# Patient Record
Sex: MALE | Race: WHITE | Employment: OTHER
[De-identification: names, ages, dates, MRNs, and addresses within clinical notes are randomized per-mention and may not be internally consistent; named-entity substitution may affect disease eponyms.]

---

## 2017-01-19 ENCOUNTER — TELEPHONE (OUTPATIENT)
Facility: CLINIC | Age: 82
End: 2017-01-19

## 2017-01-19 RX ORDER — PREDNISONE 20 MG/1
TABLET ORAL
Qty: 17 TABLET | Refills: 0 | Status: SHIPPED | OUTPATIENT
Start: 2017-01-19 | End: 2017-01-29

## 2017-02-13 ENCOUNTER — PROCEDURE VISIT (OUTPATIENT)
Dept: PODIATRY | Facility: CLINIC | Age: 82
End: 2017-02-13

## 2017-02-13 VITALS
HEIGHT: 64 IN | DIASTOLIC BLOOD PRESSURE: 76 MMHG | WEIGHT: 164 LBS | BODY MASS INDEX: 28 KG/M2 | HEART RATE: 72 BPM | SYSTOLIC BLOOD PRESSURE: 127 MMHG

## 2017-02-13 DIAGNOSIS — M79.674 PAIN IN TOES OF BOTH FEET: ICD-10-CM

## 2017-02-13 DIAGNOSIS — B35.1 ONYCHOMYCOSIS: Primary | ICD-10-CM

## 2017-02-13 DIAGNOSIS — M79.675 PAIN IN TOES OF BOTH FEET: ICD-10-CM

## 2017-02-13 PROCEDURE — 1036F TOBACCO NON-USER: CPT | Performed by: PODIATRIST

## 2017-02-13 PROCEDURE — 11721 DEBRIDE NAIL 6 OR MORE: CPT | Performed by: PODIATRIST

## 2017-02-13 ASSESSMENT — ENCOUNTER SYMPTOMS
CONSTIPATION: 0
DIARRHEA: 0
VOMITING: 0
COLOR CHANGE: 0
NAUSEA: 0

## 2017-02-17 ENCOUNTER — TELEPHONE (OUTPATIENT)
Facility: CLINIC | Age: 82
End: 2017-02-17

## 2017-02-17 RX ORDER — PREDNISONE 20 MG/1
TABLET ORAL
Qty: 17 TABLET | Refills: 0 | Status: SHIPPED | OUTPATIENT
Start: 2017-02-17 | End: 2017-02-27

## 2017-02-17 RX ORDER — BENZONATATE 100 MG/1
100 CAPSULE ORAL 3 TIMES DAILY PRN
Qty: 40 CAPSULE | Refills: 0 | Status: SHIPPED | OUTPATIENT
Start: 2017-02-17 | End: 2017-02-24

## 2017-02-28 ENCOUNTER — OFFICE VISIT (OUTPATIENT)
Facility: CLINIC | Age: 82
End: 2017-02-28

## 2017-02-28 VITALS
RESPIRATION RATE: 16 BRPM | OXYGEN SATURATION: 98 % | DIASTOLIC BLOOD PRESSURE: 70 MMHG | TEMPERATURE: 97.4 F | HEART RATE: 72 BPM | SYSTOLIC BLOOD PRESSURE: 108 MMHG

## 2017-02-28 DIAGNOSIS — J40 BRONCHITIS: Primary | ICD-10-CM

## 2017-02-28 DIAGNOSIS — I48.0 PAROXYSMAL ATRIAL FIBRILLATION (HCC): ICD-10-CM

## 2017-02-28 PROCEDURE — 4040F PNEUMOC VAC/ADMIN/RCVD: CPT | Performed by: NURSE PRACTITIONER

## 2017-02-28 PROCEDURE — G8420 CALC BMI NORM PARAMETERS: HCPCS | Performed by: NURSE PRACTITIONER

## 2017-02-28 PROCEDURE — 1036F TOBACCO NON-USER: CPT | Performed by: NURSE PRACTITIONER

## 2017-02-28 PROCEDURE — G8484 FLU IMMUNIZE NO ADMIN: HCPCS | Performed by: NURSE PRACTITIONER

## 2017-02-28 PROCEDURE — 1123F ACP DISCUSS/DSCN MKR DOCD: CPT | Performed by: NURSE PRACTITIONER

## 2017-02-28 PROCEDURE — 99213 OFFICE O/P EST LOW 20 MIN: CPT | Performed by: NURSE PRACTITIONER

## 2017-02-28 PROCEDURE — G8427 DOCREV CUR MEDS BY ELIG CLIN: HCPCS | Performed by: NURSE PRACTITIONER

## 2017-03-01 LAB
ABSOLUTE BASO #: 0 K/UL (ref 0–0.1)
ABSOLUTE EOS #: 0 K/UL (ref 0.1–0.4)
ABSOLUTE LYMPH #: 0.7 K/UL (ref 0.8–5.2)
ABSOLUTE MONO #: 0.4 K/UL (ref 0.1–0.9)
ABSOLUTE NEUT #: 8.9 K/UL (ref 1.3–9.1)
ANION GAP SERPL CALCULATED.3IONS-SCNC: 14 MMOL/L
BASOPHILS RELATIVE PERCENT: 0.1 % (ref 0–1)
BUN BLDV-MCNC: 23 MG/DL (ref 10–25)
CALCIUM SERPL-MCNC: 9.9 MG/DL (ref 8.7–10.8)
CHLORIDE BLD-SCNC: 100 MMOL/L (ref 95–111)
CO2: 26 MMOL/L (ref 21–32)
CREAT SERPL-MCNC: 1.1 MG/DL (ref 0.7–1.5)
EGFR AFRICAN AMERICAN: 77
EGFR IF NONAFRICAN AMERICAN: 64
EOSINOPHILS RELATIVE PERCENT: 0.1 % (ref 1–4)
GLUCOSE: 154 MG/DL (ref 70–100)
HCT VFR BLD CALC: 46.6 % (ref 41.4–51)
HEMOGLOBIN: 15.3 G/DL (ref 13.8–17)
LYMPHOCYTE %: 6.7 % (ref 16–48)
MCH RBC QN AUTO: 29.3 PG (ref 27–34)
MCHC RBC AUTO-ENTMCNC: 32.8 G/DL (ref 31–36)
MCV RBC AUTO: 89.3 FL (ref 80–100)
MONOCYTES # BLD: 4.3 % (ref 1–8)
NEUTROPHILS RELATIVE PERCENT: 87.9 % (ref 45–75)
PDW BLD-RTO: 13.8 % (ref 10.8–14.8)
PLATELETS: 265 K/UL (ref 150–450)
POTASSIUM SERPL-SCNC: 4.4 MMOL/L (ref 3.5–5.4)
RBC: 5.22 M/UL (ref 4–5.5)
SODIUM BLD-SCNC: 136 MMOL/L (ref 134–147)
WBC: 10.1 K/UL (ref 3.7–10.8)

## 2017-03-08 ENCOUNTER — HOSPITAL ENCOUNTER (OUTPATIENT)
Dept: GENERAL RADIOLOGY | Facility: CLINIC | Age: 82
Discharge: HOME | End: 2017-03-08
Payer: MEDICARE

## 2017-03-08 ENCOUNTER — HOSPITAL ENCOUNTER (OUTPATIENT)
Facility: CLINIC | Age: 82
Discharge: HOME | End: 2017-03-08
Payer: MEDICARE

## 2017-03-08 ENCOUNTER — OFFICE VISIT (OUTPATIENT)
Facility: CLINIC | Age: 82
End: 2017-03-08

## 2017-03-08 VITALS
BODY MASS INDEX: 29.01 KG/M2 | OXYGEN SATURATION: 97 % | RESPIRATION RATE: 16 BRPM | WEIGHT: 169 LBS | TEMPERATURE: 98.6 F | DIASTOLIC BLOOD PRESSURE: 76 MMHG | SYSTOLIC BLOOD PRESSURE: 124 MMHG | HEART RATE: 64 BPM

## 2017-03-08 DIAGNOSIS — I51.89 DIASTOLIC DYSFUNCTION: ICD-10-CM

## 2017-03-08 DIAGNOSIS — D50.9 IRON DEFICIENCY ANEMIA, UNSPECIFIED IRON DEFICIENCY ANEMIA TYPE: ICD-10-CM

## 2017-03-08 DIAGNOSIS — N40.1 BENIGN PROSTATIC HYPERPLASIA WITH LOWER URINARY TRACT SYMPTOMS, UNSPECIFIED MORPHOLOGY: ICD-10-CM

## 2017-03-08 DIAGNOSIS — K21.9 GASTROESOPHAGEAL REFLUX DISEASE WITHOUT ESOPHAGITIS: ICD-10-CM

## 2017-03-08 DIAGNOSIS — R61 EXCESSIVE SWEATING: ICD-10-CM

## 2017-03-08 DIAGNOSIS — G47.00 INSOMNIA, UNSPECIFIED TYPE: ICD-10-CM

## 2017-03-08 DIAGNOSIS — R61 NIGHT SWEATS: ICD-10-CM

## 2017-03-08 DIAGNOSIS — E78.00 PURE HYPERCHOLESTEROLEMIA: ICD-10-CM

## 2017-03-08 DIAGNOSIS — M19.90 OSTEOARTHRITIS, UNSPECIFIED OSTEOARTHRITIS TYPE, UNSPECIFIED SITE: ICD-10-CM

## 2017-03-08 DIAGNOSIS — E53.8 B12 DEFICIENCY: ICD-10-CM

## 2017-03-08 DIAGNOSIS — I10 ESSENTIAL HYPERTENSION: Primary | ICD-10-CM

## 2017-03-08 DIAGNOSIS — R74.8 ELEVATED LIVER ENZYMES: ICD-10-CM

## 2017-03-08 DIAGNOSIS — I48.0 PAROXYSMAL ATRIAL FIBRILLATION (HCC): ICD-10-CM

## 2017-03-08 DIAGNOSIS — K58.9 IRRITABLE BOWEL SYNDROME WITHOUT DIARRHEA: ICD-10-CM

## 2017-03-08 DIAGNOSIS — E87.1 HYPONATREMIA: ICD-10-CM

## 2017-03-08 DIAGNOSIS — K57.30 DIVERTICULOSIS OF LARGE INTESTINE WITHOUT HEMORRHAGE: ICD-10-CM

## 2017-03-08 LAB
ABSOLUTE EOS #: 0.2 K/UL (ref 0–0.4)
ABSOLUTE LYMPH #: 0.9 K/UL (ref 1–4.8)
ABSOLUTE MONO #: 1.4 K/UL (ref 0.1–1.2)
ABSOLUTE RETIC #: 0.07 M/UL (ref 0.02–0.1)
BASOPHILS # BLD: 0 % (ref 0–2)
BASOPHILS ABSOLUTE: 0 K/UL (ref 0–0.2)
DIFFERENTIAL TYPE: ABNORMAL
EOSINOPHILS RELATIVE PERCENT: 3 % (ref 1–4)
HCT VFR BLD CALC: 43.5 % (ref 41–53)
HEMOGLOBIN: 14.5 G/DL (ref 13.5–17.5)
LYMPHOCYTES # BLD: 12 % (ref 24–44)
MCH RBC QN AUTO: 30.2 PG (ref 26–34)
MCHC RBC AUTO-ENTMCNC: 33.4 G/DL (ref 31–37)
MCV RBC AUTO: 90.4 FL (ref 80–100)
MONOCYTES # BLD: 20 % (ref 2–11)
PDW BLD-RTO: 16 % (ref 12.5–15.4)
PLATELET # BLD: 181 K/UL (ref 140–450)
PLATELET ESTIMATE: ABNORMAL
PMV BLD AUTO: 9.5 FL (ref 6–12)
RBC # BLD: 4.81 M/UL (ref 4.5–5.9)
RBC # BLD: ABNORMAL 10*6/UL
RETIC %: 1.6 % (ref 0.5–2)
SEG NEUTROPHILS: 65 % (ref 36–66)
SEGMENTED NEUTROPHILS ABSOLUTE COUNT: 4.7 K/UL (ref 1.8–7.7)
WBC # BLD: 7.3 K/UL (ref 3.5–11)
WBC # BLD: ABNORMAL 10*3/UL

## 2017-03-08 PROCEDURE — G8420 CALC BMI NORM PARAMETERS: HCPCS | Performed by: FAMILY MEDICINE

## 2017-03-08 PROCEDURE — 84439 ASSAY OF FREE THYROXINE: CPT

## 2017-03-08 PROCEDURE — 1036F TOBACCO NON-USER: CPT | Performed by: FAMILY MEDICINE

## 2017-03-08 PROCEDURE — 85045 AUTOMATED RETICULOCYTE COUNT: CPT

## 2017-03-08 PROCEDURE — 99213 OFFICE O/P EST LOW 20 MIN: CPT | Performed by: FAMILY MEDICINE

## 2017-03-08 PROCEDURE — 4040F PNEUMOC VAC/ADMIN/RCVD: CPT | Performed by: FAMILY MEDICINE

## 2017-03-08 PROCEDURE — 85025 COMPLETE CBC W/AUTO DIFF WBC: CPT

## 2017-03-08 PROCEDURE — 84443 ASSAY THYROID STIM HORMONE: CPT

## 2017-03-08 PROCEDURE — 71020 XR CHEST STANDARD TWO VW: CPT

## 2017-03-08 PROCEDURE — G8427 DOCREV CUR MEDS BY ELIG CLIN: HCPCS | Performed by: FAMILY MEDICINE

## 2017-03-08 PROCEDURE — 1123F ACP DISCUSS/DSCN MKR DOCD: CPT | Performed by: FAMILY MEDICINE

## 2017-03-08 PROCEDURE — 36415 COLL VENOUS BLD VENIPUNCTURE: CPT

## 2017-03-08 PROCEDURE — G8484 FLU IMMUNIZE NO ADMIN: HCPCS | Performed by: FAMILY MEDICINE

## 2017-03-08 ASSESSMENT — ENCOUNTER SYMPTOMS
ABDOMINAL DISTENTION: 0
VOMITING: 0
NAUSEA: 0
CHEST TIGHTNESS: 0
ABDOMINAL PAIN: 0
BACK PAIN: 0
RHINORRHEA: 0
COUGH: 1
SHORTNESS OF BREATH: 0
CONSTIPATION: 0
SORE THROAT: 0
DIARRHEA: 0

## 2017-03-09 LAB
PATHOLOGIST REVIEW: NORMAL
SURGICAL PATHOLOGY REPORT: NORMAL
THYROXINE, FREE: 1.25 NG/DL (ref 0.93–1.7)
TSH SERPL DL<=0.05 MIU/L-ACNC: 2.66 MIU/L (ref 0.3–5)

## 2017-05-24 ENCOUNTER — PROCEDURE VISIT (OUTPATIENT)
Dept: PODIATRY | Age: 82
End: 2017-05-24
Payer: MEDICARE

## 2017-05-24 VITALS — HEART RATE: 70 BPM | SYSTOLIC BLOOD PRESSURE: 142 MMHG | DIASTOLIC BLOOD PRESSURE: 77 MMHG

## 2017-05-24 DIAGNOSIS — B35.1 ONYCHOMYCOSIS: Primary | ICD-10-CM

## 2017-05-24 DIAGNOSIS — M79.675 PAIN IN TOES OF BOTH FEET: ICD-10-CM

## 2017-05-24 DIAGNOSIS — M79.674 PAIN IN TOES OF BOTH FEET: ICD-10-CM

## 2017-05-24 LAB
ABSOLUTE BASO #: 0 K/UL (ref 0–0.1)
ABSOLUTE EOS #: 0.1 K/UL (ref 0.1–0.4)
ABSOLUTE LYMPH #: 0.9 K/UL (ref 0.8–5.2)
ABSOLUTE MONO #: 0.9 K/UL (ref 0.1–0.9)
ABSOLUTE NEUT #: 4.3 K/UL (ref 1.3–9.1)
ALBUMIN SERPL-MCNC: 3.8 G/DL (ref 3.2–5.3)
ALK PHOSPHATASE: 64 IU/L (ref 35–121)
ALT SERPL-CCNC: 15 IU/L (ref 5–59)
ANION GAP SERPL CALCULATED.3IONS-SCNC: 12 MMOL/L
AST SERPL-CCNC: 18 IU/L (ref 10–42)
BASOPHILS RELATIVE PERCENT: 0.3 %
BILIRUB SERPL-MCNC: 1.1 MG/DL (ref 0.2–1.3)
BUN BLDV-MCNC: 13 MG/DL (ref 10–25)
CALCIUM SERPL-MCNC: 9.5 MG/DL (ref 8.7–10.8)
CHLORIDE BLD-SCNC: 101 MMOL/L (ref 95–111)
CHOLESTEROL/HDL RATIO: 2.3
CHOLESTEROL: 115 MG/DL
CO2: 30 MMOL/L (ref 21–32)
CREAT SERPL-MCNC: 1 MG/DL (ref 0.7–1.5)
EGFR AFRICAN AMERICAN: 86
EGFR IF NONAFRICAN AMERICAN: 71
EOSINOPHILS RELATIVE PERCENT: 1.3 %
GLUCOSE: 89 MG/DL (ref 70–100)
HCT VFR BLD CALC: 44.2 % (ref 41.4–51)
HDLC SERPL-MCNC: 50 MG/DL (ref 40–60)
HEMOGLOBIN: 14.6 G/DL (ref 13.8–17)
IRON: 67 MCG/DL (ref 50–160)
LDL CHOLESTEROL CALCULATED: 53 MG/DL
LDL/HDL RATIO: 1.1
LYMPHOCYTE %: 15 %
MCH RBC QN AUTO: 30 PG (ref 27–34)
MCHC RBC AUTO-ENTMCNC: 33 G/DL (ref 31–36)
MCV RBC AUTO: 90.9 FL (ref 80–100)
MONOCYTES # BLD: 13.8 %
NEUTROPHILS RELATIVE PERCENT: 69.3 %
PDW BLD-RTO: 13.2 % (ref 10.8–14.8)
PLATELETS: 200 K/UL (ref 150–450)
POTASSIUM SERPL-SCNC: 4.6 MMOL/L (ref 3.5–5.4)
RBC: 4.86 M/UL (ref 4–5.5)
SODIUM BLD-SCNC: 138 MMOL/L (ref 134–147)
TOTAL PROTEIN: 5.9 G/DL (ref 5.8–8)
TRIGL SERPL-MCNC: 61 MG/DL
VITAMIN B-12: 448 PG/ML (ref 211–911)
VLDLC SERPL CALC-MCNC: 12 MG/DL
WBC: 6.1 K/UL (ref 3.7–10.8)

## 2017-05-24 PROCEDURE — 1036F TOBACCO NON-USER: CPT | Performed by: PODIATRIST

## 2017-05-24 PROCEDURE — 11721 DEBRIDE NAIL 6 OR MORE: CPT | Performed by: PODIATRIST

## 2017-05-24 ASSESSMENT — ENCOUNTER SYMPTOMS
DIARRHEA: 0
COLOR CHANGE: 0
CONSTIPATION: 0
NAUSEA: 0
VOMITING: 0

## 2017-06-08 PROBLEM — R00.1 BRADYCARDIA: Status: ACTIVE | Noted: 2017-06-08

## 2017-06-08 PROBLEM — I45.5 SINUS PAUSE: Status: ACTIVE | Noted: 2017-06-08

## 2017-08-08 ENCOUNTER — OFFICE VISIT (OUTPATIENT)
Dept: UROLOGY | Age: 82
End: 2017-08-08
Payer: MEDICARE

## 2017-08-08 VITALS
HEART RATE: 89 BPM | SYSTOLIC BLOOD PRESSURE: 127 MMHG | WEIGHT: 164.9 LBS | RESPIRATION RATE: 16 BRPM | HEIGHT: 61 IN | DIASTOLIC BLOOD PRESSURE: 90 MMHG | BODY MASS INDEX: 31.13 KG/M2

## 2017-08-08 DIAGNOSIS — N40.1 BENIGN PROSTATIC HYPERPLASIA WITH LOWER URINARY TRACT SYMPTOMS, UNSPECIFIED MORPHOLOGY: ICD-10-CM

## 2017-08-08 DIAGNOSIS — R35.1 NOCTURIA: Primary | ICD-10-CM

## 2017-08-08 PROCEDURE — 99213 OFFICE O/P EST LOW 20 MIN: CPT | Performed by: NURSE PRACTITIONER

## 2017-08-08 PROCEDURE — 1123F ACP DISCUSS/DSCN MKR DOCD: CPT | Performed by: NURSE PRACTITIONER

## 2017-08-08 PROCEDURE — 4040F PNEUMOC VAC/ADMIN/RCVD: CPT | Performed by: NURSE PRACTITIONER

## 2017-08-08 PROCEDURE — G8427 DOCREV CUR MEDS BY ELIG CLIN: HCPCS | Performed by: NURSE PRACTITIONER

## 2017-08-08 PROCEDURE — 1036F TOBACCO NON-USER: CPT | Performed by: NURSE PRACTITIONER

## 2017-08-08 PROCEDURE — G8417 CALC BMI ABV UP PARAM F/U: HCPCS | Performed by: NURSE PRACTITIONER

## 2017-08-08 RX ORDER — WARFARIN SODIUM 2 MG/1
2 TABLET ORAL
COMMUNITY
End: 2018-05-22

## 2017-08-08 RX ORDER — TAMSULOSIN HYDROCHLORIDE 0.4 MG/1
0.4 CAPSULE ORAL 2 TIMES DAILY
Qty: 180 CAPSULE | Refills: 3 | Status: SHIPPED | OUTPATIENT
Start: 2017-08-08 | End: 2018-08-01 | Stop reason: SDUPTHER

## 2017-08-08 ASSESSMENT — ENCOUNTER SYMPTOMS
COUGH: 0
COLOR CHANGE: 0
ABDOMINAL PAIN: 0
NAUSEA: 0
VOMITING: 0
WHEEZING: 0
SHORTNESS OF BREATH: 0
BACK PAIN: 0
EYE PAIN: 0
EYE REDNESS: 0

## 2017-08-16 ENCOUNTER — PROCEDURE VISIT (OUTPATIENT)
Dept: PODIATRY | Age: 82
End: 2017-08-16
Payer: MEDICARE

## 2017-08-16 VITALS
HEIGHT: 62 IN | DIASTOLIC BLOOD PRESSURE: 69 MMHG | SYSTOLIC BLOOD PRESSURE: 121 MMHG | BODY MASS INDEX: 29.81 KG/M2 | WEIGHT: 162 LBS | HEART RATE: 63 BPM

## 2017-08-16 DIAGNOSIS — M79.675 PAIN IN TOES OF BOTH FEET: ICD-10-CM

## 2017-08-16 DIAGNOSIS — M79.674 PAIN IN TOES OF BOTH FEET: ICD-10-CM

## 2017-08-16 DIAGNOSIS — B35.1 ONYCHOMYCOSIS: Primary | ICD-10-CM

## 2017-08-16 PROCEDURE — 11721 DEBRIDE NAIL 6 OR MORE: CPT | Performed by: PODIATRIST

## 2017-08-16 PROCEDURE — 1036F TOBACCO NON-USER: CPT | Performed by: PODIATRIST

## 2017-08-16 ASSESSMENT — ENCOUNTER SYMPTOMS
CONSTIPATION: 0
VOMITING: 0
NAUSEA: 0
DIARRHEA: 0
COLOR CHANGE: 0

## 2017-08-21 ENCOUNTER — HOSPITAL ENCOUNTER (OUTPATIENT)
Dept: GENERAL RADIOLOGY | Facility: CLINIC | Age: 82
Discharge: HOME | End: 2017-08-21
Payer: MEDICARE

## 2017-08-21 ENCOUNTER — HOSPITAL ENCOUNTER (OUTPATIENT)
Facility: CLINIC | Age: 82
Discharge: HOME | End: 2017-08-21
Payer: MEDICARE

## 2017-08-21 DIAGNOSIS — J20.9 ACUTE BRONCHITIS, UNSPECIFIED ORGANISM: ICD-10-CM

## 2017-08-21 DIAGNOSIS — R05.9 COUGH: ICD-10-CM

## 2017-08-21 PROCEDURE — 71020 XR CHEST STANDARD TWO VW: CPT

## 2017-11-01 ENCOUNTER — PROCEDURE VISIT (OUTPATIENT)
Dept: PODIATRY | Age: 82
End: 2017-11-01
Payer: MEDICARE

## 2017-11-01 VITALS
HEART RATE: 70 BPM | DIASTOLIC BLOOD PRESSURE: 95 MMHG | BODY MASS INDEX: 28.17 KG/M2 | WEIGHT: 165 LBS | SYSTOLIC BLOOD PRESSURE: 155 MMHG | HEIGHT: 64 IN

## 2017-11-01 DIAGNOSIS — M79.675 PAIN IN TOES OF BOTH FEET: ICD-10-CM

## 2017-11-01 DIAGNOSIS — M79.674 PAIN IN TOES OF BOTH FEET: ICD-10-CM

## 2017-11-01 DIAGNOSIS — B35.1 ONYCHOMYCOSIS: Primary | ICD-10-CM

## 2017-11-01 PROCEDURE — 11721 DEBRIDE NAIL 6 OR MORE: CPT | Performed by: PODIATRIST

## 2017-11-01 ASSESSMENT — ENCOUNTER SYMPTOMS
COLOR CHANGE: 0
NAUSEA: 0
CONSTIPATION: 0
VOMITING: 0
DIARRHEA: 0

## 2017-11-01 NOTE — PROGRESS NOTES
[x] [x] [x] [x] [x] [x] [x] [x] [x] [x]  5 4 3 2 1 1 2 3 4 5                          Right                                        Left    Incurvation/Ingrowin                                                                   [] [] [] [] [] [] [] [] [] []  5 4 3 2 1 1 2 3 4 5                         Right                                        Left    Inflammation/Pain                                                                   [x] [x] [x] [x] [x] [x] [x] [x] [x] [x]  5 4 3 2 1 1 2 3 4 5                         Right                                        Left    Vascular:  DP/PT pulses palpable 2/4, Bilateral.    CFT <3 seconds to digits 1-5, Bilateral .   Hair growth present to level of digits, Bilateral.    Neurological:  Sensation present to light touch to level of digits, Bilateral.    Assessment:  80 y.o. male with:   1. Onychomycosis    2. Pain in toes of both feet       Orders Placed This Encounter   Procedures    NM DEBRIDEMENT OF NAILS, 6 OR MORE         Plan:   Pt was evaluated and examined. Patient was given personalized discharge instructions. Nails 1-10 were debrided in length and thickness sharply with a nail nipper and  without incident, slant back performed. Pt will follow up in 9-12 weeks or sooner if any problems arise. Diagnosis was discussed with the pt and all of their questions were answered in detail. Proper foot hygiene and care was discussed with the pt. Patient to check feet daily and contact the office with any questions/problems/concerns. Other comorbidity noted and will be managed by PCP. Pain waiver discussed with patient and confirmed.          11/1/2017    Electronically signed by Deya Camargo DPM on 11/1/2017 at 4:56 PM

## 2017-11-30 ENCOUNTER — HOSPITAL ENCOUNTER (OUTPATIENT)
Age: 82
Setting detail: SPECIMEN
Discharge: HOME | End: 2017-11-30
Payer: MEDICARE

## 2017-11-30 DIAGNOSIS — I51.89 DIASTOLIC DYSFUNCTION: ICD-10-CM

## 2017-11-30 DIAGNOSIS — D50.9 IRON DEFICIENCY ANEMIA, UNSPECIFIED IRON DEFICIENCY ANEMIA TYPE: ICD-10-CM

## 2017-11-30 DIAGNOSIS — E53.8 B12 DEFICIENCY: ICD-10-CM

## 2017-11-30 DIAGNOSIS — I10 ESSENTIAL HYPERTENSION: ICD-10-CM

## 2017-11-30 DIAGNOSIS — E87.1 HYPONATREMIA: ICD-10-CM

## 2017-11-30 DIAGNOSIS — R74.8 ELEVATED LIVER ENZYMES: ICD-10-CM

## 2017-11-30 DIAGNOSIS — E78.00 PURE HYPERCHOLESTEROLEMIA: ICD-10-CM

## 2017-11-30 LAB
ALBUMIN SERPL-MCNC: 4 G/DL (ref 3.5–5.2)
ALBUMIN/GLOBULIN RATIO: 1.6 (ref 1–2.5)
ALP BLD-CCNC: 72 U/L (ref 40–129)
ALT SERPL-CCNC: 20 U/L (ref 5–41)
ANION GAP SERPL CALCULATED.3IONS-SCNC: 12 MMOL/L (ref 9–17)
AST SERPL-CCNC: 21 U/L
BILIRUB SERPL-MCNC: 1.29 MG/DL (ref 0.3–1.2)
BUN BLDV-MCNC: 18 MG/DL (ref 8–23)
BUN/CREAT BLD: ABNORMAL (ref 9–20)
CALCIUM SERPL-MCNC: 9.1 MG/DL (ref 8.6–10.4)
CHLORIDE BLD-SCNC: 100 MMOL/L (ref 98–107)
CHOLESTEROL/HDL RATIO: 1.9
CHOLESTEROL: 125 MG/DL
CO2: 27 MMOL/L (ref 20–31)
CREAT SERPL-MCNC: 0.94 MG/DL (ref 0.7–1.2)
GFR AFRICAN AMERICAN: >60 ML/MIN
GFR NON-AFRICAN AMERICAN: >60 ML/MIN
GFR SERPL CREATININE-BSD FRML MDRD: ABNORMAL ML/MIN/{1.73_M2}
GFR SERPL CREATININE-BSD FRML MDRD: ABNORMAL ML/MIN/{1.73_M2}
GLUCOSE BLD-MCNC: 87 MG/DL (ref 70–99)
HCT VFR BLD CALC: 47.5 % (ref 40.7–50.3)
HDLC SERPL-MCNC: 65 MG/DL
HEMOGLOBIN: 14.7 G/DL (ref 13–17)
IRON: 77 UG/DL (ref 59–158)
LDL CHOLESTEROL: 46 MG/DL (ref 0–130)
MCH RBC QN AUTO: 29.5 PG (ref 25.2–33.5)
MCHC RBC AUTO-ENTMCNC: 30.9 G/DL (ref 28.4–34.8)
MCV RBC AUTO: 95.2 FL (ref 82.6–102.9)
PDW BLD-RTO: 14.8 % (ref 11.8–14.4)
PLATELET # BLD: 214 K/UL (ref 138–453)
PMV BLD AUTO: 11.1 FL (ref 8.1–13.5)
POTASSIUM SERPL-SCNC: 4.4 MMOL/L (ref 3.7–5.3)
RBC # BLD: 4.99 M/UL (ref 4.21–5.77)
SODIUM BLD-SCNC: 139 MMOL/L (ref 135–144)
TOTAL PROTEIN: 6.5 G/DL (ref 6.4–8.3)
TRIGL SERPL-MCNC: 70 MG/DL
VITAMIN B-12: 694 PG/ML (ref 211–946)
VLDLC SERPL CALC-MCNC: NORMAL MG/DL (ref 1–30)
WBC # BLD: 6.7 K/UL (ref 3.5–11.3)

## 2018-01-17 ENCOUNTER — PROCEDURE VISIT (OUTPATIENT)
Dept: PODIATRY | Age: 83
End: 2018-01-17
Payer: MEDICARE

## 2018-01-17 VITALS
HEIGHT: 64 IN | BODY MASS INDEX: 28.17 KG/M2 | SYSTOLIC BLOOD PRESSURE: 152 MMHG | WEIGHT: 165 LBS | DIASTOLIC BLOOD PRESSURE: 77 MMHG | HEART RATE: 74 BPM

## 2018-01-17 DIAGNOSIS — M79.674 PAIN IN TOES OF BOTH FEET: ICD-10-CM

## 2018-01-17 DIAGNOSIS — M79.675 PAIN IN TOES OF BOTH FEET: ICD-10-CM

## 2018-01-17 DIAGNOSIS — B35.1 ONYCHOMYCOSIS: Primary | ICD-10-CM

## 2018-01-17 PROCEDURE — 11721 DEBRIDE NAIL 6 OR MORE: CPT | Performed by: PODIATRIST

## 2018-01-17 RX ORDER — WARFARIN SODIUM 5 MG/1
5 TABLET ORAL DAILY
COMMUNITY
Start: 2018-01-11 | End: 2019-08-01 | Stop reason: DRUGHIGH

## 2018-01-17 ASSESSMENT — ENCOUNTER SYMPTOMS
DIARRHEA: 0
CONSTIPATION: 0
COLOR CHANGE: 0
VOMITING: 0
NAUSEA: 0

## 2018-01-17 NOTE — PROGRESS NOTES
times daily 60 tablet 11    metoprolol tartrate (LOPRESSOR) 25 MG tablet Take 25 mg by mouth 2 times daily      Ferrous Gluconate (IRON 27 PO) Take 1 tablet by mouth daily      aspirin 81 MG tablet Take 81 mg by mouth daily      vitamin B-12 (CYANOCOBALAMIN) 100 MCG tablet Take 50 mcg by mouth daily      vitamin D-3 (CHOLECALCIFEROL) 5000 UNITS TABS Take 5,000 Units by mouth daily      Multiple Vitamins-Minerals (THERAPEUTIC MULTIVITAMIN-MINERALS) tablet Take 1 tablet by mouth daily.  Docusate Calcium (STOOL SOFTENER PO) Take  by mouth.  nitroGLYCERIN (NITROSTAT) 0.4 MG SL tablet Place 0.4 mg under the tongue every 5 minutes as needed.  Melatonin 3 MG TABS Take 3 mg by mouth nightly as needed. No current facility-administered medications on file prior to visit. Review of Systems   Constitutional: Negative for chills, diaphoresis, fatigue, fever and unexpected weight change. Cardiovascular: Negative for leg swelling. Gastrointestinal: Negative for constipation, diarrhea, nausea and vomiting. Musculoskeletal: Positive for gait problem. Negative for arthralgias and joint swelling. Skin: Negative for color change, pallor, rash and wound. Neurological: Negative for weakness and numbness. Objective:  General: AAO x 3 in NAD. Derm  Left hallux nail incurvated medial border with slight redness and pain to palpation.     Sites of Onychomycosis Involvement (Check affected area)  [x] [x] [x] [x] [x] [x] [x] [x] [x] [x]  5 4 3 2 1 1 2 3 4 5                          Right                                        Left    Thickness  [x] [x] [x] [x] [x] [x] [x] [x] [x] [x]  5 4 3 2 1 1 2 3 4 5                         Right                                        Left    Dystrophic Changes                                                                 [x] [x] [x] [x] [x] [x] [x] [x] [x] [x]  5 4 3 2 1 1 2 3 4 5                         Right Left    Color                                                                  [x] [x] [x] [x] [x] [x] [x] [x] [x] [x]  5 4 3 2 1 1 2 3 4 5                          Right                                        Left    Incurvation/Ingrowin                                                                   [] [] [] [] [] [] [] [] [] []  5 4 3 2 1 1 2 3 4 5                         Right                                        Left    Inflammation/Pain                                                                   [x] [x] [x] [x] [x] [x] [x] [x] [x] [x]  5 4 3 2 1 1 2 3 4 5                         Right                                        Left    Vascular:  DP/PT pulses palpable 2/4, Bilateral.    CFT <3 seconds to digits 1-5, Bilateral .   Hair growth present to level of digits, Bilateral.    Neurological:  Sensation present to light touch to level of digits, Bilateral.    Assessment:  80 y.o. male with:   1. Onychomycosis    2. Pain in toes of both feet       Orders Placed This Encounter   Procedures    NE DEBRIDEMENT OF NAILS, 6 OR MORE         Plan:   Pt was evaluated and examined. Patient was given personalized discharge instructions. Nails 1-10 were debrided in length and thickness sharply with a nail nipper and  without incident, slant back performed. Pt will follow up in 9-12 weeks or sooner if any problems arise. Diagnosis was discussed with the pt and all of their questions were answered in detail. Proper foot hygiene and care was discussed with the pt. Patient to check feet daily and contact the office with any questions/problems/concerns. Other comorbidity noted and will be managed by PCP. Pain waiver discussed with patient and confirmed.          1/17/2018    Electronically signed by Gregory Silva DPM on 1/17/2018 at 4:28 PM

## 2018-04-17 ENCOUNTER — OFFICE VISIT (OUTPATIENT)
Dept: PODIATRY | Age: 83
End: 2018-04-17
Payer: MEDICARE

## 2018-04-17 VITALS
DIASTOLIC BLOOD PRESSURE: 70 MMHG | SYSTOLIC BLOOD PRESSURE: 106 MMHG | HEART RATE: 68 BPM | WEIGHT: 160 LBS | HEIGHT: 64 IN | BODY MASS INDEX: 27.31 KG/M2

## 2018-04-17 DIAGNOSIS — B35.1 ONYCHOMYCOSIS: Primary | ICD-10-CM

## 2018-04-17 DIAGNOSIS — M79.674 PAIN IN TOES OF BOTH FEET: ICD-10-CM

## 2018-04-17 DIAGNOSIS — M79.675 PAIN IN TOES OF BOTH FEET: ICD-10-CM

## 2018-04-17 PROCEDURE — 11721 DEBRIDE NAIL 6 OR MORE: CPT | Performed by: PODIATRIST

## 2018-04-17 ASSESSMENT — ENCOUNTER SYMPTOMS
CONSTIPATION: 0
NAUSEA: 0
DIARRHEA: 0
VOMITING: 0
COLOR CHANGE: 0

## 2018-05-22 ENCOUNTER — OFFICE VISIT (OUTPATIENT)
Dept: GASTROENTEROLOGY | Age: 83
End: 2018-05-22
Payer: MEDICARE

## 2018-05-22 VITALS
HEART RATE: 80 BPM | BODY MASS INDEX: 28.6 KG/M2 | DIASTOLIC BLOOD PRESSURE: 70 MMHG | SYSTOLIC BLOOD PRESSURE: 111 MMHG | WEIGHT: 166.6 LBS

## 2018-05-22 DIAGNOSIS — K58.9 IRRITABLE BOWEL SYNDROME, UNSPECIFIED TYPE: ICD-10-CM

## 2018-05-22 DIAGNOSIS — K21.9 GASTROESOPHAGEAL REFLUX DISEASE WITHOUT ESOPHAGITIS: ICD-10-CM

## 2018-05-22 DIAGNOSIS — K58.9 IRRITABLE BOWEL SYNDROME WITHOUT DIARRHEA: Primary | ICD-10-CM

## 2018-05-22 PROCEDURE — 99215 OFFICE O/P EST HI 40 MIN: CPT | Performed by: INTERNAL MEDICINE

## 2018-05-22 PROCEDURE — G8417 CALC BMI ABV UP PARAM F/U: HCPCS | Performed by: INTERNAL MEDICINE

## 2018-05-22 PROCEDURE — G8427 DOCREV CUR MEDS BY ELIG CLIN: HCPCS | Performed by: INTERNAL MEDICINE

## 2018-05-22 ASSESSMENT — ENCOUNTER SYMPTOMS
GASTROINTESTINAL NEGATIVE: 1
CONSTIPATION: 0
ABDOMINAL DISTENTION: 0
BLOOD IN STOOL: 0
NAUSEA: 0
ABDOMINAL PAIN: 0
TROUBLE SWALLOWING: 0
VOMITING: 0
RECTAL PAIN: 0
ANAL BLEEDING: 0
ALLERGIC/IMMUNOLOGIC NEGATIVE: 1
DIARRHEA: 0
RESPIRATORY NEGATIVE: 1

## 2018-05-31 ENCOUNTER — HOSPITAL ENCOUNTER (OUTPATIENT)
Age: 83
Setting detail: SPECIMEN
Discharge: HOME | End: 2018-05-31
Payer: MEDICARE

## 2018-05-31 DIAGNOSIS — I10 ESSENTIAL HYPERTENSION: ICD-10-CM

## 2018-05-31 DIAGNOSIS — I51.89 DIASTOLIC DYSFUNCTION: ICD-10-CM

## 2018-05-31 DIAGNOSIS — R74.8 ELEVATED LIVER ENZYMES: ICD-10-CM

## 2018-05-31 DIAGNOSIS — E53.8 B12 DEFICIENCY: ICD-10-CM

## 2018-05-31 DIAGNOSIS — E78.00 PURE HYPERCHOLESTEROLEMIA: ICD-10-CM

## 2018-05-31 DIAGNOSIS — E87.1 HYPONATREMIA: ICD-10-CM

## 2018-05-31 DIAGNOSIS — D50.9 IRON DEFICIENCY ANEMIA, UNSPECIFIED IRON DEFICIENCY ANEMIA TYPE: ICD-10-CM

## 2018-05-31 LAB
ALBUMIN SERPL-MCNC: 3.7 G/DL (ref 3.5–5.2)
ALBUMIN/GLOBULIN RATIO: 1.4 (ref 1–2.5)
ALP BLD-CCNC: 83 U/L (ref 40–129)
ALT SERPL-CCNC: 17 U/L (ref 5–41)
ANION GAP SERPL CALCULATED.3IONS-SCNC: 11 MMOL/L (ref 9–17)
AST SERPL-CCNC: 18 U/L
BILIRUB SERPL-MCNC: 1.13 MG/DL (ref 0.3–1.2)
BUN BLDV-MCNC: 9 MG/DL (ref 8–23)
BUN/CREAT BLD: ABNORMAL (ref 9–20)
CALCIUM SERPL-MCNC: 9.1 MG/DL (ref 8.6–10.4)
CHLORIDE BLD-SCNC: 98 MMOL/L (ref 98–107)
CHOLESTEROL/HDL RATIO: 2.2
CHOLESTEROL: 114 MG/DL
CO2: 26 MMOL/L (ref 20–31)
CREAT SERPL-MCNC: 0.85 MG/DL (ref 0.7–1.2)
GFR AFRICAN AMERICAN: >60 ML/MIN
GFR NON-AFRICAN AMERICAN: >60 ML/MIN
GFR SERPL CREATININE-BSD FRML MDRD: ABNORMAL ML/MIN/{1.73_M2}
GFR SERPL CREATININE-BSD FRML MDRD: ABNORMAL ML/MIN/{1.73_M2}
GLUCOSE BLD-MCNC: 84 MG/DL (ref 70–99)
HCT VFR BLD CALC: 43.1 % (ref 40.7–50.3)
HDLC SERPL-MCNC: 52 MG/DL
HEMOGLOBIN: 13.8 G/DL (ref 13–17)
IRON: 59 UG/DL (ref 59–158)
LDL CHOLESTEROL: 45 MG/DL (ref 0–130)
MCH RBC QN AUTO: 30.2 PG (ref 25.2–33.5)
MCHC RBC AUTO-ENTMCNC: 32 G/DL (ref 28.4–34.8)
MCV RBC AUTO: 94.3 FL (ref 82.6–102.9)
NRBC AUTOMATED: 0 PER 100 WBC
PDW BLD-RTO: 14.5 % (ref 11.8–14.4)
PLATELET # BLD: 203 K/UL (ref 138–453)
PMV BLD AUTO: 11.1 FL (ref 8.1–13.5)
POTASSIUM SERPL-SCNC: 4.5 MMOL/L (ref 3.7–5.3)
RBC # BLD: 4.57 M/UL (ref 4.21–5.77)
SODIUM BLD-SCNC: 135 MMOL/L (ref 135–144)
TOTAL PROTEIN: 6.3 G/DL (ref 6.4–8.3)
TRIGL SERPL-MCNC: 84 MG/DL
VITAMIN B-12: 839 PG/ML (ref 232–1245)
VLDLC SERPL CALC-MCNC: NORMAL MG/DL (ref 1–30)
WBC # BLD: 5.7 K/UL (ref 3.5–11.3)

## 2018-06-14 PROBLEM — I45.5 SINUS PAUSE: Status: RESOLVED | Noted: 2017-06-08 | Resolved: 2018-06-14

## 2018-06-22 ENCOUNTER — TELEPHONE (OUTPATIENT)
Dept: FAMILY MEDICINE CLINIC | Age: 83
End: 2018-06-22

## 2018-07-03 RX ORDER — HYDRALAZINE HYDROCHLORIDE 50 MG/1
50 TABLET, FILM COATED ORAL 2 TIMES DAILY
Qty: 60 TABLET | Refills: 11 | Status: SHIPPED | OUTPATIENT
Start: 2018-07-03 | End: 2019-06-25 | Stop reason: SDUPTHER

## 2018-07-17 ENCOUNTER — OFFICE VISIT (OUTPATIENT)
Dept: PODIATRY | Age: 83
End: 2018-07-17
Payer: MEDICARE

## 2018-07-17 VITALS
HEART RATE: 68 BPM | SYSTOLIC BLOOD PRESSURE: 128 MMHG | BODY MASS INDEX: 27.31 KG/M2 | HEIGHT: 64 IN | DIASTOLIC BLOOD PRESSURE: 76 MMHG | WEIGHT: 160 LBS

## 2018-07-17 DIAGNOSIS — M79.674 PAIN IN TOES OF BOTH FEET: ICD-10-CM

## 2018-07-17 DIAGNOSIS — M79.675 PAIN IN TOES OF BOTH FEET: ICD-10-CM

## 2018-07-17 DIAGNOSIS — B35.1 ONYCHOMYCOSIS: Primary | ICD-10-CM

## 2018-07-17 PROCEDURE — 99999 PR OFFICE/OUTPT VISIT,PROCEDURE ONLY: CPT | Performed by: PODIATRIST

## 2018-07-17 PROCEDURE — 11721 DEBRIDE NAIL 6 OR MORE: CPT | Performed by: PODIATRIST

## 2018-07-17 ASSESSMENT — ENCOUNTER SYMPTOMS
DIARRHEA: 0
NAUSEA: 0
VOMITING: 0
COLOR CHANGE: 0
CONSTIPATION: 0

## 2018-07-17 NOTE — PROGRESS NOTES
daily 30 tablet 11    tamsulosin (FLOMAX) 0.4 MG capsule Take 1 capsule by mouth 2 times daily 180 capsule 3    metoprolol tartrate (LOPRESSOR) 25 MG tablet Take 25 mg by mouth 2 times daily      Ferrous Gluconate (IRON 27 PO) Take 1 tablet by mouth daily      vitamin B-12 (CYANOCOBALAMIN) 100 MCG tablet Take 50 mcg by mouth daily      vitamin D-3 (CHOLECALCIFEROL) 5000 UNITS TABS Take 5,000 Units by mouth daily      Multiple Vitamins-Minerals (THERAPEUTIC MULTIVITAMIN-MINERALS) tablet Take 1 tablet by mouth daily.  Docusate Calcium (STOOL SOFTENER PO) Take  by mouth. No current facility-administered medications on file prior to visit. Review of Systems   Constitutional: Negative for chills, diaphoresis, fatigue, fever and unexpected weight change. Cardiovascular: Negative for leg swelling. Gastrointestinal: Negative for constipation, diarrhea, nausea and vomiting. Musculoskeletal: Positive for gait problem. Negative for arthralgias and joint swelling. Skin: Negative for color change, pallor, rash and wound. Neurological: Negative for weakness and numbness. Objective:  General: AAO x 3 in NAD. Derm  Left hallux nail incurvated medial border with slight redness and pain to palpation.     Sites of Onychomycosis Involvement (Check affected area)  [x] [x] [x] [x] [x] [x] [x] [x] [x] [x]  5 4 3 2 1 1 2 3 4 5                          Right                                        Left    Thickness  [x] [x] [x] [x] [x] [x] [x] [x] [x] [x]  5 4 3 2 1 1 2 3 4 5                         Right                                        Left    Dystrophic Changes                                                                 [x] [x] [x] [x] [x] [x] [x] [x] [x] [x]  5 4 3 2 1 1 2 3 4 5                         Right                                        Left    Color                                                                  [x] [x] [x] [x] [x] [x] [x] [x] [x] [x]  5 4 3 2 1 1 2 3 4 5

## 2018-07-25 RX ORDER — AMLODIPINE BESYLATE 5 MG/1
5 TABLET ORAL DAILY
Qty: 30 TABLET | Refills: 11 | Status: SHIPPED | OUTPATIENT
Start: 2018-07-25 | End: 2019-06-25 | Stop reason: SDUPTHER

## 2018-08-01 RX ORDER — TAMSULOSIN HYDROCHLORIDE 0.4 MG/1
0.4 CAPSULE ORAL 2 TIMES DAILY
Qty: 60 CAPSULE | Refills: 0 | Status: SHIPPED | OUTPATIENT
Start: 2018-08-01 | End: 2018-08-14 | Stop reason: SDUPTHER

## 2018-08-01 NOTE — TELEPHONE ENCOUNTER
Appointment on 8/14/18, will run out of medication on 8/5/18, would like a short supply to be sent to local pharmacy.

## 2018-08-14 ENCOUNTER — OFFICE VISIT (OUTPATIENT)
Dept: UROLOGY | Age: 83
End: 2018-08-14
Payer: MEDICARE

## 2018-08-14 VITALS
HEART RATE: 74 BPM | BODY MASS INDEX: 28.68 KG/M2 | TEMPERATURE: 98.8 F | DIASTOLIC BLOOD PRESSURE: 80 MMHG | HEIGHT: 64 IN | SYSTOLIC BLOOD PRESSURE: 128 MMHG | WEIGHT: 168 LBS

## 2018-08-14 DIAGNOSIS — R35.1 NOCTURIA: ICD-10-CM

## 2018-08-14 DIAGNOSIS — R35.1 BENIGN PROSTATIC HYPERPLASIA WITH NOCTURIA: Primary | ICD-10-CM

## 2018-08-14 DIAGNOSIS — N40.1 BENIGN PROSTATIC HYPERPLASIA WITH NOCTURIA: Primary | ICD-10-CM

## 2018-08-14 PROCEDURE — G8417 CALC BMI ABV UP PARAM F/U: HCPCS | Performed by: NURSE PRACTITIONER

## 2018-08-14 PROCEDURE — 1101F PT FALLS ASSESS-DOCD LE1/YR: CPT | Performed by: NURSE PRACTITIONER

## 2018-08-14 PROCEDURE — G8427 DOCREV CUR MEDS BY ELIG CLIN: HCPCS | Performed by: NURSE PRACTITIONER

## 2018-08-14 PROCEDURE — 1123F ACP DISCUSS/DSCN MKR DOCD: CPT | Performed by: NURSE PRACTITIONER

## 2018-08-14 PROCEDURE — 99213 OFFICE O/P EST LOW 20 MIN: CPT | Performed by: NURSE PRACTITIONER

## 2018-08-14 PROCEDURE — 4040F PNEUMOC VAC/ADMIN/RCVD: CPT | Performed by: NURSE PRACTITIONER

## 2018-08-14 PROCEDURE — 1036F TOBACCO NON-USER: CPT | Performed by: NURSE PRACTITIONER

## 2018-08-14 RX ORDER — TAMSULOSIN HYDROCHLORIDE 0.4 MG/1
0.4 CAPSULE ORAL 2 TIMES DAILY
Qty: 180 CAPSULE | Refills: 3 | Status: SHIPPED | OUTPATIENT
Start: 2018-08-14 | End: 2019-09-03 | Stop reason: SDUPTHER

## 2018-08-14 ASSESSMENT — ENCOUNTER SYMPTOMS
WHEEZING: 0
COLOR CHANGE: 0
BACK PAIN: 1
COUGH: 0
EYE PAIN: 0
VOMITING: 0
NAUSEA: 0
SHORTNESS OF BREATH: 0
ABDOMINAL PAIN: 0
EYE REDNESS: 0

## 2018-08-14 NOTE — PROGRESS NOTES
[facility]  [facility] [place]  [address]  [address]  [address] [facility] [address]  Dept: [id number] [facility] Office Note - Established    Patient:  Se Aviles  YOB: 1931  Date: 8/14/2018    The patient is a 80 y.o. male who presents today for evaluation of the following problems:   Chief Complaint   Patient presents with    Benign Prostatic Hypertrophy     yearly check       HPI  Here for follow up on BPH and continues Flomax BID. He has nocturia 2x per night which is not worsening or bothersome. He lost his wife in November and is struggling with this. He is trying to get more active. Summary of old records: N/A    Additional History: N/A    Procedures Today: N/A    Urinalysis today:  No results found for this visit on 08/14/18. Last several PSA's:  No results found for: PSA  Last total testosterone:  Lab Results   Component Value Date    TESTOSTERONE 141.0 (L) 02/22/2016       AUA Symptom Score (8/14/2018):   INCOMPLETE EMPTYING: How often have you had the sensation of not emptying your bladder?: Less than 1 to 5 times  FREQUENCY: How often do you have to urinate less than every two hours?: Less than 1 to 5 times  INTERMITTENCY: How often have you found you stopped and started again several times when you urinated?: Less than 1 to 5 times  URGENCY: How often have you found it difficult to postpone urination?: Less than 1 to 5 times  WEAK STREAM: How often have you had a weak urinary stream?: Not at all  STRAINING: How often have you had to strain to start  urination?: Not at all  NOCTURIA: How many times did you typically get up at night to uriniate?: 2 Times  TOTAL I-PSS SCORE[de-identified] 6  How would you feel if you were to spend the rest of your life with your urinary condition?: Mostly Satisfied    Last BUN and creatinine:  Lab Results   Component Value Date    BUN 9 05/31/2018     Lab Results   Component Value Date    CREATININE 0.85 05/31/2018 Additional Lab/Culture results:     Imaging Reviewed during this Office Visit:   (results were independently reviewed by physician and radiology report verified)    PAST MEDICAL, FAMILY AND SOCIAL HISTORY UPDATE:  Past Medical History:   Diagnosis Date    Acute MI ([address]     Allergic rhinitis 9/2/2011    Anemia     Basal cell cancer 03/2013    left side of head, face chin    Basal cell cancer 03/10/14    left cheek    Cervical radiculopathy     Diverticulitis 02/17/13    Epigastric pain/GERD. 9/2/2011    GERD (gastroesophageal reflux disease)     Glaucoma     Hyperlipidemia     Hypertension     Hyponatremia 9/2/2011    IBS (irritable bowel syndrome) 9/2/2011    Insomnia 9/2/2011    Osteoarthritis     Osteoarthritis/neck pain. 9/2/2011    Renal calculi     Shingles     history of shingles     Past Surgical History:   Procedure Laterality Date    CHOLECYSTECTOMY  9-9-15    laparoscopic with cholangiogram    COLONOSCOPY  2002    COLONOSCOPY  08/2013    HERNIA REPAIR      LITHOTRIPSY      MOHS SURGERY Left 03/10/14    cheek    MOHS SURGERY  12/30/14    post scalp    MOHS SURGERY Left 08/2017    X2 left cheek    OTHER SURGICAL HISTORY  9/6/15    attempted ERCP    SKIN BIOPSY  03/11/13    3 areas    SKIN BIOPSY Left 02/11/14    cheek /basal cell carinoma    SKIN CANCER EXCISION      left face and top of head    SKIN CANCER EXCISION Left 03/2013    forehead, cheek, chin, basal cell.     SKIN CANCER EXCISION Right 08/12/2016    with skin graft    SPINE SURGERY  04/2003     Family History   Problem Relation Age of Onset    Heart Disease Mother     Lung Cancer Father     Cancer Father         lung    Heart Disease Brother      Outpatient Prescriptions Marked as Taking for the 8/14/18 encounter (Office Visit) with NASRA Rutledge CNP   Medication Sig Dispense Refill    tamsulosin (FLOMAX) 0.4 MG capsule Take 1 capsule by mouth 2 times daily 180 capsule 3    amLODIPine (NORVASC) 5 MG tablet Take 1 tablet by mouth daily 30 tablet 11    hydrALAZINE (APRESOLINE) 50 MG tablet Take 1 tablet by mouth 2 times daily 60 tablet 11    Biotin 1 MG CAPS Take 1 capsule by mouth daily      Melatonin 5 MG CAPS Take 1 capsule by mouth nightly      warfarin (COUMADIN) 5 MG tablet Take (1) to (1 & 1/2) tablets by mouth once daily or as directed by [facility]      pantoprazole (PROTONIX) 40 MG tablet TAKE ONE TABLET BY MOUTH DAILY 30 tablet 11    lisinopril (PRINIVIL;ZESTRIL) 40 MG tablet TAKE ONE TABLET BY MOUTH DAILY 90 tablet 3    atorvastatin (LIPITOR) 20 MG tablet Take 1 tablet by mouth daily 30 tablet 11    metoprolol tartrate (LOPRESSOR) 25 MG tablet Take 25 mg by mouth 2 times daily      Ferrous Gluconate (IRON 27 PO) Take 1 tablet by mouth daily      vitamin B-12 (CYANOCOBALAMIN) 100 MCG tablet Take 50 mcg by mouth daily      vitamin D-3 (CHOLECALCIFEROL) 5000 UNITS TABS Take 5,000 Units by mouth daily      Multiple Vitamins-Minerals (THERAPEUTIC MULTIVITAMIN-MINERALS) tablet Take 1 tablet by mouth daily.  Docusate Calcium (STOOL SOFTENER PO) Take  by mouth. Celebrex [celecoxib] and Mobic  History   Smoking Status    Former Smoker    Packs/day: 1.00    Years: 10.00    Quit date: 1/1/1961   Smokeless Tobacco    Never Used     Comment: quit 1961     (If patient a smoker, smoking cessation counseling offered)    History   Alcohol Use    0.0 oz/week     Comment: 2x/week       REVIEW OF SYSTEMS:  Review of Systems    Physical Exam:      Vitals:    08/14/18 1329   BP: 128/80   Pulse: 74   Temp: 98.8 °F (37.1 °C)     Body mass index is 28.84 kg/m². Patient is a 80 y.o. male in no acute distress and alert and oriented to person, place and time. Physical Exam  Constitutional: Patient in no acute distress. Neuro: Alert and oriented to person, place and time.   Psych: Mood normal, affect normal  Skin: warm, dry,  No rash noted  HEENT: Head: Normocephalic and atraumatic  Conjunctivae and EOM are normal. Pupils are equal, round  Nose: Normal  Right External Ear: Normal; Left External Ear: Normal  Mouth: Mucosa Moist  Neck: Supple  Lungs: Respiratory effort is normal  Cardiovascular: strong and regular. No edema. Abdomen: Soft, non-tender, non-distended. Bladder non-tender and not distended. Musculoskeletal: Normal gait and station      Assessment and Plan      1. Benign prostatic hyperplasia with nocturia    2. Nocturia           Plan:    continue Flomax    Call with worsening symptoms- otherwise f/u in 1 yr. Decrease fluids 2 hours before bed. No Follow-up on file. Prescriptions Ordered:  Orders Placed This Encounter   Medications    tamsulosin (FLOMAX) 0.4 MG capsule     Sig: Take 1 capsule by mouth 2 times daily     Dispense:  180 capsule     Refill:  3      Orders Placed:  No orders of the defined types were placed in this encounter.          Tony Perla, NASRA - CNP

## 2018-08-14 NOTE — PATIENT INSTRUCTIONS
continue Flomax     Call with worsening symptoms- otherwise f/u in 1 yr.      Decrease fluids 2 hours before bed.

## 2018-08-16 ENCOUNTER — TELEPHONE (OUTPATIENT)
Dept: FAMILY MEDICINE CLINIC | Age: 83
End: 2018-08-16

## 2018-08-16 RX ORDER — TIZANIDINE 4 MG/1
4 TABLET ORAL 3 TIMES DAILY
Qty: 40 TABLET | Refills: 0 | Status: SHIPPED | OUTPATIENT
Start: 2018-08-16 | End: 2018-09-18 | Stop reason: ALTCHOICE

## 2018-08-16 NOTE — TELEPHONE ENCOUNTER
patient was carrying mulch and now is having neck and muscle pain in shoulder. Patient to know if could prescribe something.

## 2018-08-20 ENCOUNTER — TELEPHONE (OUTPATIENT)
Dept: FAMILY MEDICINE CLINIC | Age: 83
End: 2018-08-20

## 2018-08-20 ENCOUNTER — HOSPITAL ENCOUNTER (OUTPATIENT)
Dept: PHYSICAL THERAPY | Age: 83
Setting detail: THERAPIES SERIES
Discharge: HOME | End: 2018-08-20
Payer: MEDICARE

## 2018-08-20 DIAGNOSIS — M54.2 NECK PAIN: Primary | ICD-10-CM

## 2018-08-20 PROCEDURE — 97161 PT EVAL LOW COMPLEX 20 MIN: CPT

## 2018-08-20 PROCEDURE — G8990 OTHER PT/OT CURRENT STATUS: HCPCS

## 2018-08-20 PROCEDURE — G0283 ELEC STIM OTHER THAN WOUND: HCPCS

## 2018-08-20 PROCEDURE — G8991 OTHER PT/OT GOAL STATUS: HCPCS

## 2018-08-20 PROCEDURE — 97124 MASSAGE THERAPY: CPT

## 2018-08-20 RX ORDER — TRAMADOL HYDROCHLORIDE 50 MG/1
TABLET ORAL
Qty: 60 TABLET | Refills: 0 | Status: SHIPPED | OUTPATIENT
Start: 2018-08-20 | End: 2018-08-30

## 2018-08-20 ASSESSMENT — PAIN SCALES - GENERAL
PAINLEVEL_OUTOF10: 8
PAINLEVEL_OUTOF10: 8

## 2018-08-20 ASSESSMENT — PAIN DESCRIPTION - DESCRIPTORS
DESCRIPTORS: SHARP;CONSTANT
DESCRIPTORS: SHARP;CONSTANT

## 2018-08-20 ASSESSMENT — PAIN DESCRIPTION - FREQUENCY
FREQUENCY: CONTINUOUS
FREQUENCY: CONTINUOUS

## 2018-08-20 ASSESSMENT — PAIN DESCRIPTION - ORIENTATION
ORIENTATION: LEFT;POSTERIOR
ORIENTATION: LEFT;POSTERIOR

## 2018-08-20 ASSESSMENT — PAIN DESCRIPTION - LOCATION
LOCATION: NECK
LOCATION: NECK

## 2018-08-20 NOTE — TELEPHONE ENCOUNTER
Cont the Zanaflex and will call in Tramadol 1 pill every 6 hours prn  Call later this week with an update  What about starting PT?

## 2018-08-20 NOTE — PROGRESS NOTES
Physical Therapy  Initial Assessment  Date: 2018  Patient Name: Aimee Worthy  MRN: [id number]  : 1931     Treatment Diagnosis: muscle spasm    Subjective   General  Chart Reviewed: Yes  Patient assessed for rehabilitation services?: Yes  Additional Pertinent Hx: neck pain started 18 after lifting 2 days ago  Family / Caregiver Present: No  Referring Practitioner: Dr Marina Mcdonnell  Referral Date : 18  Diagnosis: neck pain M54.2  Follows Commands: Within Functional Limits  PT Visit Information  Onset Date: 18  PT Insurance Information: medicare/[facility]  Total # of Visits Approved: 12  Total # of Visits to Date: 1  Subjective  Subjective: L sided neck pain  Pain Screening  Patient Currently in Pain: Yes  Pain Assessment  Pain Assessment: 0-10  Pain Level: 8  Pain Location: Neck  Pain Orientation: Left;Posterior  Pain Descriptors: Mojgan Spine; Constant  Pain Frequency: Continuous  Vital Signs  Patient Currently in Pain: Yes    Vision/Hearing  Vision  Vision: Impaired (wear glasses)  Hearing  Hearing: Exceptions to [facility]  Hearing Exceptions: Bilateral hearing aid    Orientation  Orientation  Overall Orientation Status: Within Normal Limits    Social/Functional History  Social/Functional History  Lives With: Spouse  Type of Home: House  Home Layout: One level  Home Access: Stairs to enter with rails  Entrance Stairs - Number of Steps: 2  Entrance Stairs - Rails: Left  ADL Assistance: Independent  Homemaking Assistance: Independent  Ambulation Assistance: Independent  Transfer Assistance: Independent  Active : Yes  Mode of Transportation: Car  Occupation: Retired  Objective  Observation/Palpation  Posture: Poor  Palpation: tender L post cervical and upper trap  Observation: forward head and tilted to L  AROM RUE (degrees)  RUE AROM : WNL  AROM LUE (degrees)  LUE AROM : WNL  Spine  Cervical: AROM CERVICAL FLEX 20 EXT 30 SBB20 ROTL 20 R 60  Strength RUE  Strength RUE: WNL  Strength

## 2018-08-20 NOTE — PROGRESS NOTES
Jonathan Fall Risk Assessment    Risk Factor Scale  Score   History of Falls [] Yes  [x] No 25  0 0   Secondary Diagnosis [] Yes  [x] No 15  0 0   Ambulatory Aid [] Furniture  [] Crutches/cane/walker  [x] None/bedrest/wheelchair/nurse 30  15  0 0   IV/Heparin Lock [] Yes  [x] No 20  0 0   Gait/Transferring [] Impaired  [] Weak  [x] Normal/bedrest/immobile 20  10  0 0   Mental Status [] Forgets limitations  [x] Oriented to own ability 15  0 0      Total:0     Based on the Assessment score: check the appropriate box.     [x]  No intervention needed   Low =   Score of 0-24    []  Use standard prevention interventions Moderate =  Score of 24-44   [] Give patient handout and discuss fall prevention strategies   [] Establish goal of education for patient/family RE: fall prevention strategies    []  Use high risk prevention interventions High = Score of 45 and higher   [] Give patient handout and discuss fall prevention strategies   [] Establish goal of education for patient/family Re: fall prevention strategies   [] Discuss lifeline / other resources    Electronically signed by:   Denys James PT  Date: 8/20/2018

## 2018-08-22 ENCOUNTER — HOSPITAL ENCOUNTER (OUTPATIENT)
Dept: PHYSICAL THERAPY | Age: 83
Setting detail: THERAPIES SERIES
Discharge: HOME | End: 2018-08-22
Payer: MEDICARE

## 2018-08-22 PROCEDURE — G0283 ELEC STIM OTHER THAN WOUND: HCPCS

## 2018-08-22 ASSESSMENT — PAIN DESCRIPTION - ORIENTATION: ORIENTATION: LEFT

## 2018-08-22 ASSESSMENT — PAIN DESCRIPTION - DESCRIPTORS: DESCRIPTORS: DULL;ACHING

## 2018-08-22 ASSESSMENT — PAIN DESCRIPTION - FREQUENCY: FREQUENCY: INTERMITTENT

## 2018-08-22 ASSESSMENT — PAIN DESCRIPTION - LOCATION: LOCATION: NECK

## 2018-08-22 ASSESSMENT — PAIN SCALES - GENERAL: PAINLEVEL_OUTOF10: 4

## 2018-08-22 NOTE — PROGRESS NOTES
Physical Therapy  Daily Treatment Note  Date: 2018  Patient Name: Hollis Giron  MRN: [id number]     :   1931    Subjective:      PT Visit Information  Onset Date: 18  PT Insurance Information: medicare/[facility]  Total # of Visits Approved: 12  Total # of Visits to Date: 2  Subjective  Subjective: less neck pain today  Pain Screening  Patient Currently in Pain: Yes  Pain Assessment  Pain Assessment: 0-10  Pain Level: 4  Pain Location: Neck  Pain Orientation: Left  Pain Descriptors: Dull;Aching  Pain Frequency: Intermittent  Vital Signs  Patient Currently in Pain: Yes       Treatment Activities:   Modalities  Moist heat: cervical 20 min sitting  E-stim (parameters): IFC cervical/upper trap 20 min sitting     Assessment:   Conditions Requiring Skilled Therapeutic Intervention  REQUIRES PT FOLLOW UP: Yes     Plan:    Plan  Times per week: 2-3x/week  Current Treatment Recommendations: ROM, Modalities, Home Exercise Program  Plan Comment: to continue PT per POC       Treatment Charges: Minutes Units   []  Ultrasound     [x]  Electrical-Stim 20 1   []  Iontophoresis     []  Traction     []  Massage       []  Eval     []  Gait     []  Ther Exercise       []  Manual Therapy       []  Ther Activities       []  Aquatics     []  Vasopneumatic Device     []  Neuro Re-Ed       []  Other       Total Treatment Time: 20 1        Therapy Time   Individual Concurrent Group Co-treatment   Time In 1445         Time Out 1505         Minutes 20              Electronically signed by: Roberto Jones, PT

## 2018-08-27 ENCOUNTER — APPOINTMENT (OUTPATIENT)
Dept: PHYSICAL THERAPY | Age: 83
End: 2018-08-27
Payer: MEDICARE

## 2018-08-29 ENCOUNTER — APPOINTMENT (OUTPATIENT)
Dept: PHYSICAL THERAPY | Age: 83
End: 2018-08-29
Payer: MEDICARE

## 2018-08-29 NOTE — PROGRESS NOTES
[] [address] @ [facility]  [address] [facility] [address]  [place], [address] [address] [place] [facility]  Phone [phone]  Fax [phone]    Physical Therapy Discharge Note    Date: 2018      Patient: Antonio Gardner  : 1931  MRN: [id number]    Physician: Dr oLrie Mccullough     Diagnosis: neck pain Onset Date: 18    Rehab Diagnosis:muscle spasm  ICD-10 Codes: Total visits attended:  Cancels/No shows:  Date of initial visit: 18                   [] Patient recovered from conditions. Treatment goals were met. [] Patient received maximum benefit. No further therapy indicated at this time. [x] Patient demonstrated improvement from condition with  0 Of  3 Short term goals met. [x]Patient demonstrated improvement from condition with 0   Of 1  Long term goals met. [] Patient to continue exercise/home instructions independently. [] Therapy interrupted due to:    [] Patient has 2 or more no shows/cancels, is discontinued per our policy. [] Patient has completed prescribed number of treatment sessions. [] Other:      Pain level at evaluation was     8  /10 and at discharge was    4   /10    It Is My Understanding That The:  [] Patient returned to work. [] Patient demonstrated improved level of function. [] Patient returned to previous functional level.   [] Patient's current functional status is unknown due to no-shows  [x] Other:patient requested to be discontinued from PT since he is doing better     Recommendations/Comments: Discontinue PT                  Treatment Included:  [x] Therapeutic Exercise    [] Modalities:  [] Therapeutic Activity    [] Ultrasound  [x] Electrical Stimulation  [] Gait Training     [x] Massage       [] Lumbar/Cervical Traction  [] Neuromuscular Re-education [x] Cold/hotpack [] Iontophoresis: 4 mg/mL  [x] Instruction in Home Exercise Program                     Dexamethasone Sodium  [] Manual Therapy             Phosphate 40-80 mAmin  []

## 2018-08-30 ENCOUNTER — HOSPITAL ENCOUNTER (OUTPATIENT)
Dept: PHYSICAL THERAPY | Age: 83
Setting detail: THERAPIES SERIES
Discharge: HOME | End: 2018-08-30
Payer: MEDICARE

## 2018-09-18 ENCOUNTER — OFFICE VISIT (OUTPATIENT)
Dept: FAMILY MEDICINE CLINIC | Age: 83
End: 2018-09-18
Payer: MEDICARE

## 2018-09-18 VITALS
BODY MASS INDEX: 28.67 KG/M2 | HEART RATE: 70 BPM | TEMPERATURE: 98 F | DIASTOLIC BLOOD PRESSURE: 70 MMHG | RESPIRATION RATE: 16 BRPM | SYSTOLIC BLOOD PRESSURE: 110 MMHG | WEIGHT: 167 LBS | OXYGEN SATURATION: 98 %

## 2018-09-18 DIAGNOSIS — F43.21 GRIEF REACTION: ICD-10-CM

## 2018-09-18 DIAGNOSIS — N40.1 BENIGN PROSTATIC HYPERPLASIA WITH LOWER URINARY TRACT SYMPTOMS, SYMPTOM DETAILS UNSPECIFIED: ICD-10-CM

## 2018-09-18 DIAGNOSIS — M54.50 LOW BACK PAIN WITHOUT SCIATICA, UNSPECIFIED BACK PAIN LATERALITY, UNSPECIFIED CHRONICITY: ICD-10-CM

## 2018-09-18 DIAGNOSIS — R74.8 ELEVATED LIVER ENZYMES: ICD-10-CM

## 2018-09-18 DIAGNOSIS — I51.89 DIASTOLIC DYSFUNCTION: ICD-10-CM

## 2018-09-18 DIAGNOSIS — K58.9 IRRITABLE BOWEL SYNDROME WITHOUT DIARRHEA: ICD-10-CM

## 2018-09-18 DIAGNOSIS — I48.0 PAROXYSMAL ATRIAL FIBRILLATION (HCC): ICD-10-CM

## 2018-09-18 DIAGNOSIS — M19.90 OSTEOARTHRITIS, UNSPECIFIED OSTEOARTHRITIS TYPE, UNSPECIFIED SITE: ICD-10-CM

## 2018-09-18 DIAGNOSIS — G47.00 INSOMNIA, UNSPECIFIED TYPE: ICD-10-CM

## 2018-09-18 DIAGNOSIS — E53.8 B12 DEFICIENCY: ICD-10-CM

## 2018-09-18 DIAGNOSIS — D50.9 IRON DEFICIENCY ANEMIA, UNSPECIFIED IRON DEFICIENCY ANEMIA TYPE: ICD-10-CM

## 2018-09-18 DIAGNOSIS — K21.9 GASTROESOPHAGEAL REFLUX DISEASE WITHOUT ESOPHAGITIS: ICD-10-CM

## 2018-09-18 DIAGNOSIS — I10 ESSENTIAL HYPERTENSION: Primary | ICD-10-CM

## 2018-09-18 DIAGNOSIS — E78.00 PURE HYPERCHOLESTEROLEMIA: ICD-10-CM

## 2018-09-18 PROCEDURE — G8417 CALC BMI ABV UP PARAM F/U: HCPCS | Performed by: FAMILY MEDICINE

## 2018-09-18 PROCEDURE — G8427 DOCREV CUR MEDS BY ELIG CLIN: HCPCS | Performed by: FAMILY MEDICINE

## 2018-09-18 PROCEDURE — 1123F ACP DISCUSS/DSCN MKR DOCD: CPT | Performed by: FAMILY MEDICINE

## 2018-09-18 PROCEDURE — 4040F PNEUMOC VAC/ADMIN/RCVD: CPT | Performed by: FAMILY MEDICINE

## 2018-09-18 PROCEDURE — 99214 OFFICE O/P EST MOD 30 MIN: CPT | Performed by: FAMILY MEDICINE

## 2018-09-18 PROCEDURE — 1101F PT FALLS ASSESS-DOCD LE1/YR: CPT | Performed by: FAMILY MEDICINE

## 2018-09-18 PROCEDURE — 1036F TOBACCO NON-USER: CPT | Performed by: FAMILY MEDICINE

## 2018-09-18 ASSESSMENT — ENCOUNTER SYMPTOMS
BACK PAIN: 1
VOMITING: 0
CHEST TIGHTNESS: 0
NAUSEA: 0
CONSTIPATION: 0
ABDOMINAL PAIN: 0
RHINORRHEA: 0
COUGH: 0
SORE THROAT: 0
DIARRHEA: 0
SHORTNESS OF BREATH: 0
ABDOMINAL DISTENTION: 0

## 2018-09-18 NOTE — PROGRESS NOTES
Subjective:      Patient ID: Edith Logan is a 80 y.o. male. HPI  Pt here today for f/u on chronic medical problems:  HTN, HLD, Atrial Fibrillation, Anemia, OA, B12 Def, GERD, IBS, Insomnia,go over labs and/or diagnostic studies, and medication refills. Pt today denies any HA, chest pain, or worsening SOB. Pt denies any N/V/D/C or abdominal pain. Pt denies any fever or chills. Pt with arthralgias on and off, but stable on meds. Pt with occasional heartburn, but stable on meds. Pt did call the office a few weeks ago with low and upper back pain after cutting some trees. We did refill the Zanaflex and he went to PT for 2 sessions and he is doing much better. He tried the Tramdol, but did cause constipation. Pt states he does have good and bad days with the loss of his wife about a year ago, but is trying to stay active. He has good support with his [employer] and  and did join the [employer] in [place]. Otherwise pt doing well on current tx and no other concerns today. The patient's past medical, surgical, social, and family history as well as his current medications and allergies were reviewed as documented in today's encounter.       Current Outpatient Prescriptions   Medication Sig Dispense Refill    tamsulosin (FLOMAX) 0.4 MG capsule Take 1 capsule by mouth 2 times daily 180 capsule 3    amLODIPine (NORVASC) 5 MG tablet Take 1 tablet by mouth daily 30 tablet 11    hydrALAZINE (APRESOLINE) 50 MG tablet Take 1 tablet by mouth 2 times daily 60 tablet 11    Biotin 1 MG CAPS Take 1 capsule by mouth daily      Melatonin 5 MG CAPS Take 1 capsule by mouth nightly      warfarin (COUMADIN) 5 MG tablet Take (1) to (1 & 1/2) tablets by mouth once daily or as directed by [facility]      pantoprazole (PROTONIX) 40 MG tablet TAKE ONE TABLET BY MOUTH DAILY 30 tablet 11    lisinopril (PRINIVIL;ZESTRIL) 40 MG tablet TAKE ONE TABLET BY MOUTH DAILY 90 tablet 3    atorvastatin (LIPITOR) 20 Fasting?/# of Hours     Answer:   8-10 Hours    Vitamin B12     Standing Status:   Future     Standing Expiration Date:   9/18/2019        Will cont with current treatment as pt is currently stable on current treatment    Will cont with low fat/chol diet and Lipitor as ordered    Cont with increased fiber in his diet    Will cont with Coumadin as prescribed and INR checks as ordered per [facility]    Will cont to follow with Dr. Alexander Cheney as instructed    Will cont with the back exercises and Zanaflex prn    I did talk to pt again about staying active and involved with things and to let me know if he has more bad days than good days, but he seems to be doing better but does miss his wife on a daily basis    Rest of systems unchanged, continue current treatments. Medications, labs, diagnostic studies, consultations and follow-up as documented in this encounter.  Rest of systems unchanged, continue current treatments        Carlos Lee MD

## 2018-09-19 ENCOUNTER — OFFICE VISIT (OUTPATIENT)
Dept: PODIATRY | Age: 83
End: 2018-09-19
Payer: MEDICARE

## 2018-09-19 VITALS
DIASTOLIC BLOOD PRESSURE: 63 MMHG | SYSTOLIC BLOOD PRESSURE: 117 MMHG | BODY MASS INDEX: 27.31 KG/M2 | HEIGHT: 64 IN | HEART RATE: 66 BPM | WEIGHT: 160 LBS

## 2018-09-19 DIAGNOSIS — M79.674 PAIN IN TOES OF BOTH FEET: ICD-10-CM

## 2018-09-19 DIAGNOSIS — M79.675 PAIN IN TOES OF BOTH FEET: ICD-10-CM

## 2018-09-19 DIAGNOSIS — B35.1 ONYCHOMYCOSIS: Primary | ICD-10-CM

## 2018-09-19 PROCEDURE — 99999 PR OFFICE/OUTPT VISIT,PROCEDURE ONLY: CPT | Performed by: PODIATRIST

## 2018-09-19 PROCEDURE — 11721 DEBRIDE NAIL 6 OR MORE: CPT | Performed by: PODIATRIST

## 2018-09-19 ASSESSMENT — ENCOUNTER SYMPTOMS
COLOR CHANGE: 0
CONSTIPATION: 0
VOMITING: 0
DIARRHEA: 0
NAUSEA: 0

## 2018-09-19 NOTE — PROGRESS NOTES
tablet by mouth daily 30 tablet 11    metoprolol tartrate (LOPRESSOR) 25 MG tablet Take 25 mg by mouth 2 times daily      vitamin B-12 (CYANOCOBALAMIN) 100 MCG tablet Take 50 mcg by mouth daily      vitamin D-3 (CHOLECALCIFEROL) 5000 UNITS TABS Take 5,000 Units by mouth daily      Multiple Vitamins-Minerals (THERAPEUTIC MULTIVITAMIN-MINERALS) tablet Take 1 tablet by mouth daily.  Docusate Calcium (STOOL SOFTENER PO) Take  by mouth. No current facility-administered medications on file prior to visit. Review of Systems   Constitutional: Negative for chills, diaphoresis, fatigue, fever and unexpected weight change. Cardiovascular: Negative for leg swelling. Gastrointestinal: Negative for constipation, diarrhea, nausea and vomiting. Musculoskeletal: Positive for gait problem. Negative for arthralgias and joint swelling. Skin: Negative for color change, pallor, rash and wound. Neurological: Negative for weakness and numbness. Objective:  General: AAO x 3 in NAD. Derm  Left hallux nail incurvated medial border with slight redness and pain to palpation.     Sites of Onychomycosis Involvement (Check affected area)  [x] [x] [x] [x] [x] [x] [x] [x] [x] [x]  5 4 3 2 1 1 2 3 4 5                          Right                                        Left    Thickness  [x] [x] [x] [x] [x] [x] [x] [x] [x] [x]  5 4 3 2 1 1 2 3 4 5                         Right                                        Left    Dystrophic Changes                                                                 [x] [x] [x] [x] [x] [x] [x] [x] [x] [x]  5 4 3 2 1 1 2 3 4 5                         Right                                        Left    Color                                                                  [x] [x] [x] [x] [x] [x] [x] [x] [x] [x]  5 4 3 2 1 1 2 3 4 5                          Right                                        Left    Incurvation/Ingrowin [] [] [] [] [] [] [] [] [] []  5 4 3 2 1 1 2 3 4 5                         Right                                        Left    Inflammation/Pain                                                                   [x] [x] [x] [x] [x] [x] [x] [x] [x] [x]  5 4 3 2 1 1 2 3 4 5                         Right                                        Left    Vascular:  DP/PT pulses palpable 2/4, Bilateral.    CFT <3 seconds to digits 1-5, Bilateral .   Hair growth present to level of digits, Bilateral.    Neurological:  Sensation present to light touch to level of digits, Bilateral.    Assessment:  80 y.o. male with:   1. Onychomycosis    2. Pain in toes of both feet       Orders Placed This Encounter   Procedures    NM DEBRIDEMENT OF NAILS, 6 OR MORE         Plan:   Pt was evaluated and examined. Patient was given personalized discharge instructions. Nails 1-10 were debrided in length and thickness sharply with a nail nipper and  without incident, slant back performed. Pt will follow up in 9-12 weeks or sooner if any problems arise. Diagnosis was discussed with the pt and all of their questions were answered in detail. Proper foot hygiene and care was discussed with the pt. Patient to check feet daily and contact the office with any questions/problems/concerns. Other comorbidity noted and will be managed by PCP. Pain waiver discussed with patient and confirmed.          9/19/2018    Electronically signed by Jocy Hamilton DPM on 9/19/2018 at 11:49 AM

## 2018-10-05 RX ORDER — ATORVASTATIN CALCIUM 20 MG/1
20 TABLET, FILM COATED ORAL DAILY
Qty: 90 TABLET | Refills: 3 | Status: SHIPPED | OUTPATIENT
Start: 2018-10-05 | End: 2019-06-25 | Stop reason: SDUPTHER

## 2018-11-06 ENCOUNTER — TELEPHONE (OUTPATIENT)
Dept: FAMILY MEDICINE CLINIC | Age: 83
End: 2018-11-06

## 2018-11-06 RX ORDER — TIZANIDINE 4 MG/1
4 TABLET ORAL 3 TIMES DAILY
Qty: 40 TABLET | Refills: 0 | Status: SHIPPED | OUTPATIENT
Start: 2018-11-06 | End: 2018-12-20 | Stop reason: SDUPTHER

## 2018-11-06 RX ORDER — PREDNISONE 20 MG/1
TABLET ORAL
Qty: 17 TABLET | Refills: 0 | Status: SHIPPED | OUTPATIENT
Start: 2018-11-06 | End: 2018-11-16

## 2018-11-13 ENCOUNTER — TELEPHONE (OUTPATIENT)
Dept: FAMILY MEDICINE CLINIC | Age: 83
End: 2018-11-13

## 2018-11-21 ENCOUNTER — OFFICE VISIT (OUTPATIENT)
Dept: PODIATRY | Age: 83
End: 2018-11-21
Payer: MEDICARE

## 2018-11-21 VITALS — BODY MASS INDEX: 27.31 KG/M2 | HEIGHT: 64 IN | WEIGHT: 160 LBS

## 2018-11-21 DIAGNOSIS — M79.675 PAIN IN TOES OF BOTH FEET: ICD-10-CM

## 2018-11-21 DIAGNOSIS — M79.674 PAIN IN TOES OF BOTH FEET: ICD-10-CM

## 2018-11-21 DIAGNOSIS — B35.1 ONYCHOMYCOSIS: Primary | ICD-10-CM

## 2018-11-21 PROCEDURE — 99999 PR OFFICE/OUTPT VISIT,PROCEDURE ONLY: CPT | Performed by: PODIATRIST

## 2018-11-21 PROCEDURE — 11721 DEBRIDE NAIL 6 OR MORE: CPT | Performed by: PODIATRIST

## 2018-11-21 ASSESSMENT — ENCOUNTER SYMPTOMS
CONSTIPATION: 0
DIARRHEA: 0
NAUSEA: 0
COLOR CHANGE: 0
VOMITING: 0

## 2018-11-21 NOTE — PROGRESS NOTES
[facility]  Return Patient    Chief Complaint   Patient presents with    Nail Problem     nail trim/last saw Anisha Joaquin 9/18/18       Subjective: This Nanci Armor comes to clinic for foot and nail care. He would like all of his nails trimmed. He cannot take care of them himself. They are painful when long and he tries to wear shoes. Pt's primary care physician is Laci Christianson MD.     Past Medical History:   Diagnosis Date    Acute MI ([address]     Allergic rhinitis 9/2/2011    Anemia     Basal cell cancer 03/2013    left side of head, face chin    Basal cell cancer 03/10/14    left cheek    Cervical radiculopathy     Diverticulitis 02/17/13    Epigastric pain/GERD. 9/2/2011    GERD (gastroesophageal reflux disease)     Glaucoma     Hyperlipidemia     Hypertension     Hyponatremia 9/2/2011    IBS (irritable bowel syndrome) 9/2/2011    Insomnia 9/2/2011    Osteoarthritis     Osteoarthritis/neck pain.  9/2/2011    Renal calculi     Shingles     history of shingles       Allergies   Allergen Reactions    Celebrex [Celecoxib] Nausea Only    Mobic Nausea Only     Current Outpatient Prescriptions on File Prior to Visit   Medication Sig Dispense Refill    tiZANidine (ZANAFLEX) 4 MG tablet Take 1 tablet by mouth 3 times daily 40 tablet 0    lisinopril (PRINIVIL;ZESTRIL) 40 MG tablet TAKE ONE TABLET BY MOUTH DAILY 90 tablet 3    atorvastatin (LIPITOR) 20 MG tablet Take 1 tablet by mouth daily 90 tablet 3    tamsulosin (FLOMAX) 0.4 MG capsule Take 1 capsule by mouth 2 times daily 180 capsule 3    amLODIPine (NORVASC) 5 MG tablet Take 1 tablet by mouth daily 30 tablet 11    hydrALAZINE (APRESOLINE) 50 MG tablet Take 1 tablet by mouth 2 times daily 60 tablet 11    Biotin 1 MG CAPS Take 1 capsule by mouth daily      Melatonin 5 MG CAPS Take 1 capsule by mouth nightly      warfarin (COUMADIN) 5 MG tablet Take (1) to (1 & 1/2) tablets by mouth once daily or as directed by Left    Incurvation/Ingrowin                                                                   [] [] [] [] [] [] [] [] [] []  5 4 3 2 1 1 2 3 4 5                         Right                                        Left    Inflammation/Pain                                                                   [x] [x] [x] [x] [x] [x] [x] [x] [x] [x]  5 4 3 2 1 1 2 3 4 5                         Right                                        Left    Vascular:  DP/PT pulses palpable 2/4, Bilateral.    CFT <3 seconds to digits 1-5, Bilateral .   Hair growth present to level of digits, Bilateral.    Neurological:  Sensation present to light touch to level of digits, Bilateral.    Assessment:  80 y.o. male with:   1. Onychomycosis    2. Pain in toes of both feet       Orders Placed This Encounter   Procedures    [place] DEBRIDEMENT OF NAILS, 6 OR MORE         Plan:   Pt was evaluated and examined. Patient was given personalized discharge instructions. Nails 1-10 were debrided in length and thickness sharply with a nail nipper and  without incident, slant back performed. Pt will follow up in 9-12 weeks or sooner if any problems arise. Diagnosis was discussed with the pt and all of their questions were answered in detail. Proper foot hygiene and care was discussed with the pt. Patient to check feet daily and contact the office with any questions/problems/concerns. Other comorbidity noted and will be managed by PCP. Pain waiver discussed with patient and confirmed.          11/21/2018    Electronically signed by Eduar García DPM on 11/21/2018 at 11:59 AM

## 2018-11-30 ENCOUNTER — TELEPHONE (OUTPATIENT)
Dept: FAMILY MEDICINE CLINIC | Age: 83
End: 2018-11-30

## 2018-11-30 DIAGNOSIS — R53.83 FATIGUE, UNSPECIFIED TYPE: Primary | ICD-10-CM

## 2018-11-30 NOTE — TELEPHONE ENCOUNTER
Pt calls states Dr Eddie Melchor is suggesting having pt thyroid checked due to fatigue. Pt states he has an upcoming appointment and would like to have the lab order sent to his home so he can complete prior to his appointment. Please advise.  Thank you    Health Maintenance   Topic Date Due    Flu vaccine (1) 09/01/2018    Shingles Vaccine (1 of 2 - 2 Dose Series) 03/13/2019 (Originally 9/15/2012)    Potassium monitoring  05/31/2019    Creatinine monitoring  05/31/2019    DTaP/Tdap/Td vaccine (2 - Td) 08/23/2020    Pneumococcal low/med risk  Completed             (applicable per patient's age: Cancer Screenings, Depression Screening, Fall Risk Screening, Immunizations)    LDL Cholesterol (mg/dL)   Date Value   05/31/2018 45     LDL Calculated (mg/dL)   Date Value   05/23/2017 53     AST (U/L)   Date Value   05/31/2018 18     ALT (U/L)   Date Value   05/31/2018 17     BUN (mg/dL)   Date Value   05/31/2018 9      (goal A1C is < 7)   (goal LDL is <100) need 30-50% reduction from baseline     BP Readings from Last 3 Encounters:   09/19/18 117/63   09/18/18 110/70   08/14/18 128/80    (goal /80)      All Future Testing planned in [facility]:  Lab Frequency Next Occurrence   CBC Once 12/20/2018   Comprehensive Metabolic Panel Once 43/35/8891   Lipid Panel Once 12/20/2018   Vitamin B12 Once 12/20/2018       Next Visit Date:  Future Appointments  Date Time Provider Chanelle Phillips   12/20/2018 1:00 PM MD Chao Mcdonald   1/23/2019 11:15 AM Cherelle Harrison DPM [address]            Patient Active Problem List:     Insomnia     Allergic rhinitis     Malignant basal cell neoplasm of skin     Benign prostatic hyperplasia with lower urinary tract symptoms     Branch retinal vein occlusion, left eye     Cervical radiculopathy     Essential hypertension     Gastroesophageal reflux disease without esophagitis     Iron deficiency anemia     Arthritis, degenerative     Diverticulosis of large intestine without hemorrhage     Elevated PSA     B12 deficiency     Diastolic dysfunction     Elevated liver enzymes     Pure hypercholesterolemia     Irritable bowel syndrome without diarrhea     Paroxysmal atrial fibrillation (HCC)     Low testosterone     Bradycardia

## 2018-12-03 ENCOUNTER — TELEPHONE (OUTPATIENT)
Dept: FAMILY MEDICINE CLINIC | Age: 83
End: 2018-12-03

## 2018-12-04 ENCOUNTER — TELEPHONE (OUTPATIENT)
Dept: FAMILY MEDICINE CLINIC | Age: 83
End: 2018-12-04

## 2018-12-06 ENCOUNTER — TELEPHONE (OUTPATIENT)
Dept: FAMILY MEDICINE CLINIC | Age: 83
End: 2018-12-06

## 2018-12-11 ENCOUNTER — HOSPITAL ENCOUNTER (OUTPATIENT)
Age: 83
Setting detail: SPECIMEN
Discharge: HOME | End: 2018-12-11
Payer: MEDICARE

## 2018-12-11 DIAGNOSIS — R53.83 FATIGUE, UNSPECIFIED TYPE: ICD-10-CM

## 2018-12-11 DIAGNOSIS — E78.00 PURE HYPERCHOLESTEROLEMIA: ICD-10-CM

## 2018-12-11 DIAGNOSIS — R74.8 ELEVATED LIVER ENZYMES: ICD-10-CM

## 2018-12-11 DIAGNOSIS — D50.9 IRON DEFICIENCY ANEMIA, UNSPECIFIED IRON DEFICIENCY ANEMIA TYPE: ICD-10-CM

## 2018-12-11 DIAGNOSIS — I51.89 DIASTOLIC DYSFUNCTION: ICD-10-CM

## 2018-12-11 DIAGNOSIS — E53.8 B12 DEFICIENCY: ICD-10-CM

## 2018-12-11 DIAGNOSIS — I10 ESSENTIAL HYPERTENSION: ICD-10-CM

## 2018-12-11 LAB
ALBUMIN SERPL-MCNC: 3.7 G/DL (ref 3.5–5.2)
ALBUMIN/GLOBULIN RATIO: 1.4 (ref 1–2.5)
ALP BLD-CCNC: 64 U/L (ref 40–129)
ALT SERPL-CCNC: 16 U/L (ref 5–41)
ANION GAP SERPL CALCULATED.3IONS-SCNC: 10 MMOL/L (ref 9–17)
AST SERPL-CCNC: 20 U/L
BILIRUB SERPL-MCNC: 0.98 MG/DL (ref 0.3–1.2)
BUN BLDV-MCNC: 17 MG/DL (ref 8–23)
BUN/CREAT BLD: ABNORMAL (ref 9–20)
CALCIUM SERPL-MCNC: 9.2 MG/DL (ref 8.6–10.4)
CHLORIDE BLD-SCNC: 105 MMOL/L (ref 98–107)
CHOLESTEROL/HDL RATIO: 2
CHOLESTEROL: 114 MG/DL
CO2: 27 MMOL/L (ref 20–31)
CREAT SERPL-MCNC: 0.81 MG/DL (ref 0.7–1.2)
GFR AFRICAN AMERICAN: >60 ML/MIN
GFR NON-AFRICAN AMERICAN: >60 ML/MIN
GFR SERPL CREATININE-BSD FRML MDRD: ABNORMAL ML/MIN/{1.73_M2}
GFR SERPL CREATININE-BSD FRML MDRD: ABNORMAL ML/MIN/{1.73_M2}
GLUCOSE BLD-MCNC: 90 MG/DL (ref 70–99)
HCT VFR BLD CALC: 46.8 % (ref 40.7–50.3)
HDLC SERPL-MCNC: 57 MG/DL
HEMOGLOBIN: 14.3 G/DL (ref 13–17)
LDL CHOLESTEROL: 44 MG/DL (ref 0–130)
MCH RBC QN AUTO: 29.5 PG (ref 25.2–33.5)
MCHC RBC AUTO-ENTMCNC: 30.6 G/DL (ref 28.4–34.8)
MCV RBC AUTO: 96.5 FL (ref 82.6–102.9)
NRBC AUTOMATED: 0 PER 100 WBC
PDW BLD-RTO: 15.6 % (ref 11.8–14.4)
PLATELET # BLD: 201 K/UL (ref 138–453)
PMV BLD AUTO: 11.3 FL (ref 8.1–13.5)
POTASSIUM SERPL-SCNC: 5 MMOL/L (ref 3.7–5.3)
RBC # BLD: 4.85 M/UL (ref 4.21–5.77)
SODIUM BLD-SCNC: 142 MMOL/L (ref 135–144)
THYROXINE, FREE: 1.32 NG/DL (ref 0.93–1.7)
TOTAL PROTEIN: 6.3 G/DL (ref 6.4–8.3)
TRIGL SERPL-MCNC: 65 MG/DL
TSH SERPL DL<=0.05 MIU/L-ACNC: 2.43 MIU/L (ref 0.3–5)
VITAMIN B-12: 461 PG/ML (ref 232–1245)
VLDLC SERPL CALC-MCNC: NORMAL MG/DL (ref 1–30)
WBC # BLD: 5.6 K/UL (ref 3.5–11.3)

## 2018-12-20 ENCOUNTER — OFFICE VISIT (OUTPATIENT)
Dept: FAMILY MEDICINE CLINIC | Age: 83
End: 2018-12-20
Payer: MEDICARE

## 2018-12-20 VITALS
DIASTOLIC BLOOD PRESSURE: 74 MMHG | BODY MASS INDEX: 29.35 KG/M2 | WEIGHT: 171 LBS | TEMPERATURE: 98 F | OXYGEN SATURATION: 97 % | HEART RATE: 70 BPM | RESPIRATION RATE: 16 BRPM | SYSTOLIC BLOOD PRESSURE: 126 MMHG

## 2018-12-20 DIAGNOSIS — M19.90 OSTEOARTHRITIS, UNSPECIFIED OSTEOARTHRITIS TYPE, UNSPECIFIED SITE: ICD-10-CM

## 2018-12-20 DIAGNOSIS — D50.9 IRON DEFICIENCY ANEMIA, UNSPECIFIED IRON DEFICIENCY ANEMIA TYPE: ICD-10-CM

## 2018-12-20 DIAGNOSIS — G89.29 CHRONIC BILATERAL LOW BACK PAIN WITHOUT SCIATICA: ICD-10-CM

## 2018-12-20 DIAGNOSIS — N40.1 BENIGN PROSTATIC HYPERPLASIA WITH LOWER URINARY TRACT SYMPTOMS, SYMPTOM DETAILS UNSPECIFIED: ICD-10-CM

## 2018-12-20 DIAGNOSIS — K58.9 IRRITABLE BOWEL SYNDROME WITHOUT DIARRHEA: ICD-10-CM

## 2018-12-20 DIAGNOSIS — R74.8 ELEVATED LIVER ENZYMES: ICD-10-CM

## 2018-12-20 DIAGNOSIS — I48.0 PAROXYSMAL ATRIAL FIBRILLATION (HCC): ICD-10-CM

## 2018-12-20 DIAGNOSIS — G47.00 INSOMNIA, UNSPECIFIED TYPE: ICD-10-CM

## 2018-12-20 DIAGNOSIS — E53.8 B12 DEFICIENCY: ICD-10-CM

## 2018-12-20 DIAGNOSIS — I10 ESSENTIAL HYPERTENSION: Primary | ICD-10-CM

## 2018-12-20 DIAGNOSIS — M54.50 CHRONIC BILATERAL LOW BACK PAIN WITHOUT SCIATICA: ICD-10-CM

## 2018-12-20 DIAGNOSIS — E78.00 PURE HYPERCHOLESTEROLEMIA: ICD-10-CM

## 2018-12-20 DIAGNOSIS — K21.9 GASTROESOPHAGEAL REFLUX DISEASE WITHOUT ESOPHAGITIS: ICD-10-CM

## 2018-12-20 PROCEDURE — G8427 DOCREV CUR MEDS BY ELIG CLIN: HCPCS | Performed by: FAMILY MEDICINE

## 2018-12-20 PROCEDURE — G8482 FLU IMMUNIZE ORDER/ADMIN: HCPCS | Performed by: FAMILY MEDICINE

## 2018-12-20 PROCEDURE — 1101F PT FALLS ASSESS-DOCD LE1/YR: CPT | Performed by: FAMILY MEDICINE

## 2018-12-20 PROCEDURE — G8417 CALC BMI ABV UP PARAM F/U: HCPCS | Performed by: FAMILY MEDICINE

## 2018-12-20 PROCEDURE — 4040F PNEUMOC VAC/ADMIN/RCVD: CPT | Performed by: FAMILY MEDICINE

## 2018-12-20 PROCEDURE — 1036F TOBACCO NON-USER: CPT | Performed by: FAMILY MEDICINE

## 2018-12-20 PROCEDURE — 1123F ACP DISCUSS/DSCN MKR DOCD: CPT | Performed by: FAMILY MEDICINE

## 2018-12-20 PROCEDURE — 99214 OFFICE O/P EST MOD 30 MIN: CPT | Performed by: FAMILY MEDICINE

## 2018-12-20 RX ORDER — TIZANIDINE 2 MG/1
2 TABLET ORAL 3 TIMES DAILY
Qty: 60 TABLET | Refills: 0 | Status: SHIPPED | OUTPATIENT
Start: 2018-12-20 | End: 2019-05-09 | Stop reason: SINTOL

## 2018-12-20 ASSESSMENT — ENCOUNTER SYMPTOMS
COUGH: 0
CHEST TIGHTNESS: 0
NAUSEA: 0
CONSTIPATION: 0
ABDOMINAL PAIN: 0
BACK PAIN: 1
ABDOMINAL DISTENTION: 0
DIARRHEA: 0
SORE THROAT: 0
RHINORRHEA: 0
SHORTNESS OF BREATH: 0
VOMITING: 0

## 2018-12-20 NOTE — PROGRESS NOTES
HYPERTENSION visit     BP Readings from Last 3 Encounters:   09/19/18 117/63   09/18/18 110/70   08/14/18 128/80       LDL Calculated (mg/dL)   Date Value   05/23/2017 53     LDL Cholesterol (mg/dL)   Date Value   12/11/2018 44     HDL (mg/dL)   Date Value   12/11/2018 57     BUN (mg/dL)   Date Value   12/11/2018 17     CREATININE (mg/dL)   Date Value   12/11/2018 0.81     Glucose   Date Value   12/11/2018 90 mg/dL   05/23/2017 89 mg/dl              Have you changed or started any medications since your last visit including any over-the-counter medicines, vitamins, or herbal medicines? yes - see med list   Have you stopped taking any of your medications? Is so, why? -  no  Are you having any side effects from any of your medications? - no  How often do you miss doses of your medication? no      Have you seen any other physician or provider since your last visit? yes - Dr. Nunu Munson   Have you had any other diagnostic tests since your last visit? yes - labs   Have you been seen in the emergency room and/or had an admission in a hospital since we last saw you?  no   Have you had your routine dental cleaning in the past 6 months?  yes - 07/2018     Do you have an active MyChart account? If no, what is the barrier?   Yes    Patient Care Team:  Aziza Salamanca MD as PCP - General (Family Medicine)  Aziza Salamanca MD as PCP - S Attributed Provider  Sabina Villarreal MD as Consulting Physician (Dermatology)  Juliette Cogan, MD as Consulting Physician (Cardiology)  Fide Saunders MD as Consulting Physician (Gastroenterology)  Janette Adam MD as Surgeon (Ophthalmology)  Cyrus Shane MD as Surgeon (Ophthalmology)  Luna Jackson MD as Consulting Physician (Urology)  Demetrius Orellana DO as Consulting Physician (General Surgery)  Breonna Al DPM as Consulting Physician (Podiatry)  Pablo Wheatley MD as Consulting Physician (Internal Medicine)    Medical History Review  Past Medical, Family, and

## 2018-12-20 NOTE — PROGRESS NOTES
Subjective:      Patient ID: Abbie Epperson is a 80 y.o. male. HPI  Pt here today for f/u on chronic medical problems:  HTN, HLD, GERD, Anemia, OA, B12 Def, Elevated Liver Enzymes, Atrial Fibrillation, IBS, BPH,go over labs and/or diagnostic studies, and medication refills. Pt today denies any HA, chest pain, or SOB. Pt denies any N/V/D/C or abdominal pain. Pt denies any fever or chills. Pt does c/o low back pain on and off and he states is worse first thing in the morning and then once he gets moving he feels better. He states he did try going to the [employer], but didn't really work for him. He states he does he out a lot and difficult to cook for himself. He still misses his wife who passed away over a year ago, but does try to stat active. Pt with occasional heartburn, but stable on meds. Pt with arthralgias on and off, but stable on meds. Otherwise pt doing well on current tx and no other concerns today. The patient's past medical, surgical, social, and family history as well as his current medications and allergies were reviewed as documented in today's encounter.       Current Outpatient Prescriptions   Medication Sig Dispense Refill    tiZANidine (ZANAFLEX) 2 MG tablet Take 1 tablet by mouth 3 times daily 60 tablet 0    lisinopril (PRINIVIL;ZESTRIL) 40 MG tablet TAKE ONE TABLET BY MOUTH DAILY 90 tablet 3    atorvastatin (LIPITOR) 20 MG tablet Take 1 tablet by mouth daily 90 tablet 3    tamsulosin (FLOMAX) 0.4 MG capsule Take 1 capsule by mouth 2 times daily 180 capsule 3    amLODIPine (NORVASC) 5 MG tablet Take 1 tablet by mouth daily 30 tablet 11    hydrALAZINE (APRESOLINE) 50 MG tablet Take 1 tablet by mouth 2 times daily 60 tablet 11    Biotin 1 MG CAPS Take 1 capsule by mouth daily      Melatonin 5 MG CAPS Take 1 capsule by mouth nightly      warfarin (COUMADIN) 5 MG tablet Take (1) to (1 & 1/2) tablets by mouth once daily or as directed by [facility]tico[facility]      heard.  Pulmonary/Chest: Effort normal and breath sounds normal. No respiratory distress. He has no wheezes. He exhibits no tenderness. Abdominal: Soft. Bowel sounds are normal. He exhibits no distension and no mass. There is no tenderness. Musculoskeletal: Normal range of motion. He exhibits no edema or tenderness. Lymphadenopathy:     He has no cervical adenopathy. Neurological: He is alert and oriented to person, place, and time. He has normal reflexes. Skin: No rash noted. Psychiatric: He has a normal mood and affect. His behavior is normal.   Vitals reviewed. Labs reviewed with pt today. Assessment:       Diagnosis Orders   1. Essential hypertension     2. Pure hypercholesterolemia     3. Gastroesophageal reflux disease without esophagitis     4. Iron deficiency anemia, unspecified iron deficiency anemia type     5. Osteoarthritis, unspecified osteoarthritis type, unspecified site     6. B12 deficiency     7. Elevated liver enzymes     8. Paroxysmal atrial fibrillation (HCC)     9. Insomnia, unspecified type     10. Irritable bowel syndrome without diarrhea     11. Benign prostatic hyperplasia with lower urinary tract symptoms, symptom details unspecified     12. Chronic bilateral low back pain without sciatica           Plan:         Orders Placed This Encounter   Medications    tiZANidine (ZANAFLEX) 2 MG tablet     Sig: Take 1 tablet by mouth 3 times daily     Dispense:  60 tablet     Refill:  0     Will cont with current treatment as pt is currently stable on current treatment    Will cont with low fat/chol diet and Lipitor as ordered    Will refill the Zanaflex to take prn for his back pain    Will cont with Coumadin as prescribed and INR [facility] as [facility]    Will cont to follow with Dr. Gary Sidhu as instructed    I did state to stay active and to stay busy with friends and family    Rest of systems unchanged, continue current treatments.     Medications, labs, diagnostic studies, consultations and follow-up as documented in this encounter.  Rest of systems unchanged, continue current treatments        Jennie Borja MD

## 2019-01-03 ENCOUNTER — TELEPHONE (OUTPATIENT)
Dept: FAMILY MEDICINE CLINIC | Age: 84
End: 2019-01-03

## 2019-01-03 ENCOUNTER — OFFICE VISIT (OUTPATIENT)
Dept: FAMILY MEDICINE CLINIC | Age: 84
End: 2019-01-03
Payer: MEDICARE

## 2019-01-03 VITALS
BODY MASS INDEX: 28.99 KG/M2 | OXYGEN SATURATION: 98 % | HEART RATE: 72 BPM | HEIGHT: 64 IN | DIASTOLIC BLOOD PRESSURE: 70 MMHG | WEIGHT: 169.8 LBS | SYSTOLIC BLOOD PRESSURE: 124 MMHG | TEMPERATURE: 98.2 F

## 2019-01-03 DIAGNOSIS — R05.9 COUGH: Primary | ICD-10-CM

## 2019-01-03 DIAGNOSIS — J20.9 ACUTE BRONCHITIS, UNSPECIFIED ORGANISM: Primary | ICD-10-CM

## 2019-01-03 PROCEDURE — G8417 CALC BMI ABV UP PARAM F/U: HCPCS | Performed by: NURSE PRACTITIONER

## 2019-01-03 PROCEDURE — 1036F TOBACCO NON-USER: CPT | Performed by: NURSE PRACTITIONER

## 2019-01-03 PROCEDURE — 4040F PNEUMOC VAC/ADMIN/RCVD: CPT | Performed by: NURSE PRACTITIONER

## 2019-01-03 PROCEDURE — 1101F PT FALLS ASSESS-DOCD LE1/YR: CPT | Performed by: NURSE PRACTITIONER

## 2019-01-03 PROCEDURE — 1123F ACP DISCUSS/DSCN MKR DOCD: CPT | Performed by: NURSE PRACTITIONER

## 2019-01-03 PROCEDURE — G8482 FLU IMMUNIZE ORDER/ADMIN: HCPCS | Performed by: NURSE PRACTITIONER

## 2019-01-03 PROCEDURE — G8427 DOCREV CUR MEDS BY ELIG CLIN: HCPCS | Performed by: NURSE PRACTITIONER

## 2019-01-03 PROCEDURE — 99214 OFFICE O/P EST MOD 30 MIN: CPT | Performed by: NURSE PRACTITIONER

## 2019-01-03 RX ORDER — CEFUROXIME AXETIL 250 MG/1
250 TABLET ORAL 2 TIMES DAILY
Qty: 20 TABLET | Refills: 0 | Status: SHIPPED | OUTPATIENT
Start: 2019-01-03 | End: 2019-01-10 | Stop reason: SDUPTHER

## 2019-01-03 RX ORDER — BENZONATATE 100 MG/1
100 CAPSULE ORAL 3 TIMES DAILY PRN
Qty: 30 CAPSULE | Refills: 0 | Status: SHIPPED | OUTPATIENT
Start: 2019-01-03 | End: 2019-01-13

## 2019-01-03 ASSESSMENT — ENCOUNTER SYMPTOMS
SINUS PRESSURE: 1
SHORTNESS OF BREATH: 1
SORE THROAT: 1
SINUS PAIN: 1
RHINORRHEA: 1
WHEEZING: 0
COUGH: 1

## 2019-01-04 ENCOUNTER — HOSPITAL ENCOUNTER (OUTPATIENT)
Dept: GENERAL RADIOLOGY | Facility: CLINIC | Age: 84
Discharge: HOME | End: 2019-01-06
Payer: MEDICARE

## 2019-01-04 ENCOUNTER — HOSPITAL ENCOUNTER (OUTPATIENT)
Facility: CLINIC | Age: 84
Discharge: HOME | End: 2019-01-06
Payer: MEDICARE

## 2019-01-04 DIAGNOSIS — R05.9 COUGH: ICD-10-CM

## 2019-01-04 PROCEDURE — 71046 X-RAY EXAM CHEST 2 VIEWS: CPT

## 2019-01-08 ENCOUNTER — OFFICE VISIT (OUTPATIENT)
Dept: FAMILY MEDICINE CLINIC | Age: 84
End: 2019-01-08
Payer: MEDICARE

## 2019-01-08 VITALS
SYSTOLIC BLOOD PRESSURE: 124 MMHG | HEART RATE: 76 BPM | TEMPERATURE: 98 F | DIASTOLIC BLOOD PRESSURE: 72 MMHG | OXYGEN SATURATION: 98 % | WEIGHT: 169.75 LBS | HEIGHT: 64 IN | BODY MASS INDEX: 28.98 KG/M2

## 2019-01-08 DIAGNOSIS — J20.9 ACUTE BRONCHITIS, UNSPECIFIED ORGANISM: Primary | ICD-10-CM

## 2019-01-08 PROCEDURE — G8482 FLU IMMUNIZE ORDER/ADMIN: HCPCS | Performed by: FAMILY MEDICINE

## 2019-01-08 PROCEDURE — 4040F PNEUMOC VAC/ADMIN/RCVD: CPT | Performed by: FAMILY MEDICINE

## 2019-01-08 PROCEDURE — 1101F PT FALLS ASSESS-DOCD LE1/YR: CPT | Performed by: FAMILY MEDICINE

## 2019-01-08 PROCEDURE — 99213 OFFICE O/P EST LOW 20 MIN: CPT | Performed by: FAMILY MEDICINE

## 2019-01-08 PROCEDURE — 1123F ACP DISCUSS/DSCN MKR DOCD: CPT | Performed by: FAMILY MEDICINE

## 2019-01-08 PROCEDURE — 1036F TOBACCO NON-USER: CPT | Performed by: FAMILY MEDICINE

## 2019-01-08 PROCEDURE — G8417 CALC BMI ABV UP PARAM F/U: HCPCS | Performed by: FAMILY MEDICINE

## 2019-01-08 PROCEDURE — G8427 DOCREV CUR MEDS BY ELIG CLIN: HCPCS | Performed by: FAMILY MEDICINE

## 2019-01-10 ENCOUNTER — TELEPHONE (OUTPATIENT)
Dept: FAMILY MEDICINE CLINIC | Age: 84
End: 2019-01-10

## 2019-01-10 DIAGNOSIS — J20.9 ACUTE BRONCHITIS, UNSPECIFIED ORGANISM: ICD-10-CM

## 2019-01-10 RX ORDER — CEFUROXIME AXETIL 250 MG/1
250 TABLET ORAL 2 TIMES DAILY
Qty: 20 TABLET | Refills: 0 | Status: SHIPPED | OUTPATIENT
Start: 2019-01-10 | End: 2019-01-20

## 2019-01-22 ENCOUNTER — TELEPHONE (OUTPATIENT)
Dept: FAMILY MEDICINE CLINIC | Age: 84
End: 2019-01-22

## 2019-01-22 DIAGNOSIS — J32.9 CHRONIC SINUSITIS, UNSPECIFIED LOCATION: Primary | ICD-10-CM

## 2019-01-23 ENCOUNTER — HOSPITAL ENCOUNTER (OUTPATIENT)
Facility: CLINIC | Age: 84
Discharge: HOME | End: 2019-01-25
Payer: MEDICARE

## 2019-01-23 ENCOUNTER — HOSPITAL ENCOUNTER (OUTPATIENT)
Dept: GENERAL RADIOLOGY | Facility: CLINIC | Age: 84
Discharge: HOME | End: 2019-01-25
Payer: MEDICARE

## 2019-01-23 ENCOUNTER — OFFICE VISIT (OUTPATIENT)
Dept: PODIATRY | Age: 84
End: 2019-01-23
Payer: MEDICARE

## 2019-01-23 VITALS — HEIGHT: 64 IN | WEIGHT: 160 LBS | BODY MASS INDEX: 27.31 KG/M2

## 2019-01-23 DIAGNOSIS — M79.674 PAIN IN TOES OF BOTH FEET: ICD-10-CM

## 2019-01-23 DIAGNOSIS — M79.675 PAIN IN TOES OF BOTH FEET: ICD-10-CM

## 2019-01-23 DIAGNOSIS — B35.1 ONYCHOMYCOSIS: Primary | ICD-10-CM

## 2019-01-23 DIAGNOSIS — J32.9 CHRONIC SINUSITIS, UNSPECIFIED LOCATION: ICD-10-CM

## 2019-01-23 PROCEDURE — 99999 PR OFFICE/OUTPT VISIT,PROCEDURE ONLY: CPT | Performed by: PODIATRIST

## 2019-01-23 PROCEDURE — 11721 DEBRIDE NAIL 6 OR MORE: CPT | Performed by: PODIATRIST

## 2019-01-23 PROCEDURE — 70220 X-RAY EXAM OF SINUSES: CPT

## 2019-01-23 ASSESSMENT — ENCOUNTER SYMPTOMS
CONSTIPATION: 0
VOMITING: 0
NAUSEA: 0
DIARRHEA: 0
COLOR CHANGE: 0

## 2019-03-14 ENCOUNTER — OFFICE VISIT (OUTPATIENT)
Dept: PODIATRY | Age: 84
End: 2019-03-14
Payer: MEDICARE

## 2019-03-14 VITALS — WEIGHT: 160 LBS | BODY MASS INDEX: 27.31 KG/M2 | HEIGHT: 64 IN

## 2019-03-14 DIAGNOSIS — M79.674 PAIN IN TOES OF BOTH FEET: ICD-10-CM

## 2019-03-14 DIAGNOSIS — M79.675 PAIN IN TOES OF BOTH FEET: ICD-10-CM

## 2019-03-14 DIAGNOSIS — B35.1 ONYCHOMYCOSIS: Primary | ICD-10-CM

## 2019-03-14 PROCEDURE — 99213 OFFICE O/P EST LOW 20 MIN: CPT | Performed by: PODIATRIST

## 2019-03-14 PROCEDURE — 1101F PT FALLS ASSESS-DOCD LE1/YR: CPT | Performed by: PODIATRIST

## 2019-03-14 PROCEDURE — G8482 FLU IMMUNIZE ORDER/ADMIN: HCPCS | Performed by: PODIATRIST

## 2019-03-14 PROCEDURE — 1123F ACP DISCUSS/DSCN MKR DOCD: CPT | Performed by: PODIATRIST

## 2019-03-14 PROCEDURE — G8417 CALC BMI ABV UP PARAM F/U: HCPCS | Performed by: PODIATRIST

## 2019-03-14 PROCEDURE — G8427 DOCREV CUR MEDS BY ELIG CLIN: HCPCS | Performed by: PODIATRIST

## 2019-03-14 PROCEDURE — 1036F TOBACCO NON-USER: CPT | Performed by: PODIATRIST

## 2019-03-14 PROCEDURE — 4040F PNEUMOC VAC/ADMIN/RCVD: CPT | Performed by: PODIATRIST

## 2019-03-14 RX ORDER — PANTOPRAZOLE SODIUM 40 MG/1
40 TABLET, DELAYED RELEASE ORAL DAILY
COMMUNITY
End: 2019-06-25 | Stop reason: ALTCHOICE

## 2019-03-14 ASSESSMENT — ENCOUNTER SYMPTOMS
CONSTIPATION: 0
DIARRHEA: 0
NAUSEA: 0
COLOR CHANGE: 0
VOMITING: 0

## 2019-03-22 ENCOUNTER — OFFICE VISIT (OUTPATIENT)
Dept: FAMILY MEDICINE CLINIC | Age: 84
End: 2019-03-22
Payer: MEDICARE

## 2019-03-22 VITALS
RESPIRATION RATE: 16 BRPM | DIASTOLIC BLOOD PRESSURE: 68 MMHG | TEMPERATURE: 97.4 F | OXYGEN SATURATION: 96 % | WEIGHT: 168 LBS | SYSTOLIC BLOOD PRESSURE: 124 MMHG | BODY MASS INDEX: 28.84 KG/M2 | HEART RATE: 88 BPM

## 2019-03-22 DIAGNOSIS — D50.9 IRON DEFICIENCY ANEMIA, UNSPECIFIED IRON DEFICIENCY ANEMIA TYPE: ICD-10-CM

## 2019-03-22 DIAGNOSIS — M19.90 OSTEOARTHRITIS, UNSPECIFIED OSTEOARTHRITIS TYPE, UNSPECIFIED SITE: ICD-10-CM

## 2019-03-22 DIAGNOSIS — E78.00 PURE HYPERCHOLESTEROLEMIA: ICD-10-CM

## 2019-03-22 DIAGNOSIS — R74.8 ELEVATED LIVER ENZYMES: ICD-10-CM

## 2019-03-22 DIAGNOSIS — M54.50 CHRONIC BILATERAL LOW BACK PAIN WITHOUT SCIATICA: ICD-10-CM

## 2019-03-22 DIAGNOSIS — E53.8 B12 DEFICIENCY: ICD-10-CM

## 2019-03-22 DIAGNOSIS — I10 ESSENTIAL HYPERTENSION: Primary | ICD-10-CM

## 2019-03-22 DIAGNOSIS — G47.00 INSOMNIA, UNSPECIFIED TYPE: ICD-10-CM

## 2019-03-22 DIAGNOSIS — G89.29 CHRONIC BILATERAL LOW BACK PAIN WITHOUT SCIATICA: ICD-10-CM

## 2019-03-22 DIAGNOSIS — I48.0 PAROXYSMAL ATRIAL FIBRILLATION (HCC): ICD-10-CM

## 2019-03-22 DIAGNOSIS — K21.9 GASTROESOPHAGEAL REFLUX DISEASE WITHOUT ESOPHAGITIS: ICD-10-CM

## 2019-03-22 DIAGNOSIS — N40.1 BENIGN PROSTATIC HYPERPLASIA WITH LOWER URINARY TRACT SYMPTOMS, SYMPTOM DETAILS UNSPECIFIED: ICD-10-CM

## 2019-03-22 PROCEDURE — 4040F PNEUMOC VAC/ADMIN/RCVD: CPT | Performed by: FAMILY MEDICINE

## 2019-03-22 PROCEDURE — 1101F PT FALLS ASSESS-DOCD LE1/YR: CPT | Performed by: FAMILY MEDICINE

## 2019-03-22 PROCEDURE — 1123F ACP DISCUSS/DSCN MKR DOCD: CPT | Performed by: FAMILY MEDICINE

## 2019-03-22 PROCEDURE — 1036F TOBACCO NON-USER: CPT | Performed by: FAMILY MEDICINE

## 2019-03-22 PROCEDURE — G8417 CALC BMI ABV UP PARAM F/U: HCPCS | Performed by: FAMILY MEDICINE

## 2019-03-22 PROCEDURE — 99214 OFFICE O/P EST MOD 30 MIN: CPT | Performed by: FAMILY MEDICINE

## 2019-03-22 PROCEDURE — G8427 DOCREV CUR MEDS BY ELIG CLIN: HCPCS | Performed by: FAMILY MEDICINE

## 2019-03-22 PROCEDURE — G8482 FLU IMMUNIZE ORDER/ADMIN: HCPCS | Performed by: FAMILY MEDICINE

## 2019-03-22 ASSESSMENT — ENCOUNTER SYMPTOMS
SHORTNESS OF BREATH: 0
DIARRHEA: 0
RHINORRHEA: 0
ABDOMINAL PAIN: 0
CHEST TIGHTNESS: 0
COUGH: 0
ABDOMINAL DISTENTION: 0
VOMITING: 0
BACK PAIN: 1
SORE THROAT: 0
NAUSEA: 0
CONSTIPATION: 0

## 2019-03-22 ASSESSMENT — PATIENT HEALTH QUESTIONNAIRE - PHQ9: DEPRESSION UNABLE TO ASSESS: PT REFUSES

## 2019-04-10 ENCOUNTER — TELEPHONE (OUTPATIENT)
Dept: FAMILY MEDICINE CLINIC | Age: 84
End: 2019-04-10

## 2019-04-10 NOTE — LETTER
[facility]  [address], [place] [address]  Phone: [phone]  Fax: [phone]    Haylee Hickey MD        April 10, 2019     Patient: Flores Grissom   YOB: 1931   Date of Visit: 4/10/2019       To Whom It May Concern: It is my medical opinion that Cecily Capps requires a disability parking placard for the following reasons:  He cannot walk 200 feet without stopping to rest.  Duration of need: 5 years    DX: OA M19.90    If you have any questions or concerns, please don't hesitate to call.     Sincerely,        Haylee Hickey MD

## 2019-05-09 ENCOUNTER — OFFICE VISIT (OUTPATIENT)
Dept: PODIATRY | Age: 84
End: 2019-05-09
Payer: MEDICARE

## 2019-05-09 VITALS — HEIGHT: 64 IN | WEIGHT: 160 LBS | BODY MASS INDEX: 27.31 KG/M2

## 2019-05-09 DIAGNOSIS — M79.674 PAIN IN TOES OF BOTH FEET: ICD-10-CM

## 2019-05-09 DIAGNOSIS — B35.1 ONYCHOMYCOSIS: Primary | ICD-10-CM

## 2019-05-09 DIAGNOSIS — M79.675 PAIN IN TOES OF BOTH FEET: ICD-10-CM

## 2019-05-09 PROCEDURE — 11721 DEBRIDE NAIL 6 OR MORE: CPT | Performed by: PODIATRIST

## 2019-05-09 PROCEDURE — 99999 PR OFFICE/OUTPT VISIT,PROCEDURE ONLY: CPT | Performed by: PODIATRIST

## 2019-05-09 ASSESSMENT — ENCOUNTER SYMPTOMS
CONSTIPATION: 0
COLOR CHANGE: 0
VOMITING: 0
DIARRHEA: 0
NAUSEA: 0

## 2019-05-09 NOTE — PROGRESS NOTES
[facility]  Return Patient    Chief Complaint   Patient presents with    Nail Problem     B/L NAIL TRIM    Other     PCP: LAST VISIT 03/22/2019 DR Lianet Phillips       Subjective: This Vince Valiente comes to clinic for foot and nail care. He would like all of his nails trimmed. He cannot take care of them himself. They are painful when long and he tries to wear shoes. Pt's primary care physician is Randall Hollingsworth MD.     Past Medical History:   Diagnosis Date    Acute MI ([address]     Allergic rhinitis 9/2/2011    Anemia     Basal cell cancer 03/2013    left side of head, face chin    Basal cell cancer 03/10/14    left cheek    Cervical radiculopathy     Diverticulitis 02/17/13    Epigastric pain/GERD. 9/2/2011    GERD (gastroesophageal reflux disease)     Glaucoma     Hyperlipidemia     Hypertension     Hyponatremia 9/2/2011    IBS (irritable bowel syndrome) 9/2/2011    Insomnia 9/2/2011    Osteoarthritis     Osteoarthritis/neck pain.  9/2/2011    Renal calculi     Shingles     history of shingles       Allergies   Allergen Reactions    Celebrex [Celecoxib] Nausea Only    Mobic Nausea Only     Current Outpatient Medications on File Prior to Visit   Medication Sig Dispense Refill    pantoprazole (PROTONIX) 40 MG tablet Take 40 mg by mouth daily      fluticasone (FLONASE) 50 MCG/ACT nasal spray 2 sprays by Each Nare route daily 1 Bottle 0    tiZANidine (ZANAFLEX) 2 MG tablet Take 1 tablet by mouth 3 times daily 60 tablet 0    lisinopril (PRINIVIL;ZESTRIL) 40 MG tablet TAKE ONE TABLET BY MOUTH DAILY 90 tablet 3    atorvastatin (LIPITOR) 20 MG tablet Take 1 tablet by mouth daily 90 tablet 3    tamsulosin (FLOMAX) 0.4 MG capsule Take 1 capsule by mouth 2 times daily 180 capsule 3    amLODIPine (NORVASC) 5 MG tablet Take 1 tablet by mouth daily 30 tablet 11    hydrALAZINE (APRESOLINE) 50 MG tablet Take 1 tablet by mouth 2 times daily 60 tablet 11    Melatonin 5 MG CAPS Take 1 capsule by mouth nightly      warfarin (COUMADIN) 5 MG tablet Take (1) to (1 & 1/2) tablets by mouth once daily or as directed by [facility]ticoagulation [facility]      metoprolol tartrate (LOPRESSOR) 25 MG tablet Take 25 mg by mouth 2 times daily       No current facility-administered medications on file prior to visit. Review of Systems   Constitutional: Negative for chills, diaphoresis, fatigue, fever and unexpected weight change. Cardiovascular: Negative for leg swelling. Gastrointestinal: Negative for constipation, diarrhea, nausea and vomiting. Musculoskeletal: Positive for gait problem. Negative for arthralgias and joint swelling. Skin: Negative for color change, pallor, rash and wound. Neurological: Negative for weakness and numbness. Objective:  General: AAO x 3 in NAD. Derm  Left hallux nail incurvated medial border with slight redness and pain to palpation.     Sites of Onychomycosis Involvement (Check affected area)  [x] [x] [x] [x] [x] [x] [x] [x] [x] [x]  5 4 3 2 1 1 2 3 4 5                          Right                                        Left    Thickness  [x] [x] [x] [x] [x] [x] [x] [x] [x] [x]  5 4 3 2 1 1 2 3 4 5                         Right                                        Left    Dystrophic Changes                                                                 [x] [x] [x] [x] [x] [x] [x] [x] [x] [x]  5 4 3 2 1 1 2 3 4 5                         Right                                        Left    Color                                                                  [x] [x] [x] [x] [x] [x] [x] [x] [x] [x]  5 4 3 2 1 1 2 3 4 5                          Right                                        Left    Incurvation/Ingrowin                                                                   [] [] [] [] [] [] [] [] [] []  5 4 3 2 1 1 2 3 4 5                         Right                                        Left    Inflammation/Pain [x] [x] [x] [x] [x] [x] [x] [x] [x] [x]  5 4 3 2 1 1 2 3 4 5                         Right                                        Left    Vascular:  DP/PT pulses palpable 2/4, Bilateral.    CFT <3 seconds to digits 1-5, Bilateral .   Hair growth present to level of digits, Bilateral.    Neurological:  Sensation present to light touch to level of digits, Bilateral.    Assessment:  80 y.o. male with:   1. Onychomycosis    2. Pain in toes of both feet       Orders Placed This Encounter   Procedures    [place] DEBRIDEMENT OF NAILS, 6 OR MORE         Plan:   Pt was evaluated and examined. Patient was given personalized discharge instructions. Nails 1-10 were debrided in length and thickness sharply with a nail nipper and  without incident, slant back performed. Pt will follow up in 9-12 weeks or sooner if any problems arise. Diagnosis was discussed with the pt and all of their questions were answered in detail. Proper foot hygiene and care was discussed with the pt. Patient to check feet daily and contact the office with any questions/problems/concerns. Other comorbidity noted and will be managed by PCP. Pain waiver discussed with patient and confirmed.          5/9/2019    Electronically signed by Marycarmen Rutledge DPM on 5/9/2019 at 12:05 PM

## 2019-05-23 ENCOUNTER — TELEPHONE (OUTPATIENT)
Dept: FAMILY MEDICINE CLINIC | Age: 84
End: 2019-05-23

## 2019-05-23 RX ORDER — TIZANIDINE 4 MG/1
4 TABLET ORAL 3 TIMES DAILY
Qty: 40 TABLET | Refills: 0 | Status: SHIPPED | OUTPATIENT
Start: 2019-05-23 | End: 2019-06-25

## 2019-05-23 NOTE — TELEPHONE ENCOUNTER
Patient states his 2 \"neck cords\" and the very top of his shoulders are very painful, pain is radiating down to his rotator cuff. He would like to know if there is a muscle relaxer that could be prescribed, or if you have any other suggestions for him. Any medications should be sent to the [facility]. Please advise.

## 2019-05-23 NOTE — TELEPHONE ENCOUNTER
All the muscle relaxers cause fatigue. Have him try 1/2 of the Zanaflex 4mg. I think he really needs to just get a massage a couple of visits.

## 2019-05-23 NOTE — TELEPHONE ENCOUNTER
Patient notified. Verbalized understanding.    TOLD PATIENT TO TAKE MEDICATION BEFORE GOING TO BED SO IT DOESN'T AFFECT HIS DAY

## 2019-05-23 NOTE — TELEPHONE ENCOUNTER
Patient states he was given this muscle relaxer back at [facility] last year. He states this muscle relaxer makes him very drowsy and he falls asleep within 45 minutes of taking it. He would like to know if there is something else he can take instead of that.

## 2019-06-12 ENCOUNTER — HOSPITAL ENCOUNTER (OUTPATIENT)
Age: 84
Setting detail: SPECIMEN
Discharge: HOME | End: 2019-06-12
Payer: MEDICARE

## 2019-06-12 DIAGNOSIS — D50.9 IRON DEFICIENCY ANEMIA, UNSPECIFIED IRON DEFICIENCY ANEMIA TYPE: ICD-10-CM

## 2019-06-12 DIAGNOSIS — I48.0 PAROXYSMAL ATRIAL FIBRILLATION (HCC): ICD-10-CM

## 2019-06-12 DIAGNOSIS — E53.8 B12 DEFICIENCY: ICD-10-CM

## 2019-06-12 DIAGNOSIS — I10 ESSENTIAL HYPERTENSION: ICD-10-CM

## 2019-06-12 DIAGNOSIS — R74.8 ELEVATED LIVER ENZYMES: ICD-10-CM

## 2019-06-12 DIAGNOSIS — E78.00 PURE HYPERCHOLESTEROLEMIA: ICD-10-CM

## 2019-06-12 LAB
ALBUMIN SERPL-MCNC: 4 G/DL (ref 3.5–5.2)
ALBUMIN/GLOBULIN RATIO: 1.5 (ref 1–2.5)
ALP BLD-CCNC: 69 U/L (ref 40–129)
ALT SERPL-CCNC: 15 U/L (ref 5–41)
ANION GAP SERPL CALCULATED.3IONS-SCNC: 10 MMOL/L (ref 9–17)
AST SERPL-CCNC: 19 U/L
BILIRUB SERPL-MCNC: 1.49 MG/DL (ref 0.3–1.2)
BUN BLDV-MCNC: 10 MG/DL (ref 8–23)
BUN/CREAT BLD: ABNORMAL (ref 9–20)
CALCIUM SERPL-MCNC: 9.6 MG/DL (ref 8.6–10.4)
CHLORIDE BLD-SCNC: 99 MMOL/L (ref 98–107)
CHOLESTEROL/HDL RATIO: 2.3
CHOLESTEROL: 114 MG/DL
CO2: 28 MMOL/L (ref 20–31)
CREAT SERPL-MCNC: 0.83 MG/DL (ref 0.7–1.2)
GFR AFRICAN AMERICAN: >60 ML/MIN
GFR NON-AFRICAN AMERICAN: >60 ML/MIN
GFR SERPL CREATININE-BSD FRML MDRD: ABNORMAL ML/MIN/{1.73_M2}
GFR SERPL CREATININE-BSD FRML MDRD: ABNORMAL ML/MIN/{1.73_M2}
GLUCOSE BLD-MCNC: 85 MG/DL (ref 70–99)
HCT VFR BLD CALC: 47 % (ref 40.7–50.3)
HDLC SERPL-MCNC: 49 MG/DL
HEMOGLOBIN: 14.5 G/DL (ref 13–17)
LDL CHOLESTEROL: 49 MG/DL (ref 0–130)
MCH RBC QN AUTO: 29.2 PG (ref 25.2–33.5)
MCHC RBC AUTO-ENTMCNC: 30.9 G/DL (ref 28.4–34.8)
MCV RBC AUTO: 94.8 FL (ref 82.6–102.9)
NRBC AUTOMATED: 0 PER 100 WBC
PDW BLD-RTO: 14.8 % (ref 11.8–14.4)
PLATELET # BLD: 195 K/UL (ref 138–453)
PMV BLD AUTO: 11.3 FL (ref 8.1–13.5)
POTASSIUM SERPL-SCNC: 4.6 MMOL/L (ref 3.7–5.3)
RBC # BLD: 4.96 M/UL (ref 4.21–5.77)
SODIUM BLD-SCNC: 137 MMOL/L (ref 135–144)
TOTAL PROTEIN: 6.6 G/DL (ref 6.4–8.3)
TRIGL SERPL-MCNC: 81 MG/DL
VITAMIN B-12: 296 PG/ML (ref 232–1245)
VLDLC SERPL CALC-MCNC: NORMAL MG/DL (ref 1–30)
WBC # BLD: 5.7 K/UL (ref 3.5–11.3)

## 2019-06-25 ENCOUNTER — OFFICE VISIT (OUTPATIENT)
Dept: FAMILY MEDICINE CLINIC | Age: 84
End: 2019-06-25
Payer: MEDICARE

## 2019-06-25 VITALS
BODY MASS INDEX: 28.67 KG/M2 | HEART RATE: 68 BPM | RESPIRATION RATE: 16 BRPM | TEMPERATURE: 97.8 F | OXYGEN SATURATION: 96 % | WEIGHT: 167 LBS | DIASTOLIC BLOOD PRESSURE: 82 MMHG | SYSTOLIC BLOOD PRESSURE: 128 MMHG

## 2019-06-25 DIAGNOSIS — M54.2 NECK PAIN: ICD-10-CM

## 2019-06-25 DIAGNOSIS — K58.9 IRRITABLE BOWEL SYNDROME WITHOUT DIARRHEA: ICD-10-CM

## 2019-06-25 DIAGNOSIS — I10 ESSENTIAL HYPERTENSION: Primary | ICD-10-CM

## 2019-06-25 DIAGNOSIS — M75.101 RIGHT ROTATOR CUFF TEAR ARTHROPATHY: ICD-10-CM

## 2019-06-25 DIAGNOSIS — M12.811 RIGHT ROTATOR CUFF TEAR ARTHROPATHY: ICD-10-CM

## 2019-06-25 DIAGNOSIS — D50.9 IRON DEFICIENCY ANEMIA, UNSPECIFIED IRON DEFICIENCY ANEMIA TYPE: ICD-10-CM

## 2019-06-25 DIAGNOSIS — G47.00 INSOMNIA, UNSPECIFIED TYPE: ICD-10-CM

## 2019-06-25 DIAGNOSIS — K21.9 GASTROESOPHAGEAL REFLUX DISEASE WITHOUT ESOPHAGITIS: ICD-10-CM

## 2019-06-25 DIAGNOSIS — R74.8 ELEVATED LIVER ENZYMES: ICD-10-CM

## 2019-06-25 DIAGNOSIS — E78.00 PURE HYPERCHOLESTEROLEMIA: ICD-10-CM

## 2019-06-25 DIAGNOSIS — I48.0 PAROXYSMAL ATRIAL FIBRILLATION (HCC): ICD-10-CM

## 2019-06-25 DIAGNOSIS — M19.90 OSTEOARTHRITIS, UNSPECIFIED OSTEOARTHRITIS TYPE, UNSPECIFIED SITE: ICD-10-CM

## 2019-06-25 DIAGNOSIS — E53.8 B12 DEFICIENCY: ICD-10-CM

## 2019-06-25 DIAGNOSIS — N40.1 BENIGN PROSTATIC HYPERPLASIA WITH LOWER URINARY TRACT SYMPTOMS, SYMPTOM DETAILS UNSPECIFIED: ICD-10-CM

## 2019-06-25 DIAGNOSIS — M54.50 CHRONIC BILATERAL LOW BACK PAIN WITHOUT SCIATICA: ICD-10-CM

## 2019-06-25 DIAGNOSIS — G89.29 CHRONIC BILATERAL LOW BACK PAIN WITHOUT SCIATICA: ICD-10-CM

## 2019-06-25 PROCEDURE — 96372 THER/PROPH/DIAG INJ SC/IM: CPT | Performed by: FAMILY MEDICINE

## 2019-06-25 PROCEDURE — G8417 CALC BMI ABV UP PARAM F/U: HCPCS | Performed by: FAMILY MEDICINE

## 2019-06-25 PROCEDURE — 4040F PNEUMOC VAC/ADMIN/RCVD: CPT | Performed by: FAMILY MEDICINE

## 2019-06-25 PROCEDURE — G8427 DOCREV CUR MEDS BY ELIG CLIN: HCPCS | Performed by: FAMILY MEDICINE

## 2019-06-25 PROCEDURE — 1036F TOBACCO NON-USER: CPT | Performed by: FAMILY MEDICINE

## 2019-06-25 PROCEDURE — 99214 OFFICE O/P EST MOD 30 MIN: CPT | Performed by: FAMILY MEDICINE

## 2019-06-25 PROCEDURE — 1123F ACP DISCUSS/DSCN MKR DOCD: CPT | Performed by: FAMILY MEDICINE

## 2019-06-25 RX ORDER — ATORVASTATIN CALCIUM 20 MG/1
20 TABLET, FILM COATED ORAL DAILY
Qty: 90 TABLET | Refills: 3 | Status: SHIPPED
Start: 2019-06-25 | End: 2020-07-14 | Stop reason: SINTOL

## 2019-06-25 RX ORDER — DOXEPIN HYDROCHLORIDE 50 MG/1
50 CAPSULE ORAL NIGHTLY
Qty: 30 CAPSULE | Refills: 3 | Status: SHIPPED | OUTPATIENT
Start: 2019-06-25 | End: 2019-07-02

## 2019-06-25 RX ORDER — AMLODIPINE BESYLATE 5 MG/1
5 TABLET ORAL DAILY
Qty: 30 TABLET | Refills: 11 | Status: SHIPPED | OUTPATIENT
Start: 2019-06-25 | End: 2019-10-17 | Stop reason: SINTOL

## 2019-06-25 RX ORDER — CYANOCOBALAMIN 1000 UG/ML
1000 INJECTION INTRAMUSCULAR; SUBCUTANEOUS ONCE
Status: COMPLETED | OUTPATIENT
Start: 2019-06-25 | End: 2019-06-25

## 2019-06-25 RX ORDER — HYDRALAZINE HYDROCHLORIDE 50 MG/1
50 TABLET, FILM COATED ORAL 2 TIMES DAILY
Qty: 60 TABLET | Refills: 11 | Status: SHIPPED | OUTPATIENT
Start: 2019-06-25 | End: 2019-07-25 | Stop reason: SDUPTHER

## 2019-06-25 RX ADMIN — CYANOCOBALAMIN 1000 MCG: 1000 INJECTION INTRAMUSCULAR; SUBCUTANEOUS at 14:25

## 2019-06-25 ASSESSMENT — ENCOUNTER SYMPTOMS
SHORTNESS OF BREATH: 0
ABDOMINAL DISTENTION: 0
BACK PAIN: 1
CHEST TIGHTNESS: 0
DIARRHEA: 0
ABDOMINAL PAIN: 0
RHINORRHEA: 0
COUGH: 0
NAUSEA: 0
VOMITING: 0
SORE THROAT: 0
CONSTIPATION: 0

## 2019-06-25 ASSESSMENT — PATIENT HEALTH QUESTIONNAIRE - PHQ9
2. FEELING DOWN, DEPRESSED OR HOPELESS: 1
SUM OF ALL RESPONSES TO PHQ QUESTIONS 1-9: 2
SUM OF ALL RESPONSES TO PHQ9 QUESTIONS 1 & 2: 2
SUM OF ALL RESPONSES TO PHQ QUESTIONS 1-9: 2
1. LITTLE INTEREST OR PLEASURE IN DOING THINGS: 1

## 2019-06-25 NOTE — PROGRESS NOTES
Subjective:      Patient ID: Rose Schreiber is a 80 y.o. male. HPI  Pt here today for f/u on chronic medical problems:  HTN, HLD, Atrial Fibrillation, Anemia, OA, GERD, B12 Def, IBS, BPH,go over labs and/or diagnostic studies, and medication refills. Pt today denies any HA, chest pain, or SOB. Pt denies any N/V/D/C or abdominal pain. Pt denies any fever or chills. Pt with arthralgias/back pain on and off, but stable on meds. Pt with occasional heartburn, but stable on meds. Pt is staying busy and active, but still really misses his wife. He is c/o right shoulder pain and weakness. He states it has improved, but still gets a burning sensation and has weakness where he cannot even raise his coffee cup. He is having difficulty staying asleep. He can fall asleep, but awakens and cannot fall back asleep. He states his mind is racing. Otherwise pt doing well on current tx and no other concerns today. The patient's past medical, surgical, social, and family history as well as his current medications and allergies were reviewed as documented in today's encounter.       Current Outpatient Medications   Medication Sig Dispense Refill    bimatoprost (LUMIGAN) 0.01 % SOLN ophthalmic drops Place 1 drop into the left eye nightly      atorvastatin (LIPITOR) 20 MG tablet Take 1 tablet by mouth daily 90 tablet 3    hydrALAZINE (APRESOLINE) 50 MG tablet Take 1 tablet by mouth 2 times daily 60 tablet 11    amLODIPine (NORVASC) 5 MG tablet Take 1 tablet by mouth daily 30 tablet 11    doxepin (SINEQUAN) 50 MG capsule Take 1 capsule by mouth nightly 30 capsule 3    fluticasone (FLONASE) 50 MCG/ACT nasal spray 2 sprays by Each Nare route daily (Patient taking differently: 2 sprays by Each Nostril route daily as needed ) 1 Bottle 0    lisinopril (PRINIVIL;ZESTRIL) 40 MG tablet TAKE ONE TABLET BY MOUTH DAILY 90 tablet 3    tamsulosin (FLOMAX) 0.4 MG capsule Take 1 capsule by mouth 2 times daily 180 capsule 3    Melatonin 5 MG CAPS Take 2 capsules by mouth nightly       warfarin (COUMADIN) 5 MG tablet Take (1) to (1 & 1/2) tablets by mouth once daily or as [facility]      metoprolol tartrate (LOPRESSOR) 25 MG tablet Take 25 mg by mouth 2 times daily       Current Facility-Administered Medications   Medication Dose Route Frequency Provider Last Rate Last Dose    cyanocobalamin injection 1,000 mcg  1,000 mcg Intramuscular Once Alex Quick MD         Review of Systems   Constitutional: Negative for appetite change, fatigue and fever. HENT: Negative for congestion, ear pain, rhinorrhea and sore throat. Respiratory: Negative for cough, chest tightness and shortness of breath. Cardiovascular: Negative for chest pain and palpitations. Gastrointestinal: Negative for abdominal distention, abdominal pain, constipation, diarrhea, nausea and vomiting. Occ hertburn   Genitourinary: Negative for difficulty urinating and dysuria. Musculoskeletal: Positive for arthralgias, back pain and neck pain. Negative for myalgias. Right shoulder pain   Skin: Negative for rash. Neurological: Positive for weakness (RUE). Negative for dizziness, light-headedness and headaches. Hematological: Negative for adenopathy. Psychiatric/Behavioral: Positive for sleep disturbance. Negative for behavioral problems. The patient is not nervous/anxious. Objective:   Physical Exam   Constitutional: He is oriented to person, place, and time. He appears well-developed. No distress. HENT:   Head: Normocephalic and atraumatic. Right Ear: External ear normal.   Left Ear: External ear normal.   Nose: Nose normal.   Mouth/Throat: Oropharynx is clear and moist. No oropharyngeal exudate. Eyes: Pupils are equal, round, and reactive to light. Conjunctivae and EOM are normal.   Neck: Normal range of motion. Cardiovascular: Normal rate, normal heart sounds and intact distal pulses.  An irregularly irregular rhythm present. No murmur heard. Pulmonary/Chest: Effort normal and breath sounds normal. No respiratory distress. He has no wheezes. He exhibits no tenderness. Abdominal: Soft. Bowel sounds are normal. He exhibits no distension and no mass. There is no tenderness. Musculoskeletal: Normal range of motion. He exhibits no edema or tenderness. Lymphadenopathy:     He has no cervical adenopathy. Neurological: He is alert and oriented to person, place, and time. He has normal reflexes. Skin: No rash noted. Psychiatric: He has a normal mood and affect. His behavior is normal.   Vitals reviewed. Labs reviewed with pt today. Assessment:       Diagnosis Orders   1. Essential hypertension     2. Pure hypercholesterolemia     3. Paroxysmal atrial fibrillation (HCC)     4. Iron deficiency anemia, unspecified iron deficiency anemia type     5. Osteoarthritis, unspecified osteoarthritis type, unspecified site     6. Gastroesophageal reflux disease without esophagitis     7. B12 deficiency  cyanocobalamin injection 1,000 mcg   8. Irritable bowel syndrome without diarrhea     9. Chronic bilateral low back pain without sciatica     10. Elevated liver enzymes      improved   11. Insomnia, unspecified type  doxepin (SINEQUAN) 50 MG capsule   12. Benign prostatic hyperplasia with lower urinary tract symptoms, symptom details unspecified     13. Right rotator cuff tear arthropathy  [facility]   14.  Neck pain  [facility]         Plan:      Orders Placed This Encounter   Procedures    [facility]     Referral Priority:   Routine     Referral Type:   Eval and Treat     Referral Reason:   Specialty Services Required     Requested Specialty:   Physical Therapy     Number of Visits Requested:   1      Orders Placed This Encounter   Medications    cyanocobalamin injection 1,000 mcg    atorvastatin (LIPITOR) 20 MG tablet     Sig: Take 1 tablet by mouth daily     Dispense:  90 tablet     Refill:  3    hydrALAZINE (APRESOLINE) 50 MG tablet     Sig: Take 1 tablet by mouth 2 times daily     Dispense:  60 tablet     Refill:  11    amLODIPine (NORVASC) 5 MG tablet     Sig: Take 1 tablet by mouth daily     Dispense:  30 tablet     Refill:  11    doxepin (SINEQUAN) 50 MG capsule     Sig: Take 1 capsule by mouth nightly     Dispense:  30 capsule     Refill:  3     Will cont with current treatment as pt is currently stable on current treatment    Will cont with low fat/chol diet and Lipitor as ordered    Will give him a B12 injection here in the office today and start OTC B12 1000mcg daily    Will try the Doxepin 50mg daily for his sleep    Will start PT for his right arm pain and weakness, but I did state it can also be coming form his neck    Will cont to follow with Dr. Ale Ornelas as instructed    Will follow up with Urology as instructed for elevated PSA level    Will have him back in a month to see how he did with PT and the Doxepin    Rest of systems unchanged, continue current treatments. Medications, labs, diagnostic studies, consultations and follow-up as documented in this encounter.  Rest of systems unchanged, continue current treatments        Brandon Laguna MD

## 2019-06-25 NOTE — PROGRESS NOTES
HYPERTENSION visit     BP Readings from Last 3 Encounters:   06/25/19 128/82   03/22/19 124/68   01/08/19 124/72       LDL Calculated (mg/dL)   Date Value   05/23/2017 53     LDL Cholesterol (mg/dL)   Date Value   06/12/2019 49     HDL (mg/dL)   Date Value   06/12/2019 49     BUN (mg/dL)   Date Value   06/12/2019 10     CREATININE (mg/dL)   Date Value   06/12/2019 0.83     Glucose   Date Value   06/12/2019 85 mg/dL   05/23/2017 89 mg/dl              Have you changed or started any medications since your last visit including any over-the-counter medicines, vitamins, or herbal medicines? no   Have you stopped taking any of your medications? Is so, why? -  no  Are you having any side effects from any of your medications? - no  How often do you miss doses of your medication? rare      Have you seen any other physician or provider since your last visit?  no   Have you had any other diagnostic tests since your last visit? yes - labs   Have you been seen in the emergency room and/or had an admission in a hospital since we last saw you?  no   Have you had your routine dental cleaning in the past 6 months?  yes - 02/2019     Do you have an active MyChart account? If no, what is the barrier?   Yes    Patient Care Team:  Martha Jain MD as PCP - General (Family Medicine)  Martha Jain MD as PCP - [facility]  Yolanda Ny MD as Consulting Physician (Dermatology)  Fredrick Stewart MD as Consulting Physician (Cardiology)  Nicole Erickson MD as Consulting Physician (Gastroenterology)  Wilfrido Montero MD as Surgeon (Ophthalmology)  Antoinette Saldana MD as Surgeon (Ophthalmology)  Andrea Astorga MD as Consulting Physician (Urology)  Phoenix Mcdonald DO as Consulting Physician (General Surgery)  Bryant Lucero DPM as Consulting Physician (Podiatry)  Argenis Dawn MD as Consulting Physician (Internal Medicine)    Medical History Review  Past Medical, Family, and Social History reviewed and does contribute to the patient presenting condition    Health Maintenance   Topic Date Due    Annual Wellness Visit (AWV)  12/02/1994    Shingles Vaccine (2 of 3) 06/25/2020 (Originally 9/9/2012)    Potassium monitoring  06/12/2020    Creatinine monitoring  06/12/2020    DTaP/Tdap/Td vaccine (2 - Td) 08/23/2020    Flu vaccine  Completed    Pneumococcal 65+ years Vaccine  Completed     Patient/Caregiver verbalize understanding of medications.   Yes

## 2019-07-02 ENCOUNTER — HOSPITAL ENCOUNTER (OUTPATIENT)
Dept: PHYSICAL THERAPY | Age: 84
Setting detail: THERAPIES SERIES
Discharge: HOME | End: 2019-07-02
Payer: MEDICARE

## 2019-07-02 ENCOUNTER — OFFICE VISIT (OUTPATIENT)
Dept: PODIATRY | Age: 84
End: 2019-07-02
Payer: MEDICARE

## 2019-07-02 VITALS — BODY MASS INDEX: 27.66 KG/M2 | WEIGHT: 162 LBS | HEIGHT: 64 IN

## 2019-07-02 DIAGNOSIS — M79.674 PAIN IN TOES OF BOTH FEET: ICD-10-CM

## 2019-07-02 DIAGNOSIS — B35.1 ONYCHOMYCOSIS: Primary | ICD-10-CM

## 2019-07-02 DIAGNOSIS — M79.675 PAIN IN TOES OF BOTH FEET: ICD-10-CM

## 2019-07-02 PROCEDURE — 97161 PT EVAL LOW COMPLEX 20 MIN: CPT

## 2019-07-02 PROCEDURE — G8417 CALC BMI ABV UP PARAM F/U: HCPCS | Performed by: PODIATRIST

## 2019-07-02 PROCEDURE — 1036F TOBACCO NON-USER: CPT | Performed by: PODIATRIST

## 2019-07-02 PROCEDURE — G8427 DOCREV CUR MEDS BY ELIG CLIN: HCPCS | Performed by: PODIATRIST

## 2019-07-02 PROCEDURE — 1123F ACP DISCUSS/DSCN MKR DOCD: CPT | Performed by: PODIATRIST

## 2019-07-02 PROCEDURE — 97110 THERAPEUTIC EXERCISES: CPT

## 2019-07-02 PROCEDURE — 4040F PNEUMOC VAC/ADMIN/RCVD: CPT | Performed by: PODIATRIST

## 2019-07-02 PROCEDURE — 99213 OFFICE O/P EST LOW 20 MIN: CPT | Performed by: PODIATRIST

## 2019-07-02 PROCEDURE — G0283 ELEC STIM OTHER THAN WOUND: HCPCS

## 2019-07-02 ASSESSMENT — ENCOUNTER SYMPTOMS
CONSTIPATION: 0
COLOR CHANGE: 0
DIARRHEA: 0
VOMITING: 0
NAUSEA: 0

## 2019-07-02 ASSESSMENT — PAIN DESCRIPTION - FREQUENCY: FREQUENCY: CONTINUOUS

## 2019-07-02 ASSESSMENT — PAIN DESCRIPTION - DESCRIPTORS: DESCRIPTORS: CONSTANT;ACHING

## 2019-07-02 ASSESSMENT — PAIN SCALES - GENERAL: PAINLEVEL_OUTOF10: 9

## 2019-07-02 ASSESSMENT — PAIN DESCRIPTION - ORIENTATION: ORIENTATION: RIGHT

## 2019-07-02 ASSESSMENT — PAIN DESCRIPTION - LOCATION: LOCATION: SHOULDER

## 2019-07-02 NOTE — PROGRESS NOTES
tablet Take 25 mg by mouth 2 times daily       No current facility-administered medications on file prior to visit. Review of Systems   Constitutional: Negative for chills, diaphoresis, fatigue, fever and unexpected weight change. Cardiovascular: Negative for leg swelling. Gastrointestinal: Negative for constipation, diarrhea, nausea and vomiting. Musculoskeletal: Positive for gait problem. Negative for arthralgias and joint swelling. Skin: Negative for color change, pallor, rash and wound. Neurological: Negative for weakness and numbness. Objective:  General: AAO x 3 in NAD. Derm  Left hallux nail incurvated medial border with slight redness and pain to palpation.     Sites of Onychomycosis Involvement (Check affected area)  [x] [x] [x] [x] [x] [x] [x] [x] [x] [x]  5 4 3 2 1 1 2 3 4 5                          Right                                        Left    Thickness  [x] [x] [x] [x] [x] [x] [x] [x] [x] [x]  5 4 3 2 1 1 2 3 4 5                         Right                                        Left    Dystrophic Changes                                                                 [x] [x] [x] [x] [x] [x] [x] [x] [x] [x]  5 4 3 2 1 1 2 3 4 5                         Right                                        Left    Color                                                                  [x] [x] [x] [x] [x] [x] [x] [x] [x] [x]  5 4 3 2 1 1 2 3 4 5                          Right                                        Left    Incurvation/Ingrowin                                                                   [] [] [] [] [] [] [] [] [] []  5 4 3 2 1 1 2 3 4 5                         Right                                        Left    Inflammation/Pain                                                                   [x] [x] [x] [x] [x] [x] [x] [x] [x] [x]  5 4 3 2 1 1 2 3 4 5                         Right                                        Left    Vascular:  DP/PT pulses palpable 2/4, Bilateral.    CFT <3 seconds to digits 1-5, Bilateral .   Hair growth present to level of digits, Bilateral.    Neurological:  Sensation present to light touch to level of digits, Bilateral.    Assessment:  80 y.o. male with:   1. Onychomycosis    2. Pain in toes of both feet       No orders of the defined types were placed in this encounter. Plan:   Pt was evaluated and examined. Patient was given personalized discharge instructions. Nails 1-10 were debrided in length and thickness sharply with a nail nipper and  without incident, slant back performed. Pt will follow up in 9-12 weeks or sooner if any problems arise. Diagnosis was discussed with the pt and all of their questions were answered in detail. Proper foot hygiene and care was discussed with the pt. Patient to check feet daily and contact the office with any questions/problems/concerns. Other comorbidity noted and will be managed by PCP. Pain waiver discussed with patient and confirmed.          7/2/2019    Electronically signed by Brandi Torres DPM on 7/2/2019 at 4:49 PM

## 2019-07-09 ENCOUNTER — HOSPITAL ENCOUNTER (OUTPATIENT)
Dept: PHYSICAL THERAPY | Age: 84
Setting detail: THERAPIES SERIES
Discharge: HOME | End: 2019-07-09
Payer: MEDICARE

## 2019-07-09 PROCEDURE — 97110 THERAPEUTIC EXERCISES: CPT

## 2019-07-09 PROCEDURE — G0283 ELEC STIM OTHER THAN WOUND: HCPCS

## 2019-07-09 ASSESSMENT — PAIN DESCRIPTION - FREQUENCY: FREQUENCY: INTERMITTENT

## 2019-07-09 ASSESSMENT — PAIN DESCRIPTION - LOCATION: LOCATION: SHOULDER

## 2019-07-09 ASSESSMENT — PAIN DESCRIPTION - ORIENTATION: ORIENTATION: RIGHT

## 2019-07-09 ASSESSMENT — PAIN SCALES - GENERAL: PAINLEVEL_OUTOF10: 8

## 2019-07-11 ENCOUNTER — HOSPITAL ENCOUNTER (OUTPATIENT)
Dept: PHYSICAL THERAPY | Age: 84
Setting detail: THERAPIES SERIES
Discharge: HOME | End: 2019-07-11
Payer: MEDICARE

## 2019-07-11 PROCEDURE — G0283 ELEC STIM OTHER THAN WOUND: HCPCS

## 2019-07-11 PROCEDURE — 97110 THERAPEUTIC EXERCISES: CPT

## 2019-07-11 ASSESSMENT — PAIN DESCRIPTION - DESCRIPTORS: DESCRIPTORS: ACHING

## 2019-07-11 ASSESSMENT — PAIN DESCRIPTION - ORIENTATION: ORIENTATION: RIGHT

## 2019-07-11 ASSESSMENT — PAIN DESCRIPTION - LOCATION: LOCATION: SHOULDER

## 2019-07-11 ASSESSMENT — PAIN SCALES - GENERAL: PAINLEVEL_OUTOF10: 6

## 2019-07-11 ASSESSMENT — PAIN DESCRIPTION - FREQUENCY: FREQUENCY: INTERMITTENT

## 2019-07-15 ENCOUNTER — HOSPITAL ENCOUNTER (OUTPATIENT)
Dept: PHYSICAL THERAPY | Age: 84
Setting detail: THERAPIES SERIES
Discharge: HOME | End: 2019-07-15
Payer: MEDICARE

## 2019-07-15 PROCEDURE — G0283 ELEC STIM OTHER THAN WOUND: HCPCS

## 2019-07-15 PROCEDURE — 97110 THERAPEUTIC EXERCISES: CPT

## 2019-07-16 ENCOUNTER — HOSPITAL ENCOUNTER (OUTPATIENT)
Dept: PHYSICAL THERAPY | Age: 84
Setting detail: THERAPIES SERIES
Discharge: HOME | End: 2019-07-16
Payer: MEDICARE

## 2019-07-16 PROCEDURE — 97110 THERAPEUTIC EXERCISES: CPT

## 2019-07-16 PROCEDURE — G0283 ELEC STIM OTHER THAN WOUND: HCPCS

## 2019-07-16 ASSESSMENT — PAIN SCALES - GENERAL: PAINLEVEL_OUTOF10: 6

## 2019-07-16 ASSESSMENT — PAIN DESCRIPTION - FREQUENCY: FREQUENCY: CONTINUOUS

## 2019-07-16 ASSESSMENT — PAIN DESCRIPTION - ORIENTATION: ORIENTATION: RIGHT

## 2019-07-16 ASSESSMENT — PAIN DESCRIPTION - DESCRIPTORS: DESCRIPTORS: CONSTANT

## 2019-07-16 ASSESSMENT — PAIN DESCRIPTION - LOCATION: LOCATION: SHOULDER

## 2019-07-16 NOTE — PROGRESS NOTES
Physical Therapy  Daily Treatment Note  Date: 2019  Patient Name: Antony Douglas  MRN: [id number]     :   [id number]    Subjective:      PT Visit Information  Onset Date: 19  PT Insurance Information: medicare/[facility]  Total # of Visits Approved: 12  Total # of Visits to Date: 5  Subjective  Subjective: complains of increased pain on R denis today,slept on R denis last night  Pain Screening  Patient Currently in Pain: Yes  Pain Assessment  Pain Assessment: 0-10  Pain Level: 6  Pain Location: Shoulder  Pain Orientation: Right  Pain Descriptors: Constant  Pain Frequency: Continuous  Vital Signs  Patient Currently in Pain: Yes       Treatment Activities:   Exercises  Exercise 1: 1# cane ex-flex/Abd/ext/ER 10x  Exercise 2: R denis flex/ext/scaption 1# 10x  Exercise 3: R elbow curls 1# 10x  Exercise 4: R denis IR towel stretch 3x30\"  Exercise 5: hot pack and EStim R denis/neck sitting 20 min  Exercise 6: Green Tband rows/pull down 10x  Exercise 7: 1 strand Lime Tband 6 way R denis 10x     Assessment:   Conditions Requiring Skilled Therapeutic Intervention  REQUIRES PT FOLLOW UP: Yes  Discharge Recommendations: Home independently     Plan:    Plan  Current Treatment Recommendations: Strengthening, ROM, Modalities, Home Exercise Program  Plan Comment: to continue PT per POC  Timed Code Treatment Minutes: 15 Minutes   Treatment Charges: Minutes Units   []  Ultrasound     [x]  Electrical-Stim 20 1   []  Iontophoresis     []  Traction     []  Massage       []  Eval     []  Gait     [x]  Ther Exercise 15  1    []  Manual Therapy       []  Ther Activities       []  Aquatics     []  Vasopneumatic Device     []  Neuro Re-Ed       []  Other       Total Treatment Time: 35 2        Therapy Time   Individual Concurrent Group Co-treatment   Time In 1100         Time Out 1135         Minutes 35         Timed Code Treatment Minutes: 15 Minutes     Electronically signed by: Susana Regalado, PT

## 2019-07-17 ENCOUNTER — OFFICE VISIT (OUTPATIENT)
Dept: FAMILY MEDICINE CLINIC | Age: 84
End: 2019-07-17
Payer: MEDICARE

## 2019-07-17 VITALS
SYSTOLIC BLOOD PRESSURE: 112 MMHG | TEMPERATURE: 98.4 F | DIASTOLIC BLOOD PRESSURE: 68 MMHG | WEIGHT: 170 LBS | RESPIRATION RATE: 16 BRPM | OXYGEN SATURATION: 98 % | BODY MASS INDEX: 29.18 KG/M2 | HEART RATE: 98 BPM

## 2019-07-17 DIAGNOSIS — Z00.00 ROUTINE GENERAL MEDICAL EXAMINATION AT A HEALTH CARE FACILITY: Primary | ICD-10-CM

## 2019-07-17 PROCEDURE — 1123F ACP DISCUSS/DSCN MKR DOCD: CPT | Performed by: FAMILY MEDICINE

## 2019-07-17 PROCEDURE — 4040F PNEUMOC VAC/ADMIN/RCVD: CPT | Performed by: FAMILY MEDICINE

## 2019-07-17 PROCEDURE — G0438 PPPS, INITIAL VISIT: HCPCS | Performed by: FAMILY MEDICINE

## 2019-07-17 RX ORDER — SERTRALINE HYDROCHLORIDE 25 MG/1
25 TABLET, FILM COATED ORAL DAILY
Qty: 30 TABLET | Refills: 3 | Status: SHIPPED | OUTPATIENT
Start: 2019-07-17 | End: 2019-07-23 | Stop reason: SINTOL

## 2019-07-17 ASSESSMENT — LIFESTYLE VARIABLES
HOW MANY STANDARD DRINKS CONTAINING ALCOHOL DO YOU HAVE ON A TYPICAL DAY: 0
HOW OFTEN DO YOU HAVE SIX OR MORE DRINKS ON ONE OCCASION: 0
AUDIT-C TOTAL SCORE: 3
HOW OFTEN DO YOU HAVE A DRINK CONTAINING ALCOHOL: 3

## 2019-07-17 ASSESSMENT — PATIENT HEALTH QUESTIONNAIRE - PHQ9
SUM OF ALL RESPONSES TO PHQ QUESTIONS 1-9: 2
SUM OF ALL RESPONSES TO PHQ QUESTIONS 1-9: 2

## 2019-07-17 NOTE — PATIENT INSTRUCTIONS
heart-healthy diet is one that limits sodium , certain types of fat , and cholesterol . This type of diet is recommended for:   People with any form of cardiovascular disease (eg, coronary heart disease , peripheral vascular disease , previous heart attack , previous stroke )   People with risk factors for cardiovascular disease, such as high blood pressure , high cholesterol , or diabetes   Anyone who wants to lower their risk of developing cardiovascular disease   Sodium    Sodium is a mineral found in many foods. In general, most people consume much more sodium than they need. Diets high in sodium can increase blood pressure and lead to edema (water retention). On a heart-healthy diet, you should consume no more than 2,300 mg (milligrams) of sodium per dayabout the amount in one teaspoon of table salt. The foods highest in sodium include table salt (about 50% sodium), processed foods, convenience foods, and preserved foods. Cholesterol    Cholesterol is a fat-like, waxy substance in your blood. Our bodies make some cholesterol. It is also found in animal products, with the highest amounts in fatty meat, egg yolks, whole milk, cheese, shellfish, and organ meats. On a heart-healthy diet, you should limit your cholesterol intake to less than 200 mg per day. It is normal and important to have some cholesterol in your bloodstream. But too much cholesterol can cause plaque to build up within your arteries, which can eventually lead to a heart attack or stroke. The two types of cholesterol that are most commonly referred to are:   Low-density lipoprotein (LDL) cholesterol  Also known as bad cholesterol, this is the cholesterol that tends to build up along your arteries. Bad cholesterol levels are increased by eating fats that are saturated or hydrogenated. Optimal level of this cholesterol is less than 100. Over 130 starts to get risky for heart disease.    High-density lipoprotein (HDL) cholesterol  Also known as witnesses or a notary present when you sign this form. Consider a durable power of  for health care. This allows you to name a person to make decisions about your care if you are not able to. Most people ask a close friend or family member. Talk to this person about the kinds of treatments you want and those that you do not want. Make sure this person understands your wishes. These legal papers are simple to change. Tell your doctor what you want to change, and ask him or her to make a note in your medical file. Give your family updated copies of the papers. Where can you learn more? Go to [email or web address] and sign in to your [employer] account. Enter P184 in the [employer] box to learn more about \"Advance Care Planning: Care Instructions. \"     If you do not have an account, please click on the \"Sign Up Now\" link. Current as of: April 18, 2018  Content Version: 12.0  © 1740-5875 Healthwise, Incorporated. Care instructions adapted under license by [facility]). If you have questions about a medical condition or this instruction, always ask your healthcare professional. [place] [address] any warranty or liability for your use of this information.     ·

## 2019-07-23 ENCOUNTER — APPOINTMENT (OUTPATIENT)
Dept: PHYSICAL THERAPY | Age: 84
End: 2019-07-23
Payer: MEDICARE

## 2019-07-23 ENCOUNTER — OFFICE VISIT (OUTPATIENT)
Dept: FAMILY MEDICINE CLINIC | Age: 84
End: 2019-07-23
Payer: MEDICARE

## 2019-07-23 VITALS
TEMPERATURE: 96.9 F | OXYGEN SATURATION: 97 % | DIASTOLIC BLOOD PRESSURE: 70 MMHG | RESPIRATION RATE: 16 BRPM | WEIGHT: 167 LBS | BODY MASS INDEX: 28.67 KG/M2 | HEART RATE: 109 BPM | SYSTOLIC BLOOD PRESSURE: 114 MMHG

## 2019-07-23 DIAGNOSIS — R63.0 DECREASED APPETITE: ICD-10-CM

## 2019-07-23 DIAGNOSIS — G25.2 COARSE TREMOR: ICD-10-CM

## 2019-07-23 DIAGNOSIS — T50.905A ADVERSE EFFECT OF DRUG, INITIAL ENCOUNTER: Primary | ICD-10-CM

## 2019-07-23 DIAGNOSIS — F41.9 ANXIETY: ICD-10-CM

## 2019-07-23 PROCEDURE — 1036F TOBACCO NON-USER: CPT | Performed by: FAMILY MEDICINE

## 2019-07-23 PROCEDURE — 99214 OFFICE O/P EST MOD 30 MIN: CPT | Performed by: FAMILY MEDICINE

## 2019-07-23 PROCEDURE — G8417 CALC BMI ABV UP PARAM F/U: HCPCS | Performed by: FAMILY MEDICINE

## 2019-07-23 PROCEDURE — G8427 DOCREV CUR MEDS BY ELIG CLIN: HCPCS | Performed by: FAMILY MEDICINE

## 2019-07-23 PROCEDURE — 1123F ACP DISCUSS/DSCN MKR DOCD: CPT | Performed by: FAMILY MEDICINE

## 2019-07-23 PROCEDURE — 4040F PNEUMOC VAC/ADMIN/RCVD: CPT | Performed by: FAMILY MEDICINE

## 2019-07-23 RX ORDER — BUSPIRONE HYDROCHLORIDE 5 MG/1
TABLET ORAL
Qty: 60 TABLET | Refills: 1 | Status: SHIPPED | OUTPATIENT
Start: 2019-07-23 | End: 2019-08-01 | Stop reason: DRUGHIGH

## 2019-07-23 ASSESSMENT — ENCOUNTER SYMPTOMS
VOMITING: 0
SHORTNESS OF BREATH: 0
SORE THROAT: 0
DIARRHEA: 0
NAUSEA: 0
CONSTIPATION: 0
COUGH: 0
ABDOMINAL PAIN: 0
ABDOMINAL DISTENTION: 0
BACK PAIN: 0
RHINORRHEA: 0
CHEST TIGHTNESS: 0

## 2019-07-23 NOTE — PROGRESS NOTES
Subjective:      Patient ID: Markel Brizuela is a 80 y.o. male. HPI  Pt here today for an acute visit secondary to feeling very anxious and nervous and panicky. He is also c/o decreased appetite and a tremor of both of his hands. . He states the symptoms started since we started him on Zoloft a week ago. Pt today denies any HA, chest pain, or SOB. Pt denies any N/V/D/C or abdominal pain. Pt denies any fever or chills. Otherwise pt doing well on current tx and no other concerns today. The patient's past medical, surgical, social, and family history as well as his current medications and allergies were reviewed as documented in today's encounter. Current Outpatient Medications   Medication Sig Dispense Refill    busPIRone (BUSPAR) 5 MG tablet Take 1 pill daily x 1 week, the 1 pill twice a day 60 tablet 1    bimatoprost (LUMIGAN) 0.01 % SOLN ophthalmic drops Place 1 drop into the left eye nightly      atorvastatin (LIPITOR) 20 MG tablet Take 1 tablet by mouth daily 90 tablet 3    hydrALAZINE (APRESOLINE) 50 MG tablet Take 1 tablet by mouth 2 times daily 60 tablet 11    amLODIPine (NORVASC) 5 MG tablet Take 1 tablet by mouth daily 30 tablet 11    lisinopril (PRINIVIL;ZESTRIL) 40 MG tablet TAKE ONE TABLET BY MOUTH DAILY 90 tablet 3    tamsulosin (FLOMAX) 0.4 MG capsule Take 1 capsule by mouth 2 times daily 180 capsule 3    Melatonin 5 MG CAPS Take 2 capsules by mouth nightly       warfarin (COUMADIN) 5 MG tablet Take (1) to (1 & 1/2) tablets by mouth once daily or as [facility]      metoprolol tartrate (LOPRESSOR) 25 MG tablet Take 25 mg by mouth 2 times daily       No current facility-administered medications for this visit. Review of Systems   Constitutional: Positive for appetite change (decreased). Negative for fatigue and fever. HENT: Negative for congestion, ear pain, rhinorrhea and sore throat.     Respiratory: Negative for cough, chest tightness and Anxiety     3. Coarse tremor     4. Decreased appetite           Plan:         Orders Placed This Encounter   Medications    busPIRone (BUSPAR) 5 MG tablet     Sig: Take 1 pill daily x 1 week, the 1 pill twice a day     Dispense:  60 tablet     Refill:  1     I did d/w pt that I think he is having a reaction/side effects to the Zoloft sicne his symptoms started since starting the Zoloft a week ago    We are going to stop the Zoloft and I am going to start him on Buspar 5mg to take daily x 1 week and then increase to BID if he tolerates. I want to start him on something as he is very anxious and I do not want to use a benzodiazepam because of their side effects    Will have him call on Thursday with an update    Will keep his regular appt in 3 weeks to see how he is doing and if no improvement with his tremor, will refer him to Neurology as he is very concerned about Parkinson's. I did state it is a clinical diagnosis, but I really do not think he has Parkinson's    Rest of systems unchanged, continue current treatments. Medications, labs, diagnostic studies, consultations and follow-up as documented in this encounter.  Rest of systems unchanged, continue current treatments        Megan Giraldo MD

## 2019-07-24 ENCOUNTER — HOSPITAL ENCOUNTER (OUTPATIENT)
Dept: PHYSICAL THERAPY | Age: 84
Setting detail: THERAPIES SERIES
Discharge: HOME | End: 2019-07-24
Payer: MEDICARE

## 2019-07-24 PROCEDURE — 97110 THERAPEUTIC EXERCISES: CPT

## 2019-07-25 ENCOUNTER — TELEPHONE (OUTPATIENT)
Dept: FAMILY MEDICINE CLINIC | Age: 84
End: 2019-07-25

## 2019-07-25 RX ORDER — HYDRALAZINE HYDROCHLORIDE 50 MG/1
TABLET, FILM COATED ORAL
Qty: 60 TABLET | Refills: 11 | Status: SHIPPED | OUTPATIENT
Start: 2019-07-25 | End: 2020-07-13

## 2019-07-29 ENCOUNTER — OFFICE VISIT (OUTPATIENT)
Dept: PODIATRY | Age: 84
End: 2019-07-29
Payer: MEDICARE

## 2019-07-29 VITALS — BODY MASS INDEX: 27.66 KG/M2 | WEIGHT: 162 LBS | HEIGHT: 64 IN

## 2019-07-29 DIAGNOSIS — M79.671 PAIN ASSOCIATED WITH ACCESSORY NAVICULAR BONE OF FOOT, RIGHT: ICD-10-CM

## 2019-07-29 DIAGNOSIS — Q74.2 PAIN ASSOCIATED WITH ACCESSORY NAVICULAR BONE OF FOOT, RIGHT: ICD-10-CM

## 2019-07-29 DIAGNOSIS — M76.821 POSTERIOR TIBIAL TENDINITIS OF RIGHT LOWER EXTREMITY: ICD-10-CM

## 2019-07-29 DIAGNOSIS — M79.671 FOOT PAIN, RIGHT: Primary | ICD-10-CM

## 2019-07-29 PROCEDURE — 99213 OFFICE O/P EST LOW 20 MIN: CPT | Performed by: PODIATRIST

## 2019-07-29 PROCEDURE — 4040F PNEUMOC VAC/ADMIN/RCVD: CPT | Performed by: PODIATRIST

## 2019-07-29 PROCEDURE — G8427 DOCREV CUR MEDS BY ELIG CLIN: HCPCS | Performed by: PODIATRIST

## 2019-07-29 PROCEDURE — 1123F ACP DISCUSS/DSCN MKR DOCD: CPT | Performed by: PODIATRIST

## 2019-07-29 PROCEDURE — 1036F TOBACCO NON-USER: CPT | Performed by: PODIATRIST

## 2019-07-29 PROCEDURE — G8417 CALC BMI ABV UP PARAM F/U: HCPCS | Performed by: PODIATRIST

## 2019-07-29 PROCEDURE — 29540 STRAPPING ANKLE &/FOOT: CPT | Performed by: PODIATRIST

## 2019-07-29 ASSESSMENT — ENCOUNTER SYMPTOMS
NAUSEA: 0
VOMITING: 0
CONSTIPATION: 0
DIARRHEA: 0
COLOR CHANGE: 0

## 2019-07-30 NOTE — PROGRESS NOTES
Plan: Patient examined and evaluated. Current condition and treatment options discussed in detail. Advised pt to rest, ice. Verbal and written instructions given to patient. Contact office with any questions/problems/concerns. Rest, ice  New powersteps  Arch pad, short unna boot, ankle strapping  Albert for 5 days.      Orders Placed This Encounter   Procedures    XR FOOT RIGHT (MIN 3 VIEWS)    TX STRAPPING; ANKLE &/OR FOOT     Orders Placed This Encounter   Medications    predniSONE (ALBERT) 5 MG TBEC     Sig: Take 1 tablet by mouth 2 times daily     Dispense:  10 tablet     Refill:  0        RTC in 1week(s).    7/29/2019

## 2019-07-31 ENCOUNTER — TELEPHONE (OUTPATIENT)
Dept: FAMILY MEDICINE CLINIC | Age: 84
End: 2019-07-31

## 2019-07-31 ENCOUNTER — TELEPHONE (OUTPATIENT)
Dept: PODIATRY | Age: 84
End: 2019-07-31

## 2019-07-31 DIAGNOSIS — M79.671 FOOT PAIN, RIGHT: Primary | ICD-10-CM

## 2019-07-31 DIAGNOSIS — M79.671 PAIN ASSOCIATED WITH ACCESSORY NAVICULAR BONE OF FOOT, RIGHT: ICD-10-CM

## 2019-07-31 DIAGNOSIS — M76.821 POSTERIOR TIBIAL TENDINITIS OF RIGHT LOWER EXTREMITY: ICD-10-CM

## 2019-07-31 DIAGNOSIS — Q74.2 PAIN ASSOCIATED WITH ACCESSORY NAVICULAR BONE OF FOOT, RIGHT: ICD-10-CM

## 2019-07-31 RX ORDER — TRAMADOL HYDROCHLORIDE 50 MG/1
50 TABLET ORAL EVERY 4 HOURS PRN
Qty: 18 TABLET | Refills: 0 | Status: ON HOLD | OUTPATIENT
Start: 2019-07-31 | End: 2019-08-05 | Stop reason: HOSPADM

## 2019-08-01 ENCOUNTER — HOSPITAL ENCOUNTER (INPATIENT)
Age: 84
LOS: 4 days | Discharge: NURSING, REHAB OR LONG-TERM CARE | DRG: 554 | End: 2019-08-05
Attending: EMERGENCY MEDICINE | Admitting: PODIATRIST
Payer: MEDICARE

## 2019-08-01 ENCOUNTER — APPOINTMENT (OUTPATIENT)
Dept: GENERAL RADIOLOGY | Age: 84
DRG: 554 | End: 2019-08-01
Payer: MEDICARE

## 2019-08-01 ENCOUNTER — TELEPHONE (OUTPATIENT)
Dept: FAMILY MEDICINE CLINIC | Age: 84
End: 2019-08-01

## 2019-08-01 ENCOUNTER — TELEPHONE (OUTPATIENT)
Dept: PODIATRY | Age: 84
End: 2019-08-01

## 2019-08-01 DIAGNOSIS — R52 INTRACTABLE PAIN: ICD-10-CM

## 2019-08-01 DIAGNOSIS — L03.115 CELLULITIS OF RIGHT FOOT: Primary | ICD-10-CM

## 2019-08-01 PROBLEM — L03.90 CELLULITIS: Status: ACTIVE | Noted: 2019-08-01

## 2019-08-01 LAB
ABSOLUTE EOS #: 0 K/UL (ref 0–0.4)
ABSOLUTE IMMATURE GRANULOCYTE: ABNORMAL K/UL (ref 0–0.3)
ABSOLUTE LYMPH #: 1.18 K/UL (ref 1–4.8)
ABSOLUTE MONO #: 2.25 K/UL (ref 0.1–1.3)
ANION GAP SERPL CALCULATED.3IONS-SCNC: 11 MMOL/L (ref 9–17)
BASOPHILS # BLD: 0 % (ref 0–2)
BASOPHILS ABSOLUTE: 0 K/UL (ref 0–0.2)
BUN BLDV-MCNC: 16 MG/DL (ref 8–23)
BUN/CREAT BLD: ABNORMAL (ref 9–20)
C-REACTIVE PROTEIN: 170.4 MG/L (ref 0–5)
CALCIUM SERPL-MCNC: 9.2 MG/DL (ref 8.6–10.4)
CHLORIDE BLD-SCNC: 96 MMOL/L (ref 98–107)
CO2: 24 MMOL/L (ref 20–31)
CREAT SERPL-MCNC: 0.86 MG/DL (ref 0.7–1.2)
DIFFERENTIAL TYPE: ABNORMAL
EOSINOPHILS RELATIVE PERCENT: 0 % (ref 0–4)
GFR AFRICAN AMERICAN: >60 ML/MIN
GFR NON-AFRICAN AMERICAN: >60 ML/MIN
GFR SERPL CREATININE-BSD FRML MDRD: ABNORMAL ML/MIN/{1.73_M2}
GFR SERPL CREATININE-BSD FRML MDRD: ABNORMAL ML/MIN/{1.73_M2}
GLUCOSE BLD-MCNC: 92 MG/DL (ref 70–99)
HCT VFR BLD CALC: 44.6 % (ref 41–53)
HEMOGLOBIN: 15 G/DL (ref 13.5–17.5)
IMMATURE GRANULOCYTES: ABNORMAL %
INR BLD: 3.2
LYMPHOCYTES # BLD: 11 % (ref 24–44)
MCH RBC QN AUTO: 30.3 PG (ref 26–34)
MCHC RBC AUTO-ENTMCNC: 33.7 G/DL (ref 31–37)
MCV RBC AUTO: 90 FL (ref 80–100)
MONOCYTES # BLD: 21 % (ref 1–7)
MORPHOLOGY: ABNORMAL
NRBC AUTOMATED: ABNORMAL PER 100 WBC
PDW BLD-RTO: 16.2 % (ref 11.5–14.9)
PLATELET # BLD: 182 K/UL (ref 150–450)
PLATELET ESTIMATE: ABNORMAL
PMV BLD AUTO: 8.8 FL (ref 6–12)
POTASSIUM SERPL-SCNC: 4.3 MMOL/L (ref 3.7–5.3)
PROTHROMBIN TIME: 32.7 SEC (ref 11.8–14.6)
RBC # BLD: 4.95 M/UL (ref 4.5–5.9)
RBC # BLD: ABNORMAL 10*6/UL
SEDIMENTATION RATE, ERYTHROCYTE: 36 MM (ref 0–15)
SEG NEUTROPHILS: 68 % (ref 36–66)
SEGMENTED NEUTROPHILS ABSOLUTE COUNT: 7.27 K/UL (ref 1.3–9.1)
SODIUM BLD-SCNC: 131 MMOL/L (ref 135–144)
URIC ACID: 3.9 MG/DL (ref 3.4–7)
WBC # BLD: 10.7 K/UL (ref 3.5–11)
WBC # BLD: ABNORMAL 10*3/UL

## 2019-08-01 PROCEDURE — 85651 RBC SED RATE NONAUTOMATED: CPT

## 2019-08-01 PROCEDURE — 6360000002 HC RX W HCPCS: Performed by: STUDENT IN AN ORGANIZED HEALTH CARE EDUCATION/TRAINING PROGRAM

## 2019-08-01 PROCEDURE — G0378 HOSPITAL OBSERVATION PER HR: HCPCS

## 2019-08-01 PROCEDURE — 80048 BASIC METABOLIC PNL TOTAL CA: CPT

## 2019-08-01 PROCEDURE — 73610 X-RAY EXAM OF ANKLE: CPT

## 2019-08-01 PROCEDURE — 73630 X-RAY EXAM OF FOOT: CPT

## 2019-08-01 PROCEDURE — 85025 COMPLETE CBC W/AUTO DIFF WBC: CPT

## 2019-08-01 PROCEDURE — 93970 EXTREMITY STUDY: CPT

## 2019-08-01 PROCEDURE — 99221 1ST HOSP IP/OBS SF/LOW 40: CPT | Performed by: PODIATRIST

## 2019-08-01 PROCEDURE — 2580000003 HC RX 258: Performed by: STUDENT IN AN ORGANIZED HEALTH CARE EDUCATION/TRAINING PROGRAM

## 2019-08-01 PROCEDURE — 36415 COLL VENOUS BLD VENIPUNCTURE: CPT

## 2019-08-01 PROCEDURE — 86140 C-REACTIVE PROTEIN: CPT

## 2019-08-01 PROCEDURE — 99285 EMERGENCY DEPT VISIT HI MDM: CPT

## 2019-08-01 PROCEDURE — 1200000000 HC SEMI PRIVATE

## 2019-08-01 PROCEDURE — 85610 PROTHROMBIN TIME: CPT

## 2019-08-01 PROCEDURE — 84550 ASSAY OF BLOOD/URIC ACID: CPT

## 2019-08-01 RX ORDER — WARFARIN SODIUM 5 MG/1
5 TABLET ORAL DAILY
Status: DISCONTINUED | OUTPATIENT
Start: 2019-08-01 | End: 2019-08-01 | Stop reason: DRUGHIGH

## 2019-08-01 RX ORDER — ACETAMINOPHEN 325 MG/1
650 TABLET ORAL EVERY 4 HOURS PRN
Status: DISCONTINUED | OUTPATIENT
Start: 2019-08-01 | End: 2019-08-05 | Stop reason: HOSPADM

## 2019-08-01 RX ORDER — LANOLIN ALCOHOL/MO/W.PET/CERES
1000 CREAM (GRAM) TOPICAL DAILY
COMMUNITY
End: 2020-07-14 | Stop reason: ALTCHOICE

## 2019-08-01 RX ORDER — LISINOPRIL 20 MG/1
40 TABLET ORAL DAILY
Status: DISCONTINUED | OUTPATIENT
Start: 2019-08-01 | End: 2019-08-01

## 2019-08-01 RX ORDER — LISINOPRIL 20 MG/1
40 TABLET ORAL DAILY
Status: DISCONTINUED | OUTPATIENT
Start: 2019-08-02 | End: 2019-08-05 | Stop reason: HOSPADM

## 2019-08-01 RX ORDER — BUSPIRONE HYDROCHLORIDE 5 MG/1
5 TABLET ORAL 2 TIMES DAILY
COMMUNITY
End: 2020-04-16 | Stop reason: SDUPTHER

## 2019-08-01 RX ORDER — AMLODIPINE BESYLATE 5 MG/1
5 TABLET ORAL DAILY
Status: DISCONTINUED | OUTPATIENT
Start: 2019-08-02 | End: 2019-08-03

## 2019-08-01 RX ORDER — SODIUM CHLORIDE 0.9 % (FLUSH) 0.9 %
10 SYRINGE (ML) INJECTION EVERY 12 HOURS SCHEDULED
Status: DISCONTINUED | OUTPATIENT
Start: 2019-08-01 | End: 2019-08-05 | Stop reason: HOSPADM

## 2019-08-01 RX ORDER — TAMSULOSIN HYDROCHLORIDE 0.4 MG/1
0.4 CAPSULE ORAL 2 TIMES DAILY
Status: DISCONTINUED | OUTPATIENT
Start: 2019-08-01 | End: 2019-08-02 | Stop reason: SDUPTHER

## 2019-08-01 RX ORDER — SODIUM CHLORIDE 0.9 % (FLUSH) 0.9 %
10 SYRINGE (ML) INJECTION PRN
Status: DISCONTINUED | OUTPATIENT
Start: 2019-08-01 | End: 2019-08-05 | Stop reason: HOSPADM

## 2019-08-01 RX ORDER — WARFARIN SODIUM 5 MG/1
2.5 TABLET ORAL WEEKLY
Status: ON HOLD | COMMUNITY
End: 2021-08-21 | Stop reason: SDUPTHER

## 2019-08-01 RX ORDER — ATORVASTATIN CALCIUM 20 MG/1
20 TABLET, FILM COATED ORAL DAILY
Status: DISCONTINUED | OUTPATIENT
Start: 2019-08-02 | End: 2019-08-05 | Stop reason: HOSPADM

## 2019-08-01 RX ORDER — LISINOPRIL 20 MG/1
40 TABLET ORAL DAILY
Status: DISCONTINUED | OUTPATIENT
Start: 2019-08-02 | End: 2019-08-01 | Stop reason: SDUPTHER

## 2019-08-01 RX ORDER — HYDRALAZINE HYDROCHLORIDE 50 MG/1
50 TABLET, FILM COATED ORAL 2 TIMES DAILY
Status: DISCONTINUED | OUTPATIENT
Start: 2019-08-01 | End: 2019-08-05 | Stop reason: HOSPADM

## 2019-08-01 RX ORDER — ONDANSETRON 2 MG/ML
4 INJECTION INTRAMUSCULAR; INTRAVENOUS EVERY 6 HOURS PRN
Status: DISCONTINUED | OUTPATIENT
Start: 2019-08-01 | End: 2019-08-05 | Stop reason: HOSPADM

## 2019-08-01 RX ORDER — WARFARIN SODIUM 5 MG/1
5 TABLET ORAL
COMMUNITY
End: 2020-05-04 | Stop reason: SDUPTHER

## 2019-08-01 RX ADMIN — TAMSULOSIN HYDROCHLORIDE 0.4 MG: 0.4 CAPSULE ORAL at 22:28

## 2019-08-01 RX ADMIN — CEFAZOLIN 1 G: 1 INJECTION, POWDER, FOR SOLUTION INTRAMUSCULAR; INTRAVENOUS at 22:33

## 2019-08-01 RX ADMIN — METOPROLOL TARTRATE 25 MG: 25 TABLET ORAL at 22:28

## 2019-08-01 RX ADMIN — HYDRALAZINE HYDROCHLORIDE 50 MG: 50 TABLET, FILM COATED ORAL at 22:28

## 2019-08-01 ASSESSMENT — PAIN DESCRIPTION - LOCATION
LOCATION: LEG;FOOT
LOCATION: FOOT

## 2019-08-01 ASSESSMENT — ENCOUNTER SYMPTOMS
SHORTNESS OF BREATH: 0
ABDOMINAL DISTENTION: 0
SORE THROAT: 0
COLOR CHANGE: 1
CHEST TIGHTNESS: 0
DIARRHEA: 0
VOMITING: 0
ABDOMINAL PAIN: 0
NAUSEA: 0

## 2019-08-01 ASSESSMENT — PAIN DESCRIPTION - PROGRESSION: CLINICAL_PROGRESSION: GRADUALLY WORSENING

## 2019-08-01 ASSESSMENT — PAIN SCALES - GENERAL
PAINLEVEL_OUTOF10: 10
PAINLEVEL_OUTOF10: 9

## 2019-08-01 ASSESSMENT — PAIN DESCRIPTION - PAIN TYPE
TYPE: CHRONIC PAIN
TYPE: ACUTE PAIN

## 2019-08-01 ASSESSMENT — PAIN DESCRIPTION - ORIENTATION
ORIENTATION: RIGHT;LEFT
ORIENTATION: RIGHT;LEFT

## 2019-08-01 ASSESSMENT — PAIN DESCRIPTION - DESCRIPTORS: DESCRIPTORS: ACHING

## 2019-08-01 ASSESSMENT — PAIN DESCRIPTION - ONSET: ONSET: ON-GOING

## 2019-08-01 NOTE — H&P
4.3   CL 96*   CO2 24   BUN 16   CREATININE 0.86   GLUCOSE 92   CALCIUM 9.2        Coags:  Recent Labs     08/01/19  1010   INR 3.2       No results found for: LABA1C  Lab Results   Component Value Date    SEDRATE 36 (H) 08/01/2019      Lab Results   Component Value Date    .4 (H) 08/01/2019        Physical Exam:    General: A&Ox3, NAD  Heart: Regular rate and irregular rhythm. Lungs: Equal air entry. No increased effort. Abdomen: Soft, non-tender to palpation. Not distended. Lower Extremity Physical Exam:  Vascular: DP and PT pulses are palpable. CFT <3 seconds to all digits. Hair growth is absent to the level of the digits. Mild nonpitting edema bilateral lower extremity. Neuro: Saph/sural/SP/DP/plantar sensation intact to light touch. Musculoskeletal: Muscle strength is deferred due to pain to all lower extremity muscle groups. Gross deformity is absent. Dermatologic:   Mild erythema to the medial right foot and ankle near the insertion of the posterior tibial tendon and to the left foot over the dorsal aspect of the first MPJ. Increase in warmth was noted. Pertinent negatives include no open wound, no purulent drainage, no fluctuance, no crepitus, no induration. Imaging:   XR ANKLE LEFT (MIN 3 VIEWS)   Final Result   Left ankle: Diffuse mild soft tissue swelling and degenerative changes are   noted without acute fracture or dislocation. Small plantar calcaneal spur. Osteopenia. Left foot: Osteopenia. Arthritic changes most significant at the tarsal   metatarsal junction. Soft tissue swelling over the forefoot. Plantar   calcaneal spur. No acute fracture. XR FOOT LEFT (MIN 3 VIEWS)   Final Result   Left ankle: Diffuse mild soft tissue swelling and degenerative changes are   noted without acute fracture or dislocation. Small plantar calcaneal spur. Osteopenia. Left foot: Osteopenia. Arthritic changes most significant at the tarsal   metatarsal junction. this time there is no clear source for elevated inflammatory markers. CRP was 170.4. · Follow-up MRI right foot - rule out deep abscess vs septic joint  · Will discuss with Dr. Reyna Hamilton    DVT ppx: Coumadin  Diet: Regular  Activity: As tolerated to bilateral lower extremity  Pain control: Preethi Al DPM   Podiatric Medicine & Surgery   8/1/2019 at 3:17 PM    I performed a history and physical examination of the patient and discussed management with the resident. I reviewed the residents note and agree with the documented findings and plan of care. Any areas of disagreement are noted on the chart. I was personally present for the key portions of any procedures. I have documented in the chart those procedures where I was not present during the key portions. I have reviewed the Podiatry Resident progress note. I agree with the chief complaint, past medical history, past surgical history, allergies, medications, social and family history as documented unless otherwise noted below. Documentation of the HPI, Physical Exam and Medical Decision Making performed by medical students or scribes is based on my personal performance of the HPI, PE and MDM. I have personally evaluated this patient and have completed at least one if not all key elements of the E/M (history, physical exam, and MDM). Additional findings are as noted.      Edward Kaye DPM on 8/1/2019 at 6:96 PM  Board Certified, American Board of Podiatric Surgery  Fellow, [employer] and [employer]

## 2019-08-01 NOTE — ED PROVIDER NOTES
swelling of the right ankle and   right foot. 2. No acute fracture or gross dislocation. 3. Mild plantar calcaneal spur. Degenerative changes as above. VL Lower Extremity Bilateral Venous Duplex    (Results Pending)       Xr Foot Right (min 3 Views)    Result Date: 7/29/2019  Radiology exam is complete. No Radiologist dictation. Please follow up with ordering provider. LABS:  Labs Reviewed   CBC WITH AUTO DIFFERENTIAL - Abnormal; Notable for the following components:       Result Value    RDW 16.2 (*)     Seg Neutrophils 68 (*)     Lymphocytes 11 (*)     Monocytes 21 (*)     Absolute Mono # 2.25 (*)     All other components within normal limits   BASIC METABOLIC PANEL - Abnormal; Notable for the following components:    Sodium 131 (*)     Chloride 96 (*)     All other components within normal limits   PROTIME-INR - Abnormal; Notable for the following components:    Protime 32.7 (*)     All other components within normal limits   SEDIMENTATION RATE - Abnormal; Notable for the following components:    Sed Rate 36 (*)     All other components within normal limits   C-REACTIVE PROTEIN - Abnormal; Notable for the following components:    .4 (*)     All other components within normal limits   URIC ACID       All other labs were within normal range or not returned as of this dictation. EMERGENCY DEPARTMENT COURSE and DIFFERENTIAL DIAGNOSIS/MDM:   Vitals:    Vitals:    08/01/19 0931 08/01/19 0953 08/01/19 1029   BP: 96/68  106/66   Pulse: 75 72 70   Resp: 16  16   Temp: 98.2 °F (36.8 °C)     TempSrc: Oral     SpO2: 98%  98%   Weight: 163 lb (73.9 kg)     Height: 5' 4\" (1.626 m)           Patient instructed to return to the emergency room if symptoms worsen, return, or any other concern right away which is agreed by the patient    ED MEDS:  No orders of the defined types were placed in this encounter.         CONSULTS:  IP CONSULT TO PODIATRY  IP CONSULT TO INTERNAL

## 2019-08-01 NOTE — PROGRESS NOTES
Medication History completed:    New medications: vitamin B12    Medications discontinued: none    Changes to dosing:   Buspirone changed to 5 mg twice daily  Melatonin changed to 5 mg nightly  Warfarin changed to 2.5 mg on Wednesday and 5 mg all other days    Stated allergies: As listed    Other pertinent information: Medications confirmed with [address] [place] [address] Anticoagulation Service notes. Prednisone was started on 7/29/19 for a 5 day course.      Thank you,  Xander Toribio, PharmD, [facility]  [phone]

## 2019-08-02 ENCOUNTER — APPOINTMENT (OUTPATIENT)
Dept: MRI IMAGING | Age: 84
DRG: 554 | End: 2019-08-02
Payer: MEDICARE

## 2019-08-02 LAB
ABSOLUTE EOS #: 0 K/UL (ref 0–0.4)
ABSOLUTE IMMATURE GRANULOCYTE: ABNORMAL K/UL (ref 0–0.3)
ABSOLUTE LYMPH #: 0.93 K/UL (ref 1–4.8)
ABSOLUTE MONO #: 2.55 K/UL (ref 0.1–1.3)
ANION GAP SERPL CALCULATED.3IONS-SCNC: 13 MMOL/L (ref 9–17)
BASOPHILS # BLD: 0 % (ref 0–2)
BASOPHILS ABSOLUTE: 0 K/UL (ref 0–0.2)
BUN BLDV-MCNC: 13 MG/DL (ref 8–23)
BUN/CREAT BLD: ABNORMAL (ref 9–20)
C-REACTIVE PROTEIN: 229.3 MG/L (ref 0–5)
CALCIUM SERPL-MCNC: 8.5 MG/DL (ref 8.6–10.4)
CHLORIDE BLD-SCNC: 97 MMOL/L (ref 98–107)
CO2: 21 MMOL/L (ref 20–31)
CREAT SERPL-MCNC: 0.68 MG/DL (ref 0.7–1.2)
DIFFERENTIAL TYPE: ABNORMAL
EOSINOPHILS RELATIVE PERCENT: 0 % (ref 0–4)
GFR AFRICAN AMERICAN: >60 ML/MIN
GFR NON-AFRICAN AMERICAN: >60 ML/MIN
GFR SERPL CREATININE-BSD FRML MDRD: ABNORMAL ML/MIN/{1.73_M2}
GFR SERPL CREATININE-BSD FRML MDRD: ABNORMAL ML/MIN/{1.73_M2}
GLUCOSE BLD-MCNC: 86 MG/DL (ref 70–99)
HCT VFR BLD CALC: 40.4 % (ref 41–53)
HEMOGLOBIN: 13.3 G/DL (ref 13.5–17.5)
IMMATURE GRANULOCYTES: ABNORMAL %
INR BLD: 4.1
LYMPHOCYTES # BLD: 8 % (ref 24–44)
MCH RBC QN AUTO: 30 PG (ref 26–34)
MCHC RBC AUTO-ENTMCNC: 33 G/DL (ref 31–37)
MCV RBC AUTO: 91 FL (ref 80–100)
MONOCYTES # BLD: 22 % (ref 1–7)
MORPHOLOGY: ABNORMAL
NRBC AUTOMATED: ABNORMAL PER 100 WBC
PATHOLOGIST REVIEW: NORMAL
PDW BLD-RTO: 16 % (ref 11.5–14.9)
PLATELET # BLD: 169 K/UL (ref 150–450)
PLATELET ESTIMATE: ABNORMAL
PMV BLD AUTO: 8.6 FL (ref 6–12)
POTASSIUM SERPL-SCNC: 4.3 MMOL/L (ref 3.7–5.3)
PROTHROMBIN TIME: 39.9 SEC (ref 11.8–14.6)
RBC # BLD: 4.44 M/UL (ref 4.5–5.9)
RBC # BLD: ABNORMAL 10*6/UL
SEG NEUTROPHILS: 70 % (ref 36–66)
SEGMENTED NEUTROPHILS ABSOLUTE COUNT: 8.12 K/UL (ref 1.3–9.1)
SODIUM BLD-SCNC: 131 MMOL/L (ref 135–144)
URIC ACID, UR: 79.2 MG/DL
WBC # BLD: 11.6 K/UL (ref 3.5–11)
WBC # BLD: ABNORMAL 10*3/UL

## 2019-08-02 PROCEDURE — 6360000002 HC RX W HCPCS: Performed by: STUDENT IN AN ORGANIZED HEALTH CARE EDUCATION/TRAINING PROGRAM

## 2019-08-02 PROCEDURE — 6360000004 HC RX CONTRAST MEDICATION: Performed by: PODIATRIST

## 2019-08-02 PROCEDURE — 97166 OT EVAL MOD COMPLEX 45 MIN: CPT

## 2019-08-02 PROCEDURE — 80048 BASIC METABOLIC PNL TOTAL CA: CPT

## 2019-08-02 PROCEDURE — 6370000000 HC RX 637 (ALT 250 FOR IP): Performed by: INTERNAL MEDICINE

## 2019-08-02 PROCEDURE — 2580000003 HC RX 258: Performed by: INTERNAL MEDICINE

## 2019-08-02 PROCEDURE — 97530 THERAPEUTIC ACTIVITIES: CPT

## 2019-08-02 PROCEDURE — 2580000003 HC RX 258: Performed by: PODIATRIST

## 2019-08-02 PROCEDURE — 84560 ASSAY OF URINE/URIC ACID: CPT

## 2019-08-02 PROCEDURE — 73720 MRI LWR EXTREMITY W/O&W/DYE: CPT

## 2019-08-02 PROCEDURE — 36415 COLL VENOUS BLD VENIPUNCTURE: CPT

## 2019-08-02 PROCEDURE — 86140 C-REACTIVE PROTEIN: CPT

## 2019-08-02 PROCEDURE — 73723 MRI JOINT LWR EXTR W/O&W/DYE: CPT

## 2019-08-02 PROCEDURE — 85025 COMPLETE CBC W/AUTO DIFF WBC: CPT

## 2019-08-02 PROCEDURE — 99223 1ST HOSP IP/OBS HIGH 75: CPT | Performed by: INTERNAL MEDICINE

## 2019-08-02 PROCEDURE — 1200000000 HC SEMI PRIVATE

## 2019-08-02 PROCEDURE — 2580000003 HC RX 258: Performed by: STUDENT IN AN ORGANIZED HEALTH CARE EDUCATION/TRAINING PROGRAM

## 2019-08-02 PROCEDURE — 85610 PROTHROMBIN TIME: CPT

## 2019-08-02 PROCEDURE — 99233 SBSQ HOSP IP/OBS HIGH 50: CPT | Performed by: PODIATRIST

## 2019-08-02 PROCEDURE — 97162 PT EVAL MOD COMPLEX 30 MIN: CPT

## 2019-08-02 PROCEDURE — A9579 GAD-BASE MR CONTRAST NOS,1ML: HCPCS | Performed by: PODIATRIST

## 2019-08-02 PROCEDURE — 6370000000 HC RX 637 (ALT 250 FOR IP): Performed by: STUDENT IN AN ORGANIZED HEALTH CARE EDUCATION/TRAINING PROGRAM

## 2019-08-02 RX ORDER — TAMSULOSIN HYDROCHLORIDE 0.4 MG/1
0.4 CAPSULE ORAL 2 TIMES DAILY
Status: DISCONTINUED | OUTPATIENT
Start: 2019-08-02 | End: 2019-08-05 | Stop reason: HOSPADM

## 2019-08-02 RX ORDER — SODIUM CHLORIDE 0.9 % (FLUSH) 0.9 %
10 SYRINGE (ML) INJECTION PRN
Status: DISCONTINUED | OUTPATIENT
Start: 2019-08-02 | End: 2019-08-05 | Stop reason: HOSPADM

## 2019-08-02 RX ORDER — BUSPIRONE HYDROCHLORIDE 5 MG/1
5 TABLET ORAL 2 TIMES DAILY
Status: DISCONTINUED | OUTPATIENT
Start: 2019-08-02 | End: 2019-08-05 | Stop reason: HOSPADM

## 2019-08-02 RX ORDER — SODIUM CHLORIDE 9 MG/ML
INJECTION, SOLUTION INTRAVENOUS CONTINUOUS
Status: DISCONTINUED | OUTPATIENT
Start: 2019-08-02 | End: 2019-08-05 | Stop reason: HOSPADM

## 2019-08-02 RX ORDER — COLCHICINE 0.6 MG/1
0.6 TABLET ORAL 2 TIMES DAILY
Status: DISCONTINUED | OUTPATIENT
Start: 2019-08-02 | End: 2019-08-05 | Stop reason: HOSPADM

## 2019-08-02 RX ADMIN — GADOTERIDOL 15 ML: 279.3 INJECTION, SOLUTION INTRAVENOUS at 23:28

## 2019-08-02 RX ADMIN — CEFAZOLIN 1 G: 1 INJECTION, POWDER, FOR SOLUTION INTRAMUSCULAR; INTRAVENOUS at 23:18

## 2019-08-02 RX ADMIN — CEFAZOLIN 1 G: 1 INJECTION, POWDER, FOR SOLUTION INTRAMUSCULAR; INTRAVENOUS at 14:44

## 2019-08-02 RX ADMIN — METOPROLOL TARTRATE 25 MG: 25 TABLET ORAL at 23:17

## 2019-08-02 RX ADMIN — SODIUM CHLORIDE: 9 INJECTION, SOLUTION INTRAVENOUS at 14:41

## 2019-08-02 RX ADMIN — METOPROLOL TARTRATE 25 MG: 25 TABLET ORAL at 09:33

## 2019-08-02 RX ADMIN — CEFAZOLIN 1 G: 1 INJECTION, POWDER, FOR SOLUTION INTRAMUSCULAR; INTRAVENOUS at 05:17

## 2019-08-02 RX ADMIN — Medication 10 ML: at 09:35

## 2019-08-02 RX ADMIN — TAMSULOSIN HYDROCHLORIDE 0.4 MG: 0.4 CAPSULE ORAL at 09:31

## 2019-08-02 RX ADMIN — HYDRALAZINE HYDROCHLORIDE 50 MG: 50 TABLET, FILM COATED ORAL at 09:33

## 2019-08-02 RX ADMIN — ACETAMINOPHEN 650 MG: 325 TABLET, FILM COATED ORAL at 14:41

## 2019-08-02 RX ADMIN — AMLODIPINE BESYLATE 5 MG: 5 TABLET ORAL at 09:32

## 2019-08-02 RX ADMIN — Medication 10 ML: at 23:28

## 2019-08-02 RX ADMIN — COLCHICINE 0.6 MG: 0.6 TABLET, FILM COATED ORAL at 23:16

## 2019-08-02 RX ADMIN — HYDRALAZINE HYDROCHLORIDE 50 MG: 50 TABLET, FILM COATED ORAL at 23:17

## 2019-08-02 RX ADMIN — TAMSULOSIN HYDROCHLORIDE 0.4 MG: 0.4 CAPSULE ORAL at 23:18

## 2019-08-02 RX ADMIN — BUSPIRONE HYDROCHLORIDE 5 MG: 5 TABLET ORAL at 23:17

## 2019-08-02 RX ADMIN — ATORVASTATIN CALCIUM 20 MG: 20 TABLET, FILM COATED ORAL at 09:34

## 2019-08-02 RX ADMIN — COLCHICINE 0.6 MG: 0.6 TABLET, FILM COATED ORAL at 14:41

## 2019-08-02 RX ADMIN — LISINOPRIL 40 MG: 20 TABLET ORAL at 09:34

## 2019-08-02 ASSESSMENT — PAIN DESCRIPTION - PROGRESSION
CLINICAL_PROGRESSION: GRADUALLY WORSENING
CLINICAL_PROGRESSION: GRADUALLY WORSENING

## 2019-08-02 ASSESSMENT — PAIN DESCRIPTION - DESCRIPTORS
DESCRIPTORS: SHARP
DESCRIPTORS: SHARP

## 2019-08-02 ASSESSMENT — PAIN DESCRIPTION - LOCATION
LOCATION: FOOT

## 2019-08-02 ASSESSMENT — PAIN DESCRIPTION - ORIENTATION
ORIENTATION: RIGHT;LEFT
ORIENTATION: RIGHT;LEFT
ORIENTATION: LEFT;RIGHT
ORIENTATION: RIGHT;LEFT

## 2019-08-02 ASSESSMENT — PAIN DESCRIPTION - ONSET
ONSET: ON-GOING
ONSET: ON-GOING

## 2019-08-02 ASSESSMENT — PAIN DESCRIPTION - FREQUENCY
FREQUENCY: INTERMITTENT
FREQUENCY: INTERMITTENT

## 2019-08-02 ASSESSMENT — PAIN SCALES - GENERAL
PAINLEVEL_OUTOF10: 8
PAINLEVEL_OUTOF10: 8
PAINLEVEL_OUTOF10: 6
PAINLEVEL_OUTOF10: 8
PAINLEVEL_OUTOF10: 8

## 2019-08-02 ASSESSMENT — PAIN DESCRIPTION - PAIN TYPE
TYPE: ACUTE PAIN

## 2019-08-02 NOTE — PROGRESS NOTES
below. Documentation of the HPI, Physical Exam and Medical Decision Making performed by medical students or scribes is based on my personal performance of the HPI, PE and MDM. I have personally evaluated this patient and have completed at least one if not all key elements of the E/M (history, physical exam, and MDM). Additional findings are as noted.      Aleida Guevara DPM on 8/2/2019 at 46:97 AM  Board Certified, American Board of Podiatric Surgery  Fellow, [employer] [facility] and [employer] [facility]

## 2019-08-02 NOTE — PLAN OF CARE
Problem: Falls - Risk of:  Goal: Will remain free from falls  Description  Will remain free from falls  Outcome: Ongoing  Note:   Patient remained free from falls this shift. Call light and bed side table are within reach, bed is in lowest position, and side rails are up x2. Will continue to monitor. Goal: Absence of physical injury  Description  Absence of physical injury  Outcome: Ongoing     Problem: Risk for Impaired Skin Integrity  Goal: Tissue integrity - skin and mucous membranes  Description  Structural intactness and normal physiological function of skin and  mucous membranes.   Outcome: Ongoing     Problem: Pain:  Goal: Pain level will decrease  Description  Pain level will decrease  Outcome: Ongoing  Goal: Control of acute pain  Description  Control of acute pain  Outcome: Ongoing  Goal: Control of chronic pain  Description  Control of chronic pain  Outcome: Ongoing

## 2019-08-02 NOTE — PROGRESS NOTES
Activity; Distraction;Repositioned  Response to Pain Intervention: None  Vital Signs  Patient Currently in Pain: Yes  Oxygen Therapy  O2 Device: None (Room air)       Orientation  Orientation  Overall Orientation Status: Within Normal Limits  Social/Functional History  Social/Functional History  Lives With: Alone  Type of Home: House  Home Layout: One level  Home Access: Stairs to enter with rails  Entrance Stairs - Number of Steps: 2  Entrance Stairs - Rails: Both(can reach both sides)  Bathroom Shower/Tub: Tub/Shower unit, Doors  Bathroom Toilet: Standard  Bathroom Equipment: Grab bars in shower  Bathroom Accessibility: Accessible  Home Equipment: Quad cane, Cane, Rolling walker, Reacher(transport chair - did not use)  ADL Assistance: Independent  Homemaking Assistance: Independent  Homemaking Responsibilities: Yes  Ambulation Assistance: Independent  Transfer Assistance: Independent  Active : Yes  Mode of Transportation: SUV  Occupation: Retired  Type of occupation: [employer]  Additional Comments: Son is [employer] [facility], daughter does not work and lives nearby if needed. Cognition        Objective          AROM RLE (degrees)  RLE AROM: WFL  RLE General AROM: hip/knee WFL, ankle minimal movement PROM/AROM due to pain ~5 degrees  AROM LLE (degrees)  LLE AROM : WFL  LLE General AROM: hip/knee WFL, ankle minimal movement PROM/AROM due to pain ~5 degrees  AROM RUE (degrees)  RUE General AROM: See OT  AROM LUE (degrees)  LUE General AROM: See OT  Strength RLE  Strength RLE: WFL  Comment: hip/knee 4-/5, ankle 2/5  Strength LLE  Strength LLE: WFL  Comment: hip/knee 4-/5, ankle 2/5  Strength RUE  Comment: See OT  Strength LUE  Comment: See OT     Sensation  Overall Sensation Status: WFL(Pt denies)  Bed mobility  Rolling to Left: Minimal assistance  Supine to Sit: Minimal assistance  Sit to Supine:  Moderate assistance;2 Person assistance  Scooting: Stand by assistance  Comment: HOB slightly elevated, requiring

## 2019-08-02 NOTE — CONSULTS
a past surgical history that includes Spine surgery (04/2003); Lithotripsy; hernia repair; Skin cancer excision; skin biopsy (03/11/13); Skin cancer excision (Left, 03/2013); Colonoscopy (2002); Colonoscopy (08/2013); skin biopsy (Left, 02/11/14); Mohs surgery (Left, 03/10/14); Mohs surgery (12/30/14); other surgical history (9/6/15); Cholecystectomy (9-9-15); Skin cancer excision (Right, 08/12/2016); and Mohs surgery (Left, 08/2017). Home Medications:    Prior to Admission medications    Medication Sig Start Date End Date Taking? Authorizing Provider   warfarin (COUMADIN) 5 MG tablet Take 2.5 mg by mouth once a week Wednesday   Yes Historical Provider, MD   warfarin (COUMADIN) 5 MG tablet Take 5 mg by mouth Six times weekly   Yes Historical Provider, MD   vitamin B-12 (CYANOCOBALAMIN) 1000 MCG tablet Take 1,000 mcg by mouth daily   Yes Historical Provider, MD   busPIRone (BUSPAR) 5 MG tablet Take 5 mg by mouth 2 times daily   Yes Historical Provider, MD   traMADol (ULTRAM) 50 MG tablet Take 1 tablet by mouth every 4 hours as needed for Pain for up to 3 days. Intended supply: 3 days.  Take lowest dose possible to manage pain 7/31/19 8/3/19 Yes Bianka Corona, REECE   predniSONE (ALBERT) 5 MG TBEC Take 1 tablet by mouth 2 times daily 7/29/19  Yes Darren Moffett DPM   hydrALAZINE (APRESOLINE) 50 MG tablet TAKE ONE TABLET BY MOUTH TWICE A DAY 7/25/19  Yes Luan Benton MD   bimatoprost (LUMIGAN) 0.01 % SOLN ophthalmic drops Place 1 drop into the left eye nightly   Yes Historical Provider, MD   atorvastatin (LIPITOR) 20 MG tablet Take 1 tablet by mouth daily 6/25/19  Yes Luan Benton MD   amLODIPine (NORVASC) 5 MG tablet Take 1 tablet by mouth daily 6/25/19  Yes Luan Benton MD   lisinopril (PRINIVIL;ZESTRIL) 40 MG tablet TAKE ONE TABLET BY MOUTH DAILY 10/18/18  Yes Luan Benton MD   tamsulosin [facility]) 0.4 MG capsule Take 1 capsule by mouth 2 times daily 8/14/18  Yes Valdo Fuentes, APRN - CNP noted.  A small plantar calcaneal spur is noted. The LisFranc relationship is preserved. Left ankle: Diffuse mild soft tissue swelling and degenerative changes are noted without acute fracture or dislocation. Small plantar calcaneal spur. Osteopenia. Left foot: Osteopenia. Arthritic changes most significant at the tarsal metatarsal junction. Soft tissue swelling over the forefoot. Plantar calcaneal spur. No acute fracture. Xr Foot Right (min 3 Views)    Result Date: 8/1/2019  EXAMINATION: THREE XRAY VIEWS OF THE RIGHT FOOT; THREE XRAY VIEWS OF THE RIGHT ANKLE 8/1/2019 9:50 am COMPARISON: None. HISTORY: ORDERING SYSTEM PROVIDED HISTORY: pain over medial aspect. hx of inflammed tendon TECHNOLOGIST PROVIDED HISTORY: pain over medial aspect. hx of inflammed tendon Reason for Exam: Pain Acuity: Unknown Type of Exam: Unknown 59-year-old male with pain over the medial aspect of the right ankle and foot FINDINGS: Right ankle: Mild to moderate edema and soft tissue swelling of the right leg and right ankle. Ankle mortise appears intact. Osseous alignment is normal.  No acute fracture or gross dislocation. Mild degenerative changes of the midfoot and tibiotalar joint. Mild plantar calcaneal spur. Boehler's angle is maintained. Atherosclerotic calcification of the vasculature. Right foot: Mild degenerative change at the midfoot, 1st MTP/MTS joints. Type 2 accessory navicular. No marginal erosions. No acute fracture or gross dislocation. Mild to moderate edema, soft tissue swelling of the right ankle and right foot. Boehler's angle is maintained. Mild plantar calcaneal spur. Well corticated ossific density dorsal to the 1st metatarsal base likely representing sequela of remote trauma. Right ankle: 1. Mild to moderate edema and soft tissue swelling of the right leg and right ankle. 2. Degenerative changes as above. Mild plantar calcaneal spur. 3. No acute fracture or gross dislocation.  Right foot:

## 2019-08-03 LAB
-: NORMAL
ABSOLUTE BANDS #: 0.18 K/UL (ref 0–1)
ABSOLUTE EOS #: 0 K/UL (ref 0–0.4)
ABSOLUTE IMMATURE GRANULOCYTE: ABNORMAL K/UL (ref 0–0.3)
ABSOLUTE LYMPH #: 0.79 K/UL (ref 1–4.8)
ABSOLUTE MONO #: 1.41 K/UL (ref 0.1–1.3)
ANION GAP SERPL CALCULATED.3IONS-SCNC: 11 MMOL/L (ref 9–17)
BANDS: 2 % (ref 0–10)
BASOPHILS # BLD: 0 % (ref 0–2)
BASOPHILS ABSOLUTE: 0 K/UL (ref 0–0.2)
BUN BLDV-MCNC: 11 MG/DL (ref 8–23)
BUN/CREAT BLD: ABNORMAL (ref 9–20)
C-REACTIVE PROTEIN: 236.6 MG/L (ref 0–5)
CALCIUM SERPL-MCNC: 8.4 MG/DL (ref 8.6–10.4)
CHLORIDE BLD-SCNC: 100 MMOL/L (ref 98–107)
CO2: 22 MMOL/L (ref 20–31)
CREAT SERPL-MCNC: 0.61 MG/DL (ref 0.7–1.2)
CULTURE: NORMAL
DIFFERENTIAL TYPE: ABNORMAL
DIRECT EXAM: NORMAL
EOSINOPHILS RELATIVE PERCENT: 0 % (ref 0–4)
GFR AFRICAN AMERICAN: >60 ML/MIN
GFR NON-AFRICAN AMERICAN: >60 ML/MIN
GFR SERPL CREATININE-BSD FRML MDRD: ABNORMAL ML/MIN/{1.73_M2}
GFR SERPL CREATININE-BSD FRML MDRD: ABNORMAL ML/MIN/{1.73_M2}
GLUCOSE BLD-MCNC: 105 MG/DL (ref 70–99)
HCT VFR BLD CALC: 39.9 % (ref 41–53)
HEMOGLOBIN: 13.3 G/DL (ref 13.5–17.5)
IMMATURE GRANULOCYTES: ABNORMAL %
INR BLD: 3.8
LYMPHOCYTES # BLD: 9 % (ref 24–44)
Lab: NORMAL
MCH RBC QN AUTO: 29.9 PG (ref 26–34)
MCHC RBC AUTO-ENTMCNC: 33.2 G/DL (ref 31–37)
MCV RBC AUTO: 89.9 FL (ref 80–100)
MONOCYTES # BLD: 16 % (ref 1–7)
MORPHOLOGY: ABNORMAL
NRBC AUTOMATED: ABNORMAL PER 100 WBC
PDW BLD-RTO: 15.7 % (ref 11.5–14.9)
PLATELET # BLD: 179 K/UL (ref 150–450)
PLATELET ESTIMATE: ABNORMAL
PMV BLD AUTO: 8.7 FL (ref 6–12)
POTASSIUM SERPL-SCNC: 4.1 MMOL/L (ref 3.7–5.3)
PROTHROMBIN TIME: 38 SEC (ref 11.8–14.6)
RBC # BLD: 4.44 M/UL (ref 4.5–5.9)
RBC # BLD: ABNORMAL 10*6/UL
REASON FOR REJECTION: NORMAL
SEG NEUTROPHILS: 73 % (ref 36–66)
SEGMENTED NEUTROPHILS ABSOLUTE COUNT: 6.42 K/UL (ref 1.3–9.1)
SODIUM BLD-SCNC: 133 MMOL/L (ref 135–144)
SPECIMEN DESCRIPTION: NORMAL
WBC # BLD: 8.8 K/UL (ref 3.5–11)
WBC # BLD: ABNORMAL 10*3/UL
ZZ NTE CLEAN UP: ORDERED TEST: NORMAL
ZZ NTE WITH NAME CLEAN UP: SPECIMEN SOURCE: NORMAL

## 2019-08-03 PROCEDURE — 89060 EXAM SYNOVIAL FLUID CRYSTALS: CPT

## 2019-08-03 PROCEDURE — 85610 PROTHROMBIN TIME: CPT

## 2019-08-03 PROCEDURE — 0S9G3ZZ DRAINAGE OF LEFT ANKLE JOINT, PERCUTANEOUS APPROACH: ICD-10-PCS | Performed by: PODIATRIST

## 2019-08-03 PROCEDURE — 1200000000 HC SEMI PRIVATE

## 2019-08-03 PROCEDURE — 87070 CULTURE OTHR SPECIMN AEROBIC: CPT

## 2019-08-03 PROCEDURE — 6370000000 HC RX 637 (ALT 250 FOR IP): Performed by: INTERNAL MEDICINE

## 2019-08-03 PROCEDURE — 2500000003 HC RX 250 WO HCPCS: Performed by: STUDENT IN AN ORGANIZED HEALTH CARE EDUCATION/TRAINING PROGRAM

## 2019-08-03 PROCEDURE — 87075 CULTR BACTERIA EXCEPT BLOOD: CPT

## 2019-08-03 PROCEDURE — 85025 COMPLETE CBC W/AUTO DIFF WBC: CPT

## 2019-08-03 PROCEDURE — 36415 COLL VENOUS BLD VENIPUNCTURE: CPT

## 2019-08-03 PROCEDURE — 99232 SBSQ HOSP IP/OBS MODERATE 35: CPT | Performed by: INTERNAL MEDICINE

## 2019-08-03 PROCEDURE — 87206 SMEAR FLUORESCENT/ACID STAI: CPT

## 2019-08-03 PROCEDURE — 2580000003 HC RX 258: Performed by: STUDENT IN AN ORGANIZED HEALTH CARE EDUCATION/TRAINING PROGRAM

## 2019-08-03 PROCEDURE — 6360000002 HC RX W HCPCS: Performed by: STUDENT IN AN ORGANIZED HEALTH CARE EDUCATION/TRAINING PROGRAM

## 2019-08-03 PROCEDURE — 87205 SMEAR GRAM STAIN: CPT

## 2019-08-03 PROCEDURE — 86140 C-REACTIVE PROTEIN: CPT

## 2019-08-03 PROCEDURE — 80048 BASIC METABOLIC PNL TOTAL CA: CPT

## 2019-08-03 RX ORDER — LIDOCAINE HYDROCHLORIDE 10 MG/ML
20 INJECTION, SOLUTION INFILTRATION; PERINEURAL ONCE
Status: COMPLETED | OUTPATIENT
Start: 2019-08-03 | End: 2019-08-03

## 2019-08-03 RX ADMIN — LISINOPRIL 40 MG: 20 TABLET ORAL at 13:39

## 2019-08-03 RX ADMIN — TAMSULOSIN HYDROCHLORIDE 0.4 MG: 0.4 CAPSULE ORAL at 21:47

## 2019-08-03 RX ADMIN — METOPROLOL TARTRATE 25 MG: 25 TABLET ORAL at 21:46

## 2019-08-03 RX ADMIN — AMLODIPINE BESYLATE 5 MG: 5 TABLET ORAL at 08:37

## 2019-08-03 RX ADMIN — COLCHICINE 0.6 MG: 0.6 TABLET, FILM COATED ORAL at 08:40

## 2019-08-03 RX ADMIN — ATORVASTATIN CALCIUM 20 MG: 20 TABLET, FILM COATED ORAL at 08:37

## 2019-08-03 RX ADMIN — HYDRALAZINE HYDROCHLORIDE 50 MG: 50 TABLET, FILM COATED ORAL at 13:40

## 2019-08-03 RX ADMIN — HYDRALAZINE HYDROCHLORIDE 50 MG: 50 TABLET, FILM COATED ORAL at 21:46

## 2019-08-03 RX ADMIN — COLCHICINE 0.6 MG: 0.6 TABLET, FILM COATED ORAL at 21:46

## 2019-08-03 RX ADMIN — BUSPIRONE HYDROCHLORIDE 5 MG: 5 TABLET ORAL at 21:51

## 2019-08-03 RX ADMIN — BUSPIRONE HYDROCHLORIDE 5 MG: 5 TABLET ORAL at 08:37

## 2019-08-03 RX ADMIN — TAMSULOSIN HYDROCHLORIDE 0.4 MG: 0.4 CAPSULE ORAL at 08:37

## 2019-08-03 RX ADMIN — CEFAZOLIN 1 G: 1 INJECTION, POWDER, FOR SOLUTION INTRAMUSCULAR; INTRAVENOUS at 08:37

## 2019-08-03 RX ADMIN — METOPROLOL TARTRATE 25 MG: 25 TABLET ORAL at 08:37

## 2019-08-03 RX ADMIN — LIDOCAINE HYDROCHLORIDE 20 ML: 10 INJECTION, SOLUTION INFILTRATION; PERINEURAL at 11:48

## 2019-08-03 ASSESSMENT — ENCOUNTER SYMPTOMS
CHEST TIGHTNESS: 0
DIARRHEA: 0
COLOR CHANGE: 1
SHORTNESS OF BREATH: 0
ABDOMINAL PAIN: 0
NAUSEA: 0
ABDOMINAL DISTENTION: 0
VOMITING: 0
SORE THROAT: 0

## 2019-08-03 ASSESSMENT — PAIN DESCRIPTION - FREQUENCY: FREQUENCY: INTERMITTENT

## 2019-08-03 ASSESSMENT — PAIN DESCRIPTION - ORIENTATION: ORIENTATION: RIGHT;LEFT

## 2019-08-03 ASSESSMENT — PAIN DESCRIPTION - PROGRESSION: CLINICAL_PROGRESSION: GRADUALLY IMPROVING

## 2019-08-03 ASSESSMENT — PAIN DESCRIPTION - ONSET: ONSET: ON-GOING

## 2019-08-03 ASSESSMENT — PAIN - FUNCTIONAL ASSESSMENT: PAIN_FUNCTIONAL_ASSESSMENT: PREVENTS OR INTERFERES SOME ACTIVE ACTIVITIES AND ADLS

## 2019-08-03 ASSESSMENT — PAIN SCALES - GENERAL
PAINLEVEL_OUTOF10: 8
PAINLEVEL_OUTOF10: 7
PAINLEVEL_OUTOF10: 7

## 2019-08-03 ASSESSMENT — PAIN DESCRIPTION - DESCRIPTORS: DESCRIPTORS: ACHING

## 2019-08-03 ASSESSMENT — PAIN DESCRIPTION - LOCATION: LOCATION: FOOT

## 2019-08-03 NOTE — CONSULTS
of clubs or organizations: Not on file     Relationship status: Not on file    Intimate partner violence:     Fear of current or ex partner: Not on file     Emotionally abused: Not on file     Physically abused: Not on file     Forced sexual activity: Not on file   Other Topics Concern    Not on file   Social History Narrative    Not on file       Family History:     Family History   Problem Relation Age of Onset    Heart Disease Mother     Lung Cancer Father     Cancer Father         lung    Heart Disease Brother         Allergies:   Celebrex [celecoxib] and Mobic     Review of Systems:   Constitutional: No fevers or chills. No systemic complaints  Head: No headaches  Eyes: No double vision or blurry vision. No conjunctival inflammation. ENT: No sore throat or runny nose. . No hearing loss, tinnitus or vertigo. Cardiovascular: No chest pain or palpitations. No shortness of breath. No LOPEZ  Lung: No shortness of breath or cough. No sputum production  Abdomen: No nausea, vomiting, diarrhea, or abdominal pain. Honora Ely No cramps. Genitourinary: No increased urinary frequency, or dysuria. No hematuria. No suprapubic or CVA pain  Musculoskeletal: Bilateral ankle and foot pain  Hematologic: No bleeding or bruising. Neurologic: No headache, weakness, numbness, or tingling. Integument: No rash, no ulcers. Psychiatric: No depression. Endocrine: No polyuria, no polydipsia, no polyphagia. Physical Examination :     Patient Vitals for the past 8 hrs:   BP Temp Pulse Resp SpO2   08/03/19 0645 (!) 109/56 98.2 °F (36.8 °C) 106 18 96 %     General Appearance: Awake, alert, and in no apparent distress  Head:  Normocephalic, no trauma  Eyes: Pupils equal, round, reactive to light and accommodation; extraocular movements intact; sclera anicteric; conjunctivae pink. No embolic phenomena. ENT: Oropharynx clear, without erythema, exudate, or thrush. No tenderness of sinuses. Mouth/throat: mucosa pink and moist. No lesions.

## 2019-08-03 NOTE — CONSULTS
CALCIUM 9.2 8.5* 8.4*     No results for input(s): PROT, LABALBU, EAG, T2HCLSV, U2KDPCD, FT4, TSH, CORTISOL, PROLACTIN, AST, ALT, LDH, GGT, ALKPHOS, LABGGT, BILITOT, BILIDIR, AMMONIA, AMYLASE, LIPASE, LACTATE, CHOL, HDL, LDLCHOLESTEROL, CHOLHDLRATIO, TRIG, VLDL, PHENYTOIN, PHENYF, VALPROATE in the last 72 hours. Glucose:No results for input(s): LABA1C, POCGLU, LABINSU in the last 72 hours. Lab Results   Component Value Date/Time    SPECIAL NOT REPORTED 09/04/2015 12:06 AM     Lab Results   Component Value Date/Time    CULTURE NO GROWTH 09/04/2015 12:06 AM    CULTURE  09/04/2015 12:06 AM     Performed at [address] [place] [address], [address] [place] [address] [phone]       No results found for: POCPH, PHART, PH, POCPCO2, DAV0VTH, PCO2, POCPO2, PO2ART, PO2, POCHCO3, TKQ1KXW, HCO3, NBEA, PBEA, BEART, BE, THGBART, THB, EFX7KNY, CMPS0HTU, I3YQPJGA, O2SAT, FIO2    Radiology:        Physical Examination:        General appearance:  alert, cooperative and no distress  Mental Status:  oriented to person, place and time and normal affect  Lungs:  clear to auscultation bilaterally, normal effort  Heart:  regular rate and rhythm, no murmur  Abdomen:  soft, nontender, nondistended, normal bowel sounds, no masses, hepatomegaly, splenomegaly  Extremities: Bilateral dorsum of the feet pain some edema 1+  Skin:  no gross lesions, rashes, induration    Assessment:        Primary Problem  <principal problem not specified>    Active Hospital Problems    Diagnosis Date Noted    Cellulitis of right foot [L03.115]     Intractable pain [R52]     Acute idiopathic gout of right foot [M10.071]     Cellulitis [L03.90] 08/01/2019       Plan:        1. akash feet pain  2. Mri done resutl pending  3. Clinically not septic likely inflamatory agree with colcrys   4. Sed rate 36  5. htncontrolled  6. meds rev  7.  Stop norvasc likely causing edema     Massiel Gonzalez MD  8/3/2019  9:13 AM

## 2019-08-03 NOTE — PROGRESS NOTES
DATE: 8/3/2019    NAME: Bhavana Meza  MRN: [id number]   : 1931    Patient not seen this date for Physical Therapy due to:  [] Blood transfusion in progress  [] Hemodialysis  [x]  Patient Declined-still with too mihir pain on both feet  [] Spine Precautions   [] Strict Bedrest  [] Surgery/ Procedure  [] Testing      [] Other        [] PT being discontinued at this time. Patient independent. No further needs. [] PT being discontinued at this time as the patient has been transferred to palliative care. No further needs.     Brice Wayne, PT

## 2019-08-03 NOTE — CARE COORDINATION
ONGOING DISCHARGE PLAN:    Spoke with patient regarding discharge plan and patient states that he thinks he will need to go to SNF. He would like [facility] as that is close to his home. We discussed VNS after SNF. He is agreeable. Freedom of choice and list provided, agency selected [address]. Notified Chrystal Tamia, that pt would like to go to [facility]. PT/OT rec SNF. MRI bilat feet done yesterday- awaiting results. Active order for IV Ancef. Will continue to follow for additional discharge needs.     Electronically signed by Demond Ibrahim RN on 8/3/2019 at 8:56 AM

## 2019-08-03 NOTE — FLOWSHEET NOTE
Patient feels like he's made some progress and doctors beginning to develop a plan of care. He updated writer on his condition and is receptive to the idea of going to an ECF for rehab. While his daughter lives close by, he feels a rehab unit is the best option for full recovery. 08/03/19 1041   Encounter Summary   Services provided to: Patient   Referral/Consult From: [place]   Continue Visiting   (8-3-19)   Complexity of Encounter Moderate   Length of Encounter 15 minutes   Routine   Type Follow up   Spiritual/Taoist   Type Spiritual support   Grief and Life Adjustment   Type New Diagnosis   Assessment Approachable; Anxious   Intervention Active listening;Explored feelings, thoughts, concerns;Explored coping resources;Nurtured hope;Sustaining presence/ Ministry of presence; Discussed illness/injury and it's impact   Outcome Expressed gratitude;Engaged in conversation;Expressed feelings/needs/concerns; Hopeful

## 2019-08-03 NOTE — PROGRESS NOTES
Progress note  Podiatric Medicine and Surgery     Subjective     Chief Complaint: Cellulitis, bilateral feet    Patient seen and evaluated at bedside. No acute events overnight. Afebrile, VSS. Pain about the same as yesterday to the L ankle. HPI:  Yolanda Christina is a 80 y.o. male seen at [facility] for progressive severe pain with associated cellulitis bilateral feet. Patient states he has been dealing with posterior tibial tendinitis in his right foot and has been following Dr. Ana Burger for treatment. Last Friday (7/26/19) patient related increasing pain to his medial right foot and ankle. Patient states that over the weekend the pain became progressively worse in his left foot also became affected. Per his daughter the patient was in so much pain over the weekend that he was unable to ambulate or even get out of his chair. He called Dr. Ana Burger schedule an appointment and was seen in his office on Monday. He was placed in a posterior splint and given prednisone. However pain continued to worsen to the point of being untolerable and the patient noted an increase in redness to the painful areas. So the patient came to the ED. He denies any trauma or falls. Patient denies nausea, vomiting, fever, chills, and shortness of breath. PCP is Eddie Falk MD    ROS:   Review of Systems   Constitutional: Negative for chills and fever. HENT: Negative for congestion and sore throat. Respiratory: Negative for chest tightness and shortness of breath. Cardiovascular: Positive for leg swelling. Negative for chest pain and palpitations. Gastrointestinal: Negative for abdominal distention, abdominal pain, diarrhea, nausea and vomiting. Musculoskeletal: Positive for arthralgias, gait problem and joint swelling. Skin: Positive for color change. Neurological: Negative for dizziness, light-headedness and numbness.    Psychiatric/Behavioral: Negative for agitation, behavioral problems and confusion. Past Medical History   has a past medical history of Acute MI ([facility] [address] Allergic rhinitis, Anemia, Basal cell cancer, Basal cell cancer, Cervical radiculopathy, Diverticulitis, Epigastric pain/GERD., GERD (gastroesophageal reflux disease), Glaucoma, Hyperlipidemia, Hypertension, Hyponatremia, IBS (irritable bowel syndrome), Insomnia, Osteoarthritis, Osteoarthritis/neck pain. , Renal calculi, and Shingles. Past Surgical History   has a past surgical history that includes Spine surgery (04/2003); Lithotripsy; hernia repair; Skin cancer excision; skin biopsy (03/11/13); Skin cancer excision (Left, 03/2013); Colonoscopy (2002); Colonoscopy (08/2013); skin biopsy (Left, 02/11/14); Mohs surgery (Left, 03/10/14); Mohs surgery (12/30/14); other surgical history (9/6/15); Cholecystectomy (9-9-15); Skin cancer excision (Right, 08/12/2016); and Mohs surgery (Left, 08/2017). Medications  Prior to Admission medications    Medication Sig Start Date End Date Taking? Authorizing Provider   warfarin (COUMADIN) 5 MG tablet Take 2.5 mg by mouth once a week Wednesday   Yes Historical Provider, MD   warfarin (COUMADIN) 5 MG tablet Take 5 mg by mouth Six times weekly   Yes Historical Provider, MD   vitamin B-12 (CYANOCOBALAMIN) 1000 MCG tablet Take 1,000 mcg by mouth daily   Yes Historical Provider, MD   busPIRone (BUSPAR) 5 MG tablet Take 5 mg by mouth 2 times daily   Yes Historical Provider, MD   traMADol (ULTRAM) 50 MG tablet Take 1 tablet by mouth every 4 hours as needed for Pain for up to 3 days. Intended supply: 3 days.  Take lowest dose possible to manage pain 7/31/19 8/3/19 Yes Cele Ziegler DPM   predniSONE (ALBERT) 5 MG TBEC Take 1 tablet by mouth 2 times daily 7/29/19  Yes Darren Gonzalez DPM   hydrALAZINE (APRESOLINE) 50 MG tablet TAKE ONE TABLET BY MOUTH TWICE A DAY 7/25/19  Yes Mali Ordaz MD   bimatoprost (LUMIGAN) 0.01 % SOLN ophthalmic drops Place 1 drop into the left eye nightly   Yes Historical Provider, MD   atorvastatin (LIPITOR) 20 MG tablet Take 1 tablet by mouth daily 19  Yes Wicho Sam MD   amLODIPine (NORVASC) 5 MG tablet Take 1 tablet by mouth daily 19  Yes Wicho Sam MD   lisinopril (PRINIVIL;ZESTRIL) 40 MG tablet TAKE ONE TABLET BY MOUTH DAILY 10/18/18  Yes Wicho Sam MD   tamsulosin (FLOMAX) 0.4 MG capsule Take 1 capsule by mouth 2 times daily 18  Yes NASRA Roy CNP   melatonin 5 MG TABS tablet Take 1 tablet by mouth nightly    Yes Historical Provider, MD   metoprolol tartrate (LOPRESSOR) 25 MG tablet Take 25 mg by mouth 2 times daily   Yes Historical Provider, MD    Scheduled Meds:  Continuous Infusions:  PRN Meds:. Allergies  is allergic to celebrex [celecoxib] and mobic. Family History  family history includes Cancer in his father; Heart Disease in his brother and mother; Pegserafin Dietzbe in his father. Social History   reports that he quit smoking about 58 years ago. He has a 10.00 pack-year smoking history. He has never used smokeless tobacco.   reports that he drinks alcohol. reports that he does not use drugs.       Objective     Vitals:  Patient Vitals for the past 8 hrs:   BP Temp Pulse Resp SpO2   19 0645 (!) 109/56 98.2 °F (36.8 °C) 106 18 96 %   19 2316 130/68 -- -- -- --     Average, Min, and Max for last 24 hours Vitals:  TEMPERATURE:  Temp  Av °F (37.2 °C)  Min: 98.2 °F (36.8 °C)  Max: 99.9 °F (37.7 °C)    RESPIRATIONS RANGE: Resp  Av.7  Min: 16  Max: 18    PULSE RANGE: Pulse  Av.7  Min: 79  Max: 108    BLOOD PRESSURE RANGE:  Systolic (20LUE), ZYK:303 , Min:84 , CFK:184   ; Diastolic (70GEL), ALL:15, Min:40, Max:68      PULSE OXIMETRY RANGE: SpO2  Av %  Min: 96 %  Max: 98 %  I&O:  I/O last 3 completed shifts:  In: -   Out: 650 [Urine:650]    CBC:  Recent Labs     19  1010 19  0654 19  0614   WBC 10.7 11.6* 8.8   HGB 15.0 13.3* 13.3*   HCT 44.6 40.4* 39.9*    169 179   .4* 229.3* 236.6*        BMP:  Recent Labs     08/01/19  1010 08/02/19  0654 08/03/19  0614   * 131* 133*   K 4.3 4.3 4.1   CL 96* 97* 100   CO2 24 21 22   BUN 16 13 11   CREATININE 0.86 0.68* 0.61*   GLUCOSE 92 86 105*   CALCIUM 9.2 8.5* 8.4*        Coags:  Recent Labs     08/01/19  1010 08/02/19  0751   INR 3.2 4.1       No results found for: LABA1C  Lab Results   Component Value Date    SEDRATE 36 (H) 08/01/2019      Lab Results   Component Value Date    .6 (H) 08/03/2019        Physical Exam:    General: A&Ox3, NAD  Heart: Regular rate and irregular rhythm. Lungs: Equal air entry. No increased effort. Abdomen: Soft, non-tender to palpation. Not distended. Lower Extremity Physical Exam:  Vascular: DP and PT pulses are palpable, bilaterally. CFT <3 seconds to all digits. Hair growth is absent to the level of the digits. Mild nonpitting edema bilateral lower extremity. Neuro: Saph/sural/SP/DP/plantar sensation intact to light touch. Musculoskeletal: Muscle strength is deferred due to pain to all lower extremity muscle groups. Gross deformity is absent. Dermatologic:   Mild erythema to the medial right foot and ankle near the insertion of the posterior tibial tendon and to the left foot over the dorsal aspect of the first MPJ is improved. No in warmth was noted. Pertinent negatives include no open wound, no purulent drainage, no fluctuance, no crepitus, no induration. Imaging:   VASCULAR REPORT   Final Result      VL Lower Extremity Bilateral Venous Duplex   Final Result      XR ANKLE LEFT (MIN 3 VIEWS)   Final Result   Left ankle: Diffuse mild soft tissue swelling and degenerative changes are   noted without acute fracture or dislocation. Small plantar calcaneal spur. Osteopenia. Left foot: Osteopenia. Arthritic changes most significant at the tarsal   metatarsal junction. Soft tissue swelling over the forefoot. Plantar   calcaneal spur.   No acute have discussed the case, including pertinent history and exam findings with the resident. I agree with the assessment, plan and orders as documented by the intern. Any changes were made in the note above. Electronically signed by Jose Stanley DPM on 8/3/2019 at 12:33 PM    I performed a history and physical examination of the patient and discussed management with the resident. I reviewed the residents note and agree with the documented findings and plan of care. Any areas of disagreement are noted on the chart. I was personally present for the key portions of any procedures. I have documented in the chart those procedures where I was not present during the key portions. I have reviewed the Podiatry Resident progress note. I agree with the chief complaint, past medical history, past surgical history, allergies, medications, social and family history as documented unless otherwise noted below. Documentation of the HPI, Physical Exam and Medical Decision Making performed by medical students or scribes is based on my personal performance of the HPI, PE and MDM. I have personally evaluated this patient and have completed at least one if not all key elements of the E/M (history, physical exam, and MDM). Additional findings are as noted.      Herminia Bowers DPM on 8/4/2019 at 6:86 PM  Board Certified, American Board of Podiatric Surgery  Fellow, [employer] [facility] [employer] and [facility]

## 2019-08-03 NOTE — PROGRESS NOTES
Writer received phone call from lab. They said that the specimen from the left ankle clotted, and they were unable to run the body fluid count, and the tech said that she saw some crystals in the sample, but she will be unable to place the report in the chart until a pathologist looks at it, and that will not be till Monday. Podiatry sent [employer] message with update.

## 2019-08-04 LAB
ABSOLUTE EOS #: 0.07 K/UL (ref 0–0.4)
ABSOLUTE IMMATURE GRANULOCYTE: ABNORMAL K/UL (ref 0–0.3)
ABSOLUTE LYMPH #: 0.52 K/UL (ref 1–4.8)
ABSOLUTE MONO #: 1.56 K/UL (ref 0.1–1.3)
ANION GAP SERPL CALCULATED.3IONS-SCNC: 9 MMOL/L (ref 9–17)
BASOPHILS # BLD: 0 % (ref 0–2)
BASOPHILS ABSOLUTE: 0 K/UL (ref 0–0.2)
BUN BLDV-MCNC: 11 MG/DL (ref 8–23)
BUN/CREAT BLD: ABNORMAL (ref 9–20)
C-REACTIVE PROTEIN: 151.2 MG/L (ref 0–5)
CALCIUM SERPL-MCNC: 8.6 MG/DL (ref 8.6–10.4)
CHLORIDE BLD-SCNC: 100 MMOL/L (ref 98–107)
CO2: 24 MMOL/L (ref 20–31)
CREAT SERPL-MCNC: 0.57 MG/DL (ref 0.7–1.2)
DIFFERENTIAL TYPE: ABNORMAL
DIRECT EXAM: NORMAL
EOSINOPHILS RELATIVE PERCENT: 1 % (ref 0–4)
GFR AFRICAN AMERICAN: >60 ML/MIN
GFR NON-AFRICAN AMERICAN: >60 ML/MIN
GFR SERPL CREATININE-BSD FRML MDRD: ABNORMAL ML/MIN/{1.73_M2}
GFR SERPL CREATININE-BSD FRML MDRD: ABNORMAL ML/MIN/{1.73_M2}
GLUCOSE BLD-MCNC: 94 MG/DL (ref 70–99)
HCT VFR BLD CALC: 39.2 % (ref 41–53)
HEMOGLOBIN: 13.2 G/DL (ref 13.5–17.5)
IMMATURE GRANULOCYTES: ABNORMAL %
INR BLD: 2.8
LYMPHOCYTES # BLD: 8 % (ref 24–44)
Lab: NORMAL
MCH RBC QN AUTO: 30.1 PG (ref 26–34)
MCHC RBC AUTO-ENTMCNC: 33.6 G/DL (ref 31–37)
MCV RBC AUTO: 89.7 FL (ref 80–100)
MONOCYTES # BLD: 24 % (ref 1–7)
MORPHOLOGY: ABNORMAL
NRBC AUTOMATED: ABNORMAL PER 100 WBC
PDW BLD-RTO: 15.9 % (ref 11.5–14.9)
PLATELET # BLD: 195 K/UL (ref 150–450)
PLATELET ESTIMATE: ABNORMAL
PMV BLD AUTO: 8.7 FL (ref 6–12)
POTASSIUM SERPL-SCNC: 4.4 MMOL/L (ref 3.7–5.3)
PROTHROMBIN TIME: 29.8 SEC (ref 11.8–14.6)
RBC # BLD: 4.37 M/UL (ref 4.5–5.9)
RBC # BLD: ABNORMAL 10*6/UL
SEG NEUTROPHILS: 67 % (ref 36–66)
SEGMENTED NEUTROPHILS ABSOLUTE COUNT: 4.35 K/UL (ref 1.3–9.1)
SODIUM BLD-SCNC: 133 MMOL/L (ref 135–144)
SPECIMEN DESCRIPTION: NORMAL
WBC # BLD: 6.5 K/UL (ref 3.5–11)
WBC # BLD: ABNORMAL 10*3/UL

## 2019-08-04 PROCEDURE — 6370000000 HC RX 637 (ALT 250 FOR IP): Performed by: INTERNAL MEDICINE

## 2019-08-04 PROCEDURE — 80048 BASIC METABOLIC PNL TOTAL CA: CPT

## 2019-08-04 PROCEDURE — 36415 COLL VENOUS BLD VENIPUNCTURE: CPT

## 2019-08-04 PROCEDURE — 86140 C-REACTIVE PROTEIN: CPT

## 2019-08-04 PROCEDURE — 1200000000 HC SEMI PRIVATE

## 2019-08-04 PROCEDURE — 85025 COMPLETE CBC W/AUTO DIFF WBC: CPT

## 2019-08-04 PROCEDURE — 99232 SBSQ HOSP IP/OBS MODERATE 35: CPT | Performed by: INTERNAL MEDICINE

## 2019-08-04 PROCEDURE — 97116 GAIT TRAINING THERAPY: CPT

## 2019-08-04 PROCEDURE — 6370000000 HC RX 637 (ALT 250 FOR IP): Performed by: FAMILY MEDICINE

## 2019-08-04 PROCEDURE — 85610 PROTHROMBIN TIME: CPT

## 2019-08-04 PROCEDURE — 99231 SBSQ HOSP IP/OBS SF/LOW 25: CPT | Performed by: NURSE PRACTITIONER

## 2019-08-04 PROCEDURE — 2580000003 HC RX 258: Performed by: INTERNAL MEDICINE

## 2019-08-04 RX ORDER — WARFARIN SODIUM 2.5 MG/1
2.5 TABLET ORAL
Status: COMPLETED | OUTPATIENT
Start: 2019-08-04 | End: 2019-08-04

## 2019-08-04 RX ADMIN — TAMSULOSIN HYDROCHLORIDE 0.4 MG: 0.4 CAPSULE ORAL at 20:19

## 2019-08-04 RX ADMIN — METOPROLOL TARTRATE 25 MG: 25 TABLET ORAL at 20:19

## 2019-08-04 RX ADMIN — ATORVASTATIN CALCIUM 20 MG: 20 TABLET, FILM COATED ORAL at 09:11

## 2019-08-04 RX ADMIN — METOPROLOL TARTRATE 25 MG: 25 TABLET ORAL at 09:11

## 2019-08-04 RX ADMIN — COLCHICINE 0.6 MG: 0.6 TABLET, FILM COATED ORAL at 20:18

## 2019-08-04 RX ADMIN — HYDRALAZINE HYDROCHLORIDE 50 MG: 50 TABLET, FILM COATED ORAL at 09:11

## 2019-08-04 RX ADMIN — TAMSULOSIN HYDROCHLORIDE 0.4 MG: 0.4 CAPSULE ORAL at 09:11

## 2019-08-04 RX ADMIN — WARFARIN SODIUM 2.5 MG: 2.5 TABLET ORAL at 17:11

## 2019-08-04 RX ADMIN — SODIUM CHLORIDE: 9 INJECTION, SOLUTION INTRAVENOUS at 09:28

## 2019-08-04 RX ADMIN — LISINOPRIL 40 MG: 20 TABLET ORAL at 09:11

## 2019-08-04 RX ADMIN — BUSPIRONE HYDROCHLORIDE 5 MG: 5 TABLET ORAL at 20:19

## 2019-08-04 RX ADMIN — COLCHICINE 0.6 MG: 0.6 TABLET, FILM COATED ORAL at 09:10

## 2019-08-04 RX ADMIN — BUSPIRONE HYDROCHLORIDE 5 MG: 5 TABLET ORAL at 09:11

## 2019-08-04 RX ADMIN — HYDRALAZINE HYDROCHLORIDE 50 MG: 50 TABLET, FILM COATED ORAL at 20:19

## 2019-08-04 ASSESSMENT — PAIN DESCRIPTION - ORIENTATION
ORIENTATION: RIGHT;LEFT
ORIENTATION: RIGHT

## 2019-08-04 ASSESSMENT — ENCOUNTER SYMPTOMS
SORE THROAT: 0
SHORTNESS OF BREATH: 0
DIARRHEA: 0
COLOR CHANGE: 1
ABDOMINAL PAIN: 0
VOMITING: 0
CHEST TIGHTNESS: 0
NAUSEA: 0
ABDOMINAL DISTENTION: 0

## 2019-08-04 ASSESSMENT — PAIN DESCRIPTION - LOCATION
LOCATION: FOOT
LOCATION: FOOT

## 2019-08-04 ASSESSMENT — PAIN SCALES - GENERAL
PAINLEVEL_OUTOF10: 7
PAINLEVEL_OUTOF10: 6
PAINLEVEL_OUTOF10: 5
PAINLEVEL_OUTOF10: 7

## 2019-08-04 ASSESSMENT — PAIN DESCRIPTION - FREQUENCY
FREQUENCY: INTERMITTENT
FREQUENCY: INTERMITTENT

## 2019-08-04 ASSESSMENT — PAIN DESCRIPTION - ONSET: ONSET: ON-GOING

## 2019-08-04 ASSESSMENT — PAIN DESCRIPTION - PROGRESSION: CLINICAL_PROGRESSION: GRADUALLY IMPROVING

## 2019-08-04 ASSESSMENT — PAIN - FUNCTIONAL ASSESSMENT: PAIN_FUNCTIONAL_ASSESSMENT: PREVENTS OR INTERFERES SOME ACTIVE ACTIVITIES AND ADLS

## 2019-08-04 ASSESSMENT — PAIN DESCRIPTION - DESCRIPTORS: DESCRIPTORS: ACHING

## 2019-08-04 NOTE — CONSULTS
B1HXMOZ, FT4, TSH, CORTISOL, PROLACTIN, AST, ALT, LDH, GGT, ALKPHOS, LABGGT, BILITOT, BILIDIR, AMMONIA, AMYLASE, LIPASE, LACTATE, CHOL, HDL, LDLCHOLESTEROL, CHOLHDLRATIO, TRIG, VLDL, PHENYTOIN, PHENYF, VALPROATE in the last 72 hours. Glucose:No results for input(s): LABA1C, POCGLU, LABINSU in the last 72 hours. Lab Results   Component Value Date/Time    SPECIAL NOT REPORTED 08/03/2019 12:29 PM     Lab Results   Component Value Date/Time    CULTURE PLEASE REFER TO AEROBIC ANAEROBIC CULTURE 08/03/2019 12:24 PM       No results found for: POCPH, PHART, PH, POCPCO2, GGN6IYF, PCO2, POCPO2, PO2ART, PO2, POCHCO3, LRC6YQO, HCO3, NBEA, PBEA, BEART, BE, THGBART, THB, DOB1EIW, SRST6UDN, F6LNGKJN, O2SAT, FIO2    Radiology:        Physical Examination:        General appearance:  alert, cooperative and no distress  Mental Status:  oriented to person, place and time and normal affect  Lungs:  clear to auscultation bilaterally, normal effort  Heart:  regular rate and rhythm, no murmur  Abdomen:  soft, nontender, nondistended, normal bowel sounds, no masses, hepatomegaly, splenomegaly  Extremities: Bilateral dorsum of the feet pain some edema 1+  Skin:  no gross lesions, rashes, induration    Assessment:        Primary Problem  <principal problem not specified>    Active Hospital Problems    Diagnosis Date Noted    Cellulitis of right foot [L03.115]     Intractable pain [R52]     Acute idiopathic gout of right foot [M10.071]     Cellulitis [L03.90] 08/01/2019       Plan:        1. akash feet pain  2. Mri done resutl pending  3. Clinically not septic likely inflamatory agree with colcrys   4. Sed rate 36  5. htncontrolled  6. meds rev  7. Stop norvasc likely causing edema   8. Aug 4  9. No fever  10. Mri noted no bacteria  11. infmlamtory condition  12. Labs rev  13. meds rev  14. Agree with plan  15.      Crow Pradhan MD  8/4/2019  11:48 AM

## 2019-08-04 NOTE — PROGRESS NOTES
Progress note  Podiatric Medicine and Surgery     Subjective     Chief Complaint: Cellulitis, bilateral feet    Patient seen and evaluated at bedside. No acute events overnight. Afebrile, VSS. Erythema continues to improve since starting colchicine   Pain is improving patient able to bear weight for the 1st time today. HPI:  Gurpreet Yung is a 80 y.o. male seen at [facility] for progressive severe pain with associated cellulitis bilateral feet. Patient states he has been dealing with posterior tibial tendinitis in his right foot and has been following Dr. Huma Griggs for treatment. Last Friday (7/26/19) patient related increasing pain to his medial right foot and ankle. Patient states that over the weekend the pain became progressively worse in his left foot also became affected. Per his daughter the patient was in so much pain over the weekend that he was unable to ambulate or even get out of his chair. He called Dr. Huma Griggs schedule an appointment and was seen in his office on Monday. He was placed in a posterior splint and given prednisone. However pain continued to worsen to the point of being untolerable and the patient noted an increase in redness to the painful areas. So the patient came to the ED. He denies any trauma or falls. Patient denies nausea, vomiting, fever, chills, and shortness of breath. PCP is Marko Zuniga MD    ROS:   Review of Systems   Constitutional: Negative for chills and fever. HENT: Negative for congestion and sore throat. Respiratory: Negative for chest tightness and shortness of breath. Cardiovascular: Positive for leg swelling. Negative for chest pain and palpitations. Gastrointestinal: Negative for abdominal distention, abdominal pain, diarrhea, nausea and vomiting. Musculoskeletal: Positive for arthralgias, gait problem and joint swelling. Skin: Positive for color change.    Neurological: Negative for dizziness, light-headedness and numbness. Psychiatric/Behavioral: Negative for agitation, behavioral problems and confusion. Past Medical History   has a past medical history of Acute MI ([facility] [address] Allergic rhinitis, Anemia, Basal cell cancer, Basal cell cancer, Cervical radiculopathy, Diverticulitis, Epigastric pain/GERD., GERD (gastroesophageal reflux disease), Glaucoma, Hyperlipidemia, Hypertension, Hyponatremia, IBS (irritable bowel syndrome), Insomnia, Osteoarthritis, Osteoarthritis/neck pain. , Renal calculi, and Shingles. Past Surgical History   has a past surgical history that includes Spine surgery (04/2003); Lithotripsy; hernia repair; Skin cancer excision; skin biopsy (03/11/13); Skin cancer excision (Left, 03/2013); Colonoscopy (2002); Colonoscopy (08/2013); skin biopsy (Left, 02/11/14); Mohs surgery (Left, 03/10/14); Mohs surgery (12/30/14); other surgical history (9/6/15); Cholecystectomy (9-9-15); Skin cancer excision (Right, 08/12/2016); and Mohs surgery (Left, 08/2017). Medications  Prior to Admission medications    Medication Sig Start Date End Date Taking?  Authorizing Provider   warfarin (COUMADIN) 5 MG tablet Take 2.5 mg by mouth once a week Wednesday   Yes Historical Provider, MD   warfarin (COUMADIN) 5 MG tablet Take 5 mg by mouth Six times weekly   Yes Historical Provider, MD   vitamin B-12 (CYANOCOBALAMIN) 1000 MCG tablet Take 1,000 mcg by mouth daily   Yes Historical Provider, MD   busPIRone (BUSPAR) 5 MG tablet Take 5 mg by mouth 2 times daily   Yes Historical Provider, MD   predniSONE (ALBERT) 5 MG TBEC Take 1 tablet by mouth 2 times daily 7/29/19  Yes Darren Mondragon DPM   hydrALAZINE (APRESOLINE) 50 MG tablet TAKE ONE TABLET BY MOUTH TWICE A DAY 7/25/19  Yes Larisa Chen MD   bimatoprost (LUMIGAN) 0.01 % SOLN ophthalmic drops Place 1 drop into the left eye nightly   Yes Historical Provider, MD   atorvastatin (LIPITOR) 20 MG tablet Take 1 tablet by mouth daily 6/25/19  Yes Larisa Chen MD   amLODIPine 100   CO2 21 22 24   BUN 13 11 11   CREATININE 0.68* 0.61* 0.57*   GLUCOSE 86 105* 94   CALCIUM 8.5* 8.4* 8.6        Coags:  Recent Labs     08/02/19  0751 08/03/19  0614 08/04/19  0717   INR 4.1 3.8 2.8       No results found for: LABA1C  Lab Results   Component Value Date    SEDRATE 36 (H) 08/01/2019      Lab Results   Component Value Date    .2 (H) 08/04/2019        Physical Exam:    General: A&Ox3, NAD  Heart: Regular rate and irregular rhythm. Lungs: Equal air entry. No increased effort. Abdomen: Soft, non-tender to palpation. Not distended. Lower Extremity Physical Exam:  Vascular: DP and PT pulses are palpable, bilaterally. CFT <3 seconds to all digits. Hair growth is absent to the level of the digits. Mild nonpitting edema bilateral lower extremity. Neuro: Saph/sural/SP/DP/plantar sensation intact to light touch. Musculoskeletal: Muscle strength is deferred due to pain to all lower extremity muscle groups. Gross deformity is absent. ROM is increasing. Less pain to palpation of lateral ankle gutter. Still painful with forced dorsiflexion of ankle. No crepitus noted to ankle joint during ROM. Inversion and eversion ROM non-painful. Dermatologic:   Mild erythema to the medial right foot and ankle near the insertion of the posterior tibial tendon and to the left foot over the dorsal aspect of the first MPJ is nearly resolved. No in warmth was noted. Pertinent negatives include no open wound, no purulent drainage, no fluctuance, no crepitus, no induration. Imaging:   MRI ANKLE LEFT W WO CONTRAST   Final Result   Diffuse soft tissue swelling and subcutaneous edema. No focal drainable   fluid collection. Small nonspecific ankle joint effusion. No evidence of osteomyelitis. MRI FOOT RIGHT W WO CONTRAST   Final Result   Diffuse soft tissue swelling and subcutaneous edema. No focal drainable   fluid collection. Small nonspecific ankle joint effusion.       No

## 2019-08-04 NOTE — PROGRESS NOTES
Assessment: 0-10  Pain Level: 7  Pain Location: Foot  Pain Orientation: Right  Pain Frequency: Intermittent  Vital Signs  Patient Currently in Pain: Yes       Orientation    WNL  Cognition    WNL  Objective   Transfers  Sit to Stand: Stand by assistance  Stand to sit: Stand by assistance  Ambulation  Ambulation?: Yes  Ambulation 1  Surface: level tile  Device: Rolling Walker  Assistance: Contact guard assistance  Gait Deviations: Slow Maribel  Distance: 20ft  Stairs/Curb  Stairs?: No     Exercises  Hip Flexion: BLE sitting 10x  Knee Long Arc Quad: BLE 10x  Ankle Pumps: BLE sitting 10x     Goals  Short term goals  Time Frame for Short term goals: 5-7 visits - progress goals as able  Short term goal 1: Pt to demonstrate IND bed mobility. Short term goal 2: Pt to perform sit to stand/stand to sit Mod A x1 to RW. Short term goal 3: Pt improve B LE strength by 1/2 MMT grade. Short term goal 4: Pt to improve sitting balance to Fair with upright midline posture at EOB. Patient Goals   Patient goals :  To be able to walk again    Plan    Plan  Times per week: 5-7x/week  Specific instructions for Next Treatment: sitting balance, bed mobility, ROM/strength  Current Treatment Recommendations: ROM, Strengthening, Balance Training, Functional Mobility Training, Safety Education & Training, Patient/Caregiver Education & Training, Equipment Evaluation, Education, & procurement, Positioning, Endurance Training  Plan Comment: to continue PT per POC  Safety Devices  Type of devices: Call light within reach, Left in chair  Restraints  Initially in place: No     Therapy Time   Individual Concurrent Group Co-treatment   Time In 1050         Time Out 1110         Minutes 20         Timed Code Treatment Minutes: 20 Minutes     Electronically signed by: Marina Sheldon, PT

## 2019-08-04 NOTE — PROGRESS NOTES
left face and top of head    SKIN CANCER EXCISION Left 2013    forehead, cheek, chin, basal cell.  SKIN CANCER EXCISION Right 2016    with skin graft    SPINE SURGERY  2003       Medications:      warfarin  2.5 mg Oral Once    colchicine  0.6 mg Oral BID    tamsulosin  0.4 mg Oral BID    busPIRone  5 mg Oral BID    atorvastatin  20 mg Oral Daily    hydrALAZINE  50 mg Oral BID    metoprolol tartrate  25 mg Oral BID    sodium chloride flush  10 mL Intravenous 2 times per day    warfarin (COUMADIN) daily dosing (placeholder)   Other RX Placeholder    lisinopril  40 mg Oral Daily       Social History:     Social History     Socioeconomic History    Marital status:      Spouse name: Not on file    Number of children: Not on file    Years of education: Not on file    Highest education level: Not on file   Occupational History    Not on file   Social Needs    Financial resource strain: Not on file    Food insecurity:     Worry: Not on file     Inability: Not on file    Transportation needs:     Medical: Not on file     Non-medical: Not on file   Tobacco Use    Smoking status: Former Smoker     Packs/day: 1.00     Years: 10.00     Pack years: 10.00     Last attempt to quit: 1961     Years since quittin.6    Smokeless tobacco: Never Used    Tobacco comment: quit    Substance and Sexual Activity    Alcohol use:  Yes     Alcohol/week: 0.0 standard drinks     Comment: 2x/week    Drug use: No    Sexual activity: Not on file   Lifestyle    Physical activity:     Days per week: Not on file     Minutes per session: Not on file    Stress: Not on file   Relationships    Social connections:     Talks on phone: Not on file     Gets together: Not on file     Attends [employer] service: Not on file     Active member of club or organization: Not on file     Attends meetings of clubs or organizations: Not on file     Relationship status: Not on file    Intimate partner contrast.; Multiplanar multisequence   MRI of the left ankle was performed without and with the administration of   intravenous contrast.       COMPARISON:   None       HISTORY:   ORDERING SYSTEM PROVIDED HISTORY: Progressive severe pain with cellulitis   TECHNOLOGIST PROVIDED HISTORY:   Reason for Exam: Progressive severe pain with cellulitis Patient c/o   Bi-lateral feet pain. Acuity: Acute   Type of Exam: Initial   Additional signs and symptoms: Hx - gout of right foot. Relevant Medical/Surgical History: No relevant surgical hx to area of   interest. ; ORDERING SYSTEM PROVIDED HISTORY: ankle pain, rule out septic   joint   TECHNOLOGIST PROVIDED HISTORY:   Reason for Exam: ankle pain, rule out septic joint   Acuity: Acute   Type of Exam: Initial   Relevant Medical/Surgical History: No relevant surgical hx to area of   interest.       FINDINGS:   BONE MARROW: No acute fracture.  No suspicious bone marrow replacing lesion. Small nonspecific ankle joint effusion.  Os navicularis.       SOFT TISSUES: Diffuse subcutaneous edema and soft tissue swelling.  No focal   drainable fluid collection.  Post-contrast imaging demonstrates mild reactive   type enhancement.  No peripherally enhancing fluid collection.           Impression   Diffuse soft tissue swelling and subcutaneous edema.  No focal drainable   fluid collection.       Small nonspecific ankle joint effusion.       No evidence of osteomyelitis. Medical Decision [id number]:       Medical Decision Making-Other:     Note:  · Labs, medications, radiologic studies were reviewed with personal review of films  · Moderate Large amounts of data were reviewed  · Discussed with nursing Staff, Discharge planner  · Infection Control and Prevention measures reviewed  · All prior entries were reviewed  · Administer medications as ordered  · Prognosis: Good  · Discharge planning reviewed  · Follow up as outpatient.     Thank you for allowing us to

## 2019-08-05 VITALS
BODY MASS INDEX: 27.83 KG/M2 | DIASTOLIC BLOOD PRESSURE: 66 MMHG | RESPIRATION RATE: 16 BRPM | HEIGHT: 64 IN | HEART RATE: 75 BPM | TEMPERATURE: 98.8 F | SYSTOLIC BLOOD PRESSURE: 117 MMHG | OXYGEN SATURATION: 96 % | WEIGHT: 163 LBS

## 2019-08-05 LAB
ABSOLUTE EOS #: 0 K/UL (ref 0–0.4)
ABSOLUTE IMMATURE GRANULOCYTE: ABNORMAL K/UL (ref 0–0.3)
ABSOLUTE LYMPH #: 0.39 K/UL (ref 1–4.8)
ABSOLUTE MONO #: 0.69 K/UL (ref 0.1–1.3)
ANION GAP SERPL CALCULATED.3IONS-SCNC: 11 MMOL/L (ref 9–17)
ANION GAP SERPL CALCULATED.3IONS-SCNC: 11 MMOL/L (ref 9–17)
BASOPHILS # BLD: 0 % (ref 0–2)
BASOPHILS ABSOLUTE: 0 K/UL (ref 0–0.2)
BNP INTERPRETATION: ABNORMAL
BUN BLDV-MCNC: 13 MG/DL (ref 8–23)
BUN BLDV-MCNC: 13 MG/DL (ref 8–23)
BUN/CREAT BLD: ABNORMAL (ref 9–20)
BUN/CREAT BLD: ABNORMAL (ref 9–20)
C-REACTIVE PROTEIN: 80.3 MG/L (ref 0–5)
CALCIUM SERPL-MCNC: 8.5 MG/DL (ref 8.6–10.4)
CALCIUM SERPL-MCNC: 8.6 MG/DL (ref 8.6–10.4)
CHLORIDE BLD-SCNC: 104 MMOL/L (ref 98–107)
CHLORIDE BLD-SCNC: 105 MMOL/L (ref 98–107)
CO2: 21 MMOL/L (ref 20–31)
CO2: 22 MMOL/L (ref 20–31)
CREAT SERPL-MCNC: 0.6 MG/DL (ref 0.7–1.2)
CREAT SERPL-MCNC: 0.68 MG/DL (ref 0.7–1.2)
CRYSTALS, FLUID: NORMAL
DIFFERENTIAL TYPE: ABNORMAL
EOSINOPHILS RELATIVE PERCENT: 0 % (ref 0–4)
GFR AFRICAN AMERICAN: >60 ML/MIN
GFR AFRICAN AMERICAN: >60 ML/MIN
GFR NON-AFRICAN AMERICAN: >60 ML/MIN
GFR NON-AFRICAN AMERICAN: >60 ML/MIN
GFR SERPL CREATININE-BSD FRML MDRD: ABNORMAL ML/MIN/{1.73_M2}
GLUCOSE BLD-MCNC: 131 MG/DL (ref 70–99)
GLUCOSE BLD-MCNC: 93 MG/DL (ref 70–99)
HCT VFR BLD CALC: 38.4 % (ref 41–53)
HEMOGLOBIN: 12.8 G/DL (ref 13.5–17.5)
IMMATURE GRANULOCYTES: ABNORMAL %
INR BLD: 2.7
LV EF: 33 %
LVEF MODALITY: NORMAL
LYMPHOCYTES # BLD: 8 % (ref 24–44)
MCH RBC QN AUTO: 29.9 PG (ref 26–34)
MCHC RBC AUTO-ENTMCNC: 33.4 G/DL (ref 31–37)
MCV RBC AUTO: 89.6 FL (ref 80–100)
MONOCYTES # BLD: 14 % (ref 1–7)
MORPHOLOGY: ABNORMAL
NRBC AUTOMATED: ABNORMAL PER 100 WBC
PDW BLD-RTO: 16 % (ref 11.5–14.9)
PLATELET # BLD: 193 K/UL (ref 150–450)
PLATELET ESTIMATE: ABNORMAL
PMV BLD AUTO: 8.2 FL (ref 6–12)
POTASSIUM SERPL-SCNC: 3.6 MMOL/L (ref 3.7–5.3)
POTASSIUM SERPL-SCNC: 3.7 MMOL/L (ref 3.7–5.3)
PRO-BNP: 3776 PG/ML
PROTHROMBIN TIME: 29.1 SEC (ref 11.8–14.6)
RBC # BLD: 4.29 M/UL (ref 4.5–5.9)
RBC # BLD: ABNORMAL 10*6/UL
SEG NEUTROPHILS: 78 % (ref 36–66)
SEGMENTED NEUTROPHILS ABSOLUTE COUNT: 3.82 K/UL (ref 1.3–9.1)
SODIUM BLD-SCNC: 136 MMOL/L (ref 135–144)
SODIUM BLD-SCNC: 138 MMOL/L (ref 135–144)
SPECIMEN TYPE: NORMAL
WBC # BLD: 4.9 K/UL (ref 3.5–11)
WBC # BLD: ABNORMAL 10*3/UL

## 2019-08-05 PROCEDURE — 6370000000 HC RX 637 (ALT 250 FOR IP): Performed by: INTERNAL MEDICINE

## 2019-08-05 PROCEDURE — 97110 THERAPEUTIC EXERCISES: CPT

## 2019-08-05 PROCEDURE — 99232 SBSQ HOSP IP/OBS MODERATE 35: CPT | Performed by: INTERNAL MEDICINE

## 2019-08-05 PROCEDURE — 83880 ASSAY OF NATRIURETIC PEPTIDE: CPT

## 2019-08-05 PROCEDURE — 6370000000 HC RX 637 (ALT 250 FOR IP): Performed by: STUDENT IN AN ORGANIZED HEALTH CARE EDUCATION/TRAINING PROGRAM

## 2019-08-05 PROCEDURE — 85610 PROTHROMBIN TIME: CPT

## 2019-08-05 PROCEDURE — 93970 EXTREMITY STUDY: CPT

## 2019-08-05 PROCEDURE — 80048 BASIC METABOLIC PNL TOTAL CA: CPT

## 2019-08-05 PROCEDURE — 86140 C-REACTIVE PROTEIN: CPT

## 2019-08-05 PROCEDURE — 93306 TTE W/DOPPLER COMPLETE: CPT

## 2019-08-05 PROCEDURE — 36415 COLL VENOUS BLD VENIPUNCTURE: CPT

## 2019-08-05 PROCEDURE — 85025 COMPLETE CBC W/AUTO DIFF WBC: CPT

## 2019-08-05 RX ORDER — WARFARIN SODIUM 2.5 MG/1
2.5 TABLET ORAL
Status: COMPLETED | OUTPATIENT
Start: 2019-08-05 | End: 2019-08-05

## 2019-08-05 RX ORDER — FUROSEMIDE 20 MG/1
20 TABLET ORAL ONCE
Status: COMPLETED | OUTPATIENT
Start: 2019-08-05 | End: 2019-08-05

## 2019-08-05 RX ORDER — COLCHICINE 0.6 MG/1
0.6 TABLET ORAL 2 TIMES DAILY
Qty: 30 TABLET | Refills: 3
Start: 2019-08-05 | End: 2019-08-06 | Stop reason: SDUPTHER

## 2019-08-05 RX ORDER — POTASSIUM CHLORIDE 20 MEQ/1
20 TABLET, EXTENDED RELEASE ORAL 2 TIMES DAILY
Status: DISCONTINUED | OUTPATIENT
Start: 2019-08-05 | End: 2019-08-05 | Stop reason: HOSPADM

## 2019-08-05 RX ADMIN — METOPROLOL TARTRATE 25 MG: 25 TABLET ORAL at 11:48

## 2019-08-05 RX ADMIN — TAMSULOSIN HYDROCHLORIDE 0.4 MG: 0.4 CAPSULE ORAL at 11:48

## 2019-08-05 RX ADMIN — FUROSEMIDE 20 MG: 20 TABLET ORAL at 18:39

## 2019-08-05 RX ADMIN — POTASSIUM CHLORIDE 20 MEQ: 20 TABLET, EXTENDED RELEASE ORAL at 09:07

## 2019-08-05 RX ADMIN — WARFARIN SODIUM 2.5 MG: 2.5 TABLET ORAL at 18:40

## 2019-08-05 RX ADMIN — ATORVASTATIN CALCIUM 20 MG: 20 TABLET, FILM COATED ORAL at 11:48

## 2019-08-05 RX ADMIN — BUSPIRONE HYDROCHLORIDE 5 MG: 5 TABLET ORAL at 09:03

## 2019-08-05 RX ADMIN — COLCHICINE 0.6 MG: 0.6 TABLET, FILM COATED ORAL at 09:03

## 2019-08-05 RX ADMIN — HYDRALAZINE HYDROCHLORIDE 50 MG: 50 TABLET, FILM COATED ORAL at 11:48

## 2019-08-05 RX ADMIN — LISINOPRIL 40 MG: 20 TABLET ORAL at 11:49

## 2019-08-05 ASSESSMENT — PAIN DESCRIPTION - LOCATION
LOCATION: ANKLE
LOCATION: FOOT

## 2019-08-05 ASSESSMENT — PAIN SCALES - GENERAL
PAINLEVEL_OUTOF10: 5
PAINLEVEL_OUTOF10: 0
PAINLEVEL_OUTOF10: 5

## 2019-08-05 ASSESSMENT — PAIN DESCRIPTION - PROGRESSION
CLINICAL_PROGRESSION: GRADUALLY IMPROVING
CLINICAL_PROGRESSION: GRADUALLY IMPROVING

## 2019-08-05 ASSESSMENT — ENCOUNTER SYMPTOMS
NAUSEA: 0
SORE THROAT: 0
ABDOMINAL DISTENTION: 0
SHORTNESS OF BREATH: 0
ABDOMINAL PAIN: 0
VOMITING: 0
DIARRHEA: 0
CHEST TIGHTNESS: 0
COLOR CHANGE: 1

## 2019-08-05 ASSESSMENT — PAIN DESCRIPTION - DESCRIPTORS: DESCRIPTORS: ACHING

## 2019-08-05 ASSESSMENT — PAIN DESCRIPTION - PAIN TYPE: TYPE: ACUTE PAIN

## 2019-08-05 ASSESSMENT — PAIN DESCRIPTION - ORIENTATION
ORIENTATION: RIGHT;LEFT
ORIENTATION: RIGHT;LEFT

## 2019-08-05 NOTE — CARE COORDINATION
DISCHARGE PLANNING NOTE:    LSW continues to follow for SNF. Ritchie Langley, spoke with [facility] this morning, and they do not have any beds available. LSW spoke with patient and he would like [facility]. PT/OT continue to rec SNF. Left ankle joint aspiration performed at bedside on 8/3. GLC to follow for d/c from SNF. Will continue to follow for additional d/c needs.     Electronically signed by Sohan Ryan RN on 8/5/2019 at 9:06 AM

## 2019-08-05 NOTE — DISCHARGE SUMMARY
so much pain over the weekend that he was unable to ambulate or even get out of his chair. He called Dr. Jennifer Quintana schedule an appointment and was seen in his office on Monday. He was placed in a posterior splint and given prednisone. However pain continued to worsen to the point of being untolerable and the patient noted an increase in redness to the painful areas. So the patient came to the ED. He denies any trauma or falls. Patient denies nausea, vomiting, fever, chills, and shortness of breath. During the patient's inpatient stay his L ankle had worsening pain and erythema. After joint aspiration was performed it was found that the patient had CPPD of the L ankle. The patient's symptoms improved after receiving colchicine. The patient was later found to have increasing pitting edema to the bilateral lower extremities, BNP was elevated and 2D ECHO was done. Patient will be transferred to SNF and will follow up his long term management of CPPD and CHF with his PCP and cardiologist, respectively. The patient will follow up with Dr. Jennifer Quintana within 1 week.     Consults: ID and Internal medicine    Patient Instructions     Medications:      Medication List      START taking these medications    colchicine 0.6 MG tablet  Commonly known as:  COLCRYS  Take 1 tablet by mouth 2 times daily        CONTINUE taking these medications    amLODIPine 5 MG tablet  Commonly known as:  NORVASC  Take 1 tablet by mouth daily     atorvastatin 20 MG tablet  Commonly known as:  LIPITOR  Take 1 tablet by mouth daily     bimatoprost 0.01 % Soln ophthalmic drops  Commonly known as:  LUMIGAN     busPIRone 5 MG tablet  Commonly known as:  BUSPAR     hydrALAZINE 50 MG tablet  Commonly known as:  APRESOLINE  TAKE ONE TABLET BY MOUTH TWICE A DAY     lisinopril 40 MG tablet  Commonly known as:  PRINIVIL;ZESTRIL  TAKE ONE TABLET BY MOUTH DAILY     melatonin 5 MG Tabs tablet     metoprolol tartrate 25 MG tablet  Commonly known as:

## 2019-08-05 NOTE — PROGRESS NOTES
bed)    EXERCISES    Other exercises?: Yes  Other exercises 1: (B) LE ex sup at pts amanda 5-10reps(pt limited by pain)           Activity Tolerance: Patient limited by pain  PT Equipment Recommendations  Equipment Needed: No(reports having RW and s.c at home)  Other Comments  Comments: therapy session cut short due to pt getting US of (B) LE's checking for DVT's. Current Treatment Recommendations: ROM, Strengthening, Balance Training, Functional Mobility Training, Safety Education & Training, Patient/Caregiver Education & Training, Equipment Evaluation, Education, & procurement, Positioning, Endurance Training    Conditions Requiring Skilled Therapeutic Intervention  Body structures, Functions, Activity limitations: Decreased functional mobility ; Decreased strength;Decreased ROM; Decreased endurance; Increased Pain;Decreased balance  Assessment: gradually improving with mobility  REQUIRES PT FOLLOW UP: Yes  Discharge Recommendations: Subacute/Skilled Nursing Facility;Continue to assess pending progress    Goals  Short term goals  Time Frame for Short term goals: 5-7 visits - progress goals as able  Short term goal 1: Pt to demonstrate IND bed mobility. Short term goal 2: Pt to perform sit to stand/stand to sit Mod A x1 to RW. Short term goal 3: Pt improve B LE strength by 1/2 MMT grade. Short term goal 4: Pt to improve sitting balance to Fair with upright midline posture at EOB.        08/05/19 0842   PT Individual Minutes   Time In [address] [place] [address] [address]   Time Out 0842   Minutes 13       Electronically signed by ARCHANA Brunson on 8/5/19 at 8:46 AM

## 2019-08-05 NOTE — PROGRESS NOTES
Progress note  Podiatric Medicine and Surgery     Subjective     Chief Complaint: Cellulitis, bilateral feet    Patient seen and evaluated at bedside. Patient reports acute LE pitting edema over night  Afebrile, slightly tachy overnight  Erythema continues to improve as well  Pain of the L ankle continues to improve however patient reports increased pain and swelling to his RLE which started yesterday evening    HPI:  Bard Irene is a 80 y.o. male seen at [facility] for progressive severe pain with associated cellulitis bilateral feet. Patient states he has been dealing with posterior tibial tendinitis in his right foot and has been following Dr. Ailin Chong for treatment. Last Friday (7/26/19) patient related increasing pain to his medial right foot and ankle. Patient states that over the weekend the pain became progressively worse in his left foot also became affected. Per his daughter the patient was in so much pain over the weekend that he was unable to ambulate or even get out of his chair. He called Dr. Ailin Chong schedule an appointment and was seen in his office on Monday. He was placed in a posterior splint and given prednisone. However pain continued to worsen to the point of being untolerable and the patient noted an increase in redness to the painful areas. So the patient came to the ED. He denies any trauma or falls. Patient denies nausea, vomiting, fever, chills, and shortness of breath. PCP is Jamshid Hadley MD    ROS:   Review of Systems   Constitutional: Negative for chills and fever. HENT: Negative for congestion and sore throat. Respiratory: Negative for chest tightness and shortness of breath. Cardiovascular: Positive for leg swelling. Negative for chest pain and palpitations. Gastrointestinal: Negative for abdominal distention, abdominal pain, diarrhea, nausea and vomiting. Musculoskeletal: Positive for arthralgias, gait problem and joint swelling. Skin: Positive for color change. Neurological: Negative for dizziness, light-headedness and numbness. Psychiatric/Behavioral: Negative for agitation, behavioral problems and confusion. Past Medical History   has a past medical history of Acute MI ([address] Allergic rhinitis, Anemia, Basal cell cancer, Basal cell cancer, Cervical radiculopathy, Diverticulitis, Epigastric pain/GERD., GERD (gastroesophageal reflux disease), Glaucoma, Hyperlipidemia, Hypertension, Hyponatremia, IBS (irritable bowel syndrome), Insomnia, Osteoarthritis, Osteoarthritis/neck pain. , Renal calculi, and Shingles. Past Surgical History   has a past surgical history that includes Spine surgery (04/2003); Lithotripsy; hernia repair; Skin cancer excision; skin biopsy (03/11/13); Skin cancer excision (Left, 03/2013); Colonoscopy (2002); Colonoscopy (08/2013); skin biopsy (Left, 02/11/14); Mohs surgery (Left, 03/10/14); Mohs surgery (12/30/14); other surgical history (9/6/15); Cholecystectomy (9-9-15); Skin cancer excision (Right, 08/12/2016); and Mohs surgery (Left, 08/2017). Medications  Prior to Admission medications    Medication Sig Start Date End Date Taking?  Authorizing Provider   warfarin (COUMADIN) 5 MG tablet Take 2.5 mg by mouth once a week Wednesday   Yes Historical Provider, MD   warfarin (COUMADIN) 5 MG tablet Take 5 mg by mouth Six times weekly   Yes Historical Provider, MD   vitamin B-12 (CYANOCOBALAMIN) 1000 MCG tablet Take 1,000 mcg by mouth daily   Yes Historical Provider, MD   busPIRone (BUSPAR) 5 MG tablet Take 5 mg by mouth 2 times daily   Yes Historical Provider, MD   predniSONE (ALBERT) 5 MG TBEC Take 1 tablet by mouth 2 times daily 7/29/19  Yes Darren Gardner DPM   hydrALAZINE (APRESOLINE) 50 MG tablet TAKE ONE TABLET BY MOUTH TWICE A DAY 7/25/19  Yes Venecia Smiley MD   bimatoprost (LUMIGAN) 0.01 % SOLN ophthalmic drops Place 1 drop into the left eye nightly   Yes Historical Provider, MD   atorvastatin 08/03/19  0614 08/04/19  0717 08/05/19  0624   * 133* 138   K 4.1 4.4 3.6*    100 105   CO2 22 24 22   BUN 11 11 13   CREATININE 0.61* 0.57* 0.60*   GLUCOSE 105* 94 93   CALCIUM 8.4* 8.6 8.5*        Coags:  Recent Labs     08/03/19  0614 08/04/19  0717 08/05/19  0624   INR 3.8 2.8 2.7       No results found for: LABA1C  Lab Results   Component Value Date    SEDRATE 36 (H) 08/01/2019      Lab Results   Component Value Date    CRP 80.3 (H) 08/05/2019        Physical Exam:    General: A&Ox3, NAD  Heart: Regular rate and irregular rhythm. Lungs: Equal air entry. No increased effort. Abdomen: Soft, non-tender to palpation. Not distended. Lower Extremity Physical Exam:  Vascular: DP and PT pulses are palpable, bilaterally. CFT <3 seconds to all digits. Hair growth is absent to the level of the digits. Pitting edema bilateral lower extremity R>L. Neuro: Saph/sural/SP/DP/plantar sensation intact to light touch. Musculoskeletal: Muscle strength decreased with decreased ROM 2/2 edema and pain, ROM is improved since admission. Gross deformity is absent. Less pain to palpation of lateral ankle gutter. Still painful with forced dorsiflexion of ankle. No crepitus noted to ankle joint during ROM. Inversion and eversion ROM non-painful. No tenderness with compression of calf bilaterally. Dermatologic:  Erythema to the medial right foot and ankle near the insertion of the posterior tibial tendon and to the left foot over the dorsal aspect of the first MPJ is resolved. No in warmth was noted. Pertinent negatives include no open wound, no purulent drainage, no fluctuance, no crepitus, no induration. Imaging:   MRI ANKLE LEFT W WO CONTRAST   Final Result   Diffuse soft tissue swelling and subcutaneous edema. No focal drainable   fluid collection. Small nonspecific ankle joint effusion. No evidence of osteomyelitis.          MRI FOOT RIGHT W WO CONTRAST   Final Result   Diffuse soft tissue

## 2019-08-06 ENCOUNTER — TELEPHONE (OUTPATIENT)
Dept: PODIATRY | Age: 84
End: 2019-08-06

## 2019-08-06 DIAGNOSIS — I10 ESSENTIAL HYPERTENSION: Primary | ICD-10-CM

## 2019-08-06 RX ORDER — FUROSEMIDE 20 MG/1
20 TABLET ORAL DAILY
Qty: 30 TABLET | Refills: 0 | Status: SHIPPED | OUTPATIENT
Start: 2019-08-06 | End: 2019-10-17

## 2019-08-06 RX ORDER — COLCHICINE 0.6 MG/1
0.6 TABLET ORAL 2 TIMES DAILY
Qty: 30 TABLET | Refills: 3 | Status: SHIPPED | OUTPATIENT
Start: 2019-08-06 | End: 2019-09-03 | Stop reason: SDUPTHER

## 2019-08-07 ENCOUNTER — TELEPHONE (OUTPATIENT)
Dept: FAMILY MEDICINE CLINIC | Age: 84
End: 2019-08-07

## 2019-08-07 DIAGNOSIS — R53.1 GENERALIZED WEAKNESS: Primary | ICD-10-CM

## 2019-08-07 NOTE — TELEPHONE ENCOUNTER
Spoke with paxton who needs an order faxed that says ok to admit patient to [facility] for skilled nursing and therapy. I did print the order and will have the doctor sign in the morning and then fax back to them.

## 2019-08-08 ENCOUNTER — TELEPHONE (OUTPATIENT)
Dept: FAMILY MEDICINE CLINIC | Age: 84
End: 2019-08-08

## 2019-08-08 LAB
CULTURE: ABNORMAL
DIRECT EXAM: ABNORMAL
DIRECT EXAM: ABNORMAL
Lab: ABNORMAL
SPECIMEN DESCRIPTION: ABNORMAL

## 2019-08-19 ENCOUNTER — TELEPHONE (OUTPATIENT)
Dept: FAMILY MEDICINE CLINIC | Age: 84
End: 2019-08-19

## 2019-08-26 ENCOUNTER — HOSPITAL ENCOUNTER (OUTPATIENT)
Age: 84
Setting detail: SPECIMEN
Discharge: HOME | End: 2019-08-26
Payer: MEDICARE

## 2019-08-26 DIAGNOSIS — I10 ESSENTIAL HYPERTENSION: ICD-10-CM

## 2019-08-26 LAB
ANION GAP SERPL CALCULATED.3IONS-SCNC: 13 MMOL/L (ref 9–17)
BUN BLDV-MCNC: 12 MG/DL (ref 8–23)
BUN/CREAT BLD: ABNORMAL (ref 9–20)
CALCIUM SERPL-MCNC: 9.2 MG/DL (ref 8.6–10.4)
CHLORIDE BLD-SCNC: 105 MMOL/L (ref 98–107)
CO2: 26 MMOL/L (ref 20–31)
CREAT SERPL-MCNC: 0.76 MG/DL (ref 0.7–1.2)
GFR AFRICAN AMERICAN: >60 ML/MIN
GFR NON-AFRICAN AMERICAN: >60 ML/MIN
GFR SERPL CREATININE-BSD FRML MDRD: ABNORMAL ML/MIN/{1.73_M2}
GFR SERPL CREATININE-BSD FRML MDRD: ABNORMAL ML/MIN/{1.73_M2}
GLUCOSE BLD-MCNC: 102 MG/DL (ref 70–99)
HCT VFR BLD CALC: 45.6 % (ref 40.7–50.3)
HEMOGLOBIN: 13.9 G/DL (ref 13–17)
MCH RBC QN AUTO: 29.2 PG (ref 25.2–33.5)
MCHC RBC AUTO-ENTMCNC: 30.5 G/DL (ref 28.4–34.8)
MCV RBC AUTO: 95.8 FL (ref 82.6–102.9)
NRBC AUTOMATED: 0 PER 100 WBC
PDW BLD-RTO: 15.3 % (ref 11.8–14.4)
PLATELET # BLD: 157 K/UL (ref 138–453)
PMV BLD AUTO: 12.4 FL (ref 8.1–13.5)
POTASSIUM SERPL-SCNC: 4.3 MMOL/L (ref 3.7–5.3)
RBC # BLD: 4.76 M/UL (ref 4.21–5.77)
SODIUM BLD-SCNC: 144 MMOL/L (ref 135–144)
WBC # BLD: 5.6 K/UL (ref 3.5–11.3)

## 2019-08-27 ENCOUNTER — OFFICE VISIT (OUTPATIENT)
Dept: FAMILY MEDICINE CLINIC | Age: 84
End: 2019-08-27
Payer: MEDICARE

## 2019-08-27 VITALS
HEART RATE: 58 BPM | OXYGEN SATURATION: 98 % | WEIGHT: 161 LBS | RESPIRATION RATE: 16 BRPM | TEMPERATURE: 98.4 F | DIASTOLIC BLOOD PRESSURE: 66 MMHG | SYSTOLIC BLOOD PRESSURE: 112 MMHG | BODY MASS INDEX: 27.64 KG/M2

## 2019-08-27 DIAGNOSIS — F41.9 ANXIETY: ICD-10-CM

## 2019-08-27 DIAGNOSIS — G47.00 INSOMNIA, UNSPECIFIED TYPE: ICD-10-CM

## 2019-08-27 DIAGNOSIS — R79.89 ELEVATED BRAIN NATRIURETIC PEPTIDE (BNP) LEVEL: ICD-10-CM

## 2019-08-27 DIAGNOSIS — M10.071 ACUTE IDIOPATHIC GOUT OF RIGHT FOOT: ICD-10-CM

## 2019-08-27 DIAGNOSIS — I50.43 ACUTE ON CHRONIC COMBINED SYSTOLIC AND DIASTOLIC CONGESTIVE HEART FAILURE (HCC): ICD-10-CM

## 2019-08-27 DIAGNOSIS — I48.0 PAROXYSMAL ATRIAL FIBRILLATION (HCC): ICD-10-CM

## 2019-08-27 DIAGNOSIS — Z23 NEED FOR IMMUNIZATION AGAINST INFLUENZA: ICD-10-CM

## 2019-08-27 DIAGNOSIS — L03.115 CELLULITIS OF RIGHT FOOT: Primary | ICD-10-CM

## 2019-08-27 DIAGNOSIS — R19.7 DIARRHEA, UNSPECIFIED TYPE: ICD-10-CM

## 2019-08-27 DIAGNOSIS — M79.89 SWELLING OF BOTH LOWER EXTREMITIES: ICD-10-CM

## 2019-08-27 PROCEDURE — G0008 ADMIN INFLUENZA VIRUS VAC: HCPCS | Performed by: FAMILY MEDICINE

## 2019-08-27 PROCEDURE — G8417 CALC BMI ABV UP PARAM F/U: HCPCS | Performed by: FAMILY MEDICINE

## 2019-08-27 PROCEDURE — 1123F ACP DISCUSS/DSCN MKR DOCD: CPT | Performed by: FAMILY MEDICINE

## 2019-08-27 PROCEDURE — 1111F DSCHRG MED/CURRENT MED MERGE: CPT | Performed by: FAMILY MEDICINE

## 2019-08-27 PROCEDURE — 4040F PNEUMOC VAC/ADMIN/RCVD: CPT | Performed by: FAMILY MEDICINE

## 2019-08-27 PROCEDURE — 1036F TOBACCO NON-USER: CPT | Performed by: FAMILY MEDICINE

## 2019-08-27 PROCEDURE — G8427 DOCREV CUR MEDS BY ELIG CLIN: HCPCS | Performed by: FAMILY MEDICINE

## 2019-08-27 PROCEDURE — 90653 IIV ADJUVANT VACCINE IM: CPT | Performed by: FAMILY MEDICINE

## 2019-08-27 PROCEDURE — 99214 OFFICE O/P EST MOD 30 MIN: CPT | Performed by: FAMILY MEDICINE

## 2019-08-27 ASSESSMENT — ENCOUNTER SYMPTOMS
SHORTNESS OF BREATH: 0
COUGH: 0
ABDOMINAL PAIN: 0
CONSTIPATION: 0
DIARRHEA: 1
SORE THROAT: 0
RHINORRHEA: 0
VOMITING: 0
NAUSEA: 0
CHEST TIGHTNESS: 0
ABDOMINAL DISTENTION: 0
BACK PAIN: 0

## 2019-08-27 NOTE — PROGRESS NOTES
Subjective:      Patient ID: Terence Toro is a 80 y.o. male. HPI  Pt here today for a hospital follow up secondary to acute gout in his right ankle. He was admitted for acute pain in his right ankle and LE swelling. He was first treated with ATB's as a cellulitis, but they aspirated the ankle and crystals were positive. He was started on Colchicine. The did an BNP for his LE swelling and ECHO and his BNP was elevated and the ECHO did show EF 30-35% which is decreased for him. He today is feeling good. The LE swelling has gone down and no CP or SOB, he is c/o diarrhea since he was discharged from the hospital. He did go to rehab after his hospital stay. Pt denies any fever or chills. Pt denies any N/V or abdominal pain. He is feeling much better after starting the Buspar. Otherwise pt doing well on current tx and no other concerns today. The patient's past medical, surgical, social, and family history as well as his current medications and allergies were reviewed as documented in today's encounter.       Current Outpatient Medications   Medication Sig Dispense Refill    colchicine (COLCRYS) 0.6 MG tablet Take 1 tablet by mouth 2 times daily (Patient taking differently: Take 0.6 mg by mouth daily ) 30 tablet 3    warfarin (COUMADIN) 5 MG tablet Take 2.5 mg by mouth once a week Wednesday      warfarin (COUMADIN) 5 MG tablet Take 5 mg by mouth Six times weekly      vitamin B-12 (CYANOCOBALAMIN) 1000 MCG tablet Take 1,000 mcg by mouth daily      busPIRone (BUSPAR) 5 MG tablet Take 5 mg by mouth 2 times daily      hydrALAZINE (APRESOLINE) 50 MG tablet TAKE ONE TABLET BY MOUTH TWICE A DAY 60 tablet 11    bimatoprost (LUMIGAN) 0.01 % SOLN ophthalmic drops Place 1 drop into the left eye nightly      atorvastatin (LIPITOR) 20 MG tablet Take 1 tablet by mouth daily 90 tablet 3    amLODIPine (NORVASC) 5 MG tablet Take 1 tablet by mouth daily 30 tablet 11    lisinopril (PRINIVIL;ZESTRIL) 40 MG tablet TAKE OLDER, IM, PREFILL SYR, 0.5ML (FLUAD TRIV)    CBC     Standing Status:   Future     Standing Expiration Date:   8/26/2020    Basic Metabolic Panel     Standing Status:   Future     Standing Expiration Date:   8/26/2020    Uric Acid     Standing Status:   Future     Standing Expiration Date:   8/26/2020    Brain Natriuretic Peptide     Standing Status:   Future     Standing Expiration Date:   8/27/2020    Occult Blood Screen      Will keep him on the Colchicine daily as he is doing better and he is afraid to come off - his uric acid level in the hospital was normal    Will follow up with Dr. Lori Moffett later this week    Will check stool studies and for C.diff with his diarrhea and being on ATB's and in the hospital and nursing home    Will have him get compression stockings    Will keep him off the Lasix as he is clinically doing better and no SOB. Will repeat the BNP level and will keep his appt with Dr. Delmer Dawson in October to follow up on the ECHO that did show a decreased EF - 30-35%    Will cont with the Buspar as he is doing better    Rest of systems unchanged, continue current treatments. Medications, labs, diagnostic studies, consultations and follow-up as documented in this encounter.  Rest of systems unchanged, continue current treatments        Luan Benton MD

## 2019-08-28 ENCOUNTER — TELEPHONE (OUTPATIENT)
Dept: FAMILY MEDICINE CLINIC | Age: 84
End: 2019-08-28

## 2019-08-28 ENCOUNTER — HOSPITAL ENCOUNTER (OUTPATIENT)
Age: 84
Setting detail: SPECIMEN
Discharge: HOME | End: 2019-08-28
Payer: MEDICARE

## 2019-08-28 DIAGNOSIS — R19.7 DIARRHEA, UNSPECIFIED TYPE: ICD-10-CM

## 2019-08-29 ENCOUNTER — OFFICE VISIT (OUTPATIENT)
Dept: PODIATRY | Age: 84
End: 2019-08-29
Payer: MEDICARE

## 2019-08-29 VITALS — WEIGHT: 162 LBS | BODY MASS INDEX: 27.66 KG/M2 | HEIGHT: 64 IN

## 2019-08-29 DIAGNOSIS — M79.675 PAIN IN TOES OF BOTH FEET: ICD-10-CM

## 2019-08-29 DIAGNOSIS — M79.674 PAIN IN TOES OF BOTH FEET: ICD-10-CM

## 2019-08-29 DIAGNOSIS — B35.1 ONYCHOMYCOSIS: Primary | ICD-10-CM

## 2019-08-29 LAB
C DIFF AG + TOXIN: NEGATIVE
SPECIMEN DESCRIPTION: NORMAL

## 2019-08-29 PROCEDURE — 11721 DEBRIDE NAIL 6 OR MORE: CPT | Performed by: PODIATRIST

## 2019-08-29 PROCEDURE — 99999 PR OFFICE/OUTPT VISIT,PROCEDURE ONLY: CPT | Performed by: PODIATRIST

## 2019-08-30 ASSESSMENT — ENCOUNTER SYMPTOMS
COLOR CHANGE: 0
CONSTIPATION: 0
DIARRHEA: 0
NAUSEA: 0
VOMITING: 0

## 2019-09-03 ENCOUNTER — OFFICE VISIT (OUTPATIENT)
Dept: UROLOGY | Age: 84
End: 2019-09-03
Payer: MEDICARE

## 2019-09-03 VITALS
HEIGHT: 64 IN | HEART RATE: 54 BPM | DIASTOLIC BLOOD PRESSURE: 83 MMHG | BODY MASS INDEX: 27.31 KG/M2 | SYSTOLIC BLOOD PRESSURE: 134 MMHG | WEIGHT: 160 LBS

## 2019-09-03 DIAGNOSIS — R35.1 BENIGN PROSTATIC HYPERPLASIA WITH NOCTURIA: Primary | ICD-10-CM

## 2019-09-03 DIAGNOSIS — R35.1 NOCTURIA: ICD-10-CM

## 2019-09-03 DIAGNOSIS — N40.1 BENIGN PROSTATIC HYPERPLASIA WITH NOCTURIA: Primary | ICD-10-CM

## 2019-09-03 PROCEDURE — 1123F ACP DISCUSS/DSCN MKR DOCD: CPT | Performed by: NURSE PRACTITIONER

## 2019-09-03 PROCEDURE — 1036F TOBACCO NON-USER: CPT | Performed by: NURSE PRACTITIONER

## 2019-09-03 PROCEDURE — G8427 DOCREV CUR MEDS BY ELIG CLIN: HCPCS | Performed by: NURSE PRACTITIONER

## 2019-09-03 PROCEDURE — 4040F PNEUMOC VAC/ADMIN/RCVD: CPT | Performed by: NURSE PRACTITIONER

## 2019-09-03 PROCEDURE — G8417 CALC BMI ABV UP PARAM F/U: HCPCS | Performed by: NURSE PRACTITIONER

## 2019-09-03 PROCEDURE — 99213 OFFICE O/P EST LOW 20 MIN: CPT | Performed by: NURSE PRACTITIONER

## 2019-09-03 PROCEDURE — 1111F DSCHRG MED/CURRENT MED MERGE: CPT | Performed by: NURSE PRACTITIONER

## 2019-09-03 RX ORDER — COLCHICINE 0.6 MG/1
0.6 TABLET ORAL 2 TIMES DAILY
Qty: 60 TABLET | Refills: 11 | Status: SHIPPED | OUTPATIENT
Start: 2019-09-03 | End: 2019-11-27 | Stop reason: DRUGHIGH

## 2019-09-03 RX ORDER — TAMSULOSIN HYDROCHLORIDE 0.4 MG/1
0.4 CAPSULE ORAL 2 TIMES DAILY
Qty: 180 CAPSULE | Refills: 3 | Status: SHIPPED | OUTPATIENT
Start: 2019-09-03 | End: 2020-09-08 | Stop reason: SDUPTHER

## 2019-09-03 RX ORDER — ATORVASTATIN CALCIUM 20 MG/1
TABLET, FILM COATED ORAL
Qty: 90 TABLET | Refills: 3 | Status: SHIPPED | OUTPATIENT
Start: 2019-09-03 | End: 2019-09-03 | Stop reason: ALTCHOICE

## 2019-09-03 ASSESSMENT — ENCOUNTER SYMPTOMS
NAUSEA: 0
ABDOMINAL PAIN: 0
COUGH: 0
SHORTNESS OF BREATH: 0
WHEEZING: 0
DIARRHEA: 0
EYE REDNESS: 0
CONSTIPATION: 0
EYE PAIN: 0
VOMITING: 0
BACK PAIN: 0

## 2019-09-03 NOTE — PATIENT INSTRUCTIONS
Flomax 2x per day    Continue to stay well hydrated- decrease fluids 2 ours before bed    Continue to exercise.

## 2019-09-03 NOTE — PROGRESS NOTES
SKILLED NURSING AND THERAPY 1 each 0    furosemide (LASIX) 20 MG tablet Take 1 tablet by mouth daily 30 tablet 0    warfarin (COUMADIN) 5 MG tablet Take 2.5 mg by mouth once a week Wednesday      warfarin (COUMADIN) 5 MG tablet Take 5 mg by mouth Six times weekly      vitamin B-12 (CYANOCOBALAMIN) 1000 MCG tablet Take 1,000 mcg by mouth daily      busPIRone (BUSPAR) 5 MG tablet Take 5 mg by mouth 2 times daily      hydrALAZINE (APRESOLINE) 50 MG tablet TAKE ONE TABLET BY MOUTH TWICE A DAY 60 tablet 11    bimatoprost (LUMIGAN) 0.01 % SOLN ophthalmic drops Place 1 drop into the left eye nightly      atorvastatin (LIPITOR) 20 MG tablet Take 1 tablet by mouth daily 90 tablet 3    amLODIPine (NORVASC) 5 MG tablet Take 1 tablet by mouth daily 30 tablet 11    lisinopril (PRINIVIL;ZESTRIL) 40 MG tablet TAKE ONE TABLET BY MOUTH DAILY 90 tablet 3    melatonin 5 MG TABS tablet Take 1 tablet by mouth nightly       metoprolol tartrate (LOPRESSOR) 25 MG tablet Take 25 mg by mouth 2 times daily         Celebrex [celecoxib] and Mobic  Social History     Tobacco Use   Smoking Status Former Smoker    Packs/day: 1.00    Years: 10.00    Pack years: 10.00    Last attempt to quit: 1961    Years since quittin.7   Smokeless Tobacco Never Used   Tobacco Comment    quit      (Ifpatient a smoker, smoking cessation counseling offered)    Social History     Substance and Sexual Activity   Alcohol Use Yes    Alcohol/week: 0.0 standard drinks    Comment: 2x/week       REVIEW OF SYSTEMS:  Review of Systems    Physical Exam:      Vitals:    19 1430   BP: 134/83   Pulse: 54     Body mass index is 27.45 kg/m². Patient is a 80 y.o. male in no acute distress and alert and oriented to person, place and time. Physical Exam  Constitutional: Patient in no acute distress. Neuro: Alert and oriented to person, place and time.   Psych: Mood normal, affect normal  Skin: warm, pink, No rash noted  HEENT: Head: Normocephalic

## 2019-09-26 RX ORDER — BUSPIRONE HYDROCHLORIDE 5 MG/1
5 TABLET ORAL 2 TIMES DAILY
Qty: 60 TABLET | Refills: 11 | Status: SHIPPED | OUTPATIENT
Start: 2019-09-26 | End: 2019-10-17 | Stop reason: SDUPTHER

## 2019-10-02 ENCOUNTER — HOSPITAL ENCOUNTER (OUTPATIENT)
Age: 84
Setting detail: SPECIMEN
Discharge: HOME | End: 2019-10-02
Payer: MEDICARE

## 2019-10-02 DIAGNOSIS — I50.43 ACUTE ON CHRONIC COMBINED SYSTOLIC AND DIASTOLIC CONGESTIVE HEART FAILURE (HCC): ICD-10-CM

## 2019-10-02 DIAGNOSIS — I48.0 PAROXYSMAL ATRIAL FIBRILLATION (HCC): ICD-10-CM

## 2019-10-02 DIAGNOSIS — L03.115 CELLULITIS OF RIGHT FOOT: ICD-10-CM

## 2019-10-02 DIAGNOSIS — M10.071 ACUTE IDIOPATHIC GOUT OF RIGHT FOOT: ICD-10-CM

## 2019-10-02 DIAGNOSIS — R79.89 ELEVATED BRAIN NATRIURETIC PEPTIDE (BNP) LEVEL: ICD-10-CM

## 2019-10-02 DIAGNOSIS — M79.89 SWELLING OF BOTH LOWER EXTREMITIES: ICD-10-CM

## 2019-10-02 LAB
ANION GAP SERPL CALCULATED.3IONS-SCNC: 9 MMOL/L (ref 9–17)
BNP INTERPRETATION: ABNORMAL
BUN BLDV-MCNC: 12 MG/DL (ref 8–23)
BUN/CREAT BLD: ABNORMAL (ref 9–20)
CALCIUM SERPL-MCNC: 9 MG/DL (ref 8.6–10.4)
CHLORIDE BLD-SCNC: 101 MMOL/L (ref 98–107)
CO2: 26 MMOL/L (ref 20–31)
CREAT SERPL-MCNC: 0.82 MG/DL (ref 0.7–1.2)
GFR AFRICAN AMERICAN: >60 ML/MIN
GFR NON-AFRICAN AMERICAN: >60 ML/MIN
GFR SERPL CREATININE-BSD FRML MDRD: ABNORMAL ML/MIN/{1.73_M2}
GFR SERPL CREATININE-BSD FRML MDRD: ABNORMAL ML/MIN/{1.73_M2}
GLUCOSE BLD-MCNC: 122 MG/DL (ref 70–99)
HCT VFR BLD CALC: 43.6 % (ref 40.7–50.3)
HEMOGLOBIN: 13.5 G/DL (ref 13–17)
MCH RBC QN AUTO: 29.7 PG (ref 25.2–33.5)
MCHC RBC AUTO-ENTMCNC: 31 G/DL (ref 28.4–34.8)
MCV RBC AUTO: 96 FL (ref 82.6–102.9)
NRBC AUTOMATED: 0 PER 100 WBC
PDW BLD-RTO: 16 % (ref 11.8–14.4)
PLATELET # BLD: 165 K/UL (ref 138–453)
PMV BLD AUTO: 12.7 FL (ref 8.1–13.5)
POTASSIUM SERPL-SCNC: 4.1 MMOL/L (ref 3.7–5.3)
PRO-BNP: 2208 PG/ML
RBC # BLD: 4.54 M/UL (ref 4.21–5.77)
SODIUM BLD-SCNC: 136 MMOL/L (ref 135–144)
URIC ACID: 4.7 MG/DL (ref 3.4–7)
WBC # BLD: 5.5 K/UL (ref 3.5–11.3)

## 2019-10-17 ENCOUNTER — OFFICE VISIT (OUTPATIENT)
Dept: FAMILY MEDICINE CLINIC | Age: 84
End: 2019-10-17
Payer: MEDICARE

## 2019-10-17 VITALS
HEART RATE: 50 BPM | DIASTOLIC BLOOD PRESSURE: 72 MMHG | OXYGEN SATURATION: 98 % | WEIGHT: 161 LBS | SYSTOLIC BLOOD PRESSURE: 106 MMHG | TEMPERATURE: 97.9 F | BODY MASS INDEX: 27.62 KG/M2

## 2019-10-17 DIAGNOSIS — F41.9 ANXIETY: ICD-10-CM

## 2019-10-17 DIAGNOSIS — D50.9 IRON DEFICIENCY ANEMIA, UNSPECIFIED IRON DEFICIENCY ANEMIA TYPE: ICD-10-CM

## 2019-10-17 DIAGNOSIS — I10 ESSENTIAL HYPERTENSION: Primary | ICD-10-CM

## 2019-10-17 DIAGNOSIS — M19.90 OSTEOARTHRITIS, UNSPECIFIED OSTEOARTHRITIS TYPE, UNSPECIFIED SITE: ICD-10-CM

## 2019-10-17 DIAGNOSIS — E53.8 B12 DEFICIENCY: ICD-10-CM

## 2019-10-17 DIAGNOSIS — I48.0 PAROXYSMAL ATRIAL FIBRILLATION (HCC): ICD-10-CM

## 2019-10-17 DIAGNOSIS — I50.22 CHRONIC SYSTOLIC CONGESTIVE HEART FAILURE (HCC): ICD-10-CM

## 2019-10-17 DIAGNOSIS — M10.9 GOUT, UNSPECIFIED CAUSE, UNSPECIFIED CHRONICITY, UNSPECIFIED SITE: ICD-10-CM

## 2019-10-17 DIAGNOSIS — E78.00 PURE HYPERCHOLESTEROLEMIA: ICD-10-CM

## 2019-10-17 PROBLEM — L03.90 CELLULITIS: Status: RESOLVED | Noted: 2019-08-01 | Resolved: 2019-10-17

## 2019-10-17 PROCEDURE — G8482 FLU IMMUNIZE ORDER/ADMIN: HCPCS | Performed by: FAMILY MEDICINE

## 2019-10-17 PROCEDURE — G8427 DOCREV CUR MEDS BY ELIG CLIN: HCPCS | Performed by: FAMILY MEDICINE

## 2019-10-17 PROCEDURE — 1036F TOBACCO NON-USER: CPT | Performed by: FAMILY MEDICINE

## 2019-10-17 PROCEDURE — 99214 OFFICE O/P EST MOD 30 MIN: CPT | Performed by: FAMILY MEDICINE

## 2019-10-17 PROCEDURE — G8417 CALC BMI ABV UP PARAM F/U: HCPCS | Performed by: FAMILY MEDICINE

## 2019-10-17 PROCEDURE — 1123F ACP DISCUSS/DSCN MKR DOCD: CPT | Performed by: FAMILY MEDICINE

## 2019-10-17 PROCEDURE — 4040F PNEUMOC VAC/ADMIN/RCVD: CPT | Performed by: FAMILY MEDICINE

## 2019-10-17 ASSESSMENT — ENCOUNTER SYMPTOMS
SHORTNESS OF BREATH: 0
ABDOMINAL DISTENTION: 0
CONSTIPATION: 0
SORE THROAT: 0
NAUSEA: 0
COUGH: 0
RHINORRHEA: 0
DIARRHEA: 0
CHEST TIGHTNESS: 0
BACK PAIN: 0
ABDOMINAL PAIN: 0
VOMITING: 0

## 2019-10-22 ENCOUNTER — OFFICE VISIT (OUTPATIENT)
Dept: PODIATRY | Age: 84
End: 2019-10-22
Payer: MEDICARE

## 2019-10-22 VITALS — HEIGHT: 62 IN | WEIGHT: 168 LBS | BODY MASS INDEX: 30.91 KG/M2

## 2019-10-22 DIAGNOSIS — B35.1 ONYCHOMYCOSIS: Primary | ICD-10-CM

## 2019-10-22 DIAGNOSIS — M79.675 PAIN IN TOES OF BOTH FEET: ICD-10-CM

## 2019-10-22 DIAGNOSIS — M79.674 PAIN IN TOES OF BOTH FEET: ICD-10-CM

## 2019-10-22 PROCEDURE — 99213 OFFICE O/P EST LOW 20 MIN: CPT | Performed by: PODIATRIST

## 2019-10-22 PROCEDURE — 4040F PNEUMOC VAC/ADMIN/RCVD: CPT | Performed by: PODIATRIST

## 2019-10-22 PROCEDURE — G8427 DOCREV CUR MEDS BY ELIG CLIN: HCPCS | Performed by: PODIATRIST

## 2019-10-22 PROCEDURE — G8482 FLU IMMUNIZE ORDER/ADMIN: HCPCS | Performed by: PODIATRIST

## 2019-10-22 PROCEDURE — 1123F ACP DISCUSS/DSCN MKR DOCD: CPT | Performed by: PODIATRIST

## 2019-10-22 PROCEDURE — G8417 CALC BMI ABV UP PARAM F/U: HCPCS | Performed by: PODIATRIST

## 2019-10-22 PROCEDURE — 1036F TOBACCO NON-USER: CPT | Performed by: PODIATRIST

## 2019-10-22 ASSESSMENT — ENCOUNTER SYMPTOMS
DIARRHEA: 0
VOMITING: 0
CONSTIPATION: 0
NAUSEA: 0
COLOR CHANGE: 0

## 2019-11-07 RX ORDER — LISINOPRIL 40 MG/1
TABLET ORAL
Qty: 90 TABLET | Refills: 3 | Status: SHIPPED | OUTPATIENT
Start: 2019-11-07 | End: 2020-10-23

## 2019-11-13 ENCOUNTER — TELEPHONE (OUTPATIENT)
Dept: FAMILY MEDICINE CLINIC | Age: 84
End: 2019-11-13

## 2019-11-27 ENCOUNTER — OFFICE VISIT (OUTPATIENT)
Dept: FAMILY MEDICINE CLINIC | Age: 84
End: 2019-11-27
Payer: MEDICARE

## 2019-11-27 VITALS
HEART RATE: 66 BPM | TEMPERATURE: 97.8 F | WEIGHT: 159 LBS | BODY MASS INDEX: 29.08 KG/M2 | RESPIRATION RATE: 16 BRPM | DIASTOLIC BLOOD PRESSURE: 66 MMHG | OXYGEN SATURATION: 97 % | SYSTOLIC BLOOD PRESSURE: 114 MMHG

## 2019-11-27 DIAGNOSIS — E53.8 B12 DEFICIENCY: ICD-10-CM

## 2019-11-27 DIAGNOSIS — I48.0 PAROXYSMAL ATRIAL FIBRILLATION (HCC): ICD-10-CM

## 2019-11-27 DIAGNOSIS — I10 ESSENTIAL HYPERTENSION: ICD-10-CM

## 2019-11-27 DIAGNOSIS — I50.22 CHRONIC SYSTOLIC CONGESTIVE HEART FAILURE (HCC): ICD-10-CM

## 2019-11-27 DIAGNOSIS — D50.9 IRON DEFICIENCY ANEMIA, UNSPECIFIED IRON DEFICIENCY ANEMIA TYPE: ICD-10-CM

## 2019-11-27 DIAGNOSIS — R63.4 WEIGHT LOSS: ICD-10-CM

## 2019-11-27 DIAGNOSIS — F41.9 ANXIETY: ICD-10-CM

## 2019-11-27 DIAGNOSIS — E78.00 PURE HYPERCHOLESTEROLEMIA: ICD-10-CM

## 2019-11-27 DIAGNOSIS — M10.9 GOUT, UNSPECIFIED CAUSE, UNSPECIFIED CHRONICITY, UNSPECIFIED SITE: ICD-10-CM

## 2019-11-27 DIAGNOSIS — R19.7 DIARRHEA, UNSPECIFIED TYPE: ICD-10-CM

## 2019-11-27 DIAGNOSIS — I95.2 HYPOTENSION DUE TO DRUGS: Primary | ICD-10-CM

## 2019-11-27 PROCEDURE — G8417 CALC BMI ABV UP PARAM F/U: HCPCS | Performed by: FAMILY MEDICINE

## 2019-11-27 PROCEDURE — 1036F TOBACCO NON-USER: CPT | Performed by: FAMILY MEDICINE

## 2019-11-27 PROCEDURE — G8482 FLU IMMUNIZE ORDER/ADMIN: HCPCS | Performed by: FAMILY MEDICINE

## 2019-11-27 PROCEDURE — 4040F PNEUMOC VAC/ADMIN/RCVD: CPT | Performed by: FAMILY MEDICINE

## 2019-11-27 PROCEDURE — 99214 OFFICE O/P EST MOD 30 MIN: CPT | Performed by: FAMILY MEDICINE

## 2019-11-27 PROCEDURE — 1123F ACP DISCUSS/DSCN MKR DOCD: CPT | Performed by: FAMILY MEDICINE

## 2019-11-27 PROCEDURE — G8427 DOCREV CUR MEDS BY ELIG CLIN: HCPCS | Performed by: FAMILY MEDICINE

## 2019-11-27 RX ORDER — COLCHICINE 0.6 MG/1
0.6 TABLET ORAL 2 TIMES DAILY
Qty: 60 TABLET | Refills: 11 | Status: SHIPPED
Start: 2019-11-27 | End: 2020-01-13

## 2019-11-27 RX ORDER — ALLOPURINOL 100 MG/1
100 TABLET ORAL DAILY
Qty: 90 TABLET | Refills: 1 | Status: SHIPPED | OUTPATIENT
Start: 2019-11-27 | End: 2020-05-11 | Stop reason: SDUPTHER

## 2019-11-27 ASSESSMENT — ENCOUNTER SYMPTOMS
CHEST TIGHTNESS: 0
SORE THROAT: 0
CONSTIPATION: 0
DIARRHEA: 1
VOMITING: 0
COUGH: 0
SHORTNESS OF BREATH: 0
RHINORRHEA: 0
NAUSEA: 0
BACK PAIN: 0
ABDOMINAL PAIN: 0
ABDOMINAL DISTENTION: 0

## 2019-12-13 ENCOUNTER — TELEPHONE (OUTPATIENT)
Dept: FAMILY MEDICINE CLINIC | Age: 84
End: 2019-12-13

## 2019-12-16 ENCOUNTER — TELEPHONE (OUTPATIENT)
Dept: FAMILY MEDICINE CLINIC | Age: 84
End: 2019-12-16

## 2019-12-17 ENCOUNTER — OFFICE VISIT (OUTPATIENT)
Dept: FAMILY MEDICINE CLINIC | Age: 84
End: 2019-12-17
Payer: MEDICARE

## 2019-12-17 ENCOUNTER — HOSPITAL ENCOUNTER (OUTPATIENT)
Dept: GENERAL RADIOLOGY | Facility: CLINIC | Age: 84
Discharge: HOME | End: 2019-12-19
Payer: MEDICARE

## 2019-12-17 ENCOUNTER — TELEPHONE (OUTPATIENT)
Dept: FAMILY MEDICINE CLINIC | Age: 84
End: 2019-12-17

## 2019-12-17 ENCOUNTER — HOSPITAL ENCOUNTER (OUTPATIENT)
Facility: CLINIC | Age: 84
Discharge: HOME | End: 2019-12-19
Payer: MEDICARE

## 2019-12-17 VITALS
OXYGEN SATURATION: 98 % | SYSTOLIC BLOOD PRESSURE: 130 MMHG | HEART RATE: 86 BPM | DIASTOLIC BLOOD PRESSURE: 74 MMHG | RESPIRATION RATE: 16 BRPM | WEIGHT: 159 LBS | BODY MASS INDEX: 29.08 KG/M2 | TEMPERATURE: 98.7 F

## 2019-12-17 DIAGNOSIS — M53.3 DISORDER OF SACROILIAC JOINT: ICD-10-CM

## 2019-12-17 DIAGNOSIS — M54.16 LUMBAR RADICULOPATHY: Primary | ICD-10-CM

## 2019-12-17 DIAGNOSIS — M54.16 LUMBAR RADICULOPATHY: ICD-10-CM

## 2019-12-17 PROCEDURE — G8482 FLU IMMUNIZE ORDER/ADMIN: HCPCS | Performed by: FAMILY MEDICINE

## 2019-12-17 PROCEDURE — 1123F ACP DISCUSS/DSCN MKR DOCD: CPT | Performed by: FAMILY MEDICINE

## 2019-12-17 PROCEDURE — G8427 DOCREV CUR MEDS BY ELIG CLIN: HCPCS | Performed by: FAMILY MEDICINE

## 2019-12-17 PROCEDURE — G8417 CALC BMI ABV UP PARAM F/U: HCPCS | Performed by: FAMILY MEDICINE

## 2019-12-17 PROCEDURE — 72100 X-RAY EXAM L-S SPINE 2/3 VWS: CPT

## 2019-12-17 PROCEDURE — 99213 OFFICE O/P EST LOW 20 MIN: CPT | Performed by: FAMILY MEDICINE

## 2019-12-17 PROCEDURE — 4040F PNEUMOC VAC/ADMIN/RCVD: CPT | Performed by: FAMILY MEDICINE

## 2019-12-17 PROCEDURE — 1036F TOBACCO NON-USER: CPT | Performed by: FAMILY MEDICINE

## 2019-12-17 RX ORDER — PREDNISONE 20 MG/1
TABLET ORAL
Qty: 17 TABLET | Refills: 0 | Status: SHIPPED | OUTPATIENT
Start: 2019-12-17 | End: 2019-12-27

## 2019-12-17 RX ORDER — GABAPENTIN 100 MG/1
CAPSULE ORAL
Qty: 30 CAPSULE | Refills: 1 | Status: SHIPPED | OUTPATIENT
Start: 2019-12-17 | End: 2020-05-04 | Stop reason: ALTCHOICE

## 2019-12-17 ASSESSMENT — ENCOUNTER SYMPTOMS
BACK PAIN: 1
ABDOMINAL DISTENTION: 0
NAUSEA: 0
SHORTNESS OF BREATH: 0
COUGH: 0
CHEST TIGHTNESS: 0
CONSTIPATION: 0
ABDOMINAL PAIN: 0
VOMITING: 0
DIARRHEA: 0
RHINORRHEA: 0
SORE THROAT: 0

## 2019-12-19 ENCOUNTER — OFFICE VISIT (OUTPATIENT)
Dept: PODIATRY | Age: 84
End: 2019-12-19
Payer: MEDICARE

## 2019-12-19 VITALS — WEIGHT: 159 LBS | BODY MASS INDEX: 29.08 KG/M2

## 2019-12-19 DIAGNOSIS — B35.1 ONYCHOMYCOSIS: Primary | ICD-10-CM

## 2019-12-19 DIAGNOSIS — M79.674 PAIN IN TOES OF BOTH FEET: ICD-10-CM

## 2019-12-19 DIAGNOSIS — M79.675 PAIN IN TOES OF BOTH FEET: ICD-10-CM

## 2019-12-19 PROCEDURE — 99999 PR OFFICE/OUTPT VISIT,PROCEDURE ONLY: CPT | Performed by: PODIATRIST

## 2019-12-19 PROCEDURE — 11721 DEBRIDE NAIL 6 OR MORE: CPT | Performed by: PODIATRIST

## 2019-12-20 ENCOUNTER — TELEPHONE (OUTPATIENT)
Dept: FAMILY MEDICINE CLINIC | Age: 84
End: 2019-12-20

## 2019-12-20 ASSESSMENT — ENCOUNTER SYMPTOMS
DIARRHEA: 0
COLOR CHANGE: 0
NAUSEA: 0
VOMITING: 0
CONSTIPATION: 0

## 2020-01-02 ENCOUNTER — HOSPITAL ENCOUNTER (OUTPATIENT)
Age: 85
Setting detail: SPECIMEN
Discharge: HOME | End: 2020-01-02
Payer: MEDICARE

## 2020-01-02 LAB
ALBUMIN SERPL-MCNC: 3.7 G/DL (ref 3.5–5.2)
ALBUMIN/GLOBULIN RATIO: 1.4 (ref 1–2.5)
ALP BLD-CCNC: 72 U/L (ref 40–129)
ALT SERPL-CCNC: 23 U/L (ref 5–41)
ANION GAP SERPL CALCULATED.3IONS-SCNC: 9 MMOL/L (ref 9–17)
AST SERPL-CCNC: 19 U/L
BILIRUB SERPL-MCNC: 1.69 MG/DL (ref 0.3–1.2)
BUN BLDV-MCNC: 20 MG/DL (ref 8–23)
BUN/CREAT BLD: ABNORMAL (ref 9–20)
CALCIUM SERPL-MCNC: 9.2 MG/DL (ref 8.6–10.4)
CHLORIDE BLD-SCNC: 101 MMOL/L (ref 98–107)
CHOLESTEROL/HDL RATIO: 1.7
CHOLESTEROL: 132 MG/DL
CO2: 29 MMOL/L (ref 20–31)
CREAT SERPL-MCNC: 0.85 MG/DL (ref 0.7–1.2)
GFR AFRICAN AMERICAN: >60 ML/MIN
GFR NON-AFRICAN AMERICAN: >60 ML/MIN
GFR SERPL CREATININE-BSD FRML MDRD: ABNORMAL ML/MIN/{1.73_M2}
GFR SERPL CREATININE-BSD FRML MDRD: ABNORMAL ML/MIN/{1.73_M2}
GLUCOSE BLD-MCNC: 89 MG/DL (ref 70–99)
HCT VFR BLD CALC: 47.5 % (ref 40.7–50.3)
HDLC SERPL-MCNC: 79 MG/DL
HEMOGLOBIN: 14.6 G/DL (ref 13–17)
LDL CHOLESTEROL: 41 MG/DL (ref 0–130)
MCH RBC QN AUTO: 29.6 PG (ref 25.2–33.5)
MCHC RBC AUTO-ENTMCNC: 30.7 G/DL (ref 28.4–34.8)
MCV RBC AUTO: 96.3 FL (ref 82.6–102.9)
NRBC AUTOMATED: 0 PER 100 WBC
PDW BLD-RTO: 15.9 % (ref 11.8–14.4)
PLATELET # BLD: 184 K/UL (ref 138–453)
PMV BLD AUTO: 11.5 FL (ref 8.1–13.5)
POTASSIUM SERPL-SCNC: 4.6 MMOL/L (ref 3.7–5.3)
RBC # BLD: 4.93 M/UL (ref 4.21–5.77)
SODIUM BLD-SCNC: 139 MMOL/L (ref 135–144)
TOTAL PROTEIN: 6.4 G/DL (ref 6.4–8.3)
TRIGL SERPL-MCNC: 61 MG/DL
URIC ACID: 4.2 MG/DL (ref 3.4–7)
VITAMIN B-12: 366 PG/ML (ref 232–1245)
VLDLC SERPL CALC-MCNC: NORMAL MG/DL (ref 1–30)
WBC # BLD: 8.8 K/UL (ref 3.5–11.3)

## 2020-01-06 ENCOUNTER — HOSPITAL ENCOUNTER (OUTPATIENT)
Dept: PHYSICAL THERAPY | Age: 85
Setting detail: THERAPIES SERIES
Discharge: HOME | End: 2020-01-06
Payer: MEDICARE

## 2020-01-06 PROCEDURE — 97161 PT EVAL LOW COMPLEX 20 MIN: CPT

## 2020-01-06 PROCEDURE — 97110 THERAPEUTIC EXERCISES: CPT

## 2020-01-06 PROCEDURE — G0283 ELEC STIM OTHER THAN WOUND: HCPCS

## 2020-01-06 ASSESSMENT — PAIN DESCRIPTION - ORIENTATION: ORIENTATION: LOWER

## 2020-01-06 ASSESSMENT — PAIN SCALES - GENERAL: PAINLEVEL_OUTOF10: 6

## 2020-01-06 ASSESSMENT — PAIN DESCRIPTION - FREQUENCY: FREQUENCY: INTERMITTENT

## 2020-01-06 ASSESSMENT — PAIN DESCRIPTION - LOCATION: LOCATION: BACK

## 2020-01-06 NOTE — PROGRESS NOTES
WNL  Strength LLE  Strength LLE: WNL  Bed mobility  Rolling to Left: Independent  Rolling to Right: Independent  Supine to Sit: Independent  Sit to Supine: Independent  Transfers  Sit to Stand: Independent  Stand to sit: Independent  Bed to Chair: Independent  Stand Pivot Transfers: Independent  Ambulation  Ambulation?: Yes  Ambulation 1  Surface: level tile  Device: No Device  Assistance: Independent  Gait Deviations: None  Stairs/Curb  Stairs?: No     Exercises  Exercise 1: post pelvic tilt 10x10\"supine  Exercise 2: bridging 10x10\"  Exercise 3: LTR B 10x10\"  Exercise 4: B hamsting stretch 3x30\"  Exercise 5: hot pack and EStim to lumbar area 20 min sitting    Assessment   Conditions Requiring Skilled Therapeutic Intervention  Body structures, Functions, Activity limitations: Decreased functional mobility ; Decreased ADL status; Decreased ROM; Increased pain  Assessment: lumbar pain limiting function  Treatment Diagnosis: difficulty walking R26.2 NEC  Prognosis: Good  Decision Making: Low Complexity  History: low back pain started 11/15/19,no injury  Exam: limited trunk ROM  Clinical Presentation: oswestry score 22  Barriers to Learning: none  REQUIRES PT FOLLOW UP: Yes  Treatment Initiated : therapeutic ex hot pack and EStim to lumbar area  Discharge Recommendations: Home independently  Activity Tolerance  Activity Tolerance: Patient Tolerated treatment well     Plan   Plan  Times per week: 2x/week  Plan weeks: 6 weeks  Specific instructions for Next Treatment: progress with ex as tolerated  Current Treatment Recommendations: Strengthening, ROM, Home Exercise Program, Modalities  Plan Comment: given written HEP    OutComes Score  Oswestry CMS Modifier: Alexsandra Hernandez (01/06/20 1050)  Oswestry Disability Scores %: 22.22 (01/06/20 1050)    Goals  Short term goals  Time Frame for Short term goals: 6 visits  Short term goal 1: decrease low back pain by 50% so patient can tolerate walking better  Short term goal 2: increase AROM trunk flex and SB to full  Short term goal 3: indep with HEP  Long term goals  Time Frame for Long term goals : 12 visits  Long term goal 1: improve oswewstry score from 22 to 12 or better  Patient Goals   Patient goals : strengthen back     Treatment Charges: Minutes Units   []  Ultrasound     [x]  Electrical-Stim 20 1   []  Iontophoresis     []  Traction     []  Massage       [x]  Eval 20 1   []  Gait     [x]  Ther Exercise 10  1    []  Manual Therapy       []  Ther Activities       []  Aquatics     []  Vasopneumatic Device     []  Neuro Re-Ed       []  Other       Total Treatment Time: 50 3        Therapy Time   Individual Concurrent Group Co-treatment   Time In 1050         Time Out 1140         Minutes 50         Timed Code Treatment Minutes: 10 Minutes    Patient Goals:strengthen back    Comments/Assessment:    Rehab Potential:  [x] Good  [] Fair  [] Poor   Suggested Professional Referral:  [x] No  [] Yes:  Barriers to Goal Achievement:  [x] No  [] Yes:  Domestic Concerns:  [x] No  [] Yes:    Treatment Plan:  [x] Therapeutic Exercise    [] Modalities:  [] Therapeutic Activity    [] Ultrasound  [x] Electrical Stimulation  [] Gait Training     [] Massage       [] Lumbar/Cervical Traction  [] Neuromuscular Re-education [x] Cold/hot pack [] Other:  [x] Instruction in HEP              [] Work Conditioning                                  [] Manual Therapy                     [] Aquatic Therapy     [] Vasocompression  [] Iontophoresis: 4 mg/mL                      Dexamethasone Sodium      Phosphate 40-80mAmin (Allergies Reviewed)     Frequency:         2  X/wk x        6  wk's      [x] Plans/Goals, Risk/Benefits discussed with pt/family  Comprehension of Education [x] yes  [] Needs Review  Pt/Family Education: [x] Verbal  [x] Demo  [x] Written    More objective information is available upon request.  Thank you for this referral.        Medicare/Regulatory Requirements:  I have reviewed this plan of care and certify a

## 2020-01-06 NOTE — PROGRESS NOTES
Jonathan Fall Risk Assessment    Risk Factor Scale  Score   History of Falls [] Yes  [x] No 25  0 0   Secondary Diagnosis [] Yes  [x] No 15  0 0   Ambulatory Aid [] Furniture  [] Crutches/cane/walker  [x] None/bedrest/wheelchair/nurse 30  15  0 0   IV/Heparin Lock [] Yes  [x] No 20  0 0   Gait/Transferring [] Impaired  [] Weak  [x] Normal/bedrest/immobile 20  10  0 0   Mental Status [] Forgets limitations  [x] Oriented to own ability 15  0 0      Total:0     Based on the Assessment score: check the appropriate box.     [x]  No intervention needed   Low =   Score of 0-24    []  Use standard prevention interventions Moderate =  Score of 24-44   [] Give patient handout and discuss fall prevention strategies   [] Establish goal of education for patient/family RE: fall prevention strategies    []  Use high risk prevention interventions High = Score of 45 and higher   [] Give patient handout and discuss fall prevention strategies   [] Establish goal of education for patient/family Re: fall prevention strategies   [] Discuss lifeline / other resources    Electronically signed by:   Larry Duran, PT  Date: 1/6/2020

## 2020-01-10 ENCOUNTER — HOSPITAL ENCOUNTER (OUTPATIENT)
Dept: PHYSICAL THERAPY | Age: 85
Setting detail: THERAPIES SERIES
Discharge: HOME | End: 2020-01-10
Payer: MEDICARE

## 2020-01-10 PROCEDURE — G0283 ELEC STIM OTHER THAN WOUND: HCPCS

## 2020-01-10 NOTE — PROGRESS NOTES
Physical Therapy Discharge Note    Date: 1/10/2020  Patient Name: Frances Baumann  MRN: [id number]     :   [id number]    Subjective:   General  Referring Practitioner: Miguelina Boudreaux  PT Visit Information  Onset Date: 11/15/19  PT Insurance Information: medicare/[place] roxy Jewell  Total # of Visits Approved: 12  Total # of Visits to Date: 2  Subjective  Subjective: no low back pain but complains of stiffness today  General Comment  Comments: patient would like today to be his last visit and will continue HEP          Treatment Activities:   Exercises  Exercise 5: hot pack and EStim to lumbar area 20 min sitting    Assessment:   Conditions Requiring Skilled Therapeutic Intervention  Assessment: SHORT TERM GOALS MET 2/3 LONG TERM GOAL MET 0/1  Treatment Diagnosis: difficulty walking R26.2 NEC  REQUIRES PT FOLLOW UP: No  Discharge Recommendations: Home independently     Goals:  Short term goals  Short term goal 1: decrease low back pain by 50% so patient can tolerate walking better(met)  Short term goal 2: increase AROM trunk flex and SB to full(not met)  Short term goal 3: indep with HEP(met)  Long term goals  Long term goal 1: improve oswewstry score from 22 to 12 or better(not met)    Plan:    Plan  Plan Comment: TO DISCONTINUE PT PER PATIENT REQUEST AND PATIENT TO CONTINUE HEP      Treatment Charges: Minutes Units   []  Ultrasound     [x]  Electrical-Stim 20 1   []  Iontophoresis     []  Traction     []  Massage       []  Eval     []  Gait     []  Ther Exercise       []  Manual Therapy       []  Ther Activities       []  Aquatics     []  Vasopneumatic Device     []  Neuro Re-Ed       []  Other       Total Treatment Time: 20 1        Therapy Time   Individual Concurrent Group Co-treatment   Time In 1026         Time Out 1046         Minutes 20               Electronically signed by: Natalie Carter PT

## 2020-01-13 ENCOUNTER — OFFICE VISIT (OUTPATIENT)
Dept: FAMILY MEDICINE CLINIC | Age: 85
End: 2020-01-13
Payer: MEDICARE

## 2020-01-13 VITALS
WEIGHT: 168 LBS | SYSTOLIC BLOOD PRESSURE: 128 MMHG | OXYGEN SATURATION: 99 % | BODY MASS INDEX: 30.73 KG/M2 | HEART RATE: 79 BPM | TEMPERATURE: 98.7 F | DIASTOLIC BLOOD PRESSURE: 78 MMHG | RESPIRATION RATE: 16 BRPM

## 2020-01-13 PROCEDURE — 4040F PNEUMOC VAC/ADMIN/RCVD: CPT | Performed by: FAMILY MEDICINE

## 2020-01-13 PROCEDURE — G8427 DOCREV CUR MEDS BY ELIG CLIN: HCPCS | Performed by: FAMILY MEDICINE

## 2020-01-13 PROCEDURE — G8417 CALC BMI ABV UP PARAM F/U: HCPCS | Performed by: FAMILY MEDICINE

## 2020-01-13 PROCEDURE — 99214 OFFICE O/P EST MOD 30 MIN: CPT | Performed by: FAMILY MEDICINE

## 2020-01-13 PROCEDURE — 1123F ACP DISCUSS/DSCN MKR DOCD: CPT | Performed by: FAMILY MEDICINE

## 2020-01-13 PROCEDURE — 20610 DRAIN/INJ JOINT/BURSA W/O US: CPT | Performed by: FAMILY MEDICINE

## 2020-01-13 PROCEDURE — G8482 FLU IMMUNIZE ORDER/ADMIN: HCPCS | Performed by: FAMILY MEDICINE

## 2020-01-13 PROCEDURE — 1036F TOBACCO NON-USER: CPT | Performed by: FAMILY MEDICINE

## 2020-01-13 RX ORDER — BUPIVACAINE HYDROCHLORIDE 2.5 MG/ML
5 INJECTION, SOLUTION INFILTRATION; PERINEURAL ONCE
Status: COMPLETED | OUTPATIENT
Start: 2020-01-13 | End: 2020-01-13

## 2020-01-13 RX ORDER — METHYLPREDNISOLONE ACETATE 40 MG/ML
40 INJECTION, SUSPENSION INTRA-ARTICULAR; INTRALESIONAL; INTRAMUSCULAR; SOFT TISSUE ONCE
Status: COMPLETED | OUTPATIENT
Start: 2020-01-13 | End: 2020-01-13

## 2020-01-13 RX ADMIN — BUPIVACAINE HYDROCHLORIDE 12.5 MG: 2.5 INJECTION, SOLUTION INFILTRATION; PERINEURAL at 11:46

## 2020-01-13 RX ADMIN — METHYLPREDNISOLONE ACETATE 40 MG: 40 INJECTION, SUSPENSION INTRA-ARTICULAR; INTRALESIONAL; INTRAMUSCULAR; SOFT TISSUE at 11:48

## 2020-01-13 ASSESSMENT — ENCOUNTER SYMPTOMS
NAUSEA: 0
ABDOMINAL DISTENTION: 0
RHINORRHEA: 1
SORE THROAT: 0
DIARRHEA: 0
ABDOMINAL PAIN: 0
VOMITING: 0
BACK PAIN: 1
CHEST TIGHTNESS: 0
COUGH: 0
CONSTIPATION: 0
SHORTNESS OF BREATH: 0

## 2020-01-13 ASSESSMENT — PATIENT HEALTH QUESTIONNAIRE - PHQ9
SUM OF ALL RESPONSES TO PHQ QUESTIONS 1-9: 1
1. LITTLE INTEREST OR PLEASURE IN DOING THINGS: 0
SUM OF ALL RESPONSES TO PHQ QUESTIONS 1-9: 1
2. FEELING DOWN, DEPRESSED OR HOPELESS: 1
SUM OF ALL RESPONSES TO PHQ9 QUESTIONS 1 & 2: 1

## 2020-01-13 NOTE — PROGRESS NOTES
Visit Information    Have you changed or started any medications since your last visit including any over-the-counter medicines, vitamins, or herbal medicines? no   Have you stopped taking any of your medications? Is so, why? -  no  Are you having any side effects from any of your medications? - no    Have you seen any other physician or provider since your last visit?  no   Have you had any other diagnostic tests since your last visit?  no   Have you been seen in the emergency room and/or had an admission in a hospital since we last saw you?  no   Have you had your routine dental cleaning in the past 6 months?  no     Do you have an active MyChart account? If no, what is the barrier?   Yes    Patient Care Team:  Rene Noble MD as PCP - General (Family Medicine)  Rene Noble MD as PCP - [facility]  Pete Monreal MD as Consulting Physician (Dermatology)  Salvador Casey MD as Consulting Physician (Cardiology)  Iraj Albert MD as Consulting Physician (Gastroenterology)  Oscar Kirk MD as Surgeon (Ophthalmology)  Lucia Shah MD as Surgeon (Ophthalmology)  Codi Small MD as Consulting Physician (Urology)  Carolyn Smiley DO as Consulting Physician (General Surgery)  Mayte Conley DPM as Consulting Physician (Podiatry)  Petar Duvall MD as Consulting Physician (Internal Medicine)    Medical History Review  Past Medical, Family, and Social History reviewed and does contribute to the patient presenting condition    Health Maintenance   Topic Date Due    Shingles Vaccine (2 of 3) 06/25/2020 (Originally 9/9/2012)    Annual Wellness Visit (AWV)  07/16/2020    DTaP/Tdap/Td vaccine (2 - Td) 08/23/2020    Lipid screen  01/02/2021    Potassium monitoring  01/02/2021    Creatinine monitoring  01/02/2021    Flu vaccine  Completed    Pneumococcal 65+ years Vaccine  Completed

## 2020-01-13 NOTE — PROGRESS NOTES
Jacqueline Leiva MD    [address]  [address] [facility] [address]  Dept: [phone]  Dept Fax: [phone]     Patient ID: Jd Luke is a 80 y.o. male. HPI    Pt here today for f/u on chronic medical problems HTN, HLD, Atrial Fibrillation, Anxiety, OA, Anemia, GERD, B12 Def,go over labs and/or diagnostic studies, and medication refills. Pt denies any fever or chills. Pt today denies any HA, chest pain, or SOB. Pt denies any N/V/D/C or abdominal pain. Pt states his low back pain has improved and he did stop PT and doing the home exercises. He is c/o right shoulder pain with a burning sensation down his arm. Pt with occasional heartburn, but stable on meds. Pt states mood is stable on meds. Otherwise pt doing well on current tx and no other concerns today. The patient's past medical, surgical, social, and family history as well as his current medications and allergies were reviewed as documented in today's encounter.       Current Outpatient Medications on File Prior to Visit   Medication Sig Dispense Refill    gabapentin (NEURONTIN) 100 MG capsule Take one pill at night 30 capsule 1    allopurinol (ZYLOPRIM) 100 MG tablet Take 1 tablet by mouth daily 90 tablet 1    lisinopril (PRINIVIL;ZESTRIL) 40 MG tablet TAKE ONE TABLET BY MOUTH DAILY 90 tablet 3    tamsulosin (FLOMAX) 0.4 MG capsule Take 1 capsule by mouth 2 times daily 180 capsule 3    UNABLE TO FIND OK TO ADMIT TO [place] FOR INPATIENT SKILLED NURSING AND THERAPY 1 each 0    warfarin (COUMADIN) 5 MG tablet Take 2.5 mg by mouth once a week Wednesday      warfarin (COUMADIN) 5 MG tablet Take 5 mg by mouth Six times weekly      vitamin B-12 (CYANOCOBALAMIN) 1000 MCG tablet Take 1,000 mcg by mouth daily      busPIRone (BUSPAR) 5 MG tablet Take 5 mg by mouth 2 times daily      hydrALAZINE (APRESOLINE) 50 MG tablet TAKE ONE TABLET BY MOUTH TWICE A DAY 60 Medications    methylPREDNISolone acetate (DEPO-MEDROL) injection 40 mg    bupivacaine (MARCAINE) 0.25 % injection 12.5 mg     Will cont with current treatment as pt is currently stable on current treatment    Will cont with low fat/chol diet and Lipitor as ordered    Will cont with the back exercises    Will see how pt does with right shoulder injection. Avoid vigorous activity to right shoulder and apply ice 2-3 times per day. I did state she may have increased pain the next day while the steroid takes effect and to call if any increased pain, swelling or redness    Will cont to follow with Dr. Pedro Roth as instructed    Rest of systems unchanged, continue current treatments. Medications, labs, diagnostic studies, consultations and follow-up as documented in this encounter. Rest of systems unchanged, continue current treatments    Jacqueline Lake MD

## 2020-01-14 ENCOUNTER — APPOINTMENT (OUTPATIENT)
Dept: PHYSICAL THERAPY | Age: 85
End: 2020-01-14
Payer: MEDICARE

## 2020-01-16 ENCOUNTER — APPOINTMENT (OUTPATIENT)
Dept: PHYSICAL THERAPY | Age: 85
End: 2020-01-16
Payer: MEDICARE

## 2020-01-27 ENCOUNTER — HOSPITAL ENCOUNTER (OUTPATIENT)
Facility: CLINIC | Age: 85
Discharge: HOME | End: 2020-01-29
Payer: MEDICARE

## 2020-01-27 ENCOUNTER — HOSPITAL ENCOUNTER (OUTPATIENT)
Dept: GENERAL RADIOLOGY | Facility: CLINIC | Age: 85
Discharge: HOME | End: 2020-01-29
Payer: MEDICARE

## 2020-01-27 ENCOUNTER — OFFICE VISIT (OUTPATIENT)
Dept: FAMILY MEDICINE CLINIC | Age: 85
End: 2020-01-27
Payer: MEDICARE

## 2020-01-27 ENCOUNTER — TELEPHONE (OUTPATIENT)
Dept: FAMILY MEDICINE CLINIC | Age: 85
End: 2020-01-27

## 2020-01-27 VITALS
WEIGHT: 168 LBS | SYSTOLIC BLOOD PRESSURE: 138 MMHG | BODY MASS INDEX: 30.73 KG/M2 | DIASTOLIC BLOOD PRESSURE: 76 MMHG | HEART RATE: 80 BPM | OXYGEN SATURATION: 98 % | TEMPERATURE: 98 F

## 2020-01-27 PROCEDURE — 72040 X-RAY EXAM NECK SPINE 2-3 VW: CPT

## 2020-01-27 PROCEDURE — 99213 OFFICE O/P EST LOW 20 MIN: CPT | Performed by: NURSE PRACTITIONER

## 2020-01-27 PROCEDURE — 73030 X-RAY EXAM OF SHOULDER: CPT

## 2020-01-27 RX ORDER — TRAMADOL HYDROCHLORIDE 50 MG/1
50 TABLET ORAL EVERY 8 HOURS PRN
Qty: 21 TABLET | Refills: 0 | Status: SHIPPED | OUTPATIENT
Start: 2020-01-27 | End: 2020-02-03

## 2020-01-27 ASSESSMENT — ENCOUNTER SYMPTOMS
ABDOMINAL DISTENTION: 0
SORE THROAT: 0
EYE PAIN: 0
CONSTIPATION: 0
SHORTNESS OF BREATH: 0
RHINORRHEA: 0
CHEST TIGHTNESS: 0
EYE DISCHARGE: 0
BACK PAIN: 0
DIARRHEA: 0
COUGH: 0
EYE ITCHING: 0
NAUSEA: 0
ABDOMINAL PAIN: 0
VOMITING: 0

## 2020-01-27 NOTE — TELEPHONE ENCOUNTER
Patient called stating there pain is at # 8  the top of right shoulder neck cord. Patient states they havent' had any xray's of shoulder or Mri.  Please advise

## 2020-01-27 NOTE — PROGRESS NOTES
Visit Information    Have you changed or started any medications since your last visit including any over-the-counter medicines, vitamins, or herbal medicines? no   Are you having any side effects from any of your medications? -  no  Have you stopped taking any of your medications? Is so, why? -  no    Have you seen any other physician or provider since your last visit? No  Have you had any other diagnostic tests since your last visit? No  Have you been seen in the emergency room and/or had an admission to a hospital since we last saw you? Yes - Records Requested- [facility] for ear flush  Have you had your routine dental cleaning in the past 6 months? no    Have you activated your [employer] account? If not, what are your barriers?  Yes     Patient Care Team:  Dale Shell MD as PCP - General (Family Medicine)  Dale Shell MD as PCP [facility]  Gustavo Damon MD as Consulting Physician (Dermatology)  Jony Caicedo MD as Consulting Physician (Cardiology)  Mable Stewart MD as Consulting Physician (Gastroenterology)  Jeovanny Mckeon MD as Surgeon (Ophthalmology)  Jesus Tenorio MD as Surgeon (Ophthalmology)  Radha Patten MD as Consulting Physician (Urology)  Deyvi Martin DO as Consulting Physician (General Surgery)  Marcus Dahl DPM as Consulting Physician (Podiatry)  Ricki Wheeler MD as Consulting Physician (Internal Medicine)    Medical History Review  Past Medical, Family, and Social History reviewed and does not contribute to the patient presenting condition    Health Maintenance   Topic Date Due    Shingles Vaccine (2 of 3) 06/25/2020 (Originally 9/9/2012)    Annual Wellness Visit (AWV)  07/16/2020    DTaP/Tdap/Td vaccine (2 - Td) 08/23/2020    Lipid screen  01/02/2021    Potassium monitoring  01/02/2021    Creatinine monitoring  01/02/2021    Flu vaccine  Completed    Pneumococcal 65+ years Vaccine  Completed

## 2020-01-27 NOTE — PROGRESS NOTES
Caitlin Tom, APRN-[facility]  [address]  [address] [facility] [address]  Dept: [phone]  Dept Fax: [phone]     Patient ID: Mildred Hummel is a 80 y.o. male. HPI    Pt here today for an acute visit secondary to right, posterior shoulder pain and neck pain. He relates he was using a broom, sweeping back and forth and he has had pain for the last month. He has been using Tylenol to control the pain and isn't getting much relief. He does try to use the SalonPas patches, but given where his pain is, is hard for him to apply. Pt denies any fever or chills. Pt today denies any HA, chest pain, or SOB. Pt denies any N/V/D/C or abdominal pain. Pt states mood is stable on meds. Otherwise pt doing well on current tx and no other concerns today. The patient's past medical, surgical, social, and family history as well as his current medications and allergies were reviewed as documented in today's encounter.       Current Outpatient Medications on File Prior to Visit   Medication Sig Dispense Refill    gabapentin (NEURONTIN) 100 MG capsule Take one pill at night 30 capsule 1    allopurinol (ZYLOPRIM) 100 MG tablet Take 1 tablet by mouth daily 90 tablet 1    lisinopril (PRINIVIL;ZESTRIL) 40 MG tablet TAKE ONE TABLET BY MOUTH DAILY 90 tablet 3    tamsulosin (FLOMAX) 0.4 MG capsule Take 1 capsule by mouth 2 times daily 180 capsule 3    UNABLE TO FIND OK TO ADMIT TO [place] FOR INPATIENT SKILLED NURSING AND THERAPY 1 each 0    warfarin (COUMADIN) 5 MG tablet Take 2.5 mg by mouth once a week Wednesday      warfarin (COUMADIN) 5 MG tablet Take 5 mg by mouth Six times weekly      vitamin B-12 (CYANOCOBALAMIN) 1000 MCG tablet Take 1,000 mcg by mouth daily      busPIRone (BUSPAR) 5 MG tablet Take 5 mg by mouth 2 times daily      hydrALAZINE (APRESOLINE) 50 MG tablet TAKE ONE TABLET BY MOUTH TWICE A DAY 60 tablet 11    Conjunctivae normal.      Pupils: Pupils are equal, round, and reactive to light. Neck:      Musculoskeletal: Normal range of motion. Pain with movement and muscular tenderness present. Cardiovascular:      Rate and Rhythm: Normal rate and regular rhythm. Pulses: Normal pulses. Heart sounds: No murmur. Pulmonary:      Effort: Pulmonary effort is normal. No respiratory distress. Breath sounds: Normal breath sounds. No wheezing, rhonchi or rales. Abdominal:      General: Bowel sounds are normal. There is no distension. Palpations: Abdomen is soft. Musculoskeletal: Normal range of motion. General: Tenderness (right shoulder) present. Right shoulder: He exhibits tenderness and pain. Comments: Point tenderness noted to posterior shoulder. Has slightly diminished ROM   Skin:     General: Skin is warm and dry. Findings: No rash. Neurological:      General: No focal deficit present. Mental Status: He is alert and oriented to person, place, and time. Deep Tendon Reflexes: Reflexes normal.   Psychiatric:         Behavior: Behavior normal.         Assessment:      Diagnosis Orders   1. Acute pain of right shoulder  XR SHOULDER RIGHT (MIN 2 VIEWS)   2. Acute neck pain  XR CERVICAL SPINE (2-3 VIEWS)       Plan:     1. Acute pain of right shoulder/Acute neck pain    - XR SHOULDER RIGHT (MIN 2 VIEWS); Future    - XR CERVICAL SPINE (2-3 VIEWS); Future    - will call with results     - possible PT    - traMADol (ULTRAM) 50 MG tablet; Take 1 tablet by mouth every 8 hours as needed for Pain for up to 7 days. Intended supply: 7 days. Take lowest dose possible to manage pain  Dispense: 21 tablet; Refill: 0    - Tylenol 1-2 tabs po q4h prn    - Rest of systems unchanged, continue current treatments. - Medications, labs, diagnostic studies, consultations and follow-up as documented in this encounter.  Rest of systems unchanged, continue current treatments    Frederick Safer Cindy Goodwin, APRN-CNP

## 2020-01-28 NOTE — PROGRESS NOTES
Notes recorded by Earnie Barthel, APRN - NP on 1/27/2020 at 5:03 PM EST  Both shoulder x-ray and neck x-ray show degenerative changes (arthritis).  Will order PT.  If shoulder symptoms continue, he can schedule a shoulder injection with Dr. Dorian Diaz    Referral for [facility] done.

## 2020-02-03 ENCOUNTER — TELEPHONE (OUTPATIENT)
Dept: FAMILY MEDICINE CLINIC | Age: 85
End: 2020-02-03

## 2020-02-03 RX ORDER — IPRATROPIUM BROMIDE 21 UG/1
2 SPRAY, METERED NASAL 3 TIMES DAILY
Qty: 1 BOTTLE | Refills: 3 | Status: ON HOLD | OUTPATIENT
Start: 2020-02-03 | End: 2021-02-17

## 2020-02-06 ENCOUNTER — TELEPHONE (OUTPATIENT)
Dept: FAMILY MEDICINE CLINIC | Age: 85
End: 2020-02-06

## 2020-02-06 RX ORDER — TIZANIDINE 2 MG/1
2 TABLET ORAL 3 TIMES DAILY
Qty: 40 TABLET | Refills: 0 | Status: SHIPPED | OUTPATIENT
Start: 2020-02-06 | End: 2020-04-12 | Stop reason: SDUPTHER

## 2020-02-06 NOTE — TELEPHONE ENCOUNTER
[place] Pt Mr Jeanie Poole is calling, to see if his Dr can please, send in a script for him, Pt states he is having bad back spasms, can not get out of bed, and will like to have a muscle relaxer, can reach Pt at [phone] thanks writer.

## 2020-02-07 RX ORDER — PREDNISONE 20 MG/1
TABLET ORAL
Qty: 17 TABLET | Refills: 0 | Status: SHIPPED | OUTPATIENT
Start: 2020-02-07 | End: 2020-03-23 | Stop reason: SDUPTHER

## 2020-02-07 NOTE — TELEPHONE ENCOUNTER
Cristina Rao the pt daughter called and states that the pt started the muscle relaxer but it does not seem to help. Pt is not screaming in regards to the pain but he is still not able to sit up or get out of bed. Pt daughter thinks the 2mg is not enough. Pt daughter thinks he needs a pain pill with the muscle relaxer. Cristina Rao is requesting a call back on her cellphone.    Cristina Rao: [phone]

## 2020-02-07 NOTE — TELEPHONE ENCOUNTER
He can take 2 of the Tizanidine 2mg pills three times a day as needed. He did have tramadol sent in on 1/27 and that is a pain pill. I will call in oral steroids which will help with the pain as well. Call MOnday with an update.

## 2020-02-19 ENCOUNTER — OFFICE VISIT (OUTPATIENT)
Dept: PODIATRY | Age: 85
End: 2020-02-19
Payer: MEDICARE

## 2020-02-19 VITALS — HEIGHT: 64 IN | WEIGHT: 160 LBS | BODY MASS INDEX: 27.31 KG/M2

## 2020-02-19 PROCEDURE — G8482 FLU IMMUNIZE ORDER/ADMIN: HCPCS | Performed by: PODIATRIST

## 2020-02-19 PROCEDURE — G8427 DOCREV CUR MEDS BY ELIG CLIN: HCPCS | Performed by: PODIATRIST

## 2020-02-19 PROCEDURE — 1036F TOBACCO NON-USER: CPT | Performed by: PODIATRIST

## 2020-02-19 PROCEDURE — 1123F ACP DISCUSS/DSCN MKR DOCD: CPT | Performed by: PODIATRIST

## 2020-02-19 PROCEDURE — 99213 OFFICE O/P EST LOW 20 MIN: CPT | Performed by: PODIATRIST

## 2020-02-19 PROCEDURE — G8417 CALC BMI ABV UP PARAM F/U: HCPCS | Performed by: PODIATRIST

## 2020-02-19 PROCEDURE — 4040F PNEUMOC VAC/ADMIN/RCVD: CPT | Performed by: PODIATRIST

## 2020-02-19 ASSESSMENT — ENCOUNTER SYMPTOMS
DIARRHEA: 0
VOMITING: 0
COLOR CHANGE: 0
CONSTIPATION: 0
NAUSEA: 0

## 2020-02-19 NOTE — PROGRESS NOTES
[facility]  Return Patient    Chief Complaint   Patient presents with    Nail Problem     nail trim/last saw Kris Anchors 1/13/2020       Subjective: This Citlalli Degree comes to clinic for foot and nail care. He would like all of his nails trimmed. He cannot take care of them himself. They are painful when long and he tries to wear shoes. Pt's primary care physician is Nils Camara MD. His right hallux nail is pain free. Past Medical History:   Diagnosis Date    Acute MI ([address]     Allergic rhinitis 9/2/2011    Anemia     Basal cell cancer 03/2013    left side of head, face chin    Basal cell cancer 03/10/14    left cheek    Cervical radiculopathy     Diverticulitis 02/17/13    Epigastric pain/GERD. 9/2/2011    GERD (gastroesophageal reflux disease)     Glaucoma     Hyperlipidemia     Hypertension     Hyponatremia 9/2/2011    IBS (irritable bowel syndrome) 9/2/2011    Insomnia 9/2/2011    Osteoarthritis     Osteoarthritis/neck pain.  9/2/2011    Renal calculi     Shingles     history of shingles       Allergies   Allergen Reactions    Celebrex [Celecoxib] Nausea Only    Mobic Nausea Only     Current Outpatient Medications on File Prior to Visit   Medication Sig Dispense Refill    tiZANidine (ZANAFLEX) 2 MG tablet Take 1 tablet by mouth 3 times daily 40 tablet 0    ipratropium (ATROVENT) 0.03 % nasal spray 2 sprays by Nasal route 3 times daily 1 Bottle 3    allopurinol (ZYLOPRIM) 100 MG tablet Take 1 tablet by mouth daily 90 tablet 1    lisinopril (PRINIVIL;ZESTRIL) 40 MG tablet TAKE ONE TABLET BY MOUTH DAILY 90 tablet 3    tamsulosin (FLOMAX) 0.4 MG capsule Take 1 capsule by mouth 2 times daily 180 capsule 3    UNABLE TO FIND OK TO ADMIT TO [place] FOR INPATIENT SKILLED NURSING AND THERAPY 1 each 0    warfarin (COUMADIN) 5 MG tablet Take 2.5 mg by mouth once a week Wednesday      warfarin (COUMADIN) 5 MG tablet Take 5 mg by mouth Six times weekly      vitamin B-12 (CYANOCOBALAMIN) 1000 MCG tablet Take 1,000 mcg by mouth daily      busPIRone (BUSPAR) 5 MG tablet Take 5 mg by mouth 2 times daily      hydrALAZINE (APRESOLINE) 50 MG tablet TAKE ONE TABLET BY MOUTH TWICE A DAY 60 tablet 11    bimatoprost (LUMIGAN) 0.01 % SOLN ophthalmic drops Place 1 drop into the left eye nightly      atorvastatin (LIPITOR) 20 MG tablet Take 1 tablet by mouth daily 90 tablet 3    melatonin 5 MG TABS tablet Take 1 tablet by mouth nightly       metoprolol tartrate (LOPRESSOR) 25 MG tablet Take 25 mg by mouth 2 times daily      gabapentin (NEURONTIN) 100 MG capsule Take one pill at night 30 capsule 1     No current facility-administered medications on file prior to visit. Review of Systems   Constitutional: Negative for chills, diaphoresis, fatigue, fever and unexpected weight change. Cardiovascular: Negative for leg swelling. Gastrointestinal: Negative for constipation, diarrhea, nausea and vomiting. Musculoskeletal: Positive for gait problem. Negative for arthralgias and joint swelling. Skin: Negative for color change, pallor, rash and wound. Neurological: Negative for weakness and numbness. Objective:  General: AAO x 3 in NAD. Derm  Left hallux nail incurvated medial border with slight redness and pain to palpation.     Sites of Onychomycosis Involvement (Check affected area)  [x] [x] [x] [x] [x] [x] [x] [x] [x] [x]  5 4 3 2 1 1 2 3 4 5                          Right                                        Left    Thickness  [x] [x] [x] [x] [x] [x] [x] [x] [x] [x]  5 4 3 2 1 1 2 3 4 5                         Right                                        Left    Dystrophic Changes                                                                 [x] [x] [x] [x] [x] [x] [x] [x] [x] [x]  5 4 3 2 1 1 2 3 4 5                         Right                                        Left    Color [x] [x] [x] [x] [x] [x] [x] [x] [x] [x]  5 4 3 2 1 1 2 3 4 5                          Right                                        Left    Incurvation/Ingrowin                                                                   [] [] [] [] [] [] [] [] [] []  5 4 3 2 1 1 2 3 4 5                         Right                                        Left    Inflammation/Pain                                                                   [x] [x] [x] [x] [x] [x] [x] [x] [x] [x]  5 4 3 2 1 1 2 3 4 5                         Right                                        Left    Vascular:  DP/PT pulses palpable 2/4, Bilateral.    CFT <3 seconds to digits 1-5, Bilateral .   Hair growth present to level of digits, Bilateral.    Neurological:  Sensation present to light touch to level of digits, Bilateral.    Assessment:  80 y.o. male with:   1. Onychomycosis    2. Pain in toes of both feet       No orders of the defined types were placed in this encounter. Plan:   Pt was evaluated and examined. Patient was given personalized discharge instructions. Nails 1-10 were debrided in length and thickness sharply with a nail nipper and  without incident, slant back performed. Pt will follow up in 9-12 weeks or sooner if any problems arise. Diagnosis was discussed with the pt and all of their questions were answered in detail. Proper foot hygiene and care was discussed with the pt. Patient to check feet daily and contact the office with any questions/problems/concerns. Other comorbidity noted and will be managed by PCP. Pain waiver discussed with patient and confirmed.          2/19/2020    Electronically signed by Kristan Santacruz DPM on 2/19/2020 at 10:47 AM

## 2020-03-02 ENCOUNTER — TELEPHONE (OUTPATIENT)
Dept: FAMILY MEDICINE CLINIC | Age: 85
End: 2020-03-02

## 2020-03-05 ENCOUNTER — TELEPHONE (OUTPATIENT)
Dept: FAMILY MEDICINE CLINIC | Age: 85
End: 2020-03-05

## 2020-03-05 NOTE — TELEPHONE ENCOUNTER
Patient called and said he spoke with you earlier. He did not give me details. I offered to try and help him, but he wants to speak with you directly. Please call patient.

## 2020-03-11 ENCOUNTER — HOSPITAL ENCOUNTER (OUTPATIENT)
Dept: PHYSICAL THERAPY | Age: 85
Setting detail: THERAPIES SERIES
Discharge: HOME | End: 2020-03-11
Payer: MEDICARE

## 2020-03-11 PROCEDURE — G0283 ELEC STIM OTHER THAN WOUND: HCPCS

## 2020-03-11 PROCEDURE — 97161 PT EVAL LOW COMPLEX 20 MIN: CPT

## 2020-03-11 ASSESSMENT — PAIN DESCRIPTION - DESCRIPTORS: DESCRIPTORS: CONSTANT

## 2020-03-11 ASSESSMENT — PAIN SCALES - GENERAL: PAINLEVEL_OUTOF10: 7

## 2020-03-11 ASSESSMENT — PAIN DESCRIPTION - LOCATION: LOCATION: BACK

## 2020-03-11 ASSESSMENT — PAIN DESCRIPTION - ORIENTATION: ORIENTATION: LOWER

## 2020-03-11 ASSESSMENT — PAIN DESCRIPTION - PAIN TYPE: TYPE: CHRONIC PAIN

## 2020-03-11 ASSESSMENT — PAIN DESCRIPTION - FREQUENCY: FREQUENCY: CONTINUOUS

## 2020-03-11 NOTE — PROGRESS NOTES
Jonathan Fall Risk Assessment    Risk Factor Scale  Score   History of Falls [] Yes  [x] No 25  0 0   Secondary Diagnosis [] Yes  [x] No 15  0 0   Ambulatory Aid [] Furniture  [] Crutches/cane/walker  [x] None/bedrest/wheelchair/nurse 30  15  0 0   IV/Heparin Lock [] Yes  [x] No 20  0 0   Gait/Transferring [] Impaired  [] Weak  [x] Normal/bedrest/immobile 20  10  0 0   Mental Status [] Forgets limitations  [x] Oriented to own ability 15  0 0      Total:0     Based on the Assessment score: check the appropriate box.     [x]  No intervention needed   Low =   Score of 0-24    []  Use standard prevention interventions Moderate =  Score of 24-44   [] Give patient handout and discuss fall prevention strategies   [] Establish goal of education for patient/family RE: fall prevention strategies    []  Use high risk prevention interventions High = Score of 45 and higher   [] Give patient handout and discuss fall prevention strategies   [] Establish goal of education for patient/family Re: fall prevention strategies   [] Discuss lifeline / other resources    Electronically signed by:   Kateryna Flaherty, PT  Date: 3/11/2020
improve oswestry score from 53 to 43 or better  Patient Goals   Patient goals : relieve pain     Treatment Charges: Minutes Units   []  Ultrasound     [x]  Electrical-Stim 20 1   []  Iontophoresis     []  Traction     []  Massage       [x]  Eval 20 1   []  Gait     []  Ther Exercise       []  Manual Therapy       []  Ther Activities       []  Aquatics     []  Vasopneumatic Device     []  Neuro Re-Ed       []  Other       Total Treatment Time: 40 2        Therapy Time   Individual Concurrent Group Co-treatment   Time In 1000         Time Out 4458         Minutes 40               Patient Goals:relieve pain  Comments/Assessment:    Rehab Potential:  [x] Good  [] Fair  [] Poor   Suggested Professional Referral:  [x] No  [] Yes:  Barriers to Goal Achievement:  [] No  [x] Yes:chronic  Domestic Concerns:  [x] No  [] Yes:    Treatment Plan:  [x] Therapeutic Exercise   16623  [] Iontophoresis: 4 mg/mL Dexamethasone Sodium Phosphate  mAmin  61180   [] Therapeutic Activity  75184 [] Vasopneumatic cold with compression  87373    [] Gait Training   45262 [] Ultrasound   [id number]   [] Neuromuscular Re-education  88011 [x] Electrical Stimulation Unattended  51433   [] Manual Therapy  12678 [] Electrical Stimulation Attended  94858   [x] Instruction in HEP  [] Lumbar/Cervical Traction  [id number]   [] Aquatic Therapy   62244 [] Cold/hotpack    [] Massage   71735      [] Dry Needling, 1 or 2 muscles  49569   [] Biofeedback, first 15 minutes   37080  [] Biofeedback, additional 15 minutes   91692 [] Dry Needling, 3 or more muscles  82448      Frequency:     2-3      X/wk x    4-6      wk's      [x] Plans/Goals, Risk/Benefits discussed with pt/family  Comprehension of Education [x] yes  [] Needs Review  Pt/Family Education: [x] Verbal  [x] Demo  [] Written    More objective information is available upon request.  Thank you for this referral.        Medicare/Regulatory Requirements:  I have reviewed this plan of care and certify a need

## 2020-03-12 ENCOUNTER — HOSPITAL ENCOUNTER (OUTPATIENT)
Dept: PHYSICAL THERAPY | Age: 85
Setting detail: THERAPIES SERIES
Discharge: HOME | End: 2020-03-12
Payer: MEDICARE

## 2020-03-12 PROCEDURE — G0283 ELEC STIM OTHER THAN WOUND: HCPCS

## 2020-03-12 ASSESSMENT — PAIN SCALES - GENERAL: PAINLEVEL_OUTOF10: 7

## 2020-03-12 ASSESSMENT — PAIN DESCRIPTION - DESCRIPTORS: DESCRIPTORS: CONSTANT

## 2020-03-12 ASSESSMENT — PAIN DESCRIPTION - LOCATION: LOCATION: BACK;SHOULDER

## 2020-03-12 ASSESSMENT — PAIN DESCRIPTION - ORIENTATION: ORIENTATION: LOWER;RIGHT

## 2020-03-12 ASSESSMENT — PAIN DESCRIPTION - FREQUENCY: FREQUENCY: CONTINUOUS

## 2020-03-12 NOTE — PROGRESS NOTES
Physical Therapy  Daily Treatment Note  Date: 3/12/2020  Patient Name: Jackson Ely  MRN: [id number]     :   1931    Subjective:      PT Visit Information  Onset Date: 20  PT Insurance Information: medicare/[facility]  Total # of Visits Approved: 12  Total # of Visits to Date: 2  Subjective  Subjective: complains of pain on lower back and R denis today  Pain Screening  Patient Currently in Pain: Yes  Pain Assessment  Pain Assessment: 0-10  Pain Level: 7  Pain Location: Back; Shoulder  Pain Orientation: Lower;Right  Pain Descriptors: Constant  Pain Frequency: Continuous  Vital Signs  Patient Currently in Pain: Yes       Treatment Activities:   Modalities  Moist heat: hot pack and EStim to lower back and R denis 20 min sitting     Assessment:   Conditions Requiring Skilled Therapeutic Intervention  Assessment: will do ex at home  REQUIRES PT FOLLOW UP: Yes  Discharge Recommendations: Home independently     Plan:    Plan  Times per week: 2-3x/week  Current Treatment Recommendations: Modalities  Plan Comment: to continue PT per POC      Treatment Charges: Minutes Units   []  Ultrasound     [x]  Electrical-Stim 20 1   []  Iontophoresis     []  Traction     []  Massage       []  Eval     []  Gait     []  Ther Exercise       []  Manual Therapy       []  Ther Activities       []  Aquatics     []  Vasopneumatic Device     []  Neuro Re-Ed       []  Other       Total Treatment Time: 20 1        Therapy Time   Individual Concurrent Group Co-treatment   Time In 1345         Time Out 1405         Minutes 20              Electronically signed by: Cj Flowers, PT

## 2020-03-13 ENCOUNTER — HOSPITAL ENCOUNTER (OUTPATIENT)
Dept: PHYSICAL THERAPY | Age: 85
Setting detail: THERAPIES SERIES
Discharge: HOME | End: 2020-03-13
Payer: MEDICARE

## 2020-03-13 PROCEDURE — G0283 ELEC STIM OTHER THAN WOUND: HCPCS

## 2020-03-13 ASSESSMENT — PAIN DESCRIPTION - ORIENTATION
ORIENTATION_2: RIGHT
ORIENTATION: LOWER

## 2020-03-13 ASSESSMENT — PAIN SCALES - GENERAL: PAINLEVEL_OUTOF10: 5

## 2020-03-13 ASSESSMENT — PAIN DESCRIPTION - INTENSITY: RATING_2: 8

## 2020-03-13 ASSESSMENT — PAIN DESCRIPTION - FREQUENCY: FREQUENCY: INTERMITTENT

## 2020-03-13 ASSESSMENT — PAIN DESCRIPTION - LOCATION
LOCATION: BACK
LOCATION_2: SHOULDER

## 2020-03-13 ASSESSMENT — PAIN DESCRIPTION - DURATION: DURATION_2: CONTINUOUS

## 2020-03-13 ASSESSMENT — PAIN DESCRIPTION - DESCRIPTORS: DESCRIPTORS_2: CONSTANT

## 2020-03-16 ENCOUNTER — HOSPITAL ENCOUNTER (OUTPATIENT)
Dept: PHYSICAL THERAPY | Age: 85
Setting detail: THERAPIES SERIES
Discharge: HOME | End: 2020-03-16
Payer: MEDICARE

## 2020-03-16 PROCEDURE — G0283 ELEC STIM OTHER THAN WOUND: HCPCS

## 2020-03-16 ASSESSMENT — PAIN DESCRIPTION - DURATION: DURATION_2: INTERMITTENT

## 2020-03-16 ASSESSMENT — PAIN DESCRIPTION - LOCATION
LOCATION: BACK
LOCATION_2: SHOULDER

## 2020-03-16 ASSESSMENT — PAIN DESCRIPTION - ORIENTATION
ORIENTATION: LOWER
ORIENTATION_2: RIGHT

## 2020-03-16 ASSESSMENT — PAIN SCALES - GENERAL: PAINLEVEL_OUTOF10: 3

## 2020-03-16 ASSESSMENT — PAIN DESCRIPTION - INTENSITY: RATING_2: 8

## 2020-03-16 ASSESSMENT — PAIN DESCRIPTION - FREQUENCY: FREQUENCY: INTERMITTENT

## 2020-03-16 NOTE — PROGRESS NOTES
Physical Therapy  Daily Treatment Note  Date: 3/16/2020  Patient Name: Baljinder Cleveland  MRN: [id number]     :   1931    Subjective:      PT Visit Information  Onset Date: 20  PT Insurance Information: medicare/[facility]  Total # of Visits Approved: 12  Total # of Visits to Date: 4  Subjective  Subjective: less lower back pain,R denis pain still persist  Pain Screening  Patient Currently in Pain: Yes  Pain Assessment  Pain Assessment: 0-10  Pain Level: 3  Pain Location: Back  Pain Orientation: Lower  Pain Frequency: Intermittent  Pain 2  Pain Rating 2: 8  Pain Location 2: Shoulder  Pain Orientation 2: Right  Pain Duration 2: Intermittent  Vital Signs  Patient Currently in Pain: Yes       Treatment Activities:   Modalities  Moist heat: hot pack and EStim to lower back and R denis 20 min sitting     Assessment:   Conditions Requiring Skilled Therapeutic Intervention  REQUIRES PT FOLLOW UP: Yes  Discharge Recommendations: Home independently      Plan:    Plan  Current Treatment Recommendations: Modalities  Plan Comment: to hold off PT due to COVD-19 concern but will call when he is ready to come again      Treatment Charges: Minutes Units   []  Ultrasound     [x]  Electrical-Stim 20 1   []  Iontophoresis     []  Traction     []  Massage       []  Eval     []  Gait     []  Ther Exercise       []  Manual Therapy       []  Ther Activities       []  Aquatics     []  Vasopneumatic Device     []  Neuro Re-Ed       []  Other       Total Treatment Time: 20 1        Therapy Time   Individual Concurrent Group Co-treatment   Time In 1050         Time Out 1110         Minutes 20               Electronically signed by: Sonny Perez, PT

## 2020-03-17 ENCOUNTER — APPOINTMENT (OUTPATIENT)
Dept: PHYSICAL THERAPY | Age: 85
End: 2020-03-17
Payer: MEDICARE

## 2020-03-18 ENCOUNTER — APPOINTMENT (OUTPATIENT)
Dept: PHYSICAL THERAPY | Age: 85
End: 2020-03-18
Payer: MEDICARE

## 2020-03-23 ENCOUNTER — TELEPHONE (OUTPATIENT)
Dept: INFECTIOUS DISEASES | Age: 85
End: 2020-03-23

## 2020-03-23 ENCOUNTER — TELEPHONE (OUTPATIENT)
Dept: FAMILY MEDICINE CLINIC | Age: 85
End: 2020-03-23

## 2020-03-23 RX ORDER — BACLOFEN 10 MG/1
10 TABLET ORAL 3 TIMES DAILY
Qty: 60 TABLET | Refills: 0 | Status: SHIPPED | OUTPATIENT
Start: 2020-03-23 | End: 2020-04-16

## 2020-03-23 RX ORDER — PREDNISONE 20 MG/1
TABLET ORAL
Qty: 17 TABLET | Refills: 0 | Status: SHIPPED | OUTPATIENT
Start: 2020-03-23 | End: 2020-04-12 | Stop reason: SDUPTHER

## 2020-03-23 NOTE — TELEPHONE ENCOUNTER
Patient's daughter called stating that her father is having severe muscle spasms in his lower back since 3/20. It has gotten so bad that she has to stay with him to help him. It is too painful for him to lay down, so he has been sleeping upright on the couch. She states this has happened before and the only thing that gave him relief was a muscle relaxer and steroid.

## 2020-03-23 NOTE — TELEPHONE ENCOUNTER
I will call in another course of steroids and I am going to try a different muscle relaxer because I think he said the Zanaflex didn't work for him. Keep me posted on how he is doing and stay safe during all this.

## 2020-04-12 ENCOUNTER — VIRTUAL VISIT (OUTPATIENT)
Dept: FAMILY MEDICINE CLINIC | Age: 85
End: 2020-04-12
Payer: MEDICARE

## 2020-04-12 PROCEDURE — 99441 PR PHYS/QHP TELEPHONE EVALUATION 5-10 MIN: CPT | Performed by: FAMILY MEDICINE

## 2020-04-12 RX ORDER — PREDNISONE 20 MG/1
TABLET ORAL
Qty: 17 TABLET | Refills: 0 | Status: SHIPPED | OUTPATIENT
Start: 2020-04-12 | End: 2020-04-22

## 2020-04-12 RX ORDER — TIZANIDINE 2 MG/1
2 TABLET ORAL 3 TIMES DAILY
Qty: 40 TABLET | Refills: 0 | Status: SHIPPED | OUTPATIENT
Start: 2020-04-12 | End: 2020-09-04 | Stop reason: SDUPTHER

## 2020-04-16 ENCOUNTER — VIRTUAL VISIT (OUTPATIENT)
Dept: FAMILY MEDICINE CLINIC | Age: 85
End: 2020-04-16
Payer: MEDICARE

## 2020-04-16 PROBLEM — M51.36 DDD (DEGENERATIVE DISC DISEASE), LUMBAR: Status: ACTIVE | Noted: 2020-04-16

## 2020-04-16 PROBLEM — M50.30 DDD (DEGENERATIVE DISC DISEASE), CERVICAL: Status: ACTIVE | Noted: 2020-04-16

## 2020-04-16 PROCEDURE — 99443 PR PHYS/QHP TELEPHONE EVALUATION 21-30 MIN: CPT | Performed by: FAMILY MEDICINE

## 2020-04-16 RX ORDER — BUSPIRONE HYDROCHLORIDE 10 MG/1
5 TABLET ORAL 3 TIMES DAILY
Qty: 90 TABLET | Refills: 3 | Status: SHIPPED
Start: 2020-04-16 | End: 2020-05-21 | Stop reason: DRUGHIGH

## 2020-04-16 ASSESSMENT — ENCOUNTER SYMPTOMS
SORE THROAT: 0
DIARRHEA: 0
VOMITING: 0
RHINORRHEA: 0
COUGH: 0
CHEST TIGHTNESS: 0
ABDOMINAL DISTENTION: 0
CONSTIPATION: 0
NAUSEA: 0
SHORTNESS OF BREATH: 0
ABDOMINAL PAIN: 0
BACK PAIN: 1

## 2020-04-16 NOTE — PROGRESS NOTES
Jacqueline Carlisle MD  [address] [place] [address]  [address] [address], [address]  [place] [address]  Dept: [phone]  Dept Fax: [phone]     Patient ID: Tricia Jackson is a 80 y.o. male. HPI    Pt here today for an acute visit secondary to low back pain and weakness in his legs from the pain. He does get relief when he is on the Prednisone and muscle relaxers. He states he has no radiation of the pain in his legs and no numbness or tingling. No fever or chills and no B/B incontinence. He states the pain sometimes doubles him over. He states worse in the morning, but when he gets moving he does feel better. Pt denies any fever or chills. Pt today denies any HA, chest pain, or SOB. Pt denies any N/V/D/C or abdominal pain. Pt also states he is feeling more anxious with everything going on and he is taking the Buspar as prescribed. Otherwise pt doing well on current tx and no other concerns today. The patient's past medical, surgical, social, and family history as well as his current medications and allergies were reviewed as documented in today's encounter.       Current Outpatient Medications on File Prior to Visit   Medication Sig Dispense Refill    tiZANidine (ZANAFLEX) 2 MG tablet Take 1 tablet by mouth 3 times daily 40 tablet 0    predniSONE (DELTASONE) 20 MG tablet Take 2 tabs daily x 4 days, then 1 tab daily x 6 days, then 1/2 tab daily x 6 days 17 tablet 0    ipratropium (ATROVENT) 0.03 % nasal spray 2 sprays by Nasal route 3 times daily 1 Bottle 3    gabapentin (NEURONTIN) 100 MG capsule Take one pill at night 30 capsule 1    allopurinol (ZYLOPRIM) 100 MG tablet Take 1 tablet by mouth daily 90 tablet 1    lisinopril (PRINIVIL;ZESTRIL) 40 MG tablet TAKE ONE TABLET BY MOUTH DAILY 90 tablet 3    tamsulosin (FLOMAX) 0.4 MG capsule Take 1 capsule by mouth 2 times daily 180 capsule 3    UNABLE TO FIND OK TO ADMIT TO [facility] FOR INPATIENT SKILLED NURSING AND THERAPY 1 each 0    warfarin (COUMADIN) 5 MG tablet Take 2.5 mg by mouth once a week Wednesday      warfarin (COUMADIN) 5 MG tablet Take 5 mg by mouth Six times weekly      vitamin B-12 (CYANOCOBALAMIN) 1000 MCG tablet Take 1,000 mcg by mouth daily      hydrALAZINE (APRESOLINE) 50 MG tablet TAKE ONE TABLET BY MOUTH TWICE A DAY 60 tablet 11    bimatoprost (LUMIGAN) 0.01 % SOLN ophthalmic drops Place 1 drop into the left eye nightly      atorvastatin (LIPITOR) 20 MG tablet Take 1 tablet by mouth daily 90 tablet 3    melatonin 5 MG TABS tablet Take 1 tablet by mouth nightly       metoprolol tartrate (LOPRESSOR) 25 MG tablet Take 25 mg by mouth 2 times daily       No current facility-administered medications on file prior to visit. Subjective:     Review of Systems   Constitutional: Negative for appetite change, fatigue and fever. HENT: Negative for congestion, ear pain, rhinorrhea and sore throat. Respiratory: Negative for cough, chest tightness and shortness of breath. Cardiovascular: Negative for chest pain and palpitations. Gastrointestinal: Negative for abdominal distention, abdominal pain, constipation, diarrhea, nausea and vomiting. Genitourinary: Negative for difficulty urinating and dysuria. Musculoskeletal: Positive for arthralgias and back pain. Negative for myalgias. Skin: Negative for rash. Neurological: Negative for dizziness, weakness, light-headedness and headaches. Hematological: Negative for adenopathy. Psychiatric/Behavioral: Negative for behavioral problems and sleep disturbance. The patient is nervous/anxious. Objective:     Physical Exam  Pulmonary:      Effort: Pulmonary effort is normal. No respiratory distress. Neurological:      Mental Status: He is alert and oriented to person, place, and time. Psychiatric:         Mood and Affect: Mood normal.     Lumbar X-ray reviewed with pt today    Assessment:      Diagnosis Orders   1.  DDD

## 2020-04-17 ENCOUNTER — TELEPHONE (OUTPATIENT)
Dept: FAMILY MEDICINE CLINIC | Age: 85
End: 2020-04-17

## 2020-04-17 NOTE — TELEPHONE ENCOUNTER
He just needs to let Dr. Elizabeth Alexander know he has been on steroids for his back and if we can, I would like to keep him on a daily low dose Prednisone, but only if his pressures in his eyes are OK. Thanks.

## 2020-04-17 NOTE — TELEPHONE ENCOUNTER
Pt called and is having a little confusion on what Dr. Taylor Somers wanted the pt to discuss with Dr. Tomeka Mendoza. He believes he is suppose to ask if Dr. Tomeka Mendoza will check his glaucoma more frequently if you prescribe him the low dose of prednisone daily. I told him I would verify and have someone call him back.

## 2020-04-27 ENCOUNTER — TELEPHONE (OUTPATIENT)
Dept: FAMILY MEDICINE CLINIC | Age: 85
End: 2020-04-27

## 2020-04-27 RX ORDER — FUROSEMIDE 20 MG/1
20 TABLET ORAL DAILY PRN
Qty: 30 TABLET | Refills: 0 | Status: SHIPPED | OUTPATIENT
Start: 2020-04-27 | End: 2020-07-14 | Stop reason: ALTCHOICE

## 2020-04-30 ENCOUNTER — TELEPHONE (OUTPATIENT)
Dept: FAMILY MEDICINE CLINIC | Age: 85
End: 2020-04-30

## 2020-05-04 ENCOUNTER — VIRTUAL VISIT (OUTPATIENT)
Dept: FAMILY MEDICINE CLINIC | Age: 85
End: 2020-05-04
Payer: MEDICARE

## 2020-05-04 ENCOUNTER — HOSPITAL ENCOUNTER (OUTPATIENT)
Age: 85
Setting detail: SPECIMEN
Discharge: HOME | End: 2020-05-04
Payer: MEDICARE

## 2020-05-04 ENCOUNTER — TELEPHONE (OUTPATIENT)
Dept: FAMILY MEDICINE CLINIC | Age: 85
End: 2020-05-04

## 2020-05-04 LAB
ANION GAP SERPL CALCULATED.3IONS-SCNC: 11 MMOL/L (ref 9–17)
BUN BLDV-MCNC: 17 MG/DL (ref 8–23)
BUN/CREAT BLD: ABNORMAL (ref 9–20)
CALCIUM SERPL-MCNC: 8.8 MG/DL (ref 8.6–10.4)
CHLORIDE BLD-SCNC: 97 MMOL/L (ref 98–107)
CO2: 28 MMOL/L (ref 20–31)
CREAT SERPL-MCNC: 1.01 MG/DL (ref 0.7–1.2)
GFR AFRICAN AMERICAN: >60 ML/MIN
GFR NON-AFRICAN AMERICAN: >60 ML/MIN
GFR SERPL CREATININE-BSD FRML MDRD: ABNORMAL ML/MIN/{1.73_M2}
GFR SERPL CREATININE-BSD FRML MDRD: ABNORMAL ML/MIN/{1.73_M2}
GLUCOSE BLD-MCNC: 121 MG/DL (ref 70–99)
POTASSIUM SERPL-SCNC: 4.3 MMOL/L (ref 3.7–5.3)
SODIUM BLD-SCNC: 136 MMOL/L (ref 135–144)

## 2020-05-04 PROCEDURE — 99443 PR PHYS/QHP TELEPHONE EVALUATION 21-30 MIN: CPT | Performed by: FAMILY MEDICINE

## 2020-05-04 RX ORDER — PREDNISONE 1 MG/1
5 TABLET ORAL DAILY
Qty: 30 TABLET | Refills: 6 | Status: SHIPPED | OUTPATIENT
Start: 2020-05-04 | End: 2020-06-02 | Stop reason: SDUPTHER

## 2020-05-04 ASSESSMENT — ENCOUNTER SYMPTOMS
ABDOMINAL DISTENTION: 0
DIARRHEA: 0
NAUSEA: 0
BACK PAIN: 1
CHEST TIGHTNESS: 0
SHORTNESS OF BREATH: 0
CONSTIPATION: 0
RHINORRHEA: 0
ABDOMINAL PAIN: 0
SORE THROAT: 0
COUGH: 0
VOMITING: 0

## 2020-05-04 NOTE — PROGRESS NOTES
Jacqueline Estrada MD  [address] [place] [address] FAMILY [facility]  [address] [address], [address]  [address] [address]  Dept: [phone]  Dept Fax: [phone]     Patient ID: Leonel Ruiz is a 80 y.o. male. HPI    Pt for a phone follow up on his low back pain, anxiety, and lower extremity swelling. He states today he is feeling great. He did take the Lasix for the past 5 days and has been wearing compression stockings and the swelling in his legs has gone down. He states the increased Buspar has really helped with his anxiety and is not feeling as anxious. He also states his back pain has improved. He did see Dr. Nunu Barnett and he did tell him he could be on long term steroids if it would benefit from his back pain. Pt denies any fever or chills. Pt today denies any HA, chest pain, or SOB. Pt denies any N/V/D/C or abdominal pain. Otherwise pt doing well on current tx and no other concerns today. The patient's past medical, surgical, social, and family history as well as his current medications and allergies were reviewed as documented in today's encounter.       Current Outpatient Medications on File Prior to Visit   Medication Sig Dispense Refill    furosemide (LASIX) 20 MG tablet Take 1 tablet by mouth daily as needed (lower extremity swelling) 30 tablet 0    busPIRone (BUSPAR) 10 MG tablet Take 0.5 tablets by mouth 3 times daily (Patient taking differently: Take 10 mg by mouth 2 times daily ) 90 tablet 3    tiZANidine (ZANAFLEX) 2 MG tablet Take 1 tablet by mouth 3 times daily 40 tablet 0    ipratropium (ATROVENT) 0.03 % nasal spray 2 sprays by Nasal route 3 times daily 1 Bottle 3    allopurinol (ZYLOPRIM) 100 MG tablet Take 1 tablet by mouth daily 90 tablet 1    lisinopril (PRINIVIL;ZESTRIL) 40 MG tablet TAKE ONE TABLET BY MOUTH DAILY 90 tablet 3    tamsulosin (FLOMAX) 0.4 MG capsule Take 1 capsule by mouth 2 times daily 180 capsule 3    UNABLE TO FIND OK TO ADMIT TO pending    Assessment:      Diagnosis Orders   1. DDD (degenerative disc disease), lumbar     2. Chronic bilateral low back pain without sciatica     3. Anxiety     4. Swelling of both lower extremities         Plan:        Orders Placed This Encounter   Medications    predniSONE (DELTASONE) 5 MG tablet     Sig: Take 1 tablet by mouth daily for 10 days     Dispense:  30 tablet     Refill:  6     Will cont with the Buspar 10mg TID as he is doing much better    Will cont with the Zanaflex or Baclofen for his low back pain and I did have a long d/w pt about being on a chronic low dose prednisone for his back pain. I did d/w pt about side effects, but I do feel the risk outweighs the benefit. I am going to also have him get on OTC Omeprazole 20mg daily for GI prophylaxis with being on the long term steroid. Will cont wearing the compression stockings and will use the Lasix prn. Will call him with the results of his BMP he had drawn today    Rest of systems unchanged, continue current treatments. Medications, labs, diagnostic studies, consultations and follow-up as documented in this encounter. Rest of systems unchanged, continue current treatments    Heather Mcneill is a 80 y.o. male being evaluated by a Virtual Visit (phone visit) encounter to address concerns as mentioned above. A caregiver was present when appropriate. Due to this being a TeleHealth encounter (During [facility] public health emergency), evaluation of the following organ systems was limited: Vitals/Constitutional/EENT/Resp/CV/GI//MS/Neuro/Skin/Heme-Lymph-Imm. Pursuant to the emergency declaration under the [address] [place] [address], [address] [place] [address] authority and the [employer] and [employer] Act, this Virtual Visit was conducted with patient's (and/or legal guardian's) consent, to reduce the patient's risk of exposure to COVID-19 and provide necessary medical care.   The patient (and/or legal guardian) has also been advised to contact this office for worsening conditions or problems, and seek emergency medical treatment and/or call 911 if deemed necessary. Patient identification was verified at the start of the visit: Yes    Total time spent for this encounter: 24  minutes    Services were provided through a phine synchronous discussion virtually to substitute for in-person clinic visit. Patient and provider were located at their individual homes. --Tee Suarez MD on 5/4/2020 at 11:08 AM    An electronic signature was used to authenticate this note. Jacqueline Montesinos MD

## 2020-05-04 NOTE — TELEPHONE ENCOUNTER
Discussed results and need for the omeprazole because of his chronic steroid therapy. He stated his understanding.

## 2020-05-04 NOTE — PROGRESS NOTES
reviewed and does contribute to the patient presenting condition    Health Maintenance   Topic Date Due    Shingles Vaccine (2 of 3) 06/25/2020 (Originally 9/9/2012)    Annual Wellness Visit (AWV)  07/17/2020    DTaP/Tdap/Td vaccine (2 - Td) 08/23/2020    Lipid screen  01/02/2021    Potassium monitoring  01/02/2021    Creatinine monitoring  01/02/2021    Flu vaccine  Completed    Pneumococcal 65+ years Vaccine  Completed    Hepatitis A vaccine  Aged Out    Hepatitis B vaccine  Aged Out    Hib vaccine  Aged Out    Meningococcal (ACWY) vaccine  Aged Out   Patient/Caregiver verbalize understanding of medications.   Yes

## 2020-05-05 ENCOUNTER — TELEPHONE (OUTPATIENT)
Dept: FAMILY MEDICINE CLINIC | Age: 85
End: 2020-05-05

## 2020-05-11 ENCOUNTER — TELEPHONE (OUTPATIENT)
Dept: FAMILY MEDICINE CLINIC | Age: 85
End: 2020-05-11

## 2020-05-11 RX ORDER — ALLOPURINOL 100 MG/1
100 TABLET ORAL DAILY
Qty: 90 TABLET | Refills: 3 | Status: SHIPPED | OUTPATIENT
Start: 2020-05-11 | End: 2021-04-09

## 2020-05-11 NOTE — TELEPHONE ENCOUNTER
Pt called in to let Dr. Lin Mcclain know that he is doing better since taking the prednisone. Also, pt would like a refill of allopurinol  [facility] to [facility] [address].

## 2020-05-13 ENCOUNTER — OFFICE VISIT (OUTPATIENT)
Dept: PODIATRY | Age: 85
End: 2020-05-13
Payer: MEDICARE

## 2020-05-13 VITALS — HEIGHT: 64 IN | WEIGHT: 160 LBS | BODY MASS INDEX: 27.31 KG/M2

## 2020-05-13 PROCEDURE — 99999 PR OFFICE/OUTPT VISIT,PROCEDURE ONLY: CPT | Performed by: PODIATRIST

## 2020-05-13 PROCEDURE — 11721 DEBRIDE NAIL 6 OR MORE: CPT | Performed by: PODIATRIST

## 2020-05-13 RX ORDER — GABAPENTIN 100 MG/1
CAPSULE ORAL
COMMUNITY
Start: 2019-12-17 | End: 2020-07-14 | Stop reason: ALTCHOICE

## 2020-05-13 ASSESSMENT — ENCOUNTER SYMPTOMS
VOMITING: 0
COLOR CHANGE: 0
DIARRHEA: 0
NAUSEA: 0
CONSTIPATION: 0

## 2020-05-13 NOTE — PROGRESS NOTES
times daily ) 90 tablet 3    tiZANidine (ZANAFLEX) 2 MG tablet Take 1 tablet by mouth 3 times daily 40 tablet 0    ipratropium (ATROVENT) 0.03 % nasal spray 2 sprays by Nasal route 3 times daily 1 Bottle 3    lisinopril (PRINIVIL;ZESTRIL) 40 MG tablet TAKE ONE TABLET BY MOUTH DAILY 90 tablet 3    tamsulosin (FLOMAX) 0.4 MG capsule Take 1 capsule by mouth 2 times daily 180 capsule 3    UNABLE TO FIND OK TO ADMIT TO [facility] FOR INPATIENT SKILLED NURSING AND THERAPY 1 each 0    warfarin (COUMADIN) 5 MG tablet Take 2.5 mg by mouth once a week Wednesday      vitamin B-12 (CYANOCOBALAMIN) 1000 MCG tablet Take 1,000 mcg by mouth daily      hydrALAZINE (APRESOLINE) 50 MG tablet TAKE ONE TABLET BY MOUTH TWICE A DAY 60 tablet 11    bimatoprost (LUMIGAN) 0.01 % SOLN ophthalmic drops Place 1 drop into both eyes nightly       atorvastatin (LIPITOR) 20 MG tablet Take 1 tablet by mouth daily 90 tablet 3    melatonin 5 MG TABS tablet Take 1 tablet by mouth nightly       metoprolol tartrate (LOPRESSOR) 25 MG tablet Take 25 mg by mouth 2 times daily       No current facility-administered medications on file prior to visit. Review of Systems   Constitutional: Negative for chills, diaphoresis, fatigue, fever and unexpected weight change. Cardiovascular: Negative for leg swelling. Gastrointestinal: Negative for constipation, diarrhea, nausea and vomiting. Musculoskeletal: Positive for gait problem. Negative for arthralgias and joint swelling. Skin: Negative for color change, pallor, rash and wound. Neurological: Negative for weakness and numbness. Objective:  General: AAO x 3 in NAD. Derm  Left hallux nail incurvated medial border with slight redness and pain to palpation.     Sites of Onychomycosis Involvement (Check affected area)  [x] [x] [x] [x] [x] [x] [x] [x] [x] [x]  5 4 3 2 1 1 2 3 4 5                          Right Left    Thickness  [x] [x] [x] [x] [x] [x] [x] [x] [x] [x]  5 4 3 2 1 1 2 3 4 5                         Right                                        Left    Dystrophic Changes                                                                 [x] [x] [x] [x] [x] [x] [x] [x] [x] [x]  5 4 3 2 1 1 2 3 4 5                         Right                                        Left    Color                                                                  [x] [x] [x] [x] [x] [x] [x] [x] [x] [x]  5 4 3 2 1 1 2 3 4 5                          Right                                        Left    Incurvation/Ingrowin                                                                   [] [] [] [] [] [] [] [] [] []  5 4 3 2 1 1 2 3 4 5                         Right                                        Left    Inflammation/Pain                                                                   [x] [x] [x] [x] [x] [x] [x] [x] [x] [x]  5 4 3 2 1 1 2 3 4 5                         Right                                        Left    Vascular:  DP/PT pulses palpable 2/4, Bilateral.    CFT <3 seconds to digits 1-5, Bilateral .   Hair growth present to level of digits, Bilateral.    Neurological:  Sensation present to light touch to level of digits, Bilateral.    Assessment:  80 y.o. male with:   1. Onychomycosis    2. Pain in toes of both feet       Orders Placed This Encounter   Procedures    NC DEBRIDEMENT OF NAILS, 6 OR MORE         Plan:   Pt was evaluated and examined. Patient was given personalized discharge instructions. Nails 1-10 were debrided in length and thickness sharply with a nail nipper and  without incident, slant back performed. Pt will follow up in 9-12 weeks or sooner if any problems arise. Diagnosis was discussed with the pt and all of their questions were answered in detail. Proper foot hygiene and care was discussed with the pt.  Patient to check feet daily and contact the office with any

## 2020-05-21 RX ORDER — BUSPIRONE HYDROCHLORIDE 10 MG/1
10 TABLET ORAL 2 TIMES DAILY
Qty: 60 TABLET | Refills: 11 | Status: SHIPPED | OUTPATIENT
Start: 2020-05-21 | End: 2021-01-13 | Stop reason: SDUPTHER

## 2020-05-21 RX ORDER — BUSPIRONE HYDROCHLORIDE 10 MG/1
10 TABLET ORAL 2 TIMES DAILY
COMMUNITY
End: 2020-05-21 | Stop reason: SDUPTHER

## 2020-06-02 ENCOUNTER — VIRTUAL VISIT (OUTPATIENT)
Dept: FAMILY MEDICINE CLINIC | Age: 85
End: 2020-06-02
Payer: MEDICARE

## 2020-06-02 PROCEDURE — 99443 PR PHYS/QHP TELEPHONE EVALUATION 21-30 MIN: CPT | Performed by: FAMILY MEDICINE

## 2020-06-02 RX ORDER — PREDNISONE 1 MG/1
5 TABLET ORAL DAILY
Qty: 30 TABLET | Refills: 6
Start: 2020-06-02 | End: 2020-07-02

## 2020-06-02 ASSESSMENT — ENCOUNTER SYMPTOMS
ABDOMINAL PAIN: 0
COUGH: 0
RHINORRHEA: 0
NAUSEA: 0
DIARRHEA: 0
ABDOMINAL DISTENTION: 0
CONSTIPATION: 0
SORE THROAT: 0
CHEST TIGHTNESS: 0
VOMITING: 0
SHORTNESS OF BREATH: 0
BACK PAIN: 1

## 2020-06-02 NOTE — PROGRESS NOTES
Visit Information    Have you changed or started any medications since your last visit including any over-the-counter medicines, vitamins, or herbal medicines? no   Are you having any side effects from any of your medications? -  no  Have you stopped taking any of your medications? Is so, why? -  no    Have you seen any other physician or provider since your last visit? No  Have you had any other diagnostic tests since your last visit? No  Have you been seen in the emergency room and/or had an admission to a hospital since we last saw you? No  Have you had your routine dental cleaning in the past 6 months? no    Have you activated your [employer] account? If not, what are your barriers?  Yes     Patient Care Team:  Emma Salazar MD as PCP - General (Family Medicine)  Emma Salazar MD as PCP - [facility]  Henrique Demarco MD as Consulting Physician (Dermatology)  Grisel Ross MD as Consulting Physician (Cardiology)  Tg Cottrell MD as Consulting Physician (Gastroenterology)  Yolanda Mann MD as Surgeon (Ophthalmology)  Alida Schafer MD as Surgeon (Ophthalmology)  Alexander Boo MD as Consulting Physician (Urology)  Isabel Bowers DO as Consulting Physician (General Surgery)  Randi Soulier, DPM as Consulting Physician (Podiatry)  Brett Eric MD as Consulting Physician (Internal Medicine)    Medical History Review  Past Medical, Family, and Social History reviewed and does not contribute to the patient presenting condition    Health Maintenance   Topic Date Due    Shingles Vaccine (2 of 3) 06/25/2020 (Originally 9/9/2012)    Annual Wellness Visit (AWV)  07/17/2020    DTaP/Tdap/Td vaccine (2 - Td) 08/23/2020    Lipid screen  01/02/2021    Potassium monitoring  05/04/2021    Creatinine monitoring  05/04/2021    Flu vaccine  Completed    Pneumococcal 65+ years Vaccine  Completed    Hepatitis A vaccine  Aged Out    Hepatitis B vaccine  Aged Out    Hib vaccine  Aged

## 2020-07-06 ENCOUNTER — HOSPITAL ENCOUNTER (OUTPATIENT)
Age: 85
Setting detail: SPECIMEN
Discharge: HOME | End: 2020-07-06
Payer: MEDICARE

## 2020-07-06 LAB
ALBUMIN SERPL-MCNC: 3.8 G/DL (ref 3.5–5.2)
ALBUMIN/GLOBULIN RATIO: 1.6 (ref 1–2.5)
ALP BLD-CCNC: 69 U/L (ref 40–129)
ALT SERPL-CCNC: 15 U/L (ref 5–41)
ANION GAP SERPL CALCULATED.3IONS-SCNC: 13 MMOL/L (ref 9–17)
AST SERPL-CCNC: 18 U/L
BILIRUB SERPL-MCNC: 1.2 MG/DL (ref 0.3–1.2)
BUN BLDV-MCNC: 15 MG/DL (ref 8–23)
BUN/CREAT BLD: ABNORMAL (ref 9–20)
CALCIUM SERPL-MCNC: 9.1 MG/DL (ref 8.6–10.4)
CHLORIDE BLD-SCNC: 102 MMOL/L (ref 98–107)
CHOLESTEROL/HDL RATIO: 2.3
CHOLESTEROL: 143 MG/DL
CO2: 27 MMOL/L (ref 20–31)
CREAT SERPL-MCNC: 0.85 MG/DL (ref 0.7–1.2)
GFR AFRICAN AMERICAN: >60 ML/MIN
GFR NON-AFRICAN AMERICAN: >60 ML/MIN
GFR SERPL CREATININE-BSD FRML MDRD: ABNORMAL ML/MIN/{1.73_M2}
GFR SERPL CREATININE-BSD FRML MDRD: ABNORMAL ML/MIN/{1.73_M2}
GLUCOSE BLD-MCNC: 83 MG/DL (ref 70–99)
HCT VFR BLD CALC: 45.5 % (ref 40.7–50.3)
HDLC SERPL-MCNC: 63 MG/DL
HEMOGLOBIN: 14.4 G/DL (ref 13–17)
LDL CHOLESTEROL: 65 MG/DL (ref 0–130)
MCH RBC QN AUTO: 30.1 PG (ref 25.2–33.5)
MCHC RBC AUTO-ENTMCNC: 31.6 G/DL (ref 28.4–34.8)
MCV RBC AUTO: 95 FL (ref 82.6–102.9)
NRBC AUTOMATED: 0 PER 100 WBC
PDW BLD-RTO: 14.9 % (ref 11.8–14.4)
PLATELET # BLD: 196 K/UL (ref 138–453)
PMV BLD AUTO: 11 FL (ref 8.1–13.5)
POTASSIUM SERPL-SCNC: 4.5 MMOL/L (ref 3.7–5.3)
RBC # BLD: 4.79 M/UL (ref 4.21–5.77)
SODIUM BLD-SCNC: 142 MMOL/L (ref 135–144)
TOTAL PROTEIN: 6.2 G/DL (ref 6.4–8.3)
TRIGL SERPL-MCNC: 73 MG/DL
URIC ACID: 4 MG/DL (ref 3.4–7)
VITAMIN B-12: 276 PG/ML (ref 232–1245)
VLDLC SERPL CALC-MCNC: NORMAL MG/DL (ref 1–30)
WBC # BLD: 6.9 K/UL (ref 3.5–11.3)

## 2020-07-13 RX ORDER — HYDRALAZINE HYDROCHLORIDE 50 MG/1
TABLET, FILM COATED ORAL
Qty: 60 TABLET | Refills: 11 | Status: SHIPPED | OUTPATIENT
Start: 2020-07-13 | End: 2021-03-05

## 2020-07-14 ENCOUNTER — VIRTUAL VISIT (OUTPATIENT)
Dept: FAMILY MEDICINE CLINIC | Age: 85
End: 2020-07-14
Payer: MEDICARE

## 2020-07-14 PROBLEM — I50.43 ACUTE ON CHRONIC COMBINED SYSTOLIC AND DIASTOLIC CONGESTIVE HEART FAILURE (HCC): Status: RESOLVED | Noted: 2019-08-27 | Resolved: 2020-07-14

## 2020-07-14 PROCEDURE — 4040F PNEUMOC VAC/ADMIN/RCVD: CPT | Performed by: FAMILY MEDICINE

## 2020-07-14 PROCEDURE — G8427 DOCREV CUR MEDS BY ELIG CLIN: HCPCS | Performed by: FAMILY MEDICINE

## 2020-07-14 PROCEDURE — 1036F TOBACCO NON-USER: CPT | Performed by: FAMILY MEDICINE

## 2020-07-14 PROCEDURE — G8417 CALC BMI ABV UP PARAM F/U: HCPCS | Performed by: FAMILY MEDICINE

## 2020-07-14 PROCEDURE — 1123F ACP DISCUSS/DSCN MKR DOCD: CPT | Performed by: FAMILY MEDICINE

## 2020-07-14 PROCEDURE — 99214 OFFICE O/P EST MOD 30 MIN: CPT | Performed by: FAMILY MEDICINE

## 2020-07-14 RX ORDER — PREDNISONE 1 MG/1
5 TABLET ORAL DAILY
COMMUNITY
End: 2020-11-09

## 2020-07-14 RX ORDER — NITROGLYCERIN 0.4 MG/1
0.4 TABLET SUBLINGUAL EVERY 5 MIN PRN
COMMUNITY
End: 2021-05-18 | Stop reason: ALTCHOICE

## 2020-07-14 ASSESSMENT — ENCOUNTER SYMPTOMS
VOMITING: 0
CONSTIPATION: 0
CHEST TIGHTNESS: 0
DIARRHEA: 0
ABDOMINAL DISTENTION: 0
SHORTNESS OF BREATH: 0
ABDOMINAL PAIN: 0
BACK PAIN: 1
SORE THROAT: 0
COUGH: 0
RHINORRHEA: 0
NAUSEA: 0

## 2020-07-14 NOTE — PROGRESS NOTES
(PRINIVIL;ZESTRIL) 40 MG tablet TAKE ONE TABLET BY MOUTH DAILY 90 tablet 3    tamsulosin (FLOMAX) 0.4 MG capsule Take 1 capsule by mouth 2 times daily 180 capsule 3    warfarin (COUMADIN) 5 MG tablet Take 2.5 mg by mouth once a week Wednesday      bimatoprost (LUMIGAN) 0.01 % SOLN ophthalmic drops Place 1 drop into both eyes nightly       melatonin 5 MG TABS tablet Take 1 tablet by mouth nightly       metoprolol tartrate (LOPRESSOR) 25 MG tablet Take 25 mg by mouth 2 times daily       No current facility-administered medications on file prior to visit. Subjective:     Review of Systems   Constitutional: Negative for appetite change, fatigue and fever. HENT: Negative for congestion, ear pain, rhinorrhea and sore throat. Respiratory: Negative for cough, chest tightness and shortness of breath. Cardiovascular: Negative for chest pain and palpitations. Gastrointestinal: Negative for abdominal distention, abdominal pain, constipation, diarrhea, nausea and vomiting. Occ heartburn   Genitourinary: Negative for difficulty urinating and dysuria. Musculoskeletal: Positive for arthralgias, back pain and neck pain. Negative for myalgias. Skin: Negative for rash. Neurological: Negative for dizziness, weakness, light-headedness and headaches. Hematological: Negative for adenopathy. Psychiatric/Behavioral: Positive for sleep disturbance (stable). Negative for behavioral problems. The patient is nervous/anxious (stable). Objective:     Physical Exam  Vitals reviewed: Unable to do vital signs, as this is a virtual visit. Constitutional:       General: He is not in acute distress. Appearance: He is well-developed. Pulmonary:      Effort: Pulmonary effort is normal. No tachypnea, accessory muscle usage or respiratory distress.       Comments: Patient able to speak in full sentences without difficulty  Neurological:      Mental Status: He is alert and oriented to person, place, and Virtual Visit (video visit) encounter to address concerns as mentioned above. A caregiver was present when appropriate. Due to this being a TeleHealth encounter (During [facility] public health emergency), evaluation of the following organ systems was limited: Vitals/Constitutional/EENT/Resp/CV/GI//MS/Neuro/Skin/Heme-Lymph-Imm. Pursuant to the emergency declaration under the [address] [place] [address] and the [employer] and [employer] Act, this Virtual Visit was conducted with patient's (and/or legal guardian's) consent, to reduce the patient's risk of exposure to COVID-19 and provide necessary medical care. The patient (and/or legal guardian) has also been advised to contact this office for worsening conditions or problems, and seek emergency medical treatment and/or call 911 if deemed necessary. Patient identification was verified at the start of the visit: Yes    Total time spent for this encounter: Not billed by time    Services were provided through a video synchronous discussion virtually to substitute for in-person clinic visit. Patient and provider were located at their individual homes. --Charlie Daniels MD on 7/14/2020 at 3:52 PM    An electronic signature was used to authenticate this note. Jacqueline Yung MD

## 2020-07-14 NOTE — PROGRESS NOTES
HYPERTENSION visit     BP Readings from Last 3 Encounters:   01/27/20 138/76   01/13/20 128/78   12/17/19 130/74       LDL Calculated (mg/dL)   Date Value   05/23/2017 53     LDL Cholesterol (mg/dL)   Date Value   07/06/2020 65     HDL (mg/dL)   Date Value   07/06/2020 63     BUN (mg/dL)   Date Value   07/06/2020 15     CREATININE (mg/dL)   Date Value   07/06/2020 0.85     Glucose   Date Value   07/06/2020 83 mg/dL   05/23/2017 89 mg/dl              Have you changed or started any medications since your last visit including any over-the-counter medicines, vitamins, or herbal medicines? no   Have you stopped taking any of your medications? Is so, why? -  no  Are you having any side effects from any of your medications? - no  How often do you miss doses of your medication? rare      Have you seen any other physician or provider since your last visit? yes - Dr. Poly Garzon   Have you had any other diagnostic tests since your last visit? yes - labs   Have you been seen in the emergency room and/or had an admission in a hospital since we last saw you?  no   Have you had your routine dental cleaning in the past 6 months?  yes -      Do you have an active MyChart account? If no, what is the barrier?   Yes    Patient Care Team:  Brian Cruz MD as PCP - General (Family Medicine)  Brian Cruz MD as PCP - [facility]  Sherin Turk MD as Consulting Physician (Dermatology)  Abdulaziz Huitron MD as Consulting Physician (Cardiology)  Sherman Rider MD as Consulting Physician (Gastroenterology)  Marty Navarro MD as Surgeon (Ophthalmology)  Ruben Bernardo MD as Surgeon (Ophthalmology)  Wilfred Zavala MD as Consulting Physician (Urology)  Flaco Le DO as Consulting Physician (General Surgery)  Luis Miguel Bone DPM as Consulting Physician (Podiatry)  Yvonne Foss MD as Consulting Physician (Internal Medicine)    Medical History Review  Past Medical, Family, and Social History reviewed and does contribute to the patient presenting condition    Health Maintenance   Topic Date Due    Shingles Vaccine (2 of 3) 09/09/2012    Annual Wellness Visit (AWV)  07/17/2020    DTaP/Tdap/Td vaccine (2 - Td) 08/23/2020    Flu vaccine (1) 09/01/2020    Lipid screen  07/06/2021    Potassium monitoring  07/06/2021    Creatinine monitoring  07/06/2021    Pneumococcal 65+ years Vaccine  Completed    Hepatitis A vaccine  Aged Out    Hepatitis B vaccine  Aged Out    Hib vaccine  Aged Out    Meningococcal (ACWY) vaccine  Aged Out     Patient/Caregiver verbalize understanding of medications.   Yes

## 2020-07-15 ENCOUNTER — OFFICE VISIT (OUTPATIENT)
Dept: PODIATRY | Age: 85
End: 2020-07-15
Payer: MEDICARE

## 2020-07-15 VITALS — WEIGHT: 160 LBS | BODY MASS INDEX: 27.31 KG/M2 | HEIGHT: 64 IN

## 2020-07-15 PROCEDURE — 11721 DEBRIDE NAIL 6 OR MORE: CPT | Performed by: PODIATRIST

## 2020-07-15 PROCEDURE — 99999 PR OFFICE/OUTPT VISIT,PROCEDURE ONLY: CPT | Performed by: PODIATRIST

## 2020-07-15 RX ORDER — LATANOPROST 50 UG/ML
1 SOLUTION/ DROPS OPHTHALMIC NIGHTLY
COMMUNITY

## 2020-07-15 ASSESSMENT — ENCOUNTER SYMPTOMS
NAUSEA: 0
DIARRHEA: 0
VOMITING: 0
CONSTIPATION: 0
COLOR CHANGE: 0

## 2020-07-15 NOTE — PROGRESS NOTES
[facility]  Return Patient    Chief Complaint   Patient presents with    Nail Problem     nail trim/last saw Neftali Just 7/15/2020       Subjective: This Jillian Castillo comes to clinic for foot and nail care. He would like all of his nails trimmed. He cannot take care of them himself. They are painful when long and he tries to wear shoes. Pt's primary care physician is Geoff Truong MD. His right hallux nail is pain free. Past Medical History:   Diagnosis Date    Acute MI ([address]     Allergic rhinitis 9/2/2011    Anemia     Basal cell cancer 03/2013    left side of head, face chin    Basal cell cancer 03/10/14    left cheek    Cervical radiculopathy     Diverticulitis 02/17/13    Epigastric pain/GERD. 9/2/2011    GERD (gastroesophageal reflux disease)     Glaucoma     Hyperlipidemia     Hypertension     Hyponatremia 9/2/2011    IBS (irritable bowel syndrome) 9/2/2011    Insomnia 9/2/2011    Osteoarthritis     Osteoarthritis/neck pain.  9/2/2011    Renal calculi     Shingles     history of shingles       Allergies   Allergen Reactions    Celebrex [Celecoxib] Nausea Only    Mobic Nausea Only     Current Outpatient Medications on File Prior to Visit   Medication Sig Dispense Refill    latanoprost (XALATAN) 0.005 % ophthalmic solution 1 drop nightly      predniSONE (DELTASONE) 5 MG tablet Take 5 mg by mouth daily      hydrALAZINE (APRESOLINE) 50 MG tablet TAKE ONE TABLET BY MOUTH TWICE A DAY 60 tablet 11    busPIRone (BUSPAR) 10 MG tablet Take 1 tablet by mouth 2 times daily 60 tablet 11    allopurinol (ZYLOPRIM) 100 MG tablet Take 1 tablet by mouth daily 90 tablet 3    lisinopril (PRINIVIL;ZESTRIL) 40 MG tablet TAKE ONE TABLET BY MOUTH DAILY 90 tablet 3    tamsulosin (FLOMAX) 0.4 MG capsule Take 1 capsule by mouth 2 times daily 180 capsule 3    warfarin (COUMADIN) 5 MG tablet Take 2.5 mg by mouth once a week Wednesday      melatonin 5 MG TABS tablet Take 1 Left    Incurvation/Ingrowin                                                                   [] [] [] [] [] [] [] [] [] []  5 4 3 2 1 1 2 3 4 5                         Right                                        Left    Inflammation/Pain                                                                   [x] [x] [x] [x] [x] [x] [x] [x] [x] [x]  5 4 3 2 1 1 2 3 4 5                         Right                                        Left    Vascular:  DP/PT pulses palpable 2/4, Bilateral.    CFT <3 seconds to digits 1-5, Bilateral .   Hair growth present to level of digits, Bilateral.    Neurological:  Sensation present to light touch to level of digits, Bilateral.    Assessment:  80 y.o. male with:   1. Onychomycosis    2. Pain in toes of both feet       Orders Placed This Encounter   Procedures    [place] DEBRIDEMENT OF NAILS, 6 OR MORE         Plan:   Pt was evaluated and examined. Patient was given personalized discharge instructions. Nails 1-10 were debrided in length and thickness sharply with a nail nipper and  without incident, slant back performed. Pt will follow up in 9-12 weeks or sooner if any problems arise. Diagnosis was discussed with the pt and all of their questions were answered in detail. Proper foot hygiene and care was discussed with the pt. Patient to check feet daily and contact the office with any questions/problems/concerns. Other comorbidity noted and will be managed by PCP. Pain waiver discussed with patient and confirmed.          7/15/2020    Electronically signed by Reji Lott DPM on 7/15/2020 at 11:49 AM

## 2020-08-07 ENCOUNTER — TELEPHONE (OUTPATIENT)
Dept: FAMILY MEDICINE CLINIC | Age: 85
End: 2020-08-07

## 2020-08-07 NOTE — TELEPHONE ENCOUNTER
It can be elevated because he is feeling nervous. He has been on the low side so I do not want to increase his doses of his BP pills. As long as it stays below 150-160/90 we are OK. Have him monitor it over the weekend and next week and call with some BP readings next week.

## 2020-08-07 NOTE — TELEPHONE ENCOUNTER
Patient went to a dermatologist, and they took his blood pressure and it's been elevated. It has been high the past couple days. Patient feels nervous and it wondering at what point he should be concerned. Wanting a call back from clinical regarding this.

## 2020-08-07 NOTE — TELEPHONE ENCOUNTER
Spoke with patient and relayed message.   Patient expressed understanding and said he will call with bp readings

## 2020-08-10 ENCOUNTER — TELEPHONE (OUTPATIENT)
Dept: FAMILY MEDICINE CLINIC | Age: 85
End: 2020-08-10

## 2020-08-10 NOTE — TELEPHONE ENCOUNTER
Please recommend appointment, I can see him tomorrow for an in person visit. For any BP >180/110 is means to be seen emergently. Please ensure he is currently not having any CP, SOB, or severe headache?

## 2020-08-10 NOTE — TELEPHONE ENCOUNTER
Patient was supposed to call in with BP readings. Friday (08/07/2020) 154/82, 161/102. Saturday (08/08/2020) was 150/76. Today (08/10/2020) 161/103. Patient is wondering if he should have a appt or what at this point. Please advise.

## 2020-08-11 ENCOUNTER — OFFICE VISIT (OUTPATIENT)
Dept: FAMILY MEDICINE CLINIC | Age: 85
End: 2020-08-11
Payer: MEDICARE

## 2020-08-11 VITALS
RESPIRATION RATE: 16 BRPM | HEART RATE: 78 BPM | SYSTOLIC BLOOD PRESSURE: 138 MMHG | BODY MASS INDEX: 27.98 KG/M2 | OXYGEN SATURATION: 98 % | DIASTOLIC BLOOD PRESSURE: 80 MMHG | WEIGHT: 163 LBS | TEMPERATURE: 98 F

## 2020-08-11 PROCEDURE — G8417 CALC BMI ABV UP PARAM F/U: HCPCS | Performed by: NURSE PRACTITIONER

## 2020-08-11 PROCEDURE — 99213 OFFICE O/P EST LOW 20 MIN: CPT | Performed by: NURSE PRACTITIONER

## 2020-08-11 PROCEDURE — G8427 DOCREV CUR MEDS BY ELIG CLIN: HCPCS | Performed by: NURSE PRACTITIONER

## 2020-08-11 PROCEDURE — 1123F ACP DISCUSS/DSCN MKR DOCD: CPT | Performed by: NURSE PRACTITIONER

## 2020-08-11 PROCEDURE — 1036F TOBACCO NON-USER: CPT | Performed by: NURSE PRACTITIONER

## 2020-08-11 PROCEDURE — 4040F PNEUMOC VAC/ADMIN/RCVD: CPT | Performed by: NURSE PRACTITIONER

## 2020-08-11 ASSESSMENT — ENCOUNTER SYMPTOMS: SHORTNESS OF BREATH: 0

## 2020-08-11 NOTE — PROGRESS NOTES
2013    HERNIA REPAIR      LITHOTRIPSY      MOHS SURGERY Left 03/10/14    cheek    MOHS SURGERY  14    post scalp    MOHS SURGERY Left 08/2017    X2 left cheek    MOHS SURGERY Left 10/21/2019    temple    OTHER SURGICAL HISTORY  9/6/15    attempted ERCP    SKIN BIOPSY  13    3 areas    SKIN BIOPSY Left 14    cheek /basal cell carinoma    SKIN CANCER EXCISION      left face and top of head    SKIN CANCER EXCISION Left 2013    forehead, cheek, chin, basal cell.  SKIN CANCER EXCISION Right 2016    with skin graft    SPINE SURGERY  2003       Family History   Problem Relation Age of Onset    Heart Disease Mother     Lung Cancer Father     Cancer Father         lung    Heart Disease Brother        Social History     Tobacco Use    Smoking status: Former Smoker     Packs/day: 1.00     Years: 10.00     Pack years: 10.00     Last attempt to quit: 1961     Years since quittin.6    Smokeless tobacco: Never Used    Tobacco comment: quit    Substance Use Topics    Alcohol use: Yes     Alcohol/week: 0.0 standard drinks     Comment: 2x/week      Current Outpatient Medications   Medication Sig Dispense Refill    latanoprost (XALATAN) 0.005 % ophthalmic solution 1 drop nightly      predniSONE (DELTASONE) 5 MG tablet Take 5 mg by mouth daily      nitroGLYCERIN (NITROSTAT) 0.4 MG SL tablet Place 0.4 mg under the tongue every 5 minutes as needed for Chest pain up to max of 3 total doses. If no relief after 1 dose, call 911.       hydrALAZINE (APRESOLINE) 50 MG tablet TAKE ONE TABLET BY MOUTH TWICE A DAY 60 tablet 11    busPIRone (BUSPAR) 10 MG tablet Take 1 tablet by mouth 2 times daily 60 tablet 11    allopurinol (ZYLOPRIM) 100 MG tablet Take 1 tablet by mouth daily 90 tablet 3    tiZANidine (ZANAFLEX) 2 MG tablet Take 1 tablet by mouth 3 times daily 40 tablet 0    lisinopril (PRINIVIL;ZESTRIL) 40 MG tablet TAKE ONE TABLET BY MOUTH DAILY 90 tablet 3    tamsulosin (FLOMAX) 0.4 MG capsule Take 1 capsule by mouth 2 times daily 180 capsule 3    warfarin (COUMADIN) 5 MG tablet Take 2.5 mg by mouth once a week Wednesday      melatonin 5 MG TABS tablet Take 1 tablet by mouth nightly       metoprolol tartrate (LOPRESSOR) 25 MG tablet Take 25 mg by mouth 2 times daily      ipratropium (ATROVENT) 0.03 % nasal spray 2 sprays by Nasal route 3 times daily (Patient not taking: Reported on 7/15/2020) 1 Bottle 3     No current facility-administered medications for this visit. Allergies   Allergen Reactions    Celebrex [Celecoxib] Nausea Only    Mobic Nausea Only       Health Maintenance   Topic Date Due    Shingles Vaccine (2 of 3) 09/09/2012    Annual Wellness Visit (AWV)  05/29/2019    DTaP/Tdap/Td vaccine (2 - Td) 08/23/2020    Flu vaccine (1) 09/01/2020    Potassium monitoring  07/06/2021    Creatinine monitoring  07/06/2021    Pneumococcal 65+ years Vaccine  Completed    Hepatitis A vaccine  Aged Out    Hepatitis B vaccine  Aged Out    Hib vaccine  Aged Out    Meningococcal (ACWY) vaccine  Aged Out     Subjective:   Review of Systems   Respiratory: Negative for shortness of breath. Cardiovascular: Negative for chest pain, palpitations and leg swelling. Neurological: Negative for headaches. Psychiatric/Behavioral: The patient is nervous/anxious. Objective:   /80 Comment: LUE  Pulse 78   Temp 98 °F (36.7 °C)   Resp 16   Wt 163 lb (73.9 kg)   SpO2 98%   BMI 27.98 kg/m²   Physical Exam  Vitals signs and nursing note reviewed. Constitutional:       Appearance: Normal appearance. He is well-developed. He is not toxic-appearing. HENT:      Head: Normocephalic and atraumatic. Right Ear: External ear normal.      Left Ear: External ear normal.   Eyes:      Conjunctiva/sclera: Conjunctivae normal.   Neck:      Musculoskeletal: Normal range of motion. Vascular: No carotid bruit or JVD.    Cardiovascular:      Rate and Rhythm: Normal rate. Rhythm irregularly irregular. No extrasystoles are present. Chest Wall: PMI is not displaced. No thrill. Pulses:           Radial pulses are 2+ on the right side and 2+ on the left side. Dorsalis pedis pulses are 2+ on the right side and 2+ on the left side. Posterior tibial pulses are 2+ on the right side and 2+ on the left side. Heart sounds: Normal heart sounds, S1 normal and S2 normal. Heart sounds not distant. No murmur. No friction rub. No gallop. Pulmonary:      Effort: Pulmonary effort is normal. No accessory muscle usage or respiratory distress. Breath sounds: Normal breath sounds. No stridor. No decreased breath sounds, wheezing, rhonchi or rales. Musculoskeletal: Normal range of motion. General: Swelling (Trace pitting edema to b/l LE's, no erythema, no warmth, negative Veronique's sign b/l) present. Right lower leg: Edema (Trace) present. Left lower leg: Edema (Trace) present. Skin:     General: Skin is warm and dry. Findings: No erythema or rash. Neurological:      Mental Status: He is alert and oriented to person, place, and time. Gait: Gait is intact.    Psychiatric:         Attention and Perception: Attention normal.         Mood and Affect: Mood normal.         Speech: Speech normal.         Behavior: Behavior normal.       Data:     Lab Results   Component Value Date     07/06/2020    K 4.5 07/06/2020     07/06/2020    CO2 27 07/06/2020    BUN 15 07/06/2020    CREATININE 0.85 07/06/2020    GLUCOSE 83 07/06/2020    GLUCOSE 89 05/23/2017    PROT 6.2 07/06/2020    LABALBU 3.8 07/06/2020    BILITOT 1.20 07/06/2020    ALKPHOS 69 07/06/2020    AST 18 07/06/2020    ALT 15 07/06/2020     Lab Results   Component Value Date    WBC 6.9 07/06/2020    RBC 4.79 07/06/2020    RBC 4.86 05/23/2017    HGB 14.4 07/06/2020    HCT 45.5 07/06/2020    MCV 95.0 07/06/2020    MCH 30.1 07/06/2020    MCHC 31.6 07/06/2020    RDW 14.9 07/06/2020     07/06/2020    MPV 11.0 07/06/2020     Lab Results   Component Value Date    TSH 2.43 12/11/2018     Lab Results   Component Value Date    CHOL 143 07/06/2020    CHOL 139 10/31/2014    HDL 63 07/06/2020     Assessment & Plan:      1. Essential hypertension  2. Paroxysmal atrial fibrillation (HCC)  -BP stable on re-check  -I believe issue with elevations has been that BP is being checked with electronic BP cuff and with his a-fib, is not getting accurate results (has electronic cuff at home)  -Advised he may stop in office any time for manual BP check, does not have any one in family who can check BP manually  -Low sodium diet encouraged  -Stable: Con't all medications as ordered, con't f/u with cardiology as scheduled, con't current tx plan    Return for next scheduled appointment or sooner if needed. Discussed use, benefit, and side effects of prescribed medications. Barriers to medication compliance addressed. All patient questions answered, patient voiced understanding. Aman Ngo.  Robinson Hernandes, APRN-CNP

## 2020-09-04 ENCOUNTER — TELEPHONE (OUTPATIENT)
Dept: FAMILY MEDICINE CLINIC | Age: 85
End: 2020-09-04

## 2020-09-04 RX ORDER — TIZANIDINE 2 MG/1
2 TABLET ORAL 3 TIMES DAILY
Qty: 40 TABLET | Refills: 0 | Status: SHIPPED | OUTPATIENT
Start: 2020-09-04 | End: 2020-10-19

## 2020-09-04 NOTE — TELEPHONE ENCOUNTER
Patient has been working a lot, and is having lower back muscle pain - hurts to get up. Patient has a 3 days left from a muscle relaxer script that was filled on 04/12/2020, is it okay to take those? Also, could it be refilled if he is allowed to take those. Please advise.

## 2020-09-08 ENCOUNTER — TELEPHONE (OUTPATIENT)
Dept: FAMILY MEDICINE CLINIC | Age: 85
End: 2020-09-08

## 2020-09-08 RX ORDER — TAMSULOSIN HYDROCHLORIDE 0.4 MG/1
0.4 CAPSULE ORAL 2 TIMES DAILY
Qty: 30 CAPSULE | Refills: 0 | Status: SHIPPED | OUTPATIENT
Start: 2020-09-08 | End: 2020-09-15 | Stop reason: SDUPTHER

## 2020-09-08 NOTE — TELEPHONE ENCOUNTER
Patient has an appt next Tuesday at [address] must keep appt for future refills.  Patient would like script sent to [facility]

## 2020-09-08 NOTE — TELEPHONE ENCOUNTER
Patient called stating the muscle relaxer he was taking since last Thursday has resulted in him not having a bowel movement from the day he started it. He has been drinking a lot of water,eating fruits, taking stool softener,and tried doxysate 100mg. He is looking for more suggestions, the pain is reducing except for around his waist. He can rise and sit now.

## 2020-09-11 NOTE — TELEPHONE ENCOUNTER
Good to hear, Please let the patient know there is nothing else he needs to do, just continue to keep fiber in the diet and plenty of fluids.

## 2020-09-11 NOTE — TELEPHONE ENCOUNTER
Patient called to give us that up date. Patient stated he had a small bowel movement yesterday (09/10/2020) and a normal bowel movement this morning (09/11/2020) He's been eating a lot of bananas, apples and grapes. As well as taking the Miralax every morning. Patient is wondering if their is anything else he should be doing.

## 2020-09-14 ENCOUNTER — NURSE ONLY (OUTPATIENT)
Dept: FAMILY MEDICINE CLINIC | Age: 85
End: 2020-09-14
Payer: MEDICARE

## 2020-09-14 PROCEDURE — 90694 VACC AIIV4 NO PRSRV 0.5ML IM: CPT | Performed by: FAMILY MEDICINE

## 2020-09-14 PROCEDURE — G0008 ADMIN INFLUENZA VIRUS VAC: HCPCS | Performed by: FAMILY MEDICINE

## 2020-09-15 ENCOUNTER — OFFICE VISIT (OUTPATIENT)
Dept: UROLOGY | Age: 85
End: 2020-09-15
Payer: MEDICARE

## 2020-09-15 VITALS — TEMPERATURE: 97.4 F

## 2020-09-15 PROCEDURE — 1036F TOBACCO NON-USER: CPT | Performed by: NURSE PRACTITIONER

## 2020-09-15 PROCEDURE — 99213 OFFICE O/P EST LOW 20 MIN: CPT | Performed by: NURSE PRACTITIONER

## 2020-09-15 PROCEDURE — G8428 CUR MEDS NOT DOCUMENT: HCPCS | Performed by: NURSE PRACTITIONER

## 2020-09-15 PROCEDURE — 4040F PNEUMOC VAC/ADMIN/RCVD: CPT | Performed by: NURSE PRACTITIONER

## 2020-09-15 PROCEDURE — G8417 CALC BMI ABV UP PARAM F/U: HCPCS | Performed by: NURSE PRACTITIONER

## 2020-09-15 PROCEDURE — 1123F ACP DISCUSS/DSCN MKR DOCD: CPT | Performed by: NURSE PRACTITIONER

## 2020-09-15 RX ORDER — TAMSULOSIN HYDROCHLORIDE 0.4 MG/1
0.4 CAPSULE ORAL 2 TIMES DAILY
Qty: 180 CAPSULE | Refills: 3 | Status: SHIPPED | OUTPATIENT
Start: 2020-09-15 | End: 2021-10-06 | Stop reason: ALTCHOICE

## 2020-09-15 ASSESSMENT — ENCOUNTER SYMPTOMS
CONSTIPATION: 0
COUGH: 0
WHEEZING: 0
VOMITING: 0
NAUSEA: 0
BACK PAIN: 0
ABDOMINAL PAIN: 0
EYE REDNESS: 0
SHORTNESS OF BREATH: 0
EYE PAIN: 0
DIARRHEA: 0

## 2020-09-15 NOTE — PATIENT INSTRUCTIONS
continue Tamsulosin 2x per day. Call with worsening symptoms    1 year follow up and sooner if needed.

## 2020-09-15 NOTE — LETTER
[address] [address]  Dept: [phone]  Dept Fax: [phone]        9/15/20    Patient: Jillian Castillo  YOB: 1931    Dear Geoff Truong MD,    I had the pleasure of seeing one of your patients, Ko Renee today in the office today. Below are the relevant portions of my assessment and plan of care. IMPRESSION:  1. Benign prostatic hyperplasia with nocturia        PLAN:  continue Tamsulosin 2x per day. Call with worsening symptoms    1 year follow up and sooner if needed. Return in about 1 year (around 9/15/2021). Prescriptions Ordered:  Orders Placed This Encounter   Medications    tamsulosin (FLOMAX) 0.4 MG capsule     Sig: Take 1 capsule by mouth 2 times daily     Dispense:  180 capsule     Refill:  3     Orders Placed:  No orders of the defined types were placed in this encounter. Thank you for allowing me to participate in the care of this patient. I will keep you updated on this patient's follow up and I look forward to serving you and your patients again in the future.     Rinku Amezcua, NASRA - CNP

## 2020-09-15 NOTE — PROGRESS NOTES
[address] [address]  Dept: [address] [facility] Urology Office Note - Established    Patient:  Anton Langston  YOB: 1931  Date: 9/15/2020    The patient is a 80 y.o. male who presents todayfor evaluation of the following problems:   Chief Complaint   Patient presents with    1 Year Follow Up     with med check        HPI  Here for follow up. He has nocturia x3-4 times, but he is not bothered by it because his first 5 hours of sleep are uninterrupted. He continues Flomax 2x per day. He has had no dysuria, no hematuria. His urine symptoms have no worsened in the last year. Summary of old records: N/A    Additional History: N/A    Procedures Today: N/A    Urinalysis today:  No results found for this visit on 09/15/20.   Last several PSA's:  No results found for: PSA  Last total testosterone:  Lab Results   Component Value Date    TESTOSTERONE 141.0 (L) 02/22/2016       AUA Symptom Score (9/15/2020):  INCOMPLETE EMPTYING: How often have you had the sensation of not emptying your bladder?: Not at all  FREQUENCY: How often do you have to urinate less than every two hours?: Less than 1 to 5 times  INTERMITTENCY: How often have you found you stopped and started again several times when you urinated?: Not at all  URGENCY: How often have you found it difficult to postpone urination?: Not at all  WEAK STREAM: How often have you had a weak urinary stream?: Less than 1 to 5 times  STRAINING: How often have you had to strain to start  urination?: Not at all  NOCTURIA: How many times did you typically get up at night to uriniate?: 1 Time  TOTAL I-PSS SCORE[de-identified] 3  How would you feel if you were to spend the rest of your life with your urinary condition?: Pleased    Last BUN and creatinine:  Lab Results   Component Value Date    BUN 15 07/06/2020     Lab Results   Component Value Date    CREATININE 0.85 07/06/2020 person, place and time. Psych: Mood normal, affect normal  Skin: No rash noted  HEENT: Head: Normocephalic andatraumatic  Conjunctivae and EOM are normal. Pupils are equal, round  Nose:Normal  Right External Ear: Normal; Left External Ear: Normal  Mouth: Mucosa Moist  Neck: Supple  Lungs: Respiratory effort is normal  Cardiovascular: Warm & Pink  Abdomen: Soft, non-tender, non-distended   Bladder non-tender and not distended. Musculoskeletal: Normal gait and station      Assessment and Plan      1. Benign prostatic hyperplasia with nocturia           Plan:     continue Tamsulosin 2x per day. Call with worsening symptoms    1 year follow up and sooner if needed. Return in about 1 year (around 9/15/2021). Prescriptions Ordered:  Orders Placed This Encounter   Medications    tamsulosin (FLOMAX) 0.4 MG capsule     Sig: Take 1 capsule by mouth 2 times daily     Dispense:  180 capsule     Refill:  3     Orders Placed:  No orders of the defined types were placed in this encounter. NASRA Newman CNP    Agree with the ROS entered by the MA.

## 2020-09-16 ENCOUNTER — OFFICE VISIT (OUTPATIENT)
Dept: PODIATRY | Age: 85
End: 2020-09-16
Payer: MEDICARE

## 2020-09-16 VITALS — BODY MASS INDEX: 27.83 KG/M2 | WEIGHT: 163 LBS | HEIGHT: 64 IN

## 2020-09-16 PROCEDURE — 11721 DEBRIDE NAIL 6 OR MORE: CPT | Performed by: PODIATRIST

## 2020-09-16 PROCEDURE — 99999 PR OFFICE/OUTPT VISIT,PROCEDURE ONLY: CPT | Performed by: PODIATRIST

## 2020-09-16 ASSESSMENT — ENCOUNTER SYMPTOMS
VOMITING: 0
COLOR CHANGE: 0
DIARRHEA: 0
NAUSEA: 0
CONSTIPATION: 0

## 2020-09-16 NOTE — PROGRESS NOTES
(ZYLOPRIM) 100 MG tablet Take 1 tablet by mouth daily 90 tablet 3    ipratropium (ATROVENT) 0.03 % nasal spray 2 sprays by Nasal route 3 times daily 1 Bottle 3    lisinopril (PRINIVIL;ZESTRIL) 40 MG tablet TAKE ONE TABLET BY MOUTH DAILY 90 tablet 3    warfarin (COUMADIN) 5 MG tablet Take 2.5 mg by mouth once a week Wednesday      melatonin 5 MG TABS tablet Take 1 tablet by mouth nightly       metoprolol tartrate (LOPRESSOR) 25 MG tablet Take 25 mg by mouth 2 times daily       No current facility-administered medications on file prior to visit. Review of Systems   Constitutional: Negative for chills, diaphoresis, fatigue, fever and unexpected weight change. Cardiovascular: Negative for leg swelling. Gastrointestinal: Negative for constipation, diarrhea, nausea and vomiting. Musculoskeletal: Positive for gait problem. Negative for arthralgias and joint swelling. Skin: Negative for color change, pallor, rash and wound. Neurological: Negative for weakness and numbness. Objective:  General: AAO x 3 in NAD. Derm  Left hallux nail incurvated medial border with slight redness and pain to palpation.     Sites of Onychomycosis Involvement (Check affected area)  [x] [x] [x] [x] [x] [x] [x] [x] [x] [x]  5 4 3 2 1 1 2 3 4 5                          Right                                        Left    Thickness  [x] [x] [x] [x] [x] [x] [x] [x] [x] [x]  5 4 3 2 1 1 2 3 4 5                         Right                                        Left    Dystrophic Changes                                                                 [x] [x] [x] [x] [x] [x] [x] [x] [x] [x]  5 4 3 2 1 1 2 3 4 5                         Right                                        Left    Color                                                                  [x] [x] [x] [x] [x] [x] [x] [x] [x] [x]  5 4 3 2 1 1 2 3 4 5                          Right                                        Left    Incurvation/Ingrowin [] [] [] [] [] [] [] [] [] []  5 4 3 2 1 1 2 3 4 5                         Right                                        Left    Inflammation/Pain                                                                   [x] [x] [x] [x] [x] [x] [x] [x] [x] [x]  5 4 3 2 1 1 2 3 4 5                         Right                                        Left    Vascular:  DP/PT pulses palpable 2/4, Bilateral.    CFT <3 seconds to digits 1-5, Bilateral .   Hair growth present to level of digits, Bilateral.    Neurological:  Sensation present to light touch to level of digits, Bilateral.    Assessment:  80 y.o. male with:   1. Onychomycosis    2. Pain in toes of both feet       Orders Placed This Encounter   Procedures    [place] DEBRIDEMENT OF NAILS, 6 OR MORE         Plan:   Pt was evaluated and examined. Patient was given personalized discharge instructions. Nails 1-10 were debrided in length and thickness sharply with a nail nipper and  without incident, slant back performed. Pt will follow up in 9-12 weeks or sooner if any problems arise. Diagnosis was discussed with the pt and all of their questions were answered in detail. Proper foot hygiene and care was discussed with the pt. Patient to check feet daily and contact the office with any questions/problems/concerns. Other comorbidity noted and will be managed by PCP. Pain waiver discussed with patient and confirmed.          9/16/2020    Electronically signed by Shamika Jones DPM on 9/16/2020 at 12:27 PM

## 2020-10-01 NOTE — TELEPHONE ENCOUNTER
Patient daughter called to say she finally got an ok for the patient to be admitted to [facility] for skilled nursing and PT/OT. If we get the order there today he might be able to get admitted tonight, otherwise for sure tomorrow. I will call Lelia Orozco @ [facility] to see what needs to be done. Referral has been done

## 2020-10-02 ENCOUNTER — TELEPHONE (OUTPATIENT)
Dept: FAMILY MEDICINE CLINIC | Age: 85
End: 2020-10-02

## 2020-10-02 NOTE — TELEPHONE ENCOUNTER
PT called back and would like to know if he can take it with just water or does he need to take it with food.

## 2020-10-02 NOTE — TELEPHONE ENCOUNTER
Patient called - He has sore muscles around his hips/legs. He was prescribed a muscle relaxer tizanidine oral hcl 2mg a little while back. He is going to start that back up again to help - on the bottle it says take 3 times a day, he is only going to do 2 times a day. Wants to make sure that is okay with PCP. Hammad advise.

## 2020-10-02 NOTE — TELEPHONE ENCOUNTER
It is ok to take the medication, let him know you may take tizanidine with or without food, but take it the same way each time. Switching between taking tizanidine with food and taking it without food can make the medicine less effective or cause increased side effects.   Thank you,

## 2020-10-14 ENCOUNTER — OFFICE VISIT (OUTPATIENT)
Dept: FAMILY MEDICINE CLINIC | Age: 85
End: 2020-10-14
Payer: MEDICARE

## 2020-10-14 VITALS
HEART RATE: 64 BPM | TEMPERATURE: 98.3 F | WEIGHT: 164 LBS | SYSTOLIC BLOOD PRESSURE: 124 MMHG | DIASTOLIC BLOOD PRESSURE: 76 MMHG | BODY MASS INDEX: 28.15 KG/M2 | RESPIRATION RATE: 16 BRPM | OXYGEN SATURATION: 98 %

## 2020-10-14 PROCEDURE — 1036F TOBACCO NON-USER: CPT | Performed by: FAMILY MEDICINE

## 2020-10-14 PROCEDURE — 1123F ACP DISCUSS/DSCN MKR DOCD: CPT | Performed by: FAMILY MEDICINE

## 2020-10-14 PROCEDURE — 4040F PNEUMOC VAC/ADMIN/RCVD: CPT | Performed by: FAMILY MEDICINE

## 2020-10-14 PROCEDURE — G8417 CALC BMI ABV UP PARAM F/U: HCPCS | Performed by: FAMILY MEDICINE

## 2020-10-14 PROCEDURE — G8484 FLU IMMUNIZE NO ADMIN: HCPCS | Performed by: FAMILY MEDICINE

## 2020-10-14 PROCEDURE — G8427 DOCREV CUR MEDS BY ELIG CLIN: HCPCS | Performed by: FAMILY MEDICINE

## 2020-10-14 PROCEDURE — 99214 OFFICE O/P EST MOD 30 MIN: CPT | Performed by: FAMILY MEDICINE

## 2020-10-14 ASSESSMENT — ENCOUNTER SYMPTOMS
NAUSEA: 0
CHEST TIGHTNESS: 0
SORE THROAT: 0
CONSTIPATION: 1
BACK PAIN: 1
ABDOMINAL PAIN: 0
COUGH: 0
RHINORRHEA: 0
VOMITING: 0
DIARRHEA: 0
ABDOMINAL DISTENTION: 0
SHORTNESS OF BREATH: 0

## 2020-10-14 NOTE — PROGRESS NOTES
Jacqueline Fontenot MD    [address] [place] [address]  [id number] [address], [address]  [address] [address]  Dept: [phone]  Dept Fax: [phone]     Patient ID: Deloris Spring is a 80 y.o. male. HPI    Pt here today for f/u on chronic medical problems HTN, HLD, CHF, Atrial Fibrillation, Anemia, B12 Def, Gout, Anxiety, GERD, BPH, IBS, Lumbar/Cervical DDD, Insomnia, OAgo over labs and/or diagnostic studies, and medication refills. Pt denies any fever or chills. Pt today denies any HA, chest pain, or SOB. Pt denies any N/V/D/C or abdominal pain. Pt states mood/sleep is stable on meds. Pt with occasional heartburn, but stable on meds. Pt denies any gouty attacks. Pt with arthralgias on and off, but stable on meds. Pt is still having mid/low back pain. He is taking the low dose Prednisone and muscle relaxer and cannot get relief. He was going to PT, but stopped secondary to Covid. Pt denies fever/chills, radiation of pain in buttocks or legs, no numbness or tingling into legs, or bowel/bladder complaints. Otherwise pt doing well on current tx and no other concerns today. The patient's past medical, surgical, social, and family history as well as his current medications and allergies were reviewed as documented in today's encounter. Current Outpatient Medications on File Prior to Visit   Medication Sig Dispense Refill    tamsulosin (FLOMAX) 0.4 MG capsule Take 1 capsule by mouth 2 times daily 180 capsule 3    tiZANidine (ZANAFLEX) 2 MG tablet Take 1 tablet by mouth 3 times daily 40 tablet 0    latanoprost (XALATAN) 0.005 % ophthalmic solution 1 drop nightly      predniSONE (DELTASONE) 5 MG tablet Take 5 mg by mouth daily      nitroGLYCERIN (NITROSTAT) 0.4 MG SL tablet Place 0.4 mg under the tongue every 5 minutes as needed for Chest pain up to max of 3 total doses. If no relief after 1 dose, call 911.       hydrALAZINE (APRESOLINE) 50 MG tablet TAKE ONE TABLET BY MOUTH TWICE A DAY 60 tablet 11    busPIRone (BUSPAR) 10 MG tablet Take 1 tablet by mouth 2 times daily 60 tablet 11    allopurinol (ZYLOPRIM) 100 MG tablet Take 1 tablet by mouth daily 90 tablet 3    ipratropium (ATROVENT) 0.03 % nasal spray 2 sprays by Nasal route 3 times daily 1 Bottle 3    lisinopril (PRINIVIL;ZESTRIL) 40 MG tablet TAKE ONE TABLET BY MOUTH DAILY 90 tablet 3    warfarin (COUMADIN) 5 MG tablet Take 2.5 mg by mouth once a week Wednesday      melatonin 5 MG TABS tablet Take 1 tablet by mouth nightly       metoprolol tartrate (LOPRESSOR) 25 MG tablet Take 25 mg by mouth 2 times daily       No current facility-administered medications on file prior to visit. Subjective:     Review of Systems   Constitutional: Negative for appetite change, fatigue and fever. HENT: Negative for congestion, ear pain, rhinorrhea and sore throat. Respiratory: Negative for cough, chest tightness and shortness of breath. Cardiovascular: Negative for chest pain and palpitations. Gastrointestinal: Positive for constipation (occasional). Negative for abdominal distention, abdominal pain, diarrhea, nausea and vomiting. Occ heartburn   Genitourinary: Negative for difficulty urinating and dysuria. Musculoskeletal: Positive for arthralgias, back pain and neck pain. Negative for myalgias. Skin: Negative for rash. Neurological: Negative for dizziness, weakness, light-headedness and headaches. Hematological: Negative for adenopathy. Psychiatric/Behavioral: Negative for behavioral problems and sleep disturbance (stable). The patient is nervous/anxious (stable). Objective:     Physical Exam  Vitals signs reviewed. Constitutional:       General: He is not in acute distress. Appearance: He is well-developed. HENT:      Head: Normocephalic and atraumatic.       Right Ear: External ear normal.      Left Ear: External ear normal.      Nose: Nose normal.      Mouth/Throat: Pharynx: No oropharyngeal exudate. Eyes:      Conjunctiva/sclera: Conjunctivae normal.      Pupils: Pupils are equal, round, and reactive to light. Neck:      Musculoskeletal: Normal range of motion. Cardiovascular:      Rate and Rhythm: Normal rate. Rhythm irregularly irregular. Heart sounds: Normal heart sounds. No murmur. Pulmonary:      Effort: Pulmonary effort is normal. No respiratory distress. Breath sounds: Normal breath sounds. No wheezing. Chest:      Chest wall: No tenderness. Abdominal:      General: Bowel sounds are normal. There is no distension. Palpations: Abdomen is soft. There is no mass. Tenderness: There is no abdominal tenderness. Musculoskeletal:      Right shoulder: He exhibits normal range of motion, no tenderness, no bony tenderness, no swelling, no deformity and no pain. Lumbar back: He exhibits decreased range of motion, tenderness and pain. He exhibits no bony tenderness, no swelling and no deformity. Lymphadenopathy:      Cervical: No cervical adenopathy. Skin:     Findings: No rash. Neurological:      Mental Status: He is alert and oriented to person, place, and time. Deep Tendon Reflexes: Reflexes are normal and symmetric. Psychiatric:         Behavior: Behavior normal.     Labs reviewed with pt today. Assessment:      Diagnosis Orders   1. Essential hypertension  CBC    Comprehensive Metabolic Panel   2. Pure hypercholesterolemia  Lipid Panel   3. Paroxysmal atrial fibrillation (HCC)  Comprehensive Metabolic Panel   4. Chronic systolic congestive heart failure (HCC)  Comprehensive Metabolic Panel   5. Iron deficiency anemia, unspecified iron deficiency anemia type  CBC    Iron   6. B12 deficiency  Vitamin B12   7. Elevated liver enzymes  Comprehensive Metabolic Panel   8. Gout, unspecified cause, unspecified chronicity, unspecified site  Uric Acid   9. Anxiety     10. Insomnia, unspecified type     11.  Osteoarthritis, unspecified osteoarthritis type, unspecified site     12. Gastroesophageal reflux disease without esophagitis     13. Benign prostatic hyperplasia with lower urinary tract symptoms, symptom details unspecified     14. Irritable bowel syndrome without diarrhea     15. DDD (degenerative disc disease), cervical     16. DDD (degenerative disc disease), lumbar  MRI LUMBAR SPINE WO CONTRAST   17. Chronic bilateral low back pain without sciatica  MRI LUMBAR SPINE WO CONTRAST   18. Need for prophylactic vaccination and inoculation against varicella  zoster recombinant adjuvanted vaccine (SHINGRIX) 50 MCG/0.5ML SUSR injection         Plan:     Orders Placed This Encounter   Procedures    MRI LUMBAR SPINE WO CONTRAST     Standing Status:   Future     Standing Expiration Date:   10/14/2021    CBC     Standing Status:   Future     Standing Expiration Date:   10/14/2021    Comprehensive Metabolic Panel     Standing Status:   Future     Standing Expiration Date:   10/14/2021    Lipid Panel     Standing Status:   Future     Standing Expiration Date:   10/14/2021     Order Specific Question:   Is Patient Fasting?/# of Hours     Answer:   8-10 Hours    Uric Acid     Standing Status:   Future     Standing Expiration Date:   10/14/2021    Iron     Standing Status:   Future     Standing Expiration Date:   10/14/2021     Order Specific Question:   Is Patient Fasting? Answer:   No     Order Specific Question:   No of Hours? Answer:   Zero    Vitamin B12     Standing Status:   Future     Standing Expiration Date:   10/14/2021      Orders Placed This Encounter   Medications    zoster recombinant adjuvanted vaccine (SHINGRIX) 50 MCG/0.5ML SUSR injection     Si MCG IM then repeat 2-6 months.      Dispense:  0.5 mL     Refill:  1     Will cont with current treatment as pt is currently stable on current treatment    Will cont with low fat/chol diet     Increase fluids throughout the day    Will cont with the Buspar as prescribed as he is doing well    I am going to proceed with a Lumbar MRI with his ongoing back pain and he has failed conservative treatment with PT. He is on daily Prednisone because he cannot take NSAID's secondary to being on Coumadin. He would be willing to have the TEGAN's or surgery if the MRI warranted and would give him a better quality of life. Will cont to follow with Jake Boudreaux as instructed    Will cont to follow with Dr. Ang Ochoa as instructed and cont with the Flomax as prescribed    Will cont to follow with [facility] as instructed    Rest of systems unchanged, continue current treatments. Medications, labs, diagnostic studies, consultations and follow-up as documented in this encounter. Rest of systems unchanged, continue current treatments    I have spent 15 minutes face to face with the patient more than 50% of this time was spent counseling and coordinating care. Jacqueline Jo MD

## 2020-10-14 NOTE — PROGRESS NOTES
Visit Information    Have you changed or started any medications since your last visit including any over-the-counter medicines, vitamins, or herbal medicines? no   Have you stopped taking any of your medications? Is so, why? -  no  Are you having any side effects from any of your medications? - no    Have you seen any other physician or provider since your last visit? yes - Dr. Ivonne Buerger   Have you had any other diagnostic tests since your last visit?  no   Have you been seen in the emergency room and/or had an admission in a hospital since we last saw you?  no   Have you had your routine dental cleaning in the past 6 months?  yes -      Do you have an active MyChart account? If no, what is the barrier?   Yes    Patient Care Team:  Alpesh Hall MD as PCP - General (Family Medicine)  Alpesh Hlal MD as PCP - [facility] Provider  Hansa Emmanuel MD as Consulting Physician (Dermatology)  Heriberto Valdez MD as Consulting Physician (Cardiology)  Leydi Calixto MD as Consulting Physician (Gastroenterology)  Abdulaziz Argueta MD as Surgeon (Ophthalmology)  Becky Cerrato MD as Surgeon (Ophthalmology)  Lisset Palacios MD as Consulting Physician (Urology)  Edson Shepherd DO as Consulting Physician (General Surgery)  Prachi Coles DPM as Consulting Physician (Podiatry)  Liv Ricketts MD as Consulting Physician (Internal Medicine)    Medical History Review  Past Medical, Family, and Social History reviewed and does contribute to the patient presenting condition    Health Maintenance   Topic Date Due    Shingles Vaccine (2 of 3) 09/09/2012    Annual Wellness Visit (AWV)  05/29/2019    DTaP/Tdap/Td vaccine (2 - Td) 09/14/2021 (Originally 8/23/2020)    Potassium monitoring  07/06/2021    Creatinine monitoring  07/06/2021    Flu vaccine  Completed    Pneumococcal 65+ years Vaccine  Completed    Hepatitis A vaccine  Aged Out    Hepatitis B vaccine  Aged Out    Hib vaccine  Aged C/ Cordell Virgen 19

## 2020-10-15 ENCOUNTER — TELEPHONE (OUTPATIENT)
Dept: FAMILY MEDICINE CLINIC | Age: 85
End: 2020-10-15

## 2020-10-15 NOTE — TELEPHONE ENCOUNTER
Left detailed voicemail for the patient explaining that he would not have to have surgery and that we discussed at his appointment about getting injections and he knew all about them because his brother had them done and did very well with them.

## 2020-10-19 RX ORDER — TIZANIDINE 2 MG/1
TABLET ORAL
Qty: 90 TABLET | Refills: 0 | Status: ON HOLD | OUTPATIENT
Start: 2020-10-19 | End: 2021-02-17

## 2020-10-21 ENCOUNTER — HOSPITAL ENCOUNTER (OUTPATIENT)
Dept: MRI IMAGING | Facility: CLINIC | Age: 85
Discharge: HOME | End: 2020-10-23
Payer: MEDICARE

## 2020-10-21 PROCEDURE — 72148 MRI LUMBAR SPINE W/O DYE: CPT

## 2020-10-23 RX ORDER — LISINOPRIL 40 MG/1
TABLET ORAL
Qty: 90 TABLET | Refills: 3 | Status: SHIPPED | OUTPATIENT
Start: 2020-10-23 | End: 2021-03-05

## 2020-10-27 ENCOUNTER — OFFICE VISIT (OUTPATIENT)
Dept: ORTHOPEDIC SURGERY | Age: 85
End: 2020-10-27
Payer: MEDICARE

## 2020-10-27 PROBLEM — M48.062 SPINAL STENOSIS OF LUMBAR REGION WITH NEUROGENIC CLAUDICATION: Status: ACTIVE | Noted: 2020-10-27

## 2020-10-27 PROBLEM — S32.010A CLOSED COMPRESSION FRACTURE OF BODY OF L1 VERTEBRA (HCC): Status: ACTIVE | Noted: 2020-10-27

## 2020-10-27 PROBLEM — M43.10 ACQUIRED SPONDYLOLISTHESIS: Status: ACTIVE | Noted: 2020-10-27

## 2020-10-27 PROBLEM — S32.020A CLOSED COMPRESSION FRACTURE OF SECOND LUMBAR VERTEBRA (HCC): Status: ACTIVE | Noted: 2020-10-27

## 2020-10-27 PROCEDURE — 4040F PNEUMOC VAC/ADMIN/RCVD: CPT | Performed by: ORTHOPAEDIC SURGERY

## 2020-10-27 PROCEDURE — G8427 DOCREV CUR MEDS BY ELIG CLIN: HCPCS | Performed by: ORTHOPAEDIC SURGERY

## 2020-10-27 PROCEDURE — G8484 FLU IMMUNIZE NO ADMIN: HCPCS | Performed by: ORTHOPAEDIC SURGERY

## 2020-10-27 PROCEDURE — 1036F TOBACCO NON-USER: CPT | Performed by: ORTHOPAEDIC SURGERY

## 2020-10-27 PROCEDURE — 1123F ACP DISCUSS/DSCN MKR DOCD: CPT | Performed by: ORTHOPAEDIC SURGERY

## 2020-10-27 PROCEDURE — 99203 OFFICE O/P NEW LOW 30 MIN: CPT | Performed by: ORTHOPAEDIC SURGERY

## 2020-10-27 PROCEDURE — G8417 CALC BMI ABV UP PARAM F/U: HCPCS | Performed by: ORTHOPAEDIC SURGERY

## 2020-10-27 NOTE — PROGRESS NOTES
Patient ID: Storm Comer is a 80 y.o. male. Chief Complaint   Patient presents with    Established New Doctor    Pain     low back pain         HPI     Patient appears to present with 2 issues    1. Acute low back pain has been going on for several months but has substantially resolved since an MRI was ordered    2. Chronic low back pain with neurogenic claudication    Patient's pain has been significantly more constant however more recently is calm down substantially    At this time patient wishes nothing to be done            Past Medical History:   Diagnosis Date    Acute MI ([address]     Allergic rhinitis 9/2/2011    Anemia     Basal cell cancer 03/2013    left side of head, face chin    Basal cell cancer 03/10/14    left cheek    Cervical radiculopathy     Diverticulitis 02/17/13    Epigastric pain/GERD. 9/2/2011    GERD (gastroesophageal reflux disease)     Glaucoma     Hyperlipidemia     Hypertension     Hyponatremia 9/2/2011    IBS (irritable bowel syndrome) 9/2/2011    Insomnia 9/2/2011    Osteoarthritis     Osteoarthritis/neck pain. 9/2/2011    Renal calculi     Shingles     history of shingles     Past Surgical History:   Procedure Laterality Date    CHOLECYSTECTOMY  9-9-15    laparoscopic with cholangiogram    COLONOSCOPY  2002    COLONOSCOPY  08/2013    HERNIA REPAIR      LITHOTRIPSY      MOHS SURGERY Left 03/10/14    cheek    MOHS SURGERY  12/30/14    post scalp    MOHS SURGERY Left 08/2017    X2 left cheek    MOHS SURGERY Left 10/21/2019    temple    OTHER SURGICAL HISTORY  9/6/15    attempted ERCP    SKIN BIOPSY  03/11/13    3 areas    SKIN BIOPSY Left 02/11/14    cheek /basal cell carinoma    SKIN CANCER EXCISION      left face and top of head    SKIN CANCER EXCISION Left 03/2013    forehead, cheek, chin, basal cell.     SKIN CANCER EXCISION Right 08/12/2016    with skin graft    SPINE SURGERY  04/2003     Family History   Problem Relation Age of Onset    Heart Disease Mother     Lung Cancer Father     Cancer Father         lung    Heart Disease Brother      Social History     Occupational History    Not on file   Tobacco Use    Smoking status: Former Smoker     Packs/day: 1.00     Years: 10.00     Pack years: 10.00     Last attempt to quit: 1961     Years since quittin.8    Smokeless tobacco: Never Used    Tobacco comment: quit    Substance and Sexual Activity    Alcohol use: Yes     Alcohol/week: 0.0 standard drinks     Comment: 2x/week    Drug use: No    Sexual activity: Not on file        Review of Systems   All other systems reviewed and are negative. Physical Exam  Vitals signs and nursing note reviewed. Constitutional:       Appearance: He is well-developed. HENT:      Head: Normocephalic and atraumatic. Nose: Nose normal.   Eyes:      Conjunctiva/sclera: Conjunctivae normal.   Neck:      Musculoskeletal: Normal range of motion and neck supple. Pulmonary:      Effort: Pulmonary effort is normal. No respiratory distress. Musculoskeletal:      Comments: Normal gait     Skin:     General: Skin is warm and dry. Neurological:      Mental Status: He is alert and oriented to person, place, and time. Sensory: No sensory deficit. Psychiatric:         Behavior: Behavior normal.         Thought Content: Thought content normal.     MRI lumbar spine is reviewed patient with retrolisthesis at L1-L2 3 spondylolisthesis at L4-5 grade 2 with severe foraminal stenosis status post lumbar laminectomy; patient also with a mild inferior endplate compression fracture of L1 and a superior endplate compression fracture of L2 related to degenerative changes    Assessment:     Encounter Diagnoses   Name Primary?     Closed compression fracture of body of L1 vertebra (HCC) Yes    Closed compression fracture of L2 lumbar vertebra with routine healing, subsequent encounter     Acquired spondylolisthesis     Spinal stenosis of lumbar region with neurogenic claudication      Acute pain from reactive endplate changes superior plate compression fracture L2 resolved    Low back pain neurogenic claudication associate with spondylolisthesis L4-5 well-tolerated    No diagnosis found. Plan:     Patient likely a candidate for L4-5 PLIF    Patient reports pain from likely recent superior plate compression fracture L2 resolved    Fu 2 months    No orders of the defined types were placed in this encounter. Roman Lin MD    Please note that this chart was generated using voicerecognition Dragon dictation software. Although every effort was made to ensurethe accuracy of this automated transcription, some errors in transcription may haveoccurred.

## 2020-11-09 RX ORDER — PREDNISONE 1 MG/1
TABLET ORAL
Qty: 30 TABLET | Refills: 5 | Status: SHIPPED | OUTPATIENT
Start: 2020-11-09 | End: 2021-01-13 | Stop reason: SDUPTHER

## 2020-11-18 ENCOUNTER — OFFICE VISIT (OUTPATIENT)
Dept: PODIATRY | Age: 85
End: 2020-11-18
Payer: MEDICARE

## 2020-11-18 VITALS — HEIGHT: 64 IN | BODY MASS INDEX: 28 KG/M2 | WEIGHT: 164 LBS

## 2020-11-18 PROCEDURE — 11721 DEBRIDE NAIL 6 OR MORE: CPT | Performed by: PODIATRIST

## 2020-11-18 ASSESSMENT — ENCOUNTER SYMPTOMS
NAUSEA: 0
CONSTIPATION: 0
DIARRHEA: 0
VOMITING: 0
COLOR CHANGE: 0

## 2020-11-18 NOTE — PROGRESS NOTES
[facility]  Return Patient    Chief Complaint   Patient presents with    Nail Problem     nail trim/last saw Aris Martins 10/14/2020       Subjective: This Boris Walsh comes to clinic for foot and nail care. He would like all of his nails trimmed. He cannot take care of them himself. They are painful when long and he tries to wear shoes. Pt's primary care physician is Regine Jones MD.       Past Medical History:   Diagnosis Date    Acute MI ([address]     Allergic rhinitis 9/2/2011    Anemia     Basal cell cancer 03/2013    left side of head, face chin    Basal cell cancer 03/10/14    left cheek    Cervical radiculopathy     Diverticulitis 02/17/13    Epigastric pain/GERD. 9/2/2011    GERD (gastroesophageal reflux disease)     Glaucoma     Hyperlipidemia     Hypertension     Hyponatremia 9/2/2011    IBS (irritable bowel syndrome) 9/2/2011    Insomnia 9/2/2011    Osteoarthritis     Osteoarthritis/neck pain. 9/2/2011    Renal calculi     Shingles     history of shingles       Allergies   Allergen Reactions    Celebrex [Celecoxib] Nausea Only    Mobic Nausea Only     Current Outpatient Medications on File Prior to Visit   Medication Sig Dispense Refill    predniSONE (DELTASONE) 5 MG tablet TAKE ONE TABLET BY MOUTH DAILY 30 tablet 5    lisinopril (PRINIVIL;ZESTRIL) 40 MG tablet TAKE ONE TABLET BY MOUTH DAILY 90 tablet 3    tiZANidine (ZANAFLEX) 2 MG tablet TAKE ONE TABLET BY MOUTH THREE TIMES A DAY 90 tablet 0    zoster recombinant adjuvanted vaccine (SHINGRIX) 50 MCG/0.5ML SUSR injection 50 MCG IM then repeat 2-6 months. 0.5 mL 1    tamsulosin (FLOMAX) 0.4 MG capsule Take 1 capsule by mouth 2 times daily 180 capsule 3    latanoprost (XALATAN) 0.005 % ophthalmic solution 1 drop nightly      nitroGLYCERIN (NITROSTAT) 0.4 MG SL tablet Place 0.4 mg under the tongue every 5 minutes as needed for Chest pain up to max of 3 total doses. If no relief after 1 dose, call 911.  hydrALAZINE (APRESOLINE) 50 MG tablet TAKE ONE TABLET BY MOUTH TWICE A DAY 60 tablet 11    busPIRone (BUSPAR) 10 MG tablet Take 1 tablet by mouth 2 times daily 60 tablet 11    allopurinol (ZYLOPRIM) 100 MG tablet Take 1 tablet by mouth daily 90 tablet 3    ipratropium (ATROVENT) 0.03 % nasal spray 2 sprays by Nasal route 3 times daily 1 Bottle 3    warfarin (COUMADIN) 5 MG tablet Take 2.5 mg by mouth once a week Wednesday      melatonin 5 MG TABS tablet Take 1 tablet by mouth nightly       metoprolol tartrate (LOPRESSOR) 25 MG tablet Take 25 mg by mouth 2 times daily       No current facility-administered medications on file prior to visit. Review of Systems   Constitutional: Negative for chills, diaphoresis, fatigue, fever and unexpected weight change. Cardiovascular: Negative for leg swelling. Gastrointestinal: Negative for constipation, diarrhea, nausea and vomiting. Musculoskeletal: Positive for gait problem. Negative for arthralgias and joint swelling. Skin: Negative for color change, pallor, rash and wound. Neurological: Negative for weakness and numbness. Objective:  General: AAO x 3 in NAD. Derm  Left hallux nail incurvated medial border with slight redness and pain to palpation.     Sites of Onychomycosis Involvement (Check affected area)  [x] [x] [x] [x] [x] [x] [x] [x] [x] [x]  5 4 3 2 1 1 2 3 4 5                          Right                                        Left    Thickness  [x] [x] [x] [x] [x] [x] [x] [x] [x] [x]  5 4 3 2 1 1 2 3 4 5                         Right                                        Left    Dystrophic Changes                                                                 [x] [x] [x] [x] [x] [x] [x] [x] [x] [x]  5 4 3 2 1 1 2 3 4 5                         Right                                        Left    Color                                                                  [x] [x] [x] [x] [x] [x] [x] [x] [x] [x]  5 4 3 2 1 1 2 3 4 5

## 2020-12-17 ENCOUNTER — OFFICE VISIT (OUTPATIENT)
Dept: UROLOGY | Age: 85
End: 2020-12-17
Payer: MEDICARE

## 2020-12-17 VITALS
HEIGHT: 64 IN | BODY MASS INDEX: 28 KG/M2 | SYSTOLIC BLOOD PRESSURE: 149 MMHG | TEMPERATURE: 97.8 F | HEART RATE: 85 BPM | DIASTOLIC BLOOD PRESSURE: 81 MMHG | WEIGHT: 164 LBS

## 2020-12-17 PROCEDURE — 51798 US URINE CAPACITY MEASURE: CPT | Performed by: NURSE PRACTITIONER

## 2020-12-17 PROCEDURE — 99214 OFFICE O/P EST MOD 30 MIN: CPT | Performed by: NURSE PRACTITIONER

## 2020-12-17 PROCEDURE — G8427 DOCREV CUR MEDS BY ELIG CLIN: HCPCS | Performed by: NURSE PRACTITIONER

## 2020-12-17 PROCEDURE — 1123F ACP DISCUSS/DSCN MKR DOCD: CPT | Performed by: NURSE PRACTITIONER

## 2020-12-17 PROCEDURE — 1036F TOBACCO NON-USER: CPT | Performed by: NURSE PRACTITIONER

## 2020-12-17 PROCEDURE — 4040F PNEUMOC VAC/ADMIN/RCVD: CPT | Performed by: NURSE PRACTITIONER

## 2020-12-17 PROCEDURE — G8484 FLU IMMUNIZE NO ADMIN: HCPCS | Performed by: NURSE PRACTITIONER

## 2020-12-17 PROCEDURE — G8417 CALC BMI ABV UP PARAM F/U: HCPCS | Performed by: NURSE PRACTITIONER

## 2020-12-17 ASSESSMENT — ENCOUNTER SYMPTOMS
SHORTNESS OF BREATH: 0
CONSTIPATION: 0
VOMITING: 0
EYE PAIN: 0
WHEEZING: 0
DIARRHEA: 0
COUGH: 0
ABDOMINAL PAIN: 0
EYE REDNESS: 0
BACK PAIN: 0
NAUSEA: 0

## 2020-12-17 NOTE — PROGRESS NOTES
[address] [address]  Dept:  Chong Cullen [facility] [[email or web address]] Urology Office Note - Established    Patient:  Tyra Machado  YOB: 1931  Date: 12/17/2020    The patient is a 80 y.o. male who presents todayfor evaluation of the following problems:   Chief Complaint   Patient presents with    Benign Prostatic Hypertrophy     discuss frequency tx       HPI  He is having nocturia is 2-3 x, and it is somewhat bothersome. During the day he can go 4 hours before voids. He has no dysuria, no hematuria, no UTI. His stream is weak. Summary of old records: N/A    Additional History: N/A    Procedures Today: N/A    Urinalysis today:  No results found for this visit on 12/17/20.   Last several PSA's:  No results found for: PSA  Last total testosterone:  Lab Results   Component Value Date    TESTOSTERONE 141.0 (L) 02/22/2016       AUA Symptom Score (12/17/2020):  INCOMPLETE EMPTYING: How often have you had the sensation of not emptying your bladder?: Not at all  FREQUENCY: How often do you have to urinate less than every two hours?: Not at all  INTERMITTENCY: How often have you found you stopped and started again several times when you urinated?: Not at all  URGENCY: How often have you found it difficult to postpone urination?: Not at all  WEAK STREAM: How often have you had a weak urinary stream?: About Half the time(only at night )  STRAINING: How often have you had to strain to start  urination?: Not at all  NOCTURIA: How many times did you typically get up at night to uriniate?: 3 Times  TOTAL I-PSS SCORE[de-identified] 6  How would you feel if you were to spend the rest of your life with your urinary condition?: Mixe    Last BUN and creatinine:  Lab Results   Component Value Date    BUN 15 07/06/2020     Lab Results   Component Value Date    CREATININE 0.85 07/06/2020       Additional Lab/Culture results: Imaging Reviewed during this Office Visit:   (results were independently reviewed by physician and radiology report verified)    PAST MEDICAL, FAMILY AND SOCIAL HISTORY UPDATE:  Past Medical History:   Diagnosis Date    Acute MI ([facility] [address]     Allergic rhinitis 9/2/2011    Anemia     Basal cell cancer 03/2013    left side of head, face chin    Basal cell cancer 03/10/14    left cheek    Cervical radiculopathy     Diverticulitis 02/17/13    Epigastric pain/GERD. 9/2/2011    GERD (gastroesophageal reflux disease)     Glaucoma     Hyperlipidemia     Hypertension     Hyponatremia 9/2/2011    IBS (irritable bowel syndrome) 9/2/2011    Insomnia 9/2/2011    Osteoarthritis     Osteoarthritis/neck pain. 9/2/2011    Renal calculi     Shingles     history of shingles     Past Surgical History:   Procedure Laterality Date    CHOLECYSTECTOMY  9-9-15    laparoscopic with cholangiogram    COLONOSCOPY  2002    COLONOSCOPY  08/2013    HERNIA REPAIR      LITHOTRIPSY      MOHS SURGERY Left 03/10/14    cheek    MOHS SURGERY  12/30/14    post scalp    MOHS SURGERY Left 08/2017    X2 left cheek    MOHS SURGERY Left 10/21/2019    temple    OTHER SURGICAL HISTORY  9/6/15    attempted ERCP    SKIN BIOPSY  03/11/13    3 areas    SKIN BIOPSY Left 02/11/14    cheek /basal cell carinoma    SKIN CANCER EXCISION      left face and top of head    SKIN CANCER EXCISION Left 03/2013    forehead, cheek, chin, basal cell.     SKIN CANCER EXCISION Right 08/12/2016    with skin graft    SPINE SURGERY  04/2003     Family History   Problem Relation Age of Onset    Heart Disease Mother     Lung Cancer Father     Cancer Father         lung    Heart Disease Brother      Outpatient Medications Marked as Taking for the 12/17/20 encounter (Office Visit) with NASRA Wells CNP   Medication Sig Dispense Refill    predniSONE (DELTASONE) 5 MG tablet TAKE ONE TABLET BY MOUTH DAILY 30 tablet 5  lisinopril (PRINIVIL;ZESTRIL) 40 MG tablet TAKE ONE TABLET BY MOUTH DAILY 90 tablet 3    tiZANidine (ZANAFLEX) 2 MG tablet TAKE ONE TABLET BY MOUTH THREE TIMES A DAY 90 tablet 0    zoster recombinant adjuvanted vaccine (SHINGRIX) 50 MCG/0.5ML SUSR injection 50 MCG IM then repeat 2-6 months. 0.5 mL 1    tamsulosin (FLOMAX) 0.4 MG capsule Take 1 capsule by mouth 2 times daily 180 capsule 3    latanoprost (XALATAN) 0.005 % ophthalmic solution 1 drop nightly      nitroGLYCERIN (NITROSTAT) 0.4 MG SL tablet Place 0.4 mg under the tongue every 5 minutes as needed for Chest pain up to max of 3 total doses. If no relief after 1 dose, call 911.  hydrALAZINE (APRESOLINE) 50 MG tablet TAKE ONE TABLET BY MOUTH TWICE A DAY 60 tablet 11    busPIRone (BUSPAR) 10 MG tablet Take 1 tablet by mouth 2 times daily 60 tablet 11    allopurinol (ZYLOPRIM) 100 MG tablet Take 1 tablet by mouth daily 90 tablet 3    ipratropium (ATROVENT) 0.03 % nasal spray 2 sprays by Nasal route 3 times daily 1 Bottle 3    warfarin (COUMADIN) 5 MG tablet Take 2.5 mg by mouth once a week Wednesday      melatonin 5 MG TABS tablet Take 1 tablet by mouth nightly       metoprolol tartrate (LOPRESSOR) 25 MG tablet Take 25 mg by mouth 2 times daily         Celebrex [celecoxib] and Mobic  Social History     Tobacco Use   Smoking Status Former Smoker    Packs/day: 1.00    Years: 10.00    Pack years: 10.00    Quit date: 1961    Years since quittin.0   Smokeless Tobacco Never Used   Tobacco Comment    quit      (Ifpatient a smoker, smoking cessation counseling offered)    Social History     Substance and Sexual Activity   Alcohol Use Yes    Alcohol/week: 0.0 standard drinks    Comment: 2x/week       REVIEW OF SYSTEMS:  Review of Systems    Physical Exam:      Vitals:    20 0928   BP: (!) 149/81   Pulse: 85   Temp: 97.8 °F (36.6 °C)     Body mass index is 28.15 kg/m². Patient is a 80 y.o. male in no acute distress and alert and oriented to person, place and time. Physical Exam  Constitutional: Patient in no acute distress. Neuro: Alert and oriented to person, place and time. Psych: Mood normal, affect normal  Skin: No rash noted  HEENT: Head: Normocephalic andatraumatic  Conjunctivae and EOM are normal. Pupils are equal, round  Nose:Normal  Right External Ear: Normal; Left External Ear: Normal  Mouth: Mucosa Moist  Neck: Supple  Lungs: Respiratory effort is normal  Cardiovascular: Warm & Pink  Abdomen: Soft, non-tender, non-distended with no CVA,  No flank tenderness,  Or hepatosplenomegaly   Lymphatics: No palpablelymphadenopathy. Bladder non-tender and not distended. PVR- 32 ml   Musculoskeletal: Normal gait and station      Assessment and Plan      1. Incomplete emptying of bladder    2. Nocturia    3. Weak urinary stream    4. Benign prostatic hyperplasia with nocturia           Plan:     Continue Tamsulosin 2x per day    Post void residual is 32 ml     Elevate leg when sitting    Decrease fluids 2 hours before bed. Timed urination every 2-3 hours during the day. See if these measures help the nighttime urination first. We discussed Finasteride and cysto- will see if lifestyle measures can improve symptoms. No follow-ups on file. Prescriptions Ordered:  No orders of the defined types were placed in this encounter. Orders Placed:  Orders Placed This Encounter   Procedures    [place] MEASUREMENT,POST-VOID RESIDUAL VOLUME BY US,NON-IMAGING           NASRA Chavarria CNP    Agree with the ROS entered by the MA.

## 2020-12-17 NOTE — LETTER
[address] [address]  Dept: [phone]  Dept Fax: [phone]        12/17/20    Patient: Wilfrido Estrada  YOB: 1931    Dear Josie Victoria MD,    I had the pleasure of seeing one of your patients, Oscar Lerma today in the office today. Below are the relevant portions of my assessment and plan of care. IMPRESSION:  1. Incomplete emptying of bladder    2. Nocturia    3. Weak urinary stream    4. Benign prostatic hyperplasia with nocturia        PLAN:  Continue Tamsulosin 2x per day    Post void residual is 32 ml     Elevate leg when sitting    Decrease fluids 2 hours before bed. Timed urination every 2-3 hours during the day. See if these measures help the nighttime urination first. We discussed Finasteride and cysto- will see if lifestyle measures can improve symptoms. No follow-ups on file. Prescriptions Ordered:  No orders of the defined types were placed in this encounter. Orders Placed:  Orders Placed This Encounter   Procedures    [place] MEASUREMENT,POST-VOID RESIDUAL VOLUME BY US,NON-IMAGING        Thank you for allowing me to participate in the care of this patient. I will keep you updated on this patient's follow up and I look forward to serving you and your patients again in the future.     NASRA Rayo - CNP

## 2020-12-28 ENCOUNTER — TELEPHONE (OUTPATIENT)
Dept: FAMILY MEDICINE CLINIC | Age: 85
End: 2020-12-28

## 2021-01-04 ENCOUNTER — TELEPHONE (OUTPATIENT)
Dept: FAMILY MEDICINE CLINIC | Age: 86
End: 2021-01-04

## 2021-01-04 NOTE — TELEPHONE ENCOUNTER
Daughter Shanna Walters called back to explain that the 5 mg was the lower dose. He used to take 20 mg. Daughter said he is doing better . He was not taking the muscle relaxer before and now he is. Everything is working fine.

## 2021-01-04 NOTE — TELEPHONE ENCOUNTER
He has always been on the Prednisone 5mg daily. Will see what Dr. Pavel Lim says at his appt on 1/7 and I see he has an appt with me on 1/13 and will discuss further options at that appt.

## 2021-01-07 ENCOUNTER — HOSPITAL ENCOUNTER (OUTPATIENT)
Age: 86
Setting detail: SPECIMEN
Discharge: HOME | End: 2021-01-07
Payer: MEDICARE

## 2021-01-07 DIAGNOSIS — M10.9 GOUT, UNSPECIFIED CAUSE, UNSPECIFIED CHRONICITY, UNSPECIFIED SITE: ICD-10-CM

## 2021-01-07 DIAGNOSIS — I48.0 PAROXYSMAL ATRIAL FIBRILLATION (HCC): ICD-10-CM

## 2021-01-07 DIAGNOSIS — E53.8 B12 DEFICIENCY: ICD-10-CM

## 2021-01-07 DIAGNOSIS — R74.8 ELEVATED LIVER ENZYMES: ICD-10-CM

## 2021-01-07 DIAGNOSIS — E78.00 PURE HYPERCHOLESTEROLEMIA: ICD-10-CM

## 2021-01-07 DIAGNOSIS — D50.9 IRON DEFICIENCY ANEMIA, UNSPECIFIED IRON DEFICIENCY ANEMIA TYPE: ICD-10-CM

## 2021-01-07 DIAGNOSIS — I50.22 CHRONIC SYSTOLIC CONGESTIVE HEART FAILURE (HCC): ICD-10-CM

## 2021-01-07 DIAGNOSIS — I10 ESSENTIAL HYPERTENSION: ICD-10-CM

## 2021-01-07 LAB
ALBUMIN SERPL-MCNC: 3.8 G/DL (ref 3.5–5.2)
ALBUMIN/GLOBULIN RATIO: 1.5 (ref 1–2.5)
ALP BLD-CCNC: 67 U/L (ref 40–129)
ALT SERPL-CCNC: 15 U/L (ref 5–41)
ANION GAP SERPL CALCULATED.3IONS-SCNC: 14 MMOL/L (ref 9–17)
AST SERPL-CCNC: 18 U/L
BILIRUB SERPL-MCNC: 1.48 MG/DL (ref 0.3–1.2)
BUN BLDV-MCNC: 13 MG/DL (ref 8–23)
BUN/CREAT BLD: ABNORMAL (ref 9–20)
CALCIUM SERPL-MCNC: 9.3 MG/DL (ref 8.6–10.4)
CHLORIDE BLD-SCNC: 99 MMOL/L (ref 98–107)
CHOLESTEROL/HDL RATIO: 2.7
CHOLESTEROL: 171 MG/DL
CO2: 25 MMOL/L (ref 20–31)
CREAT SERPL-MCNC: 0.84 MG/DL (ref 0.7–1.2)
GFR AFRICAN AMERICAN: >60 ML/MIN
GFR NON-AFRICAN AMERICAN: >60 ML/MIN
GFR SERPL CREATININE-BSD FRML MDRD: ABNORMAL ML/MIN/{1.73_M2}
GFR SERPL CREATININE-BSD FRML MDRD: ABNORMAL ML/MIN/{1.73_M2}
GLUCOSE BLD-MCNC: 85 MG/DL (ref 70–99)
HCT VFR BLD CALC: 46.8 % (ref 40.7–50.3)
HDLC SERPL-MCNC: 64 MG/DL
HEMOGLOBIN: 14.4 G/DL (ref 13–17)
IRON: 71 UG/DL (ref 59–158)
LDL CHOLESTEROL: 92 MG/DL (ref 0–130)
MCH RBC QN AUTO: 29.2 PG (ref 25.2–33.5)
MCHC RBC AUTO-ENTMCNC: 30.8 G/DL (ref 28.4–34.8)
MCV RBC AUTO: 94.9 FL (ref 82.6–102.9)
NRBC AUTOMATED: 0 PER 100 WBC
PDW BLD-RTO: 14.2 % (ref 11.8–14.4)
PLATELET # BLD: 201 K/UL (ref 138–453)
PMV BLD AUTO: 11.1 FL (ref 8.1–13.5)
POTASSIUM SERPL-SCNC: 5.1 MMOL/L (ref 3.7–5.3)
RBC # BLD: 4.93 M/UL (ref 4.21–5.77)
SODIUM BLD-SCNC: 138 MMOL/L (ref 135–144)
TOTAL PROTEIN: 6.4 G/DL (ref 6.4–8.3)
TRIGL SERPL-MCNC: 74 MG/DL
URIC ACID: 4 MG/DL (ref 3.4–7)
VITAMIN B-12: 693 PG/ML (ref 232–1245)
VLDLC SERPL CALC-MCNC: NORMAL MG/DL (ref 1–30)
WBC # BLD: 7.6 K/UL (ref 3.5–11.3)

## 2021-01-13 ENCOUNTER — OFFICE VISIT (OUTPATIENT)
Dept: FAMILY MEDICINE CLINIC | Age: 86
End: 2021-01-13
Payer: MEDICARE

## 2021-01-13 VITALS
TEMPERATURE: 97.8 F | WEIGHT: 165 LBS | BODY MASS INDEX: 28.32 KG/M2 | DIASTOLIC BLOOD PRESSURE: 74 MMHG | HEART RATE: 56 BPM | OXYGEN SATURATION: 97 % | SYSTOLIC BLOOD PRESSURE: 132 MMHG | RESPIRATION RATE: 16 BRPM

## 2021-01-13 DIAGNOSIS — M19.90 OSTEOARTHRITIS, UNSPECIFIED OSTEOARTHRITIS TYPE, UNSPECIFIED SITE: ICD-10-CM

## 2021-01-13 DIAGNOSIS — G47.00 INSOMNIA, UNSPECIFIED TYPE: ICD-10-CM

## 2021-01-13 DIAGNOSIS — M50.30 DDD (DEGENERATIVE DISC DISEASE), CERVICAL: ICD-10-CM

## 2021-01-13 DIAGNOSIS — I50.22 CHRONIC SYSTOLIC CONGESTIVE HEART FAILURE (HCC): ICD-10-CM

## 2021-01-13 DIAGNOSIS — R35.1 BENIGN PROSTATIC HYPERPLASIA WITH NOCTURIA: ICD-10-CM

## 2021-01-13 DIAGNOSIS — Z23 NEED FOR PROPHYLACTIC VACCINATION AND INOCULATION AGAINST VARICELLA: ICD-10-CM

## 2021-01-13 DIAGNOSIS — E78.00 PURE HYPERCHOLESTEROLEMIA: ICD-10-CM

## 2021-01-13 DIAGNOSIS — K58.9 IRRITABLE BOWEL SYNDROME WITHOUT DIARRHEA: ICD-10-CM

## 2021-01-13 DIAGNOSIS — I10 ESSENTIAL HYPERTENSION: Primary | ICD-10-CM

## 2021-01-13 DIAGNOSIS — D50.9 IRON DEFICIENCY ANEMIA, UNSPECIFIED IRON DEFICIENCY ANEMIA TYPE: ICD-10-CM

## 2021-01-13 DIAGNOSIS — R74.8 ELEVATED LIVER ENZYMES: ICD-10-CM

## 2021-01-13 DIAGNOSIS — N40.1 BENIGN PROSTATIC HYPERPLASIA WITH NOCTURIA: ICD-10-CM

## 2021-01-13 DIAGNOSIS — F41.9 ANXIETY: ICD-10-CM

## 2021-01-13 DIAGNOSIS — M51.36 DDD (DEGENERATIVE DISC DISEASE), LUMBAR: ICD-10-CM

## 2021-01-13 DIAGNOSIS — K21.9 GASTROESOPHAGEAL REFLUX DISEASE WITHOUT ESOPHAGITIS: ICD-10-CM

## 2021-01-13 DIAGNOSIS — M10.9 GOUT, UNSPECIFIED CAUSE, UNSPECIFIED CHRONICITY, UNSPECIFIED SITE: ICD-10-CM

## 2021-01-13 DIAGNOSIS — I48.0 PAROXYSMAL ATRIAL FIBRILLATION (HCC): ICD-10-CM

## 2021-01-13 DIAGNOSIS — H61.23 BILATERAL IMPACTED CERUMEN: ICD-10-CM

## 2021-01-13 DIAGNOSIS — E53.8 B12 DEFICIENCY: ICD-10-CM

## 2021-01-13 PROCEDURE — G8427 DOCREV CUR MEDS BY ELIG CLIN: HCPCS | Performed by: FAMILY MEDICINE

## 2021-01-13 PROCEDURE — 4040F PNEUMOC VAC/ADMIN/RCVD: CPT | Performed by: FAMILY MEDICINE

## 2021-01-13 PROCEDURE — 99214 OFFICE O/P EST MOD 30 MIN: CPT | Performed by: FAMILY MEDICINE

## 2021-01-13 PROCEDURE — 69210 REMOVE IMPACTED EAR WAX UNI: CPT | Performed by: FAMILY MEDICINE

## 2021-01-13 PROCEDURE — G8417 CALC BMI ABV UP PARAM F/U: HCPCS | Performed by: FAMILY MEDICINE

## 2021-01-13 PROCEDURE — 1036F TOBACCO NON-USER: CPT | Performed by: FAMILY MEDICINE

## 2021-01-13 PROCEDURE — 1123F ACP DISCUSS/DSCN MKR DOCD: CPT | Performed by: FAMILY MEDICINE

## 2021-01-13 PROCEDURE — G8484 FLU IMMUNIZE NO ADMIN: HCPCS | Performed by: FAMILY MEDICINE

## 2021-01-13 RX ORDER — PREDNISONE 1 MG/1
TABLET ORAL
Qty: 45 TABLET | Refills: 6 | Status: ON HOLD
Start: 2021-01-13 | End: 2021-02-19 | Stop reason: HOSPADM

## 2021-01-13 RX ORDER — BUSPIRONE HYDROCHLORIDE 10 MG/1
10 TABLET ORAL 3 TIMES DAILY
Qty: 90 TABLET | Refills: 11
Start: 2021-01-13 | End: 2021-02-05 | Stop reason: SDUPTHER

## 2021-01-13 ASSESSMENT — ENCOUNTER SYMPTOMS
BACK PAIN: 0
CHEST TIGHTNESS: 0
NAUSEA: 0
RHINORRHEA: 0
DIARRHEA: 0
CONSTIPATION: 0
VOMITING: 0
SORE THROAT: 0
ABDOMINAL PAIN: 0
COUGH: 0
ABDOMINAL DISTENTION: 0
SHORTNESS OF BREATH: 0

## 2021-01-13 ASSESSMENT — PATIENT HEALTH QUESTIONNAIRE - PHQ9
SUM OF ALL RESPONSES TO PHQ9 QUESTIONS 1 & 2: 2
2. FEELING DOWN, DEPRESSED OR HOPELESS: 1
SUM OF ALL RESPONSES TO PHQ QUESTIONS 1-9: 2
SUM OF ALL RESPONSES TO PHQ QUESTIONS 1-9: 2

## 2021-01-13 NOTE — PROGRESS NOTES
HYPERTENSION visit     BP Readings from Last 3 Encounters:   12/17/20 (!) 149/81   10/14/20 124/76   08/11/20 138/80       LDL Calculated (mg/dL)   Date Value   05/23/2017 53     LDL Cholesterol (mg/dL)   Date Value   01/07/2021 92     HDL (mg/dL)   Date Value   01/07/2021 64     BUN (mg/dL)   Date Value   01/07/2021 13     CREATININE (mg/dL)   Date Value   01/07/2021 0.84     Glucose   Date Value   01/07/2021 85 mg/dL   05/23/2017 89 mg/dl              Have you changed or started any medications since your last visit including any over-the-counter medicines, vitamins, or herbal medicines? no   Have you stopped taking any of your medications? Is so, why? -  no  Are you having any side effects from any of your medications? - no  How often do you miss doses of your medication? rare      Have you seen any other physician or provider since your last visit? yes - Dr.s Catrachito Treviño   Have you had any other diagnostic tests since your last visit? yes - labs   Have you been seen in the emergency room and/or had an admission in a hospital since we last saw you?  no   Have you had your routine dental cleaning in the past 6 months?  yes -      Do you have an active MyChart account? If no, what is the barrier?   Yes    Patient Care Team:  Lu Belcher MD as PCP - General (Family Medicine)  Lu Belcher MD as PCP - [facility]  Christopher Salvador MD as Consulting Physician (Dermatology)  Valeri Marquez MD as Consulting Physician (Cardiology)  Corry Watkins MD as Consulting Physician (Gastroenterology)  Ruiz Shah MD as Surgeon (Ophthalmology)  Maryjo Solis MD as Surgeon (Ophthalmology)  Verito Mcfarland MD as Consulting Physician (Urology)  Dickson Bowen DO as Consulting Physician (General Surgery)  Sean Doan DPM as Consulting Physician (Podiatry)  Fabienne Bejarano MD as Consulting Physician (Internal Medicine)    Medical History Review Past Medical, Family, and Social History reviewed and does contribute to the patient presenting condition    Health Maintenance   Topic Date Due    Shingles Vaccine (2 of 3) 09/09/2012    Annual Wellness Visit (AWV)  05/29/2019    DTaP/Tdap/Td vaccine (2 - Td) 09/14/2021 (Originally 8/23/2020)    Potassium monitoring  01/07/2022    Creatinine monitoring  01/07/2022    Flu vaccine  Completed    Pneumococcal 65+ years Vaccine  Completed    Hepatitis A vaccine  Aged Out    Hepatitis B vaccine  Aged Out    Hib vaccine  Aged Out    Meningococcal (ACWY) vaccine  Aged Out     Patient/Caregiver verbalize understanding of medications.   Yes

## 2021-01-13 NOTE — PROGRESS NOTES
Jacqueline Nj MD    [address] [facility]  [id number]  [address] [address]  Dept: [phone]  Dept Fax: [phone]     Patient ID: Reginald Henson is a 80 y.o. male. HPI    Pt here today for f/u on chronic medical problems HTN, HLD, CHF, Atrial Fibrillation, Lumbar/Cervical DDD, Gout, Anxiety, B12 Def, OA, Insomnia, IBS, BPH,go over labs and/or diagnostic studies, and medication refills. Pt denies any fever or chills. Pt today denies any HA, chest pain, or SOB. Pt denies any N/V/D/C or abdominal pain. Pt states mood is stable on meds and is felling better on the Buspar, but is wondering if he can take one during the middle of the day. Pt with arthralgias/neck/back pain on and off, but stable on meds and he is taking the daily low dose Prednisone. He is also wondering if he can take more Prednisone on days he is really hurting. Pt denies any gouty attacks. Otherwise pt doing well on current tx and no other concerns today. The patient's past medical, surgical, social, and family history as well as his current medications and allergies were reviewed as documented in today's encounter. Current Outpatient Medications on File Prior to Visit   Medication Sig Dispense Refill    lisinopril (PRINIVIL;ZESTRIL) 40 MG tablet TAKE ONE TABLET BY MOUTH DAILY 90 tablet 3    tiZANidine (ZANAFLEX) 2 MG tablet TAKE ONE TABLET BY MOUTH THREE TIMES A DAY 90 tablet 0    tamsulosin (FLOMAX) 0.4 MG capsule Take 1 capsule by mouth 2 times daily 180 capsule 3    latanoprost (XALATAN) 0.005 % ophthalmic solution 1 drop nightly      nitroGLYCERIN (NITROSTAT) 0.4 MG SL tablet Place 0.4 mg under the tongue every 5 minutes as needed for Chest pain up to max of 3 total doses. If no relief after 1 dose, call 911.       hydrALAZINE (APRESOLINE) 50 MG tablet TAKE ONE TABLET BY MOUTH TWICE A DAY 60 tablet 11  allopurinol (ZYLOPRIM) 100 MG tablet Take 1 tablet by mouth daily 90 tablet 3    ipratropium (ATROVENT) 0.03 % nasal spray 2 sprays by Nasal route 3 times daily 1 Bottle 3    warfarin (COUMADIN) 5 MG tablet Take 2.5 mg by mouth once a week Wednesday      melatonin 5 MG TABS tablet Take 1 tablet by mouth nightly       metoprolol tartrate (LOPRESSOR) 25 MG tablet Take 25 mg by mouth 2 times daily       No current facility-administered medications on file prior to visit. Subjective:     Review of Systems   Constitutional: Negative for appetite change, fatigue and fever. HENT: Positive for hearing loss. Negative for congestion, ear pain, rhinorrhea and sore throat. Ear fullness   Respiratory: Negative for cough, chest tightness and shortness of breath. Cardiovascular: Negative for chest pain and palpitations. Gastrointestinal: Negative for abdominal distention, abdominal pain, constipation, diarrhea, nausea and vomiting. Genitourinary: Negative for difficulty urinating and dysuria. Musculoskeletal: Positive for arthralgias and neck pain. Negative for back pain and myalgias. Skin: Negative for rash. Neurological: Negative for dizziness, weakness, light-headedness and headaches. Hematological: Negative for adenopathy. Psychiatric/Behavioral: Negative for behavioral problems and sleep disturbance (stable). The patient is nervous/anxious (stable). Objective:     Physical Exam  Vitals signs reviewed. Constitutional:       General: He is not in acute distress. Appearance: He is well-developed. HENT:      Head: Normocephalic and atraumatic. Right Ear: External ear normal. There is impacted cerumen. Left Ear: External ear normal. There is impacted cerumen. Nose: Nose normal.      Mouth/Throat:      Pharynx: No oropharyngeal exudate. Eyes:      Conjunctiva/sclera: Conjunctivae normal.      Pupils: Pupils are equal, round, and reactive to light.    Neck: Musculoskeletal: Normal range of motion. Cardiovascular:      Rate and Rhythm: Normal rate and regular rhythm. Heart sounds: Normal heart sounds. No murmur. Pulmonary:      Effort: Pulmonary effort is normal. No respiratory distress. Breath sounds: Normal breath sounds. No wheezing. Chest:      Chest wall: No tenderness. Abdominal:      General: Bowel sounds are normal. There is no distension. Palpations: Abdomen is soft. There is no mass. Tenderness: There is no abdominal tenderness. Musculoskeletal: Normal range of motion. General: No tenderness. Lymphadenopathy:      Cervical: No cervical adenopathy. Skin:     Findings: No rash. Neurological:      Mental Status: He is alert and oriented to person, place, and time. Deep Tendon Reflexes: Reflexes are normal and symmetric. Psychiatric:         Behavior: Behavior normal.     Labs reviewed with pt today. Assessment:      Diagnosis Orders   1. Essential hypertension     2. Pure hypercholesterolemia     3. Chronic systolic congestive heart failure (HCC)     4. Paroxysmal atrial fibrillation ([facility] [address]     5. Iron deficiency anemia, unspecified iron deficiency anemia type     6. B12 deficiency     7. DDD (degenerative disc disease), cervical     8. DDD (degenerative disc disease), lumbar     9. Gout, unspecified cause, unspecified chronicity, unspecified site     10. Anxiety     11. Insomnia, unspecified type     12. Osteoarthritis, unspecified osteoarthritis type, unspecified site     13. Elevated liver enzymes      stable   14. Irritable bowel syndrome without diarrhea     15. Benign prostatic hyperplasia with nocturia     16. Gastroesophageal reflux disease without esophagitis     17. Need for prophylactic vaccination and inoculation against varicella  zoster recombinant adjuvanted vaccine (SHINGRIX) 50 MCG/0.5ML SUSR injection   18.  Bilateral impacted cerumen  [place] REMOVAL IMPACTED CERUMEN INSTRUMENTATION UNILAT Plan:     Orders Placed This Encounter   Procedures    IN REMOVAL IMPACTED CERUMEN INSTRUMENTATION UNILAT      Orders Placed This Encounter   Medications    zoster recombinant adjuvanted vaccine (SHINGRIX) 50 MCG/0.5ML SUSR injection     Si MCG IM then repeat 2-6 months. Dispense:  0.5 mL     Refill:  1    busPIRone (BUSPAR) 10 MG tablet     Sig: Take 1 tablet by mouth 3 times daily     Dispense:  90 tablet     Refill:  11    predniSONE (DELTASONE) 5 MG tablet     Si-2 pills daily prn arthralgias/back     Dispense:  45 tablet     Refill:  6     Will cont with current treatment as pt is currently stable on current treatment    Will cont with the Lisinopril and hydralazine as prescribed for BP control    Will cont with low fat/chol diet     Will cont with the low dose Prednisone 5mg daily and the Zanaflex for his neck and back pain. He aware on the long term side effects with chronic steroids, but the benefit and QOL outweighs the risks for him. I did tell him he can double up on the Prednisone on days his back is really hurting    Will cont with the Buspar as prescribed as he is doing well and I did state he can increase to TID    Will cont with a high fiber diet    Will follow up with Dr. Silvia Crawley next week for his back pain    Will cont to follow with Dr. Taylor Mckenzie as instructed    Cont to follow with Coumadin clinic for Coumadin dosing    Rest of systems unchanged, continue current treatments. Medications, labs, diagnostic studies, consultations and follow-up as documented in this encounter. Rest of systems unchanged, continue current treatments    I have spent 15 minutes face to face with the patient more than 50% of this time was spent counseling and coordinating care. Jacqueline Knott MD

## 2021-01-15 ENCOUNTER — TELEPHONE (OUTPATIENT)
Dept: UROLOGY | Age: 86
End: 2021-01-15

## 2021-01-20 ENCOUNTER — OFFICE VISIT (OUTPATIENT)
Dept: PODIATRY | Age: 86
End: 2021-01-20
Payer: MEDICARE

## 2021-01-20 VITALS — WEIGHT: 163 LBS | HEIGHT: 64 IN | BODY MASS INDEX: 27.83 KG/M2

## 2021-01-20 DIAGNOSIS — B35.1 ONYCHOMYCOSIS: Primary | ICD-10-CM

## 2021-01-20 DIAGNOSIS — M79.675 PAIN IN TOES OF BOTH FEET: ICD-10-CM

## 2021-01-20 DIAGNOSIS — M79.674 PAIN IN TOES OF BOTH FEET: ICD-10-CM

## 2021-01-20 PROCEDURE — 1036F TOBACCO NON-USER: CPT | Performed by: PODIATRIST

## 2021-01-20 PROCEDURE — 1123F ACP DISCUSS/DSCN MKR DOCD: CPT | Performed by: PODIATRIST

## 2021-01-20 PROCEDURE — G8427 DOCREV CUR MEDS BY ELIG CLIN: HCPCS | Performed by: PODIATRIST

## 2021-01-20 PROCEDURE — 4040F PNEUMOC VAC/ADMIN/RCVD: CPT | Performed by: PODIATRIST

## 2021-01-20 PROCEDURE — G8484 FLU IMMUNIZE NO ADMIN: HCPCS | Performed by: PODIATRIST

## 2021-01-20 PROCEDURE — G8417 CALC BMI ABV UP PARAM F/U: HCPCS | Performed by: PODIATRIST

## 2021-01-20 PROCEDURE — 11721 DEBRIDE NAIL 6 OR MORE: CPT | Performed by: PODIATRIST

## 2021-01-20 ASSESSMENT — ENCOUNTER SYMPTOMS
VOMITING: 0
CONSTIPATION: 0
DIARRHEA: 0
COLOR CHANGE: 0
NAUSEA: 0

## 2021-01-20 NOTE — PROGRESS NOTES
[facility]  Return Patient    Chief Complaint   Patient presents with    Nail Problem     b/l nail trim/ last seen Dr. Tamera Aase 1/13/2020       Subjective: This Elizabeth Shoe comes to clinic for foot and nail care. He would like all of his nails trimmed. He cannot take care of them himself. They are painful when long and he tries to wear shoes. Pt's primary care physician is Michael Lemon MD.       Past Medical History:   Diagnosis Date    Acute MI ([address]     Allergic rhinitis 9/2/2011    Anemia     Basal cell cancer 03/2013    left side of head, face chin    Basal cell cancer 03/10/14    left cheek    Cervical radiculopathy     Diverticulitis 02/17/13    Epigastric pain/GERD. 9/2/2011    GERD (gastroesophageal reflux disease)     Glaucoma     Hyperlipidemia     Hypertension     Hyponatremia 9/2/2011    IBS (irritable bowel syndrome) 9/2/2011    Insomnia 9/2/2011    Osteoarthritis     Osteoarthritis/neck pain. 9/2/2011    Renal calculi     Shingles     history of shingles       Allergies   Allergen Reactions    Celebrex [Celecoxib] Nausea Only    Mobic Nausea Only     Current Outpatient Medications on File Prior to Visit   Medication Sig Dispense Refill    zoster recombinant adjuvanted vaccine (SHINGRIX) 50 MCG/0.5ML SUSR injection 50 MCG IM then repeat 2-6 months.  0.5 mL 1    busPIRone (BUSPAR) 10 MG tablet Take 1 tablet by mouth 3 times daily 90 tablet 11    predniSONE (DELTASONE) 5 MG tablet 1-2 pills daily prn arthralgias/back 45 tablet 6    lisinopril (PRINIVIL;ZESTRIL) 40 MG tablet TAKE ONE TABLET BY MOUTH DAILY 90 tablet 3    tiZANidine (ZANAFLEX) 2 MG tablet TAKE ONE TABLET BY MOUTH THREE TIMES A DAY 90 tablet 0    tamsulosin (FLOMAX) 0.4 MG capsule Take 1 capsule by mouth 2 times daily 180 capsule 3    latanoprost (XALATAN) 0.005 % ophthalmic solution 1 drop nightly      nitroGLYCERIN (NITROSTAT) 0.4 MG SL tablet Place 0.4 mg under the tongue every 5 minutes as needed for Chest pain up to max of 3 total doses. If no relief after 1 dose, call 911.  hydrALAZINE (APRESOLINE) 50 MG tablet TAKE ONE TABLET BY MOUTH TWICE A DAY 60 tablet 11    allopurinol (ZYLOPRIM) 100 MG tablet Take 1 tablet by mouth daily 90 tablet 3    ipratropium (ATROVENT) 0.03 % nasal spray 2 sprays by Nasal route 3 times daily 1 Bottle 3    warfarin (COUMADIN) 5 MG tablet Take 2.5 mg by mouth once a week Wednesday      melatonin 5 MG TABS tablet Take 1 tablet by mouth nightly       metoprolol tartrate (LOPRESSOR) 25 MG tablet Take 25 mg by mouth 2 times daily       No current facility-administered medications on file prior to visit. Review of Systems   Constitutional: Negative for chills, diaphoresis, fatigue, fever and unexpected weight change. Cardiovascular: Negative for leg swelling. Gastrointestinal: Negative for constipation, diarrhea, nausea and vomiting. Musculoskeletal: Positive for gait problem. Negative for arthralgias and joint swelling. Skin: Negative for color change, pallor, rash and wound. Neurological: Negative for weakness and numbness. Objective:  General: AAO x 3 in NAD. Derm  Left hallux nail incurvated medial border with slight redness and pain to palpation.     Sites of Onychomycosis Involvement (Check affected area)  [x] [x] [x] [x] [x] [x] [x] [x] [x] [x]  5 4 3 2 1 1 2 3 4 5                          Right                                        Left    Thickness  [x] [x] [x] [x] [x] [x] [x] [x] [x] [x]  5 4 3 2 1 1 2 3 4 5                         Right                                        Left    Dystrophic Changes                                                                 [x] [x] [x] [x] [x] [x] [x] [x] [x] [x]  5 4 3 2 1 1 2 3 4 5                         Right                                        Left    Color [x] [x] [x] [x] [x] [x] [x] [x] [x] [x]  5 4 3 2 1 1 2 3 4 5                          Right                                        Left    Incurvation/Ingrowin                                                                   [] [] [] [] [] [] [] [] [] []  5 4 3 2 1 1 2 3 4 5                         Right                                        Left    Inflammation/Pain                                                                   [x] [x] [x] [x] [x] [x] [x] [x] [x] [x]  5 4 3 2 1 1 2 3 4 5                         Right                                        Left    Vascular:  DP/PT pulses palpable 2/4, Bilateral.    CFT <3 seconds to digits 1-5, Bilateral .   Hair growth present to level of digits, Bilateral.    Neurological:  Sensation present to light touch to level of digits, Bilateral.    Assessment:  80 y.o. male with:   1. Onychomycosis    2. Pain in toes of both feet       Orders Placed This Encounter   Procedures    KS DEBRIDEMENT OF NAILS, 6 OR MORE         Plan:   Pt was evaluated and examined. Patient was given personalized discharge instructions. Nails 1-10 were debrided in length and thickness sharply with a nail nipper and  without incident, slant back performed. Pt will follow up in 9-12 weeks or sooner if any problems arise. Diagnosis was discussed with the pt and all of their questions were answered in detail. Proper foot hygiene and care was discussed with the pt. Patient to check feet daily and contact the office with any questions/problems/concerns. Other comorbidity noted and will be managed by PCP. Pain waiver discussed with patient and confirmed.          1/20/2021    Electronically signed by Raphael Rodriguez DPM on 1/20/2021 at 12:53 PM

## 2021-01-26 ENCOUNTER — IMMUNIZATION (OUTPATIENT)
Dept: PRIMARY CARE CLINIC | Age: 86
End: 2021-01-26
Payer: MEDICARE

## 2021-01-26 PROCEDURE — 0011A COVID-19, MODERNA VACCINE 100MCG/0.5ML DOSE: CPT | Performed by: NURSE PRACTITIONER

## 2021-01-26 PROCEDURE — 91301 COVID-19, MODERNA VACCINE 100MCG/0.5ML DOSE: CPT | Performed by: NURSE PRACTITIONER

## 2021-02-05 RX ORDER — BUSPIRONE HYDROCHLORIDE 10 MG/1
10 TABLET ORAL 3 TIMES DAILY
Qty: 90 TABLET | Refills: 11 | Status: SHIPPED | OUTPATIENT
Start: 2021-02-05 | End: 2021-10-18 | Stop reason: SDUPTHER

## 2021-02-16 ENCOUNTER — NURSE TRIAGE (OUTPATIENT)
Dept: OTHER | Facility: CLINIC | Age: 86
End: 2021-02-16

## 2021-02-16 NOTE — TELEPHONE ENCOUNTER
(8-10): excruciating pain, unable to do any normal activities, unable to walk        9/10    4. WORK OR EXERCISE: \"Has there been any recent work or exercise that involved this part of the body? \"       Denies  Any other symptoms     5. CAUSE: \"What do you think is causing the foot pain? \"        Weak Arch     6. OTHER SYMPTOMS: \"Do you have any other symptoms? \" (e.g., leg pain, rash, fever, numbness)      Denies     7. PREGNANCY: \"Is there any chance you are pregnant? \" \"When was your last menstrual period? \"      N/a    Protocols used: FOOT PAIN-ADULT-[place]

## 2021-02-17 ENCOUNTER — HOSPITAL ENCOUNTER (INPATIENT)
Age: 86
LOS: 2 days | Discharge: HOME | DRG: 554 | End: 2021-02-19
Attending: EMERGENCY MEDICINE | Admitting: INTERNAL MEDICINE
Payer: MEDICARE

## 2021-02-17 ENCOUNTER — APPOINTMENT (OUTPATIENT)
Dept: GENERAL RADIOLOGY | Age: 86
DRG: 554 | End: 2021-02-17
Payer: MEDICARE

## 2021-02-17 DIAGNOSIS — M11.879 CALCIUM PYROPHOSPHATE DEPOSITION DISEASE OF ANKLE: Primary | ICD-10-CM

## 2021-02-17 DIAGNOSIS — M00.9 SEPTIC ARTHRITIS OF LEFT ANKLE, DUE TO UNSPECIFIED ORGANISM (HCC): ICD-10-CM

## 2021-02-17 LAB
ABSOLUTE BANDS #: 0.25 K/UL (ref 0–1)
ABSOLUTE EOS #: 0 K/UL (ref 0–0.4)
ABSOLUTE IMMATURE GRANULOCYTE: ABNORMAL K/UL (ref 0–0.3)
ABSOLUTE LYMPH #: 0.38 K/UL (ref 1–4.8)
ABSOLUTE MONO #: 4.19 K/UL (ref 0.1–1.3)
ANION GAP SERPL CALCULATED.3IONS-SCNC: 6 MMOL/L (ref 9–17)
BANDS: 2 % (ref 0–10)
BASOPHILS # BLD: 0 % (ref 0–2)
BASOPHILS ABSOLUTE: 0 K/UL (ref 0–0.2)
BUN BLDV-MCNC: 13 MG/DL (ref 8–23)
BUN/CREAT BLD: ABNORMAL (ref 9–20)
C-REACTIVE PROTEIN: 34.7 MG/L (ref 0–5)
CALCIUM SERPL-MCNC: 8.9 MG/DL (ref 8.6–10.4)
CHLORIDE BLD-SCNC: 102 MMOL/L (ref 98–107)
CO2: 27 MMOL/L (ref 20–31)
CREAT SERPL-MCNC: 0.7 MG/DL (ref 0.7–1.2)
CRYSTALS, FLUID: NORMAL
CULTURE: NORMAL
DIFFERENTIAL TYPE: ABNORMAL
DIRECT EXAM: NORMAL
EOSINOPHILS RELATIVE PERCENT: 0 % (ref 0–4)
GFR AFRICAN AMERICAN: >60 ML/MIN
GFR NON-AFRICAN AMERICAN: >60 ML/MIN
GFR SERPL CREATININE-BSD FRML MDRD: ABNORMAL ML/MIN/{1.73_M2}
GFR SERPL CREATININE-BSD FRML MDRD: ABNORMAL ML/MIN/{1.73_M2}
GLUCOSE BLD-MCNC: 100 MG/DL (ref 70–99)
HCT VFR BLD CALC: 42.8 % (ref 41–53)
HEMOGLOBIN: 14.2 G/DL (ref 13.5–17.5)
IMMATURE GRANULOCYTES: ABNORMAL %
INR BLD: 2.2
LACTIC ACID, WHOLE BLOOD: NORMAL MMOL/L (ref 0.7–2.1)
LACTIC ACID: 1 MMOL/L (ref 0.5–2.2)
LYMPHOCYTES # BLD: 3 % (ref 24–44)
Lab: NORMAL
MCH RBC QN AUTO: 30 PG (ref 26–34)
MCHC RBC AUTO-ENTMCNC: 33.1 G/DL (ref 31–37)
MCV RBC AUTO: 90.5 FL (ref 80–100)
MONOCYTES # BLD: 33 % (ref 1–7)
MORPHOLOGY: NORMAL
NRBC AUTOMATED: ABNORMAL PER 100 WBC
PARTIAL THROMBOPLASTIN TIME: 38.3 SEC (ref 24–36)
PATHOLOGIST REVIEW: NORMAL
PDW BLD-RTO: 15.3 % (ref 11.5–14.9)
PLATELET # BLD: 175 K/UL (ref 150–450)
PLATELET ESTIMATE: ABNORMAL
PMV BLD AUTO: 9.2 FL (ref 6–12)
POTASSIUM SERPL-SCNC: 4.1 MMOL/L (ref 3.7–5.3)
PROTHROMBIN TIME: 24.6 SEC (ref 11.8–14.6)
RBC # BLD: 4.73 M/UL (ref 4.5–5.9)
RBC # BLD: ABNORMAL 10*6/UL
SEDIMENTATION RATE, ERYTHROCYTE: 18 MM (ref 0–20)
SEG NEUTROPHILS: 62 % (ref 36–66)
SEGMENTED NEUTROPHILS ABSOLUTE COUNT: 7.88 K/UL (ref 1.3–9.1)
SODIUM BLD-SCNC: 135 MMOL/L (ref 135–144)
SPECIMEN DESCRIPTION: NORMAL
SPECIMEN TYPE: NORMAL
URIC ACID: 2.7 MG/DL (ref 3.4–7)
WBC # BLD: 12.7 K/UL (ref 3.5–11)
WBC # BLD: ABNORMAL 10*3/UL

## 2021-02-17 PROCEDURE — 2580000003 HC RX 258: Performed by: EMERGENCY MEDICINE

## 2021-02-17 PROCEDURE — 85610 PROTHROMBIN TIME: CPT

## 2021-02-17 PROCEDURE — 99223 1ST HOSP IP/OBS HIGH 75: CPT | Performed by: PODIATRIST

## 2021-02-17 PROCEDURE — 87070 CULTURE OTHR SPECIMN AEROBIC: CPT

## 2021-02-17 PROCEDURE — 99285 EMERGENCY DEPT VISIT HI MDM: CPT

## 2021-02-17 PROCEDURE — 93971 EXTREMITY STUDY: CPT

## 2021-02-17 PROCEDURE — 86140 C-REACTIVE PROTEIN: CPT

## 2021-02-17 PROCEDURE — 36415 COLL VENOUS BLD VENIPUNCTURE: CPT

## 2021-02-17 PROCEDURE — 89060 EXAM SYNOVIAL FLUID CRYSTALS: CPT

## 2021-02-17 PROCEDURE — 99223 1ST HOSP IP/OBS HIGH 75: CPT | Performed by: INTERNAL MEDICINE

## 2021-02-17 PROCEDURE — 2580000003 HC RX 258: Performed by: STUDENT IN AN ORGANIZED HEALTH CARE EDUCATION/TRAINING PROGRAM

## 2021-02-17 PROCEDURE — 87075 CULTR BACTERIA EXCEPT BLOOD: CPT

## 2021-02-17 PROCEDURE — 85730 THROMBOPLASTIN TIME PARTIAL: CPT

## 2021-02-17 PROCEDURE — 0S9G3ZZ DRAINAGE OF LEFT ANKLE JOINT, PERCUTANEOUS APPROACH: ICD-10-PCS | Performed by: EMERGENCY MEDICINE

## 2021-02-17 PROCEDURE — 83605 ASSAY OF LACTIC ACID: CPT

## 2021-02-17 PROCEDURE — 6370000000 HC RX 637 (ALT 250 FOR IP): Performed by: STUDENT IN AN ORGANIZED HEALTH CARE EDUCATION/TRAINING PROGRAM

## 2021-02-17 PROCEDURE — 73610 X-RAY EXAM OF ANKLE: CPT

## 2021-02-17 PROCEDURE — 85652 RBC SED RATE AUTOMATED: CPT

## 2021-02-17 PROCEDURE — 73630 X-RAY EXAM OF FOOT: CPT

## 2021-02-17 PROCEDURE — 85025 COMPLETE CBC W/AUTO DIFF WBC: CPT

## 2021-02-17 PROCEDURE — 6360000002 HC RX W HCPCS: Performed by: EMERGENCY MEDICINE

## 2021-02-17 PROCEDURE — 20605 DRAIN/INJ JOINT/BURSA W/O US: CPT

## 2021-02-17 PROCEDURE — 84550 ASSAY OF BLOOD/URIC ACID: CPT

## 2021-02-17 PROCEDURE — 1200000000 HC SEMI PRIVATE

## 2021-02-17 PROCEDURE — 87205 SMEAR GRAM STAIN: CPT

## 2021-02-17 PROCEDURE — 80048 BASIC METABOLIC PNL TOTAL CA: CPT

## 2021-02-17 PROCEDURE — 6370000000 HC RX 637 (ALT 250 FOR IP): Performed by: EMERGENCY MEDICINE

## 2021-02-17 PROCEDURE — 2500000003 HC RX 250 WO HCPCS: Performed by: EMERGENCY MEDICINE

## 2021-02-17 RX ORDER — NAPROXEN 500 MG/1
500 TABLET ORAL 2 TIMES DAILY WITH MEALS
Status: DISCONTINUED | OUTPATIENT
Start: 2021-02-17 | End: 2021-02-19 | Stop reason: HOSPADM

## 2021-02-17 RX ORDER — HYDROCODONE BITARTRATE AND ACETAMINOPHEN 5; 325 MG/1; MG/1
2 TABLET ORAL ONCE
Status: COMPLETED | OUTPATIENT
Start: 2021-02-17 | End: 2021-02-17

## 2021-02-17 RX ORDER — TIZANIDINE 2 MG/1
2 TABLET ORAL 3 TIMES DAILY PRN
Status: DISCONTINUED | OUTPATIENT
Start: 2021-02-17 | End: 2021-02-19 | Stop reason: HOSPADM

## 2021-02-17 RX ORDER — SODIUM CHLORIDE 0.9 % (FLUSH) 0.9 %
10 SYRINGE (ML) INJECTION EVERY 12 HOURS SCHEDULED
Status: DISCONTINUED | OUTPATIENT
Start: 2021-02-17 | End: 2021-02-19 | Stop reason: HOSPADM

## 2021-02-17 RX ORDER — WARFARIN SODIUM 2.5 MG/1
2.5 TABLET ORAL
Status: DISCONTINUED | OUTPATIENT
Start: 2021-02-17 | End: 2021-02-17 | Stop reason: SDUPTHER

## 2021-02-17 RX ORDER — SODIUM CHLORIDE 0.9 % (FLUSH) 0.9 %
10 SYRINGE (ML) INJECTION PRN
Status: DISCONTINUED | OUTPATIENT
Start: 2021-02-17 | End: 2021-02-19 | Stop reason: HOSPADM

## 2021-02-17 RX ORDER — ACETAMINOPHEN 650 MG/1
650 SUPPOSITORY RECTAL EVERY 6 HOURS PRN
Status: DISCONTINUED | OUTPATIENT
Start: 2021-02-17 | End: 2021-02-19 | Stop reason: HOSPADM

## 2021-02-17 RX ORDER — 0.9 % SODIUM CHLORIDE 0.9 %
1000 INTRAVENOUS SOLUTION INTRAVENOUS ONCE
Status: COMPLETED | OUTPATIENT
Start: 2021-02-17 | End: 2021-02-17

## 2021-02-17 RX ORDER — TIZANIDINE 2 MG/1
2 TABLET ORAL 3 TIMES DAILY
Status: DISCONTINUED | OUTPATIENT
Start: 2021-02-17 | End: 2021-02-17

## 2021-02-17 RX ORDER — HYDRALAZINE HYDROCHLORIDE 50 MG/1
50 TABLET, FILM COATED ORAL 2 TIMES DAILY
Status: DISCONTINUED | OUTPATIENT
Start: 2021-02-17 | End: 2021-02-19 | Stop reason: HOSPADM

## 2021-02-17 RX ORDER — WARFARIN SODIUM 5 MG/1
5 TABLET ORAL
COMMUNITY
End: 2021-03-05

## 2021-02-17 RX ORDER — LISINOPRIL 20 MG/1
40 TABLET ORAL DAILY
Status: DISCONTINUED | OUTPATIENT
Start: 2021-02-17 | End: 2021-02-19 | Stop reason: HOSPADM

## 2021-02-17 RX ORDER — BUSPIRONE HYDROCHLORIDE 10 MG/1
10 TABLET ORAL 3 TIMES DAILY
Status: DISCONTINUED | OUTPATIENT
Start: 2021-02-17 | End: 2021-02-19 | Stop reason: HOSPADM

## 2021-02-17 RX ORDER — POLYETHYLENE GLYCOL 3350 17 G/17G
17 POWDER, FOR SOLUTION ORAL DAILY PRN
Status: DISCONTINUED | OUTPATIENT
Start: 2021-02-17 | End: 2021-02-19 | Stop reason: HOSPADM

## 2021-02-17 RX ORDER — LANOLIN ALCOHOL/MO/W.PET/CERES
3 CREAM (GRAM) TOPICAL NIGHTLY
Status: DISCONTINUED | OUTPATIENT
Start: 2021-02-17 | End: 2021-02-19 | Stop reason: HOSPADM

## 2021-02-17 RX ORDER — PROMETHAZINE HYDROCHLORIDE 25 MG/1
12.5 TABLET ORAL EVERY 6 HOURS PRN
Status: DISCONTINUED | OUTPATIENT
Start: 2021-02-17 | End: 2021-02-19 | Stop reason: HOSPADM

## 2021-02-17 RX ORDER — ONDANSETRON 2 MG/ML
4 INJECTION INTRAMUSCULAR; INTRAVENOUS EVERY 6 HOURS PRN
Status: DISCONTINUED | OUTPATIENT
Start: 2021-02-17 | End: 2021-02-19 | Stop reason: HOSPADM

## 2021-02-17 RX ORDER — TAMSULOSIN HYDROCHLORIDE 0.4 MG/1
0.4 CAPSULE ORAL 2 TIMES DAILY
Status: DISCONTINUED | OUTPATIENT
Start: 2021-02-17 | End: 2021-02-19 | Stop reason: HOSPADM

## 2021-02-17 RX ORDER — ALLOPURINOL 100 MG/1
100 TABLET ORAL DAILY
Status: DISCONTINUED | OUTPATIENT
Start: 2021-02-17 | End: 2021-02-19 | Stop reason: HOSPADM

## 2021-02-17 RX ORDER — ACETAMINOPHEN 325 MG/1
650 TABLET ORAL EVERY 6 HOURS PRN
Status: DISCONTINUED | OUTPATIENT
Start: 2021-02-17 | End: 2021-02-19 | Stop reason: HOSPADM

## 2021-02-17 RX ORDER — LIDOCAINE HYDROCHLORIDE 10 MG/ML
20 INJECTION, SOLUTION INFILTRATION; PERINEURAL ONCE
Status: COMPLETED | OUTPATIENT
Start: 2021-02-17 | End: 2021-02-17

## 2021-02-17 RX ADMIN — CEFTRIAXONE SODIUM 1000 MG: 1 INJECTION, POWDER, FOR SOLUTION INTRAMUSCULAR; INTRAVENOUS at 13:46

## 2021-02-17 RX ADMIN — SODIUM CHLORIDE, PRESERVATIVE FREE 10 ML: 5 INJECTION INTRAVENOUS at 20:04

## 2021-02-17 RX ADMIN — BUSPIRONE HYDROCHLORIDE 10 MG: 10 TABLET ORAL at 20:04

## 2021-02-17 RX ADMIN — HYDROCODONE BITARTRATE AND ACETAMINOPHEN 2 TABLET: 5; 325 TABLET ORAL at 10:13

## 2021-02-17 RX ADMIN — BUSPIRONE HYDROCHLORIDE 10 MG: 10 TABLET ORAL at 16:05

## 2021-02-17 RX ADMIN — Medication 3 MG: at 20:04

## 2021-02-17 RX ADMIN — METOPROLOL TARTRATE 25 MG: 25 TABLET, FILM COATED ORAL at 20:04

## 2021-02-17 RX ADMIN — TIZANIDINE 2 MG: 2 TABLET ORAL at 16:05

## 2021-02-17 RX ADMIN — HYDRALAZINE HYDROCHLORIDE 50 MG: 50 TABLET, FILM COATED ORAL at 20:04

## 2021-02-17 RX ADMIN — LIDOCAINE HYDROCHLORIDE 20 ML: 10 INJECTION, SOLUTION INFILTRATION; PERINEURAL at 13:41

## 2021-02-17 RX ADMIN — SODIUM CHLORIDE 1000 ML: 9 INJECTION, SOLUTION INTRAVENOUS at 10:13

## 2021-02-17 RX ADMIN — NAPROXEN 500 MG: 500 TABLET ORAL at 20:04

## 2021-02-17 RX ADMIN — TAMSULOSIN HYDROCHLORIDE 0.4 MG: 0.4 CAPSULE ORAL at 20:05

## 2021-02-17 ASSESSMENT — ENCOUNTER SYMPTOMS
BLOOD IN STOOL: 0
COUGH: 0
TROUBLE SWALLOWING: 0
ABDOMINAL PAIN: 0
SORE THROAT: 0
BACK PAIN: 1
SHORTNESS OF BREATH: 0
DIARRHEA: 0
BACK PAIN: 0
COLOR CHANGE: 0
CONSTIPATION: 0
NAUSEA: 0
WHEEZING: 0
VOMITING: 0

## 2021-02-17 ASSESSMENT — PAIN DESCRIPTION - ORIENTATION: ORIENTATION: LEFT

## 2021-02-17 ASSESSMENT — PAIN SCALES - GENERAL: PAINLEVEL_OUTOF10: 5

## 2021-02-17 NOTE — PLAN OF CARE
Problem: Falls - Risk of:  Goal: Will remain free from falls  Description: Will remain free from falls  Outcome: Ongoing   Pt. Remains free of falls, appropriate fall precautions in place. Problem: Pain:  Goal: Control of acute pain  Description: Control of acute pain  Outcome: Ongoing   Pt. Pain is adequately controlled.

## 2021-02-17 NOTE — CONSULTS
Consultation Note  Podiatric Medicine and Surgery     Subjective     Chief Complaint: Left ankle pain    HPI:  Michael Rodriguez is a 80 y.o. male seen at [facility] for evaluation of left ankle pain. The patient states that he has had insidious onset of left ankle pain over the past 3 days. The patient denies any history of acute trauma. The patient states that the pain is sharp and does not radiate. The patient presented to the hospital with a similar complaint in August 2019 and was diagnosed with CPPD of the left ankle. Upon work-up in the ED today arthrocentesis of the left ankle was performed. The patient is not diabetic. Patient denies any nausea, vomiting, fever, chills, shortness of breath. PCP is Brynn Rutledge MD    ROS:   Review of Systems   Constitutional: Negative for chills, fatigue and fever. HENT: Negative for congestion and sore throat. Respiratory: Negative for cough, shortness of breath and wheezing. Cardiovascular: Negative for chest pain and leg swelling. Gastrointestinal: Negative for diarrhea, nausea and vomiting. Endocrine: Negative for cold intolerance and heat intolerance. Genitourinary: Negative for difficulty urinating and dysuria. Musculoskeletal: Positive for arthralgias, back pain, gait problem and myalgias. Negative for neck stiffness. Skin: Negative for rash and wound. Neurological: Negative for dizziness, weakness, numbness and headaches. Psychiatric/Behavioral: Negative for agitation and behavioral problems. Past Medical History   has a past medical history of Acute MI ([facility] [address] Allergic rhinitis, Anemia, Basal cell cancer, Basal cell cancer, Cervical radiculopathy, Diverticulitis, Epigastric pain/GERD., GERD (gastroesophageal reflux disease), Glaucoma, Hyperlipidemia, Hypertension, Hyponatremia, IBS (irritable bowel syndrome), Insomnia, Osteoarthritis, Osteoarthritis/neck pain. , Renal calculi, and Shingles.     Past Surgical History has a past surgical history that includes Spine surgery (04/2003); Lithotripsy; hernia repair; Skin cancer excision; skin biopsy (03/11/13); Skin cancer excision (Left, 03/2013); Colonoscopy (2002); Colonoscopy (08/2013); skin biopsy (Left, 02/11/14); Mohs surgery (Left, 03/10/14); Mohs surgery (12/30/14); other surgical history (9/6/15); Cholecystectomy (9-9-15); Skin cancer excision (Right, 08/12/2016); Mohs surgery (Left, 08/2017); Mohs surgery (Left, 10/21/2019); and Mohs surgery (12/14/2020). Medications  Prior to Admission medications    Medication Sig Start Date End Date Taking? Authorizing Provider   warfarin (COUMADIN) 5 MG tablet Take 5 mg by mouth Six times weekly Indications: All days except Wednesday; INR goal 2-2.5 Managed by [facility]   Yes Historical Provider, MD   busPIRone (BUSPAR) 10 MG tablet Take 1 tablet by mouth 3 times daily 2/5/21  Yes Leonardo Vieyra MD   predniSONE (DELTASONE) 5 MG tablet 1-2 pills daily prn arthralgias/back  Patient taking differently: Take 5 mg by mouth daily 1-2 pills daily prn arthralgias/back 1/13/21  Yes Leonardo Vieyra MD   lisinopril (PRINIVIL;ZESTRIL) 40 MG tablet TAKE ONE TABLET BY MOUTH DAILY 10/23/20  Yes NASRA Kent CNP   tamsulosin (FLOMAX) 0.4 MG capsule Take 1 capsule by mouth 2 times daily 9/15/20  Yes NASRA Gipson CNP   latanoprost (XALATAN) 0.005 % ophthalmic solution 1 drop nightly   Yes Historical Provider, MD   hydrALAZINE (APRESOLINE) 50 MG tablet TAKE ONE TABLET BY MOUTH TWICE A DAY 7/13/20  Yes Leonardo Vieyra MD   allopurinol (ZYLOPRIM) 100 MG tablet Take 1 tablet by mouth daily 5/11/20  Yes Leonardo Vieyra MD   warfarin (COUMADIN) 5 MG tablet Take 2.5 mg by mouth once a week Indications:  Wednesdays; INR goal 2-2.5 Managed by [facility]   Yes Historical Provider, MD   melatonin 5 MG TABS tablet Take 1 tablet by mouth nightly    Yes Historical Provider, MD metoprolol tartrate (LOPRESSOR) 25 MG tablet Take 25 mg by mouth 2 times daily   Yes Historical Provider, MD   zoster recombinant adjuvanted vaccine (SHINGRIX) 50 MCG/0.5ML SUSR injection 50 MCG IM then repeat 2-6 months. 1/13/21 1/13/22  Matilde Lau MD   nitroGLYCERIN (NITROSTAT) 0.4 MG SL tablet Place 0.4 mg under the tongue every 5 minutes as needed for Chest pain up to max of 3 total doses. If no relief after 1 dose, call 911. Historical Provider, MD    Scheduled Meds:   sodium chloride flush  10 mL Intravenous 2 times per day    sodium chloride flush  10 mL Intravenous 2 times per day    allopurinol  100 mg Oral Daily    busPIRone  10 mg Oral TID    hydrALAZINE  1 tablet Oral BID    tiZANidine  2 mg Oral TID    tamsulosin  0.4 mg Oral BID    metoprolol tartrate  25 mg Oral BID    melatonin  1 tablet Oral Nightly    lisinopril  1 tablet Oral Daily    cefTRIAXone (ROCEPHIN) IV  1,000 mg Intravenous Q24H     Continuous Infusions:  PRN Meds:.sodium chloride flush, sodium chloride flush, promethazine **OR** ondansetron, polyethylene glycol, acetaminophen **OR** acetaminophen    Allergies  is allergic to celebrex [celecoxib] and mobic. Family History  family history includes Cancer in his father; Heart Disease in his brother and mother; Evia Bodily in his father. Social History   reports that he quit smoking about 60 years ago. He has a 10.00 pack-year smoking history. He has never used smokeless tobacco.   reports current alcohol use. reports no history of drug use.     Objective     Vitals:  Patient Vitals for the past 8 hrs:   BP Temp Temp src Pulse Resp SpO2 Height Weight   02/17/21 1427 133/85 99 °F (37.2 °C)  100  99 % 5' 4\" (1.626 m) 165 lb 12.6 oz (75.2 kg)   02/17/21 1400 (!) 115/59          02/17/21 1300 131/79          02/17/21 1200 (!) 107/59     95 %     02/17/21 1130 114/73     97 %     02/17/21 1100 118/67     95 %   21 1030 121/74     96 %     21 1000 (!) 105/57     97 %     21 0934 110/73 99.5 °F (37.5 °C) Oral 94 15 97 % 5' 4\" (1.626 m) 163 lb (73.9 kg)     Average, Min, and Max for last 24 hours Vitals:  TEMPERATURE:  Temp  Av.3 °F (37.4 °C)  Min: 99 °F (37.2 °C)  Max: 99.5 °F (37.5 °C)    RESPIRATIONS RANGE: Resp  Avg: 15  Min: 15  Max: 15    PULSE RANGE: Pulse  Av  Min: 94  Max: 100    BLOOD PRESSURE RANGE:  Systolic (32CTU), LEJ:055 , Min:105 , FAT:258   ; Diastolic (51EWJ), KENISHA:47, Min:57, Max:85      PULSE OXIMETRY RANGE: SpO2  Av.6 %  Min: 95 %  Max: 99 %  I&O:  No intake/output data recorded. CBC:  Recent Labs     21  1010   WBC 12.7*   HGB 14.2   HCT 42.8      CRP 34.7*        BMP:  Recent Labs     21  1010      K 4.1      CO2 27   BUN 13   CREATININE 0.70   GLUCOSE 100*   CALCIUM 8.9        Coags:  Recent Labs     21  1010   APTT 38.3*   INR 2.2       No results found for: LABA1C  Lab Results   Component Value Date    SEDRATE 18 2021     Lab Results   Component Value Date    CRP 34.7 (H) 2021       Lower Extremity Physical Exam:  Vascular: DP and PT pulses are palpable. CFT <3 seconds to all digits. Hair growth is absent to the level of the digits. Moderate non-pitting edema to the left ankle. Neuro: Saph/sural/SP/DP/plantar sensation intact to light touch. Musculoskeletal: Muscle strength is 5/5 to all lower extremity muscle groups on the right but deferred to the left. Ankle joint range of motion in plantarflexion and dorsiflexion is limited due to pain. Subtalar joint range of motion is limited due to pain. Gross tenderness to palpation to the medial and lateral ankle gutter is noted. Gross deformity is absent. Dermatologic: No open wounds or lacerations noted. Adhesive bandage over previous arthrocentesis site. No erythema noted.     Clinical:  None    Imaging:   XR ANKLE LEFT (MIN 3 VIEWS) Final Result   Nonspecific ankle soft tissue swelling. XR FOOT LEFT (MIN 3 VIEWS)   Final Result   Nonspecific ankle soft tissue swelling. VL DUP LOWER EXTREMITY VENOUS LEFT    (Results Pending)       Arthrocentesis 2/17/2021:  BIREFRINGENCE CONSISTENT WITH CALCIUM PYROPHOSPHATE CRYSTALS. Assessment     Loly Matta is a 80 y.o. male with   1. CPPD of the left ankle    Active Problems:    Septic arthritis ([address]  Resolved Problems:    * No resolved hospital problems. *        Plan     · Patient examined and evaluated at bedside. · Treatment options discussed in detail with the patient. · Radiographs reviewed and discussed in detail with the patient. · Discussed the etiology and pathophysiology of CPPD with the patient in great detail. · Recommend Naprosyn 500mg BID -- can switch to Colcrys 0.6mg BID if no clinical improvement with Naprosyn. · WBAT to Left lower extremity. · Will follow with you. · Discussed with Dr. Luz Bernstein.       Electronically signed by Aris Perez DPM on 2/17/2021 at 2:38 PM I performed a history and physical examination of the patient and discussed management with the resident. I reviewed the residents note and agree with the documented findings and plan of care. Any areas of disagreement are noted on the chart. I was personally present for the key portions of any procedures. I have documented in the chart those procedures where I was not present during the key portions. I have reviewed the Podiatry Resident progress note. I agree with the chief complaint, past medical history, past surgical history, allergies, medications, social and family history as documented unless otherwise noted below. Documentation of the HPI, Physical Exam and Medical Decision Making performed by medical students or scribes is based on my personal performance of the HPI, PE and MDM. I have personally evaluated this patient and have completed at least one if not all key elements of the E/M (history, physical exam, and MDM). Additional findings are as noted.      Tricia Guevara DPM on 2/18/2021 at 99:98 PM  Board Certified, American Board of Podiatric Surgery  Fellow, [employer] [facility]

## 2021-02-17 NOTE — ED NOTES
Bed: 15  Expected date:   Expected time:   Means of arrival:   Comments:  Olayinka Saul [facility]  02/17/21 1582

## 2021-02-17 NOTE — PROGRESS NOTES
Pharmacy Note  Warfarin Consult    Winsome Melchor is a 80 y.o. male for whom pharmacy has been consulted to manage warfarin therapy. Consulting Physician: Ubaldo Castano  Reason for Admission: Gout, left foot/ankle      Warfarin dose prior to admission: 2.5 mg Wednesday, 5 mg all other days  Warfarin indication: A fib  Target INR range: 2-2.5     Past Medical History:   Diagnosis Date    Acute MI ([address]     Allergic rhinitis 9/2/2011    Anemia     Basal cell cancer 03/2013    left side of head, face chin    Basal cell cancer 03/10/14    left cheek    Cervical radiculopathy     Diverticulitis 02/17/13    Epigastric pain/GERD. 9/2/2011    GERD (gastroesophageal reflux disease)     Glaucoma     Hyperlipidemia     Hypertension     Hyponatremia 9/2/2011    IBS (irritable bowel syndrome) 9/2/2011    Insomnia 9/2/2011    Osteoarthritis     Osteoarthritis/neck pain. 9/2/2011    Renal calculi     Shingles     history of shingles                Recent Labs     02/17/21  1010   INR 2.2     Recent Labs     02/17/21  1010   HGB 14.2   HCT 42.8          Current warfarin drug-drug interactions: allopurinol, acetaminophen,  rocephin      Date             INR        Dose   2/17/2021        2.2    Home dose is 5 mg except today patient took 2.5 mg dose on Wednesday. Daily PT/INR while inpatient. Plan:  continue home dose tomorrow if INR within 2-2.5 range. Thank you for the consult. Will continue to follow. Loretta Liao. Ph.  2/17/2021  3:43 PM

## 2021-02-17 NOTE — ED PROVIDER NOTES
[address]  EMERGENCY DEPARTMENT ENCOUNTER    Pt Name: Isabelle Fernández  MRN: [id number]  YOB: 1931  Date of evaluation:2/17/21  PCP: Gregorio Diaz MD    [address]       Chief Complaint   Patient presents with    Gout     left foot/ ankle       HISTORY OF PRESENT ILLNESS    Isabelle Fernández is a 80 y.o. male who presents with a chief complaint of left foot and ankle pain and swelling. Patient states for the past 3 days he has had pain and swelling in the left foot and ankle. Rates his pain is moderate. Pain is sharp. Nothing makes pain better but ambulating makes his symptoms worse. Pain is sharp and does not radiate. No fevers or chills. He states that several years ago he was here and diagnosed with gout with similar symptoms however had bilateral symptoms at that time. He is taking allopurinol for gout prevention. Symptoms are acute. Symptoms are moderate. Patient has no other complaints at this time. REVIEW OF SYSTEMS       Review of Systems   Constitutional: Negative for chills, fatigue and fever. HENT: Negative for congestion, ear pain, sore throat and trouble swallowing. Eyes: Negative for visual disturbance. Respiratory: Negative for cough and shortness of breath. Cardiovascular: Negative for chest pain, palpitations and leg swelling. Gastrointestinal: Negative for abdominal pain, blood in stool, constipation, diarrhea, nausea and vomiting. Genitourinary: Negative for dysuria and flank pain. Musculoskeletal: Positive for arthralgias, joint swelling and myalgias. Negative for back pain and neck pain. Skin: Negative for color change, rash and wound. Neurological: Negative for dizziness, weakness, light-headedness, numbness and headaches. Psychiatric/Behavioral: Negative for confusion. All other systems reviewed and are negative. Negative in 10 essential Systems except as mentioned above and in the HPI.         PAST MEDICAL HISTORY Past Medical History:   Diagnosis Date    Acute MI ([facility] [address]     Allergic rhinitis 9/2/2011    Anemia     Basal cell cancer 03/2013    left side of head, face chin    Basal cell cancer 03/10/14    left cheek    Cervical radiculopathy     Diverticulitis 02/17/13    Epigastric pain/GERD. 9/2/2011    GERD (gastroesophageal reflux disease)     Glaucoma     Hyperlipidemia     Hypertension     Hyponatremia 9/2/2011    IBS (irritable bowel syndrome) 9/2/2011    Insomnia 9/2/2011    Osteoarthritis     Osteoarthritis/neck pain. 9/2/2011    Renal calculi     Shingles     history of shingles         SURGICAL HISTORY      has a past surgical history that includes Spine surgery (04/2003); Lithotripsy; hernia repair; Skin cancer excision; skin biopsy (03/11/13); Skin cancer excision (Left, 03/2013); Colonoscopy (2002); Colonoscopy (08/2013); skin biopsy (Left, 02/11/14); Mohs surgery (Left, 03/10/14); Mohs surgery (12/30/14); other surgical history (9/6/15); Cholecystectomy (9-9-15); Skin cancer excision (Right, 08/12/2016); Mohs surgery (Left, 08/2017); Mohs surgery (Left, 10/21/2019); and Mohs surgery (12/14/2020).       CURRENT MEDICATIONS       Previous Medications    ALLOPURINOL (ZYLOPRIM) 100 MG TABLET    Take 1 tablet by mouth daily    BUSPIRONE (BUSPAR) 10 MG TABLET    Take 1 tablet by mouth 3 times daily    HYDRALAZINE (APRESOLINE) 50 MG TABLET    TAKE ONE TABLET BY MOUTH TWICE A DAY    IPRATROPIUM (ATROVENT) 0.03 % NASAL SPRAY    2 sprays by Nasal route 3 times daily    LATANOPROST (XALATAN) 0.005 % OPHTHALMIC SOLUTION    1 drop nightly    LISINOPRIL (PRINIVIL;ZESTRIL) 40 MG TABLET    TAKE ONE TABLET BY MOUTH DAILY    MELATONIN 5 MG TABS TABLET    Take 1 tablet by mouth nightly     METOPROLOL TARTRATE (LOPRESSOR) 25 MG TABLET    Take 25 mg by mouth 2 times daily NITROGLYCERIN (NITROSTAT) 0.4 MG SL TABLET    Place 0.4 mg under the tongue every 5 minutes as needed for Chest pain up to max of 3 total doses. If no relief after 1 dose, call 911. PREDNISONE (DELTASONE) 5 MG TABLET    1-2 pills daily prn arthralgias/back    TAMSULOSIN (FLOMAX) 0.4 MG CAPSULE    Take 1 capsule by mouth 2 times daily    TIZANIDINE (ZANAFLEX) 2 MG TABLET    TAKE ONE TABLET BY MOUTH THREE TIMES A DAY    WARFARIN (COUMADIN) 5 MG TABLET    Take 2.5 mg by mouth once a week Indications: ; INR goal 2-2.5 Managed by [facility]    WARFARIN (COUMADIN) 5 MG TABLET    Take 5 mg by mouth Six times weekly Indications: All days except Wednesday; INR goal 2-2.5 Managed by [facility]    ZOSTER RECOMBINANT ADJUVANTED VACCINE (SHINGRIX) 50 MCG/0.5ML SUSR INJECTION    50 MCG IM then repeat 2-6 months. ALLERGIES     is allergic to celebrex [celecoxib] and mobic. FAMILY HISTORY     He indicated that his mother is . He indicated that his father is . He indicated that the status of his brother is unknown.     family history includes Cancer in his father; Heart Disease in his brother and mother; [employer] in his father. SOCIAL HISTORY      reports that he quit smoking about 60 years ago. He has a 10.00 pack-year smoking history. He has never used smokeless tobacco. He reports current alcohol use. He reports that he does not use drugs. PHYSICAL EXAM     INITIAL VITALS:  height is 5' 4\" (1.626 m) and weight is 163 lb (73.9 kg). His oral temperature is 99.5 °F (37.5 °C). His blood pressure is 118/67 and his pulse is 94. His respiration is 15 and oxygen saturation is 95%. Physical Exam  Vitals signs and nursing note reviewed. Constitutional:       General: He is not in acute distress. HENT:      Head: Normocephalic and atraumatic.    Eyes:      Conjunctiva/sclera: Conjunctivae normal. Pupils: Pupils are equal, round, and reactive to light. Neck:      Musculoskeletal: Neck supple. Cardiovascular:      Rate and Rhythm: Normal rate and regular rhythm. Heart sounds: Normal heart sounds. No murmur. Pulmonary:      Effort: Pulmonary effort is normal. No respiratory distress. Breath sounds: Normal breath sounds. Abdominal:      General: Bowel sounds are normal. There is no distension. Palpations: Abdomen is soft. Tenderness: There is no abdominal tenderness. Musculoskeletal:         General: Tenderness present. Left ankle: He exhibits decreased range of motion and swelling. Tenderness. Achilles tendon normal.      Right lower leg: No edema. Left lower leg: Edema present. Lymphadenopathy:      Cervical: No cervical adenopathy. Skin:     General: Skin is warm and dry. Findings: No erythema or rash. Neurological:      Mental Status: He is alert and oriented to person, place, and time. Psychiatric:         Judgment: Judgment normal.           DIFFERENTIAL DIAGNOSIS/MDM:   22-year-old male presents with several days of left ankle and foot pain and swelling. He is afebrile but temperature is mildly elevated at 99.5. He is overall nontoxic in appearance. On exam his left ankle is swollen compared to the right. It is tender to palpation. He has good strong pulses. There is no erythema. Differential diagnosis includes gout, pseudogout, septic arthritis, inflammatory arthritis. DVT less likely. We will get blood work, x-rays, ultrasound of the leg and reassess. DIAGNOSTIC RESULTS     EKG: All EKG's are interpreted by the Emergency Department Physician who either signs or Co-signs this chart in the absence of a cardiologist.        RADIOLOGY:   I directly visualized the following  images and reviewed the radiologist interpretations:  XR ANKLE LEFT (MIN 3 VIEWS)   Final Result   Nonspecific ankle soft tissue swelling. XR FOOT LEFT (MIN 3 VIEWS)   Final Result   Nonspecific ankle soft tissue swelling. VL DUP LOWER EXTREMITY VENOUS LEFT    (Results Pending)           ED BEDSIDE ULTRASOUND:      LABS:  Labs Reviewed   CBC WITH AUTO DIFFERENTIAL - Abnormal; Notable for the following components:       Result Value    WBC 12.7 (*)     RDW 15.3 (*)     Lymphocytes 3 (*)     Monocytes 33 (*)     Absolute Lymph # 0.38 (*)     Absolute Mono # 4.19 (*)     All other components within normal limits   BASIC METABOLIC PANEL - Abnormal; Notable for the following components:    Glucose 100 (*)     Anion Gap 6 (*)     All other components within normal limits   C-REACTIVE PROTEIN - Abnormal; Notable for the following components:    CRP 34.7 (*)     All other components within normal limits   URIC ACID - Abnormal; Notable for the following components:    Uric Acid 2.7 (*)     All other components within normal limits   PROTIME-INR - Abnormal; Notable for the following components:    Protime 24.6 (*)     All other components within normal limits   APTT - Abnormal; Notable for the following components:    PTT 38.3 (*)     All other components within normal limits   CULTURE, BODY FLUID   LACTIC ACID, PLASMA   SEDIMENTATION RATE   PERIPHERAL BLOOD SMEAR, PATH REVIEW   BODY FLUID CRYSTAL         EMERGENCY DEPARTMENT COURSE:   Vitals:    Vitals:    02/17/21 0934 02/17/21 1000 02/17/21 1030 02/17/21 1100   BP: 110/73 (!) 105/57 121/74 118/67   Pulse: 94      Resp: 15      Temp: 99.5 °F (37.5 °C)      TempSrc: Oral      SpO2: 97% 97% 96% 95%   Weight: 163 lb (73.9 kg)      Height: 5' 4\" (1.626 m)        1:45 PM EST  Ultrasound of the left leg is unremarkable. No evidence of DVT. X-ray shows nonspecific tissue swelling. Blood work shows a white count of 13. He does have mildly elevated CRP. Sed rate is normal.  His uric acid level is actually within normal limits. Based on his lab work I cannot attribute his symptoms to gout. I did attempt a arthrocentesis as below. Could only withdraw a small amount of fluid however not for a culture. Patient started on broad-spectrum antibiotics. I spoke with podiatry about the case. They will evaluate the patient. At this time I am going to treat patient as a septic arthritis until proven otherwise. Spoke with Dr. Glenna Goddard who accepted patient for admission. Spoke with residents will place admission orders. CRITICALCARE:      CONSULTS:  IP CONSULT TO INTERNAL MEDICINE  IP CONSULT TO PODIATRY  IP CONSULT TO SOCIAL WORK  PHARMACY TO DOSE WARFARIN      PROCEDURES:  Arthrocentesis    Date/Time: 2/17/2021 1:47 PM  Performed by: Kari Peoples DO  Authorized by: Kari Peoples DO     Consent:     Consent obtained:  Verbal    Consent given by:  Patient    Risks discussed:  Bleeding, infection, incomplete drainage and pain    Alternatives discussed:  No treatment and alternative treatment  Location:     Location:  Ankle    Ankle:  L ankle  Anesthesia (see MAR for exact dosages): Anesthesia method:  Local infiltration    Local anesthetic:  Lidocaine 1% w/o epi  Procedure details:     Preparation: Patient was prepped and draped in usual sterile fashion      Needle gauge:  20 G    Ultrasound guidance: no      Approach: Anterior medial.    Aspirate amount:  3 cc    Aspirate characteristics:  Blood-tinged    Steroid injected: no      Specimen collected: yes    Post-procedure details:     Dressing:  Adhesive bandage    Patient tolerance of procedure: Tolerated well, no immediate complications          FINAL IMPRESSION      1. Septic arthritis of left ankle, due to unspecified organism [facility])            DISPOSITION/PLAN   DISPOSITION Admitted 02/17/2021 01:09:24 PM        PATIENT REFERRED TO:  No follow-up provider specified.     DISCHARGE MEDICATIONS:  New Prescriptions    No medications on file

## 2021-02-17 NOTE — PROGRESS NOTES
Pharmacy Medication History Note      List of current medications patient is taking is complete. Source of information: Patient, Gonzalo Zeng made to medication list:  Medications removed (include reason, ex. therapy complete or physician discontinued, noncompliance): Atrovent Nasal Spray 0.03% - patient states he no longer takes  Tizanidine 0.4 mg - Has not filled since December    Medications added/doses adjusted:  Prednisone 5 mg tablet once daily    Other notes (ex. Recent course of antibiotics, Coumadin dosing):  Patient follows with [address] [place] [address], last visit 2/3. Patients INR was 2. Goal is 2 - 2.5. Dose is Take 2.5 mg Wednesday and 5 mg all other days. Patient states he already took his dose of warfarin today 2/17. Denies use of other OTC or herbal medications. Allergies clarified - no new allergies    Please let me know if you have any questions about this encounter. Thank you!     Electronically signed by Caroline Sinclair, [facility] on 2/17/2021 at 2:05 PM

## 2021-02-17 NOTE — ED NOTES
Admission Dx: septic arthritis left ankle    Pts Chief Complaints on Arrival: left ankle pain and swelling. Hx gout. ADL's - self. Lives alone. Unable to bear weight on foot at this time. Pending Diagnostics:  Blood cx,  Left foot cell count    Residence PTA: alone    Special Considerations/Circumstances:  Pleasant, a/ox4. bandaid left ankle from aspiration.      Vitals: Current vital signs:  BP (!) 115/59   Pulse 94   Temp 99.5 °F (37.5 °C) (Oral)   Resp 15   Ht 5' 4\" (1.626 m)   Wt 163 lb (73.9 kg)   SpO2 95%   BMI 27.98 kg/m²                MEWS Score: 1            Filemon Guthrie RN  02/17/21 9327

## 2021-02-17 NOTE — H&P
[facility]     HISTORY AND PHYSICAL EXAMINATION            Date:   2/17/2021  Patient name:  Terrence Orta  Date of admission:  2/17/2021  9:39 AM  MRN:   [id number]  Account:  [de-identified]  YOB: 1931  PCP:    Yudith Cummings MD  Room:   15/15  Code Status:    Prior    Chief Complaint:     Chief Complaint   Patient presents with    Gout     left foot/ ankle       History Obtained From:     patient    History of Present Illness: The patient is a 80 y.o. Non-/non  male who presents withGout (left foot/ ankle)  and he is admitted to the hospital for the management of  Left ankle swelling     80years old male patient with past history of CAD, Afib on warfarin, HTN, GERD, OA, BCC of the face s/p treatment. Presented with worsening left foot and ankle pain and swelling that was noted 3 days prior to admission. The patient reported waking up at night and going to the bathroom to pass urine 3 days ago and he noticed this pain in his left foot and ankle. No history of trauma. The pain was described as sharp and worsened by ambulation reaching 9/10. Patient has a past medical history of gout with similar symptoms one year ago and he takes allopurinol daily. At the ER, vital signs showed low-grade fever of 37.5 however, no hypotension and no tachycardia. His physical examination was unremarkable except for left ankle swelling, hotness and tenderness, no clear erythema noted. Labs were remarkable for CRP of 34, white blood cell 12.7 and imaging studies with x-ray of the left ankle and foot were negative. Arthrocentesis performed and was bloody. Patient admitted for evaluation and possible case of septic arthritis vs gout for IV antibiotics.      Past Medical History:     Past Medical History:   Diagnosis Date  Acute MI ([facility] [address]     Allergic rhinitis 9/2/2011    Anemia     Basal cell cancer 03/2013    left side of head, face chin    Basal cell cancer 03/10/14    left cheek    Cervical radiculopathy     Diverticulitis 02/17/13    Epigastric pain/GERD. 9/2/2011    GERD (gastroesophageal reflux disease)     Glaucoma     Hyperlipidemia     Hypertension     Hyponatremia 9/2/2011    IBS (irritable bowel syndrome) 9/2/2011    Insomnia 9/2/2011    Osteoarthritis     Osteoarthritis/neck pain. 9/2/2011    Renal calculi     Shingles     history of shingles        Past SurgicalHistory:     Past Surgical History:   Procedure Laterality Date    CHOLECYSTECTOMY  9-9-15    laparoscopic with cholangiogram    COLONOSCOPY  2002    COLONOSCOPY  08/2013    HERNIA REPAIR      LITHOTRIPSY      MOHS SURGERY Left 03/10/14    cheek    MOHS SURGERY  12/30/14    post scalp    MOHS SURGERY Left 08/2017    X2 left cheek    MOHS SURGERY Left 10/21/2019    temple    MOHS SURGERY  12/14/2020    top of head    OTHER SURGICAL HISTORY  9/6/15    attempted ERCP    SKIN BIOPSY  03/11/13    3 areas    SKIN BIOPSY Left 02/11/14    cheek /basal cell carinoma    SKIN CANCER EXCISION      left face and top of head    SKIN CANCER EXCISION Left 03/2013    forehead, cheek, chin, basal cell.  SKIN CANCER EXCISION Right 08/12/2016    with skin graft    SPINE SURGERY  04/2003        Medications Prior to Admission:        Prior to Admission medications    Medication Sig Start Date End Date Taking? Authorizing Provider   warfarin (COUMADIN) 5 MG tablet Take 5 mg by mouth Six times weekly Indications:  All days except Wednesday; INR goal 2-2.5 Managed by [facility]   Yes Historical Provider, MD   busPIRone (BUSPAR) 10 MG tablet Take 1 tablet by mouth 3 times daily 2/5/21  Yes Lanna Aase, MD   predniSONE (DELTASONE) 5 MG tablet 1-2 pills daily prn arthralgias/back 1/13/21  Yes Lanna Aase, MD lisinopril (PRINIVIL;ZESTRIL) 40 MG tablet TAKE ONE TABLET BY MOUTH DAILY 10/23/20  Yes NASRA Fonseca CNP   tiZANidine (ZANAFLEX) 2 MG tablet TAKE ONE TABLET BY MOUTH THREE TIMES A DAY 10/19/20  Yes NASRA Burgos NP   tamsulosin (FLOMAX) 0.4 MG capsule Take 1 capsule by mouth 2 times daily 9/15/20  Yes Ilene Merlin, APRN - CNP   latanoprost (XALATAN) 0.005 % ophthalmic solution 1 drop nightly   Yes Historical Provider, MD   hydrALAZINE (APRESOLINE) 50 MG tablet TAKE ONE TABLET BY MOUTH TWICE A DAY 7/13/20  Yes Raheel Angulo MD   allopurinol (ZYLOPRIM) 100 MG tablet Take 1 tablet by mouth daily 5/11/20  Yes Raheel Angulo MD   warfarin (COUMADIN) 5 MG tablet Take 2.5 mg by mouth once a week Indications: Wednesdays; INR goal 2-2.5 Managed by [facility]   Yes Historical Provider, MD   melatonin 5 MG TABS tablet Take 1 tablet by mouth nightly    Yes Historical Provider, MD   metoprolol tartrate (LOPRESSOR) 25 MG tablet Take 25 mg by mouth 2 times daily   Yes Historical Provider, MD   zoster recombinant adjuvanted vaccine (SHINGRIX) 50 MCG/0.5ML SUSR injection 50 MCG IM then repeat 2-6 months. 1/13/21 1/13/22  Raheel Angulo MD   nitroGLYCERIN (NITROSTAT) 0.4 MG SL tablet Place 0.4 mg under the tongue every 5 minutes as needed for Chest pain up to max of 3 total doses. If no relief after 1 dose, call 911. Historical Provider, MD   ipratropium (ATROVENT) 0.03 % nasal spray 2 sprays by Nasal route 3 times daily 2/3/20 2/2/21  Raheel Angulo MD        Allergies:     Celebrex [celecoxib] and Mobic    Social History:     Tobacco:    reports that he quit smoking about 60 years ago. He has a 10.00 pack-year smoking history. He has never used smokeless tobacco.  Alcohol:      reports current alcohol use. Drug Use:  reports no history of drug use.     Family History:     Family History   Problem Relation Age of Onset    Heart Disease Mother    Kia Carlton Father  Cancer Father         lung    Heart Disease Brother        Review of Systems:     Positive and Negative as described in HPI. CONSTITUTIONAL:  no fevers  Psych: Normal mood and affect, no depression, no suicidal ideation. EYES: negative for blury vision  HEENT: No headaches, no nasal congestion, no difficulty swallowing  RESPIRATORY:negative for dyspnea, no wheezing, no Cough  CARDIOVASCULAR: negative for chest pain, no palpitations  GASTROINTESTINAL: no nausea, no vomiting, no change in bowel habits, no abdominal pain   GENITOURINARY: negative for dysuria, no hematuria   MUSCULOSKELETAL: Left ankle pain swelling unable to ambulate  NEUROLOGICAL: No weakness or numbness    Physical Exam:   /67   Pulse 94   Temp 99.5 °F (37.5 °C) (Oral)   Resp 15   Ht 5' 4\" (1.626 m)   Wt 163 lb (73.9 kg)   SpO2 95%   BMI 27.98 kg/m²   Temp (24hrs), Av.5 °F (37.5 °C), Min:99.5 °F (37.5 °C), Max:99.5 °F (37.5 °C)    No results for input(s): POCGLU in the last 72 hours. No intake or output data in the 24 hours ending 21 1330    PHYSICAL EXAM:  General Appearance  Alert , awake, not in acute distress  HEENT - Head is normocephalic, atraumatic. Psych: Normal mood and affect, normal behavior, not agitated, maintaining good eye contact   Neck: supple, no rigidity, normal ROM, no neck swellings, normal thyroid gland  Lungs - Bilateral equal air entry, no wheezes, rales or rhonchi, aeration good  Cardiovascular - Heart sounds are normal.  Regular rhythm, normal rate without murmur, gallop or rub.   Abdomen - Soft, nontender, nondistended, no masses or organomegaly  Neurologic - There are no new focal motor or sensory deficits  Skin - No bruising or bleeding on exposed skin area  Extremities - left ankle swelling, hotness, tenderness, no overlying erythema    Investigations:     Laboratory Testing:  Recent Results (from the past 24 hour(s))   CBC Auto Differential    Collection Time: 21 10:10 AM Result Value Ref Range    WBC 12.7 (H) 3.5 - 11.0 k/uL    RBC 4.73 4.5 - 5.9 m/uL    Hemoglobin 14.2 13.5 - 17.5 g/dL    Hematocrit 42.8 41 - 53 %    MCV 90.5 80 - 100 fL    MCH 30.0 26 - 34 pg    MCHC 33.1 31 - 37 g/dL    RDW 15.3 (H) 11.5 - 14.9 %    Platelets 337 299 - 047 k/uL    MPV 9.2 6.0 - 12.0 fL    NRBC Automated NOT REPORTED per 100 WBC    Differential Type NOT REPORTED     Immature Granulocytes NOT REPORTED 0 %    Absolute Immature Granulocyte NOT REPORTED 0.00 - 0.30 k/uL    WBC Morphology NOT REPORTED     RBC Morphology NOT REPORTED     Platelet Estimate NOT REPORTED     Seg Neutrophils 62 36 - 66 %    Lymphocytes 3 (L) 24 - 44 %    Monocytes 33 (H) 1 - 7 %    Eosinophils % 0 0 - 4 %    Basophils 0 0 - 2 %    Bands 2 0 - 10 %    Segs Absolute 7.88 1.3 - 9.1 k/uL    Absolute Lymph # 0.38 (L) 1.0 - 4.8 k/uL    Absolute Mono # 4.19 (H) 0.1 - 1.3 k/uL    Absolute Eos # 0.00 0.0 - 0.4 k/uL    Basophils Absolute 0.00 0.0 - 0.2 k/uL    Absolute Bands # 0.25 0.0 - 1.0 k/uL    Morphology Normal    Basic Metabolic Panel    Collection Time: 02/17/21 10:10 AM   Result Value Ref Range    Glucose 100 (H) 70 - 99 mg/dL    BUN 13 8 - 23 mg/dL    CREATININE 0.70 0.70 - 1.20 mg/dL    Bun/Cre Ratio NOT REPORTED 9 - 20    Calcium 8.9 8.6 - 10.4 mg/dL    Sodium 135 135 - 144 mmol/L    Potassium 4.1 3.7 - 5.3 mmol/L    Chloride 102 98 - 107 mmol/L    CO2 27 20 - 31 mmol/L    Anion Gap 6 (L) 9 - 17 mmol/L    GFR Non-African American >60 >60 mL/min    GFR African American >60 >60 mL/min    GFR Comment          GFR Staging NOT REPORTED    Lactic Acid, Plasma    Collection Time: 02/17/21 10:10 AM   Result Value Ref Range    Lactic Acid 1.0 0.5 - 2.2 mmol/L    Lactic Acid, Whole Blood NOT REPORTED 0.7 - 2.1 mmol/L   C-Reactive Protein    Collection Time: 02/17/21 10:10 AM   Result Value Ref Range    CRP 34.7 (H) 0.0 - 5.0 mg/L   Sedimentation Rate    Collection Time: 02/17/21 10:10 AM   Result Value Ref Range Sed Rate 18 0 - 20 mm   Uric Acid    Collection Time: 02/17/21 10:10 AM   Result Value Ref Range    Uric Acid 2.7 (L) 3.4 - 7.0 mg/dL   Protime-INR    Collection Time: 02/17/21 10:10 AM   Result Value Ref Range    Protime 24.6 (H) 11.8 - 14.6 sec    INR 2.2    APTT    Collection Time: 02/17/21 10:10 AM   Result Value Ref Range    PTT 38.3 (H) 24.0 - 36.0 sec   Path Review, Smear    Collection Time: 02/17/21 10:10 AM   Result Value Ref Range    Pathologist Review TO BE REVIEWED BY PATHOLOGIST          Current Facility-Administered Medications   Medication Dose Route Frequency Provider Last Rate Last Admin    lidocaine 1 % injection 20 mL  20 mL Intradermal Once [facility], DO        sodium chloride flush 0.9 % injection 10 mL  10 mL Intravenous 2 times per day [facility], DO        sodium chloride flush 0.9 % injection 10 mL  10 mL Intravenous PRN [facility], DO        cefTRIAXone (ROCEPHIN) 1000 mg IVPB in 50 mL D5W minibag  1,000 mg Intravenous Once [facility], DO         Current Outpatient Medications   Medication Sig Dispense Refill    warfarin (COUMADIN) 5 MG tablet Take 5 mg by mouth Six times weekly Indications:  All days except Wednesday; INR goal 2-2.5 Managed by [facility]      busPIRone (BUSPAR) 10 MG tablet Take 1 tablet by mouth 3 times daily 90 tablet 11    predniSONE (DELTASONE) 5 MG tablet 1-2 pills daily prn arthralgias/back 45 tablet 6    lisinopril (PRINIVIL;ZESTRIL) 40 MG tablet TAKE ONE TABLET BY MOUTH DAILY 90 tablet 3    tiZANidine (ZANAFLEX) 2 MG tablet TAKE ONE TABLET BY MOUTH THREE TIMES A DAY 90 tablet 0    tamsulosin (FLOMAX) 0.4 MG capsule Take 1 capsule by mouth 2 times daily 180 capsule 3    latanoprost (XALATAN) 0.005 % ophthalmic solution 1 drop nightly      hydrALAZINE (APRESOLINE) 50 MG tablet TAKE ONE TABLET BY MOUTH TWICE A DAY 60 tablet 11    allopurinol (ZYLOPRIM) 100 MG tablet Take 1 tablet by mouth daily 90 tablet 3  warfarin (COUMADIN) 5 MG tablet Take 2.5 mg by mouth once a week Indications: Wednesdays; INR goal 2-2.5 Managed by [facility]      melatonin 5 MG TABS tablet Take 1 tablet by mouth nightly       metoprolol tartrate (LOPRESSOR) 25 MG tablet Take 25 mg by mouth 2 times daily      zoster recombinant adjuvanted vaccine (SHINGRIX) 50 MCG/0.5ML SUSR injection 50 MCG IM then repeat 2-6 months. 0.5 mL 1    nitroGLYCERIN (NITROSTAT) 0.4 MG SL tablet Place 0.4 mg under the tongue every 5 minutes as needed for Chest pain up to max of 3 total doses. If no relief after 1 dose, call 911.  ipratropium (ATROVENT) 0.03 % nasal spray 2 sprays by Nasal route 3 times daily 1 Bottle 3       Imaging/Diagnostics:    Xr Ankle Left (min 3 Views)    Result Date: 2/17/2021  EXAMINATION: THREE XRAY VIEWS OF THE LEFT FOOT; THREE XRAY VIEWS OF THE LEFT ANKLE 2/17/2021 10:12 am COMPARISON: None. HISTORY: ORDERING SYSTEM PROVIDED HISTORY: swelling, pain TECHNOLOGIST PROVIDED HISTORY: swelling, pain Reason for Exam: swelling, warmth, pain Acuity: Acute Type of Exam: Initial; ORDERING SYSTEM PROVIDED HISTORY: swelling, warmth TECHNOLOGIST PROVIDED HISTORY: swelling, warmth Reason for Exam: swelling, warmth, pain Acuity: Acute Type of Exam: Initial FINDINGS: Lateral ankle soft tissue swelling. No acute fracture. No widening of the ankle mortise. Mild narrowing of the 1st metatarsophalangeal joint. Nonspecific ankle soft tissue swelling.      Xr Foot Left (min 3 Views)    Result Date: 2/17/2021 EXAMINATION: THREE XRAY VIEWS OF THE LEFT FOOT; THREE XRAY VIEWS OF THE LEFT ANKLE 2/17/2021 10:12 am COMPARISON: None. HISTORY: ORDERING SYSTEM PROVIDED HISTORY: swelling, pain TECHNOLOGIST PROVIDED HISTORY: swelling, pain Reason for Exam: swelling, warmth, pain Acuity: Acute Type of Exam: Initial; ORDERING SYSTEM PROVIDED HISTORY: swelling, warmth TECHNOLOGIST PROVIDED HISTORY: swelling, warmth Reason for Exam: swelling, warmth, pain Acuity: Acute Type of Exam: Initial FINDINGS: Lateral ankle soft tissue swelling. No acute fracture. No widening of the ankle mortise. Mild narrowing of the 1st metatarsophalangeal joint. Nonspecific ankle soft tissue swelling. Assessment :      Primary Problem  <principal problem not specified>    Active Hospital Problems    Diagnosis Date Noted    [facility] [M00.9] 02/17/2021       Plan:     Patient status Admit as inpatient in the  Med/Surge    Consultations:   IP CONSULT TO INTERNAL MEDICINE  IP CONSULT TO PODIATRY  IP CONSULT TO SOCIAL WORK    Left ankle pain and swelling, possible gout versus septic arthritis  -patient presented with low-grade fever 37.5, no hypotension, no tachycardia, mild leukocytosis 12.7, CRP 34, lactic acid 1, ESR 18  -uric acid 2.7  -patient on home allopurinol 100 mg daily  -received Norco for pain in the ER  -Tylenol as needed for pain  -patient has NSAIDs showing on Allergies list with nausea described  -on Rocephin 1 g daily IV  -Arthrocentesis performed: Calcium pyrophosphate crystals  -synovial fluid culture pending. -x-ray of the left ankle and foot negative  -Doppler ultrasound ordered to rule out DVT  -Follow CBC and CMP tomorrow am   -diet General.  -podiatry consulted    Afib  -On warfarin orally 5mg 6 times per week and 2.5 mg once a week.    -Resumed during admission, pharmacy to dose warfarin  -INR 2.2  -CBC tomorrow am    Chronic hypertension  -on hydralazine 50 mg twice daily  -lisinopril 40 mg daily -metoprolol 25 mg twice daily    BPH  -tamsulosin 0.4 mg twice daily    PT/OT/SW  DVT prophylaxis: already anticoagulated with warfarin    Patient is admitted as inpatient status because of co-morbiditieslisted above, severity of signs and symptoms as outlined, requirement for current medical therapies and most importantly because of direct risk to patient if care not provided in a hospital setting. Rito Land MD  2/17/2021  1:30 PM    Copy sent to Dr. Erick Ward MD   Attending Physician Statement  I have discussed the care of Norris Bethea and I have examined the patient myselft and taken ros and hpi , including pertinent history and exam findings,  with the resident. I have reviewed the key elements of all parts of the encounter with the resident. I agree with the assessment, plan and orders as documented by the resident.       Electronically signed by Yan De Jesus MD

## 2021-02-17 NOTE — ED NOTES
Mode of arrival (squad #, walk in, police, etc) : squad from home        Chief complaint(s): foot pain        Arrival Note (brief scenario, treatment PTA, etc). : Pt arrives due to left foot pain x 3 days. Pt hx gout. Pt complaint with allopurinol. Pt states he is unable to bare weight on foot due to pain. Pt states pain radiates up left calf. Pt denies numbness and tingling. Swelling and redness noted to foot. Pt denies further complaints. Pt is A&Ox4, eupneic, PWD. GCS=15. Call light in reach. C= \"Have you ever felt that you should Cut down on your drinking? \"  No  A= \"Have people Annoyed you by criticizing your drinking? \"  No  G= \"Have you ever felt bad or Guilty about your drinking? \"  No  E= \"Have you ever had a drink as an Eye-opener first thing in the morning to steady your nerves or to help a hangover? \"  No      Deferred []      Reason for deferring: N/A    *If yes to two or more: probable alcohol abuse. Gloria Pantoja RN  02/17/21 1030

## 2021-02-18 LAB
ABSOLUTE EOS #: 0 K/UL (ref 0–0.4)
ABSOLUTE IMMATURE GRANULOCYTE: ABNORMAL K/UL (ref 0–0.3)
ABSOLUTE LYMPH #: 0.38 K/UL (ref 1–4.8)
ABSOLUTE MONO #: 2.55 K/UL (ref 0.1–1.3)
ALBUMIN SERPL-MCNC: 3 G/DL (ref 3.5–5.2)
ALBUMIN/GLOBULIN RATIO: ABNORMAL (ref 1–2.5)
ALP BLD-CCNC: 69 U/L (ref 40–129)
ALT SERPL-CCNC: 27 U/L (ref 5–41)
ANION GAP SERPL CALCULATED.3IONS-SCNC: 5 MMOL/L (ref 9–17)
AST SERPL-CCNC: 20 U/L
BASOPHILS # BLD: 0 % (ref 0–2)
BASOPHILS ABSOLUTE: 0 K/UL (ref 0–0.2)
BILIRUB SERPL-MCNC: 1.82 MG/DL (ref 0.3–1.2)
BUN BLDV-MCNC: 15 MG/DL (ref 8–23)
BUN/CREAT BLD: ABNORMAL (ref 9–20)
CALCIUM SERPL-MCNC: 9 MG/DL (ref 8.6–10.4)
CHLORIDE BLD-SCNC: 105 MMOL/L (ref 98–107)
CO2: 26 MMOL/L (ref 20–31)
CREAT SERPL-MCNC: 0.7 MG/DL (ref 0.7–1.2)
DIFFERENTIAL TYPE: ABNORMAL
EOSINOPHILS RELATIVE PERCENT: 0 % (ref 0–4)
GFR AFRICAN AMERICAN: >60 ML/MIN
GFR NON-AFRICAN AMERICAN: >60 ML/MIN
GFR SERPL CREATININE-BSD FRML MDRD: ABNORMAL ML/MIN/{1.73_M2}
GFR SERPL CREATININE-BSD FRML MDRD: ABNORMAL ML/MIN/{1.73_M2}
GLUCOSE BLD-MCNC: 81 MG/DL (ref 70–99)
HCT VFR BLD CALC: 40.7 % (ref 41–53)
HEMOGLOBIN: 13.5 G/DL (ref 13.5–17.5)
IMMATURE GRANULOCYTES: ABNORMAL %
INR BLD: 2.4
LYMPHOCYTES # BLD: 5 % (ref 24–44)
MCH RBC QN AUTO: 30.2 PG (ref 26–34)
MCHC RBC AUTO-ENTMCNC: 33.1 G/DL (ref 31–37)
MCV RBC AUTO: 91.2 FL (ref 80–100)
MONOCYTES # BLD: 34 % (ref 1–7)
MORPHOLOGY: NORMAL
NRBC AUTOMATED: ABNORMAL PER 100 WBC
PDW BLD-RTO: 15.7 % (ref 11.5–14.9)
PLATELET # BLD: 158 K/UL (ref 150–450)
PLATELET ESTIMATE: ABNORMAL
PMV BLD AUTO: 9.3 FL (ref 6–12)
POTASSIUM SERPL-SCNC: 4.7 MMOL/L (ref 3.7–5.3)
PROTHROMBIN TIME: 25.9 SEC (ref 11.8–14.6)
RBC # BLD: 4.46 M/UL (ref 4.5–5.9)
RBC # BLD: ABNORMAL 10*6/UL
SEG NEUTROPHILS: 61 % (ref 36–66)
SEGMENTED NEUTROPHILS ABSOLUTE COUNT: 4.57 K/UL (ref 1.3–9.1)
SODIUM BLD-SCNC: 136 MMOL/L (ref 135–144)
TOTAL PROTEIN: 5.7 G/DL (ref 6.4–8.3)
WBC # BLD: 7.5 K/UL (ref 3.5–11)
WBC # BLD: ABNORMAL 10*3/UL

## 2021-02-18 PROCEDURE — 99232 SBSQ HOSP IP/OBS MODERATE 35: CPT | Performed by: INTERNAL MEDICINE

## 2021-02-18 PROCEDURE — 6370000000 HC RX 637 (ALT 250 FOR IP): Performed by: INTERNAL MEDICINE

## 2021-02-18 PROCEDURE — 2580000003 HC RX 258: Performed by: STUDENT IN AN ORGANIZED HEALTH CARE EDUCATION/TRAINING PROGRAM

## 2021-02-18 PROCEDURE — 2580000003 HC RX 258: Performed by: EMERGENCY MEDICINE

## 2021-02-18 PROCEDURE — 80053 COMPREHEN METABOLIC PANEL: CPT

## 2021-02-18 PROCEDURE — 97530 THERAPEUTIC ACTIVITIES: CPT

## 2021-02-18 PROCEDURE — 85610 PROTHROMBIN TIME: CPT

## 2021-02-18 PROCEDURE — 1200000000 HC SEMI PRIVATE

## 2021-02-18 PROCEDURE — 6370000000 HC RX 637 (ALT 250 FOR IP): Performed by: STUDENT IN AN ORGANIZED HEALTH CARE EDUCATION/TRAINING PROGRAM

## 2021-02-18 PROCEDURE — 6360000002 HC RX W HCPCS: Performed by: INTERNAL MEDICINE

## 2021-02-18 PROCEDURE — 97166 OT EVAL MOD COMPLEX 45 MIN: CPT

## 2021-02-18 PROCEDURE — 85025 COMPLETE CBC W/AUTO DIFF WBC: CPT

## 2021-02-18 PROCEDURE — 97162 PT EVAL MOD COMPLEX 30 MIN: CPT

## 2021-02-18 PROCEDURE — 36415 COLL VENOUS BLD VENIPUNCTURE: CPT

## 2021-02-18 RX ORDER — WARFARIN SODIUM 5 MG/1
5 TABLET ORAL
Status: COMPLETED | OUTPATIENT
Start: 2021-02-18 | End: 2021-02-18

## 2021-02-18 RX ORDER — PANTOPRAZOLE SODIUM 40 MG/1
40 TABLET, DELAYED RELEASE ORAL
Status: DISCONTINUED | OUTPATIENT
Start: 2021-02-18 | End: 2021-02-19 | Stop reason: HOSPADM

## 2021-02-18 RX ORDER — METHYLPREDNISOLONE SODIUM SUCCINATE 125 MG/2ML
60 INJECTION, POWDER, LYOPHILIZED, FOR SOLUTION INTRAMUSCULAR; INTRAVENOUS EVERY 6 HOURS
Status: DISCONTINUED | OUTPATIENT
Start: 2021-02-18 | End: 2021-02-19 | Stop reason: HOSPADM

## 2021-02-18 RX ADMIN — METHYLPREDNISOLONE SODIUM SUCCINATE 60 MG: 125 INJECTION, POWDER, FOR SOLUTION INTRAMUSCULAR; INTRAVENOUS at 11:02

## 2021-02-18 RX ADMIN — LISINOPRIL 40 MG: 20 TABLET ORAL at 08:15

## 2021-02-18 RX ADMIN — HYDRALAZINE HYDROCHLORIDE 50 MG: 50 TABLET, FILM COATED ORAL at 08:16

## 2021-02-18 RX ADMIN — Medication 3 MG: at 22:12

## 2021-02-18 RX ADMIN — METOPROLOL TARTRATE 25 MG: 25 TABLET, FILM COATED ORAL at 19:43

## 2021-02-18 RX ADMIN — METHYLPREDNISOLONE SODIUM SUCCINATE 60 MG: 125 INJECTION, POWDER, FOR SOLUTION INTRAMUSCULAR; INTRAVENOUS at 22:13

## 2021-02-18 RX ADMIN — BUSPIRONE HYDROCHLORIDE 10 MG: 10 TABLET ORAL at 08:16

## 2021-02-18 RX ADMIN — BUSPIRONE HYDROCHLORIDE 10 MG: 10 TABLET ORAL at 13:20

## 2021-02-18 RX ADMIN — METHYLPREDNISOLONE SODIUM SUCCINATE 60 MG: 125 INJECTION, POWDER, FOR SOLUTION INTRAMUSCULAR; INTRAVENOUS at 17:39

## 2021-02-18 RX ADMIN — METOPROLOL TARTRATE 25 MG: 25 TABLET, FILM COATED ORAL at 08:15

## 2021-02-18 RX ADMIN — PANTOPRAZOLE SODIUM 40 MG: 40 TABLET, DELAYED RELEASE ORAL at 11:02

## 2021-02-18 RX ADMIN — SODIUM CHLORIDE, PRESERVATIVE FREE 10 ML: 5 INJECTION INTRAVENOUS at 22:13

## 2021-02-18 RX ADMIN — NAPROXEN 500 MG: 500 TABLET ORAL at 17:39

## 2021-02-18 RX ADMIN — WARFARIN SODIUM 5 MG: 5 TABLET ORAL at 18:36

## 2021-02-18 RX ADMIN — NAPROXEN 500 MG: 500 TABLET ORAL at 08:14

## 2021-02-18 RX ADMIN — SODIUM CHLORIDE, PRESERVATIVE FREE 10 ML: 5 INJECTION INTRAVENOUS at 11:02

## 2021-02-18 RX ADMIN — ALLOPURINOL 100 MG: 100 TABLET ORAL at 08:14

## 2021-02-18 RX ADMIN — TAMSULOSIN HYDROCHLORIDE 0.4 MG: 0.4 CAPSULE ORAL at 19:43

## 2021-02-18 RX ADMIN — SODIUM CHLORIDE, PRESERVATIVE FREE 10 ML: 5 INJECTION INTRAVENOUS at 08:16

## 2021-02-18 RX ADMIN — SODIUM CHLORIDE, PRESERVATIVE FREE 10 ML: 5 INJECTION INTRAVENOUS at 19:43

## 2021-02-18 RX ADMIN — TAMSULOSIN HYDROCHLORIDE 0.4 MG: 0.4 CAPSULE ORAL at 08:16

## 2021-02-18 RX ADMIN — BUSPIRONE HYDROCHLORIDE 10 MG: 10 TABLET ORAL at 19:43

## 2021-02-18 RX ADMIN — HYDRALAZINE HYDROCHLORIDE 50 MG: 50 TABLET, FILM COATED ORAL at 19:43

## 2021-02-18 ASSESSMENT — PAIN SCALES - GENERAL
PAINLEVEL_OUTOF10: 0
PAINLEVEL_OUTOF10: 9
PAINLEVEL_OUTOF10: 9
PAINLEVEL_OUTOF10: 0

## 2021-02-18 ASSESSMENT — PAIN DESCRIPTION - ORIENTATION: ORIENTATION: LEFT

## 2021-02-18 ASSESSMENT — PAIN DESCRIPTION - DESCRIPTORS: DESCRIPTORS: BURNING

## 2021-02-18 ASSESSMENT — PAIN DESCRIPTION - FREQUENCY: FREQUENCY: INTERMITTENT

## 2021-02-18 ASSESSMENT — PAIN DESCRIPTION - ONSET: ONSET: ON-GOING

## 2021-02-18 ASSESSMENT — PAIN DESCRIPTION - LOCATION
LOCATION: ANKLE;FOOT
LOCATION: ANKLE;FOOT

## 2021-02-18 NOTE — PROGRESS NOTES
Physical Therapy    Facility/Department: [facility] MED SURG  Initial Assessment    NAME: Amada Nicholson  : 1931  MRN: [id number]    Date of Service: 2021    Discharge Recommendations:  Patient would benefit from continued therapy after discharge   PT Equipment Recommendations  Equipment Needed: (TBD)    Assessment   Body structures, Functions, Activity limitations: Decreased functional mobility ; Decreased ROM; Decreased balance; Increased pain  Assessment: continue per POC to maxmize potential for safe D/C  Treatment Diagnosis: impaired mobility due to pain left and swelling left ankle and foot  Specific instructions for Next Treatment: advance gait as tolerated left WBAT, instruct in ROM and strengthening exercises and progress to steps when pain is controlled  Prognosis: Excellent  Decision Making: Medium Complexity  History: admitted due to septic arthritis left ankle  Exam: ROM, MMT, balance and mobility assessments  Clinical Presentation: min x 1 for bed mobility, CGA x 1 sit <> stand, mod  x 1 for attempted gait 2 steps using standard walker and min x 1 for pivot to chair left WBAT, FALL RISK  PT Education: Goals; General Safety;Gait Training;PT Role;Plan of Care;Equipment;Home Exercise Program;Weight-bearing Education;Transfer Training  Barriers to Learning: none  REQUIRES PT FOLLOW UP: Yes  Activity Tolerance  Activity Tolerance: Patient limited by pain       Patient Diagnosis(es): The encounter diagnosis was Septic arthritis of left ankle, due to unspecified organism ([facility] [facility] has a past medical history of Acute MI ([facility] Allergic rhinitis, Anemia, Basal cell cancer, Basal cell cancer, Cervical radiculopathy, Diverticulitis, Epigastric pain/GERD., GERD (gastroesophageal reflux disease), Glaucoma, Hyperlipidemia, Hypertension, Hyponatremia, IBS (irritable bowel syndrome), Insomnia, Osteoarthritis, Osteoarthritis/neck pain. , Renal calculi, and Shingles. has a past surgical history that includes Spine surgery (04/2003); Lithotripsy; hernia repair; Skin cancer excision; skin biopsy (03/11/13); Skin cancer excision (Left, 03/2013); Colonoscopy (2002); Colonoscopy (08/2013); skin biopsy (Left, 02/11/14); Mohs surgery (Left, 03/10/14); Mohs surgery (12/30/14); other surgical history (9/6/15); Cholecystectomy (9-9-15); Skin cancer excision (Right, 08/12/2016); Mohs surgery (Left, 08/2017); Mohs surgery (Left, 10/21/2019); and Mohs surgery (12/14/2020). Restrictions  Restrictions/Precautions  Restrictions/Precautions: Fall Risk(up w/ assist, peripheral IV left antecubital)  Required Braces or Orthoses?: No  Position Activity Restriction  Other position/activity restrictions: up w/ assist  Vision/Hearing  Vision: Impaired  Vision Exceptions: Wears glasses at all times  Hearing: Exceptions to [facility]  Hearing Exceptions: Hard of hearing/hearing concerns;Bilateral hearing aid(hearing aides are weak)     Subjective  General  Chart Reviewed: Yes  Patient assessed for rehabilitation services?: Yes  Response To Previous Treatment: Not applicable  Family / Caregiver Present: No  Referring Practitioner: Dr. Melissa Mares  Referral Date : 02/17/21  Diagnosis: septic arthritis left ankle  Follows Commands: Within Functional Limits  Other (Comment): OK per nurse Paul to proceed w/ PT evaluation  General Comment  Comments: pt had aspiration of the left ankle in the ER  Subjective  Subjective: Pt reports that he developed pain and swelling in the left ankle and foot. Pain Screening  Patient Currently in Pain: Yes  Pain Assessment  Pain Assessment: 0-10  Pain Level: 9  Pain Type: Acute pain  Pain Location: Ankle; Foot  Pain Orientation: Left  Pain Descriptors: Burning  Pain Frequency: Intermittent  Pain Onset: On-going  Non-Pharmaceutical Pain Intervention(s): Repositioned  Response to Pain Intervention: Patient Satisfied  Multiple Pain Sites: No  Vital Signs Patient Currently in Pain: Yes       Orientation  Orientation  Overall Orientation Status: Within Functional Limits  Social/Functional History  Social/Functional History  Lives With: Alone  Type of Home: House  Home Layout: One level  Home Access: Stairs to enter with rails  Entrance Stairs - Number of Steps: 2 + 1 more into the house  Entrance Stairs - Rails: Both((can reach both sides))  Bathroom Shower/Tub: Tub/Shower unit, Doors  Bathroom Toilet: Standard  Bathroom Equipment: Grab bars in shower, Hand-held shower  Home Equipment: Cane, Quad cane, Rolling walker, Reacher(transport chair - did not use)  ADL Assistance: Independent  Homemaking Assistance: Independent  Homemaking Responsibilities: Yes  Ambulation Assistance: Independent(no device)  Transfer Assistance: Independent  Active : Yes  Mode of Transportation: SUV  Occupation: Retired  Type of occupation: [employer]  IADL Comments: sleeps in a flat bed (air mattress type)  Additional Comments: Son is [facility], daughter does not work and lives nearby if needed.   Cognition        Objective     Observation/Palpation  Observation: up w/ assist, peripheral IV left antecubital, bruising bilateral  forearms, bandages right forearm and right olecranon area    AROM RLE (degrees)  RLE AROM: WFL  AROM LLE (degrees)  LLE General AROM: limited ankle dorsiflexion due to pain level  AROM RUE (degrees)  RUE General AROM: see OT for UE assessment  AROM LUE (degrees)  LUE General AROM: see OT for UE assessment  Strength RLE  Strength RLE: WFL  Comment: 5/5  Strength LLE  Strength LLE: Exception  Comment: unable to tolerate  strength testing to the left ankle due to pain  L Hip Flexion: 3+/5  L Hip ABduction: 4-/5  L Hip ADduction: 4-/5  L Knee Flexion: 4-/5  L Knee Extension: 4-/5  L Ankle Dorsiflexion: NT  L Ankle Plantar Flexion: NT  Strength RUE  Comment: see OT for UE assessment  Strength LUE  Comment: see OT for UE assessment     Sensation Overall Sensation Status: Impaired(tingling left foot)  Bed mobility  Rolling to Right: Minimal assistance  Supine to Sit: Minimal assistance  Scooting: Stand by assistance  Comment: pt left sitting up in the chair w/ left foot propped up on a footstool  Transfers  Sit to Stand: Contact guard assistance  Stand to sit: Contact guard assistance  Bed to Chair: Minimal assistance(used standard walker)  Ambulation  Ambulation?: Yes  WB Status: WBAT left ankle  Ambulation 1  Surface: level tile  Device: Standard Walker  Assistance:  Moderate assistance  Distance: 2 forward steps- unable to tolerate weight bearing left foot due to pain level  Comments: decided to abort gait and pivot to chair  Stairs/Curb  Stairs?: No     Balance  Sitting - Static: Good  Sitting - Dynamic: Good  Standing - Static: Fair;+(used standard walker)  Standing - Dynamic: Fair(used standard walker)        Plan   Plan  Times per week: daily x 5 days  Times per day: Daily  Specific instructions for Next Treatment: advance gait as tolerated left WBAT, instruct in ROM and strengthening exercises and progress to steps when pain is controlled  Current Treatment Recommendations: Strengthening, Home Exercise Program, Safety Education & Training, ROM, Balance Training, Patient/Caregiver Education & Training, Equipment Evaluation, Education, & procurement, Functional Mobility Training, Transfer Training, Gait Training, Stair training  Safety Devices  Type of devices: Left in chair, Call light within reach, Gait belt, Patient at risk for falls, Nurse notified(nurse Miller)    G-Code       OutComes Score                                                  AM-PAC Score  AM-PAC Inpatient Mobility Raw Score : 15 (02/18/21 0852)  AM-PAC Inpatient T-Scale Score : 39.45 (02/18/21 0852)  Mobility Inpatient CMS 0-100% Score: 57.7 (02/18/21 0852)  Mobility Inpatient CMS G-Code Modifier : CK (02/18/21 8276)          Goals  Short term goals Time Frame for Short term goals: daily x 5 days  Short term goal 1: pt to tolerate 1/2 hour of therapuetic exercise and activity  Short term goal 2: pt to demonstrate good technique for LE ROM and strengthening exercises  Short term goal 3: pt to demonstrate independent bed mobility for position change  Short term goal 4: pt to demonstrate MODIFIED independent transfers using standard walker left WBAT  Short term goal 5: pt to demonstrate MODIFIED independent gait 50-80'  using standard walker left WBAT for household distances  Short term goal 6: pt to demonstrate good balance  using standard walker left WBAT  Short term goal 7: pt to demonstrate ability to ascend/ descend 2 steps w/ bilateral rails w/ min x 1 to enter/ exit his home  Patient Goals   Patient goals : return home after pain is controlled and able to [facility]       Therapy Time   Individual Concurrent Group Co-treatment   Time In 0852         Time Out 0918         Minutes 26         Timed Code Treatment Minutes: [facility], PT

## 2021-02-18 NOTE — CARE COORDINATION
CASE MANAGEMENT NOTE:    Admission Date:  2/17/2021 Tessy Valdez is a 80 y.o.  male    Admitted for : Septic arthritis ([facility] [address] [M00.9]    Met with:  Patient    PCP:  Dr. Alan De La Rosa:  Medicare      Current Residence/ Living Arrangements:  independently at home             Current Services PTA:  No    Is patient agreeable to VNS: Yes    Freedom of choice provided:  Yes    List of [address] provided: Yes    VNS chosen:  No    DME:  straight cane and walker, transport chair and GB    Home Oxygen: No    Nebulizer: No    CPAP/BIPAP: No    Supplier: N/A    Potential Assistance Needed: Yes    SNF needed: No    Freedom of choice and list provided: NA    Pharmacy:  [facility]       Does Patient want to use MEDS to BEDS? No    Is patient currently receiving oral anticoagulation therapy? Yes - Coumadin PTA - [address]    Is the Patient an [facility] with Readmission Risk Score greater than 14%? No  If yes, pt needs a follow up appointment made within 7 days. Family Members/Caregivers that pt would like involved in their care:    Yes    If yes, list name here:  Daughter Ras Ramires and Son Rufino Mariscal    Transportation Provider:  Patient and Family             Is patient in Isolation/One on One/Altered Mental Status? No  If yes, skip next question. If no, would they like an I-Pad to  use? No  If yes, call [phone]. Discharge Plan:  2/18: MEDICARE - Patient is from home alone. DME - cane, walker, transport chair, GB. Current with [address]. May want VNS - List provided. On IV rocephin, podiatry consult.  ORANGE HEADER - 7%. Liv Mojica                 Electronically signed by: Tru Stewart RN on 2/18/2021 at 1:01 PM

## 2021-02-18 NOTE — PROGRESS NOTES
Arthrocentesis performed and was bloody. Patient admitted for evaluation and possible case of septic arthritis vs gout for IV antibiotics. Review of Systems:     CONSTITUTIONAL:  no fevers  Psych: Normal mood and affect, no depression, no suicidal ideation. EYES: negative for blury vision  HEENT: No headaches, no nasal congestion, no difficulty swallowing  RESPIRATORY:negative for dyspnea, no wheezing, no Cough  CARDIOVASCULAR: negative for chest pain, no palpitations  GASTROINTESTINAL: no nausea, no vomiting, no change in bowel habits, no abdominal pain   GENITOURINARY: negative for dysuria, no hematuria   MUSCULOSKELETAL: Left foot pain and ankle swelling  NEUROLOGICAL: No weakness or numbness    Medications: Allergies:     Allergies   Allergen Reactions    Celebrex [Celecoxib] Nausea Only    Mobic Nausea Only       Current Meds:   Scheduled Meds:    sodium chloride flush  10 mL Intravenous 2 times per day    sodium chloride flush  10 mL Intravenous 2 times per day    allopurinol  100 mg Oral Daily    busPIRone  10 mg Oral TID    hydrALAZINE  50 mg Oral BID    tamsulosin  0.4 mg Oral BID    metoprolol tartrate  25 mg Oral BID    melatonin  3 mg Oral Nightly    lisinopril  40 mg Oral Daily    cefTRIAXone (ROCEPHIN) IV  1,000 mg Intravenous Q24H    warfarin (COUMADIN) daily dosing (placeholder)   Other RX Placeholder    naproxen  500 mg Oral BID WC     Continuous Infusions:     PRN Meds: sodium chloride flush, sodium chloride flush, promethazine **OR** ondansetron, polyethylene glycol, acetaminophen **OR** acetaminophen, tiZANidine    Data: Past Medical History:   has a past medical history of Acute MI ([facility] [address] Allergic rhinitis, Anemia, Basal cell cancer, Basal cell cancer, Cervical radiculopathy, Diverticulitis, Epigastric pain/GERD., GERD (gastroesophageal reflux disease), Glaucoma, Hyperlipidemia, Hypertension, Hyponatremia, IBS (irritable bowel syndrome), Insomnia, Osteoarthritis, Osteoarthritis/neck pain. , Renal calculi, and Shingles. Social History:   reports that he quit smoking about 60 years ago. He has a 10.00 pack-year smoking history. He has never used smokeless tobacco. He reports current alcohol use. He reports that he does not use drugs. Family History:   Family History   Problem Relation Age of Onset    Heart Disease Mother     Lung Cancer Father     Cancer Father         lung    Heart Disease Brother        Vitals:  /83   Pulse 103   Temp 98.2 °F (36.8 °C) (Oral)   Resp 16   Ht 5' 4\" (1.626 m)   Wt 165 lb 12.6 oz (75.2 kg)   SpO2 97%   BMI 28.46 kg/m²   Temp (24hrs), Av.8 °F (37.1 °C), Min:98.2 °F (36.8 °C), Max:99.5 °F (37.5 °C)      No results for input(s): POCGLU in the last 72 hours. I/O(24Hr):     Intake/Output Summary (Last 24 hours) at 2021 0831  Last data filed at 2021 0731  Gross per 24 hour   Intake 1050 ml   Output 650 ml   Net 400 ml       Labs:    CBC:   Lab Results   Component Value Date    WBC 7.5 2021    RBC 4.46 2021    RBC 4.86 2017    HGB 13.5 2021    HCT 40.7 2021    MCV 91.2 2021    MCH 30.2 2021    MCHC 33.1 2021    RDW 15.7 2021     2021    MPV 9.3 2021     CBC with Differential:    Lab Results   Component Value Date    WBC 7.5 2021    RBC 4.46 2021    RBC 4.86 2017    HGB 13.5 2021    HCT 40.7 2021     2021    MCV 91.2 2021    MCH 30.2 2021    MCHC 33.1 2021    RDW 15.7 2021    LYMPHOPCT 5 2021    LYMPHOPCT 15.0 2017 MONOPCT 34 02/18/2021    MONOPCT 7.7 10/05/2012    EOSPCT 1.3 05/23/2017    BASOPCT 0 02/18/2021    BASOPCT 0.3 05/23/2017    MONOSABS 2.55 02/18/2021    MONOSABS 0.6 10/05/2012    LYMPHSABS 0.38 02/18/2021    LYMPHSABS 1.1 10/05/2012    EOSABS 0.00 02/18/2021    EOSABS 0.1 10/05/2012    BASOSABS 0.00 02/18/2021    DIFFTYPE NOT REPORTED 02/18/2021     WBC:    Lab Results   Component Value Date    WBC 7.5 02/18/2021     Platelets:    Lab Results   Component Value Date     02/18/2021     Hemoglobin/Hematocrit:    Lab Results   Component Value Date    HGB 13.5 02/18/2021    HCT 40.7 02/18/2021     CMP:    Lab Results   Component Value Date     02/18/2021    K 4.7 02/18/2021     02/18/2021    CO2 26 02/18/2021    BUN 15 02/18/2021    CREATININE 0.70 02/18/2021    GFRAA >60 02/18/2021    LABGLOM >60 02/18/2021    GLUCOSE 81 02/18/2021    GLUCOSE 89 05/23/2017    PROT 5.7 02/18/2021    LABALBU 3.0 02/18/2021    CALCIUM 9.0 02/18/2021    BILITOT 1.82 02/18/2021    ALKPHOS 69 02/18/2021    AST 20 02/18/2021    ALT 27 02/18/2021       Lab Results   Component Value Date/Time    SPECIAL NOT REPORTED 02/17/2021 08:08 PM     Lab Results   Component Value Date/Time    CULTURE PENDING 02/17/2021 08:08 PM         Radiology:    Xr Ankle Left (min 3 Views)    Result Date: 2/17/2021  EXAMINATION: THREE XRAY VIEWS OF THE LEFT FOOT; THREE XRAY VIEWS OF THE LEFT ANKLE 2/17/2021 10:12 am COMPARISON: None. HISTORY: ORDERING SYSTEM PROVIDED HISTORY: swelling, pain TECHNOLOGIST PROVIDED HISTORY: swelling, pain Reason for Exam: swelling, warmth, pain Acuity: Acute Type of Exam: Initial; ORDERING SYSTEM PROVIDED HISTORY: swelling, warmth TECHNOLOGIST PROVIDED HISTORY: swelling, warmth Reason for Exam: swelling, warmth, pain Acuity: Acute Type of Exam: Initial FINDINGS: Lateral ankle soft tissue swelling. No acute fracture. No widening of the ankle mortise. Mild narrowing of the 1st metatarsophalangeal joint. Nonspecific ankle soft tissue swelling. Xr Foot Left (min 3 Views)    Result Date: 2/17/2021  EXAMINATION: THREE XRAY VIEWS OF THE LEFT FOOT; THREE XRAY VIEWS OF THE LEFT ANKLE 2/17/2021 10:12 am COMPARISON: None. HISTORY: ORDERING SYSTEM PROVIDED HISTORY: swelling, pain TECHNOLOGIST PROVIDED HISTORY: swelling, pain Reason for Exam: swelling, warmth, pain Acuity: Acute Type of Exam: Initial; ORDERING SYSTEM PROVIDED HISTORY: swelling, warmth TECHNOLOGIST PROVIDED HISTORY: swelling, warmth Reason for Exam: swelling, warmth, pain Acuity: Acute Type of Exam: Initial FINDINGS: Lateral ankle soft tissue swelling. No acute fracture. No widening of the ankle mortise. Mild narrowing of the 1st metatarsophalangeal joint. Nonspecific ankle soft tissue swelling.      Vl Dup Lower Extremity Venous Left    Result Date: 2/17/2021 [address] [place] [address]  Vascular Lower Extremities DVT Study Procedure   Patient Name   [facility]     Date of Study           02/17/2021                 Evgeny Vines   Date of Birth  12/02/1931   Gender                  Male   Age            80 year(s)   Race                       Room Number    2057   Corporate ID # [id number]   Patient Acct # [de-identified]   MR #           [id number]       Sonographer             Anoop Michaels   Accession #    [id number]   Interpreting Physician  Crow Ruffin   Referring                   Referring Physician     Tam Sanchez DO  Nurse  Practitioner  Procedure Type of Study:   Veins: Lower Extremities DVT Study, Venous Scan Lower Left. Indications for Study:Pain, leg. Patient Status:Out Patient. Technical Quality:Adequate visualization. Conclusions   Summary   No evidence of superficial or deep venous thrombosis in the left lower  extremity. Signature   ----------------------------------------------------------------  Electronically signed by Mani Dixon(Sonographer) on  02/17/2021 10:56 AM  ----------------------------------------------------------------   ----------------------------------------------------------------  Electronically signed by Bebo Corbett(Interpreting physician)  on 02/17/2021 10:40 PM  ----------------------------------------------------------------  Findings:   Right Impression:                Left Impression:  The common femoral vein          The common femoral, femoral, popliteal  demonstrates normal              and tibial veins demonstrate normal  compressibility and              compressibility and augmentation. augmentation. Normal compressibility of the great                                   saphenous vein. Normal compressibility of the small                                   saphenous vein.   Velocities are measured in cm/s ; Diameters are measured in cm Right Lower !Yes       !Yes            ! None      ! +------------------------------------+----------+---------------+----------+ ! PTV                                 ! Yes       ! Yes            ! None      ! +------------------------------------+----------+---------------+----------+ ! Peroneal                            !Yes       ! Yes            ! None      ! +------------------------------------+----------+---------------+----------+ ! Gastroc                             ! Yes       ! Yes            ! None      ! +------------------------------------+----------+---------------+----------+ ! GSV Thigh                           ! Yes       ! Yes            ! None      ! +------------------------------------+----------+---------------+----------+ ! GSV Knee                            ! Yes       ! Yes            ! None      ! +------------------------------------+----------+---------------+----------+ ! GSV Ankle                           ! Yes       ! Yes            ! None      ! +------------------------------------+----------+---------------+----------+ ! SSV                                 ! Yes       ! Yes            ! None      ! +------------------------------------+----------+---------------+----------+ Left Doppler Measurements +---------------------------+------+------+--------------------------------+ ! Location                   ! Signal!Reflux! Reflux (msec)                   ! +---------------------------+------+------+--------------------------------+ ! Common Femoral             !Phasic!      !                                ! +---------------------------+------+------+--------------------------------+ ! Prox Femoral               !Phasic!      !                                ! +---------------------------+------+------+--------------------------------+ ! Popliteal                  !Phasic!      !                                ! +---------------------------+------+------+--------------------------------+ Physical Examination:        PHYSICAL EXAM:  General Appearance  Alert , awake, not in acute distress  Psych: Normal mood and affect, normal behavior, not agitated, maintaining good eye contact   HEENT - Head is normocephalic, atraumatic. Neck: supple, no rigidity, normal ROM, no neck swellings, normal thyroid gland  Lungs - Bilateral equal air entry, no wheezes, rales or rhonchi, aeration good  Cardiovascular - Heart sounds are normal.  Regular rhythm, normal rate without murmur, gallop or rub. Abdomen - Soft, nontender, nondistended, no masses or organomegaly  Neurologic - There are no new focal motor or sensory deficits  Skin - No bruising or bleeding on exposed skin area  Extremities -left ankle swelling, mild tenderness, warm on palpation      Assessment:        Primary Problem  Calcium pyrophosphate deposition disease of ankle    Active Hospital Problems    Diagnosis Date Noted    [facility] [M00.9] 02/17/2021    Calcium pyrophosphate deposition disease of ankle [M11.279] 02/17/2021       Plan:        Left ankle pain and swelling secondary to pseudogout  -Clinically local Left dorsum of ankle swelling and warmth.   -No leukocytosis hemodynamic stable, unlikely septic arthritis  -Arthrocentesis performed in the ED: Calcium pyrophosphate crystals  -X-ray of the left ankle and foot negative  -Doppler negative for DVT  -podiatry consulted  -Continue on home allopurinol 100 mg daily  -Tylenol as needed for pain  -Naprosyn 500 mg twice daily  -slight improvement today, still patient cannot weight-bear  -we will work with PT OT for assessment  -Will discontinue antibiotics, no bacteria seen in synovial fluid.   Culture pending    History of chronic A. fib  -On warfarin pharmacy to dose, no bleeding, CBC stable  -Lopressor 25 mg twice daily    History of chronic hypertension  -Continue hydralazine 50 mg twice daily  -Continue lisinopril 40 mg daily  -Lopressor 25 mg twice daily -Blood pressure readings stable, no significant hypertension, no hypotension    History of BPH  -Continue tamsulosin 0.4 mg twice daily    DVT prophylaxis: Already anticoagulated with warfarin. Dasha Tejeda MD  2/18/2021  8:31 AM     Attending Physician Statement  I have discussed the care of Norris Bethea and I have examined the patient myselft and taken ros and hpi , including pertinent history and exam findings,  with the resident. I have reviewed the key elements of all parts of the encounter with the resident. I agree with the assessment, plan and orders as documented by the resident.   Pseudogout left ankle aspiration noted calcium pyrophosphate crystal no bacteria noted cultures negative so far clinically pseudogout and IV steroids Solu-Medrol 60 every 6 continue Colcrys start PPI patient on Coumadin and NSAIDs and steroid high risk for upper GI bleed  Patient unable to walk unable to bear weight lives alone elderly gentleman with multiple comorbid conditions high risk of falling complications    Electronically signed by Adryan Strange MD

## 2021-02-18 NOTE — PROGRESS NOTES
Subjective: \"If they can get this pain under control, you will see a whole new man\"  Comments: Pt feels that pain is his biggest barrier at this time. Overall Orientation Status: Within Functional Limits  Vision  Vision: Impaired  Vision Exceptions: Wears glasses at all times  Hearing  Hearing: Exceptions to [facility]  Hearing Exceptions: Hard of hearing/hearing concerns, Bilateral hearing aid(hearing aides are weak)  Social/Functional History  Lives With: Alone  Type of Home: House  Home Layout: One level  Home Access: Stairs to enter with rails  Entrance Stairs - Number of Steps: 2 + 1 more into the house  Entrance Stairs - Rails: Both((can reach both sides))  Bathroom Shower/Tub: Tub/Shower unit, Doors  Bathroom Toilet: Standard  Bathroom Equipment: Grab bars in shower, Hand-held shower  Home Equipment: Cane, Quad cane, Rolling walker, Reacher(transport chair - did not use)  ADL Assistance: [address] [place] [address] Avenue: Independent  Homemaking Responsibilities: Yes  Ambulation Assistance: Independent(no device)  Transfer Assistance: Independent  Active : Yes  Mode of Transportation: [employer]  Occupation: Retired  Type of occupation: [employer]  IADL Comments: sleeps in a flat bed (air mattress type)  Additional Comments: Son is [employer], daughter does not work and lives nearby if needed. Pain Assessment  Pain Assessment: 0-10  Pain Level: 9  Pain Type: Acute pain  Pain Location: Ankle, Foot  Pain Orientation: Left  Pain Descriptors: Burning  Pain Frequency: Intermittent  Response to Pain Intervention: Patient Satisfied  Multiple Pain Sites: No    Objective  Vision - Basic Assessment  Patient Visual Report: No visual complaint reported.    Cognition  Overall Cognitive Status: WFL   Perception  Overall Perceptual Status: WFL  Sensation  Overall Sensation Status: Impaired(tingling left foot)   ADL  Feeding: Independent  Grooming: Setup  UE Bathing: Setup LE Bathing: Minimal assistance(A for L foot 2* pain; CGA/Min A for standing balance)  UE Dressing: Setup  LE Dressing: Moderate assistance(A for socks 2* pain per pt report; NT at evaluation)  Toileting: Contact guard assistance(Pt reports CGA from RN student when standing to use urinal)  Additional Comments: Above levels based on pt report, observation, and clinical reasoning unless otherwise noted. Pt limited by L ankle/foot pain and decreased standing balance/tolerance. UE Function  LUE Strength  Gross LUE Strength: WFL  L Hand General: 4+/5     LUE Tone: Normotonic     LUE AROM (degrees)  LUE AROM : WFL     Left Hand AROM (degrees)  Left Hand AROM: WFL     RUE Strength  Gross RUE Strength: WFL  R Hand General: 4+/5      RUE Tone: Normotonic     RUE AROM (degrees)  RUE AROM : WFL     Right Hand AROM (degrees)  Right Hand AROM: WFL    Fine Motor Skills  Coordination  Movements Are Fluid And Coordinated: Yes     Mobility  Supine to Sit: Minimal assistance       Balance  Sitting Balance: Supervision  Standing Balance: Minimal assistance(Varied CGA/Min A depending on task/level of support.)     Functional Mobility  Functional Mobility Comments: Pt completed stand pivot only 2* pain. Bed mobility  Supine to Sit: Minimal assistance  Scooting: Stand by assistance  Comment: Pt left sitting up in bedside chair at end of session. Transfers  Stand Pivot Transfers: Minimal assistance  Sit to stand: Contact guard assistance  Stand to sit: Contact guard assistance  Transfer Comments: w/standard walker; cues for technique to reduce pain  Functional Activity Tolerance  Functional Activity Tolerance:  Tolerates < 10 Min exercise, no significant change in vital signs  Additional Comments: Limited standing tolerance 2* pain with weight-bearing   Assessment  Performance deficits / Impairments: Decreased functional mobility , Decreased ADL status, Decreased balance, Decreased high-level IADLs, Decreased endurance

## 2021-02-18 NOTE — PROGRESS NOTES
Physician Progress Note      Marcos Apodaca  [facility] #:                  [id number]  :                       1931  ADMIT DATE:       2021 9:39 AM  DISCH DATE:  RESPONDING  PROVIDER #:        Namrata Canada MD          QUERY TEXT:    Pt admitted with left ankle pain and swelling--septic arthritis vs gout per H   & P. Noted documentation of CPPD on  in consult note by ordered podiatry   consultant. If possible, please document in progress notes and discharge   summary:    The medical record reflects the following:  Risk Factors: OA, advanced age  Clinical Indicators:  Podiatry consult note:   CPPD of the left ankle;     arthrocentesis:  many neutrophils, no bacteria; birefringence consistent   with calcium pyrophosphate crystals  Treatment: arthrocentesis, podiatry consult, Naprosyn  Options provided:  -- CPPD/pseudogout confirmed present on admission, septic arthritis and gout   ruled out  -- Gout confirmed, septic arthritis and pseudogout ruled out  -- Septic arthritis confirmed, gout and pseudogout ruled out  -- Other - I will add my own diagnosis  -- Disagree - Not applicable / Not valid  -- Disagree - Clinically unable to determine / Unknown  -- Refer to Clinical Documentation Reviewer    PROVIDER RESPONSE TEXT:    The diagnosis of CPPD/pseudogout was confirmed as present on admission and   diagnoses of septic arthritis and gout ruled out.     Query created by: Giles Donnelly on 2021 7:29 AM      Electronically signed by:  Namrata Canada MD 2021 8:33 AM

## 2021-02-18 NOTE — PLAN OF CARE
Problem: Falls - Risk of:  Goal: Will remain free from falls  Description: Will remain free from falls  Outcome: Ongoing, Patient is alert and oriented, able to make needs known. Patient is aware of limitations, call light within reach.       Problem: Pain:  Goal: Pain level will decrease  Description: Pain level will decrease  Outcome: Ongoing, tolerable pain at this time      Problem: Musculor/Skeletal Functional Status  Goal: Highest potential functional level  Outcome: Ongoing, up with assist

## 2021-02-18 NOTE — PROGRESS NOTES
Pharmacy Note  Warfarin Consult follow-up      Recent Labs     02/17/21  1010 02/18/21  0550   INR 2.2 2.4     Recent Labs     02/17/21  1010 02/18/21  0550   HGB 14.2 13.5   HCT 42.8 40.7*    158       Significant Drug-Drug Interactions:  New warfarin drug-drug interactions: none  Discontinued drug-drug interactions: none  Current warfarin drug-drug interactions: none      Date             INR        Dose given previous day  Dose scheduled for today  2/18/2021            2.4       unknown          5 mg        Notes:                   INR - 2.4, will give warfarin 5 mg today. Daily PT/INR while inpatient. Zhang Rivera Pharm. [facility]

## 2021-02-19 VITALS
OXYGEN SATURATION: 95 % | TEMPERATURE: 97.9 F | HEIGHT: 64 IN | SYSTOLIC BLOOD PRESSURE: 140 MMHG | BODY MASS INDEX: 28.3 KG/M2 | DIASTOLIC BLOOD PRESSURE: 81 MMHG | HEART RATE: 96 BPM | WEIGHT: 165.79 LBS | RESPIRATION RATE: 18 BRPM

## 2021-02-19 LAB
INR BLD: 2
PROTHROMBIN TIME: 22.2 SEC (ref 11.8–14.6)

## 2021-02-19 PROCEDURE — 97535 SELF CARE MNGMENT TRAINING: CPT

## 2021-02-19 PROCEDURE — 2500000003 HC RX 250 WO HCPCS: Performed by: STUDENT IN AN ORGANIZED HEALTH CARE EDUCATION/TRAINING PROGRAM

## 2021-02-19 PROCEDURE — 99239 HOSP IP/OBS DSCHRG MGMT >30: CPT | Performed by: INTERNAL MEDICINE

## 2021-02-19 PROCEDURE — 6370000000 HC RX 637 (ALT 250 FOR IP): Performed by: INTERNAL MEDICINE

## 2021-02-19 PROCEDURE — 85610 PROTHROMBIN TIME: CPT

## 2021-02-19 PROCEDURE — 6370000000 HC RX 637 (ALT 250 FOR IP): Performed by: STUDENT IN AN ORGANIZED HEALTH CARE EDUCATION/TRAINING PROGRAM

## 2021-02-19 PROCEDURE — 6360000002 HC RX W HCPCS: Performed by: STUDENT IN AN ORGANIZED HEALTH CARE EDUCATION/TRAINING PROGRAM

## 2021-02-19 PROCEDURE — 97116 GAIT TRAINING THERAPY: CPT

## 2021-02-19 PROCEDURE — 6360000002 HC RX W HCPCS: Performed by: INTERNAL MEDICINE

## 2021-02-19 PROCEDURE — 97530 THERAPEUTIC ACTIVITIES: CPT

## 2021-02-19 PROCEDURE — 36415 COLL VENOUS BLD VENIPUNCTURE: CPT

## 2021-02-19 PROCEDURE — 2580000003 HC RX 258: Performed by: STUDENT IN AN ORGANIZED HEALTH CARE EDUCATION/TRAINING PROGRAM

## 2021-02-19 RX ORDER — PREDNISONE 20 MG/1
20 TABLET ORAL DAILY
Qty: 7 TABLET | Refills: 0 | Status: SHIPPED | OUTPATIENT
Start: 2021-02-19 | End: 2021-02-26

## 2021-02-19 RX ORDER — LIDOCAINE HYDROCHLORIDE 10 MG/ML
5 INJECTION, SOLUTION INFILTRATION; PERINEURAL ONCE
Status: COMPLETED | OUTPATIENT
Start: 2021-02-19 | End: 2021-02-19

## 2021-02-19 RX ORDER — BETAMETHASONE SODIUM PHOSPHATE AND BETAMETHASONE ACETATE 3; 3 MG/ML; MG/ML
6 INJECTION, SUSPENSION INTRA-ARTICULAR; INTRALESIONAL; INTRAMUSCULAR; SOFT TISSUE ONCE
Status: COMPLETED | OUTPATIENT
Start: 2021-02-19 | End: 2021-02-19

## 2021-02-19 RX ORDER — NAPROXEN 500 MG/1
500 TABLET ORAL 2 TIMES DAILY PRN
Qty: 60 TABLET | Refills: 1 | Status: SHIPPED
Start: 2021-02-19 | End: 2021-09-16

## 2021-02-19 RX ORDER — WARFARIN SODIUM 5 MG/1
5 TABLET ORAL
Status: DISCONTINUED | OUTPATIENT
Start: 2021-02-19 | End: 2021-02-19 | Stop reason: HOSPADM

## 2021-02-19 RX ORDER — HYDROCODONE BITARTRATE AND ACETAMINOPHEN 5; 325 MG/1; MG/1
1 TABLET ORAL EVERY 6 HOURS PRN
Qty: 14 TABLET | Refills: 0 | Status: SHIPPED | OUTPATIENT
Start: 2021-02-19 | End: 2021-02-26

## 2021-02-19 RX ORDER — PANTOPRAZOLE SODIUM 40 MG/1
40 TABLET, DELAYED RELEASE ORAL
Qty: 7 TABLET | Refills: 0 | Status: SHIPPED
Start: 2021-02-20 | End: 2021-09-16 | Stop reason: ALTCHOICE

## 2021-02-19 RX ADMIN — METHYLPREDNISOLONE SODIUM SUCCINATE 60 MG: 125 INJECTION, POWDER, FOR SOLUTION INTRAMUSCULAR; INTRAVENOUS at 05:49

## 2021-02-19 RX ADMIN — NAPROXEN 500 MG: 500 TABLET ORAL at 17:09

## 2021-02-19 RX ADMIN — HYDRALAZINE HYDROCHLORIDE 50 MG: 50 TABLET, FILM COATED ORAL at 07:42

## 2021-02-19 RX ADMIN — ALLOPURINOL 100 MG: 100 TABLET ORAL at 07:42

## 2021-02-19 RX ADMIN — METHYLPREDNISOLONE SODIUM SUCCINATE 60 MG: 125 INJECTION, POWDER, FOR SOLUTION INTRAMUSCULAR; INTRAVENOUS at 11:16

## 2021-02-19 RX ADMIN — BETAMETHASONE ACETATE AND BETAMETHASONE SODIUM PHOSPHATE 6 MG: 3; 3 INJECTION, SUSPENSION INTRA-ARTICULAR; INTRALESIONAL; INTRAMUSCULAR; SOFT TISSUE at 11:20

## 2021-02-19 RX ADMIN — METOPROLOL TARTRATE 25 MG: 25 TABLET, FILM COATED ORAL at 07:43

## 2021-02-19 RX ADMIN — SODIUM CHLORIDE, PRESERVATIVE FREE 10 ML: 5 INJECTION INTRAVENOUS at 07:49

## 2021-02-19 RX ADMIN — LIDOCAINE HYDROCHLORIDE 5 ML: 10 INJECTION, SOLUTION INFILTRATION; PERINEURAL at 11:19

## 2021-02-19 RX ADMIN — PANTOPRAZOLE SODIUM 40 MG: 40 TABLET, DELAYED RELEASE ORAL at 05:49

## 2021-02-19 RX ADMIN — NAPROXEN 500 MG: 500 TABLET ORAL at 07:42

## 2021-02-19 RX ADMIN — METHYLPREDNISOLONE SODIUM SUCCINATE 60 MG: 125 INJECTION, POWDER, FOR SOLUTION INTRAMUSCULAR; INTRAVENOUS at 17:06

## 2021-02-19 RX ADMIN — TAMSULOSIN HYDROCHLORIDE 0.4 MG: 0.4 CAPSULE ORAL at 07:43

## 2021-02-19 RX ADMIN — BUSPIRONE HYDROCHLORIDE 10 MG: 10 TABLET ORAL at 07:43

## 2021-02-19 RX ADMIN — BUSPIRONE HYDROCHLORIDE 10 MG: 10 TABLET ORAL at 15:17

## 2021-02-19 RX ADMIN — LISINOPRIL 40 MG: 20 TABLET ORAL at 07:43

## 2021-02-19 ASSESSMENT — PAIN SCALES - GENERAL
PAINLEVEL_OUTOF10: 7
PAINLEVEL_OUTOF10: 6

## 2021-02-19 ASSESSMENT — PAIN DESCRIPTION - DESCRIPTORS: DESCRIPTORS: BURNING

## 2021-02-19 ASSESSMENT — PAIN DESCRIPTION - ORIENTATION: ORIENTATION: LEFT

## 2021-02-19 ASSESSMENT — PAIN DESCRIPTION - PAIN TYPE: TYPE: ACUTE PAIN

## 2021-02-19 ASSESSMENT — PAIN DESCRIPTION - LOCATION: LOCATION: ANKLE;FOOT

## 2021-02-19 NOTE — PROGRESS NOTES
[address] [facility]    Physical Therapy Progress Note    Date: 21  Patient Name: Lizzeth Denton       Room: 2243/5912-71  MRN: [id number]   Account: [de-identified]   : 1931  (80 y.o.)   Gender: male     Discharge Recommendations   Patient would benefit from continued therapy after discharge  Equipment Needed: (Pt reports having Ulices Ren, & handle bars)    Referring Practitioner: Dr. Janelle Marrufo  Diagnosis: Septic arthritis L ankle  Restrictions/Precautions: Fall Risk(up w/ assist, peripheral IV left antecubital)  Other position/activity restrictions: up w/ assist   Past Medical History:  has a past medical history of Acute MI ([facility] [address] Allergic rhinitis, Anemia, Basal cell cancer, Basal cell cancer, Cervical radiculopathy, Diverticulitis, Epigastric pain/GERD., GERD (gastroesophageal reflux disease), Glaucoma, Hyperlipidemia, Hypertension, Hyponatremia, IBS (irritable bowel syndrome), Insomnia, Osteoarthritis, Osteoarthritis/neck pain. , Renal calculi, and Shingles. Past Surgical History:   has a past surgical history that includes Spine surgery (2003); Lithotripsy; hernia repair; Skin cancer excision; skin biopsy (13); Skin cancer excision (Left, 2013); Colonoscopy (); Colonoscopy (2013); skin biopsy (Left, 14); Mohs surgery (Left, 03/10/14); Mohs surgery (14); other surgical history (9/6/15); Cholecystectomy (9-9-15); Skin cancer excision (Right, 2016); Mohs surgery (Left, 2017); Mohs surgery (Left, 10/21/2019); and Mohs surgery (2020).        Overall Orientation Status: Within Functional Limits  Restrictions/Precautions  Restrictions/Precautions: Fall Risk(up w/ assist, peripheral IV left antecubital)  Required Braces or Orthoses?: No  Position Activity Restriction  Other position/activity restrictions: up w/ assist Subjective: Pt states he has been waiting for PT to \"show up\". Pt reports he received an injection in his left ankle this morning; pt states it helped however pt feels very stiff  Comments: Pt pleasant and agreeable to PT. Working with patient when Mookie Dahl was able to joint for high balance activities. Vital Signs  Patient Currently in Pain: Yes  Pain Assessment: 0-10  Pain Level: 6  Pain Type: Acute pain  Pain Location: Ankle; Foot  Pain Orientation: Left  Non-Pharmaceutical Pain Intervention(s): Ambulation/Increased Activity; Distraction;Repositioned  Response to Pain Intervention: Patient Satisfied                Bed Mobility  Rolling: Modified independent  Supine to Sit: Modified independent  Sit to Supine: Modified independent  Scooting: Modified independent  Comment: pt is getting in and out of bed with use of 1 bedrail      Transfers:  Sit to Stand: Supervision  Stand to sit: Supervision  Bed to Chair: Stand by assistance;Contact guard assistance(using no AD)           WB Status: WBAT left ankle  Ambulation 1  Surface: level tile  Device: No Device(pt deferred walker or SBQC)  Assistance: Stand by assistance;Contact guard assistance  Quality of Gait: minimal heel/toe gait, shuffle gait, staggering, reaching for hallrail at times, lateral sway  Gait Deviations: Slow Maribel; Increased LOLLY; Decreased step length;Decreased step height  Distance: 75ft  Comments: extra encouragement to use Walker, pt reports not needing it as \"his house is perfectly set up\"        Stairs/Curb  Stairs?: Yes  Stairs  # Steps : 2  Stairs Height: 8\"  Rails: Left ascending  Curbs: 8\"  Device: No Device(attempted with SBQC, pt started to use & left it behind)  Assistance: Stand by assistance  Comment: no LOB noted, steady                   Posture: Fair  Sitting - Static: Good  Sitting - Dynamic: Good  Standing - Static: Fair;+  Standing - Dynamic: Fair Comments: Pt deferred all use of Assistive Devices. Attempted to have pt use SBQC since pt didn't want to use walker     Other exercises?: Yes  Other exercises 1: Education on Home Safety, Safety on Stairs, Heavily Encouraged to use an Assistive Device, pt not agreeable at this time but reports having all Assistive Devices at home  Other exercises 2: Standing Tolerance >5min. Other exercises 3: Dynamic Activity Reaching High/Low, unsteady at times, pt reaches for objects to steady balance           Activity Tolerance: Patient limited by pain  Activity Tolerance: Pt unsteady and even Heavy LOB forward when transfering into bathroom, SBA/CGA throughout tx  PT Equipment Recommendations  Equipment Needed: (Pt reports having [place], & handle bars)  Other Comments  Comments: Pt strongly encouraged to use Walker for safety    Assessment  Activity Tolerance: Patient limited by pain   Body structures, Functions, Activity limitations: Decreased functional mobility ; Decreased ROM; Decreased balance; Increased pain  Specific instructions for Next Treatment: advance gait as tolerated left WBAT, instruct in ROM and strengthening exercises and progress to steps when pain is controlled  Prognosis: Excellent  Discharge Recommendations: Patient would benefit from continued therapy after discharge     Type of devices: Left in chair;Call light within reach;Gait belt;Patient at risk for falls;Nurse notified(VERONICA Rogers)     Plan  Times per week: daily x 5 days  Times per day: Daily  Current Treatment Recommendations: Strengthening, Home Exercise Program, Safety Education & Training, ROM, Balance Training, Patient/Caregiver Education & Training, Equipment Evaluation, Education, & procurement, Functional Mobility Training, Transfer Training, Gait Training, Stair training    Patient Education  New Education Provided:  Need to Use Walker for Safe Ambulation  Learner:patient  Method: demonstration and explanation Outcome: acknowledged understanding of the Need to Use Walker for Safe Ambulation and needs reinforcement     Goals  Short term goals  Time Frame for Short term goals: daily x 5 days  Short term goal 1: pt to tolerate 1/2 hour of therapuetic exercise and activity  Short term goal 2: pt to demonstrate good technique for LE ROM and strengthening exercises  Short term goal 3: pt to demonstrate independent bed mobility for position change  Short term goal 4: pt to demonstrate MODIFIED independent transfers using standard walker left WBAT  Short term goal 5: pt to demonstrate MODIFIED independent gait 50-80'  using standard walker left WBAT for household distances  Short term goal 6: pt to demonstrate good balance  using standard walker left WBAT  Short term goal 7: pt to demonstrate ability to ascend/ descend 2 steps w/ bilateral rails w/ min x 1 to enter/ exit his home    PT Individual Minutes  Time In: 7869  Time Out: 5513  Minutes: 23    Electronically signed by Isabella Jackson PTA on 2/19/21 at 3:58 PM EST

## 2021-02-19 NOTE — FLOWSHEET NOTE
02/18/21 1903   Encounter Summary   Services provided to: Patient   Referral/Consult From: [address] [facility]   (02/18/2021)   Complexity of Encounter Low   Length of Encounter 15 minutes   Spiritual Assessment Completed Yes   Routine   Type Follow up   Assessment Calm; Approachable;Passive   Intervention Active listening;Prayer;Sustaining presence/ Ministry of presence   Outcome Expressed gratitude;Comfort

## 2021-02-19 NOTE — PROGRESS NOTES
Pharmacy Note  Warfarin Consult follow-up      Recent Labs     02/17/21  1010 02/18/21  0550 02/19/21  0613   INR 2.2 2.4 2.0     Recent Labs     02/17/21  1010 02/18/21  0550   HGB 14.2 13.5   HCT 42.8 40.7*    158       Significant Drug-Drug Interactions:  New warfarin drug-drug interactions: none  Discontinued drug-drug interactions: none  Current warfarin drug-drug interactions: Solumedrol      Date             INR        Dose given previous day  Dose scheduled for today  2/19/2021            2       5 mg           5 mg        Notes:                   INR = 2, will give warfarin 5 mg today. Daily PT/INR while inpatient. Lemuel Fairchild. [facility]

## 2021-02-19 NOTE — PROGRESS NOTES
Progress Note  Podiatric Medicine and Surgery     Subjective     CC: Left ankle pain    Patient seen and examined at bedside. Afebrile, vital signs stable   Pain significantly improved from morning to afternoon  States is still painful to ambulate on left ankle    HPI :  Dom Abdi is a 80 y.o. male seen at [facility] for evaluation of left ankle pain. The patient states that he has had insidious onset of left ankle pain over the past 3 days. The patient denies any history of acute trauma. The patient states that the pain is sharp and does not radiate. The patient presented to the hospital with a similar complaint in August 2019 and was diagnosed with CPPD of the left ankle. Upon work-up in the ED today arthrocentesis of the left ankle was performed. The patient is not diabetic. Patient denies any nausea, vomiting, fever, chills, shortness of breath. ROS: Denies N/V/F/C/SOB/CP. Otherwise negative except at stated in the HPI.      Medications:  Scheduled Meds:   methylPREDNISolone  60 mg Intravenous Q6H    pantoprazole  40 mg Oral QAM AC    sodium chloride flush  10 mL Intravenous 2 times per day    sodium chloride flush  10 mL Intravenous 2 times per day    allopurinol  100 mg Oral Daily    busPIRone  10 mg Oral TID    hydrALAZINE  50 mg Oral BID    tamsulosin  0.4 mg Oral BID    metoprolol tartrate  25 mg Oral BID    melatonin  3 mg Oral Nightly    lisinopril  40 mg Oral Daily    warfarin (COUMADIN) daily dosing (placeholder)   Other RX Placeholder    naproxen  500 mg Oral BID WC       Continuous Infusions:    PRN Meds:sodium chloride flush, sodium chloride flush, promethazine **OR** ondansetron, polyethylene glycol, acetaminophen **OR** acetaminophen, tiZANidine    Objective     Vitals:  Patient Vitals for the past 8 hrs:   BP Temp Temp src Pulse Resp SpO2   02/18/21 1916 137/83 98.5 °F (36.9 °C) Oral 98 18 96 %   02/18/21 1230 106/65 98.6 °F (37 °C) Oral 96 16  Average, Min, and Max for last 24 hours Vitals:  TEMPERATURE:  Temp  Av.5 °F (36.9 °C)  Min: 98.2 °F (36.8 °C)  Max: 98.8 °F (37.1 °C)    RESPIRATIONS RANGE: Resp  Av.4  Min: 16  Max: 18    PULSE RANGE: Pulse  Av.4  Min: 86  Max: 103    BLOOD PRESSURE RANGE:  Systolic (88ULP), RUP:931 , Min:106 , QPD:154   ; Diastolic (01GEU), XVA:18, Min:53, Max:83      PULSE OXIMETRY RANGE: SpO2  Av %  Min: 95 %  Max: 97 %    I/O last 3 completed shifts:  In: -   Out: 650 [Urine:650]    CBC:  Recent Labs     21  1010 21  0550   WBC 12.7* 7.5   HGB 14.2 13.5   HCT 42.8 40.7*    158   CRP 34.7*  --         BMP:  Recent Labs     21  1010 21  0550    136   K 4.1 4.7    105   CO2 27 26   BUN 13 15   CREATININE 0.70 0.70   GLUCOSE 100* 81   CALCIUM 8.9 9.0        Coags:  Recent Labs     21  1010 21  0550   APTT 38.3*  --    PROT  --  5.7*   INR 2.2 2.4       Lab Results   Component Value Date    SEDRATE 2021     Recent Labs     21  1010   CRP 34.7*       Lower Extremity Physical Exam:  Vascular: DP and PT pulses are palpable. CFT <3 seconds to all digits. Hair growth is absent to the level of the digits. Moderate non-pitting edema to the left ankle.       Neuro: Saph/sural/SP/DP/plantar sensation intact to light touch.     Musculoskeletal: Muscle strength is 5/5 to all lower extremity muscle groups on the right but deferred to the left. Ankle joint range of motion in plantarflexion and dorsiflexion is limited due to pain. Pain elicited around the ankle joint and sinus tarsi with deep palpation. No allodynia appreciated. Gross deformity is absent.     Dermatologic: No open wounds or lacerations noted. Adhesive bandage over previous arthrocentesis site. No erythema noted.      Clinical Images:  None    Imaging:   VL DUP LOWER EXTREMITY VENOUS LEFT   Final Result      XR ANKLE LEFT (MIN 3 VIEWS)   Final Result Nonspecific ankle soft tissue swelling. XR FOOT LEFT (MIN 3 VIEWS)   Final Result   Nonspecific ankle soft tissue swelling. Cultures:   Arthrocentesis 2/17/2021:  BIREFRINGENCE CONSISTENT WITH CALCIUM PYROPHOSPHATE CRYSTALS. Assessment   Elissa Bernheim is a 80 y.o. male with   1. CPPD of the left ankle    Principal Problem:    Calcium pyrophosphate deposition disease of ankle  Active Problems:    Septic arthritis ([address]  Resolved Problems:    * No resolved hospital problems. *       Plan     · Patient examined and evaluated at bedside. · Treatment options discussed in detail with the patient. · Radiographs reviewed and discussed in detail with the patient. · Recommend to continue Naprosyn 500mg BID due to significant improvement. If symptoms do not continue to improve or worsen, will consider switching to Colcrys 0.6 mg BID or possible injection. · WBAT to Left lower extremity. · Continue working with PT/OT as tolerated. · Will follow with you. · Discussed with Dr. Nell Dickerson.     Antonella Giraldo DPM   Podiatric Medicine & Surgery   2/18/2021 at 7:30 PM I performed a history and physical examination of the patient and discussed management with the resident. I reviewed the residents note and agree with the documented findings and plan of care. Any areas of disagreement are noted on the chart. I was personally present for the key portions of any procedures. I have documented in the chart those procedures where I was not present during the key portions. I have reviewed the Podiatry Resident progress note. I agree with the chief complaint, past medical history, past surgical history, allergies, medications, social and family history as documented unless otherwise noted below. Documentation of the HPI, Physical Exam and Medical Decision Making performed by medical students or scribes is based on my personal performance of the HPI, PE and MDM. I have personally evaluated this patient and have completed at least one if not all key elements of the E/M (history, physical exam, and MDM). Additional findings are as noted.      Tricia Guevara DPM on 2/22/2021 at 1:26 AM  Board Certified, American Board of Podiatric Surgery  Fellow, [employer] and [employer] [facility]

## 2021-02-19 NOTE — PROGRESS NOTES
Reviewed discharge instructions, follow up appts, and prescriptions with patient. All patient questions answered to satisfaction.

## 2021-02-19 NOTE — PROGRESS NOTES
[id number]    PROGRESS NOTE             2/19/2021    9:46 AM    Name:   Tessy Valdez  MRN:     [id number]     Acct:      [de-identified]   Room:   2057/2057-01  IP Day:  2  Admit Date:  2/17/2021  9:39 AM    PCP:  Rachel Alvarenga MD  Code Status:  Full Code    Subjective:     C/C:   Chief Complaint   Patient presents with    Gout     left foot/ ankle     Interval History Status: improved. Patient seen and examined at the bed side, no new acute events overnight. This morning the patient feels more improvement today. Will continue working with PT/OT. IV steroids started yesterday. Notes from nursing staff and Consults had been reviewed, and the overnight progress had been checked with the nursing staff as well. Brief History:     80years old male patient with past history of CAD, Afib on warfarin, HTN, GERD, OA, BCC of the face s/p treatment. Presented with worsening left foot and ankle pain and swelling that was noted 3 days prior to admission. The patient reported waking up at night and going to the bathroom to pass urine 3 days ago and he noticed this pain in his left foot and ankle. No history of trauma. The pain was described as sharp and worsened by ambulation reaching 9/10. Patient has a past medical history of gout with similar symptoms one year ago and he takes allopurinol daily. At the ER, vital signs showed low-grade fever of 37.5 however, no hypotension and no tachycardia. His physical examination was unremarkable except for left ankle swelling, hotness and tenderness, no clear erythema noted. Labs were remarkable for CRP of 34, white blood cell 12.7 and imaging studies with x-ray of the left ankle and foot were negative. Arthrocentesis performed and was bloody. Patient admitted for evaluation and possible case of septic arthritis vs gout for IV antibiotics.      Review of Systems: CONSTITUTIONAL:  no fevers  Psych: Normal mood and affect, no depression, no suicidal ideation. EYES: negative for blury vision  HEENT: No headaches, no nasal congestion, no difficulty swallowing  RESPIRATORY:negative for dyspnea, no wheezing, no Cough  CARDIOVASCULAR: negative for chest pain, no palpitations  GASTROINTESTINAL: no nausea, no vomiting, no change in bowel habits, no abdominal pain   GENITOURINARY: negative for dysuria, no hematuria   MUSCULOSKELETAL: Left foot pain and ankle swelling improving   NEUROLOGICAL: No weakness or numbness    Medications: Allergies: Allergies   Allergen Reactions    Celebrex [Celecoxib] Nausea Only    Mobic Nausea Only       Current Meds:   Scheduled Meds:    methylPREDNISolone  60 mg Intravenous Q6H    pantoprazole  40 mg Oral QAM AC    sodium chloride flush  10 mL Intravenous 2 times per day    sodium chloride flush  10 mL Intravenous 2 times per day    allopurinol  100 mg Oral Daily    busPIRone  10 mg Oral TID    hydrALAZINE  50 mg Oral BID    tamsulosin  0.4 mg Oral BID    metoprolol tartrate  25 mg Oral BID    melatonin  3 mg Oral Nightly    lisinopril  40 mg Oral Daily    warfarin (COUMADIN) daily dosing (placeholder)   Other RX Placeholder    naproxen  500 mg Oral BID WC     Continuous Infusions:     PRN Meds: sodium chloride flush, sodium chloride flush, promethazine **OR** ondansetron, polyethylene glycol, acetaminophen **OR** acetaminophen, tiZANidine    Data:     Past Medical History:   has a past medical history of Acute MI ([facility] [address] Allergic rhinitis, Anemia, Basal cell cancer, Basal cell cancer, Cervical radiculopathy, Diverticulitis, Epigastric pain/GERD., GERD (gastroesophageal reflux disease), Glaucoma, Hyperlipidemia, Hypertension, Hyponatremia, IBS (irritable bowel syndrome), Insomnia, Osteoarthritis, Osteoarthritis/neck pain. , Renal calculi, and Shingles. WBC 7.5 02/18/2021     Platelets:    Lab Results   Component Value Date     02/18/2021     Hemoglobin/Hematocrit:    Lab Results   Component Value Date    HGB 13.5 02/18/2021    HCT 40.7 02/18/2021     CMP:    Lab Results   Component Value Date     02/18/2021    K 4.7 02/18/2021     02/18/2021    CO2 26 02/18/2021    BUN 15 02/18/2021    CREATININE 0.70 02/18/2021    GFRAA >60 02/18/2021    LABGLOM >60 02/18/2021    GLUCOSE 81 02/18/2021    GLUCOSE 89 05/23/2017    PROT 5.7 02/18/2021    LABALBU 3.0 02/18/2021    CALCIUM 9.0 02/18/2021    BILITOT 1.82 02/18/2021    ALKPHOS 69 02/18/2021    AST 20 02/18/2021    ALT 27 02/18/2021       Lab Results   Component Value Date/Time    SPECIAL NOT REPORTED 02/17/2021 08:08 PM     Lab Results   Component Value Date/Time    CULTURE NO GROWTH 22 HOURS 02/17/2021 08:08 PM         Radiology:    Xr Ankle Left (min 3 Views)    Result Date: 2/17/2021  EXAMINATION: THREE XRAY VIEWS OF THE LEFT FOOT; THREE XRAY VIEWS OF THE LEFT ANKLE 2/17/2021 10:12 am COMPARISON: None. HISTORY: ORDERING SYSTEM PROVIDED HISTORY: swelling, pain TECHNOLOGIST PROVIDED HISTORY: swelling, pain Reason for Exam: swelling, warmth, pain Acuity: Acute Type of Exam: Initial; ORDERING SYSTEM PROVIDED HISTORY: swelling, warmth TECHNOLOGIST PROVIDED HISTORY: swelling, warmth Reason for Exam: swelling, warmth, pain Acuity: Acute Type of Exam: Initial FINDINGS: Lateral ankle soft tissue swelling. No acute fracture. No widening of the ankle mortise. Mild narrowing of the 1st metatarsophalangeal joint. Nonspecific ankle soft tissue swelling.      Xr Foot Left (min 3 Views)    Result Date: 2/17/2021 EXAMINATION: THREE XRAY VIEWS OF THE LEFT FOOT; THREE XRAY VIEWS OF THE LEFT ANKLE 2/17/2021 10:12 am COMPARISON: None. HISTORY: ORDERING SYSTEM PROVIDED HISTORY: swelling, pain TECHNOLOGIST PROVIDED HISTORY: swelling, pain Reason for Exam: swelling, warmth, pain Acuity: Acute Type of Exam: Initial; ORDERING SYSTEM PROVIDED HISTORY: swelling, warmth TECHNOLOGIST PROVIDED HISTORY: swelling, warmth Reason for Exam: swelling, warmth, pain Acuity: Acute Type of Exam: Initial FINDINGS: Lateral ankle soft tissue swelling. No acute fracture. No widening of the ankle mortise. Mild narrowing of the 1st metatarsophalangeal joint. Nonspecific ankle soft tissue swelling.      Vl Dup Lower Extremity Venous Left    Result Date: 2/17/2021 [address] [place] [address]  Vascular Lower Extremities DVT Study Procedure   Patient Name   [facility]     Date of Study           02/17/2021                 Margret Chavira   Date of Birth  12/02/1931   Gender                  Male   Age            80 year(s)   Race                       Room Number    2057   Corporate ID # [id number]   Patient Acct # [de-identified]   MR #           [id number]       Sonographer             Bradley Valdez   Accession #    [id number]   Interpreting Physician  Manuel Rios   Referring                   Referring Physician     Natalya Recinos DO  Nurse  Practitioner  Procedure Type of Study:   Veins: Lower Extremities DVT Study, Venous Scan Lower Left. Indications for Study:Pain, leg. Patient Status:Out Patient. Technical Quality:Adequate visualization. Conclusions   Summary   No evidence of superficial or deep venous thrombosis in the left lower  extremity. Signature   ----------------------------------------------------------------  Electronically signed by Mani Salcedo(Sonographer) on  02/17/2021 10:56 AM  ----------------------------------------------------------------   ----------------------------------------------------------------  Electronically signed by Bebo Corbett(Interpreting physician)  on 02/17/2021 10:40 PM  ----------------------------------------------------------------  Findings:   Right Impression:                Left Impression:  The common femoral vein          The common femoral, femoral, popliteal  demonstrates normal              and tibial veins demonstrate normal  compressibility and              compressibility and augmentation. augmentation. Normal compressibility of the great                                   saphenous vein. Normal compressibility of the small                                   saphenous vein.   Velocities are measured in cm/s ; Diameters are measured in cm Right Lower Extremities DVT Study Measurements Right 2D Measurements +------------------------------------+----------+---------------+----------+ ! Location                            ! Visualized! Compressibility! Thrombosis! +------------------------------------+----------+---------------+----------+ ! Common Femoral                      !Yes       ! Yes            ! None      ! +------------------------------------+----------+---------------+----------+ Right Doppler Measurements +----------------------------+------+------+-------------------------------+ ! Location                    ! Signal!Reflux! Reflux (msec)                  ! +----------------------------+------+------+-------------------------------+ ! Common Femoral              !Phasic!      !                               ! +----------------------------+------+------+-------------------------------+ Left Lower Extremities DVT Study Measurements Left 2D Measurements +------------------------------------+----------+---------------+----------+ ! Location                            ! Visualized! Compressibility! Thrombosis! +------------------------------------+----------+---------------+----------+ ! Common Femoral                      !Yes       ! Yes            ! None      ! +------------------------------------+----------+---------------+----------+ ! Prox Femoral                        !Yes       ! Yes            ! None      ! +------------------------------------+----------+---------------+----------+ ! Mid Femoral                         !Yes       ! Yes            ! None      ! +------------------------------------+----------+---------------+----------+ ! Dist Femoral                        !Yes       ! Yes            ! None      ! +------------------------------------+----------+---------------+----------+ ! Popliteal                           !Yes       ! Yes            ! None      ! +------------------------------------+----------+---------------+----------+ ! Sapheno Femoral Junction !Yes       !Yes            ! None      ! +------------------------------------+----------+---------------+----------+ ! PTV                                 ! Yes       ! Yes            ! None      ! +------------------------------------+----------+---------------+----------+ ! Peroneal                            !Yes       ! Yes            ! None      ! +------------------------------------+----------+---------------+----------+ ! Gastroc                             ! Yes       ! Yes            ! None      ! +------------------------------------+----------+---------------+----------+ ! GSV Thigh                           ! Yes       ! Yes            ! None      ! +------------------------------------+----------+---------------+----------+ ! GSV Knee                            ! Yes       ! Yes            ! None      ! +------------------------------------+----------+---------------+----------+ ! GSV Ankle                           ! Yes       ! Yes            ! None      ! +------------------------------------+----------+---------------+----------+ ! SSV                                 ! Yes       ! Yes            ! None      ! +------------------------------------+----------+---------------+----------+ Left Doppler Measurements +---------------------------+------+------+--------------------------------+ ! Location                   ! Signal!Reflux! Reflux (msec)                   ! +---------------------------+------+------+--------------------------------+ ! Common Femoral             !Phasic!      !                                ! +---------------------------+------+------+--------------------------------+ ! Prox Femoral               !Phasic!      !                                ! +---------------------------+------+------+--------------------------------+ ! Popliteal                  !Phasic!      !                                ! +---------------------------+------+------+--------------------------------+ Physical Examination:        PHYSICAL EXAM:  General Appearance  Alert , awake, not in acute distress  Psych: Normal mood and affect, normal behavior, not agitated, maintaining good eye contact   HEENT - Head is normocephalic, atraumatic. Neck: supple, no rigidity, normal ROM, no neck swellings, normal thyroid gland  Lungs - Bilateral equal air entry, no wheezes, rales or rhonchi, aeration good  Cardiovascular - Heart sounds are normal.  Regular rhythm, normal rate without murmur, gallop or rub. Abdomen - Soft, nontender, nondistended, no masses or organomegaly  Neurologic - There are no new focal motor or sensory deficits  Skin - No bruising or bleeding on exposed skin area  Extremities -left ankle swelling improving, mild tenderness, warm on palpation    Assessment:        Primary Problem  Calcium pyrophosphate deposition disease of ankle    Active Hospital Problems    Diagnosis Date Noted    [facility] [M00.9] 02/17/2021    Calcium pyrophosphate deposition disease of ankle [M11.279] 02/17/2021       Plan:        Left ankle pain and swelling secondary to pseudogout  -Clinically local Left dorsum of ankle swelling and warmth.   -No leukocytosis hemodynamic stable, unlikely septic arthritis  -Arthrocentesis performed in the ED: Calcium pyrophosphate crystals  -X-ray of the left ankle and foot negative  -Doppler negative for DVT  -podiatry consulted  -Continue on home allopurinol 100 mg daily  -Tylenol as needed for pain  -Naprosyn 500 mg twice daily  -more improvement today  -we will work with PT OT for assessment  -Will discontinue antibiotics, no bacteria seen in synovial fluid.   Culture pending    History of chronic A. fib  -On warfarin pharmacy to dose, no bleeding, CBC stable  -Lopressor 25 mg twice daily  -PT 22.2, INR 2     History of chronic hypertension  -Continue hydralazine 50 mg twice daily.  -Continue lisinopril 40 mg daily  -Lopressor 25 mg twice daily -Blood pressure readings stable, no significant hypertension, no hypotension     History of BPH  -Continue tamsulosin 0.4 mg twice daily    DVT prophylaxis: Already anticoagulated with warfarin. Juan C Jacobo MD  2/19/2021  9:46 AM     Attending Physician Statement  I have discussed the care of Norris Bethea and I have examined the patient myselft and taken ros and hpi , including pertinent history and exam findings,  with the resident. I have reviewed the key elements of all parts of the encounter with the resident. I agree with the assessment, plan and orders as documented by the resident.   Possible left ankle joint steroid injection today by podiatry will discharge on prednisone NSAID [facility] outpatient follow-up with PCP within a week    Electronically signed by Jose Baker MD

## 2021-02-19 NOTE — PROGRESS NOTES
[address]    INPATIENT OCCUPATIONAL THERAPY  PROGRESS NOTE  Date: 2021  Patient Name: Bre Clinton      Room: 5305/2710-81  MRN: [id number]    : 1931  (80 y.o.) Gender: male     Discharge Recommendations:  Further Occupational Therapy is recommended upon facility discharge. Equipment Needed: (TBD)    Referring Practitioner: Dr. Jairo Daniel  Diagnosis: Septic arthritis L ankle  General  Chart Reviewed: Yes, Orders, Progress Notes  Patient assessed for rehabilitation services?: Yes  Family / Caregiver Present: No  Referring Practitioner: Dr. Jairo Daniel  Diagnosis: Septic arthritis L ankle    Restrictions  Restrictions/Precautions: Fall Risk(up w/ assist, peripheral IV left antecubital)  Other position/activity restrictions: up w/ assist  Required Braces or Orthoses?: No      Subjective  Subjective: Pt reports he is willing to obtain a shower chair for home bathing. Comments: Pt pleasant. Poor insight to deficits. 2 Major LOB requring assist for recovery. Per case mgt; pt indecisive with initiating home therapies despite deficits. He did receive injection to foot and reports imrpoved comfort but increased stiffness. Patient Currently in Pain: Yes  Pain Level: 6  Pain Location: Ankle; Foot  Pain Orientation: Left  Overall Orientation Status: Within Functional Limits     Pain Assessment  Pain Assessment: 0-10  Pain Level: 6  Pain Type: Acute pain  Pain Location: Ankle, Foot  Pain Orientation: Left  Pain Descriptors: Burning  Response to Pain Intervention: Patient Satisfied    Objective  Cognition  Overall Cognitive Status: WFL  Bed mobility  Scooting: Modified independent  Comment: sitting bedside chair, LLE elevated for pain control  Balance  Sitting Balance: Modified independent   Standing Balance: Contact guard assistance(Varied CGA/SBAdepending on task/level of support.)  Standing Balance  Time: 9.5min, 30sec x2 Activity: static stands, mobility in room, stair training with 2nd SBA, IADLs within room  Comment: ambulating into bathroom pt demo heavy overstep  overthreshold requiring MaxA to recover, 'thought it was a step'. 2nd LOB while ambulating to bedside chair; pt frequently reaching for furniture to stabilize; writer heavily encouraging use of RW for improved stability  with mobility tasks  Functional Mobility  Functional - Mobility Device: No device  Activity: To/from bathroom; Other  Assist Level: Contact guard assistance  Functional Mobility Comments: improved pain tolerance today; moderate unsteadiness with no DME, while using RW demos mild unsteadiness but improved use of reflexive responses   ADL  Feeding: Independent  Grooming: Setup  UE Bathing: Setup  LE Bathing: Contact guard assistance(A for L foot 2* pain; CGA for standing balance)  UE Dressing: Setup  LE Dressing: Contact guard assistance(in standing)  Toileting: Contact guard assistance(Pt reports CGA from RN student when standing to use urinal)  Additional Comments: Above levels based on pt report, observation, and clinical reasoning unless otherwise noted. Pt limited by L ankle/foot pain and decreased standing balance/tolerance. Handwashes sink level and manange door and cupboards with SBA/CGA. Writer encouraging use of RW at home as well as use of side steps; pt states he folds his RW legs in to enter/exit bathroom.      Transfers  Sit to stand: Contact guard assistance  Stand to sit: Contact guard assistance  Transfer Comments: w/standard walker; cues for technique to reduce pain  Tub Transfers  Tub Transfers Comments: encouraged use of shower chair for home bathing; reviewed how to obtain/back support vs no support; pt stating wants a chair with a back; is familiar with use of grab bars Additional Activities: ambulated to bathroom after treatment with MI; writer encouraging CGA<>Supervision due to instability; poor insight not appropriately using a call light      Vision  Patient Visual Report: No visual complaint reported. Assessment  Performance deficits / Impairments: Decreased functional mobility ; Decreased ADL status; Decreased balance;Decreased high-level IADLs;Decreased endurance  Assessment: home today with safety concerns; poor insight  Prognosis: Good  Activity Tolerance: Patient limited by pain  Safety Devices in place: Yes  Type of devices: Patient at risk for falls;Gait belt;Left in chair;Call light within reach  Equipment Recommendations  Equipment Needed: (TBD)          Patient Education:   Pacing, Safety with mobility  Learner:patient  Method: demonstration and explanation       Outcome: needs reinforcement     Plan  Safety Devices  Safety Devices in place: Yes  Type of devices: Patient at risk for falls, Gait belt, Left in chair, Call light within reach  Plan  Times per week: 5-7  Times per day: Daily  Current Treatment Recommendations: Balance Training, Functional Mobility Training, Endurance Training, Pain Management, Safety Education & Training, Patient/Caregiver Education & Training, Equipment Evaluation, Education, & procurement, Self-Care / ADL, Home Management Training      Goals  Short term goals  Time Frame for Short term goals: By Discharge  Short term goal 1: Pt will complete BADL's with set-up/Mod I and Good safety using AE if needed. Short term goal 2: Pt will complete toilet transfer and toileting with Mod I and Good safety using least restrictive device. Short term goal 3: Pt will tolerate standing for 5-10 minutes with 1-2 UE support and no LOB while completing a functional task. Short term goal 4: Pt will actively participate in 15-20 minutes of therapeutic exercise/activity to promote increased independence and safety with self-care and mobility. OT Individual Minutes  Time In: 1400  Time Out: 9305  Minutes: 27      Electronically signed by EVENS Moreira on 2/19/21 at 4:03 PM EST

## 2021-02-19 NOTE — PROGRESS NOTES
Progress Note  Podiatric Medicine and Surgery     Subjective     CC: Left ankle pain    Patient seen and examined at bedside. Afebrile, vital signs stable   Patient states pain slightly improved, but persists making it difficult to walk  Denies nausea, vomiting, fever, or chills    HPI :  Jessica George is a 80 y.o. male seen at [address] [place] [address] for evaluation of left ankle pain. The patient states that he has had insidious onset of left ankle pain over the past 3 days. The patient denies any history of acute trauma. The patient states that the pain is sharp and does not radiate. The patient presented to the hospital with a similar complaint in August 2019 and was diagnosed with CPPD of the left ankle. Upon work-up in the ED today arthrocentesis of the left ankle was performed. The patient is not diabetic. Patient denies any nausea, vomiting, fever, chills, shortness of breath. ROS: Denies N/V/F/C/SOB/CP. Otherwise negative except at stated in the HPI.      Medications:  Scheduled Meds:   warfarin  5 mg Oral Once    methylPREDNISolone  60 mg Intravenous Q6H    pantoprazole  40 mg Oral QAM AC    sodium chloride flush  10 mL Intravenous 2 times per day    sodium chloride flush  10 mL Intravenous 2 times per day    allopurinol  100 mg Oral Daily    busPIRone  10 mg Oral TID    hydrALAZINE  50 mg Oral BID    tamsulosin  0.4 mg Oral BID    metoprolol tartrate  25 mg Oral BID    melatonin  3 mg Oral Nightly    lisinopril  40 mg Oral Daily    warfarin (COUMADIN) daily dosing (placeholder)   Other RX Placeholder    naproxen  500 mg Oral BID WC       Continuous Infusions:    PRN Meds:sodium chloride flush, sodium chloride flush, promethazine **OR** ondansetron, polyethylene glycol, acetaminophen **OR** acetaminophen, tiZANidine    Objective     Vitals:  Patient Vitals for the past 8 hrs:   BP Temp Temp src Pulse Resp SpO2   02/19/21 0708 (!) 140/81 97.9 °F (36.6 °C) Oral 96 18 95 % Imaging:   VL DUP LOWER EXTREMITY VENOUS LEFT   Final Result      XR ANKLE LEFT (MIN 3 VIEWS)   Final Result   Nonspecific ankle soft tissue swelling. XR FOOT LEFT (MIN 3 VIEWS)   Final Result   Nonspecific ankle soft tissue swelling. Cultures:   Arthrocentesis 2/17/2021:  BIREFRINGENCE CONSISTENT WITH CALCIUM PYROPHOSPHATE CRYSTALS. Assessment   Trixie Patel is a 80 y.o. male with   1. CPPD of the left ankle    Principal Problem:    Calcium pyrophosphate deposition disease of ankle  Active Problems:    Septic arthritis ([address]  Resolved Problems:    * No resolved hospital problems. *       Plan     · Patient examined and evaluated at bedside. · Treatment options discussed in detail with the patient. · Radiographs reviewed and discussed in detail with the patient. · Recommend to continue Naprosyn 500mg BID. · Due to continued pain with difficulty walking, discussed the use of an intra-articular steroid  Injection. Discussed risks and benefits with the patient, he is amendable. A steroid injection was performed at medial aspect of the left ankle using 2 cc of 1% plain Lidocaine and 6 mg of Celestone. This was well tolerated. · WBAT to Left lower extremity  · Continue working with PT/OT as tolerated. · If symptoms improve, patient fine for discharge from podiatry standpoint. Patient to follow up with Dr. Aimee Anthony with in 1 week of discharge. · Discussed with Dr. Aimee Anthony.       Claudeen Charter, DPM   Podiatric Medicine & Surgery   2/19/2021 at 11:26 AM

## 2021-02-19 NOTE — PLAN OF CARE
Problem: Falls - Risk of:  Goal: Will remain free from falls  Description: Will remain free from falls  2/19/2021 1432 by Prince Orellana RN  Outcome: Completed  2/19/2021 0614 by Efren Strange RN  Outcome: Ongoing  Goal: Absence of physical injury  Description: Absence of physical injury  2/19/2021 1432 by Prince Orellana RN  Outcome: Completed  2/19/2021 0614 by Efren Strange RN  Outcome: Ongoing     Problem: Pain:  Goal: Pain level will decrease  Description: Pain level will decrease  2/19/2021 1432 by Prince Orellana RN  Outcome: Completed  2/19/2021 0614 by Efren Strange RN  Outcome: Ongoing  Goal: Control of acute pain  Description: Control of acute pain  2/19/2021 1432 by Prince Orellana RN  Outcome: Completed  2/19/2021 0614 by Efren Strange RN  Outcome: Ongoing  Goal: Control of chronic pain  Description: Control of chronic pain  2/19/2021 1432 by Prince Orellana RN  Outcome: Completed  2/19/2021 0614 by Efren Strange RN  Outcome: Ongoing     Problem: Musculor/Skeletal Functional Status  Goal: Highest potential functional level  2/19/2021 1432 by Prince Orellana RN  Outcome: Completed  2/19/2021 0614 by Efren Strange RN  Outcome: Ongoing  Goal: Absence of falls  2/19/2021 1432 by Prince Orellana RN  Outcome: Completed  2/19/2021 0614 by Efren Strange RN  Outcome: Ongoing

## 2021-02-19 NOTE — CARE COORDINATION
DISCHARGE PLANNING NOTE:    Plan is for this patient to return to home    He declines need for VNS services. Steroid shot in left heel today by podiatry. Possible discharge later today.      Or[place] Header - 8%    Electronically signed by Matt Amador RN on 2/19/2021 at 11:20 AM

## 2021-02-20 ENCOUNTER — CARE COORDINATION (OUTPATIENT)
Dept: CASE MANAGEMENT | Age: 86
End: 2021-02-20

## 2021-02-20 DIAGNOSIS — M11.879 CALCIUM PYROPHOSPHATE DEPOSITION DISEASE OF ANKLE: Primary | ICD-10-CM

## 2021-02-20 PROCEDURE — 1111F DSCHRG MED/CURRENT MED MERGE: CPT | Performed by: FAMILY MEDICINE

## 2021-02-20 NOTE — DISCHARGE SUMMARY
[address] [facility]    Discharge Summary     Patient ID: Tessy Valdez  :  1931   MRN: [id number]     ACCOUNT:  [de-identified]   Patient's PCP: Rachel Alvarenga MD  Admit Date: 2021   Discharge Date: 2020     Length of Stay: 2  Code Status:  Prior  Admitting Physician: Adryan Strange MD  Discharge Physician: Dasha Tejeda MD     Active Discharge Diagnoses:       Primary Problem  Calcium pyrophosphate deposition disease of ankle      [facility] Problems    Diagnosis Date Noted    [facility] [M[facility] 2021    Calcium pyrophosphate deposition disease of ankle [M11.279] 2021       Admission Condition:  fair     Discharged Condition: good    Hospital Stay:       Hospital Course:  Tessy Valdez is a 80 y.o. male who was admitted for the management of   Calcium pyrophosphate deposition disease of ankle , presented to ER with Gout (left foot/ ankle)    80years old male patient with a past medical history of CAD, A. fib on warfarin, hypertension, GERD, MARILEE, BCC of face status post treatment. Presented with worsening left foot and ankle pain and swelling was noted 3 days prior to admission. Patient was admitted as a case of suspected septic arthritis. However, arthrocentesis showed CPPD consistent with pseudogout. No signs of systemic toxicity, White blood cell count was normal, no hypotension. No fever. Podiatry were consulted. X-rays were negative for fractures. Doppler negative for DVT. Patient was started on Naprosyn received IV steroids with significant improvement of his condition. Patient was discharged on Norco, Naprosyn, prednisone for 7 days. Patient received intra-articular steroid injection with lidocaine prior to his discharge. Will follow with podiatry outpatient.     Significant therapeutic interventions:     Significant Diagnostic Studies:   Labs / Micro: CBC:   Lab Results   Component Value Date    WBC 7.5 02/18/2021    RBC 4.46 02/18/2021    RBC 4.86 05/23/2017    HGB 13.5 02/18/2021    HCT 40.7 02/18/2021    MCV 91.2 02/18/2021    MCH 30.2 02/18/2021    MCHC 33.1 02/18/2021    RDW 15.7 02/18/2021     02/18/2021     BMP:    Lab Results   Component Value Date    GLUCOSE 81 02/18/2021    GLUCOSE 89 05/23/2017     02/18/2021    K 4.7 02/18/2021     02/18/2021    CO2 26 02/18/2021    ANIONGAP 5 02/18/2021    BUN 15 02/18/2021    CREATININE 0.70 02/18/2021    BUNCRER NOT REPORTED 02/18/2021    CALCIUM 9.0 02/18/2021    LABGLOM >60 02/18/2021    GFRAA >60 02/18/2021    GFR      02/18/2021    GFR NOT REPORTED 02/18/2021     HFP:    Lab Results   Component Value Date    PROT 5.7 02/18/2021     CMP:    Lab Results   Component Value Date    GLUCOSE 81 02/18/2021    GLUCOSE 89 05/23/2017     02/18/2021    K 4.7 02/18/2021     02/18/2021    CO2 26 02/18/2021    BUN 15 02/18/2021    CREATININE 0.70 02/18/2021    ANIONGAP 5 02/18/2021    ALKPHOS 69 02/18/2021    ALT 27 02/18/2021    AST 20 02/18/2021    BILITOT 1.82 02/18/2021    LABALBU 3.0 02/18/2021    ALBUMIN NOT REPORTED 02/18/2021    LABGLOM >60 02/18/2021    GFRAA >60 02/18/2021    GFR      02/18/2021    GFR NOT REPORTED 02/18/2021    PROT 5.7 02/18/2021    CALCIUM 9.0 02/18/2021     PT/INR:    Lab Results   Component Value Date    PROTIME 22.2 02/19/2021    INR 2.0 02/19/2021     PTT:   Lab Results   Component Value Date    APTT 38.3 02/17/2021     FLP:    Lab Results   Component Value Date    CHOL 171 01/07/2021    CHOL 139 10/31/2014    TRIG 74 01/07/2021    HDL 64 01/07/2021     U/A:    Lab Results   Component Value Date    COLORU YELLOW 09/09/2015    TURBIDITY CLOUDY 09/09/2015    SPECGRAV 1.017 09/09/2015    HGBUR SMALL 09/09/2015    PHUR 6.5 09/09/2015    PROTEINU 1+ 09/09/2015    GLUCOSEU NEGATIVE 09/09/2015    KETUA MOD 09/09/2015    BILIRUBINUR NEGATIVE 09/09/2015 UROBILINOGEN Normal 09/09/2015    NITRU NEGATIVE 09/09/2015    LEUKOCYTESUR NEGATIVE 09/09/2015     TSH:    Lab Results   Component Value Date    TSH 2.43 12/11/2018        blood culture: Negative     Radiology:    Xr Ankle Left (min 3 Views)    Result Date: 2/17/2021  EXAMINATION: THREE XRAY VIEWS OF THE LEFT FOOT; THREE XRAY VIEWS OF THE LEFT ANKLE 2/17/2021 10:12 am COMPARISON: None. HISTORY: ORDERING SYSTEM PROVIDED HISTORY: swelling, pain TECHNOLOGIST PROVIDED HISTORY: swelling, pain Reason for Exam: swelling, warmth, pain Acuity: Acute Type of Exam: Initial; ORDERING SYSTEM PROVIDED HISTORY: swelling, warmth TECHNOLOGIST PROVIDED HISTORY: swelling, warmth Reason for Exam: swelling, warmth, pain Acuity: Acute Type of Exam: Initial FINDINGS: Lateral ankle soft tissue swelling. No acute fracture. No widening of the ankle mortise. Mild narrowing of the 1st metatarsophalangeal joint. Nonspecific ankle soft tissue swelling. Xr Foot Left (min 3 Views)    Result Date: 2/17/2021  EXAMINATION: THREE XRAY VIEWS OF THE LEFT FOOT; THREE XRAY VIEWS OF THE LEFT ANKLE 2/17/2021 10:12 am COMPARISON: None. HISTORY: ORDERING SYSTEM PROVIDED HISTORY: swelling, pain TECHNOLOGIST PROVIDED HISTORY: swelling, pain Reason for Exam: swelling, warmth, pain Acuity: Acute Type of Exam: Initial; ORDERING SYSTEM PROVIDED HISTORY: swelling, warmth TECHNOLOGIST PROVIDED HISTORY: swelling, warmth Reason for Exam: swelling, warmth, pain Acuity: Acute Type of Exam: Initial FINDINGS: Lateral ankle soft tissue swelling. No acute fracture. No widening of the ankle mortise. Mild narrowing of the 1st metatarsophalangeal joint. Nonspecific ankle soft tissue swelling.      Vl Dup Lower Extremity Venous Left    Result Date: 2/17/2021 [facility]  Vascular Lower Extremities DVT Study Procedure   Patient Name   [facility]     Date of Study           02/17/2021                 Angel Watkins   Date of Birth  12/02/1931   Gender                  Male   Age            80 year(s)   Race                       Room Number    2057   Corporate ID # [id number]   Patient Acct # [de-identified]   MR #           [id number]       Sonographer             Isela Garces   Accession #    [id number]   Interpreting Physician  Heidi Blackman   Referring                   Referring Physician     Rebekah Hudson DO  Nurse  Practitioner  Procedure Type of Study:   Veins: Lower Extremities DVT Study, Venous Scan Lower Left. Indications for Study:Pain, leg. Patient Status:Out Patient. Technical Quality:Adequate visualization. Conclusions   Summary   No evidence of superficial or deep venous thrombosis in the left lower  extremity. Signature   ----------------------------------------------------------------  Electronically signed by Mani Portillo(Sonographer) on  02/17/2021 10:56 AM  ----------------------------------------------------------------   ----------------------------------------------------------------  Electronically signed by Bebo Corbett(Interpreting physician)  on 02/17/2021 10:40 PM  ----------------------------------------------------------------  Findings:   Right Impression:                Left Impression:  The common femoral vein          The common femoral, femoral, popliteal  demonstrates normal              and tibial veins demonstrate normal  compressibility and              compressibility and augmentation. augmentation. Normal compressibility of the great                                   saphenous vein. Normal compressibility of the small                                   saphenous vein.   Velocities are measured in cm/s ; Diameters are measured in cm Right Lower !Yes       !Yes            ! None      ! +------------------------------------+----------+---------------+----------+ ! PTV                                 ! Yes       ! Yes            ! None      ! +------------------------------------+----------+---------------+----------+ ! Peroneal                            !Yes       ! Yes            ! None      ! +------------------------------------+----------+---------------+----------+ ! Gastroc                             ! Yes       ! Yes            ! None      ! +------------------------------------+----------+---------------+----------+ ! GSV Thigh                           ! Yes       ! Yes            ! None      ! +------------------------------------+----------+---------------+----------+ ! GSV Knee                            ! Yes       ! Yes            ! None      ! +------------------------------------+----------+---------------+----------+ ! GSV Ankle                           ! Yes       ! Yes            ! None      ! +------------------------------------+----------+---------------+----------+ ! SSV                                 ! Yes       ! Yes            ! None      ! +------------------------------------+----------+---------------+----------+ Left Doppler Measurements +---------------------------+------+------+--------------------------------+ ! Location                   ! Signal!Reflux! Reflux (msec)                   ! +---------------------------+------+------+--------------------------------+ ! Common Femoral             !Phasic!      !                                ! +---------------------------+------+------+--------------------------------+ ! Prox Femoral               !Phasic!      !                                ! +---------------------------+------+------+--------------------------------+ ! Popliteal                  !Phasic!      !                                ! +---------------------------+------+------+--------------------------------+        Consultations: Consults:     Final Specialist Recommendations/Findings:   IP CONSULT TO INTERNAL MEDICINE  IP CONSULT TO PODIATRY  IP CONSULT TO SOCIAL WORK  PHARMACY TO DOSE WARFARIN      The patient was seen and examined on day of discharge and this discharge summary is in conjunction with any daily progress note from day of discharge. Discharge plan:       Disposition: Home    Physician Follow Up:     Tricia Guevara DPM  [address]  [phone]    In 1 week      Lance Capone MD  [address] [facility] [place] [phone]    In 1 week         Requiring Further Evaluation/Follow Up POST HOSPITALIZATION/Incidental Findings:     Diet: regular diet    Activity: As tolerated    Instructions to Patient: Follow up with podiatry and PCP. Take medications as instructed. Discharge Medications:      Medication List      START taking these medications    HYDROcodone-acetaminophen 5-325 MG per tablet  Commonly known as: Norco  Take 1 tablet by mouth every 6 hours as needed for Pain for up to 7 days. Intended supply: 3 days.  Take lowest dose possible to manage pain     naproxen 500 MG tablet  Commonly known as: NAPROSYN  Take 1 tablet by mouth 2 times daily as needed for Pain (Take with meals)     pantoprazole 40 MG tablet  Commonly known as: PROTONIX  Take 1 tablet by mouth every morning (before breakfast)        CHANGE how you take these medications    predniSONE 20 MG tablet  Commonly known as: DELTASONE  Take 1 tablet by mouth daily for 7 days  What changed:   · medication strength  · how much to take  · how to take this  · when to take this  · additional instructions        CONTINUE taking these medications    allopurinol 100 MG tablet  Commonly known as: ZYLOPRIM  Take 1 tablet by mouth daily     busPIRone 10 MG tablet  Commonly known as: BUSPAR  Take 1 tablet by mouth 3 times daily     hydrALAZINE 50 MG tablet  Commonly known as: APRESOLINE TAKE ONE TABLET BY MOUTH TWICE A DAY     latanoprost 0.005 % ophthalmic solution  Commonly known as: XALATAN     lisinopril 40 MG tablet  Commonly known as: PRINIVIL;ZESTRIL  TAKE ONE TABLET BY MOUTH DAILY     melatonin 5 MG Tabs tablet     metoprolol tartrate 25 MG tablet  Commonly known as: LOPRESSOR     nitroGLYCERIN 0.4 MG SL tablet  Commonly known as: NITROSTAT     tamsulosin 0.4 MG capsule  Commonly known as: FLOMAX  Take 1 capsule by mouth 2 times daily     * warfarin 5 MG tablet  Commonly known as: COUMADIN     * warfarin 5 MG tablet  Commonly known as: COUMADIN     zoster recombinant adjuvanted vaccine 50 MCG/0.5ML Susr injection  Commonly known as: Shingrix  50 MCG IM then repeat 2-6 months. * This list has 2 medication(s) that are the same as other medications prescribed for you. Read the directions carefully, and ask your doctor or other care provider to review them with you. Where to Get Your Medications      These medications were sent to [address], [address] [place] [id number]    Phone: [phone]   · naproxen 500 MG tablet  · pantoprazole 40 MG tablet  · predniSONE 20 MG tablet     You can get these medications from any pharmacy    Bring a paper prescription for each of these medications  · HYDROcodone-acetaminophen 5-325 MG per tablet         Time Spent on discharge is  34 mins in patient examination, evaluation, counseling as well as medication reconciliation, prescriptions for required medications, discharge plan and follow up. Electronically signed by   Kip Jurado MD  2/20/2021  6:16 PM      Thank you Dr. Mariah Calix MD for the opportunity to be involved in this patient's care.       Attending Physician Statement I have discussed the care of Bridgette Dos Santos and I have examined the patient myselft and taken ros and hpi , including pertinent history and exam findings,  with the resident. I have reviewed the key elements of all parts of the encounter with the resident. I agree with the assessment, plan and orders as documented by the resident. I spent over 35 mins in direct patient care as above and reviewing medications and counseling for discharge .         Electronically signed by Jose Baker MD

## 2021-02-20 NOTE — CARE COORDINATION
[facility] Transitions Initial Follow Up Call    Call within 2 business days of discharge: Yes    Patient: Iván Call Patient : 1931   MRN: [id number]  Reason for Admission: CPPD of marilyn  Discharge Date: 21 RARS: Readmission Risk Score: 8      Last Discharge [facility]       Complaint Diagnosis Description Type Department Provider    21 Gout Calcium pyrophosphate deposition disease of ankle . .. ED to Hosp-Admission (Discharged) (ADMITTED) Agata Canchola MD; Elizabeth Narvaez. .. Spoke with: Jayant Hanna: [facility]    Was able to contact Priya Arizmendi for [employer]. He said that he was doing better. He said that the ankle was sore, not as painful as it was. He said that there is some swelling , but no redness and is able to ambulate on it. He stated that he had all him medications 1111F order completed. No home care and his daughter will be staying with him for a couple of day. Explained CTN role and he was receptive to further outreach. Contact information provided. Non-face-to-face services provided:  Scheduled appointment with PCP-pt will call pcp on monday  Scheduled appointment with Lucinda Reagan will call podiatry on monday  Obtained and reviewed discharge summary and/or continuity of care documents  Assessment and support for treatment adherence and medication management-reviewed       Challenges to be reviewed by the provider   Additional needs identified to be addressed with provider No  none    Discussed COVID-19 related testing which was not done at this time. Test results were not done. Patient informed of results, if available? No         Method of communication with provider : none    Advance Care Planning:   Does patient have an Advance Directive:  reviewed and current. Was this a readmission?  No  Patient stated reason for admission: Lt ankle pain  Patients top risk factors for readmission: medical condition    Care Transition Nurse (CTN) contacted the patient by telephone to perform post hospital discharge assessment. Verified name and  with patient as identifiers. Provided introduction to self, and explanation of the CTN role. CTN reviewed discharge instructions, medical action plan and red flags with patient who verbalized understanding. Patient given an opportunity to ask questions and does not have any further questions or concerns at this time. Were discharge instructions available to patient? Yes. Reviewed appropriate site of care based on symptoms and resources available to patient including: PCP, Specialist and When to call 911. The patient agrees to contact the PCP office for questions related to their healthcare. Medication reconciliation was performed with patient, who verbalizes understanding of administration of home medications. Advised obtaining a 90-day supply of all daily and as-needed medications. Covid Risk Education    Patient has following risk factors of: heart failure. Education provided regarding infection prevention, and signs and symptoms of COVID-19 and when to seek medical attention with patient who verbalized understanding. Discussed exposure protocols and quarantine From CDC: Are you at higher risk for severe illness?   and given an opportunity for questions and concerns. The patient agrees to contact the COVID-19 hotline [phone] or PCP office for questions related to COVID-19. For more information on steps you can take to protect yourself, see CDC's How to Protect Yourself     Patient/family/caregiver given information for Cady Simeon and agrees to enroll no  Patient's preferred e-mail: declines  Patient's preferred phone number: declines    Discussed follow-up appointments. If no appointment was previously scheduled, appointment scheduling offered: No and office closed  . Is follow up appointment scheduled within 7 days of discharge?  No  [facility] appointment(s):     Plan for follow-up call in 3-5 days based on severity of symptoms and risk factors. Plan for next call: routine follow up  CTN provided contact information for future needs.           Care Transitions 24 Hour Call    Do you have any ongoing symptoms?: Yes  Patient-reported symptoms: Pain  Do you have a copy of your discharge instructions?: Yes  Do you have all of your prescriptions and are they filled?: Yes  Have you been contacted by a [address]?: No  Have you scheduled your follow up appointment?: No  Were you discharged with any Home Care or Post Acute Services: No  Do you feel like you have everything you need to keep you well at home?: Yes  Care Transitions Interventions         Follow Up  Future Appointments   Date Time Provider Chanelle Phillips   2/23/2021  2:20 PM WALE Goodwin , [address]   3/31/2021 10:30 AM Gabriela Mishra, [facility] [address]   4/15/2021  9:00 AM Sis White MD [facility]   4/15/2021  2:30 PM MD LIYAH AlexanderURG [facility]   6/2/2021 10:45 AM Gabriela Mishra, [address], RN

## 2021-02-23 ENCOUNTER — TELEPHONE (OUTPATIENT)
Dept: FAMILY MEDICINE CLINIC | Age: 86
End: 2021-02-23

## 2021-02-23 ENCOUNTER — IMMUNIZATION (OUTPATIENT)
Dept: PRIMARY CARE CLINIC | Age: 86
End: 2021-02-23

## 2021-02-23 NOTE — TELEPHONE ENCOUNTER
I called patient to schedule a hospital f/u appt. Patient's daughter stated that he is back in the hospital ([facility]). Monday patient had an appt with Dr Magui Parker. They did an ekg and his HR was 150. Dr Magui Parker sent him to the er and they admitted him.   Daughter said they will call the office when he is released

## 2021-02-26 ENCOUNTER — TELEPHONE (OUTPATIENT)
Dept: FAMILY MEDICINE CLINIC | Age: 86
End: 2021-02-26

## 2021-02-26 NOTE — TELEPHONE ENCOUNTER
[facility] 45 Transitions Initial Follow Up Call    Outreach made within 2 business days of discharge: Yes    Patient: Jose Rae Patient : 1931   MRN: [id number]  Reason for Admission: There are no discharge diagnoses documented for the most recent discharge. Discharge Date: 21       Spoke with: Patient    Discharge department/[facility] Interactive Patient Contact:  Was patient able to fill all prescriptions: Yes  Was patient instructed to bring all medications to the follow-up visit: Yes  Is patient taking all medications as directed in the discharge summary?  Yes  Does patient understand their discharge instructions: Yes  Does patient have questions or concerns that need addressed prior to 7-14 day follow up office visit: no    Scheduled appointment with PCP within 7-14 days    Follow Up  Future Appointments   Date Time Provider Chanelle Phillips   3/2/2021  1:00 PM [facility] Woody Crane , [address]   3/16/2021 10:30 AM Mariah Calix MD [facility] [address] [facility]   3/31/2021 10:30 AM Torin Butterfield DPM [facility]   4/15/2021  9:00 AM MD LIYAH RangelURG [facility]   4/15/2021  2:30 PM MD CURLY Rangel [facility]   2021 10:45 AM Torin Butterfield [address] [place] [address] Dr [place], [place]

## 2021-03-01 ENCOUNTER — TELEPHONE (OUTPATIENT)
Dept: FAMILY MEDICINE CLINIC | Age: 86
End: 2021-03-01

## 2021-03-01 RX ORDER — FUROSEMIDE 20 MG/1
20 TABLET ORAL DAILY
Qty: 3 TABLET | Refills: 0 | Status: SHIPPED | OUTPATIENT
Start: 2021-03-01 | End: 2021-03-05

## 2021-03-01 NOTE — TELEPHONE ENCOUNTER
Please let patient know that I did send over 2 days of Lasix and I will discuss with Dr. Geraldine Estrada when she returns

## 2021-03-01 NOTE — TELEPHONE ENCOUNTER
Patient was offered Lasix water pills, and patient denied. Patient decided he does in fact want to try the Lasix. Please advise.

## 2021-03-01 NOTE — TELEPHONE ENCOUNTER
Relayed message to the patient. He states he is going to call back on Thursday when Dr. Macy Yuan returns and she want her opinion is.

## 2021-03-01 NOTE — TELEPHONE ENCOUNTER
Patient called stating he was in [facility] from 02/17/21 to 2/19/21 for suto Gout left foot and ankle are still swollen. Patient states he had this issue back in 2019 he was on a  medciation called colcrys 0.6 mg ,but states Physician states he couldn't t be on it for a long time so he was put Allopurinol.

## 2021-03-02 ENCOUNTER — IMMUNIZATION (OUTPATIENT)
Dept: PRIMARY CARE CLINIC | Age: 86
End: 2021-03-02
Payer: MEDICARE

## 2021-03-02 PROCEDURE — 0012A PR IMM ADMN SARSCOV2 100 MCG/0.5 ML 2ND DOSE: CPT | Performed by: FAMILY MEDICINE

## 2021-03-02 PROCEDURE — 91301 COVID-19, MODERNA VACCINE 100MCG/0.5ML DOSE: CPT | Performed by: FAMILY MEDICINE

## 2021-03-05 ENCOUNTER — TELEPHONE (OUTPATIENT)
Dept: FAMILY MEDICINE CLINIC | Age: 86
End: 2021-03-05

## 2021-03-05 RX ORDER — LISINOPRIL 10 MG/1
10 TABLET ORAL
COMMUNITY
Start: 2021-02-23 | End: 2021-03-16 | Stop reason: SDUPTHER

## 2021-03-05 RX ORDER — HYDRALAZINE HYDROCHLORIDE 25 MG/1
25 TABLET, FILM COATED ORAL 2 TIMES DAILY
COMMUNITY
Start: 2021-02-23 | End: 2021-03-23 | Stop reason: SDUPTHER

## 2021-03-05 RX ORDER — PREDNISONE 1 MG/1
5 TABLET ORAL DAILY
COMMUNITY
End: 2021-05-07

## 2021-03-05 RX ORDER — DILTIAZEM HYDROCHLORIDE 180 MG/1
1 CAPSULE, COATED, EXTENDED RELEASE ORAL DAILY
Status: ON HOLD | COMMUNITY
Start: 2021-02-23 | End: 2021-08-21 | Stop reason: HOSPADM

## 2021-03-05 NOTE — TELEPHONE ENCOUNTER
Received mail with a note from the patient and his updated medication list. Will reconcile his list.

## 2021-03-16 ENCOUNTER — OFFICE VISIT (OUTPATIENT)
Dept: FAMILY MEDICINE CLINIC | Age: 86
End: 2021-03-16
Payer: MEDICARE

## 2021-03-16 VITALS
BODY MASS INDEX: 27.81 KG/M2 | OXYGEN SATURATION: 97 % | HEART RATE: 84 BPM | WEIGHT: 162 LBS | SYSTOLIC BLOOD PRESSURE: 104 MMHG | DIASTOLIC BLOOD PRESSURE: 56 MMHG | RESPIRATION RATE: 16 BRPM

## 2021-03-16 DIAGNOSIS — I48.91 ATRIAL FIBRILLATION WITH RVR (HCC): ICD-10-CM

## 2021-03-16 DIAGNOSIS — M11.879 CALCIUM PYROPHOSPHATE DEPOSITION DISEASE OF ANKLE: Primary | ICD-10-CM

## 2021-03-16 PROCEDURE — 1036F TOBACCO NON-USER: CPT | Performed by: FAMILY MEDICINE

## 2021-03-16 PROCEDURE — 1123F ACP DISCUSS/DSCN MKR DOCD: CPT | Performed by: FAMILY MEDICINE

## 2021-03-16 PROCEDURE — 1111F DSCHRG MED/CURRENT MED MERGE: CPT | Performed by: FAMILY MEDICINE

## 2021-03-16 PROCEDURE — G8417 CALC BMI ABV UP PARAM F/U: HCPCS | Performed by: FAMILY MEDICINE

## 2021-03-16 PROCEDURE — G8427 DOCREV CUR MEDS BY ELIG CLIN: HCPCS | Performed by: FAMILY MEDICINE

## 2021-03-16 PROCEDURE — 99214 OFFICE O/P EST MOD 30 MIN: CPT | Performed by: FAMILY MEDICINE

## 2021-03-16 PROCEDURE — G8484 FLU IMMUNIZE NO ADMIN: HCPCS | Performed by: FAMILY MEDICINE

## 2021-03-16 PROCEDURE — 4040F PNEUMOC VAC/ADMIN/RCVD: CPT | Performed by: FAMILY MEDICINE

## 2021-03-16 RX ORDER — LISINOPRIL 10 MG/1
10 TABLET ORAL
Qty: 90 TABLET | Refills: 3 | Status: SHIPPED
Start: 2021-03-16 | End: 2021-11-24 | Stop reason: DRUGHIGH

## 2021-03-16 ASSESSMENT — PATIENT HEALTH QUESTIONNAIRE - PHQ9
SUM OF ALL RESPONSES TO PHQ QUESTIONS 1-9: 2
2. FEELING DOWN, DEPRESSED OR HOPELESS: 1

## 2021-03-16 NOTE — PROGRESS NOTES
encounter (Office Visit) with Ralf Ashley MD   Medication Sig Dispense Refill    dilTIAZem (CARDIZEM CD) 180 MG extended release capsule Take 1 capsule by mouth daily      hydrALAZINE (APRESOLINE) 25 MG tablet Take 25 mg by mouth 2 times daily      lisinopril (PRINIVIL;ZESTRIL) 10 MG tablet Take 10 mg by mouth Daily with lunch      predniSONE (DELTASONE) 5 MG tablet Take 5 mg by mouth daily      bimatoprost (LUMIGAN) 0.01 % SOLN ophthalmic drops Place 1 drop into both eyes nightly      naproxen (NAPROSYN) 500 MG tablet Take 1 tablet by mouth 2 times daily as needed for Pain (Take with meals) 60 tablet 1    pantoprazole (PROTONIX) 40 MG tablet Take 1 tablet by mouth every morning (before breakfast) 7 tablet 0    busPIRone (BUSPAR) 10 MG tablet Take 1 tablet by mouth 3 times daily 90 tablet 11    zoster recombinant adjuvanted vaccine (SHINGRIX) 50 MCG/0.5ML SUSR injection 50 MCG IM then repeat 2-6 months. 0.5 mL 1    tamsulosin (FLOMAX) 0.4 MG capsule Take 1 capsule by mouth 2 times daily 180 capsule 3    latanoprost (XALATAN) 0.005 % ophthalmic solution 1 drop nightly      nitroGLYCERIN (NITROSTAT) 0.4 MG SL tablet Place 0.4 mg under the tongue every 5 minutes as needed for Chest pain up to max of 3 total doses. If no relief after 1 dose, call 911.  allopurinol (ZYLOPRIM) 100 MG tablet Take 1 tablet by mouth daily 90 tablet 3    warfarin (COUMADIN) 5 MG tablet Take 2.5 mg by mouth once a week Indications:  Wednesdays; INR goal 2-2.5 [facility] by [facility]      melatonin 5 MG TABS tablet Take 1 tablet by mouth nightly       metoprolol tartrate (LOPRESSOR) 25 MG tablet Take 25 mg by mouth 2 times daily          Medications patient taking as of now reconciled against medications ordered at time of hospital discharge: Yes    Chief Complaint   Patient presents with    Gout       History of Present illness - Follow up of Hospital diagnosis(es): Pt was seen at [facility] for atrial fibrillation with RVR and started on oral Cardizem. He was then seen at [facility] for pseudogout in his left foot. He did get a steroid injection, but he is still having discomfort. Pt today denies any HA, chest pain, or SOB. Pt denies any N/V/D/C or abdominal pain. Pt denies any fever or chills. Inpatient course: Discharge summary reviewed- see chart. Interval history/Current status: See HPI    A comprehensive review of systems was negative except for what was noted in the HPI. Vitals:    03/16/21 1047   BP: (!) 104/56   Pulse: 84   Resp: 16   SpO2: 97%   Weight: 162 lb (73.5 kg)     Body mass index is 27.81 kg/m². Wt Readings from Last 3 Encounters:   03/16/21 162 lb (73.5 kg)   02/17/21 165 lb 12.6 oz (75.2 kg)   01/20/21 163 lb (73.9 kg)     BP Readings from Last 3 Encounters:   03/16/21 (!) 104/56   02/19/21 (!) 140/81   01/13/21 132/74        Physical Exam:  General Appearance: alert and oriented to person, place and time, well-developed and well-nourished, in no acute distress  Pulmonary/Chest: clear to auscultation bilaterally- no wheezes, rales or rhonchi, normal air movement, no respiratory distress  Cardiovascular: normal rate, irregularly, irregular and no murmurs  Abdomen: soft, non-tender, non-distended, normal bowel sounds, no masses or organomegaly  Extremities: no edema and Veronique's sign negative bilaterally    Assessment/Plan:  1. Calcium pyrophosphate deposition disease of ankle  - Will cont with the Allopurinol daily  - Follow up with Dr. Pop Webb later this week    2.  Atrial fibrillation with RVR (HCC)  - Will cont with the Lopressor and Cardizem for rate control  - Will cont with the Coumadin as prescribed  - Follow up with Cardiology        Medical Decision Making: moderate complexity

## 2021-03-18 ENCOUNTER — OFFICE VISIT (OUTPATIENT)
Dept: PODIATRY | Age: 86
End: 2021-03-18
Payer: MEDICARE

## 2021-03-18 VITALS — WEIGHT: 162 LBS | BODY MASS INDEX: 27.66 KG/M2 | HEIGHT: 64 IN

## 2021-03-18 DIAGNOSIS — M76.822 POSTERIOR TIBIAL TENDINITIS, LEFT: ICD-10-CM

## 2021-03-18 DIAGNOSIS — M25.472 EDEMA OF LEFT ANKLE: ICD-10-CM

## 2021-03-18 DIAGNOSIS — M72.2 PLANTAR FASCIITIS, LEFT: Primary | ICD-10-CM

## 2021-03-18 PROCEDURE — G8484 FLU IMMUNIZE NO ADMIN: HCPCS | Performed by: PODIATRIST

## 2021-03-18 PROCEDURE — 4040F PNEUMOC VAC/ADMIN/RCVD: CPT | Performed by: PODIATRIST

## 2021-03-18 PROCEDURE — 29540 STRAPPING ANKLE &/FOOT: CPT | Performed by: PODIATRIST

## 2021-03-18 PROCEDURE — 99213 OFFICE O/P EST LOW 20 MIN: CPT | Performed by: PODIATRIST

## 2021-03-18 PROCEDURE — 1036F TOBACCO NON-USER: CPT | Performed by: PODIATRIST

## 2021-03-18 PROCEDURE — G8417 CALC BMI ABV UP PARAM F/U: HCPCS | Performed by: PODIATRIST

## 2021-03-18 PROCEDURE — 1111F DSCHRG MED/CURRENT MED MERGE: CPT | Performed by: PODIATRIST

## 2021-03-18 PROCEDURE — G8427 DOCREV CUR MEDS BY ELIG CLIN: HCPCS | Performed by: PODIATRIST

## 2021-03-18 PROCEDURE — 1123F ACP DISCUSS/DSCN MKR DOCD: CPT | Performed by: PODIATRIST

## 2021-03-18 ASSESSMENT — ENCOUNTER SYMPTOMS
COLOR CHANGE: 0
NAUSEA: 0
DIARRHEA: 0
CONSTIPATION: 0
VOMITING: 0

## 2021-03-18 NOTE — PROGRESS NOTES
[facility]  Return Patient     Mini Morfin 80 y.o. male that presents for follow-up of   Chief Complaint   Patient presents with    Check-Up     follow up left foot, gout follow up        Left arch and ankle pain. Was in the hospital with pseudo gout, getting better, but still has arch pain on the left. Some swelling, wearing new balance and powersteps. Currently denies F/C/N/V. Allergies   Allergen Reactions    Celebrex [Celecoxib] Nausea Only    Mobic Nausea Only       Past Medical History:   Diagnosis Date    Acute MI ([address]     Allergic rhinitis 9/2/2011    Anemia     Basal cell cancer 03/2013    left side of head, face chin    Basal cell cancer 03/10/14    left cheek    Cervical radiculopathy     Diverticulitis 02/17/13    Epigastric pain/GERD. 9/2/2011    GERD (gastroesophageal reflux disease)     Glaucoma     Hyperlipidemia     Hypertension     Hyponatremia 9/2/2011    IBS (irritable bowel syndrome) 9/2/2011    Insomnia 9/2/2011    Osteoarthritis     Osteoarthritis/neck pain. 9/2/2011    Renal calculi     Shingles     history of shingles       Prior to Admission medications    Medication Sig Start Date End Date Taking?  Authorizing Provider   lisinopril (PRINIVIL;ZESTRIL) 10 MG tablet Take 1 tablet by mouth Daily with lunch 3/16/21  Yes Clark Hernandez MD   dilTIAZem (CARDIZEM CD) 180 MG extended release capsule Take 1 capsule by mouth daily 2/23/21  Yes Historical Provider, MD   hydrALAZINE (APRESOLINE) 25 MG tablet Take 25 mg by mouth 2 times daily 2/23/21  Yes Historical Provider, MD   predniSONE (DELTASONE) 5 MG tablet Take 5 mg by mouth daily   Yes Historical Provider, MD   bimatoprost (LUMIGAN) 0.01 % SOLN ophthalmic drops Place 1 drop into both eyes nightly   Yes Historical Provider, MD   pantoprazole (PROTONIX) 40 MG tablet Take 1 tablet by mouth every morning (before breakfast) 2/20/21  Yes Ricardo Walker MD   busPIRone (BUSPAR) 10 MG tablet Take 1 tablet by mouth 3 times daily 2/5/21  Yes Steve Perez MD   zoster recombinant adjuvanted vaccine [facility]) 50 MCG/0.5ML SUSR injection 50 MCG IM then repeat 2-6 months. 1/13/21 1/13/22 Yes Steve Perez MD   tamsulosin (FLOMAX) 0.4 MG capsule Take 1 capsule by mouth 2 times daily  Patient taking differently: Take 0.4 mg by mouth daily  9/15/20  Yes NASRA Fofana CNP   latanoprost (XALATAN) 0.005 % ophthalmic solution 1 drop nightly   Yes Historical Provider, MD   nitroGLYCERIN (NITROSTAT) 0.4 MG SL tablet Place 0.4 mg under the tongue every 5 minutes as needed for Chest pain up to max of 3 total doses. If no relief after 1 dose, call 911. Yes Historical Provider, MD   allopurinol (ZYLOPRIM) 100 MG tablet Take 1 tablet by mouth daily 5/11/20  Yes Steve Perez MD   warfarin (COUMADIN) 5 MG tablet Take 2.5 mg by mouth once a week Indications: Wednesdays; INR goal 2-2.5 Managed by [facility] Anticoagulation [facility]   Yes Historical Provider, MD   melatonin 5 MG TABS tablet Take 1 tablet by mouth nightly    Yes Historical Provider, MD   metoprolol tartrate (LOPRESSOR) 25 MG tablet Take 25 mg by mouth 2 times daily   Yes Historical Provider, MD   naproxen (NAPROSYN) 500 MG tablet Take 1 tablet by mouth 2 times daily as needed for Pain (Take with meals)  Patient not taking: Reported on 3/18/2021 2/19/21   Nimco Anderson MD       Review of Systems   Constitutional: Negative for chills, diaphoresis, fatigue, fever and unexpected weight change. Cardiovascular: Positive for leg swelling. Gastrointestinal: Negative for constipation, diarrhea, nausea and vomiting. Musculoskeletal: Positive for gait problem. Negative for arthralgias and joint swelling. Skin: Negative for color change, pallor, rash and wound. Neurological: Negative for weakness and numbness. Lower Extremity Physical Examination:     Vitals: There were no vitals filed for this visit. General: AAO x 3 in NAD.      Integument: Warm, dry, supple, Bilateral.  Open lesion absent, Bilateral.      Vascular: DP and PT pulses palpable 2/4, Bilateral.  CFT <3 seconds, Bilateral.  Hair growth absent to the level of the digits, Bilateral.  Edema present, Left. Varicosities absent, Bilateral. Erythema absent, Left    Neurological:  Sensation present to light touch to level of digits, Bilateral.    Musculoskeletal: Muscle strength 5/5, Left. Pain present upon palpation of central heel, medial and plantar central arch, pain along the pt tendon, Left. normal medial longitudinal arch, Bilateral.  Ankle ROM normal,Left. Asessment: Patient is a 80 y.o. male with:   1. Plantar fasciitis, left    2. Posterior tibial tendinitis, left    3. Edema of left ankle        Plan: Pt was evaluated and examined. Patient was given personalized discharge instructions. Pt will follow up in 2 weeks or sooner if any problems arise. Diagnosis was discussed with the pt and all of their questions were answered in detail. Proper foot hygiene and care was discussed with the pt. Patient to check feet daily and contact the office with any questions/problems/concerns. Other comorbidity noted and will be managed by PCP. Rest, good shoes, powersteps    Foot and ankle strapping/ Low-dye strapping was applied to the left with 2 inch and 1 inch tape and a scaffoid pad. This is designed to off-load the plantar fascia and encourage healing and pain reduction. Discussed with the patient that the Low dye taping is a short term treatment and its off-loading effects vary from patient to patient. I advised the patient to leave the tape on for 3-5 days, but sometimes you may need to replace it sooner in order to remain effective. Orders Placed This Encounter   Procedures    IA STRAPPING; ANKLE &/OR FOOT     No orders of the defined types were placed in this encounter. RTC in 2week(s).     3/18/2021

## 2021-03-23 RX ORDER — HYDRALAZINE HYDROCHLORIDE 25 MG/1
25 TABLET, FILM COATED ORAL 2 TIMES DAILY
Qty: 90 TABLET | Refills: 3 | Status: SHIPPED
Start: 2021-03-23 | End: 2021-05-18 | Stop reason: SINTOL

## 2021-03-31 ENCOUNTER — OFFICE VISIT (OUTPATIENT)
Dept: PODIATRY | Age: 86
End: 2021-03-31
Payer: MEDICARE

## 2021-03-31 VITALS — BODY MASS INDEX: 27.66 KG/M2 | HEIGHT: 64 IN | WEIGHT: 162 LBS

## 2021-03-31 DIAGNOSIS — M79.674 PAIN IN TOES OF BOTH FEET: ICD-10-CM

## 2021-03-31 DIAGNOSIS — B35.1 ONYCHOMYCOSIS: Primary | ICD-10-CM

## 2021-03-31 DIAGNOSIS — M79.675 PAIN IN TOES OF BOTH FEET: ICD-10-CM

## 2021-03-31 PROCEDURE — 11721 DEBRIDE NAIL 6 OR MORE: CPT | Performed by: PODIATRIST

## 2021-03-31 ASSESSMENT — ENCOUNTER SYMPTOMS
COLOR CHANGE: 0
NAUSEA: 0
VOMITING: 0
CONSTIPATION: 0
DIARRHEA: 0

## 2021-03-31 NOTE — PROGRESS NOTES
[facility]  Return Patient    Chief Complaint   Patient presents with    Nail Problem     nail trim/last saw Lorraine Chappell 3/16/21       Subjective: This Aparna Scott comes to clinic for foot and nail care. He would like all of his nails trimmed. He cannot take care of them himself. They are painful when long and he tries to wear shoes. Pt's primary care physician is Ally Sawyer MD.       Past Medical History:   Diagnosis Date    [address] [address]     Allergic rhinitis 9/2/2011    Anemia     Basal cell cancer 03/2013    left side of head, face chin    Basal cell cancer 03/10/14    left cheek    Cervical radiculopathy     Diverticulitis 02/17/13    Epigastric pain/GERD. 9/2/2011    GERD (gastroesophageal reflux disease)     Glaucoma     Hyperlipidemia     Hypertension     Hyponatremia 9/2/2011    IBS (irritable bowel syndrome) 9/2/2011    Insomnia 9/2/2011    Osteoarthritis     Osteoarthritis/neck pain.  9/2/2011    Renal calculi     Shingles     history of shingles       Allergies   Allergen Reactions    Celebrex [Celecoxib] Nausea Only    Mobic Nausea Only     Current Outpatient Medications on File Prior to Visit   Medication Sig Dispense Refill    hydrALAZINE (APRESOLINE) 25 MG tablet Take 1 tablet by mouth 2 times daily 90 tablet 3    lisinopril (PRINIVIL;ZESTRIL) 10 MG tablet Take 1 tablet by mouth Daily with lunch 90 tablet 3    dilTIAZem (CARDIZEM CD) 180 MG extended release capsule Take 1 capsule by mouth daily      predniSONE (DELTASONE) 5 MG tablet Take 5 mg by mouth daily      bimatoprost (LUMIGAN) 0.01 % SOLN ophthalmic drops Place 1 drop into both eyes nightly      naproxen (NAPROSYN) 500 MG tablet Take 1 tablet by mouth 2 times daily as needed for Pain (Take with meals) 60 tablet 1    pantoprazole (PROTONIX) 40 MG tablet Take 1 tablet by mouth every morning (before breakfast) 7 tablet 0    busPIRone (BUSPAR) 10 MG tablet Take 1 tablet by mouth 3 times daily 90 tablet 11    zoster recombinant adjuvanted vaccine (SHINGRIX) 50 MCG/0.5ML SUSR injection 50 MCG IM then repeat 2-6 months. 0.5 mL 1    tamsulosin (FLOMAX) 0.4 MG capsule Take 1 capsule by mouth 2 times daily (Patient taking differently: Take 0.4 mg by mouth daily ) 180 capsule 3    latanoprost (XALATAN) 0.005 % ophthalmic solution 1 drop nightly      nitroGLYCERIN (NITROSTAT) 0.4 MG SL tablet Place 0.4 mg under the tongue every 5 minutes as needed for Chest pain up to max of 3 total doses. If no relief after 1 dose, call 911.  allopurinol (ZYLOPRIM) 100 MG tablet Take 1 tablet by mouth daily 90 tablet 3    warfarin (COUMADIN) 5 MG tablet Take 2.5 mg by mouth once a week Indications: Wednesdays; INR goal 2-2.5 [facility]      melatonin 5 MG TABS tablet Take 1 tablet by mouth nightly       metoprolol tartrate (LOPRESSOR) 25 MG tablet Take 25 mg by mouth 2 times daily       No current facility-administered medications on file prior to visit. Review of Systems   Constitutional: Negative for chills, diaphoresis, fatigue, fever and unexpected weight change. Cardiovascular: Negative for leg swelling. Gastrointestinal: Negative for constipation, diarrhea, nausea and vomiting. Musculoskeletal: Positive for gait problem. Negative for arthralgias and joint swelling. Skin: Negative for color change, pallor, rash and wound. Neurological: Negative for weakness and numbness. Objective:  General: AAO x 3 in NAD. Derm  Left hallux nail incurvated medial border with slight redness and pain to palpation.     Sites of Onychomycosis Involvement (Check affected area)  [x] [x] [x] [x] [x] [x] [x] [x] [x] [x]  5 4 3 2 1 1 2 3 4 5                          Right                                        Left    Thickness  [x] [x] [x] [x] [x] [x] [x] [x] [x] [x]  5 4 3 2 1 1 2 3 4 5                         Right                                        Left Dystrophic Changes                                                                 [x] [x] [x] [x] [x] [x] [x] [x] [x] [x]  5 4 3 2 1 1 2 3 4 5                         Right                                        Left    Color                                                                  [x] [x] [x] [x] [x] [x] [x] [x] [x] [x]  5 4 3 2 1 1 2 3 4 5                          Right                                        Left    Incurvation/Ingrowin                                                                   [] [] [] [] [] [] [] [] [] []  5 4 3 2 1 1 2 3 4 5                         Right                                        Left    Inflammation/Pain                                                                   [x] [x] [x] [x] [x] [x] [x] [x] [x] [x]  5 4 3 2 1 1 2 3 4 5                         Right                                        Left    Vascular:  DP/PT pulses palpable 2/4, Bilateral.    CFT <3 seconds to digits 1-5, Bilateral .   Hair growth present to level of digits, Bilateral.    Neurological:  Sensation present to light touch to level of digits, Bilateral.    Assessment:  80 y.o. male with:   1. Onychomycosis    2. Pain in toes of both feet       Orders Placed This Encounter   Procedures    MI DEBRIDEMENT OF NAILS, 6 OR MORE         Plan:   Pt was evaluated and examined. Patient was given personalized discharge instructions. Nails 1-10 were debrided in length and thickness sharply with a nail nipper and  without incident, slant back performed. Pt will follow up in 9-12 weeks or sooner if any problems arise. Diagnosis was discussed with the pt and all of their questions were answered in detail. Proper foot hygiene and care was discussed with the pt. Patient to check feet daily and contact the office with any questions/problems/concerns. Other comorbidity noted and will be managed by PCP. Pain waiver discussed with patient and confirmed.          3/31/2021    Electronically signed by Melissa Mireles DPM on 3/31/2021 at 2:08 PM

## 2021-04-08 ENCOUNTER — TELEPHONE (OUTPATIENT)
Dept: FAMILY MEDICINE CLINIC | Age: 86
End: 2021-04-08

## 2021-04-08 NOTE — TELEPHONE ENCOUNTER
Patient called and said he has only been sleeping for 3-3.5 hours per night and feels exhausted during the day. He gets up during the night for frequent urination. Patient also stated that he has not been napping during the afternoon because he has a lot on his mind. He is wondering if you can recommend a sleep aid for him that he can take in the afternoon that will not effect his sleeping at night. Please advise.
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
## 2021-04-09 DIAGNOSIS — M10.9 GOUT, UNSPECIFIED CAUSE, UNSPECIFIED CHRONICITY, UNSPECIFIED SITE: ICD-10-CM

## 2021-04-09 RX ORDER — ALLOPURINOL 100 MG/1
TABLET ORAL
Qty: 90 TABLET | Refills: 2 | Status: SHIPPED | OUTPATIENT
Start: 2021-04-09 | End: 2022-02-09

## 2021-04-15 ENCOUNTER — OFFICE VISIT (OUTPATIENT)
Dept: FAMILY MEDICINE CLINIC | Age: 86
End: 2021-04-15
Payer: MEDICARE

## 2021-04-15 VITALS
BODY MASS INDEX: 30.78 KG/M2 | SYSTOLIC BLOOD PRESSURE: 126 MMHG | WEIGHT: 163 LBS | RESPIRATION RATE: 16 BRPM | HEART RATE: 50 BPM | OXYGEN SATURATION: 97 % | DIASTOLIC BLOOD PRESSURE: 74 MMHG | HEIGHT: 61 IN

## 2021-04-15 DIAGNOSIS — Z00.00 ROUTINE GENERAL MEDICAL EXAMINATION AT A HEALTH CARE FACILITY: Primary | ICD-10-CM

## 2021-04-15 PROCEDURE — 1123F ACP DISCUSS/DSCN MKR DOCD: CPT | Performed by: FAMILY MEDICINE

## 2021-04-15 PROCEDURE — G0439 PPPS, SUBSEQ VISIT: HCPCS | Performed by: FAMILY MEDICINE

## 2021-04-15 PROCEDURE — 4040F PNEUMOC VAC/ADMIN/RCVD: CPT | Performed by: FAMILY MEDICINE

## 2021-04-15 ASSESSMENT — PATIENT HEALTH QUESTIONNAIRE - PHQ9
SUM OF ALL RESPONSES TO PHQ QUESTIONS 1-9: 2
SUM OF ALL RESPONSES TO PHQ QUESTIONS 1-9: 2

## 2021-04-15 ASSESSMENT — LIFESTYLE VARIABLES
HOW OFTEN DO YOU HAVE A DRINK CONTAINING ALCOHOL: 1
AUDIT-C TOTAL SCORE: 1

## 2021-04-15 NOTE — PATIENT INSTRUCTIONS
Personalized Preventive Plan for Jameel Castorena - 4/15/2021  Medicare offers a range of preventive health benefits. Some of the tests and screenings are paid in full while other may be subject to a deductible, co-insurance, and/or copay. Some of these benefits include a comprehensive review of your medical history including lifestyle, illnesses that may run in your family, and various assessments and screenings as appropriate. After reviewing your medical record and screening and assessments performed today your provider may have ordered immunizations, labs, imaging, and/or referrals for you. A list of these orders (if applicable) as well as your Preventive Care list are included within your After Visit Summary for your review. Other Preventive Recommendations:    · A preventive eye exam performed by an eye specialist is recommended every 1-2 years to screen for glaucoma; cataracts, macular degeneration, and other eye disorders. · A preventive dental visit is recommended every 6 months. · Try to get at least 150 minutes of exercise per week or 10,000 steps per day on a pedometer . · Order or download the FREE \"Exercise & Physical Activity: Your Everyday Guide\" from [employer]. Call 4-717.673.1938 or search [employer] online. · You need 0299-5112 mg of calcium and 7947-6813 IU of vitamin D per day. It is possible to meet your calcium requirement with diet alone, but a vitamin D supplement is usually necessary to meet this goal.  · When exposed to the sun, use a sunscreen that protects against both UVA and UVB radiation with an SPF of 30 or greater. Reapply every 2 to 3 hours or after sweating, drying off with a towel, or swimming. · Always wear a seat belt when traveling in a car. Always wear a helmet when riding a bicycle or motorcycle. Heart-Healthy Diet   Sodium, Fat, and Cholesterol Controlled Diet       What Is a Heart Healthy Diet?    A heart-healthy diet is one that limits sodium , certain types of fat , and cholesterol . This type of diet is recommended for:   People with any form of cardiovascular disease (eg, coronary heart disease , peripheral vascular disease , previous heart attack , previous stroke )   People with risk factors for cardiovascular disease, such as high blood pressure , high cholesterol , or diabetes   Anyone who wants to lower their risk of developing cardiovascular disease   Sodium    Sodium is a mineral found in many foods. In general, most people consume much more sodium than they need. Diets high in sodium can increase blood pressure and lead to edema (water retention). On a heart-healthy diet, you should consume no more than 2,300 mg (milligrams) of sodium per dayabout the amount in one teaspoon of table salt. The foods highest in sodium include table salt (about 50% sodium), processed foods, convenience foods, and preserved foods. Cholesterol    Cholesterol is a fat-like, waxy substance in your blood. Our bodies make some cholesterol. It is also found in animal products, with the highest amounts in fatty meat, egg yolks, whole milk, cheese, shellfish, and organ meats. On a heart-healthy diet, you should limit your cholesterol intake to less than 200 mg per day. It is normal and important to have some cholesterol in your bloodstream. But too much cholesterol can cause plaque to build up within your arteries, which can eventually lead to a heart attack or stroke. The two types of cholesterol that are most commonly referred to are:   Low-density lipoprotein (LDL) cholesterol  Also known as bad cholesterol, this is the cholesterol that tends to build up along your arteries. Bad cholesterol levels are increased by eating fats that are saturated or hydrogenated. Optimal level of this cholesterol is less than 100. Over 130 starts to get risky for heart disease.    High-density lipoprotein (HDL) cholesterol  Also known as example, this would mean 60 grams of fat or less per day. Saturated fat and trans fat in your diet raises your blood cholesterol the most, much more than dietary cholesterol does. For this reason, on a heart-healthy diet, less than 7% of your calories should come from saturated fat and ideally 0% from trans fat. On an 1800-calorie diet, this translates into less than 14 grams of saturated fat per day, leaving 46 grams of fat to come from mono- and polyunsaturated fats.    Food Choices on a Heart Healthy Diet   Food Category   Foods Recommended   Foods to Avoid   Grains   Breads and rolls without salted tops Most dry and cooked cereals Unsalted crackers and breadsticks Low-sodium or homemade breadcrumbs or stuffing All rice and pastas   Breads, rolls, and crackers with salted tops High-fat baked goods (eg, muffins, donuts, pastries) Quick breads, self-rising flour, and biscuit mixes Regular bread crumbs Instant hot cereals Commercially prepared rice, pasta, or stuffing mixes   Vegetables   Most fresh, frozen, and low-sodium canned vegetables Low-sodium and salt-free vegetable juices Canned vegetables if unsalted or rinsed   Regular canned vegetables and juices, including sauerkraut and pickled vegetables Frozen vegetables with sauces Commercially prepared potato and vegetable mixes   Fruits   Most fresh, frozen, and canned fruits All fruit juices   Fruits processed with salt or sodium   Milk   Nonfat or low-fat (1%) milk Nonfat or low-fat yogurt Cottage cheese, low-fat ricotta, cheeses labeled as low-fat and low-sodium   Whole milk Reduced-fat (2%) milk Malted and chocolate milk Full fat yogurt Most cheeses (unless low-fat and low salt) Buttermilk (no more than 1 cup per week)   Meats and Beans   Lean cuts of fresh or frozen beef, veal, lamb, or pork (look for the word loin) Fresh or frozen poultry without the skin Fresh or frozen fish and some shellfish Egg whites and egg substitutes (Limit whole eggs to three per week) Tofu Nuts or seeds (unsalted, dry-roasted), low-sodium peanut butter Dried peas, beans, and lentils   Any smoked, cured, salted, or canned meat, fish, or poultry (including nazario, chipped beef, cold cuts, hot dogs, sausages, sardines, and anchovies) Poultry skins Breaded and/or fried fish or meats Canned peas, beans, and lentils Salted nuts   Fats and Oils   Olive oil and canola oil Low-sodium, low-fat salad dressings and mayonnaise   Butter, margarine, coconut and palm oils, nazario fat   Snacks, Sweets, and Condiments   Low-sodium or unsalted versions of broths, soups, soy sauce, and condiments Pepper, herbs, and spices; vinegar, lemon, or lime juice Low-fat frozen desserts (yogurt, sherbet, fruit bars) Sugar, cocoa powder, honey, syrup, jam, and preserves Low-fat, trans-fat free cookies, cakes, and pies Bishop and animal crackers, fig bars, tanya snaps   High-fat desserts Broth, soups, gravies, and sauces, made from instant mixes or other high-sodium ingredients Salted snack foods Canned olives Meat tenderizers, seasoning salt, and most flavored vinegars   Beverages   Low-sodium carbonated beverages Tea and coffee in moderation Soy milk   Commercially softened water   Suggestions   Make whole grains, fruits, and vegetables the base of your diet. Choose heart-healthy fats such as canola, olive, and flaxseed oil, and foods high in heart-healthy fats, such as nuts, seeds, soybeans, tofu, and fish. Eat fish at least twice per week; the fish highest in omega-3 fatty acids and lowest in mercury include salmon, herring, mackerel, sardines, and canned chunk light tuna. If you eat fish less than twice per week or have high triglycerides, talk to your doctor about taking fish oil supplements. Read food labels.    For products low in fat and cholesterol, look for fat free, low-fat, cholesterol free, saturated fat free, and trans fat freeAlso scan the Nutrition Facts Label, which lists saturated fat, trans fat, and cholesterol amounts. For products low in sodium, look for sodium free, very low sodium, low sodium, no added salt, and unsalted   Skip the salt when cooking or at the table; if food needs more flavor, get creative and try out different herbs and spices. Garlic and onion also add substantial flavor to foods. Trim any visible fat off meat and poultry before cooking, and drain the fat off after [place]. Use cooking methods that require little or no added fat, such as grilling, boiling, baking, poaching, broiling, roasting, steaming, stir-frying, and sauting. Avoid fast food and convenience food. They tend to be high in saturated and trans fat and have a lot of added salt. Talk to a registered dietitian for individualized diet advice. Last Reviewed: March 2011 Esvin Ovalles MS, MPH, RD   Updated: 3/29/2011   ·     High-Fiber Diet     What Is Fiber? Dietary fiber is a form of carbohydrate found in plants that cannot be digested by humans. All plants contain fiber, including fruits, vegetables, grains, and legumes. Fiber is often classified into two categories: soluble and insoluble. Soluble fiber draws water into the bowel and can help slow digestion. Examples of foods that are high in soluble fiber include oatmeal, oat bran, barley, legumes (eg, beans and peas), apples, and strawberries. Insoluble fiber speeds digestion and can add bulk to the stool. Examples of foods that are high in insoluble fiber include whole-wheat products, wheat bran, cauliflower, green beans, and potatoes. Why Follow a High-Fiber Diet? A high-fiber diet is often recommended to prevent and treat constipation , hemorrhoids , diverticulitis , and irritable bowel syndrome . Eating a high-fiber diet can also help improve your cholesterol levels, lower your risk of coronary heart disease , reduce your risk of type 2 diabetes , and lower your weight.  For people with type 1 or 2 diabetes, a high-fiber diet can also help stabilize blood sugar levels. How Much Fiber Should I Eat? A high-fiber diet should contain  20-35 grams  of fiber a day. This is actually the amount recommended for the general adult population; however, most Americans eat only 15 grams of fiber per day. Digestion of Fiber   Eating a higher fiber diet than usual can take some getting used to by your body's digestive system. To avoid the side effects of sudden increases in dietary fiber (eg, gas, cramping, bloating, and diarrhea), increase fiber gradually and be sure to drink plenty of fluids every day. Tips for Increasing Fiber Intake   Whenever possible, choose whole grains over refined grains (eg, brown rice instead of white rice, whole-wheat bread instead of white bread). Include a variety of grains in your diet, such as wheat, rye, barley, oats, quinoa, and bulgur. Eat more vegetarian-based meals. Here are some ideas: black bean burgers, eggplant lasagna, and veggie tofu stir-vázquez. Choose high-fiber snacks, such as fruits, popcorn, whole-grain crackers, and nuts. Make whole-grain cereal or whole-grain toast part of your daily breakfast regime. When eating out, whether ordering a sandwich or dinner, ask for extra vegetables. When baking, replace part of the white flour with whole-wheat flour. Whole-wheat flour is particularly easy to incorporate into a recipe. High-Fiber Diet Eating Guide   Food Category   Foods Recommended   Notes   Grains   Whole-grain breads, muffins, bagels, or ilda bread Rye bread Whole-wheat crackers or crisp breads Whole-grain or bran cereals Oatmeal, oat bran, or grits Wheat germ Whole-wheat pasta and brown rice   Read the ingredients list on food labels. Look for products that list \"whole\" as the first ingredient (eg, whole-wheat, whole oats). Choose cereals with at least 2 grams of fiber per serving.    Vegetables   All vegetables, especially asparagus, bean sprouts, broccoli, [place] sprouts, cabbage, carrots, cauliflower, celery, corn, greens, green beans, green pepper, onions, peas, potatoes (with skin), snow peas, spinach, squash, sweet potatoes, tomatoes, zucchini   For maximum fiber intake, eat the peels of fruits and vegetablesjust be sure to wash them well first.   Fruits   All fruits, especially apples, berries, grapefruits, mangoes, nectarines, oranges, peaches, pears, dried fruits (figs, dates, prunes, raisins)   Choose raw fruits and vegetables over juice, cooked, or cannedraw fruit has more fiber. Dried fruit is also a good source of fiber. Milk   With the exception of yogurt containing inulin (a type of fiber), dairy foods provide little fiber. Add more fiber by topping your yogurt or cottage cheese with fresh fruit, whole grain or bran cereals, nuts, or seeds. Meats and Beans   All beans and peas, especially Garbanzo beans, kidney beans, lentils, lima beans, split peas, and mckeon beans All nuts and seeds, especially almonds, peanuts, [place] nuts, cashews, peanut butter, walnuts, sesame and sunflower seeds All meat, poultry, fish, and eggs   Increase fiber in meat dishes by adding mckeon beans, kidney beans, black-eyed peas, bran, or oatmeal. If you are following a low-fat diet, use nuts and seeds only in moderation. Fats and Oils   All in moderation   Fats and oils do not provide fiber   Snacks, Sweets, and Condiments   Fruit Nuts Popcorn, whole-wheat pretzels, or trail mix made with dried fruits, nuts, and seeds Cakes, breads, and cookies made with oatmeal or whole-wheat flour   Most snack foods do not provide much fiber. Choose snacks with at least 2 grams of fiber per serving. Last Reviewed: March 2011 Jose Salas MS, MPH, RD   Updated: 3/29/2011   ·     Keep Your Memory Susan Dome       Many factors can affect your ability to remembera hectic lifestyle, aging, stress, chronic disease, and certain medicines.  But, there are steps you can take to sharpen your mind and help preserve your memory. Challenge Your Brain   Regularly challenging your mind may help keeps it in top shape. Good mental exercises include:   Crossword puzzlesUse a dictionary if you need it; you will learn more that way. Brainteasers Try some! Crafts, such as wood working and sewing   Hobbies, such as gardening and building model airplanes   SocializingVisit old friends or join groups to meet new ones. Reading   Learning a new language   Taking a class, whether it be art history or ti chi   TravelingExperience the food, history, and culture of your destination   Learning to use a computer   Going to museums, the theater, or thought-provoking movies   Changing things in your daily life, such as reversing your pattern in the grocery store or brushing your teeth using your nondominant hand   Use Memory Aids   There is no need to remember every detail on your own. These memory aids can help:   Calendars and day planners   Electronic organizers to store all sorts of helpful informationThese devices can \"beep\" to remind you of appointments. A book of days to record birthdays, anniversaries, and other occasions that occur on the same date every year   Detailed \"to-do\" lists and strategically placed sticky notes   Quick \"study\" sessionsBefore a gathering, review who will be there so their names will be fresh in your mind. Establish routinesFor example, keep your keys, wallet, and umbrella in the same place all the time or take medicine with your 8:00 AM glass of juice   Live a Healthy Life   Many actions that will keep your body strong will do the same for your mind. For example:   Talk to Your Doctor About Herbs and Supplements    Malnutrition and vitamin deficiencies can impair your mental function. For example, vitamin B12 deficiency can cause a range of symptoms, including confusion. But, what if your nutritional needs are being met? Can herbs and supplements still offer a benefit?  Researchers have investigated a range of natural remedies, such as ginkgo , ginseng , and the supplement phosphatidylserine (PS). So far, though, the evidence is inconsistent as to whether these products can improve memory or thinking. If you are interested in taking herbs and supplements, talk to your doctor first because they may interact with other medicines that you are taking. Exercise Regularly    Among the many benefits of regular exercise are increased blood flow to the brain and decreased risk of certain diseases that can interfere with memory function. One study found that even moderate exercise has a beneficial effect. Examples of \"moderate\" exercise include:   Playing 18 holes of golf once a week, without a cart   Playing tennis twice a week   Walking one mile per day   Manage Stress    It can be tough to remember what is important when your mind is cluttered. Make time for relaxation. Choose activities that calm you down, and make it routine. Manage Chronic Conditions    Side effects of high blood pressure , diabetes, and heart disease can interfere with mental function. Many of the lifestyle steps discussed here can help manage these conditions. Strive to eat a healthy diet, exercise regularly, get stress under control, and follow your doctor's advice for your condition. Minimize Medications    Talk to your doctor about the medicines that you take. Some may be unnecessary. Also, healthy lifestyle habits may lower the need for certain drugs. Last Reviewed: April 2010 Lamonte Landaverde MD   Updated: 4/13/2010   ·     [address]       As we get older, changes in balance, gait, strength, vision, hearing, and cognition make even the most youthful senior more prone to accidents. Falls are one of the leading health risks for older people.  This increased risk of falling is related to:   Aging process (eg, decreased muscle strength, slowed reflexes)   Higher incidence of chronic health problems (eg, arthritis, diabetes) that extension, and telephone cords placed out of the flow of traffic and maintained in good condition? Have frayed cords been replaced? Are all small rugs and runners slip resistant? If not, you can secure them to the floor with a special double-sided carpet tape. Are smoke detectors properly locatedone on every floor of your home and one outside of every sleeping area? Are they in good working order? Are batteries replaced at least once a year? Do you have a well-maintained carbon monoxide detector outside every sleeping are in your home? Does your furniture layout leave plenty of space to maneuver between and around chairs, tables, beds, and sofas? Are hallways, stairs and passages between rooms well lit? Can you reach a lamp without getting out of bed? Are floor surfaces well maintained? Shag rugs, high-pile carpeting, tile floors, and polished wood floors can be particularly slippery. Stairs should always have handrails and be carpeted or fitted with a non-skid tread. Is your telephone easily reachable. Is the cord safely tucked away? Room by Room   According to the [employer], bathrooms and jenae are the two most potentially hazardous rooms in your home. In the Kitchen    Be sure your stove is in proper working order and always make sure burners and the oven are off before you go out or go to sleep. Keep pots on the back burners, turn handles away from the front of the stove, and keep stove clean and free of grease build-up. Kitchen ventilation systems and range exhausts should be working properly. Keep flammable objects such as towels and pot holders away from the cooking area except when in use. Make sure kitchen curtains are tied back. Move cords and appliances away from the sink and hot surfaces. If extension cords are needed, install wiring guides so they do not hang over the sink, range, or working areas.     Look for coffee pots, kettles and toaster ovens with automatic shut-offs. Keep a mop handy in the kitchen so you can wipe up spills instantly. You should also have a small fire extinguisher. Arrange your kitchen with frequently used items on lower shelves to avoid the need to stand on a stepstool to reach them. Make sure countertops are well-lit to avoid injuries while cutting and preparing food. In the Bathroom    Use a non-slip mat or decals in the tub and shower, since wet, soapy tile or porcelain surfaces are extremely slippery. Make sure bathroom rugs are non-skid or tape them firmly to the floor. Bathtubs should have at least one, preferably two, grab bars, firmly attached to structural supports in the wall. (Do not use built-in soap holders or glass shower doors as grab bars.)    Tub seats fitted with non-slip material on the legs allow you to wash sitting down. For people with limited mobility, bathtub transfer benches allow you to slide safely into the tub. Raised toilet seats and toilet safety rails are helpful for those with knee or hip problems. In the [facility]    Make sure you use a nightlight and that the area around your bed is clear of potential obstacles. Be careful with electric blankets and never go to sleep with a heating pad, which can cause serious burns even if on a low setting. Use fire-resistant mattress covers and pillows, and NEVER smoke in bed. Keep a phone next to the bed that is programmed to dial 911 at the push of a button. If you have a chronic condition, you may want to sign on with an automatic call-in service. Typically the system includes a small pendant that connects directly to an emergency medical voice-response system. You should also make arrangements to stay in contact with someonefriend, neighbor, family memberon a regular schedule.    Fire Prevention   According to the [employer]. (Smoke Alarms for Every) [address] [place] [address], senior citizens are one of the two highest risk groups for death and serious injuries due to residential fires. When cooking, wear short-sleeved items, never a bulky long-sleeved robe. The [facility]'s Safety Checklist for Older Consumers emphasizes the importance of checking basements, garages, workshops and storage areas for fire hazards, such as volatile liquids, piles of old rags or clothing and overloaded circuits. Never smoke in bed or when lying down on a couch or recliner chair. Small portable electric or kerosene heaters are responsible for many home fires and should be used cautiously if at all. If you do use one, be sure to keep them away from flammable materials. In case of fire, make sure you have a pre-established emergency exit plan. Have a professional check your fireplace and other fuel-burning appliances yearly. [employer]   Baby boomers entering the moncada years will continue to see the development of new products to help older adults live safely and independently in spite of age-related changes. Making Life More Livable  , by Vilma Fonseca, lists over 1,000 products for \"living well in the mature years,\" such as bathing and mobility aids, household security devices, ergonomically designed knives and peelers, and faucet valves and knobs for temperature control. Medical supply stores and organizations are good sources of information about products that improve your quality of life and insure your safety. Last Reviewed: November 2009 Issac Ramirez MD   Updated: 3/7/2011     ·        Advance Care Planning: Care Instructions  Your Care Instructions     It can be hard to live with an illness that cannot be cured. But if your health is getting worse, you may want to make decisions about end-of-life care. Planning for the end of your life does not mean that you are giving up. It is a way to make sure that your wishes are met. Clearly stating your wishes can make it easier for your loved ones.  Making plans while you are still able may also ease your mind and make your final days less stressful and more meaningful. Follow-up care is a key part of your treatment and safety. Be sure to make and go to all appointments, and call your doctor if you are having problems. It's also a good idea to know your test results and keep a list of the medicines you take. What can you do to plan for the end of life? You can bring these issues up with your doctor. You do not need to wait until your doctor starts the conversation. You might start with, \"What makes life worth living for me is. Rufino Bhatti \" And then follow it with, \"I would not be willing to live with . Rufino Joy Golas \" When you complete this sentence it helps your doctor understand your wishes. Talk openly and honestly with your doctor. This is the best way to understand the decisions you will need to make as your health changes. Know that you can always change your mind. Ask your doctor about commonly used life-support measures. These include tube feedings, breathing machines, and fluids given through a vein (IV). Understanding these treatments will help you decide whether you want them. You may choose to have these life-supporting treatments for a limited time. This allows a trial period to see whether they will help you. You may also decide that you want your doctor to take only certain measures to keep you alive. It may help to think about the big picture, like what makes life worth living for you or what your values and goals are. Talk to your doctor about how long you are likely to live. Your doctor may be able to give you an idea of what usually happens with your specific illness. Think about preparing papers that state your wishes. These papers are called advance directives. If you do this early and review them often, there will not be any confusion about what you want. You can change your instructions at any time. Which papers should you prepare?   Advance directives are legal papers that tell doctors how you want to be cared for at the end of your life. You do not need a  to write these papers. Ask your doctor or your state health department for information on how to write your advance directives. They may have the forms for each of these types of papers. Make sure your doctor has a copy of these on file, and give a copy to a family member or close friend. Consider a do-not-resuscitate order (DNR). This order asks that no extra treatments be done if your heart stops or you stop breathing. Extra treatments may include cardiopulmonary resuscitation (CPR), electrical shock to restart your heart, or a machine to breathe for you. If you decide to have a DNR order, ask your doctor to explain and write it. Place the order in your home where everyone can easily see it. Consider a living will. A living will explains your wishes about life support and other treatments at the end of your life if you become unable to speak for yourself. Living ma tell doctors to use or not use treatments that would keep you alive. You must have one or two witnesses or a notary present when you sign this form. A living will may be called something else in your state. Consider a medical power of . This form allows you to name a person to make decisions about your care if you are not able to. Most people ask a close friend or family member. Talk to this person about the kinds of treatments you want and those that you do not want. Make sure this person understands your wishes. A medical power of  may be called something else in your state. These legal papers are simple to change. Tell your doctor what you want to change, and ask him or her to make a note in your medical file. Give your family updated copies of the papers. Where can you learn more? Go to [email or web address] and sign in to your [employer] account.  Enter P184 in the [facility] box to learn more about \"Advance Care Planning: Care Instructions. \"     If you do not have an account, please click on the \"Sign Up Now\" link. Current as of: July 17, 2020               Content Version: 12.8  © 2006-2021 Healthwise, Incorporated. Care instructions adapted under license by [facility] ([facility]). If you have questions about a medical condition or this instruction, always ask your healthcare professional. [place] [address] any warranty or liability for your use of this information.     ·

## 2021-04-29 ENCOUNTER — OFFICE VISIT (OUTPATIENT)
Dept: UROLOGY | Age: 86
End: 2021-04-29
Payer: MEDICARE

## 2021-04-29 VITALS
TEMPERATURE: 96.5 F | RESPIRATION RATE: 18 BRPM | WEIGHT: 163 LBS | SYSTOLIC BLOOD PRESSURE: 138 MMHG | DIASTOLIC BLOOD PRESSURE: 85 MMHG | HEART RATE: 76 BPM | HEIGHT: 61 IN | BODY MASS INDEX: 30.78 KG/M2

## 2021-04-29 DIAGNOSIS — R35.1 NOCTURIA: ICD-10-CM

## 2021-04-29 DIAGNOSIS — R35.0 URINARY FREQUENCY: Primary | ICD-10-CM

## 2021-04-29 LAB
BILIRUBIN, POC: NORMAL
BLOOD URINE, POC: NORMAL
CLARITY, POC: NORMAL
COLOR, POC: CLEAR
GLUCOSE URINE, POC: NEGATIVE
KETONES, POC: NORMAL
LEUKOCYTE EST, POC: NEGATIVE
NITRITE, POC: NEGATIVE
PH, POC: NORMAL
PROTEIN, POC: NORMAL
SPECIFIC GRAVITY, POC: NORMAL
UROBILINOGEN, POC: NORMAL

## 2021-04-29 PROCEDURE — G8427 DOCREV CUR MEDS BY ELIG CLIN: HCPCS | Performed by: NURSE PRACTITIONER

## 2021-04-29 PROCEDURE — 4040F PNEUMOC VAC/ADMIN/RCVD: CPT | Performed by: NURSE PRACTITIONER

## 2021-04-29 PROCEDURE — 81002 URINALYSIS NONAUTO W/O SCOPE: CPT | Performed by: NURSE PRACTITIONER

## 2021-04-29 PROCEDURE — 99213 OFFICE O/P EST LOW 20 MIN: CPT | Performed by: NURSE PRACTITIONER

## 2021-04-29 PROCEDURE — G8417 CALC BMI ABV UP PARAM F/U: HCPCS | Performed by: NURSE PRACTITIONER

## 2021-04-29 PROCEDURE — 1123F ACP DISCUSS/DSCN MKR DOCD: CPT | Performed by: NURSE PRACTITIONER

## 2021-04-29 PROCEDURE — 1036F TOBACCO NON-USER: CPT | Performed by: NURSE PRACTITIONER

## 2021-04-29 RX ORDER — MIRABEGRON 50 MG/1
50 TABLET, FILM COATED, EXTENDED RELEASE ORAL DAILY
Qty: 30 TABLET | Refills: 11 | Status: SHIPPED
Start: 2021-04-29 | End: 2021-05-18 | Stop reason: SINTOL

## 2021-04-29 ASSESSMENT — ENCOUNTER SYMPTOMS
CONSTIPATION: 0
COUGH: 0
VOMITING: 0
ABDOMINAL PAIN: 0
DIARRHEA: 0
EYE PAIN: 0
NAUSEA: 0
BACK PAIN: 0
WHEEZING: 0
SHORTNESS OF BREATH: 0

## 2021-04-29 NOTE — PATIENT INSTRUCTIONS
Myrbetriq start 1 daily, check BP as discussed one day morning, another midday and another day evening- report any BP elevation.      1 month

## 2021-04-29 NOTE — PROGRESS NOTES
[address] [address]  Dept: [address] [facility] Urology Office Note - Established    Patient:  Malvin Bowers  YOB: 1931  Date: 4/29/2021    The patient is a 80 y.o. male who presents todayfor evaluation of the following problems:   Chief Complaint   Patient presents with    Follow-up     frequency  patient states couldn't urinate just yet . HPI  Worsening symptoms OAB over the last 3-4 months. - Having so much frequency he is getting no sleep. Hs cardiologist dropped him down to 1 Flomax daily with his other meds. He is so exhausted and voiding small amts. He is having no dysuria or hematuria. No UTI    Summary of old records: N/A    Additional History: N/A    Procedures Today: N/A    Urinalysis today:  Results for POC orders placed in visit on 04/29/21   POCT Urinalysis no Micro   Result Value Ref Range    Color, UA clear     Clarity, UA      Glucose, UA POC negative     Bilirubin, UA      Ketones, UA      Spec Grav, UA      Blood, UA POC ++     pH, UA      Protein, UA POC +     Urobilinogen, UA      Leukocytes, UA negative     Nitrite, UA negative      Last several PSA's:  No results found for: PSA  Last total testosterone:  Lab Results   Component Value Date    TESTOSTERONE 141.0 (L) 02/22/2016       AUA Symptom Score (4/29/2021):   INCOMPLETE EMPTYING: How often have you had the sensation of not emptying your bladder?: About Half the time  FREQUENCY: How often do you have to urinate less than every two hours?: About Half the time  INTERMITTENCY: How often have you found you stopped and started again several times when you urinated?: Not at all  URGENCY: How often have you found it difficult to postpone urination?: Not at all  WEAK STREAM: How often have you had a weak urinary stream?: Not at all  STRAINING: How often have you had to strain to start  urination?: Not at all  NOCTURIA: How many times did you typically get up at night to uriniate?: 5 Times  TOTAL I-PSS SCORE[de-identified] 11       Last BUN and creatinine:  Lab Results   Component Value Date    BUN 15 02/18/2021     Lab Results   Component Value Date    CREATININE 0.70 02/18/2021       Additional Lab/Culture results:    Imaging Reviewed during this Office Visit:   (results were independently reviewed by physician and radiology report verified)    PAST MEDICAL, FAMILY AND SOCIAL HISTORY UPDATE:  Past Medical History:   Diagnosis Date    Acute MI ([facility] [address]     Allergic rhinitis 9/2/2011    Anemia     Basal cell cancer 03/2013    left side of head, face chin    Basal cell cancer 03/10/14    left cheek    Cervical radiculopathy     Diverticulitis 02/17/13    Epigastric pain/GERD. 9/2/2011    GERD (gastroesophageal reflux disease)     Glaucoma     Hyperlipidemia     Hypertension     Hyponatremia 9/2/2011    IBS (irritable bowel syndrome) 9/2/2011    Insomnia 9/2/2011    Osteoarthritis     Osteoarthritis/neck pain. 9/2/2011    Renal calculi     Shingles     history of shingles     Past Surgical History:   Procedure Laterality Date    CHOLECYSTECTOMY  9-9-15    laparoscopic with cholangiogram    COLONOSCOPY  2002    COLONOSCOPY  08/2013    HERNIA REPAIR      LITHOTRIPSY      MOHS SURGERY Left 03/10/14    cheek    MOHS SURGERY  12/30/14    post scalp    MOHS SURGERY Left 08/2017    X2 left cheek    MOHS SURGERY Left 10/21/2019    temple    MOHS SURGERY  12/14/2020    top of head    OTHER SURGICAL HISTORY  9/6/15    attempted ERCP    SKIN BIOPSY  03/11/13    3 areas    SKIN BIOPSY Left 02/11/14    cheek /basal cell carinoma    SKIN CANCER EXCISION      left face and top of head    SKIN CANCER EXCISION Left 03/2013    forehead, cheek, chin, basal cell.     SKIN CANCER EXCISION Right 08/12/2016    with skin graft    SPINE SURGERY  04/2003     Family History   Problem Relation Age of Onset    Heart Disease Mother     Lung Cancer Father     Cancer Father         lung    Heart Disease Brother      Outpatient Medications Marked as Taking for the 4/29/21 encounter (Office Visit) with NASRA Osborne CNP   Medication Sig Dispense Refill    mirabegron (MYRBETRIQ) 50 MG TB24 Take 50 mg by mouth daily 28 tablet 0    MYRBETRIQ 50 MG TB24 Take 50 mg by mouth daily Has sample for 28 days will need price. 30 tablet 11    allopurinol (ZYLOPRIM) 100 MG tablet TAKE ONE TABLET BY MOUTH DAILY 90 tablet 2    hydrALAZINE (APRESOLINE) 25 MG tablet Take 1 tablet by mouth 2 times daily 90 tablet 3    lisinopril (PRINIVIL;ZESTRIL) 10 MG tablet Take 1 tablet by mouth Daily with lunch 90 tablet 3    dilTIAZem (CARDIZEM CD) 180 MG extended release capsule Take 1 capsule by mouth daily      predniSONE (DELTASONE) 5 MG tablet Take 5 mg by mouth daily      bimatoprost (LUMIGAN) 0.01 % SOLN ophthalmic drops Place 1 drop into both eyes nightly      naproxen (NAPROSYN) 500 MG tablet Take 1 tablet by mouth 2 times daily as needed for Pain (Take with meals) 60 tablet 1    pantoprazole (PROTONIX) 40 MG tablet Take 1 tablet by mouth every morning (before breakfast) 7 tablet 0    busPIRone (BUSPAR) 10 MG tablet Take 1 tablet by mouth 3 times daily 90 tablet 11    zoster recombinant adjuvanted vaccine (SHINGRIX) 50 MCG/0.5ML SUSR injection 50 MCG IM then repeat 2-6 months. 0.5 mL 1    tamsulosin (FLOMAX) 0.4 MG capsule Take 1 capsule by mouth 2 times daily (Patient taking differently: Take 0.4 mg by mouth daily ) 180 capsule 3    latanoprost (XALATAN) 0.005 % ophthalmic solution 1 drop nightly      nitroGLYCERIN (NITROSTAT) 0.4 MG SL tablet Place 0.4 mg under the tongue every 5 minutes as needed for Chest pain up to max of 3 total doses. If no relief after 1 dose, call 911.  warfarin (COUMADIN) 5 MG tablet Take 2.5 mg by mouth once a week Indications:  Wednesdays; INR goal 2-2.5 Managed by [facility]      melatonin 5 MG TABS tablet Take 1 tablet by mouth nightly       metoprolol tartrate (LOPRESSOR) 25 MG tablet Take 25 mg by mouth 2 times daily         Celebrex [celecoxib] and Mobic  Social History     Tobacco Use   Smoking Status Former Smoker    Packs/day: 1.00    Years: 10.00    Pack years: 10.00    Quit date: 1961    Years since quittin.3   Smokeless Tobacco Never Used   Tobacco Comment    quit      (Ifpatient a smoker, smoking cessation counseling offered)    Social History     Substance and Sexual Activity   Alcohol Use Yes    Alcohol/week: 0.0 standard drinks    Comment: 2x/week       REVIEW OF SYSTEMS:  Review of Systems    Physical Exam:      Vitals:    21 1608   BP: 138/85   Pulse: 76   Resp: 18   Temp: 96.5 °F (35.8 °C)     Body mass index is 30.8 kg/m². Patient is a 80 y.o. male in no acute distress and alert and oriented to person, place and time. Physical Exam  Constitutional: Patient in no acute distress. Neuro: Alert and oriented to person, place and time. Psych: Mood normal, affect normal  Skin: No rash noted  HEENT: Head: Normocephalic andatraumatic  Conjunctivae and EOM are normal. Pupils are equal, round  Nose:Normal  Right External Ear: Normal; Left External Ear: Normal  Mouth: Mucosa Moist  Neck: Supple  Lungs: Respiratory effort is normal  Cardiovascular: Warm & Pink, lower leg edema slight   Abdomen: Soft, non-tender, non-distended  Bladder non-tender and not distended. pVR 22 ml   Musculoskeletal: Normal gait and station    Assessment and Plan      1. Urinary frequency    2. Nocturia           Plan:     PVR 22 ml- continue Flomax for BPH    Myrbetriq add for OAB - worsening frequency and nocturia   No follow-ups on file.     Prescriptions Ordered:  Orders Placed This Encounter   Medications    mirabegron (MYRBETRIQ) 50 MG TB24     Sig: Take 50 mg by mouth daily     Dispense:  28 tablet     Refill:  0    MYRBETRIQ 50 MG TB24     Sig: Take 50 mg by mouth daily Has sample for 28 days will need price. Dispense:  30 tablet     Refill:  11     Orders Placed:  Orders Placed This Encounter   Procedures    POCT Urinalysis no Micro           NASRA Monson CNP    Agree with the ROS entered by the MA.

## 2021-05-03 ENCOUNTER — TELEPHONE (OUTPATIENT)
Dept: FAMILY MEDICINE CLINIC | Age: 86
End: 2021-05-03

## 2021-05-03 ENCOUNTER — TELEPHONE (OUTPATIENT)
Dept: UROLOGY | Age: 86
End: 2021-05-03

## 2021-05-03 NOTE — TELEPHONE ENCOUNTER
Patient called stating they have  a history of over endulging in seafood which resulted in having gout,. Patient states they were put on a new medication called myrbetriq started on thursday also that day he had 3 serving of [place] COD. Patient states rt hand swollen, fingers able to  back of hand pinkish. Patient wants to know is it a result of new mediation or [place] cod. Also, patient states they took mediation for three days and stopped it.

## 2021-05-03 NOTE — TELEPHONE ENCOUNTER
It is most likely the seafood, but it could be the new med. He will need to contact urology about stopping the new med.

## 2021-05-03 NOTE — TELEPHONE ENCOUNTER
Patient left a V/M in regards to medication myrbetriq, he said he's been having some swelling. Doesn't know if it's from eating a lot of seafood or the medication giving him a reaction.

## 2021-05-05 RX ORDER — SOLIFENACIN SUCCINATE 10 MG/1
10 TABLET, FILM COATED ORAL DAILY
Qty: 30 TABLET | Refills: 1 | Status: SHIPPED | OUTPATIENT
Start: 2021-05-05 | End: 2021-06-02 | Stop reason: ALTCHOICE

## 2021-05-07 RX ORDER — PREDNISONE 1 MG/1
TABLET ORAL
Qty: 30 TABLET | Refills: 4 | Status: SHIPPED | OUTPATIENT
Start: 2021-05-07 | End: 2021-11-08

## 2021-05-14 ENCOUNTER — TELEPHONE (OUTPATIENT)
Dept: UROLOGY | Age: 86
End: 2021-05-14

## 2021-05-14 NOTE — TELEPHONE ENCOUNTER
Patient called in and stated \"The medication that I have been taking for 5 or 6 days has not been taking care of the problem.  Would like a call back ASAP

## 2021-05-18 ENCOUNTER — OFFICE VISIT (OUTPATIENT)
Dept: FAMILY MEDICINE CLINIC | Age: 86
End: 2021-05-18
Payer: MEDICARE

## 2021-05-18 VITALS
SYSTOLIC BLOOD PRESSURE: 106 MMHG | BODY MASS INDEX: 30.61 KG/M2 | WEIGHT: 162 LBS | DIASTOLIC BLOOD PRESSURE: 74 MMHG | HEART RATE: 70 BPM | RESPIRATION RATE: 16 BRPM | OXYGEN SATURATION: 96 %

## 2021-05-18 DIAGNOSIS — M10.9 GOUT, UNSPECIFIED CAUSE, UNSPECIFIED CHRONICITY, UNSPECIFIED SITE: ICD-10-CM

## 2021-05-18 DIAGNOSIS — I10 ESSENTIAL HYPERTENSION: Primary | ICD-10-CM

## 2021-05-18 DIAGNOSIS — K21.9 GASTROESOPHAGEAL REFLUX DISEASE WITHOUT ESOPHAGITIS: ICD-10-CM

## 2021-05-18 DIAGNOSIS — F32.9 REACTIVE DEPRESSION: ICD-10-CM

## 2021-05-18 DIAGNOSIS — R74.8 ELEVATED LIVER ENZYMES: ICD-10-CM

## 2021-05-18 DIAGNOSIS — R35.1 BENIGN PROSTATIC HYPERPLASIA WITH NOCTURIA: ICD-10-CM

## 2021-05-18 DIAGNOSIS — G47.00 INSOMNIA, UNSPECIFIED TYPE: ICD-10-CM

## 2021-05-18 DIAGNOSIS — D50.9 IRON DEFICIENCY ANEMIA, UNSPECIFIED IRON DEFICIENCY ANEMIA TYPE: ICD-10-CM

## 2021-05-18 DIAGNOSIS — M51.36 DDD (DEGENERATIVE DISC DISEASE), LUMBAR: ICD-10-CM

## 2021-05-18 DIAGNOSIS — M50.30 DDD (DEGENERATIVE DISC DISEASE), CERVICAL: ICD-10-CM

## 2021-05-18 DIAGNOSIS — E78.00 PURE HYPERCHOLESTEROLEMIA: ICD-10-CM

## 2021-05-18 DIAGNOSIS — I50.22 CHRONIC SYSTOLIC CONGESTIVE HEART FAILURE (HCC): ICD-10-CM

## 2021-05-18 DIAGNOSIS — N40.1 BENIGN PROSTATIC HYPERPLASIA WITH NOCTURIA: ICD-10-CM

## 2021-05-18 DIAGNOSIS — I48.0 PAROXYSMAL ATRIAL FIBRILLATION (HCC): ICD-10-CM

## 2021-05-18 DIAGNOSIS — E53.8 B12 DEFICIENCY: ICD-10-CM

## 2021-05-18 DIAGNOSIS — M19.90 OSTEOARTHRITIS, UNSPECIFIED OSTEOARTHRITIS TYPE, UNSPECIFIED SITE: ICD-10-CM

## 2021-05-18 DIAGNOSIS — K58.9 IRRITABLE BOWEL SYNDROME WITHOUT DIARRHEA: ICD-10-CM

## 2021-05-18 DIAGNOSIS — F41.9 ANXIETY: ICD-10-CM

## 2021-05-18 PROBLEM — M00.9 SEPTIC ARTHRITIS (HCC): Status: RESOLVED | Noted: 2021-02-17 | Resolved: 2021-05-18

## 2021-05-18 PROCEDURE — 4040F PNEUMOC VAC/ADMIN/RCVD: CPT | Performed by: FAMILY MEDICINE

## 2021-05-18 PROCEDURE — 1123F ACP DISCUSS/DSCN MKR DOCD: CPT | Performed by: FAMILY MEDICINE

## 2021-05-18 PROCEDURE — G8427 DOCREV CUR MEDS BY ELIG CLIN: HCPCS | Performed by: FAMILY MEDICINE

## 2021-05-18 PROCEDURE — 99215 OFFICE O/P EST HI 40 MIN: CPT | Performed by: FAMILY MEDICINE

## 2021-05-18 PROCEDURE — G8417 CALC BMI ABV UP PARAM F/U: HCPCS | Performed by: FAMILY MEDICINE

## 2021-05-18 PROCEDURE — 1036F TOBACCO NON-USER: CPT | Performed by: FAMILY MEDICINE

## 2021-05-18 SDOH — ECONOMIC STABILITY: FOOD INSECURITY: WITHIN THE PAST 12 MONTHS, YOU WORRIED THAT YOUR FOOD WOULD RUN OUT BEFORE YOU GOT MONEY TO BUY MORE.: NEVER TRUE

## 2021-05-18 SDOH — ECONOMIC STABILITY: FOOD INSECURITY: WITHIN THE PAST 12 MONTHS, THE FOOD YOU BOUGHT JUST DIDN'T LAST AND YOU DIDN'T HAVE MONEY TO GET MORE.: NEVER TRUE

## 2021-05-18 ASSESSMENT — ENCOUNTER SYMPTOMS
RHINORRHEA: 0
ABDOMINAL PAIN: 0
ABDOMINAL DISTENTION: 0
COUGH: 0
CHEST TIGHTNESS: 0
CONSTIPATION: 1
NAUSEA: 0
SHORTNESS OF BREATH: 0
DIARRHEA: 0
BACK PAIN: 1
VOMITING: 0
SORE THROAT: 0

## 2021-05-18 ASSESSMENT — SOCIAL DETERMINANTS OF HEALTH (SDOH): HOW HARD IS IT FOR YOU TO PAY FOR THE VERY BASICS LIKE FOOD, HOUSING, MEDICAL CARE, AND HEATING?: NOT HARD AT ALL
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
## 2021-06-01 ENCOUNTER — OFFICE VISIT (OUTPATIENT)
Dept: UROLOGY | Age: 86
End: 2021-06-01
Payer: MEDICARE

## 2021-06-01 VITALS
HEART RATE: 58 BPM | WEIGHT: 162 LBS | BODY MASS INDEX: 30.58 KG/M2 | DIASTOLIC BLOOD PRESSURE: 81 MMHG | HEIGHT: 61 IN | SYSTOLIC BLOOD PRESSURE: 130 MMHG | TEMPERATURE: 96.7 F

## 2021-06-01 DIAGNOSIS — R35.1 NOCTURIA: Primary | ICD-10-CM

## 2021-06-01 DIAGNOSIS — R33.9 URINARY RETENTION: ICD-10-CM

## 2021-06-01 LAB
BILIRUBIN, POC: NORMAL
BLOOD URINE, POC: NORMAL
CLARITY, POC: CLEAR
COLOR, POC: YELLOW
GLUCOSE URINE, POC: NORMAL
KETONES, POC: NORMAL
LEUKOCYTE EST, POC: NORMAL
NITRITE, POC: NORMAL
PH, POC: NORMAL
PROTEIN, POC: NORMAL
SPECIFIC GRAVITY, POC: NORMAL
UROBILINOGEN, POC: NORMAL

## 2021-06-01 PROCEDURE — G8417 CALC BMI ABV UP PARAM F/U: HCPCS | Performed by: NURSE PRACTITIONER

## 2021-06-01 PROCEDURE — G8427 DOCREV CUR MEDS BY ELIG CLIN: HCPCS | Performed by: NURSE PRACTITIONER

## 2021-06-01 PROCEDURE — 1036F TOBACCO NON-USER: CPT | Performed by: NURSE PRACTITIONER

## 2021-06-01 PROCEDURE — 51798 US URINE CAPACITY MEASURE: CPT | Performed by: NURSE PRACTITIONER

## 2021-06-01 PROCEDURE — 81002 URINALYSIS NONAUTO W/O SCOPE: CPT | Performed by: NURSE PRACTITIONER

## 2021-06-01 PROCEDURE — 99214 OFFICE O/P EST MOD 30 MIN: CPT | Performed by: NURSE PRACTITIONER

## 2021-06-01 PROCEDURE — 4040F PNEUMOC VAC/ADMIN/RCVD: CPT | Performed by: NURSE PRACTITIONER

## 2021-06-01 PROCEDURE — 1123F ACP DISCUSS/DSCN MKR DOCD: CPT | Performed by: NURSE PRACTITIONER

## 2021-06-01 ASSESSMENT — ENCOUNTER SYMPTOMS
ABDOMINAL PAIN: 0
EYE PAIN: 0
CONSTIPATION: 0
EYES NEGATIVE: 1
EYE REDNESS: 0
DIARRHEA: 0
SHORTNESS OF BREATH: 0
RESPIRATORY NEGATIVE: 1
NAUSEA: 0
GASTROINTESTINAL NEGATIVE: 1
VOMITING: 0
COUGH: 0
WHEEZING: 0
BACK PAIN: 0

## 2021-06-01 NOTE — LETTER
[address] [address]  Phone: [phone]  Fax: [phone], APRN - CNP    June 8, 2021     Dickson Edmonds Ra [address] [place] [address]    Patient: Khris Frost   MR Number: [id number]   YOB: 1931   Date of Visit: 6/1/2021       Dear Kendal Hare: Thank you for referring Josafat Nascimento to me for evaluation/treatment. Below are the relevant portions of my assessment and plan of care. 1. Nocturia    2. Urinary retention      Plan:  Plan:      Symptoms intolerable OAB, nocturia, incomplete emptying     PVR- 219 ml      Stop Oxybutynin, Myrbetriq caused hand swelling     will need cysto. If you have questions, please do not hesitate to call me. I look forward to following [place] along with you.     Sincerely,    NASRA Hare CNP, APRN - CNP

## 2021-06-01 NOTE — PROGRESS NOTES
Review of Systems   Constitutional: Negative. Negative for appetite change, chills and fatigue. Eyes: Negative. Negative for pain, redness and visual disturbance. Respiratory: Negative. Negative for cough, shortness of breath and wheezing. Cardiovascular: Negative. Negative for chest pain and leg swelling. Gastrointestinal: Negative. Negative for abdominal pain, constipation, diarrhea, nausea and vomiting. Genitourinary: Positive for frequency (ONLY at night). Negative for difficulty urinating, dysuria, flank pain, hematuria and urgency. Musculoskeletal: Negative for back pain, joint swelling and myalgias. Skin: Negative. Negative for rash and wound. Neurological: Negative. Negative for dizziness, weakness and numbness. Hematological: Negative. Does not bruise/bleed easily.    burning sensation when urinating

## 2021-06-01 NOTE — PROGRESS NOTES
chin, basal cell.  SKIN CANCER EXCISION Right 08/12/2016    with skin graft    SPINE SURGERY  04/2003     Family History   Problem Relation Age of Onset    Heart Disease Mother     Lung Cancer Father     Cancer Father         lung    Heart Disease Brother      Outpatient Medications Marked as Taking for the 6/1/21 encounter (Office Visit) with Alfred Patches, APRN - CNP   Medication Sig Dispense Refill    predniSONE (DELTASONE) 5 MG tablet TAKE ONE TABLET BY MOUTH DAILY 30 tablet 4    allopurinol (ZYLOPRIM) 100 MG tablet TAKE ONE TABLET BY MOUTH DAILY 90 tablet 2    lisinopril (PRINIVIL;ZESTRIL) 10 MG tablet Take 1 tablet by mouth Daily with lunch 90 tablet 3    dilTIAZem (CARDIZEM CD) 180 MG extended release capsule Take 1 capsule by mouth daily      bimatoprost (LUMIGAN) 0.01 % SOLN ophthalmic drops Place 1 drop into both eyes nightly      naproxen (NAPROSYN) 500 MG tablet Take 1 tablet by mouth 2 times daily as needed for Pain (Take with meals) 60 tablet 1    pantoprazole (PROTONIX) 40 MG tablet Take 1 tablet by mouth every morning (before breakfast) 7 tablet 0    busPIRone (BUSPAR) 10 MG tablet Take 1 tablet by mouth 3 times daily 90 tablet 11    zoster recombinant adjuvanted vaccine (SHINGRIX) 50 MCG/0.5ML SUSR injection 50 MCG IM then repeat 2-6 months. 0.5 mL 1    tamsulosin (FLOMAX) 0.4 MG capsule Take 1 capsule by mouth 2 times daily (Patient taking differently: Take 0.4 mg by mouth daily ) 180 capsule 3    latanoprost (XALATAN) 0.005 % ophthalmic solution 1 drop nightly      warfarin (COUMADIN) 5 MG tablet Take 2.5 mg by mouth once a week Indications:  Wednesdays; INR goal 2-2.5 Managed by [facility]      melatonin 5 MG TABS tablet Take 1 tablet by mouth nightly       metoprolol tartrate (LOPRESSOR) 25 MG tablet Take 25 mg by mouth 2 times daily         Celebrex [celecoxib] and Mobic  Social History     Tobacco Use   Smoking Status Former Smoker    Packs/day: 1.00  Years: 10.00    Pack years: 10.00    Quit date: 1961    Years since quittin.4   Smokeless Tobacco Never Used   Tobacco Comment    quit      (Ifpatient a smoker, smoking cessation counseling offered)    Social History     Substance and Sexual Activity   Alcohol Use Yes    Alcohol/week: 0.0 standard drinks    Comment: 2x/week       REVIEW OF SYSTEMS:  Review of Systems    Physical Exam:      Vitals:    21 1501   BP: 130/81   Pulse: 58   Temp: 96.7 °F (35.9 °C)     Body mass index is 30.61 kg/m². Patient is a 80 y.o. male in no acute distress and alert and oriented to person, place and time. Physical Exam  Constitutional: Patient in no acute distress. Neuro: Alert and oriented to person, place and time. Psych: Mood normal, affect normal  Skin: No rash noted  HEENT: Head: Normocephalic andatraumatic  Conjunctivae and EOM are normal. Pupils are equal, round  Nose:Normal  Right External Ear: Normal; Left External Ear: Normal  Mouth: Mucosa Moist  Neck: Supple  Lungs: Respiratory effort is normal  Cardiovascular: Warm & Pink  Abdomen: Soft, non-tender, non-distended   Bladder non-tender and not distended.  ml   Musculoskeletal: Normal gait and station      Assessment and Plan      1. Nocturia    2. Urinary retention           Plan:     Symptoms intolerable OAB, nocturia, incomplete emptying    PVR-     Stop Oxybutynin, Myrbetriq caused hand swelling    will need cysto. No follow-ups on file. Prescriptions Ordered:  No orders of the defined types were placed in this encounter. Orders Placed:  Orders Placed This Encounter   Procedures    POCT Urinalysis no Micro    [place] MEASUREMENT,POST-VOID RESIDUAL VOLUME BY US,NON-IMAGING           Suzette Alcantara, NASRA - CNP    reviewed and Agree with the ROS entered by the MA.

## 2021-06-02 ENCOUNTER — TELEPHONE (OUTPATIENT)
Dept: UROLOGY | Age: 86
End: 2021-06-02

## 2021-06-02 ENCOUNTER — OFFICE VISIT (OUTPATIENT)
Dept: PODIATRY | Age: 86
End: 2021-06-02
Payer: MEDICARE

## 2021-06-02 ENCOUNTER — HOSPITAL ENCOUNTER (OUTPATIENT)
Age: 86
Setting detail: SPECIMEN
Discharge: HOME | End: 2021-06-02
Payer: MEDICARE

## 2021-06-02 VITALS — HEIGHT: 64 IN | WEIGHT: 163 LBS | BODY MASS INDEX: 27.83 KG/M2

## 2021-06-02 DIAGNOSIS — E53.8 B12 DEFICIENCY: ICD-10-CM

## 2021-06-02 DIAGNOSIS — B35.1 ONYCHOMYCOSIS: Primary | ICD-10-CM

## 2021-06-02 DIAGNOSIS — D50.9 IRON DEFICIENCY ANEMIA, UNSPECIFIED IRON DEFICIENCY ANEMIA TYPE: ICD-10-CM

## 2021-06-02 DIAGNOSIS — R74.8 ELEVATED LIVER ENZYMES: ICD-10-CM

## 2021-06-02 DIAGNOSIS — M79.675 PAIN IN TOES OF BOTH FEET: ICD-10-CM

## 2021-06-02 DIAGNOSIS — M79.674 PAIN IN TOES OF BOTH FEET: ICD-10-CM

## 2021-06-02 DIAGNOSIS — E78.00 PURE HYPERCHOLESTEROLEMIA: ICD-10-CM

## 2021-06-02 DIAGNOSIS — I10 ESSENTIAL HYPERTENSION: ICD-10-CM

## 2021-06-02 DIAGNOSIS — I50.22 CHRONIC SYSTOLIC CONGESTIVE HEART FAILURE (HCC): ICD-10-CM

## 2021-06-02 DIAGNOSIS — M10.9 GOUT, UNSPECIFIED CAUSE, UNSPECIFIED CHRONICITY, UNSPECIFIED SITE: ICD-10-CM

## 2021-06-02 LAB
ALBUMIN SERPL-MCNC: 3.8 G/DL (ref 3.5–5.2)
ALBUMIN/GLOBULIN RATIO: 1.7 (ref 1–2.5)
ALP BLD-CCNC: 64 U/L (ref 40–129)
ALT SERPL-CCNC: 14 U/L (ref 5–41)
ANION GAP SERPL CALCULATED.3IONS-SCNC: 9 MMOL/L (ref 9–17)
AST SERPL-CCNC: 19 U/L
BILIRUB SERPL-MCNC: 1.36 MG/DL (ref 0.3–1.2)
BUN BLDV-MCNC: 16 MG/DL (ref 8–23)
BUN/CREAT BLD: ABNORMAL (ref 9–20)
CALCIUM SERPL-MCNC: 9.6 MG/DL (ref 8.6–10.4)
CHLORIDE BLD-SCNC: 95 MMOL/L (ref 98–107)
CHOLESTEROL/HDL RATIO: 2.7
CHOLESTEROL: 143 MG/DL
CO2: 28 MMOL/L (ref 20–31)
CREAT SERPL-MCNC: 1.06 MG/DL (ref 0.7–1.2)
GFR AFRICAN AMERICAN: >60 ML/MIN
GFR NON-AFRICAN AMERICAN: >60 ML/MIN
GFR SERPL CREATININE-BSD FRML MDRD: ABNORMAL ML/MIN/{1.73_M2}
GFR SERPL CREATININE-BSD FRML MDRD: ABNORMAL ML/MIN/{1.73_M2}
GLUCOSE BLD-MCNC: 81 MG/DL (ref 70–99)
HCT VFR BLD CALC: 46.6 % (ref 40.7–50.3)
HDLC SERPL-MCNC: 53 MG/DL
HEMOGLOBIN: 14.6 G/DL (ref 13–17)
LDL CHOLESTEROL: 76 MG/DL (ref 0–130)
MCH RBC QN AUTO: 29.7 PG (ref 25.2–33.5)
MCHC RBC AUTO-ENTMCNC: 31.3 G/DL (ref 28.4–34.8)
MCV RBC AUTO: 94.9 FL (ref 82.6–102.9)
NRBC AUTOMATED: 0 PER 100 WBC
PDW BLD-RTO: 14.7 % (ref 11.8–14.4)
PLATELET # BLD: 180 K/UL (ref 138–453)
PMV BLD AUTO: 10.7 FL (ref 8.1–13.5)
POTASSIUM SERPL-SCNC: 4.7 MMOL/L (ref 3.7–5.3)
RBC # BLD: 4.91 M/UL (ref 4.21–5.77)
SODIUM BLD-SCNC: 132 MMOL/L (ref 135–144)
TOTAL PROTEIN: 6.1 G/DL (ref 6.4–8.3)
TRIGL SERPL-MCNC: 71 MG/DL
URIC ACID: 3.8 MG/DL (ref 3.4–7)
VITAMIN B-12: 379 PG/ML (ref 232–1245)
VLDLC SERPL CALC-MCNC: NORMAL MG/DL (ref 1–30)
WBC # BLD: 7.2 K/UL (ref 3.5–11.3)

## 2021-06-02 PROCEDURE — 11721 DEBRIDE NAIL 6 OR MORE: CPT | Performed by: PODIATRIST

## 2021-06-02 ASSESSMENT — ENCOUNTER SYMPTOMS
DIARRHEA: 0
CONSTIPATION: 0
VOMITING: 0
NAUSEA: 0
COLOR CHANGE: 0

## 2021-06-02 NOTE — TELEPHONE ENCOUNTER
Patient called in and stated \"I need a sooner appoinment, preferably July 1st. [facility]

## 2021-06-02 NOTE — PROGRESS NOTES
[facility]  Return Patient    Chief Complaint   Patient presents with   Murrel Neither Nail Problem     9 week nail trim /last saw Andrea Duvall 5/18/21    Other     pt is unsure of medication currently taking states they are all the same and no medications have been discontinued at this time       Subjective: This Murray Germain comes to clinic for foot and nail care. He would like all of his nails trimmed. He cannot take care of them himself. They are painful when long and he tries to wear shoes. Pt's primary care physician is Renny Akhtar MD.       Past Medical History:   Diagnosis Date    Acute MI ([facility] [address]     Allergic rhinitis 9/2/2011    Anemia     Basal cell cancer 03/2013    left side of head, face chin    Basal cell cancer 03/10/14    left cheek    Cervical radiculopathy     Diverticulitis 02/17/13    Epigastric pain/GERD. 9/2/2011    GERD (gastroesophageal reflux disease)     Glaucoma     Hyperlipidemia     Hypertension     Hyponatremia 9/2/2011    IBS (irritable bowel syndrome) 9/2/2011    Insomnia 9/2/2011    Osteoarthritis     Osteoarthritis/neck pain.  9/2/2011    Renal calculi     Shingles     history of shingles       Allergies   Allergen Reactions    Celebrex [Celecoxib] Nausea Only    Mobic Nausea Only     Current Outpatient Medications on File Prior to Visit   Medication Sig Dispense Refill    predniSONE (DELTASONE) 5 MG tablet TAKE ONE TABLET BY MOUTH DAILY 30 tablet 4    solifenacin (VESICARE) 10 MG tablet Take 1 tablet by mouth daily 30 tablet 1    allopurinol (ZYLOPRIM) 100 MG tablet TAKE ONE TABLET BY MOUTH DAILY 90 tablet 2    lisinopril (PRINIVIL;ZESTRIL) 10 MG tablet Take 1 tablet by mouth Daily with lunch 90 tablet 3    dilTIAZem (CARDIZEM CD) 180 MG extended release capsule Take 1 capsule by mouth daily      bimatoprost (LUMIGAN) 0.01 % SOLN ophthalmic drops Place 1 drop into both eyes nightly      naproxen (NAPROSYN) 500 MG tablet Take 1 tablet by mouth 2 times daily as needed for Pain (Take with meals) 60 tablet 1    pantoprazole (PROTONIX) 40 MG tablet Take 1 tablet by mouth every morning (before breakfast) 7 tablet 0    busPIRone (BUSPAR) 10 MG tablet Take 1 tablet by mouth 3 times daily 90 tablet 11    zoster recombinant adjuvanted vaccine (SHINGRIX) 50 MCG/0.5ML SUSR injection 50 MCG IM then repeat 2-6 months. 0.5 mL 1    tamsulosin (FLOMAX) 0.4 MG capsule Take 1 capsule by mouth 2 times daily (Patient taking differently: Take 0.4 mg by mouth daily ) 180 capsule 3    latanoprost (XALATAN) 0.005 % ophthalmic solution 1 drop nightly      warfarin (COUMADIN) 5 MG tablet Take 2.5 mg by mouth once a week Indications: Wednesdays; INR goal 2-2.5 Man[facility] by [facility]      melatonin 5 MG TABS tablet Take 1 tablet by mouth nightly       metoprolol tartrate (LOPRESSOR) 25 MG tablet Take 25 mg by mouth 2 times daily       No current facility-administered medications on file prior to visit. Review of Systems   Constitutional: Negative for chills, diaphoresis, fatigue, fever and unexpected weight change. Cardiovascular: Negative for leg swelling. Gastrointestinal: Negative for constipation, diarrhea, nausea and vomiting. Musculoskeletal: Positive for gait problem. Negative for arthralgias and joint swelling. Skin: Negative for color change, pallor, rash and wound. Neurological: Negative for weakness and numbness. Objective:  General: AAO x 3 in NAD. Derm  Left hallux nail incurvated medial border with slight redness and pain to palpation.     Sites of Onychomycosis Involvement (Check affected area)  [x] [x] [x] [x] [x] [x] [x] [x] [x] [x]  5 4 3 2 1 1 2 3 4 5                          Right                                        Left    Thickness  [x] [x] [x] [x] [x] [x] [x] [x] [x] [x]  5 4 3 2 1 1 2 3 4 5                         Right                                        Left    Dystrophic Changes [x] [x] [x] [x] [x] [x] [x] [x] [x] [x]  5 4 3 2 1 1 2 3 4 5                         Right                                        Left    Color                                                                  [x] [x] [x] [x] [x] [x] [x] [x] [x] [x]  5 4 3 2 1 1 2 3 4 5                          Right                                        Left    Incurvation/Ingrowin                                                                   [] [] [] [] [] [] [] [] [] []  5 4 3 2 1 1 2 3 4 5                         Right                                        Left    Inflammation/Pain                                                                   [x] [x] [x] [x] [x] [x] [x] [x] [x] [x]  5 4 3 2 1 1 2 3 4 5                         Right                                        Left    Vascular:  DP/PT pulses palpable 2/4, Bilateral.    CFT <3 seconds to digits 1-5, Bilateral .   Hair growth present to level of digits, Bilateral.    Neurological:  Sensation present to light touch to level of digits, Bilateral.    Assessment:  80 y.o. male with:   1. Onychomycosis    2. Pain in toes of both feet       Orders Placed This Encounter   Procedures    RI DEBRIDEMENT OF NAILS, 6 OR MORE         Plan:   Pt was evaluated and examined. Patient was given personalized discharge instructions. Nails 1-10 were debrided in length and thickness sharply with a nail nipper and  without incident, slant back performed. Pt will follow up in 9-12 weeks or sooner if any problems arise. Diagnosis was discussed with the pt and all of their questions were answered in detail. Proper foot hygiene and care was discussed with the pt. Patient to check feet daily and contact the office with any questions/problems/concerns. Other comorbidity noted and will be managed by PCP. Pain waiver discussed with patient and confirmed.          6/2/2021    Electronically signed by Med Casper DPM on 6/2/2021 at 12:36 PM

## 2021-06-08 NOTE — PATIENT INSTRUCTIONS
Plan:      Symptoms intolerable OAB, nocturia, incomplete emptying     PVR-      Stop Oxybutynin, Myrbetriq caused hand swelling     will need cysto.

## 2021-06-18 ENCOUNTER — OFFICE VISIT (OUTPATIENT)
Dept: FAMILY MEDICINE CLINIC | Age: 86
End: 2021-06-18
Payer: MEDICARE

## 2021-06-18 VITALS
RESPIRATION RATE: 16 BRPM | SYSTOLIC BLOOD PRESSURE: 116 MMHG | BODY MASS INDEX: 26.09 KG/M2 | DIASTOLIC BLOOD PRESSURE: 76 MMHG | HEART RATE: 82 BPM | WEIGHT: 152 LBS | OXYGEN SATURATION: 98 % | TEMPERATURE: 97.6 F

## 2021-06-18 DIAGNOSIS — F32.9 REACTIVE DEPRESSION: ICD-10-CM

## 2021-06-18 DIAGNOSIS — I10 ESSENTIAL HYPERTENSION: Primary | ICD-10-CM

## 2021-06-18 DIAGNOSIS — R63.4 WEIGHT LOSS: ICD-10-CM

## 2021-06-18 DIAGNOSIS — F41.9 ANXIETY: ICD-10-CM

## 2021-06-18 PROCEDURE — 99214 OFFICE O/P EST MOD 30 MIN: CPT | Performed by: FAMILY MEDICINE

## 2021-06-18 PROCEDURE — 1036F TOBACCO NON-USER: CPT | Performed by: FAMILY MEDICINE

## 2021-06-18 PROCEDURE — 4040F PNEUMOC VAC/ADMIN/RCVD: CPT | Performed by: FAMILY MEDICINE

## 2021-06-18 PROCEDURE — 1123F ACP DISCUSS/DSCN MKR DOCD: CPT | Performed by: FAMILY MEDICINE

## 2021-06-18 PROCEDURE — G8417 CALC BMI ABV UP PARAM F/U: HCPCS | Performed by: FAMILY MEDICINE

## 2021-06-18 PROCEDURE — G8428 CUR MEDS NOT DOCUMENT: HCPCS | Performed by: FAMILY MEDICINE

## 2021-06-18 RX ORDER — MIRTAZAPINE 15 MG/1
15 TABLET, FILM COATED ORAL NIGHTLY
Qty: 30 TABLET | Refills: 3 | Status: SHIPPED | OUTPATIENT
Start: 2021-06-18 | End: 2021-07-20 | Stop reason: ALTCHOICE

## 2021-06-18 ASSESSMENT — ENCOUNTER SYMPTOMS
SORE THROAT: 0
SHORTNESS OF BREATH: 0
VOMITING: 0
CHEST TIGHTNESS: 0
COUGH: 0
RHINORRHEA: 0
BACK PAIN: 0
ABDOMINAL PAIN: 0
CONSTIPATION: 0
NAUSEA: 0
DIARRHEA: 0
ABDOMINAL DISTENTION: 0

## 2021-06-18 NOTE — PROGRESS NOTES
Jacqueline Petty MD  [address] [place] [address] [facility]  [id number]  [address] [address]  Dept: [phone]  Dept Fax: [phone]     Patient ID: Brigid Britton is a 80 y.o. male. HPI    Established patient here today for follow up on BP check and depression. We did stop the Hydralazine at his last appt for low BP. Pt hs been feeling very depressed. He is not interested in doing things and his appetite is decreased and has lost weight. He states he just feels down and no motivation to do anything. Pt denies any fever or chills. Pt today denies any HA, chest pain, or SOB. Pt denies any N/V/D/C or abdominal pain. Otherwise pt doing well on current tx and no other concerns today. The patient's past medical, surgical, social, and family history as well as his current medications and allergies were reviewed as documented in today's encounter. My previous office notes, labs and diagnostic studies were reviewed prior to and during encounter.     Current Outpatient Medications on File Prior to Visit   Medication Sig Dispense Refill    predniSONE (DELTASONE) 5 MG tablet TAKE ONE TABLET BY MOUTH DAILY 30 tablet 4    allopurinol (ZYLOPRIM) 100 MG tablet TAKE ONE TABLET BY MOUTH DAILY 90 tablet 2    lisinopril (PRINIVIL;ZESTRIL) 10 MG tablet Take 1 tablet by mouth Daily with lunch 90 tablet 3    dilTIAZem (CARDIZEM CD) 180 MG extended release capsule Take 1 capsule by mouth daily      bimatoprost (LUMIGAN) 0.01 % SOLN ophthalmic drops Place 1 drop into both eyes nightly      naproxen (NAPROSYN) 500 MG tablet Take 1 tablet by mouth 2 times daily as needed for Pain (Take with meals) 60 tablet 1    pantoprazole (PROTONIX) 40 MG tablet Take 1 tablet by mouth every morning (before breakfast) 7 tablet 0    busPIRone (BUSPAR) 10 MG tablet Take 1 tablet by mouth 3 times daily 90 tablet 11    zoster recombinant adjuvanted vaccine (SHINGRIX) 50 MCG/0.5ML SUSR injection 50 MCG IM then repeat 2-6 months. 0.5 mL 1    tamsulosin (FLOMAX) 0.4 MG capsule Take 1 capsule by mouth 2 times daily (Patient taking differently: Take 0.4 mg by mouth daily ) 180 capsule 3    latanoprost (XALATAN) 0.005 % ophthalmic solution 1 drop nightly      warfarin (COUMADIN) 5 MG tablet Take 2.5 mg by mouth once a week Indications: Wednesdays; INR goal 2-2.5 Managed by [facility]      melatonin 5 MG TABS tablet Take 1 tablet by mouth nightly       metoprolol tartrate (LOPRESSOR) 25 MG tablet Take 25 mg by mouth 2 times daily       No current facility-administered medications on file prior to visit. Subjective:     Review of Systems   Constitutional: Positive for appetite change (decreased) and unexpected weight change (weight loss). Negative for fatigue and fever. HENT: Negative for congestion, ear pain, rhinorrhea and sore throat. Respiratory: Negative for cough, chest tightness and shortness of breath. Cardiovascular: Negative for chest pain and palpitations. Gastrointestinal: Negative for abdominal distention, abdominal pain, constipation, diarrhea, nausea and vomiting. Genitourinary: Positive for frequency. Negative for difficulty urinating and dysuria. Musculoskeletal: Negative for arthralgias, back pain and myalgias. Skin: Negative for rash. Neurological: Negative for dizziness, weakness, light-headedness and headaches. Hematological: Negative for adenopathy. Psychiatric/Behavioral: Positive for dysphoric mood. Negative for behavioral problems and sleep disturbance. The patient is nervous/anxious. Objective:     Physical Exam  Vitals reviewed. Constitutional:       General: He is not in acute distress. Appearance: He is well-developed and underweight. HENT:      Head: Normocephalic and atraumatic.       Right Ear: External ear normal.      Left Ear: External ear normal.      Nose: Nose normal.      Mouth/Throat: systems unchanged, continue current treatments. Medications, labs, diagnostic studies, consultations and follow-up as documented in this encounter. Rest of systems unchanged, continue current treatments    On this date June 18, 2021,  I have spent greater than 50% of this visit reviewing previous notes, test results and face to face with the patient discussing the diagnoses, importance of compliance with the treatment plan, counseling, coordinating care as well as documenting on the day of the visit. Jacqueline Suarez MD

## 2021-07-01 ENCOUNTER — PROCEDURE VISIT (OUTPATIENT)
Dept: UROLOGY | Age: 86
End: 2021-07-01
Payer: MEDICARE

## 2021-07-01 ENCOUNTER — TELEPHONE (OUTPATIENT)
Dept: FAMILY MEDICINE CLINIC | Age: 86
End: 2021-07-01

## 2021-07-01 VITALS
BODY MASS INDEX: 25.95 KG/M2 | TEMPERATURE: 95.3 F | HEIGHT: 64 IN | HEART RATE: 66 BPM | WEIGHT: 152 LBS | SYSTOLIC BLOOD PRESSURE: 131 MMHG | DIASTOLIC BLOOD PRESSURE: 76 MMHG

## 2021-07-01 DIAGNOSIS — N40.1 BENIGN PROSTATIC HYPERPLASIA WITH INCOMPLETE BLADDER EMPTYING: ICD-10-CM

## 2021-07-01 DIAGNOSIS — R39.14 BENIGN PROSTATIC HYPERPLASIA WITH INCOMPLETE BLADDER EMPTYING: ICD-10-CM

## 2021-07-01 DIAGNOSIS — R35.1 NOCTURIA: Primary | ICD-10-CM

## 2021-07-01 PROCEDURE — 52000 CYSTOURETHROSCOPY: CPT | Performed by: UROLOGY

## 2021-07-01 NOTE — TELEPHONE ENCOUNTER
I spoke with the patient who said he isn't sure how to find out about the surgery but I did tell him he had been calling the correct number, but there are multiple providers at that office. I did ask him if he has any scars on his abdomen since I can't find anything in his chart and he said no, he doesn't. I instructed him to let Dr. Holley Sanders office know this.

## 2021-07-01 NOTE — TELEPHONE ENCOUNTER
Patient saw urology today and they need to do a prostate surgery through his abdomen and they need to know what abdominal surgeries he has had in the past. He knows it was done by either Dr. Marylen Perish or Jazmin Pérez and he only has one phone number for them and a different company answers the phone. I told him I will try to find the number and call him back because we don't have anything in his chart.

## 2021-07-01 NOTE — PROGRESS NOTES
PROSTATE U/S   Risks and Benefits discussed with patient prior to procedure. Surgeon: Suyapa Titus MD  7/1/21  Indications for exam: Enlarged Prostate  Ultrasound Findings:  Seminal Vesicles: intact bilaterally  Prostate Measurement: 109 grams  Central Zone: Normal echogenic structure  Transitional Zone: Normal echogenic structure  Peripheral Zone: Normal echogenic structure  Bladder: Minimal postvoid residual      Agree with the ROS entered by the MA.

## 2021-07-01 NOTE — PROGRESS NOTES
Cystoscopy Operative Note (7/1/21)  Surgeon: Guilherme Ojeda MD  Anesthesia: Urethral 2% Xylocaine   Indications: Nocturia  Position: Dorsal Lithotomy    Findings:   Risks and Benefits discussed with patient prior to procedure. The patient was prepped and draped in the usual sterile fashion. The flexible cystoscope was advanced through the urethra and into the bladder. The bladder was thoroughly inspected and the following was noted:    Residual Urine: mild  Urethra: normal appearing urethra with no masses, tenderness or lesions  Prostate: partially obstructing lateral lobes of prostate; median lobe present? yes. Bladder: No tumors or CIS noted. No bladder diverticulum. There was mild trabeculation noted. Ureters: Clear efflux from both ureters. Orifices with normal configuration and location. The cystoscope was removed. The patient tolerated the procedure well. Agree with the ROS entered by the MA.     LARGE PRSOTATE, DO ROBO SIMPLE PROSTATE, NEED TO HOLD COUMADIN, CARDIAC CLEARANCE, DO AT STV

## 2021-07-14 ENCOUNTER — OFFICE VISIT (OUTPATIENT)
Dept: PODIATRY | Age: 86
End: 2021-07-14
Payer: MEDICARE

## 2021-07-14 VITALS — BODY MASS INDEX: 27.83 KG/M2 | WEIGHT: 163 LBS | HEIGHT: 64 IN

## 2021-07-14 DIAGNOSIS — M72.2 PLANTAR FASCIITIS, RIGHT: Primary | ICD-10-CM

## 2021-07-14 DIAGNOSIS — M76.821 POSTERIOR TIBIAL TENDINITIS, RIGHT: ICD-10-CM

## 2021-07-14 DIAGNOSIS — M25.571 ACUTE RIGHT ANKLE PAIN: ICD-10-CM

## 2021-07-14 PROCEDURE — 29540 STRAPPING ANKLE &/FOOT: CPT | Performed by: PODIATRIST

## 2021-07-14 PROCEDURE — 4040F PNEUMOC VAC/ADMIN/RCVD: CPT | Performed by: PODIATRIST

## 2021-07-14 PROCEDURE — 99213 OFFICE O/P EST LOW 20 MIN: CPT | Performed by: PODIATRIST

## 2021-07-14 PROCEDURE — G8427 DOCREV CUR MEDS BY ELIG CLIN: HCPCS | Performed by: PODIATRIST

## 2021-07-14 PROCEDURE — 1123F ACP DISCUSS/DSCN MKR DOCD: CPT | Performed by: PODIATRIST

## 2021-07-14 PROCEDURE — 1036F TOBACCO NON-USER: CPT | Performed by: PODIATRIST

## 2021-07-14 PROCEDURE — G8417 CALC BMI ABV UP PARAM F/U: HCPCS | Performed by: PODIATRIST

## 2021-07-14 ASSESSMENT — ENCOUNTER SYMPTOMS
DIARRHEA: 0
CONSTIPATION: 0
COLOR CHANGE: 0
VOMITING: 0
NAUSEA: 0

## 2021-07-14 NOTE — PROGRESS NOTES
[facility]  Return Patient     Maris Morfin 80 y.o. male that presents for follow-up of   Chief Complaint   Patient presents with    Foot Pain     right foot, wants an unna boot        right arch and ankle pain. Would like wrapped. This has helped in the past on the left. No injury, has been walking a lot. Some swelling, wearing new balance and powersteps. Currently denies F/C/N/V. Allergies   Allergen Reactions    Celebrex [Celecoxib] Nausea Only    Mobic Nausea Only       Past Medical History:   Diagnosis Date    Acute MI ([address]     Allergic rhinitis 9/2/2011    Anemia     Basal cell cancer 03/2013    left side of head, face chin    Basal cell cancer 03/10/14    left cheek    Cervical radiculopathy     Diverticulitis 02/17/13    Epigastric pain/GERD. 9/2/2011    GERD (gastroesophageal reflux disease)     Glaucoma     Hyperlipidemia     Hypertension     Hyponatremia 9/2/2011    IBS (irritable bowel syndrome) 9/2/2011    Insomnia 9/2/2011    Osteoarthritis     Osteoarthritis/neck pain. 9/2/2011    Renal calculi     Shingles     history of shingles       Prior to Admission medications    Medication Sig Start Date End Date Taking?  Authorizing Provider   mirtazapine (REMERON) 15 MG tablet Take 1 tablet by mouth nightly 6/18/21  Yes Shadia Rossi MD   predniSONE (DELTASONE) 5 MG tablet TAKE ONE TABLET BY MOUTH DAILY 5/7/21  Yes Shadia Rossi MD   allopurinol (ZYLOPRIM) 100 MG tablet TAKE ONE TABLET BY MOUTH DAILY 4/9/21  Yes Shadia Rossi MD   lisinopril (PRINIVIL;ZESTRIL) 10 MG tablet Take 1 tablet by mouth Daily with lunch 3/16/21  Yes Shadia Rossi MD   dilTIAZem (CARDIZEM CD) 180 MG extended release capsule Take 1 capsule by mouth daily 2/23/21  Yes Historical Provider, MD   bimatoprost (LUMIGAN) 0.01 % SOLN ophthalmic drops Place 1 drop into both eyes nightly   Yes Historical Provider, MD   naproxen (NAPROSYN) 500 MG tablet Take 1 tablet by mouth 2 times daily as needed for Pain (Take with meals) 2/19/21  Yes Mo'Leticia Fonseca MD   pantoprazole (PROTONIX) 40 MG tablet Take 1 tablet by mouth every morning (before breakfast) 2/20/21  Yes Mo'Leticia Fonseca MD   busPIRone (BUSPAR) 10 MG tablet Take 1 tablet by mouth 3 times daily 2/5/21  Yes Demetrius Mayfield MD   zoster recombinant adjuvanted vaccine [facility]) 50 MCG/0.5ML SUSR injection 50 MCG IM then repeat 2-6 months. 1/13/21 1/13/22 Yes Demetrius Mayfield MD   tamsulosin (FLOMAX) 0.4 MG capsule Take 1 capsule by mouth 2 times daily  Patient taking differently: Take 0.4 mg by mouth daily  9/15/20  Yes NASRA Farmer CNP   latanoprost (XALATAN) 0.005 % ophthalmic solution 1 drop nightly   Yes Historical Provider, MD   warfarin (COUMADIN) 5 MG tablet Take 2.5 mg by mouth once a week Indications: Wednesdays; INR goal 2-2.5 Managed by [facility]   Yes Historical Provider, MD   melatonin 5 MG TABS tablet Take 1 tablet by mouth nightly    Yes Historical Provider, MD   metoprolol tartrate (LOPRESSOR) 25 MG tablet Take 25 mg by mouth 2 times daily   Yes Historical Provider, MD       Review of Systems   Constitutional: Negative for chills, diaphoresis, fatigue, fever and unexpected weight change. Cardiovascular: Positive for leg swelling. Gastrointestinal: Negative for constipation, diarrhea, nausea and vomiting. Musculoskeletal: Positive for gait problem. Negative for arthralgias and joint swelling. Skin: Negative for color change, pallor, rash and wound. Neurological: Negative for weakness and numbness. Lower Extremity Physical Examination:     Vitals: There were no vitals filed for this visit. General: AAO x 3 in NAD. Integument:   Warm, dry, supple, Bilateral.  Open lesion absent, Bilateral.      Vascular: DP and PT pulses palpable 2/4, Bilateral.  CFT <3 seconds, Bilateral.  Hair growth absent to the level of the digits, Bilateral.  Edema present, Left.   Varicosities absent, Bilateral. Erythema absent, Left    Neurological:  Sensation present to light touch to level of digits, Bilateral.    Musculoskeletal: Muscle strength 5/5, Left. Pain present upon palpation of central heel, medial and plantar central arch, pain along the pt tendon, right. normal medial longitudinal arch, Bilateral.  Ankle ROM normal,Left. Asessment: Patient is a 80 y.o. male with:   1. Plantar fasciitis, right    2. Posterior tibial tendinitis, right    3. Acute right ankle pain        Plan: Pt was evaluated and examined. Patient was given personalized discharge instructions. Pt will follow up in 2 weeks or sooner if any problems arise. Diagnosis was discussed with the pt and all of their questions were answered in detail. Proper foot hygiene and care was discussed with the pt. Patient to check feet daily and contact the office with any questions/problems/concerns. Other comorbidity noted and will be managed by PCP. Rest, good shoes, powersteps    Foot and ankle strapping/ Low-dye strapping was applied to the right, with 2 inch and 1 inch tape and a scaffoid pad. This is designed to off-load the plantar fascia and encourage healing and pain reduction. Discussed with the patient that the Low dye taping is a short term treatment and its off-loading effects vary from patient to patient. I advised the patient to leave the tape on for 3-5 days, but sometimes you may need to replace it sooner in order to remain effective. Orders Placed This Encounter   Procedures    MS STRAPPING; ANKLE &/OR FOOT     No orders of the defined types were placed in this encounter. RTC in 2week(s).     7/14/2021

## 2021-07-20 ENCOUNTER — OFFICE VISIT (OUTPATIENT)
Dept: FAMILY MEDICINE CLINIC | Age: 86
End: 2021-07-20
Payer: MEDICARE

## 2021-07-20 VITALS
DIASTOLIC BLOOD PRESSURE: 76 MMHG | TEMPERATURE: 98 F | SYSTOLIC BLOOD PRESSURE: 110 MMHG | WEIGHT: 164 LBS | OXYGEN SATURATION: 96 % | HEART RATE: 68 BPM | BODY MASS INDEX: 28.15 KG/M2

## 2021-07-20 DIAGNOSIS — F32.A DEPRESSION, UNSPECIFIED DEPRESSION TYPE: Primary | ICD-10-CM

## 2021-07-20 DIAGNOSIS — F41.9 ANXIETY: ICD-10-CM

## 2021-07-20 DIAGNOSIS — I10 ESSENTIAL HYPERTENSION: ICD-10-CM

## 2021-07-20 DIAGNOSIS — R63.4 WEIGHT LOSS: ICD-10-CM

## 2021-07-20 DIAGNOSIS — N40.1 BENIGN PROSTATIC HYPERPLASIA WITH LOWER URINARY TRACT SYMPTOMS, SYMPTOM DETAILS UNSPECIFIED: ICD-10-CM

## 2021-07-20 PROCEDURE — 1123F ACP DISCUSS/DSCN MKR DOCD: CPT | Performed by: FAMILY MEDICINE

## 2021-07-20 PROCEDURE — 4040F PNEUMOC VAC/ADMIN/RCVD: CPT | Performed by: FAMILY MEDICINE

## 2021-07-20 PROCEDURE — G8427 DOCREV CUR MEDS BY ELIG CLIN: HCPCS | Performed by: FAMILY MEDICINE

## 2021-07-20 PROCEDURE — 1036F TOBACCO NON-USER: CPT | Performed by: FAMILY MEDICINE

## 2021-07-20 PROCEDURE — G8417 CALC BMI ABV UP PARAM F/U: HCPCS | Performed by: FAMILY MEDICINE

## 2021-07-20 PROCEDURE — 99213 OFFICE O/P EST LOW 20 MIN: CPT | Performed by: FAMILY MEDICINE

## 2021-07-20 ASSESSMENT — ENCOUNTER SYMPTOMS
RHINORRHEA: 0
ABDOMINAL DISTENTION: 0
COUGH: 0
DIARRHEA: 0
CONSTIPATION: 0
SHORTNESS OF BREATH: 0
BACK PAIN: 0
NAUSEA: 0
SORE THROAT: 0
ABDOMINAL PAIN: 0
CHEST TIGHTNESS: 0
VOMITING: 0

## 2021-07-20 NOTE — PROGRESS NOTES
Jacqueline Ortiz MD  [address] [place] [address]  [id number]  [address] [address]  Dept: [phone]  Dept Fax: [phone]     Patient ID: Jose Riley is a 80 y.o. male. HPI    Established patient here today for follow up depression/anxiety and weight loss. We did start him on Remeron and recommend counseling, but pt never started because he is doing better. He is going to being getting his prostate out and that has given him some relief because part of his depression was from lac of sleep because he was up urinating several times. His appetite has improved and did gain the weight back. He states overall is doing well. He is still taking the Buspar as prescribed    Pt denies any fever or chills. Pt today denies any HA, chest pain, or SOB. Pt denies any N/V/D/C or abdominal pain. Otherwise pt doing well on current tx and no other concerns today. The patient's past medical, surgical, social, and family history as well as his current medications and allergies were reviewed as documented in today's encounter. My previous office notes, labs and diagnostic studies were reviewed prior to and during encounter.     Current Outpatient Medications on File Prior to Visit   Medication Sig Dispense Refill    predniSONE (DELTASONE) 5 MG tablet TAKE ONE TABLET BY MOUTH DAILY 30 tablet 4    allopurinol (ZYLOPRIM) 100 MG tablet TAKE ONE TABLET BY MOUTH DAILY 90 tablet 2    lisinopril (PRINIVIL;ZESTRIL) 10 MG tablet Take 1 tablet by mouth Daily with lunch 90 tablet 3    dilTIAZem (CARDIZEM CD) 180 MG extended release capsule Take 1 capsule by mouth daily      bimatoprost (LUMIGAN) 0.01 % SOLN ophthalmic drops Place 1 drop into both eyes nightly      naproxen (NAPROSYN) 500 MG tablet Take 1 tablet by mouth 2 times daily as needed for Pain (Take with meals) 60 tablet 1    pantoprazole (PROTONIX) 40 MG tablet Take 1 tablet by mouth every morning Nose: Nose normal.      Mouth/Throat:      Pharynx: No oropharyngeal exudate. Eyes:      Conjunctiva/sclera: Conjunctivae normal.      Pupils: Pupils are equal, round, and reactive to light. Cardiovascular:      Rate and Rhythm: Normal rate and regular rhythm. Heart sounds: Normal heart sounds. No murmur heard. Pulmonary:      Effort: Pulmonary effort is normal. No respiratory distress. Breath sounds: Normal breath sounds. No wheezing. Chest:      Chest wall: No tenderness. Abdominal:      General: Bowel sounds are normal. There is no distension. Palpations: Abdomen is soft. There is no mass. Tenderness: There is no abdominal tenderness. Musculoskeletal:         General: No tenderness. Normal range of motion. Cervical back: Normal range of motion. Lymphadenopathy:      Cervical: No cervical adenopathy. Skin:     Findings: No rash. Neurological:      Mental Status: He is alert and oriented to person, place, and time. Deep Tendon Reflexes: Reflexes are normal and symmetric. Psychiatric:         Behavior: Behavior normal.         Assessment:      Diagnosis Orders   1. Depression, unspecified depression type      improved   2. Anxiety     3. Weight loss     4. Essential hypertension     5. Benign prostatic hyperplasia with lower urinary tract symptoms, symptom details unspecified         Plan: Will keep him off the Remeron as he is doing better and will cont with the Buspar as prescribed    He never did go see Irma Helms, but again is doing better     Will follow up with Dr. Sonia Hamilton for a robotic prostatectomy and hopefully will resolve the nocturia and he can get a good nights sleep which has been a trigger for his depression    Will have him back in 6 weeks for a follow up    His BP is stable and will keep him off the Hydralazine     Rest of systems unchanged, continue current treatments.     Medications, labs, diagnostic studies, consultations and follow-up as

## 2021-07-21 ENCOUNTER — TELEPHONE (OUTPATIENT)
Dept: UROLOGY | Age: 86
End: 2021-07-21

## 2021-07-21 NOTE — TELEPHONE ENCOUNTER
Robotic Simple Prostatectomy @ [facility] 8/18/21 12:00pm  PAT @ [facility] 8/5/21 1:00pm      Spoke with patient and patients son regarding all procedure information. **STOP BLOOD THINNERS 5 DAY PRIOR 8/13/21** Clear liquid diet is to begin on 8/17/21. Procedure info emailed to patient and patiens son Heladioadelinelucia Euceda on 7/21/21.

## 2021-07-27 ENCOUNTER — TELEPHONE (OUTPATIENT)
Dept: FAMILY MEDICINE CLINIC | Age: 86
End: 2021-07-27

## 2021-07-27 RX ORDER — TIZANIDINE 2 MG/1
2 TABLET ORAL 3 TIMES DAILY
Qty: 40 TABLET | Refills: 0 | Status: SHIPPED | OUTPATIENT
Start: 2021-07-27 | End: 2021-08-05

## 2021-07-28 ENCOUNTER — TELEPHONE (OUTPATIENT)
Dept: FAMILY MEDICINE CLINIC | Age: 86
End: 2021-07-28

## 2021-07-28 NOTE — TELEPHONE ENCOUNTER
Patient has slow/irregular heart already. Patient is concerned that the side affects of the tizanidine is possible slow heart rate. I spoke with dr Mp Napoles and she is ok with him taking the medication.   Patient has been informed

## 2021-07-28 NOTE — TELEPHONE ENCOUNTER
----- Message from Aleks Mills sent at 7/28/2021  9:39 AM EDT -----  Subject: Message to Provider    QUESTIONS  Information for Provider? Patient is calling because he was prescribed   tiZANidine (ZANAFLEX) 2 MG tablet and has some questions regarding it. Patient states it may cause slow heartbeats and he stated he has that and   wants to know if he should continue taken the medication. Please advise  ---------------------------------------------------------------------------  --------------  CALL BACK INFO  What is the best way for the office to contact you? OK to leave message on   voicemail  Preferred Call Back Phone Number? [phone]  ---------------------------------------------------------------------------  --------------  SCRIPT ANSWERS  Relationship to Patient?  Self

## 2021-08-03 RX ORDER — SODIUM CHLORIDE, SODIUM LACTATE, POTASSIUM CHLORIDE, CALCIUM CHLORIDE 600; 310; 30; 20 MG/100ML; MG/100ML; MG/100ML; MG/100ML
1000 INJECTION, SOLUTION INTRAVENOUS CONTINUOUS
Status: CANCELLED | OUTPATIENT
Start: 2021-08-03

## 2021-08-04 ENCOUNTER — OFFICE VISIT (OUTPATIENT)
Dept: PODIATRY | Age: 86
End: 2021-08-04
Payer: MEDICARE

## 2021-08-04 VITALS — WEIGHT: 164 LBS | HEIGHT: 64 IN | BODY MASS INDEX: 28 KG/M2

## 2021-08-04 DIAGNOSIS — M79.675 PAIN IN TOES OF BOTH FEET: ICD-10-CM

## 2021-08-04 DIAGNOSIS — B35.1 ONYCHOMYCOSIS: Primary | ICD-10-CM

## 2021-08-04 DIAGNOSIS — M79.674 PAIN IN TOES OF BOTH FEET: ICD-10-CM

## 2021-08-04 PROCEDURE — 11721 DEBRIDE NAIL 6 OR MORE: CPT | Performed by: PODIATRIST

## 2021-08-04 ASSESSMENT — ENCOUNTER SYMPTOMS
VOMITING: 0
DIARRHEA: 0
COLOR CHANGE: 0
CONSTIPATION: 0
NAUSEA: 0

## 2021-08-04 NOTE — PROGRESS NOTES
[facility]  Return Patient    Chief Complaint   Patient presents with    Nail Problem     b/l nail trim/ last seen Dr. Steven Lindsey 7/20/2021       Subjective: This Kelsie George comes to clinic for foot and nail care. He would like all of his nails trimmed. He cannot take care of them himself. They are painful when long and he tries to wear shoes. Pt's primary care physician is Indio Yates MD.       Past Medical History:   Diagnosis Date    Acute MI ([address]     Allergic rhinitis 9/2/2011    Anemia     Basal cell cancer 03/2013    left side of head, face chin    Basal cell cancer 03/10/14    left cheek    Cervical radiculopathy     Diverticulitis 02/17/13    Epigastric pain/GERD. 9/2/2011    GERD (gastroesophageal reflux disease)     Glaucoma     Hyperlipidemia     Hypertension     Hyponatremia 9/2/2011    IBS (irritable bowel syndrome) 9/2/2011    Insomnia 9/2/2011    Osteoarthritis     Osteoarthritis/neck pain.  9/2/2011    Renal calculi     Shingles     history of shingles       Allergies   Allergen Reactions    Celebrex [Celecoxib] Nausea Only    Mobic Nausea Only     Current Outpatient Medications on File Prior to Visit   Medication Sig Dispense Refill    tiZANidine (ZANAFLEX) 2 MG tablet Take 1 tablet by mouth 3 times daily 40 tablet 0    predniSONE (DELTASONE) 5 MG tablet TAKE ONE TABLET BY MOUTH DAILY 30 tablet 4    allopurinol (ZYLOPRIM) 100 MG tablet TAKE ONE TABLET BY MOUTH DAILY 90 tablet 2    lisinopril (PRINIVIL;ZESTRIL) 10 MG tablet Take 1 tablet by mouth Daily with lunch 90 tablet 3    dilTIAZem (CARDIZEM CD) 180 MG extended release capsule Take 1 capsule by mouth daily      bimatoprost (LUMIGAN) 0.01 % SOLN ophthalmic drops Place 1 drop into both eyes nightly      naproxen (NAPROSYN) 500 MG tablet Take 1 tablet by mouth 2 times daily as needed for Pain (Take with meals) 60 tablet 1    pantoprazole (PROTONIX) 40 MG tablet Take 1 tablet by mouth every morning (before breakfast) 7 tablet 0    busPIRone (BUSPAR) 10 MG tablet Take 1 tablet by mouth 3 times daily 90 tablet 11    tamsulosin (FLOMAX) 0.4 MG capsule Take 1 capsule by mouth 2 times daily (Patient taking differently: Take 0.4 mg by mouth daily ) 180 capsule 3    latanoprost (XALATAN) 0.005 % ophthalmic solution 1 drop nightly      warfarin (COUMADIN) 5 MG tablet Take 2.5 mg by mouth once a week Indications: Wednesdays; INR goal 2-2.5 Managed by [facility]      melatonin 5 MG TABS tablet Take 1 tablet by mouth nightly       metoprolol tartrate (LOPRESSOR) 25 MG tablet Take 25 mg by mouth 2 times daily       No current facility-administered medications on file prior to visit. Review of Systems   Constitutional: Negative for chills, diaphoresis, fatigue, fever and unexpected weight change. Cardiovascular: Negative for leg swelling. Gastrointestinal: Negative for constipation, diarrhea, nausea and vomiting. Musculoskeletal: Positive for gait problem. Negative for arthralgias and joint swelling. Skin: Negative for color change, pallor, rash and wound. Neurological: Negative for weakness and numbness. Objective:  General: AAO x 3 in NAD. Derm  Left hallux nail incurvated medial border with slight redness and pain to palpation.     Sites of Onychomycosis Involvement (Check affected area)  [x] [x] [x] [x] [x] [x] [x] [x] [x] [x]  5 4 3 2 1 1 2 3 4 5                          Right                                        Left    Thickness  [x] [x] [x] [x] [x] [x] [x] [x] [x] [x]  5 4 3 2 1 1 2 3 4 5                         Right                                        Left    Dystrophic Changes                                                                 [x] [x] [x] [x] [x] [x] [x] [x] [x] [x]  5 4 3 2 1 1 2 3 4 5                         Right                                        Left    Color [x] [x] [x] [x] [x] [x] [x] [x] [x] [x]  5 4 3 2 1 1 2 3 4 5                          Right                                        Left    Incurvation/Ingrowin                                                                   [] [] [] [] [] [] [] [] [] []  5 4 3 2 1 1 2 3 4 5                         Right                                        Left    Inflammation/Pain                                                                   [x] [x] [x] [x] [x] [x] [x] [x] [x] [x]  5 4 3 2 1 1 2 3 4 5                         Right                                        Left    Vascular:  DP/PT pulses palpable 2/4, Bilateral.    CFT <3 seconds to digits 1-5, Bilateral .   Hair growth present to level of digits, Bilateral.    Neurological:  Sensation present to light touch to level of digits, Bilateral.    Assessment:  80 y.o. male with:   1. Onychomycosis    2. Pain in toes of both feet       Orders Placed This Encounter   Procedures    WI DEBRIDEMENT OF NAILS, 6 OR MORE         Plan:   Pt was evaluated and examined. Patient was given personalized discharge instructions. Nails 1-10 were debrided in length and thickness sharply with a nail nipper and  without incident, slant back performed. Pt will follow up in 9-12 weeks or sooner if any problems arise. Diagnosis was discussed with the pt and all of their questions were answered in detail. Proper foot hygiene and care was discussed with the pt. Patient to check feet daily and contact the office with any questions/problems/concerns. Other comorbidity noted and will be managed by PCP. Pain waiver discussed with patient and confirmed.          8/4/2021    Electronically signed by Corwin Polo DPM on 8/4/2021 at 12:40 PM

## 2021-08-05 ENCOUNTER — HOSPITAL ENCOUNTER (OUTPATIENT)
Dept: PREADMISSION TESTING | Age: 86
Discharge: HOME | End: 2021-08-09
Payer: MEDICARE

## 2021-08-05 VITALS
HEIGHT: 64 IN | RESPIRATION RATE: 20 BRPM | SYSTOLIC BLOOD PRESSURE: 133 MMHG | TEMPERATURE: 98.2 F | BODY MASS INDEX: 26.8 KG/M2 | OXYGEN SATURATION: 98 % | WEIGHT: 157 LBS | DIASTOLIC BLOOD PRESSURE: 81 MMHG | HEART RATE: 88 BPM

## 2021-08-05 LAB
ANION GAP SERPL CALCULATED.3IONS-SCNC: 11 MMOL/L (ref 9–17)
BUN BLDV-MCNC: 14 MG/DL (ref 8–23)
CHLORIDE BLD-SCNC: 95 MMOL/L (ref 98–107)
CO2: 26 MMOL/L (ref 20–31)
CREAT SERPL-MCNC: 0.83 MG/DL (ref 0.7–1.2)
GFR AFRICAN AMERICAN: >60 ML/MIN
GFR NON-AFRICAN AMERICAN: >60 ML/MIN
GFR SERPL CREATININE-BSD FRML MDRD: NORMAL ML/MIN/{1.73_M2}
GFR SERPL CREATININE-BSD FRML MDRD: NORMAL ML/MIN/{1.73_M2}
GLUCOSE BLD-MCNC: 99 MG/DL (ref 70–99)
HCT VFR BLD CALC: 47.1 % (ref 40.7–50.3)
HEMOGLOBIN: 14.9 G/DL (ref 13–17)
INR BLD: 2.5
MCH RBC QN AUTO: 29.6 PG (ref 25.2–33.5)
MCHC RBC AUTO-ENTMCNC: 31.6 G/DL (ref 28.4–34.8)
MCV RBC AUTO: 93.6 FL (ref 82.6–102.9)
NRBC AUTOMATED: 0 PER 100 WBC
PDW BLD-RTO: 14.8 % (ref 11.8–14.4)
PLATELET # BLD: 253 K/UL (ref 138–453)
PMV BLD AUTO: 9.6 FL (ref 8.1–13.5)
POTASSIUM SERPL-SCNC: 4.6 MMOL/L (ref 3.7–5.3)
PROTHROMBIN TIME: 25 SEC (ref 9.1–12.3)
RBC # BLD: 5.03 M/UL (ref 4.21–5.77)
SODIUM BLD-SCNC: 132 MMOL/L (ref 135–144)
WBC # BLD: 7.9 K/UL (ref 3.5–11.3)

## 2021-08-05 PROCEDURE — 85027 COMPLETE CBC AUTOMATED: CPT

## 2021-08-05 PROCEDURE — 36415 COLL VENOUS BLD VENIPUNCTURE: CPT

## 2021-08-05 PROCEDURE — 87077 CULTURE AEROBIC IDENTIFY: CPT

## 2021-08-05 PROCEDURE — 80051 ELECTROLYTE PANEL: CPT

## 2021-08-05 PROCEDURE — 87186 SC STD MICRODIL/AGAR DIL: CPT

## 2021-08-05 PROCEDURE — 86920 COMPATIBILITY TEST SPIN: CPT

## 2021-08-05 PROCEDURE — 87086 URINE CULTURE/COLONY COUNT: CPT

## 2021-08-05 PROCEDURE — 86900 BLOOD TYPING SEROLOGIC ABO: CPT

## 2021-08-05 PROCEDURE — 84520 ASSAY OF UREA NITROGEN: CPT

## 2021-08-05 PROCEDURE — 86901 BLOOD TYPING SEROLOGIC RH(D): CPT

## 2021-08-05 PROCEDURE — 85610 PROTHROMBIN TIME: CPT

## 2021-08-05 PROCEDURE — 82947 ASSAY GLUCOSE BLOOD QUANT: CPT

## 2021-08-05 PROCEDURE — 86850 RBC ANTIBODY SCREEN: CPT

## 2021-08-05 PROCEDURE — 82565 ASSAY OF CREATININE: CPT

## 2021-08-05 RX ORDER — HEPARIN SODIUM 5000 [USP'U]/ML
5000 INJECTION, SOLUTION INTRAVENOUS; SUBCUTANEOUS ONCE
Status: CANCELLED | OUTPATIENT
Start: 2021-08-05 | End: 2021-08-05

## 2021-08-05 NOTE — H&P
History and Physical    Pt Name: Lelia Perez  MRN: [id number]  YOB: 1931  Date of evaluation: 8/5/2021  Primary Care Physician: Radha Allison MD    SUBJECTIVE:   History of Chief Complaint:    Lelia Perez is a 80 y.o. male who presents for PAT appointment. Patient complains of nocturia and urinary frequency for about one year that is worsening. He states he is up so much at night to urinate it is affecting his daily living, as he becomes very fatigued throughout the day due to decreased sleep. He has a history of BPH and denies hematuria or abdominal pain. Patient has been scheduled for XI ROBOTIC LAPAROSCOPIC SIMPLE PROSTATECTOMY  Allergies  is allergic to celebrex [celecoxib] and mobic. Medications  Prior to Admission medications    Medication Sig Start Date End Date Taking?  Authorizing Provider   tiZANidine (ZANAFLEX) 2 MG tablet Take 1 tablet by mouth 3 times daily 7/27/21   Radha Allison MD   predniSONE (DELTASONE) 5 MG tablet TAKE ONE TABLET BY MOUTH DAILY 5/7/21   Radha Allison MD   allopurinol (ZYLOPRIM) 100 MG tablet TAKE ONE TABLET BY MOUTH DAILY 4/9/21   Radha Allison MD   lisinopril (PRINIVIL;ZESTRIL) 10 MG tablet Take 1 tablet by mouth Daily with lunch 3/16/21   Radha Allison MD   dilTIAZem (CARDIZEM CD) 180 MG extended release capsule Take 1 capsule by mouth daily 2/23/21   Historical Provider, MD   bimatoprost (LUMIGAN) 0.01 % SOLN ophthalmic drops Place 1 drop into both eyes nightly    Historical Provider, MD   naproxen (NAPROSYN) 500 MG tablet Take 1 tablet by mouth 2 times daily as needed for Pain (Take with meals) 2/19/21   Mo'Leticia Zafar MD   pantoprazole (PROTONIX) 40 MG tablet Take 1 tablet by mouth every morning (before breakfast) 2/20/21   Mo'Leticia Zafar MD   busPIRone (BUSPAR) 10 MG tablet Take 1 tablet by mouth 3 times daily 2/5/21   Radha Allison MD   tamsulosin (FLOMAX) 0.4 MG capsule Take 1 capsule by mouth 2 times daily  Patient taking \"Hi this is Yue Rausch's office.  I am calling in regards to a procedure that the provider wanted you to have.  If you could please call us back at [phone].  You can reach us Monday thru Friday 8:00 am to 4:30 pm.  Thank you.\"   differently: Take 0.4 mg by mouth daily  9/15/20   NASRA Hitchcock - CNP   latanoprost (XALATAN) 0.005 % ophthalmic solution 1 drop nightly    Historical Provider, MD   warfarin (COUMADIN) 5 MG tablet Take 2.5 mg by mouth once a week Indications: ; INR goal 2-2.5 Managed by [facility]    Historical Provider, MD   melatonin 5 MG TABS tablet Take 1 tablet by mouth nightly     Historical Provider, MD   metoprolol tartrate (LOPRESSOR) 25 MG tablet Take 25 mg by mouth 2 times daily    Historical Provider, MD     Past Medical History    has a past medical history of Acute MI ([facility] [address] Allergic rhinitis, Anemia, Basal cell cancer, Basal cell cancer, Cervical radiculopathy, Diverticulitis, Epigastric pain/GERD., GERD (gastroesophageal reflux disease), Glaucoma, Hyperlipidemia, Hypertension, Hyponatremia, IBS (irritable bowel syndrome), Insomnia, Osteoarthritis, Osteoarthritis/neck pain. , Renal calculi, and Shingles. Past Surgical History   has a past surgical history that includes Spine surgery (2003); Lithotripsy; hernia repair; Skin cancer excision; skin biopsy (13); Skin cancer excision (Left, 2013); Colonoscopy (); Colonoscopy (2013); skin biopsy (Left, 14); Mohs surgery (Left, 03/10/14); Mohs surgery (14); other surgical history (9/6/15); Cholecystectomy (9-9-15); Skin cancer excision (Right, 2016); Mohs surgery (Left, 2017); Mohs surgery (Left, 10/21/2019); and Mohs surgery (2020). Social History   reports that he quit smoking about 60 years ago. He has a 10.00 pack-year smoking history. He has never used smokeless tobacco.    reports current alcohol use. reports no history of drug use.    Marital Status   Children 2  Occupation retired  Family History  Family Status   Relation Name Status    Mother      Father      Brother  (Not Specified)     family history includes Cancer in his father; Heart Disease in his brother and mother; Triny Gathers in his father. Review of Systems:  CONSTITUTIONAL:   negative for fevers, chills, fatigue and malaise    EYES:   negative for double vision, blurred vision and photophobia    HEENT:   negative for tinnitus, epistaxis and sore throat     RESPIRATORY:   negative for cough, shortness of breath, wheezing     CARDIOVASCULAR:   negative for chest pain, palpitations, syncope, edema     GASTROINTESTINAL:   negative for nausea, vomiting     GENITOURINARY:   negative for incontinence, frequency and nocturia   MUSCULOSKELETAL:   negative for neck or back pain     NEUROLOGICAL:   Negative for weakness and tingling  negative for headaches and dizziness     PSYCHIATRIC:   negative for anxiety       OBJECTIVE:   VITALS:  height is 5' 4\" (1.626 m) and weight is 157 lb (71.2 kg). His temporal temperature is 98.2 °F (36.8 °C). His blood pressure is 133/81 and his pulse is 88. His respiration is 20 and oxygen saturation is 98%. CONSTITUTIONAL:alert & oriented x 3, no acute distress. Calm and pleasant. SKIN:  Warm and dry, no rashes to exposed areas of skin. HEAD:  Normocephalic, atraumatic. EYES: PERRL. EOMs intact. Wearing glasses. EARS:  Intact and equal bilaterally. No edema or thickening, without lumps, lesions, or discharge. Hearing grossly WNL. NOSE:  Nares patent. No rhinorrhea   MOUTH/THROAT:  Mucous membranes pink and moist, uvula midline, teeth appear to be intact. NECK:supple, no lymphadenopathy  LUNGS: Respirations even and non-labored. Clear to auscultation bilaterally, no wheezes, rales, or rhonchi. CARDIOVASCULAR: Regular rate and rhythm, murmur appreciated. ABDOMEN: soft, non-tender, non-distended, bowel sounds active x 4. EXTREMITIES: No edema to bilateral lower extremities. No varicosities to bilateral lower extremities. NEUROLOGIC: CN II-XII are grossly intact.  Gait is smooth, rhythmic and effortless     Testing:   Labs pending: drawn 8/5/2021   IMPRESSIONS:

## 2021-08-05 NOTE — PROGRESS NOTES
Anesthesia Focused Assessment    Hx of anesthesia complications:  no  Family hx of anesthesia complications:  no      Prior + Covid-19 test? no      STOP-BANG Sleep Apnea Questionnaire    SNORE loudly (heard through closed doors)? No  TIRED, fatigued, sleepy during daytime? Yes  OBSERVED stopping breathing during sleep? No  High blood PRESSURE or being treated? Yes    BMI over 35? No  AGE over 48? Yes  NECK circumference over 16\"? No  GENDER (male)? Yes             Total 4  High risk 5-8  Intermediate risk 3-4  Low risk 0-2    ----------------------------------------------------------------------------------------------------------------------  MARILEE                              No  If yes, machine? DM1                                            No  DM2                   No    Coronary Artery Disease      No  HTN         Yes  Defib/AICD/Pacemaker               No             Renal Failure                   No  If yes, on dialysis           Active smoker? No  Drinks alcohol? Yes rarely  Illicit drugs? No  Dentition? Benign       Past Medical History:   Diagnosis Date    Acute MI ([facility] [address]     Allergic rhinitis 9/2/2011    Anemia     Basal cell cancer 03/2013    left side of head, face chin    Basal cell cancer 03/10/14    left cheek    Cervical radiculopathy     Diverticulitis 02/17/13    Epigastric pain/GERD. 9/2/2011    GERD (gastroesophageal reflux disease)     Glaucoma     Hyperlipidemia     Hypertension     Hyponatremia 9/2/2011    IBS (irritable bowel syndrome) 9/2/2011    Insomnia 9/2/2011    Osteoarthritis     Osteoarthritis/neck pain. 9/2/2011    Renal calculi     Shingles     history of shingles         Patient was evaluated in PAT & anesthesia guidelines were applied. NPO guidelines, medication instructions and scheduled arrival time were reviewed with patient. Anesthesia contacted:   yes    I spoke with Dr. Arabella Molina Patient history and pertinent findings reviewed. Patient with history of MI, atrial fibrillation, controlled, on warfarin, has cardiac clearance on file but is marked moderate to high risk. No further orders per Dr. Arabella Molina. Medical or cardiac clearance ordered: cardiac clearance on file, moderate to high risk.     NASRA Aldridge - CNP   8/5/21  12:38 PM

## 2021-08-07 LAB
CULTURE: ABNORMAL
Lab: ABNORMAL
SPECIMEN DESCRIPTION: ABNORMAL

## 2021-08-09 ENCOUNTER — TELEPHONE (OUTPATIENT)
Dept: FAMILY MEDICINE CLINIC | Age: 86
End: 2021-08-09

## 2021-08-09 DIAGNOSIS — N30.00 ACUTE CYSTITIS WITHOUT HEMATURIA: Primary | ICD-10-CM

## 2021-08-09 RX ORDER — SULFAMETHOXAZOLE AND TRIMETHOPRIM 800; 160 MG/1; MG/1
1 TABLET ORAL 2 TIMES DAILY
Qty: 14 TABLET | Refills: 0 | Status: SHIPPED | OUTPATIENT
Start: 2021-08-09 | End: 2021-08-16

## 2021-08-09 NOTE — TELEPHONE ENCOUNTER
Received fax from [facility] that patient is positive for a UTI. There is an order for an ATB in the chart so I did fax the results back to Dr. Richardson Ip office to see if there is anything they need from us.

## 2021-08-09 NOTE — PROGRESS NOTES
Left message for [facility] @ 's office informing her of positive urine culture for Staphylococcus and results were faxed. Also faxed to PCP office.

## 2021-08-16 ENCOUNTER — TELEPHONE (OUTPATIENT)
Dept: UROLOGY | Age: 86
End: 2021-08-16

## 2021-08-17 NOTE — H&P
History and Physical    Patient:  Boris Walsh  MRN: [id number]  YOB: 1931    CHIEF COMPLAINT:  BPH    HISTORY OF PRESENT ILLNESS:   The patient is a 80 y.o. male who presents with BPH. He had a cystoscopy with enlarged prostate with trilobar hyperplasia. Patient takes coumadin for atrial fibrillation. UCx with staph coag neg. Cr 0.83  He had cardiac clearance with Dr Belen Magallanes. TRUS 109 cc. Pain scale:   0    Past Medical History:    Past Medical History:   Diagnosis Date    Acute MI ([address]     Allergic rhinitis 9/2/2011    Anemia     Atrial fibrillation (HCC)     Dr. Rafi Hameed, [facility] cell cancer 03/2013    left side of head, face chin    Basal cell cancer 03/10/14    left cheek    Cervical radiculopathy     Diverticulitis 02/17/13    GERD (gastroesophageal reflux disease)     Glaucoma     left eye    Hyperlipidemia     Hypertension     Dr. Sergey Valero    Hyponatremia 9/2/2011    IBS (irritable bowel syndrome) 9/2/2011    Insomnia 9/2/2011    Osteoarthritis     Osteoarthritis/neck pain. 9/2/2011    Renal calculi     Shingles     history of shingles    Urinary frequency     Wears glasses     Wears hearing aid in both ears        Past Surgical History:    Past Surgical History:   Procedure Laterality Date    CHOLECYSTECTOMY  9-9-15    laparoscopic with cholangiogram    COLONOSCOPY  2002    COLONOSCOPY  08/2013    HERNIA REPAIR Right     inguinal    LITHOTRIPSY      MOHS SURGERY Left 03/10/14    cheek    MOHS SURGERY  12/30/14    post scalp    MOHS SURGERY Left 08/2017    X2 left cheek    MOHS SURGERY Left 10/21/2019    temple    MOHS SURGERY  12/14/2020    top of head    OTHER SURGICAL HISTORY  9/6/15    attempted ERCP    SKIN BIOPSY  03/11/13    3 areas    SKIN BIOPSY Left 02/11/14    cheek /basal cell carinoma    SKIN CANCER EXCISION      left face and top of head    SKIN CANCER EXCISION Left 03/2013    forehead, cheek, chin, basal cell.     SKIN CANCER EXCISION Right 08/12/2016    with skin graft    SPINE SURGERY  04/2003       Medications Prior to Admission:    Prior to Admission medications    Medication Sig Start Date End Date Taking? Authorizing Provider   predniSONE (DELTASONE) 5 MG tablet TAKE ONE TABLET BY MOUTH DAILY 5/7/21  Yes Karolina Savage MD   allopurinol (ZYLOPRIM) 100 MG tablet TAKE ONE TABLET BY MOUTH DAILY 4/9/21  Yes Karolina Savage MD   lisinopril (PRINIVIL;ZESTRIL) 10 MG tablet Take 1 tablet by mouth Daily with lunch 3/16/21  Yes Karolina Savage MD   dilTIAZem (CARDIZEM CD) 180 MG extended release capsule Take 1 capsule by mouth daily 2/23/21  Yes Historical Provider, MD   bimatoprost (LUMIGAN) 0.01 % SOLN ophthalmic drops Place 1 drop into both eyes nightly   Yes Historical Provider, MD   naproxen (NAPROSYN) 500 MG tablet Take 1 tablet by mouth 2 times daily as needed for Pain (Take with meals) 2/19/21  Yes Ricardo Lyon MD   busPIRone (BUSPAR) 10 MG tablet Take 1 tablet by mouth 3 times daily 2/5/21  Yes Karolina Savage MD   tamsulosin (FLOMAX) 0.4 MG capsule Take 1 capsule by mouth 2 times daily 9/15/20  Yes NASRA Goodwin - CNP   latanoprost (XALATAN) 0.005 % ophthalmic solution Place 1 drop into both eyes nightly    Yes Historical Provider, MD   melatonin 5 MG TABS tablet Take 1 tablet by mouth nightly    Yes Historical Provider, MD   metoprolol tartrate (LOPRESSOR) 25 MG tablet Take 25 mg by mouth 2 times daily   Yes Historical Provider, MD   pantoprazole (PROTONIX) 40 MG tablet Take 1 tablet by mouth every morning (before breakfast) 2/20/21   MoMiriam Lyon MD   warfarin (COUMADIN) 5 MG tablet Take 2.5 mg by mouth once a week Indications: Wednesdays; INR goal 2-2.5 Managed by [facility], Dr. Niharika Loo, will stop 5 days before surgery on 8/18/21, and restart 7 days following surgery.     Historical Provider, MD       Allergies:  Celebrex [celecoxib] and Mobic    Social History:    Social History Socioeconomic History    Marital status:      Spouse name: Not on file    Number of children: Not on file    Years of education: Not on file    Highest education level: Not on file   Occupational History    Not on file   Tobacco Use    Smoking status: Former Smoker     Packs/day: 1.00     Years: 10.00     Pack years: 10.00     Quit date: 1961     Years since quittin.6    Smokeless tobacco: Never Used    Tobacco comment: quit    Vaping Use    Vaping Use: Never used   Substance and Sexual Activity    Alcohol use: Not Currently     Alcohol/week: 0.0 standard drinks    Drug use: No    Sexual activity: Not on file   Other Topics Concern    Not on file   Social History Narrative    Not on file     Social Determinants of Health     Financial Resource Strain: Low Risk     Difficulty of Paying Living Expenses: Not hard at all   Food Insecurity: No Food Insecurity    Worried About [address] in the Last Year: Never true    [facility] in the Last Year: Never true   Transportation Needs:     Lack of Transportation (Medical):      Lack of Transportation (Non-Medical):    Physical Activity:     Days of Exercise per Week:     Minutes of Exercise per Session:    Stress:     Feeling of Stress :    Social Connections:     Frequency of Communication with Friends and Family:     Frequency of Social Gatherings with Friends and Family:     Attends Church Services:     Active Member of Clubs or Organizations:     Attends Club or Organization Meetings:     Marital Status:    Intimate Partner Violence:     Fear of Current or Ex-Partner:     Emotionally Abused:     Physically Abused:     Sexually Abused:        Family History:    Family History   Problem Relation Age of Onset    Heart Disease Mother     Lung Cancer Father     Cancer Father         lung    Heart Disease Brother        REVIEW OF SYSTEMS:  Constitutional: negative  Eyes: negative  Respiratory: negative  Cardiovascular: negative  Gastrointestinal: negative  Genitourinary: see HPI  Musculoskeletal: negative  Skin: negative   Neurological: negative  Hematological/Lymphatic: negative  Psychological: negative       Physical Exam:      Patient Vitals for the past 24 hrs:   BP Temp Temp src Pulse Resp SpO2 Height Weight   08/18/21 1105 122/84 96.8 °F (36 °C) Temporal 54 18 99 % 5' 4\" (1.626 m) 159 lb (72.1 kg)     Constitutional: Patient in no acute distress; Neuro: alert and oriented to person place and time. Psych: Mood and affect normal.  Lungs: Respiratory effort normal  Cardiovascular:  Normal peripheral pulses. Regular rate. Abdomen: Soft, non-tender, non-distended         LABS:   No results for input(s): WBC, HGB, HCT, MCV, PLT in the last 72 hours. No results for input(s): NA, K, CL, CO2, PHOS, BUN, CREATININE in the last 72 hours. Invalid input(s): CA  No results found for: PSA    Additional Lab/culture results:    Urinalysis: No results for input(s): COLORU, PHUR, LABCAST, WBCUA, RBCUA, MUCUS, TRICHOMONAS, YEAST, BACTERIA, CLARITYU, SPECGRAV, LEUKOCYTESUR, UROBILINOGEN, [place] in the last 72 hours. Invalid input(s): NITRATE, GLUCOSEUKETONESUAMORPHOUS     -----------------------------------------------------------------  Imaging Results:    Assessment and Plan   Impression:    80 y.o. male with BPH    Plan:   OR today for robotic simple prostatectomy.     Paige Saldana MD  11:51 AM 8/18/2021

## 2021-08-18 ENCOUNTER — ANESTHESIA (OUTPATIENT)
Dept: OPERATING ROOM | Age: 86
DRG: 717 | End: 2021-08-18
Payer: MEDICARE

## 2021-08-18 ENCOUNTER — ANESTHESIA EVENT (OUTPATIENT)
Dept: OPERATING ROOM | Age: 86
DRG: 717 | End: 2021-08-18
Payer: MEDICARE

## 2021-08-18 ENCOUNTER — HOSPITAL ENCOUNTER (INPATIENT)
Age: 86
LOS: 1 days | Discharge: NURSING, REHAB OR LONG-TERM CARE | DRG: 717 | End: 2021-08-21
Attending: UROLOGY | Admitting: UROLOGY
Payer: MEDICARE

## 2021-08-18 VITALS — DIASTOLIC BLOOD PRESSURE: 105 MMHG | TEMPERATURE: 95.1 F | OXYGEN SATURATION: 99 % | SYSTOLIC BLOOD PRESSURE: 154 MMHG

## 2021-08-18 DIAGNOSIS — G89.18 ACUTE POSTOPERATIVE PAIN: Primary | ICD-10-CM

## 2021-08-18 PROBLEM — N32.0 BLADDER OUTLET OBSTRUCTION: Status: ACTIVE | Noted: 2021-08-18

## 2021-08-18 LAB
ANION GAP SERPL CALCULATED.3IONS-SCNC: 15 MMOL/L (ref 9–17)
BUN BLDV-MCNC: 20 MG/DL (ref 8–23)
BUN/CREAT BLD: ABNORMAL (ref 9–20)
CALCIUM SERPL-MCNC: 8.7 MG/DL (ref 8.6–10.4)
CHLORIDE BLD-SCNC: 98 MMOL/L (ref 98–107)
CO2: 18 MMOL/L (ref 20–31)
CREAT SERPL-MCNC: 1 MG/DL (ref 0.7–1.2)
GFR AFRICAN AMERICAN: >60 ML/MIN
GFR NON-AFRICAN AMERICAN: >60 ML/MIN
GFR SERPL CREATININE-BSD FRML MDRD: ABNORMAL ML/MIN/{1.73_M2}
GFR SERPL CREATININE-BSD FRML MDRD: ABNORMAL ML/MIN/{1.73_M2}
GLUCOSE BLD-MCNC: 91 MG/DL (ref 70–99)
HCT VFR BLD CALC: 48.5 % (ref 40.7–50.3)
HEMOGLOBIN: 15.3 G/DL (ref 13–17)
MCH RBC QN AUTO: 29.7 PG (ref 25.2–33.5)
MCHC RBC AUTO-ENTMCNC: 31.5 G/DL (ref 28.4–34.8)
MCV RBC AUTO: 94.2 FL (ref 82.6–102.9)
NRBC AUTOMATED: 0 PER 100 WBC
PDW BLD-RTO: 15 % (ref 11.8–14.4)
PLATELET # BLD: 190 K/UL (ref 138–453)
PMV BLD AUTO: 9.8 FL (ref 8.1–13.5)
POC INR: 1.1
POTASSIUM SERPL-SCNC: 4.7 MMOL/L (ref 3.7–5.3)
PROTHROMBIN TIME, POC: 13.1 SEC (ref 10.4–14.2)
RBC # BLD: 5.15 M/UL (ref 4.21–5.77)
SODIUM BLD-SCNC: 131 MMOL/L (ref 135–144)
WBC # BLD: 7 K/UL (ref 3.5–11.3)

## 2021-08-18 PROCEDURE — 3700000001 HC ADD 15 MINUTES (ANESTHESIA): Performed by: UROLOGY

## 2021-08-18 PROCEDURE — 6360000002 HC RX W HCPCS: Performed by: STUDENT IN AN ORGANIZED HEALTH CARE EDUCATION/TRAINING PROGRAM

## 2021-08-18 PROCEDURE — 3600000019 HC SURGERY ROBOT ADDTL 15MIN: Performed by: UROLOGY

## 2021-08-18 PROCEDURE — 2500000003 HC RX 250 WO HCPCS: Performed by: SPECIALIST

## 2021-08-18 PROCEDURE — 6370000000 HC RX 637 (ALT 250 FOR IP): Performed by: UROLOGY

## 2021-08-18 PROCEDURE — 6360000002 HC RX W HCPCS: Performed by: SPECIALIST

## 2021-08-18 PROCEDURE — 7100000000 HC PACU RECOVERY - FIRST 15 MIN: Performed by: UROLOGY

## 2021-08-18 PROCEDURE — 2709999900 HC NON-CHARGEABLE SUPPLY: Performed by: UROLOGY

## 2021-08-18 PROCEDURE — 2580000003 HC RX 258: Performed by: ANESTHESIOLOGY

## 2021-08-18 PROCEDURE — 87086 URINE CULTURE/COLONY COUNT: CPT

## 2021-08-18 PROCEDURE — 6360000002 HC RX W HCPCS

## 2021-08-18 PROCEDURE — 80048 BASIC METABOLIC PNL TOTAL CA: CPT

## 2021-08-18 PROCEDURE — 96375 TX/PRO/DX INJ NEW DRUG ADDON: CPT

## 2021-08-18 PROCEDURE — 6370000000 HC RX 637 (ALT 250 FOR IP): Performed by: STUDENT IN AN ORGANIZED HEALTH CARE EDUCATION/TRAINING PROGRAM

## 2021-08-18 PROCEDURE — 85610 PROTHROMBIN TIME: CPT

## 2021-08-18 PROCEDURE — 2500000003 HC RX 250 WO HCPCS: Performed by: UROLOGY

## 2021-08-18 PROCEDURE — 88307 TISSUE EXAM BY PATHOLOGIST: CPT

## 2021-08-18 PROCEDURE — 7100000001 HC PACU RECOVERY - ADDTL 15 MIN: Performed by: UROLOGY

## 2021-08-18 PROCEDURE — 2500000003 HC RX 250 WO HCPCS: Performed by: NURSE ANESTHETIST, CERTIFIED REGISTERED

## 2021-08-18 PROCEDURE — S2900 ROBOTIC SURGICAL SYSTEM: HCPCS | Performed by: UROLOGY

## 2021-08-18 PROCEDURE — 6360000002 HC RX W HCPCS: Performed by: ANESTHESIOLOGY

## 2021-08-18 PROCEDURE — G0378 HOSPITAL OBSERVATION PER HR: HCPCS

## 2021-08-18 PROCEDURE — 6360000002 HC RX W HCPCS: Performed by: NURSE ANESTHETIST, CERTIFIED REGISTERED

## 2021-08-18 PROCEDURE — 2580000003 HC RX 258: Performed by: STUDENT IN AN ORGANIZED HEALTH CARE EDUCATION/TRAINING PROGRAM

## 2021-08-18 PROCEDURE — 3600000009 HC SURGERY ROBOT BASE: Performed by: UROLOGY

## 2021-08-18 PROCEDURE — 85027 COMPLETE CBC AUTOMATED: CPT

## 2021-08-18 PROCEDURE — 3700000000 HC ANESTHESIA ATTENDED CARE: Performed by: UROLOGY

## 2021-08-18 PROCEDURE — 2580000003 HC RX 258: Performed by: UROLOGY

## 2021-08-18 RX ORDER — DOCUSATE SODIUM 100 MG/1
100 CAPSULE, LIQUID FILLED ORAL 2 TIMES DAILY
Qty: 60 CAPSULE | Refills: 0 | Status: SHIPPED | OUTPATIENT
Start: 2021-08-18 | End: 2021-09-17

## 2021-08-18 RX ORDER — ROCURONIUM BROMIDE 10 MG/ML
INJECTION, SOLUTION INTRAVENOUS PRN
Status: DISCONTINUED | OUTPATIENT
Start: 2021-08-18 | End: 2021-08-18 | Stop reason: SDUPTHER

## 2021-08-18 RX ORDER — HEPARIN SODIUM 5000 [USP'U]/ML
INJECTION, SOLUTION INTRAVENOUS; SUBCUTANEOUS
Status: COMPLETED
Start: 2021-08-18 | End: 2021-08-18

## 2021-08-18 RX ORDER — ONDANSETRON 4 MG/1
4 TABLET, ORALLY DISINTEGRATING ORAL EVERY 8 HOURS PRN
Status: DISCONTINUED | OUTPATIENT
Start: 2021-08-18 | End: 2021-08-21 | Stop reason: HOSPADM

## 2021-08-18 RX ORDER — CEFADROXIL 500 MG/1
500 CAPSULE ORAL 2 TIMES DAILY
Qty: 6 CAPSULE | Refills: 0 | Status: SHIPPED | OUTPATIENT
Start: 2021-08-18 | End: 2021-08-21

## 2021-08-18 RX ORDER — PROPOFOL 10 MG/ML
INJECTION, EMULSION INTRAVENOUS PRN
Status: DISCONTINUED | OUTPATIENT
Start: 2021-08-18 | End: 2021-08-18 | Stop reason: SDUPTHER

## 2021-08-18 RX ORDER — GLYCOPYRROLATE 1 MG/5 ML
SYRINGE (ML) INTRAVENOUS PRN
Status: DISCONTINUED | OUTPATIENT
Start: 2021-08-18 | End: 2021-08-18 | Stop reason: SDUPTHER

## 2021-08-18 RX ORDER — FENTANYL CITRATE 50 UG/ML
INJECTION, SOLUTION INTRAMUSCULAR; INTRAVENOUS PRN
Status: DISCONTINUED | OUTPATIENT
Start: 2021-08-18 | End: 2021-08-18 | Stop reason: SDUPTHER

## 2021-08-18 RX ORDER — ACETAMINOPHEN 325 MG/1
650 TABLET ORAL EVERY 6 HOURS
Status: DISCONTINUED | OUTPATIENT
Start: 2021-08-18 | End: 2021-08-21 | Stop reason: HOSPADM

## 2021-08-18 RX ORDER — TROSPIUM CHLORIDE 20 MG/1
20 TABLET, FILM COATED ORAL 2 TIMES DAILY PRN
Status: DISCONTINUED | OUTPATIENT
Start: 2021-08-18 | End: 2021-08-21 | Stop reason: HOSPADM

## 2021-08-18 RX ORDER — METOPROLOL TARTRATE 5 MG/5ML
INJECTION INTRAVENOUS PRN
Status: DISCONTINUED | OUTPATIENT
Start: 2021-08-18 | End: 2021-08-18 | Stop reason: SDUPTHER

## 2021-08-18 RX ORDER — POLYETHYLENE GLYCOL 3350 17 G/17G
17 POWDER, FOR SOLUTION ORAL DAILY
Status: DISCONTINUED | OUTPATIENT
Start: 2021-08-18 | End: 2021-08-21 | Stop reason: HOSPADM

## 2021-08-18 RX ORDER — BUSPIRONE HYDROCHLORIDE 10 MG/1
10 TABLET ORAL 3 TIMES DAILY
Status: DISCONTINUED | OUTPATIENT
Start: 2021-08-18 | End: 2021-08-21 | Stop reason: HOSPADM

## 2021-08-18 RX ORDER — HEPARIN SODIUM 5000 [USP'U]/ML
5000 INJECTION, SOLUTION INTRAVENOUS; SUBCUTANEOUS ONCE
Status: COMPLETED | OUTPATIENT
Start: 2021-08-18 | End: 2021-08-18

## 2021-08-18 RX ORDER — LIDOCAINE HYDROCHLORIDE 10 MG/ML
INJECTION, SOLUTION EPIDURAL; INFILTRATION; INTRACAUDAL; PERINEURAL PRN
Status: DISCONTINUED | OUTPATIENT
Start: 2021-08-18 | End: 2021-08-18 | Stop reason: SDUPTHER

## 2021-08-18 RX ORDER — SODIUM CHLORIDE 0.9 % (FLUSH) 0.9 %
5-40 SYRINGE (ML) INJECTION EVERY 12 HOURS SCHEDULED
Status: DISCONTINUED | OUTPATIENT
Start: 2021-08-18 | End: 2021-08-21 | Stop reason: HOSPADM

## 2021-08-18 RX ORDER — LATANOPROST 50 UG/ML
1 SOLUTION/ DROPS OPHTHALMIC NIGHTLY
Status: DISCONTINUED | OUTPATIENT
Start: 2021-08-18 | End: 2021-08-21 | Stop reason: HOSPADM

## 2021-08-18 RX ORDER — LANOLIN ALCOHOL/MO/W.PET/CERES
5 CREAM (GRAM) TOPICAL NIGHTLY
Status: DISCONTINUED | OUTPATIENT
Start: 2021-08-18 | End: 2021-08-21 | Stop reason: HOSPADM

## 2021-08-18 RX ORDER — LEVOFLOXACIN 5 MG/ML
500 INJECTION, SOLUTION INTRAVENOUS EVERY 24 HOURS
Status: DISCONTINUED | OUTPATIENT
Start: 2021-08-19 | End: 2021-08-21 | Stop reason: HOSPADM

## 2021-08-18 RX ORDER — CYCLOBENZAPRINE HCL 5 MG
5 TABLET ORAL 3 TIMES DAILY PRN
Status: DISCONTINUED | OUTPATIENT
Start: 2021-08-18 | End: 2021-08-21 | Stop reason: HOSPADM

## 2021-08-18 RX ORDER — HYDRALAZINE HYDROCHLORIDE 20 MG/ML
5 INJECTION INTRAMUSCULAR; INTRAVENOUS EVERY 6 HOURS PRN
Status: DISCONTINUED | OUTPATIENT
Start: 2021-08-18 | End: 2021-08-21 | Stop reason: HOSPADM

## 2021-08-18 RX ORDER — ONDANSETRON 2 MG/ML
4 INJECTION INTRAMUSCULAR; INTRAVENOUS EVERY 6 HOURS PRN
Status: DISCONTINUED | OUTPATIENT
Start: 2021-08-18 | End: 2021-08-21 | Stop reason: HOSPADM

## 2021-08-18 RX ORDER — MAGNESIUM HYDROXIDE 1200 MG/15ML
LIQUID ORAL CONTINUOUS PRN
Status: COMPLETED | OUTPATIENT
Start: 2021-08-18 | End: 2021-08-18

## 2021-08-18 RX ORDER — ALLOPURINOL 100 MG/1
100 TABLET ORAL DAILY
Status: DISCONTINUED | OUTPATIENT
Start: 2021-08-18 | End: 2021-08-21 | Stop reason: HOSPADM

## 2021-08-18 RX ORDER — NEOSTIGMINE METHYLSULFATE 5 MG/5 ML
SYRINGE (ML) INTRAVENOUS PRN
Status: DISCONTINUED | OUTPATIENT
Start: 2021-08-18 | End: 2021-08-18 | Stop reason: SDUPTHER

## 2021-08-18 RX ORDER — TAMSULOSIN HYDROCHLORIDE 0.4 MG/1
0.4 CAPSULE ORAL 2 TIMES DAILY
Status: DISCONTINUED | OUTPATIENT
Start: 2021-08-18 | End: 2021-08-21 | Stop reason: HOSPADM

## 2021-08-18 RX ORDER — ONDANSETRON 2 MG/ML
INJECTION INTRAMUSCULAR; INTRAVENOUS PRN
Status: DISCONTINUED | OUTPATIENT
Start: 2021-08-18 | End: 2021-08-18 | Stop reason: SDUPTHER

## 2021-08-18 RX ORDER — POLYETHYLENE GLYCOL 3350 17 G/17G
17 POWDER, FOR SOLUTION ORAL DAILY
Qty: 238 G | Refills: 0 | Status: SHIPPED | OUTPATIENT
Start: 2021-08-18 | End: 2021-09-01

## 2021-08-18 RX ORDER — LISINOPRIL 10 MG/1
10 TABLET ORAL
Status: DISCONTINUED | OUTPATIENT
Start: 2021-08-20 | End: 2021-08-21 | Stop reason: HOSPADM

## 2021-08-18 RX ORDER — OXYCODONE HYDROCHLORIDE 5 MG/1
5 TABLET ORAL EVERY 4 HOURS PRN
Status: DISCONTINUED | OUTPATIENT
Start: 2021-08-18 | End: 2021-08-21 | Stop reason: HOSPADM

## 2021-08-18 RX ORDER — BUPIVACAINE HYDROCHLORIDE AND EPINEPHRINE 5; 5 MG/ML; UG/ML
INJECTION, SOLUTION PERINEURAL PRN
Status: DISCONTINUED | OUTPATIENT
Start: 2021-08-18 | End: 2021-08-18 | Stop reason: ALTCHOICE

## 2021-08-18 RX ORDER — PREDNISONE 1 MG/1
5 TABLET ORAL DAILY
Status: DISCONTINUED | OUTPATIENT
Start: 2021-08-18 | End: 2021-08-21 | Stop reason: HOSPADM

## 2021-08-18 RX ORDER — ULTRASOUND COUPLING MEDIUM
GEL (GRAM) TOPICAL PRN
Status: DISCONTINUED | OUTPATIENT
Start: 2021-08-18 | End: 2021-08-18 | Stop reason: ALTCHOICE

## 2021-08-18 RX ORDER — SODIUM CHLORIDE 9 MG/ML
25 INJECTION, SOLUTION INTRAVENOUS PRN
Status: DISCONTINUED | OUTPATIENT
Start: 2021-08-18 | End: 2021-08-21 | Stop reason: HOSPADM

## 2021-08-18 RX ORDER — DILTIAZEM HYDROCHLORIDE 180 MG/1
180 CAPSULE, COATED, EXTENDED RELEASE ORAL DAILY
Status: DISCONTINUED | OUTPATIENT
Start: 2021-08-18 | End: 2021-08-21 | Stop reason: HOSPADM

## 2021-08-18 RX ORDER — SODIUM CHLORIDE, SODIUM LACTATE, POTASSIUM CHLORIDE, CALCIUM CHLORIDE 600; 310; 30; 20 MG/100ML; MG/100ML; MG/100ML; MG/100ML
1000 INJECTION, SOLUTION INTRAVENOUS CONTINUOUS
Status: DISCONTINUED | OUTPATIENT
Start: 2021-08-18 | End: 2021-08-18

## 2021-08-18 RX ORDER — SODIUM CHLORIDE, SODIUM LACTATE, POTASSIUM CHLORIDE, CALCIUM CHLORIDE 600; 310; 30; 20 MG/100ML; MG/100ML; MG/100ML; MG/100ML
INJECTION, SOLUTION INTRAVENOUS CONTINUOUS
Status: DISCONTINUED | OUTPATIENT
Start: 2021-08-18 | End: 2021-08-20

## 2021-08-18 RX ORDER — LEVOFLOXACIN 5 MG/ML
500 INJECTION, SOLUTION INTRAVENOUS EVERY 24 HOURS
Status: COMPLETED | OUTPATIENT
Start: 2021-08-18 | End: 2021-08-18

## 2021-08-18 RX ORDER — PANTOPRAZOLE SODIUM 40 MG/1
40 TABLET, DELAYED RELEASE ORAL
Status: DISCONTINUED | OUTPATIENT
Start: 2021-08-19 | End: 2021-08-21 | Stop reason: HOSPADM

## 2021-08-18 RX ORDER — MAGNESIUM HYDROXIDE 1200 MG/15ML
LIQUID ORAL PRN
Status: DISCONTINUED | OUTPATIENT
Start: 2021-08-18 | End: 2021-08-18 | Stop reason: ALTCHOICE

## 2021-08-18 RX ORDER — OXYCODONE HYDROCHLORIDE 5 MG/1
10 TABLET ORAL EVERY 4 HOURS PRN
Status: DISCONTINUED | OUTPATIENT
Start: 2021-08-18 | End: 2021-08-21 | Stop reason: HOSPADM

## 2021-08-18 RX ORDER — SODIUM CHLORIDE 0.9 % (FLUSH) 0.9 %
10 SYRINGE (ML) INJECTION PRN
Status: DISCONTINUED | OUTPATIENT
Start: 2021-08-18 | End: 2021-08-21 | Stop reason: HOSPADM

## 2021-08-18 RX ORDER — MAGNESIUM HYDROXIDE 1200 MG/15ML
3000 LIQUID ORAL CONTINUOUS
Status: DISCONTINUED | OUTPATIENT
Start: 2021-08-18 | End: 2021-08-21 | Stop reason: HOSPADM

## 2021-08-18 RX ORDER — OXYCODONE HYDROCHLORIDE AND ACETAMINOPHEN 5; 325 MG/1; MG/1
1 TABLET ORAL EVERY 6 HOURS PRN
Qty: 20 TABLET | Refills: 0 | Status: SHIPPED | OUTPATIENT
Start: 2021-08-18 | End: 2021-08-25

## 2021-08-18 RX ORDER — DOCUSATE SODIUM 100 MG/1
100 CAPSULE, LIQUID FILLED ORAL DAILY
Status: DISCONTINUED | OUTPATIENT
Start: 2021-08-18 | End: 2021-08-21 | Stop reason: HOSPADM

## 2021-08-18 RX ORDER — MORPHINE SULFATE 2 MG/ML
2 INJECTION, SOLUTION INTRAMUSCULAR; INTRAVENOUS
Status: DISPENSED | OUTPATIENT
Start: 2021-08-18 | End: 2021-08-19

## 2021-08-18 RX ORDER — CALCIUM CARBONATE 200(500)MG
500 TABLET,CHEWABLE ORAL 3 TIMES DAILY PRN
Status: DISCONTINUED | OUTPATIENT
Start: 2021-08-18 | End: 2021-08-21 | Stop reason: HOSPADM

## 2021-08-18 RX ADMIN — HYDROMORPHONE HYDROCHLORIDE 0.2 MG: 1 INJECTION, SOLUTION INTRAMUSCULAR; INTRAVENOUS; SUBCUTANEOUS at 15:40

## 2021-08-18 RX ADMIN — Medication 3 MG: at 14:50

## 2021-08-18 RX ADMIN — MORPHINE SULFATE 2 MG: 2 INJECTION, SOLUTION INTRAMUSCULAR; INTRAVENOUS at 16:40

## 2021-08-18 RX ADMIN — PREDNISONE 5 MG: 5 TABLET ORAL at 20:33

## 2021-08-18 RX ADMIN — ACETAMINOPHEN 650 MG: 325 TABLET ORAL at 22:55

## 2021-08-18 RX ADMIN — CEFAZOLIN 2000 MG: 10 INJECTION, POWDER, FOR SOLUTION INTRAVENOUS at 13:47

## 2021-08-18 RX ADMIN — SODIUM CHLORIDE, POTASSIUM CHLORIDE, SODIUM LACTATE AND CALCIUM CHLORIDE 1000 ML: 600; 310; 30; 20 INJECTION, SOLUTION INTRAVENOUS at 11:24

## 2021-08-18 RX ADMIN — HYDROMORPHONE HYDROCHLORIDE 0.2 MG: 1 INJECTION, SOLUTION INTRAMUSCULAR; INTRAVENOUS; SUBCUTANEOUS at 15:35

## 2021-08-18 RX ADMIN — SODIUM CHLORIDE, POTASSIUM CHLORIDE, SODIUM LACTATE AND CALCIUM CHLORIDE: 600; 310; 30; 20 INJECTION, SOLUTION INTRAVENOUS at 22:55

## 2021-08-18 RX ADMIN — LIDOCAINE HYDROCHLORIDE 50 MG: 10 INJECTION, SOLUTION EPIDURAL; INFILTRATION; INTRACAUDAL; PERINEURAL at 13:00

## 2021-08-18 RX ADMIN — HYDROMORPHONE HYDROCHLORIDE 0.4 MG: 1 INJECTION, SOLUTION INTRAMUSCULAR; INTRAVENOUS; SUBCUTANEOUS at 15:25

## 2021-08-18 RX ADMIN — HYDROMORPHONE HYDROCHLORIDE 0.4 MG: 1 INJECTION, SOLUTION INTRAMUSCULAR; INTRAVENOUS; SUBCUTANEOUS at 15:30

## 2021-08-18 RX ADMIN — MORPHINE SULFATE 2 MG: 2 INJECTION, SOLUTION INTRAMUSCULAR; INTRAVENOUS at 22:55

## 2021-08-18 RX ADMIN — SODIUM CHLORIDE 3000 ML: 900 IRRIGANT IRRIGATION at 17:03

## 2021-08-18 RX ADMIN — PROPOFOL 100 MG: 10 INJECTION, EMULSION INTRAVENOUS at 13:00

## 2021-08-18 RX ADMIN — PHENOL 1 SPRAY: 1.5 LIQUID ORAL at 21:02

## 2021-08-18 RX ADMIN — BUSPIRONE HYDROCHLORIDE 10 MG: 10 TABLET ORAL at 20:34

## 2021-08-18 RX ADMIN — HYOSCYAMINE SULFATE 125 MCG: 0.12 TABLET ORAL; SUBLINGUAL at 22:55

## 2021-08-18 RX ADMIN — HYDROMORPHONE HYDROCHLORIDE 0.2 MG: 1 INJECTION, SOLUTION INTRAMUSCULAR; INTRAVENOUS; SUBCUTANEOUS at 15:45

## 2021-08-18 RX ADMIN — LATANOPROST 1 DROP: 50 SOLUTION/ DROPS OPHTHALMIC at 20:32

## 2021-08-18 RX ADMIN — ALLOPURINOL 100 MG: 100 TABLET ORAL at 20:33

## 2021-08-18 RX ADMIN — ANTACID TABLETS 500 MG: 500 TABLET, CHEWABLE ORAL at 23:29

## 2021-08-18 RX ADMIN — METOPROLOL TARTRATE 2 MG: 1 INJECTION, SOLUTION INTRAVENOUS at 14:16

## 2021-08-18 RX ADMIN — OXYCODONE HYDROCHLORIDE 5 MG: 5 TABLET ORAL at 21:14

## 2021-08-18 RX ADMIN — DEXTROSE MONOHYDRATE 1000 MG: 5 INJECTION INTRAVENOUS at 23:19

## 2021-08-18 RX ADMIN — DOCUSATE SODIUM 100 MG: 100 CAPSULE ORAL at 20:33

## 2021-08-18 RX ADMIN — METOPROLOL TARTRATE 25 MG: 25 TABLET ORAL at 20:33

## 2021-08-18 RX ADMIN — Medication 0.4 MG: at 14:50

## 2021-08-18 RX ADMIN — PHENOL 1 SPRAY: 1.5 LIQUID ORAL at 22:55

## 2021-08-18 RX ADMIN — SODIUM CHLORIDE, POTASSIUM CHLORIDE, SODIUM LACTATE AND CALCIUM CHLORIDE: 600; 310; 30; 20 INJECTION, SOLUTION INTRAVENOUS at 13:34

## 2021-08-18 RX ADMIN — ONDANSETRON 4 MG: 2 INJECTION, SOLUTION INTRAMUSCULAR; INTRAVENOUS at 14:45

## 2021-08-18 RX ADMIN — ACETAMINOPHEN 650 MG: 325 TABLET ORAL at 17:30

## 2021-08-18 RX ADMIN — FENTANYL CITRATE 100 MCG: 50 INJECTION, SOLUTION INTRAMUSCULAR; INTRAVENOUS at 13:55

## 2021-08-18 RX ADMIN — HYDROMORPHONE HYDROCHLORIDE 0.2 MG: 1 INJECTION, SOLUTION INTRAMUSCULAR; INTRAVENOUS; SUBCUTANEOUS at 15:50

## 2021-08-18 RX ADMIN — METOPROLOL TARTRATE 2 MG: 1 INJECTION, SOLUTION INTRAVENOUS at 14:55

## 2021-08-18 RX ADMIN — HEPARIN SODIUM 5000 UNITS: 5000 INJECTION INTRAVENOUS; SUBCUTANEOUS at 11:22

## 2021-08-18 RX ADMIN — Medication 4.5 MG: at 20:32

## 2021-08-18 RX ADMIN — HYDRALAZINE HYDROCHLORIDE 5 MG: 20 INJECTION INTRAMUSCULAR; INTRAVENOUS at 17:29

## 2021-08-18 RX ADMIN — FENTANYL CITRATE 50 MCG: 50 INJECTION, SOLUTION INTRAMUSCULAR; INTRAVENOUS at 13:00

## 2021-08-18 RX ADMIN — HYOSCYAMINE SULFATE 125 MCG: 0.12 TABLET ORAL; SUBLINGUAL at 17:30

## 2021-08-18 RX ADMIN — SODIUM CHLORIDE 3000 ML: 900 IRRIGANT IRRIGATION at 20:59

## 2021-08-18 RX ADMIN — LEVOFLOXACIN 500 MG: 5 INJECTION, SOLUTION INTRAVENOUS at 13:47

## 2021-08-18 RX ADMIN — SODIUM CHLORIDE, POTASSIUM CHLORIDE, SODIUM LACTATE AND CALCIUM CHLORIDE: 600; 310; 30; 20 INJECTION, SOLUTION INTRAVENOUS at 16:42

## 2021-08-18 RX ADMIN — TAMSULOSIN HYDROCHLORIDE 0.4 MG: 0.4 CAPSULE ORAL at 20:33

## 2021-08-18 RX ADMIN — FENTANYL CITRATE 50 MCG: 50 INJECTION, SOLUTION INTRAMUSCULAR; INTRAVENOUS at 14:09

## 2021-08-18 RX ADMIN — ONDANSETRON 4 MG: 2 INJECTION INTRAMUSCULAR; INTRAVENOUS at 16:59

## 2021-08-18 RX ADMIN — ROCURONIUM BROMIDE 50 MG: 10 INJECTION INTRAVENOUS at 13:00

## 2021-08-18 RX ADMIN — HEPARIN SODIUM 5000 UNITS: 5000 INJECTION, SOLUTION INTRAVENOUS; SUBCUTANEOUS at 11:22

## 2021-08-18 ASSESSMENT — PAIN SCALES - GENERAL
PAINLEVEL_OUTOF10: 6
PAINLEVEL_OUTOF10: 4
PAINLEVEL_OUTOF10: 6
PAINLEVEL_OUTOF10: 7
PAINLEVEL_OUTOF10: 6
PAINLEVEL_OUTOF10: 6
PAINLEVEL_OUTOF10: 7
PAINLEVEL_OUTOF10: 7
PAINLEVEL_OUTOF10: 6
PAINLEVEL_OUTOF10: 6
PAINLEVEL_OUTOF10: 7
PAINLEVEL_OUTOF10: 8
PAINLEVEL_OUTOF10: 8

## 2021-08-18 ASSESSMENT — PULMONARY FUNCTION TESTS
PIF_VALUE: 15
PIF_VALUE: 29
PIF_VALUE: 1
PIF_VALUE: 15
PIF_VALUE: 22
PIF_VALUE: 17
PIF_VALUE: 1
PIF_VALUE: 15
PIF_VALUE: 19
PIF_VALUE: 16
PIF_VALUE: 19
PIF_VALUE: 29
PIF_VALUE: 18
PIF_VALUE: 15
PIF_VALUE: 31
PIF_VALUE: 18
PIF_VALUE: 29
PIF_VALUE: 15
PIF_VALUE: 15
PIF_VALUE: 29
PIF_VALUE: 2
PIF_VALUE: 15
PIF_VALUE: 29
PIF_VALUE: 29
PIF_VALUE: 18
PIF_VALUE: 30
PIF_VALUE: 30
PIF_VALUE: 31
PIF_VALUE: 15
PIF_VALUE: 29
PIF_VALUE: 15
PIF_VALUE: 30
PIF_VALUE: 15
PIF_VALUE: 14
PIF_VALUE: 18
PIF_VALUE: 15
PIF_VALUE: 17
PIF_VALUE: 29
PIF_VALUE: 15
PIF_VALUE: 18
PIF_VALUE: 30
PIF_VALUE: 20
PIF_VALUE: 17
PIF_VALUE: 20
PIF_VALUE: 30
PIF_VALUE: 15
PIF_VALUE: 29
PIF_VALUE: 30
PIF_VALUE: 29
PIF_VALUE: 15
PIF_VALUE: 15
PIF_VALUE: 19
PIF_VALUE: 18
PIF_VALUE: 2
PIF_VALUE: 16
PIF_VALUE: 28
PIF_VALUE: 16
PIF_VALUE: 31
PIF_VALUE: 16
PIF_VALUE: 15
PIF_VALUE: 19
PIF_VALUE: 15
PIF_VALUE: 20
PIF_VALUE: 7
PIF_VALUE: 30
PIF_VALUE: 18
PIF_VALUE: 15
PIF_VALUE: 30
PIF_VALUE: 23
PIF_VALUE: 30
PIF_VALUE: 16
PIF_VALUE: 17
PIF_VALUE: 17
PIF_VALUE: 29
PIF_VALUE: 18
PIF_VALUE: 29
PIF_VALUE: 18
PIF_VALUE: 29
PIF_VALUE: 29
PIF_VALUE: 28
PIF_VALUE: 30
PIF_VALUE: 15
PIF_VALUE: 30
PIF_VALUE: 15
PIF_VALUE: 15
PIF_VALUE: 18
PIF_VALUE: 15
PIF_VALUE: 18
PIF_VALUE: 29
PIF_VALUE: 28
PIF_VALUE: 31
PIF_VALUE: 31
PIF_VALUE: 1
PIF_VALUE: 29
PIF_VALUE: 29
PIF_VALUE: 28
PIF_VALUE: 15
PIF_VALUE: 29
PIF_VALUE: 30
PIF_VALUE: 16
PIF_VALUE: 16
PIF_VALUE: 28
PIF_VALUE: 17
PIF_VALUE: 15
PIF_VALUE: 15
PIF_VALUE: 17
PIF_VALUE: 1
PIF_VALUE: 15
PIF_VALUE: 2
PIF_VALUE: 11
PIF_VALUE: 30
PIF_VALUE: 1
PIF_VALUE: 15
PIF_VALUE: 29
PIF_VALUE: 17
PIF_VALUE: 18
PIF_VALUE: 15
PIF_VALUE: 15
PIF_VALUE: 23
PIF_VALUE: 20
PIF_VALUE: 15
PIF_VALUE: 30
PIF_VALUE: 27
PIF_VALUE: 15
PIF_VALUE: 22
PIF_VALUE: 19
PIF_VALUE: 31
PIF_VALUE: 30
PIF_VALUE: 28
PIF_VALUE: 30
PIF_VALUE: 16
PIF_VALUE: 15
PIF_VALUE: 29
PIF_VALUE: 15

## 2021-08-18 ASSESSMENT — PAIN - FUNCTIONAL ASSESSMENT: PAIN_FUNCTIONAL_ASSESSMENT: 0-10

## 2021-08-18 ASSESSMENT — PAIN DESCRIPTION - PAIN TYPE
TYPE: SURGICAL PAIN
TYPE: SURGICAL PAIN

## 2021-08-18 ASSESSMENT — PAIN DESCRIPTION - LOCATION
LOCATION: ABDOMEN
LOCATION: ABDOMEN

## 2021-08-18 ASSESSMENT — PAIN DESCRIPTION - DESCRIPTORS
DESCRIPTORS: PRESSURE
DESCRIPTORS: PRESSURE

## 2021-08-18 NOTE — CARE COORDINATION
Case Management Initial Discharge Plan  Daljit Fulton,             Met with:patient, pts son to discuss discharge plans. Information verified: address, contacts, phone number, , insurance Yes  Insurance Provider: [address] [place] [address]    Emergency Contact/Next of Kin name & number:   1. Pamela Morfin  2. Inderjit Morfin      Child  Child [phone]       Who are involved in patient's support system? saon and daughter    PCP: Rafael Tejeda MD  Date of last visit: 2 weeks      Discharge Planning    Living Arrangements:        Home has 1 stories  2 stairs to climb to get into front door, 0stairs to climb to reach second floor  Location of bedroom/bathroom in home main    Patient able to perform ADL's:Independent    Current Services (outpatient & in home) none  DME equipment: none  DME provider:     Is patient receiving oral anticoagulation therapy? Yes warfarin    If indicated:   Physician managing anticoagulation treatment: Dr Cinda Mckee  Where does patient obtain lab work for ATC treatment? [facility]      Potential Assistance Needed:       Patient agreeable to home care: No  [place] of choice provided:  n/a    [facility]  Agreeable to SNF/Rehab: No  [place] of choice provided: n/a     Evaluation: no    Expected Discharge date:       Patient expects to be discharged to: If home: is the family and/or caregiver wiling & able to provide support at home? yes  Who will be providing this support? son    Follow Up Appointment: Best Day/ Time:      Transportation provider: son  Transportation arrangements needed for discharge: No    Readmission Risk              Risk of Unplanned Readmission:  0             Does patient have a readmission risk score greater than 14?: n/a  If yes, follow-up appointment must be made within 7 days of discharge.      Goals of Care: get well      Educated pt on transitional options, provided freedom of choice and are agreeable with plan      Discharge Plan: Plans to return home, son will be staying with him , pcp established, has transportation          Electronically signed by Audi Root RN on 8/18/21 at 5:45 PM EDT

## 2021-08-18 NOTE — OP NOTE
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      COMPLICATIONS:  None.      ESTIMATED BLOOD LOSS:  300 cc     INTRAVENOUS FLUIDS:  Crystalloid per anesthesia    Indications:    The patient is a 80 y.o. male who presents with BPH. He had a cystoscopy with enlarged prostate with trilobar hyperplasia. Patient takes coumadin for atrial fibrillation. On transrectal ultrasound his prostate was enlarged at 109 cc. Specimens:   ID Type Source Tests Collected by Time Destination   1 : URINE FOR CULTURE Urine Urine, indwelling catheter CULTURE, URINE Tylor Richards MD 2021 1453    A : Benign prostatic hyperplasia-BPH Tissue Prostate SURGICAL PATHOLOGY Tylor Richards MD 2021 1434        Implants:  * No implants in log *      Drains:   Closed/Suction Drain LLQ Bulb 15 Tamazight (Active)   Site Description Unable to view 21 1545   Dressing Status Clean;Dry; Intact 21 1545   Drainage Appearance Bloody 21 1545   Status Compressed 21 1545       Urethral Catheter Triple-lumen;Straight-tip;Latex 24 fr (Active)   Catheter Indications Perioperative use for selected surgical procedures 21 1545   Site Assessment No urethral drainage 21 1545   Urine Color Diluted;Bloody 21 1545   Urine Appearance Clear 21 1545   Output (mL) 700 mL 21 1606              DESCRIPTION OF PROCEDURE:    The patient was brought back to the operating room and he was positioned in the modified dorsal lithotomy position after general anesthesia was started.  His Quintana catheter was removed, then he was sterilely prepped and draped in standard fashion. All the pressure points were padded. He was secured to the emergency department and he was draped. A new 18-Tamazight Quintana catheter was placed and then an incision was made in supraumbilical region and a Veress needle was placed through this into the  peritoneum, and pneumoperitoneum was obtained. The rest of the ports were placed in the usual fashion. The 3 robotic ports were placed which were 8 mm ports and 2 assistant ports were placed, one 12 mm port and one 5 mm port. The patient was placed in Trendelenburg position and the robot was docked and the procedure started by identifying the bladder. The bladder was filled through the catheter and an incision was made into the bladder in a horizontal fashion at the dome of the bladder. We then identified the catheter and identified the prostate, along with the ureteral orifices. After this, we incised the mucosa of the bladder and we began to dissect the plane between the adenoma of the prostate and the capsule of the prostate. This was done circumferentially making to stay away from the ureteral orifices. We carefully dissected this plane all the way around the prostate to core out the adenoma. Tenaculum was used on the 4th arm which helped with traction and we also fulgurated prostatic vessels along the way. This plane continued all the way around the prostate posteriorly, laterally and anteriorly especially towards the apex of the prostate. We made sure not to injure surrounding tissues. At the apex of the prostate we stayed close tothe prostate and as we approached the urethra, we bivalved the prostate adenoma and we removed one--half to better allow visualization then we dissected the rest of the adenoma off and then we placed both specimens into Two separate Endo-Catch bags and we then irrigated the area. We made sure there was adequate hemostasis.  We placed interrupted stitches at the 5 and 7 o'clock position where the prostate vessels would enter. We also sewed the bladder  neck mucosa down to the floor of the prostate capsule with a running stitch of 3-0 V-Loc. We irrigated the area again and made sure there was adequate hemostasis. After this was all done and ureteral orifices were intact, there is adequate hemostasis. We then placed a new 24 Ukrainian 3-way Quintana catheter into the bladder. Then we closed the bladder in 2 layers with 3-0 V-Loc stitches in a running fashion. After this we filled the bladder and made sure there was no leakage. We then undocked the robot. We elongated the umbilical skin incision. We removed the specimen bags through this  incision. This was set as permanent specimen to pathology. We closed this incision with running 0 PDS, and all the skin incisions were closed with 4-0 Monocryl. A NEYMAR drain was placed through the 4th robotic arm port. This was secured to the skin with 3-0 nylon and all the skin incisions were closed with 4-0 Monocryl with Dermabond and that was the end of the procedure. The patient will be admitted for routine  postoperative care.      Dr Vasiliy Dawn was present and scrubbed throughout the entire case.

## 2021-08-18 NOTE — ANESTHESIA PRE PROCEDURE
Managed by [facility], Dr. Lata Scott, will stop 5 days before surgery on 8/18/21, and restart 7 days following surgery. Historical Provider, MD       Current medications:    Current Facility-Administered Medications   Medication Dose Route Frequency Provider Last Rate Last Admin    levoFLOXacin (LEVAQUIN) 500 MG/100ML infusion 500 mg  500 mg Intravenous Q24H Isabelle Howard MD        lactated ringers infusion 1,000 mL  1,000 mL Intravenous Continuous Kim Schwartz MD 50 mL/hr at 08/18/21 1124 1,000 mL at 08/18/21 1124       Allergies:     Allergies   Allergen Reactions    Celebrex [Celecoxib] Nausea Only    Mobic Nausea Only       Problem List:    Patient Active Problem List   Diagnosis Code    Insomnia G47.00    Allergic rhinitis J30.9    Malignant basal cell neoplasm of skin C44.91    Benign prostatic hyperplasia with lower urinary tract symptoms N40.1    Branch retinal vein occlusion, left eye I05.8961    Cervical radiculopathy M54.12    Essential hypertension I10    Gastroesophageal reflux disease without esophagitis K21.9    Iron deficiency anemia D50.9    Arthritis, degenerative M19.90    Diverticulosis of large intestine without hemorrhage K57.30    Elevated PSA R97.20    B12 deficiency A37.1    Diastolic dysfunction Z72.94    Elevated liver enzymes R74.8    Pure hypercholesterolemia E78.00    Irritable bowel syndrome without diarrhea K58.9    Paroxysmal atrial fibrillation (HCC) I48.0    Low testosterone R79.89    Bradycardia R00.1    Chronic bilateral low back pain without sciatica M54.5, G89.29    Gout M10.9    Anxiety F41.9    Chronic systolic congestive heart failure (HCC) I50.22    DDD (degenerative disc disease), cervical M50.30    DDD (degenerative disc disease), lumbar M51.36    Closed compression fracture of second lumbar vertebra (HCC) S32.020A    Acquired spondylolisthesis M43.10    Spinal stenosis of lumbar region with neurogenic claudication M48.062    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 Huma Meraz, [facility] cell cancer 2013    left side of head, face chin    Basal cell cancer 03/10/14    left cheek    Cervical radiculopathy     Diverticulitis 13    GERD (gastroesophageal reflux disease)     Glaucoma     left eye    Hyperlipidemia     Hypertension     Dr. Colette Jauregui    Hyponatremia 2011    IBS (irritable bowel syndrome) 2011    Insomnia 2011    Osteoarthritis     Osteoarthritis/neck pain. 2011    Renal calculi     Shingles     history of shingles    Urinary frequency     Wears glasses     Wears hearing aid in both ears        Past Surgical History:        Procedure Laterality Date    CHOLECYSTECTOMY  9-9-15    laparoscopic with cholangiogram    COLONOSCOPY      COLONOSCOPY  2013    HERNIA REPAIR Right     inguinal    LITHOTRIPSY      MOHS SURGERY Left 03/10/14    cheek    MOHS SURGERY  14    post scalp    MOHS SURGERY Left 08/2017    X2 left cheek    MOHS SURGERY Left 10/21/2019    temple    MOHS SURGERY  2020    top of head    OTHER SURGICAL HISTORY  9/6/15    attempted ERCP    SKIN BIOPSY  13    3 areas    SKIN BIOPSY Left 14    cheek /basal cell carinoma    SKIN CANCER EXCISION      left face and top of head    SKIN CANCER EXCISION Left 2013    forehead, cheek, chin, basal cell.     SKIN CANCER EXCISION Right 2016    with skin graft    SPINE SURGERY  2003       Social History:    Social History     Tobacco Use    Smoking status: Former Smoker     Packs/day: 1.00     Years: 10.00     Pack years: 10.00     Quit date: 1961     Years since quittin.6    Smokeless tobacco: Never Used    Tobacco comment: quit    Substance Use Topics  Alcohol use: Not Currently     Alcohol/week: 0.0 standard drinks                                Counseling given: Not Answered  Comment: quit 1961      Vital Signs (Current):   Vitals:    08/18/21 1105   BP: 122/84   Pulse: 54   Resp: 18   Temp: 96.8 °F (36 °C)   TempSrc: Temporal   SpO2: 99%   Weight: 159 lb (72.1 kg)   Height: 5' 4\" (1.626 m)                                              BP Readings from Last 3 Encounters:   08/18/21 122/84   08/05/21 133/81   07/20/21 110/76       NPO Status: Time of last liquid consumption: 2200                        Time of last solid consumption: 1400                        Date of last liquid consumption: 08/17/21                        Date of last solid food consumption: 08/17/21    BMI:   Wt Readings from Last 3 Encounters:   08/18/21 159 lb (72.1 kg)   08/05/21 157 lb (71.2 kg)   08/04/21 164 lb (74.4 kg)     Body mass index is 27.29 kg/m². CBC:   Lab Results   Component Value Date    WBC 7.9 08/05/2021    RBC 5.03 08/05/2021    RBC 4.86 05/23/2017    HGB 14.9 08/05/2021    HCT 47.1 08/05/2021    MCV 93.6 08/05/2021    RDW 14.8 08/05/2021     08/05/2021       CMP:   Lab Results   Component Value Date     08/05/2021    K 4.6 08/05/2021    CL 95 08/05/2021    CO2 26 08/05/2021    BUN 14 08/05/2021    CREATININE 0.83 08/05/2021    GFRAA >60 08/05/2021    LABGLOM >60 08/05/2021    GLUCOSE 99 08/05/2021    GLUCOSE 89 05/23/2017    PROT 6.1 06/02/2021    CALCIUM 9.6 06/02/2021    BILITOT 1.36 06/02/2021    ALKPHOS 64 06/02/2021    AST 19 06/02/2021    ALT 14 06/02/2021       POC Tests: No results for input(s): POCGLU, POCNA, POCK, POCCL, POCBUN, POCHEMO, POCHCT in the last 72 hours.     Coags:   Lab Results   Component Value Date    PROTIME 25.0 08/05/2021    INR 2.5 08/05/2021    APTT 38.3 02/17/2021       HCG (If Applicable): No results found for: PREGTESTUR, PREGSERUM, HCG, HCGQUANT     ABGs: No results found for: PHART, PO2ART, KPM8ZHO, LZJ6PLW, BEART, [id number]     Type & Screen (If Applicable):  No results found for: LABABO, LABRH    Drug/Infectious Status (If Applicable):  Lab Results   Component Value Date    HEPCAB NONREACTIVE 09/05/2015       COVID-19 Screening (If Applicable): No results found for: COVID19        Anesthesia Evaluation  Patient summary reviewed and Nursing notes reviewed no history of anesthetic complications:   Airway: Mallampati: II  TM distance: >3 FB   Neck ROM: full  Mouth opening: > = 3 FB Dental: normal exam         Pulmonary:Negative Pulmonary ROS and normal exam                               Cardiovascular:  Exercise tolerance: good (>4 METS),   (+) hypertension:, past MI: > 6 months and no interval change, CHF: systolic and diastolic,       ECG reviewed  Rhythm: regular  Rate: normal  Echocardiogram reviewed         Beta Blocker:  Dose within 24 Hrs         Neuro/Psych:   (+) neuromuscular disease:, psychiatric history:            GI/Hepatic/Renal:   (+) GERD:,           Endo/Other:    (+) blood dyscrasia: anticoagulation therapy:., .                 Abdominal:             Vascular: Other Findings:             Anesthesia Plan      general     ASA 3       Induction: intravenous. MIPS: Postoperative opioids intended and Prophylactic antiemetics administered. Anesthetic plan and risks discussed with patient. Use of blood products discussed with patient whom consented to blood products.    Plan discussed with CRNA and surgical team.                  Kiara Parks MD   8/18/2021

## 2021-08-18 NOTE — DISCHARGE INSTR - DIET

## 2021-08-18 NOTE — PROGRESS NOTES
Patient admitted to room 318 from surgery s/p simple prostatectomy, oriented to room and call light, visitor at bedside, nursing admission navigator completed, comfort care plan initiated, IV infusing and CBI infusing, BP running in 170's, urology up to floor to see patient and Hydralazine IV given, EPC;s to BLE while in bed, telemetry applied per orders, vitals taken, patient medicated for pain per request

## 2021-08-19 LAB
ABO/RH: NORMAL
ANION GAP SERPL CALCULATED.3IONS-SCNC: 13 MMOL/L (ref 9–17)
ANION GAP SERPL CALCULATED.3IONS-SCNC: 14 MMOL/L (ref 9–17)
ANTIBODY SCREEN: NEGATIVE
ARM BAND NUMBER: NORMAL
BLD PROD TYP BPU: NORMAL
BLD PROD TYP BPU: NORMAL
BUN BLDV-MCNC: 13 MG/DL (ref 8–23)
BUN BLDV-MCNC: 17 MG/DL (ref 8–23)
BUN/CREAT BLD: ABNORMAL (ref 9–20)
BUN/CREAT BLD: ABNORMAL (ref 9–20)
CALCIUM SERPL-MCNC: 8 MG/DL (ref 8.6–10.4)
CALCIUM SERPL-MCNC: 8.7 MG/DL (ref 8.6–10.4)
CHLORIDE BLD-SCNC: 95 MMOL/L (ref 98–107)
CHLORIDE BLD-SCNC: 96 MMOL/L (ref 98–107)
CO2: 18 MMOL/L (ref 20–31)
CO2: 18 MMOL/L (ref 20–31)
CREAT SERPL-MCNC: 0.75 MG/DL (ref 0.7–1.2)
CREAT SERPL-MCNC: 0.85 MG/DL (ref 0.7–1.2)
CREATININE FLUID: 0.8 MG/DL
CROSSMATCH RESULT: NORMAL
CROSSMATCH RESULT: NORMAL
CULTURE: NO GROWTH
DISPENSE STATUS BLOOD BANK: NORMAL
DISPENSE STATUS BLOOD BANK: NORMAL
EXPIRATION DATE: NORMAL
GFR AFRICAN AMERICAN: >60 ML/MIN
GFR AFRICAN AMERICAN: >60 ML/MIN
GFR NON-AFRICAN AMERICAN: >60 ML/MIN
GFR NON-AFRICAN AMERICAN: >60 ML/MIN
GFR SERPL CREATININE-BSD FRML MDRD: ABNORMAL ML/MIN/{1.73_M2}
GLUCOSE BLD-MCNC: 128 MG/DL (ref 70–99)
GLUCOSE BLD-MCNC: 76 MG/DL (ref 70–99)
HCT VFR BLD CALC: 39.5 % (ref 40.7–50.3)
HCT VFR BLD CALC: 41.1 % (ref 40.7–50.3)
HEMOGLOBIN: 12.7 G/DL (ref 13–17)
HEMOGLOBIN: 13 G/DL (ref 13–17)
LV EF: 53 %
LVEF MODALITY: NORMAL
Lab: NORMAL
MCH RBC QN AUTO: 29.8 PG (ref 25.2–33.5)
MCH RBC QN AUTO: 30 PG (ref 25.2–33.5)
MCHC RBC AUTO-ENTMCNC: 31.6 G/DL (ref 28.4–34.8)
MCHC RBC AUTO-ENTMCNC: 32.2 G/DL (ref 28.4–34.8)
MCV RBC AUTO: 92.7 FL (ref 82.6–102.9)
MCV RBC AUTO: 94.7 FL (ref 82.6–102.9)
NRBC AUTOMATED: 0 PER 100 WBC
NRBC AUTOMATED: 0 PER 100 WBC
PDW BLD-RTO: 15.2 % (ref 11.8–14.4)
PDW BLD-RTO: 15.3 % (ref 11.8–14.4)
PLATELET # BLD: 156 K/UL (ref 138–453)
PLATELET # BLD: 172 K/UL (ref 138–453)
PMV BLD AUTO: 10.2 FL (ref 8.1–13.5)
PMV BLD AUTO: 9.9 FL (ref 8.1–13.5)
POTASSIUM SERPL-SCNC: 5 MMOL/L (ref 3.7–5.3)
POTASSIUM SERPL-SCNC: 5.1 MMOL/L (ref 3.7–5.3)
RBC # BLD: 4.26 M/UL (ref 4.21–5.77)
RBC # BLD: 4.34 M/UL (ref 4.21–5.77)
SODIUM BLD-SCNC: 127 MMOL/L (ref 135–144)
SODIUM BLD-SCNC: 127 MMOL/L (ref 135–144)
SPECIMEN DESCRIPTION: NORMAL
SPECIMEN TYPE: NORMAL
TRANSFUSION STATUS: NORMAL
TRANSFUSION STATUS: NORMAL
TROPONIN INTERP: ABNORMAL
TROPONIN T: ABNORMAL NG/ML
TROPONIN, HIGH SENSITIVITY: 34 NG/L (ref 0–22)
UNIT DIVISION: 0
UNIT DIVISION: 0
UNIT NUMBER: NORMAL
UNIT NUMBER: NORMAL
WBC # BLD: 11.6 K/UL (ref 3.5–11.3)
WBC # BLD: 15 K/UL (ref 3.5–11.3)

## 2021-08-19 PROCEDURE — 93306 TTE W/DOPPLER COMPLETE: CPT

## 2021-08-19 PROCEDURE — 2580000003 HC RX 258: Performed by: STUDENT IN AN ORGANIZED HEALTH CARE EDUCATION/TRAINING PROGRAM

## 2021-08-19 PROCEDURE — 96365 THER/PROPH/DIAG IV INF INIT: CPT

## 2021-08-19 PROCEDURE — 85027 COMPLETE CBC AUTOMATED: CPT

## 2021-08-19 PROCEDURE — 6360000002 HC RX W HCPCS: Performed by: STUDENT IN AN ORGANIZED HEALTH CARE EDUCATION/TRAINING PROGRAM

## 2021-08-19 PROCEDURE — 94760 N-INVAS EAR/PLS OXIMETRY 1: CPT

## 2021-08-19 PROCEDURE — G0378 HOSPITAL OBSERVATION PER HR: HCPCS

## 2021-08-19 PROCEDURE — 97162 PT EVAL MOD COMPLEX 30 MIN: CPT

## 2021-08-19 PROCEDURE — 2500000003 HC RX 250 WO HCPCS: Performed by: STUDENT IN AN ORGANIZED HEALTH CARE EDUCATION/TRAINING PROGRAM

## 2021-08-19 PROCEDURE — 36415 COLL VENOUS BLD VENIPUNCTURE: CPT

## 2021-08-19 PROCEDURE — 84484 ASSAY OF TROPONIN QUANT: CPT

## 2021-08-19 PROCEDURE — 6370000000 HC RX 637 (ALT 250 FOR IP): Performed by: STUDENT IN AN ORGANIZED HEALTH CARE EDUCATION/TRAINING PROGRAM

## 2021-08-19 PROCEDURE — 82570 ASSAY OF URINE CREATININE: CPT

## 2021-08-19 PROCEDURE — 96375 TX/PRO/DX INJ NEW DRUG ADDON: CPT

## 2021-08-19 PROCEDURE — 97166 OT EVAL MOD COMPLEX 45 MIN: CPT

## 2021-08-19 PROCEDURE — 96376 TX/PRO/DX INJ SAME DRUG ADON: CPT

## 2021-08-19 PROCEDURE — 80048 BASIC METABOLIC PNL TOTAL CA: CPT

## 2021-08-19 PROCEDURE — 97535 SELF CARE MNGMENT TRAINING: CPT

## 2021-08-19 PROCEDURE — 97530 THERAPEUTIC ACTIVITIES: CPT

## 2021-08-19 RX ORDER — 0.9 % SODIUM CHLORIDE 0.9 %
1000 INTRAVENOUS SOLUTION INTRAVENOUS ONCE
Status: COMPLETED | OUTPATIENT
Start: 2021-08-19 | End: 2021-08-19

## 2021-08-19 RX ORDER — KETOROLAC TROMETHAMINE 15 MG/ML
15 INJECTION, SOLUTION INTRAMUSCULAR; INTRAVENOUS EVERY 8 HOURS
Status: COMPLETED | OUTPATIENT
Start: 2021-08-19 | End: 2021-08-20

## 2021-08-19 RX ORDER — DIGOXIN 0.25 MG/ML
250 INJECTION INTRAMUSCULAR; INTRAVENOUS EVERY 4 HOURS
Status: COMPLETED | OUTPATIENT
Start: 2021-08-19 | End: 2021-08-19

## 2021-08-19 RX ORDER — CALCIUM GLUCONATE 20 MG/ML
1000 INJECTION, SOLUTION INTRAVENOUS ONCE
Status: COMPLETED | OUTPATIENT
Start: 2021-08-19 | End: 2021-08-19

## 2021-08-19 RX ADMIN — SODIUM CHLORIDE 1000 ML: 9 INJECTION, SOLUTION INTRAVENOUS at 11:05

## 2021-08-19 RX ADMIN — KETOROLAC TROMETHAMINE 15 MG: 15 INJECTION, SOLUTION INTRAMUSCULAR; INTRAVENOUS at 17:56

## 2021-08-19 RX ADMIN — ACETAMINOPHEN 650 MG: 325 TABLET ORAL at 17:56

## 2021-08-19 RX ADMIN — LATANOPROST 1 DROP: 50 SOLUTION OPHTHALMIC at 20:19

## 2021-08-19 RX ADMIN — PHENOL 1 SPRAY: 1.5 LIQUID ORAL at 09:36

## 2021-08-19 RX ADMIN — BUSPIRONE HYDROCHLORIDE 10 MG: 10 TABLET ORAL at 20:18

## 2021-08-19 RX ADMIN — PHENOL 1 SPRAY: 1.5 LIQUID ORAL at 14:04

## 2021-08-19 RX ADMIN — BUSPIRONE HYDROCHLORIDE 10 MG: 10 TABLET ORAL at 13:33

## 2021-08-19 RX ADMIN — BUSPIRONE HYDROCHLORIDE 10 MG: 10 TABLET ORAL at 09:26

## 2021-08-19 RX ADMIN — ACETAMINOPHEN 650 MG: 325 TABLET ORAL at 21:29

## 2021-08-19 RX ADMIN — Medication 4.5 MG: at 20:18

## 2021-08-19 RX ADMIN — KETOROLAC TROMETHAMINE 15 MG: 15 INJECTION, SOLUTION INTRAMUSCULAR; INTRAVENOUS at 09:25

## 2021-08-19 RX ADMIN — DOCUSATE SODIUM 100 MG: 100 CAPSULE ORAL at 11:41

## 2021-08-19 RX ADMIN — SODIUM CHLORIDE, POTASSIUM CHLORIDE, SODIUM LACTATE AND CALCIUM CHLORIDE: 600; 310; 30; 20 INJECTION, SOLUTION INTRAVENOUS at 12:44

## 2021-08-19 RX ADMIN — DEXTROSE MONOHYDRATE 1000 MG: 5 INJECTION INTRAVENOUS at 06:11

## 2021-08-19 RX ADMIN — DIGOXIN 250 MCG: 0.25 INJECTION INTRAMUSCULAR; INTRAVENOUS at 17:56

## 2021-08-19 RX ADMIN — CALCIUM GLUCONATE 1000 MG: 20 INJECTION, SOLUTION INTRAVENOUS at 09:24

## 2021-08-19 RX ADMIN — SODIUM CHLORIDE, PRESERVATIVE FREE 10 ML: 5 INJECTION INTRAVENOUS at 09:26

## 2021-08-19 RX ADMIN — PHENOL 1 SPRAY: 1.5 LIQUID ORAL at 02:30

## 2021-08-19 RX ADMIN — DEXTROSE MONOHYDRATE 1000 MG: 5 INJECTION INTRAVENOUS at 14:46

## 2021-08-19 RX ADMIN — TAMSULOSIN HYDROCHLORIDE 0.4 MG: 0.4 CAPSULE ORAL at 20:18

## 2021-08-19 RX ADMIN — CYCLOBENZAPRINE HYDROCHLORIDE 5 MG: 5 TABLET, FILM COATED ORAL at 05:47

## 2021-08-19 RX ADMIN — ACETAMINOPHEN 650 MG: 325 TABLET ORAL at 09:25

## 2021-08-19 RX ADMIN — PREDNISONE 5 MG: 5 TABLET ORAL at 11:41

## 2021-08-19 RX ADMIN — ALLOPURINOL 100 MG: 100 TABLET ORAL at 09:26

## 2021-08-19 RX ADMIN — SODIUM CHLORIDE, PRESERVATIVE FREE 10 ML: 5 INJECTION INTRAVENOUS at 20:20

## 2021-08-19 RX ADMIN — HYOSCYAMINE SULFATE 125 MCG: 0.12 TABLET ORAL; SUBLINGUAL at 05:47

## 2021-08-19 RX ADMIN — DIGOXIN 250 MCG: 0.25 INJECTION INTRAMUSCULAR; INTRAVENOUS at 21:29

## 2021-08-19 RX ADMIN — ACETAMINOPHEN 650 MG: 325 TABLET ORAL at 05:46

## 2021-08-19 RX ADMIN — POLYETHYLENE GLYCOL 3350 17 G: 17 POWDER, FOR SOLUTION ORAL at 09:36

## 2021-08-19 RX ADMIN — TAMSULOSIN HYDROCHLORIDE 0.4 MG: 0.4 CAPSULE ORAL at 09:26

## 2021-08-19 RX ADMIN — LEVOFLOXACIN 500 MG: 5 INJECTION, SOLUTION INTRAVENOUS at 13:33

## 2021-08-19 RX ADMIN — OXYCODONE HYDROCHLORIDE 10 MG: 5 TABLET ORAL at 07:22

## 2021-08-19 RX ADMIN — LATANOPROST 1 DROP: 50 SOLUTION/ DROPS OPHTHALMIC at 20:17

## 2021-08-19 RX ADMIN — OXYCODONE HYDROCHLORIDE 5 MG: 5 TABLET ORAL at 14:12

## 2021-08-19 RX ADMIN — PANTOPRAZOLE SODIUM 40 MG: 40 TABLET, DELAYED RELEASE ORAL at 06:14

## 2021-08-19 ASSESSMENT — PAIN SCALES - GENERAL
PAINLEVEL_OUTOF10: 1
PAINLEVEL_OUTOF10: 6
PAINLEVEL_OUTOF10: 8
PAINLEVEL_OUTOF10: 6
PAINLEVEL_OUTOF10: 8
PAINLEVEL_OUTOF10: 9
PAINLEVEL_OUTOF10: 7
PAINLEVEL_OUTOF10: 9
PAINLEVEL_OUTOF10: 8
PAINLEVEL_OUTOF10: 2
PAINLEVEL_OUTOF10: 1
PAINLEVEL_OUTOF10: 6
PAINLEVEL_OUTOF10: 8

## 2021-08-19 ASSESSMENT — PAIN DESCRIPTION - DESCRIPTORS
DESCRIPTORS: DISCOMFORT
DESCRIPTORS: DISCOMFORT
DESCRIPTORS: ACHING;DISCOMFORT
DESCRIPTORS: PRESSURE

## 2021-08-19 ASSESSMENT — PAIN DESCRIPTION - PAIN TYPE
TYPE: ACUTE PAIN
TYPE: ACUTE PAIN;SURGICAL PAIN
TYPE: ACUTE PAIN
TYPE: SURGICAL PAIN

## 2021-08-19 ASSESSMENT — PAIN DESCRIPTION - LOCATION
LOCATION: ABDOMEN

## 2021-08-19 ASSESSMENT — PAIN - FUNCTIONAL ASSESSMENT: PAIN_FUNCTIONAL_ASSESSMENT: PREVENTS OR INTERFERES SOME ACTIVE ACTIVITIES AND ADLS

## 2021-08-19 NOTE — PROGRESS NOTES
Physical Therapy    Facility/Department: [facility] RENAL//MED SURG  Initial Assessment    NAME: Niki Mancuso  : 1931  MRN: [id number]    Date of Service: 2021  S/P from simple prostatectomy (21)  Discharge Recommendations:    Further therapy recommended at discharge. PT Equipment Recommendations  Equipment Needed: No (pt reports owning a RW)    Assessment   Body structures, Functions, Activity limitations: Decreased functional mobility ; Decreased posture;Decreased endurance;Decreased ROM; Decreased strength;Decreased balance;Decreased safe awareness; Increased pain  Assessment: Pt required Devan to perform bed mobility, able to perform sit to stand with RW CGA. Pt's tolerance to functional mobility significantly limited due to hypotension and dizziness. Pt will require 24hr support upon discharge at this time. Recommending continued skilled physical therapy to address functional deficits to return pt to prior level of independence. Prognosis: Good  Decision Making: Medium Complexity  PT Education: Plan of Care;Goals;PT Role;Energy Conservation;General Safety  REQUIRES PT FOLLOW UP: Yes  Activity Tolerance  Activity Tolerance: Other;Treatment limited secondary to medical complications (free text)  Activity Tolerance: Pt limited due to lightheadedness/ dizziness and hypotension       Patient Diagnosis(es): The encounter diagnosis was Acute postoperative pain. has a past medical history of Acute MI ([address] Allergic rhinitis, Anemia, Atrial fibrillation ([address] Basal cell cancer, Basal cell cancer, Cervical radiculopathy, Diverticulitis, GERD (gastroesophageal reflux disease), Glaucoma, Hyperlipidemia, Hypertension, Hyponatremia, IBS (irritable bowel syndrome), Insomnia, Osteoarthritis, Osteoarthritis/neck pain. , Renal calculi, Shingles, Urinary frequency, Wears glasses, and Wears hearing aid in both ears. has a past surgical history that includes Spine surgery (2003);  Lithotripsy; Skin cancer excision; skin biopsy (03/11/13); Skin cancer excision (Left, 03/2013); Colonoscopy (2002); Colonoscopy (08/2013); skin biopsy (Left, 02/11/14); Mohs surgery (Left, 03/10/14); Mohs surgery (12/30/14); other surgical history (9/6/15); Cholecystectomy (9-9-15); Skin cancer excision (Right, 08/12/2016); Mohs surgery (Left, 08/2017); Mohs surgery (Left, 10/21/2019); Mohs surgery (12/14/2020); hernia repair (Right); Prostatectomy (08/18/2021); and Prostatectomy (N/A, 8/18/2021). Restrictions  Restrictions/Precautions  Restrictions/Precautions: Up as Tolerated, Fall Risk  Required Braces or Orthoses?: No  Position Activity Restriction  Other position/activity restrictions:  Quintana catheter, NEYMAR drain  Vision/Hearing  Vision: Impaired  Vision Exceptions: Wears glasses at all times  Hearing: Exceptions to [facility]  Hearing Exceptions: Bilateral hearing aid     Subjective  General  Patient assessed for rehabilitation services?: Yes  Response To Previous Treatment: Not applicable  Family / Caregiver Present: Yes (pt's daughter and son present at bedside at end of session)  Follows Commands: Within Functional Limits  Subjective  Subjective: RN and pt in agreement for PT eval; pt supine in bed upon PT arrival, pt very pleasant and cooperative throughout session  Pain Screening  Patient Currently in Pain: Yes  Pain Assessment  Pain Assessment: 0-10  Pain Level: 8  Pain Type: Acute pain  Pain Location: Abdomen  Pain Descriptors: Discomfort  Non-Pharmaceutical Pain Intervention(s): Ambulation/Increased Activity;Repositioned  Response to Pain Intervention: Patient Satisfied  Multiple Pain Sites: No  Vital Signs  Patient Currently in Pain: Yes       Orientation  Orientation  Overall Orientation Status: Within Functional Limits  Social/Functional History  Social/Functional History  Lives With: Alone  Type of Home: House  Home Layout: One level, 1/2 bath on main level  Home Access: Stairs to enter with rails  Entrance Stairs - Number of Steps: 2  Entrance Stairs - Rails: Both  Bathroom Shower/Tub: Tub/Shower unit  Bathroom Toilet: Standard  Bathroom Equipment: Grab bars in shower, Grab bars around toilet  Home Equipment: Quad cane, Rolling walker, 4 wheeled walker, [employer]  ADL Assistance: Independent  Homemaking Assistance: Independent  Ambulation Assistance: Independent (pt reports independently ambulating with no AD at baseline; utilizing a RW with \"arthritis flare up\")  Transfer Assistance: Independent  Active : Yes (pt reports family has been performing driving tasks at recent)  Mode of Transportation: [facility]  Cognition   Cognition  Overall Cognitive Status: WFL    Objective  Joint Mobility  Spine: WFL  ROM RLE: WFL  ROM LLE: WFL  ROM RUE: WFL  ROM LUE: WFL  Strength RLE  Strength RLE: WFL  Strength LLE  Strength LLE: WFL  Strength RUE  Strength RUE: WFL  Strength LUE  Strength LUE: WFL  Motor Control  Gross Motor?: WFL  Sensation  Overall Sensation Status: WFL (pt denies numbness/tingling)  Bed mobility  Supine to Sit: Minimal assistance  Sit to Supine: Minimal assistance  Comment: Increased time required to complete with HOB elevated to ~60 degrees; mod verbal cues required for sequencing and safety due to dizziness. Pt reports visualizing a \"black bug\" at the end of the bed with performance of bed mobility, no bug visualized by writer. Supine /73, seated BP 92/71  Transfers  Sit to Stand: Contact guard assistance  Stand to sit: Contact guard assistance  Comment: STS performed with reported dizziness and lightheadedness with standing, BP 85/66. Pt attempted to perform standing marches, however, pt with significant dizziness requiring modA to safely sit on the EOB.   Ambulation  Ambulation?: No (unable to safely attempt due to hypotension and poor standing tolerance)  Stairs/Curb  Stairs?: No     Balance  Posture: Fair  Sitting - Static: Good;-  Sitting - Dynamic: Good;-  Standing - Static: Fair;+  Standing - Dynamic: Fair;+  Comments: standing balance assessed w/ RW; pt able to sit EOB CGA        Plan   Plan  Times per week: 5-6x/week  Current Treatment Recommendations: Strengthening, Transfer Training, Endurance Training, Patient/Caregiver Education & Training, ROM, Equipment Evaluation, Education, & procurement, Balance Training, Gait Training, Home Exercise Program, Functional Mobility Training, Stair training, Safety Education & Training  Safety Devices  Type of devices: Left in bed, Bed alarm in place, Call light within reach, Gait belt, Nurse notified  Restraints  Initially in place: No    AM-PAC Score     AM-PAC Inpatient Mobility without Stair Climbing Raw Score : 15 (08/19/21 1559)  AM-PAC Inpatient without Stair Climbing T-Scale Score : 43.03 (08/19/21 1559)  Mobility Inpatient CMS 0-100% Score: 47.43 (08/19/21 1559)  Mobility Inpatient without Stair CMS G-Code Modifier : CK (08/19/21 1559)       Goals  Short term goals  Time Frame for Short term goals: 14  Short term goal 1: Pt to perform bed mobility independently  Short term goal 2: Pt to demonstrate functional transfers independently  Short term goal 3: Ambulate 200ft w/ least restrictive AD with supervision  Short term goal 4: Ascend/descend 2 stairs with bilateral rails to simulate home environment SBA  Patient Goals   Patient goals :  To go home       Therapy Time   Individual Concurrent Group Co-treatment   Time In 0958         Time Out 1040         Minutes 42         Timed Code Treatment Minutes: 10 Minutes       Pam Alva, PT

## 2021-08-19 NOTE — PROGRESS NOTES
Occupational Therapy   Occupational Therapy Initial Assessment  Date: 2021   Patient Name: Lelia Perez  MRN: [id number]     : 1931    Date of Service: 2021    S/P from simple prostatectomy (21)    Discharge Recommendations:  Patient would benefit from continued therapy after discharge (pt to require 24hr supervision/assistance at this time)       Assessment   Performance deficits / Impairments: Decreased functional mobility ; Decreased ADL status; Decreased strength;Decreased safe awareness;Decreased cognition;Decreased endurance;Decreased balance;Decreased high-level IADLs;Decreased coordination;Decreased fine motor control  Prognosis: Good  Decision Making: Medium Complexity  OT Education: OT Role;Plan of Care;ADL Adaptive Strategies;Transfer Training;Energy Conservation; Family Education;Equipment;Precautions (Activity Promotion, Bed Mobility Techniques, Techniques to Mng Dizziness with Positional Changes; good return)  REQUIRES OT FOLLOW UP: Yes  Activity Tolerance  Activity Tolerance: Patient limited by fatigue;Treatment limited secondary to decreased cognition (and dizziness)  Safety Devices  Safety Devices in place: Yes  Type of devices: Call light within reach; Bed alarm in place;Nurse notified; Left in bed  Restraints  Initially in place: No           Patient Diagnosis(es): The encounter diagnosis was Acute postoperative pain. has a past medical history of Acute MI ([facility] [address] Allergic rhinitis, Anemia, Atrial fibrillation ([address] Basal cell cancer, Basal cell cancer, Cervical radiculopathy, Diverticulitis, GERD (gastroesophageal reflux disease), Glaucoma, Hyperlipidemia, Hypertension, Hyponatremia, IBS (irritable bowel syndrome), Insomnia, Osteoarthritis, Osteoarthritis/neck pain. , Renal calculi, Shingles, Urinary frequency, Wears glasses, and Wears hearing aid in both ears. has a past surgical history that includes Spine surgery (2003);  Lithotripsy; Skin cancer excision; skin biopsy (03/11/13); Skin cancer excision (Left, 03/2013); Colonoscopy (2002); Colonoscopy (08/2013); skin biopsy (Left, 02/11/14); Mohs surgery (Left, 03/10/14); Mohs surgery (12/30/14); other surgical history (9/6/15); Cholecystectomy (9-9-15); Skin cancer excision (Right, 08/12/2016); Mohs surgery (Left, 08/2017); Mohs surgery (Left, 10/21/2019); Mohs surgery (12/14/2020); hernia repair (Right); Prostatectomy (08/18/2021); and Prostatectomy (N/A, 8/18/2021). Restrictions  Restrictions/Precautions  Restrictions/Precautions: Up as Tolerated, Fall Risk  Required Braces or Orthoses?: No  Position Activity Restriction  Other position/activity restrictions: Quintana catheter, NEYMAR drain    Subjective   General  Patient assessed for rehabilitation services?: Yes  Family / Caregiver Present: Yes (son and dtr arrived at end of session)  General Comment  Comments: RN ok'd for therapy this AM. Pt agreeable to participate in session and pleasant/cooperative throughout. Pt reports dizziness at earlier attempt of OOB activity with RN and reports fear of dizziness/falling multiple times throughout session regarding emotional support/redirection. Patient Currently in Pain: Yes  Pain Assessment  Pain Assessment: 0-10  Pain Level: 8  Pain Type: Acute pain;Surgical pain  Pain Location: Abdomen  Pain Descriptors: Aching;Discomfort  Functional Pain Assessment: Prevents or interferes some active activities and ADLs  Non-Pharmaceutical Pain Intervention(s): Ambulation/Increased Activity; Distraction; Emotional support;Repositioned  Response to Pain Intervention: Patient Satisfied    Social/Functional History  Social/Functional History  Lives With: Alone  Type of Home: House  Home Layout: One level, 1/2 bath on main level  Home Access: Stairs to enter with rails  Entrance Stairs - Number of Steps: 2  Entrance Stairs - Rails: Both  Bathroom Shower/Tub: Tub/Shower unit  Bathroom Toilet: Standard  Bathroom Equipment: Grab bars in shower, Grab bars around toilet  Home Equipment: Quad cane, Rolling walker, 4 wheeled walker, [employer]  ADL Assistance: Independent  Homemaking Assistance: Independent  Ambulation Assistance: Independent (pt reports independently ambulating with no AD at baseline; utilizing a RW with \"arthritis flare up\")  Transfer Assistance: Independent  Active : Yes (pt reports family has been performing driving tasks at recent)  Mode of Transportation: [facility]       Objective   Vision: Impaired  Vision Exceptions: Wears glasses at all times  Hearing: Exceptions to [facility]  Hearing Exceptions: Bilateral hearing aid    Orientation  Overall Orientation Status: Within Functional Limits      Balance  Sitting Balance: Stand by assistance (unsupported seated EOB ~15 minutes, with moderate dizziness initially progressing to mild dizziness with cues to keep head up/eyes open and incorporate pursed lip breathing)  Standing Balance: Contact guard assistance (with use of RW)  Standing Balance  Time: ~2 minutes  Activity: static standing at EOB  Comment: CGA with use of RW; complaint of mild to moderate dizziness throughout and mod verbal/tactile cues required for safety awareness and to keep head up/eyes open and incorporate pursed lip breathing techniques; BP 85/66 and pt reports increased dizziness therefore pt returned to EOB     ADL  Feeding: Increased time to complete;Setup  Grooming: Increased time to complete;Contact guard assistance  UE Bathing: Increased time to complete;Minimal assistance  LE Bathing: Increased time to complete; Moderate assistance  UE Dressing: Minimal assistance; Increased time to complete  LE Dressing: Increased time to complete; Moderate assistance  Toileting: Increased time to complete; Moderate assistance     Tone RUE  RUE Tone: Normotonic  Tone LUE  LUE Tone: Normotonic  Coordination  Movements Are Fluid And Coordinated: No  Coordination and Movement description: Fine motor impairments;Gross motor impairments;Tremors;Decreased speed;Decreased accuracy; Right UE;Left UE     Bed mobility  Supine to Sit: Minimal assistance (HOB raised ~60* and use of bed rail)  Sit to Supine: Minimal assistance  Scooting: Minimal assistance  Comment: Mod verbal/tactile cues for initiation/sequencing/safety awareness due to impulsivity; increased time/effort due to dizziness and fatigue. BP prior to bed mobility 108/73. Pt reports visualizing a \"black bug\" at the end of the bed during mobility and perseverating on seeing a bug however no bug seen by writer. BP upon reaching EOB 92/71. Transfers  Sit to stand: Contact guard assistance  Stand to sit: Contact guard assistance  Transfer Comments: Mod verbal/tactile cues for proper hand placement/sequencing/safety awareness with use of RW; pt unsteady and required increased time/effort to perform with complaint of mild dizziness throughout        Cognition  Overall Cognitive Status: WFL        Sensation  Overall Sensation Status: WFL (pt denies any numbness/tingling)        LUE AROM (degrees)  LUE AROM : WFL  RUE AROM (degrees)  RUE AROM : WFL  LUE Strength  Gross LUE Strength: WFL  RUE Strength  Gross RUE Strength: WFL      Plan    4-5x/wk  Plan  Current Treatment Recommendations: Balance Training, Functional Mobility Training, Endurance Training, Strengthening, Patient/Caregiver Education & Training, Equipment Evaluation, Education, & procurement, Self-Care / ADL, Safety Education & Training      AM-PAC Score  AM-Merged with [facility] Inpatient Daily Activity Raw Score: 16 (08/19/21 1546)  AM-PAC Inpatient ADL T-Scale Score : 35.96 (08/19/21 1546)  ADL Inpatient CMS 0-100% Score: 53.32 (08/19/21 1546)  ADL Inpatient CMS G-Code Modifier : CK (08/19/21 1546)    Goals  Short term goals  Time Frame for Short term goals: Pt will, by discharge:  Short term goal 1: Perform functional transfers/functional mobility with mod IND using least restrictive AD  Short term goal 2:  Independently demo good safety awareness during engagement in all functional tasks  Short term goal 3: Perform ADL tasks independently  Short term goal 4: Demo 10+ minutes standing tolerance with use of least restrictive AD for increased participation in ADLs       Therapy Time   Individual Concurrent Group Co-treatment   Time In 0958         Time Out 1042         Minutes 44         Timed Code Treatment Minutes: 815 [facility], OTR/L

## 2021-08-19 NOTE — PLAN OF CARE
Problem: Pain:  Goal: Pain level will decrease  Description: Pain level will decrease  8/19/2021 0648 by Simon Wilde  Outcome: Ongoing  8/19/2021 0510 by Simon Wilde  Outcome: Ongoing  8/18/2021 1818 by Liberty Coelho RN  Outcome: Ongoing  Goal: Control of acute pain  Description: Control of acute pain  8/19/2021 0648 by Simon Wilde  Outcome: Ongoing  8/19/2021 0510 by Simon Wilde  Outcome: Ongoing  8/18/2021 1818 by Liberty Coelho RN  Outcome: Ongoing  Goal: Control of chronic pain  Description: Control of chronic pain  8/19/2021 0648 by Simon Wilde  Outcome: Ongoing  8/19/2021 0510 by Simon Wilde  Outcome: Ongoing  8/18/2021 1818 by Liberty Coelho RN  Outcome: Ongoing     Problem: Skin Integrity:  Goal: Will show no infection signs and symptoms  Description: Will show no infection signs and symptoms  8/19/2021 0648 by Simon Wilde  Outcome: Ongoing  8/19/2021 0510 by Simon Wilde  Outcome: Ongoing  8/18/2021 1818 by Liberty Coelho RN  Outcome: Ongoing  Goal: Absence of new skin breakdown  Description: Absence of new skin breakdown  8/19/2021 0648 by Simon Wilde  Outcome: Ongoing  8/19/2021 0510 by Simon Wilde  Outcome: Ongoing  8/18/2021 1818 by Liberty Coelho RN  Outcome: Ongoing

## 2021-08-19 NOTE — PLAN OF CARE
Problem: Pain:  Goal: Pain level will decrease  Description: Pain level will decrease  8/19/2021 1200 by Marti Barksdale RN  Outcome: Ongoing     Problem: Pain:  Goal: Control of acute pain  Description: Control of acute pain  8/19/2021 1200 by Marti Barksdale RN  Outcome: Ongoing

## 2021-08-19 NOTE — PLAN OF CARE
Problem: Pain:  Goal: Pain level will decrease  Description: Pain level will decrease  8/19/2021 0510 by Mireya Solis  Outcome: Ongoing  8/18/2021 1818 by Rolanda Wheatley RN  Outcome: Ongoing  Goal: Control of acute pain  Description: Control of acute pain  8/19/2021 0510 by Mireya Solis  Outcome: Ongoing  8/18/2021 1818 by Rolanda Wheatley RN  Outcome: Ongoing  Goal: Control of chronic pain  Description: Control of chronic pain  8/19/2021 0510 by Mireya Solis  Outcome: Ongoing  8/18/2021 1818 by Rolanda Wheatley RN  Outcome: Ongoing     Problem: Skin Integrity:  Goal: Will show no infection signs and symptoms  Description: Will show no infection signs and symptoms  8/19/2021 0510 by Mireya Solis  Outcome: Ongoing  8/18/2021 1818 by Rolanda Wheatley RN  Outcome: Ongoing  Goal: Absence of new skin breakdown  Description: Absence of new skin breakdown  8/19/2021 0510 by Mireya Solis  Outcome: Ongoing  8/18/2021 1818 by Rolanda Wheatley RN  Outcome: Ongoing

## 2021-08-19 NOTE — FLOWSHEET NOTE
Patient has been c/o dizziness since last night. 08/19/21 1045   Vital Signs   Orthostatic B/P and Pulse?    (Heart rate ranging from )   Blood Pressure Lying 108/73   Blood Pressure Sitting 92/71   Blood Pressure Standing 85/66

## 2021-08-19 NOTE — PROGRESS NOTES
Congestive Heart Failure Education completed and charted. CHF booklet given. Patient was receptive to education. Discussed the  importance of medication compliance. Discussed the importance of a heart healthy diet. Discussed 2000 mg sodium-restricted daily diet. Patient instructed to limit fluid intake to  1.5 to 2 liters per day. Patient instructed to weigh self at the same time of each day each morning, reinforced teaching to monitor for 3-5 lb weight increase over 1-2 days notify physician if change noted. Signs and symptoms of CHF discussed with patient, such as feeling more tired than normal, feeling short of breath, coughing that increases when lying down, sudden weight gain, swelling of the feet, legs or belly. Patient verbalized understanding to notify physician office if these symptoms occur. EF 30-35%   Pt follows with [facility].

## 2021-08-19 NOTE — PROGRESS NOTES
Presented to bedside to examine patient. Patient was noted to be hypotensive earlier today when working with PT OT. Subsequently was noted to be in A. fib with RVR. Patient administered fluids which improved blood pressure however patient continued to be in A. fib with RVR. Currently patient is slightly hypotensive with systolic blood pressure in upper 90s. Will hold beta-blocker dose this evening. Administer IV digoxin 250 4 hour s apart 2 doses. Will discuss anticoagulation with urology. Start heparin drip for A. fib anticoagulation when okay with urology. Follow up on 2D ECHO.     Jania Martin MD  Cardiology Fellow

## 2021-08-20 LAB
ANION GAP SERPL CALCULATED.3IONS-SCNC: 14 MMOL/L (ref 9–17)
BUN BLDV-MCNC: 11 MG/DL (ref 8–23)
BUN/CREAT BLD: ABNORMAL (ref 9–20)
CALCIUM SERPL-MCNC: 8.6 MG/DL (ref 8.6–10.4)
CHLORIDE BLD-SCNC: 97 MMOL/L (ref 98–107)
CO2: 17 MMOL/L (ref 20–31)
CREAT SERPL-MCNC: 0.72 MG/DL (ref 0.7–1.2)
GFR AFRICAN AMERICAN: >60 ML/MIN
GFR NON-AFRICAN AMERICAN: >60 ML/MIN
GFR SERPL CREATININE-BSD FRML MDRD: ABNORMAL ML/MIN/{1.73_M2}
GFR SERPL CREATININE-BSD FRML MDRD: ABNORMAL ML/MIN/{1.73_M2}
GLUCOSE BLD-MCNC: 88 MG/DL (ref 70–99)
HCT VFR BLD CALC: 36.9 % (ref 40.7–50.3)
HEMOGLOBIN: 11.8 G/DL (ref 13–17)
MCH RBC QN AUTO: 31.4 PG (ref 25.2–33.5)
MCHC RBC AUTO-ENTMCNC: 32 G/DL (ref 28.4–34.8)
MCV RBC AUTO: 98.1 FL (ref 82.6–102.9)
NRBC AUTOMATED: 0 PER 100 WBC
PDW BLD-RTO: 15.3 % (ref 11.8–14.4)
PLATELET # BLD: 343 K/UL (ref 138–453)
PMV BLD AUTO: 11.1 FL (ref 8.1–13.5)
POTASSIUM SERPL-SCNC: 4.7 MMOL/L (ref 3.7–5.3)
RBC # BLD: 3.76 M/UL (ref 4.21–5.77)
SODIUM BLD-SCNC: 128 MMOL/L (ref 135–144)
SURGICAL PATHOLOGY REPORT: NORMAL
TROPONIN INTERP: ABNORMAL
TROPONIN T: ABNORMAL NG/ML
TROPONIN, HIGH SENSITIVITY: 41 NG/L (ref 0–22)
WBC # BLD: 9.1 K/UL (ref 3.5–11.3)

## 2021-08-20 PROCEDURE — 1200000000 HC SEMI PRIVATE

## 2021-08-20 PROCEDURE — 0VB04ZZ EXCISION OF PROSTATE, PERCUTANEOUS ENDOSCOPIC APPROACH: ICD-10-PCS | Performed by: UROLOGY

## 2021-08-20 PROCEDURE — 8E0W4CZ ROBOTIC ASSISTED PROCEDURE OF TRUNK REGION, PERCUTANEOUS ENDOSCOPIC APPROACH: ICD-10-PCS | Performed by: UROLOGY

## 2021-08-20 PROCEDURE — 97110 THERAPEUTIC EXERCISES: CPT

## 2021-08-20 PROCEDURE — 6370000000 HC RX 637 (ALT 250 FOR IP): Performed by: STUDENT IN AN ORGANIZED HEALTH CARE EDUCATION/TRAINING PROGRAM

## 2021-08-20 PROCEDURE — 6360000002 HC RX W HCPCS: Performed by: STUDENT IN AN ORGANIZED HEALTH CARE EDUCATION/TRAINING PROGRAM

## 2021-08-20 PROCEDURE — 2580000003 HC RX 258: Performed by: STUDENT IN AN ORGANIZED HEALTH CARE EDUCATION/TRAINING PROGRAM

## 2021-08-20 PROCEDURE — 84484 ASSAY OF TROPONIN QUANT: CPT

## 2021-08-20 PROCEDURE — 36415 COLL VENOUS BLD VENIPUNCTURE: CPT

## 2021-08-20 PROCEDURE — 80048 BASIC METABOLIC PNL TOTAL CA: CPT

## 2021-08-20 PROCEDURE — 85027 COMPLETE CBC AUTOMATED: CPT

## 2021-08-20 PROCEDURE — 96376 TX/PRO/DX INJ SAME DRUG ADON: CPT

## 2021-08-20 PROCEDURE — 97116 GAIT TRAINING THERAPY: CPT

## 2021-08-20 RX ADMIN — KETOROLAC TROMETHAMINE 15 MG: 15 INJECTION, SOLUTION INTRAMUSCULAR; INTRAVENOUS at 00:27

## 2021-08-20 RX ADMIN — TAMSULOSIN HYDROCHLORIDE 0.4 MG: 0.4 CAPSULE ORAL at 21:07

## 2021-08-20 RX ADMIN — BUSPIRONE HYDROCHLORIDE 10 MG: 10 TABLET ORAL at 08:26

## 2021-08-20 RX ADMIN — PREDNISONE 5 MG: 5 TABLET ORAL at 08:26

## 2021-08-20 RX ADMIN — ACETAMINOPHEN 650 MG: 325 TABLET ORAL at 04:03

## 2021-08-20 RX ADMIN — METOPROLOL TARTRATE 25 MG: 25 TABLET ORAL at 08:26

## 2021-08-20 RX ADMIN — SODIUM CHLORIDE, PRESERVATIVE FREE 10 ML: 5 INJECTION INTRAVENOUS at 08:36

## 2021-08-20 RX ADMIN — POLYETHYLENE GLYCOL 3350 17 G: 17 POWDER, FOR SOLUTION ORAL at 08:43

## 2021-08-20 RX ADMIN — ACETAMINOPHEN 650 MG: 325 TABLET ORAL at 18:58

## 2021-08-20 RX ADMIN — ACETAMINOPHEN 650 MG: 325 TABLET ORAL at 12:01

## 2021-08-20 RX ADMIN — METOPROLOL TARTRATE 25 MG: 25 TABLET ORAL at 21:07

## 2021-08-20 RX ADMIN — BUSPIRONE HYDROCHLORIDE 10 MG: 10 TABLET ORAL at 15:07

## 2021-08-20 RX ADMIN — LEVOFLOXACIN 500 MG: 5 INJECTION, SOLUTION INTRAVENOUS at 12:01

## 2021-08-20 RX ADMIN — ACETAMINOPHEN 650 MG: 325 TABLET ORAL at 21:08

## 2021-08-20 RX ADMIN — TAMSULOSIN HYDROCHLORIDE 0.4 MG: 0.4 CAPSULE ORAL at 08:26

## 2021-08-20 RX ADMIN — PANTOPRAZOLE SODIUM 40 MG: 40 TABLET, DELAYED RELEASE ORAL at 08:26

## 2021-08-20 RX ADMIN — BUSPIRONE HYDROCHLORIDE 10 MG: 10 TABLET ORAL at 21:07

## 2021-08-20 RX ADMIN — Medication 4.5 MG: at 21:06

## 2021-08-20 RX ADMIN — DOCUSATE SODIUM 100 MG: 100 CAPSULE ORAL at 08:26

## 2021-08-20 RX ADMIN — ALLOPURINOL 100 MG: 100 TABLET ORAL at 08:26

## 2021-08-20 RX ADMIN — LATANOPROST 1 DROP: 50 SOLUTION/ DROPS OPHTHALMIC at 21:08

## 2021-08-20 ASSESSMENT — PAIN SCALES - GENERAL
PAINLEVEL_OUTOF10: 2
PAINLEVEL_OUTOF10: 4
PAINLEVEL_OUTOF10: 1
PAINLEVEL_OUTOF10: 1
PAINLEVEL_OUTOF10: 7

## 2021-08-20 ASSESSMENT — PAIN DESCRIPTION - PAIN TYPE: TYPE: SURGICAL PAIN;ACUTE PAIN

## 2021-08-20 ASSESSMENT — PAIN DESCRIPTION - LOCATION: LOCATION: ABDOMEN

## 2021-08-20 NOTE — DISCHARGE INSTR - COC
Continuity of Care Form    Patient Name: Michelle Low   :  1931  MRN:  [id number]    [facility] date:  2021  Discharge date:  21    Code Status Order: Full Code   Advance Directives:   [facility] Documentation       Date/Time Healthcare Directive Type of Healthcare Directive Copy in [address] Agent's Name Healthcare Agent's Phone Number    21 1059  Yes, patient has an advance directive for healthcare treatment  Durable power of  for health care  Yes, copy in chart  Adult Children  [address] [facility]              Admitting Physician:  Armando Best MD  PCP: Nicki House MD    Discharging Nurse: [facility] Unit/Room#: 1596/4243-06  Discharging Unit Phone Number: [phone]    Emergency Contact:   Extended Emergency Contact Information  Primary Emergency Contact: [address] Phone: [phone]  Relation: Child  Secondary Emergency Contact: [address] Phone: [phone]  Relation: Child    Past Surgical History:  Past Surgical History:   Procedure Laterality Date    CHOLECYSTECTOMY  9-9-15    laparoscopic with cholangiogram    COLONOSCOPY      COLONOSCOPY  2013    HERNIA REPAIR Right     inguinal    LITHOTRIPSY      MOHS SURGERY Left 03/10/14    cheek    MOHS SURGERY  14    post scalp    MOHS SURGERY Left 08/2017    X2 left cheek    MOHS SURGERY Left 10/21/2019    temple    MOHS SURGERY  2020    top of head    OTHER SURGICAL HISTORY  9/6/15    attempted ERCP    PROSTATECTOMY  2021    PROSTATECTOMY N/A 2021    XI ROBOTIC LAPAROSCOPIC SIMPLE PROSTATECTOMY performed by Armando Best MD at [address] [place] [address]  13    3 areas    SKIN BIOPSY Left 14    cheek /basal cell carinoma    SKIN CANCER EXCISION      left face and top of head    SKIN CANCER EXCISION Left 2013    forehead, cheek, chin, basal cell.     SKIN CANCER EXCISION Right 2016    with skin graft    SPINE SURGERY  04/2003       Immunization History:   Immunization History   Administered Date(s) Administered    COVID-19, Moderna, PF, 100mcg/0.5mL 01/26/2021, 03/02/2021    Influenza Vaccine, unspecified formulation 10/05/2015    Influenza Whole 10/26/2010    Influenza, High Dose (Fluzone 65 yrs and older) 09/27/2012, 09/16/2013, 08/27/2014, 09/16/2016, 09/12/2017, 10/20/2018    Influenza, Norva Fadumo, adjuvanted, 65 yrs +, IM, PF (Fluad) 09/14/2020    Influenza, Triv, inactivated, subunit, adjuvanted, IM (Fluad 65 yrs and older) 08/27/2019    PPD Test 08/08/2019    Pneumococcal Conjugate 13-valent (Ijeaqjw73) 11/12/2015    Pneumococcal Polysaccharide (Foiowoljb36) 09/27/2008    Tdap (Boostrix, Adacel) 08/23/2010    Zoster Live (Zostavax) 07/15/2012       Active Problems:  Patient Active Problem List   Diagnosis Code    Insomnia G47.00    Allergic rhinitis J30.9    Malignant basal cell neoplasm of skin C44.91    Benign prostatic hyperplasia with lower urinary tract symptoms N40.1    Branch retinal vein occlusion, left eye B31.2175    Cervical radiculopathy M54.12    Essential hypertension I10    Gastroesophageal reflux disease without esophagitis K21.9    Iron deficiency anemia D50.9    Arthritis, degenerative M19.90    Diverticulosis of large intestine without hemorrhage K57.30    Elevated PSA R97.20    B12 deficiency Z25.8    Diastolic dysfunction T92.00    Elevated liver enzymes R74.8    Pure hypercholesterolemia E78.00    Irritable bowel syndrome without diarrhea K58.9    Paroxysmal atrial fibrillation (HCC) I48.0    Low testosterone R79.89    Bradycardia R00.1    Chronic bilateral low back pain without sciatica M54.5, G89.29    Gout M10.9    Anxiety F41.9    Chronic systolic congestive heart failure (HCC) I50.22    DDD (degenerative disc disease), cervical M50.30    DDD (degenerative disc disease), lumbar M51.36    Closed compression fracture of second lumbar vertebra ([address] S32.020A    Acquired spondylolisthesis M43.10    Spinal stenosis of lumbar region with neurogenic claudication M48.062    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    Depression F32.9    Bladder outlet obstruction N32.0       Isolation/Infection:   Isolation            No Isolation          Patient Infection Status       None to display            Nurse Assessment:  Last Vital Signs: /79   Pulse 83   Temp 97.7 °F (36.5 °C) (Oral)   Resp 16   Ht 5' 4\" (1.626 m)   Wt 159 lb (72.1 kg)   SpO2 97%   BMI 27.29 kg/m²     Last documented pain score (0-10 scale): Pain Level: 7  Last Weight:   Wt Readings from Last 1 Encounters:   08/18/21 159 lb (72.1 kg)     Mental Status:  oriented and alert    IV Access:  - None    Nursing Mobility/ADLs:  Walking   Dependent  Transfer  Dependent  Bathing  Assisted  Dressing  Assisted  Toileting  Assisted  Feeding  Independent  Med Admin  Assisted  Med Delivery   whole    Wound Care Documentation and Therapy:        Elimination:  Continence: Bowel: Yes  Bladder: leyva  Urinary Catheter: Insertion Date: 8/18/21    Colostomy/Ileostomy/Ileal Conduit: No       Date of Last BM: yann    Intake/Output Summary (Last 24 hours) at 8/20/2021 1327  Last data filed at 8/20/2021 3005  Gross per 24 hour   Intake    Output 2490 ml   Net -2490 ml     I/O last 3 completed shifts:  In: -   Out: 3809 [Urine:2450; Drains:40]    Safety Concerns: At Risk for Falls    Impairments/Disabilities:      None    Nutrition Therapy:  Current Nutrition Therapy:   - Oral Diet:  General    Routes of Feeding: Oral  Liquids: No Restrictions  Daily Fluid Restriction: no  Last Modified Barium Swallow with Video (Video Swallowing Test): not done    Treatments at the Time of Hospital Discharge:   Respiratory Treatments: n/a  Oxygen Therapy:  is not on home oxygen therapy.   Ventilator:    - No ventilator support    Rehab Therapies: Physical Therapy  Weight Bearing Status/Restrictions: No weight bearing restirctions  Other Medical Equipment (for information only, NOT a DME order):  bedside commode  Other Treatments: n/a    Patient's personal belongings (please select all that are sent with patient):  Glasses    RN SIGNATURE:  Electronically signed by Chelsey Mack RN on 8/21/21 at 9:57 AM EDT    CASE MANAGEMENT/SOCIAL WORK SECTION    Inpatient Status Date: ***    Readmission Risk Assessment Score:  Readmission Risk              Risk of Unplanned Readmission:  0           Discharging to Facility/ [facility]  Fax Failed  [address] [address]       Phone: [phone]       Fax: [phone]            / signature: Electronically signed by Herminio Leiva RN on 8/20/21 at 1:28 PM EDT    PHYSICIAN SECTION    Prognosis: Good    Condition at Discharge: Stable    Rehab Potential (if transferring to Rehab): Good    Recommended Labs or Other Treatments After Discharge: None    Physician Certification: I certify the above information and transfer of Deloris Spring  is necessary for the continuing treatment of the diagnosis listed and that he requires Acute Rehab for less 30 days.      Update Admission H&P: No change in H&P    PHYSICIAN SIGNATURE:  Electronically signed by Angie Peña MD on 8/21/21 at 10:34 AM EDT

## 2021-08-20 NOTE — CARE COORDINATION
Writer met with Patient and his son, Dave Farley. Both agree that patient will need a SNF stay prior to safely returning home. Freedom of choice is given via home care list.  First choice is LONG TERM ACUTE CARE [facility] [place] styles Brdy and son will provide a second choice, if needed. Referral is made to [facility] AND [facility]. [address] from [facility] is notified of patient's need. Cely Bose states that the [facility] 3 day stay rule is still waived at this time. Cely Bose will review the patient's information and notify writer when a decision is made. Magdamafili Garsia from [facility] states that patient is accepted, however, they will not have a bed until 8/21. Cely Bose does not know what time tomorrow when the bed will be available and she will notify the CM tomorrow.

## 2021-08-20 NOTE — CONSULTS
Attestation signed by      Attending Physician Statement:    I have discussed the care of  Boris Walsh , including pertinent history and exam findings, with the Cardiology fellow/resident. I have seen and examined the patient and the key elements of all parts of the encounter have been performed by me. I agree with the assessment, plan and orders as documented by the fellow/resident, after I modified exam findings and plan of treatments, and the final version is my approved version of the assessment. Additional Comments:   Known AFIB off AC for operation . AC can be started once OK by surgery . Continue BB   Echo normal LVEF . Continue with Dr. Rafi Hameed . Needs to work up his dizziness and ischemia work up   Please call if needed   Dr. Jayna Miguel Cardiology Cardiology    Consult / H&P               Today's Date: 8/20/2021  Patient Name: Boris Walsh  Date of admission: 8/18/2021 10:00 AM  Patient's age: 80 y.o., 12/2/1931  Admission Dx: Bladder outlet obstruction [N32.0]    Reason for Consult:  Cardiac evaluation    Requesting Physician: Joanna Peguero MD    CHIEF COMPLAINT:  Increased urinary frequency    History Obtained From:  patient    HISTORY OF PRESENT ILLNESS:      The patient is a 80 y.o.  male who is admitted to the hospital for bladder outlet obstruction. S/p simple prostatectomy POD#2. Pt reports an aching discomfort in upper abdominal area just under breastbone that started yesterday, is constant, 4/10 severity, non-radiating, slightly improved with standing. He also reports having dizziness with standing since yesterday and constipation for last 2 weeks with no bowel movements or flatus since surgery. He also reports feeling weaker than prior to coming into hospital.    He denies SOB, dyspnea, orthopnea, PND, swelling of extremities, nausea, vomiting, diarrhea, numbness, tingling, palpitations or headache.   He continues to have bloody urine without clots. Past Medical History:   has a past medical history of Acute MI ([facility] Allergic rhinitis, Anemia, Atrial fibrillation ([facility] Basal cell cancer, Basal cell cancer, Cervical radiculopathy, Diverticulitis, GERD (gastroesophageal reflux disease), Glaucoma, Hyperlipidemia, Hypertension, Hyponatremia, IBS (irritable bowel syndrome), Insomnia, Osteoarthritis, Osteoarthritis/neck pain. , Renal calculi, Shingles, Urinary frequency, Wears glasses, and Wears hearing aid in both ears. Past Surgical History:   has a past surgical history that includes Spine surgery (04/2003); Lithotripsy; Skin cancer excision; skin biopsy (03/11/13); Skin cancer excision (Left, 03/2013); Colonoscopy (2002); Colonoscopy (08/2013); skin biopsy (Left, 02/11/14); Mohs surgery (Left, 03/10/14); Mohs surgery (12/30/14); other surgical history (9/6/15); Cholecystectomy (9-9-15); Skin cancer excision (Right, 08/12/2016); Mohs surgery (Left, 08/2017); Mohs surgery (Left, 10/21/2019); Mohs surgery (12/14/2020); hernia repair (Right); Prostatectomy (08/18/2021); and Prostatectomy (N/A, 8/18/2021). Home Medications:    Prior to Admission medications    Medication Sig Start Date End Date Taking? Authorizing Provider   docusate sodium (COLACE) 100 MG capsule Take 1 capsule by mouth 2 times daily 8/18/21 9/17/21 Yes Yaneth Gonzalez MD   polyethylene glycol [facility]) 17 GM/SCOOP powder Take 17 g by mouth daily for 14 days 8/18/21 9/1/21 Yes Yaneth Gonzalez MD   oxyCODONE-acetaminophen (PERCOCET) 5-325 MG per tablet Take 1 tablet by mouth every 6 hours as needed for Pain for up to 7 days. Intended supply: 7 days.  Take lowest dose possible to manage pain 8/18/21 8/25/21 Yes Yaneth Gonzalez MD   cefadroxil (DURICEF) 500 MG capsule Take 1 capsule by mouth 2 times daily for 3 days 8/18/21 8/21/21 Yes Yaneth Gonzalez MD   predniSONE (DELTASONE) 5 MG tablet TAKE ONE TABLET BY MOUTH DAILY 5/7/21  Yes Brendon Hagen MD   allopurinol (ZYLOPRIM) 100 MG tablet TAKE ONE TABLET BY MOUTH DAILY 4/9/21  Yes Brendon Hagen MD   lisinopril (PRINIVIL;ZESTRIL) 10 MG tablet Take 1 tablet by mouth Daily with lunch 3/16/21  Yes Brendon Hagen MD   dilTIAZem (CARDIZEM CD) 180 MG extended release capsule Take 1 capsule by mouth daily 2/23/21  Yes Historical Provider, MD   bimatoprost (LUMIGAN) 0.01 % SOLN ophthalmic drops Place 1 drop into both eyes nightly   Yes Historical Provider, MD   naproxen (NAPROSYN) 500 MG tablet Take 1 tablet by mouth 2 times daily as needed for Pain (Take with meals) 2/19/21  Yes Mo'Leticia Raymundo MD   busPIRone (BUSPAR) 10 MG tablet Take 1 tablet by mouth 3 times daily 2/5/21  Yes Brendon Hagen MD   tamsulosin (FLOMAX) 0.4 MG capsule Take 1 capsule by mouth 2 times daily 9/15/20  Yes NASRA Todd CNP   latanoprost (XALATAN) 0.005 % ophthalmic solution Place 1 drop into both eyes nightly    Yes Historical Provider, MD   melatonin 5 MG TABS tablet Take 1 tablet by mouth nightly    Yes Historical Provider, MD   metoprolol tartrate (LOPRESSOR) 25 MG tablet Take 25 mg by mouth 2 times daily   Yes Historical Provider, MD   pantoprazole (PROTONIX) 40 MG tablet Take 1 tablet by mouth every morning (before breakfast) 2/20/21   Mo'Leticia Raymundo MD   warfarin (COUMADIN) 5 MG tablet Take 2.5 mg by mouth once a week Indications: Wednesdays; INR goal 2-2.5 Managed by [facility], Dr. Adarsh Massey, will stop 5 days before surgery on 8/18/21, and restart 7 days following surgery.     Historical Provider, MD      Current Facility-Administered Medications: docusate sodium (COLACE) capsule 100 mg, 100 mg, Oral, Daily  sodium chloride 0.9 % irrigation 3,000 mL, 3,000 mL, Irrigation, Continuous  allopurinol (ZYLOPRIM) tablet 100 mg, 100 mg, Oral, Daily  latanoprost (XALATAN) 0.005 % ophthalmic solution 1 drop, 1 drop, Both Eyes, Nightly  busPIRone (BUSPAR) tablet 10 mg, 10 mg, Oral, TID  [Held by provider] dilTIAZem (CARDIZEM CD) extended release capsule 180 mg, 180 mg, Oral, Daily  latanoprost (XALATAN) 0.005 % ophthalmic solution 1 drop, 1 drop, Both Eyes, Nightly  [Held by provider] lisinopril (PRINIVIL;ZESTRIL) tablet 10 mg, 10 mg, Oral, Lunch  melatonin tablet 4.5 mg, 4.5 mg, Oral, Nightly  metoprolol tartrate (LOPRESSOR) tablet 25 mg, 25 mg, Oral, BID  pantoprazole (PROTONIX) tablet 40 mg, 40 mg, Oral, QAM AC  tamsulosin (FLOMAX) capsule 0.4 mg, 0.4 mg, Oral, BID  predniSONE (DELTASONE) tablet 5 mg, 5 mg, Oral, Daily  lactated ringers infusion, , Intravenous, Continuous  sodium chloride flush 0.9 % injection 5-40 mL, 5-40 mL, Intravenous, 2 times per day  sodium chloride flush 0.9 % injection 10 mL, 10 mL, Intravenous, PRN  0.9 % sodium chloride infusion, 25 mL, Intravenous, PRN  ondansetron (ZOFRAN-ODT) disintegrating tablet 4 mg, 4 mg, Oral, Q8H PRN **OR** ondansetron (ZOFRAN) injection 4 mg, 4 mg, Intravenous, Q6H PRN  polyethylene glycol (GLYCOLAX) packet 17 g, 17 g, Oral, Daily  acetaminophen (TYLENOL) tablet 650 mg, 650 mg, Oral, Q6H  trospium (SANCTURA) tablet 20 mg, 20 mg, Oral, BID PRN  oxyCODONE (ROXICODONE) immediate release tablet 5 mg, 5 mg, Oral, Q4H PRN **OR** oxyCODONE (ROXICODONE) immediate release tablet 10 mg, 10 mg, Oral, Q4H PRN  levoFLOXacin (LEVAQUIN) 500 MG/100ML infusion 500 mg, 500 mg, Intravenous, Q24H  hydrALAZINE (APRESOLINE) injection 5 mg, 5 mg, Intravenous, Q6H PRN  benzocaine-menthol (CEPACOL SORE THROAT) lozenge 1 lozenge, 1 lozenge, Oral, Q2H PRN  hyoscyamine (LEVSIN/SL) sublingual tablet 125 mcg, 125 mcg, Oral, Q4H PRN  cyclobenzaprine (FLEXERIL) tablet 5 mg, 5 mg, Oral, TID PRN  phenol 1.4 % mouth spray 1 spray, 1 spray, Mouth/Throat, Q2H PRN  calcium carbonate (TUMS) chewable tablet 500 mg, 500 mg, Oral, TID PRN    Allergies:  Celebrex [celecoxib] and Mobic    Social History:   reports that he quit smoking about 60 years ago. He has a 10.00 pack-year smoking history.  He has never used smokeless tobacco. He reports previous alcohol use. He reports that he does not use drugs. Family History: family history includes Cancer in his father; Heart Disease in his brother and mother; Cleaster Libman in his father. No h/o sudden cardiac death. REVIEW OF SYSTEMS:    · Constitutional: there has been no unanticipated weight loss. There's been No change in energy level, No change in activity level. · Eyes: No visual changes or diplopia. No scleral icterus. · ENT: No Headaches  · Cardiovascular: No cardiac history  · Respiratory: No previous pulmonary problems, No cough  · Gastrointestinal: epigastric abdominal pain, tenderness in epigastric area to palpation in any area of abdomen, hyperactive bowel sounds, mildly distended, constipation. Soft  · Genitourinary: No dysuria, trouble voiding. Hematuria present. · Musculoskeletal:  No gait disturbance, No joint complaints. Weakness present . · Integumentary: No rash or pruritis. · Neurological: No headache, diplopia, change in muscle strength, numbness or tingling. No change in gait, balance, coordination, mood, affect, memory, mentation, behavior. · Psychiatric: No anxiety, or depression. · Endocrine: No temperature intolerance. No excessive thirst, fluid intake, or urination. No tremor. · Hematologic/Lymphatic: No abnormal bruising or bleeding, blood clots or swollen lymph nodes. · Allergic/Immunologic: No nasal congestion or hives. PHYSICAL EXAM:      /89   Pulse 106   Temp 98.1 °F (36.7 °C) (Oral)   Resp 19   Ht 5' 4\" (1.626 m)   Wt 159 lb (72.1 kg)   SpO2 96%   BMI 27.29 kg/m²    Constitutional and General Appearance: alert, cooperative, no distress and appears stated age  HEENT: PERRL, no cervical lymphadenopathy. No masses palpable. Normal oral mucosa  Respiratory:  · Normal excursion and expansion without use of accessory muscles  · Resp Auscultation: Good respiratory effort. No for increased work of breathing.  On auscultation: clear to auscultation bilaterally  Cardiovascular:  · The apical impulse is not displaced  · Heart tones are irregularly irregular  · Systolic murmur present  · Jugular venous pulsation Normal  · The carotid upstroke is normal in amplitude and contour without delay or bruit  · Peripheral pulses are symmetrical and full   Abdomen:   · No masses or tenderness  · Bowel sounds hyperacitve  · Tenderness to palpation localized to epigastric area  Extremities:  ·  No Cyanosis or Clubbing  ·  Lower extremity edema: No  ·  Skin: Warm and dry  Neurological:  · Alert and oriented. · Moves all extremities well  · No abnormalities of mood, affect, memory, mentation, or behavior are noted    DATA:    Diagnostics:    EKG: atrial fibrillation, rate 91. ECHO: 8/19/21  Left ventricle is normal in size. Global left ventricular systolic function is difficult to assess due to heart rate and rhythm but appears overall  normal. Estimated ejection fraction is 50-55 % . Calculated EF via Carrington's method is 53 %. Normal left ventricular wall thickness. Left atrium is mildly dilated. Right atrium is mildly dilated . Normal right ventricular size and function. The aortic valve is calcified with reduced cusp mobility. Aortic valve is trileaflet. Mean gradient of 14 mmHg indicating at least mild aortic valve stenosis. Trivial to mild aortic insufficiency. Mild mitral regurgitation. Mild tricuspid regurgitation. Estimated right ventricular systolic pressure is 25 mmHg.   Report Status: [address] [facility], [facility], [facility] [phone]  [address], [address] [facility] [phone]  [facility] [address], [facility], [address] [facility] [phone]  [address] [facility], [address] [place] [address] [phone]  [address] [phone]  [facility] [address], [address], [address] [phone]  [facility] [address], [place] [address] [place], [address]  Patient Name: Beatriz Lawst:     CBC:   Recent Labs     08/19/21  1621 08/20/21  0518   WBC 11.6* 9.1   HGB 12.7* 11.8*   HCT 39.5* 36.9*    343     BMP:   Recent Labs     08/19/21  1621 08/20/21  0518   * 128*   K 5.1 4.7   CO2 18* 17*   BUN 13 11   CREATININE 0.85 0.72   LABGLOM >60 >60   GLUCOSE 128* 88     BNP: No results for input(s): BNP in the last 72 hours. PT/INR:   Recent Labs     08/18/21  1124   PROTIME 13.1   INR 1.1     APTT:No results for input(s): APTT in the last 72 hours. CARDIAC ENZYMES:No results for input(s): CKTOTAL, CKMB, CKMBINDEX, TROPONINI in the last 72 hours. FASTING LIPID PANEL:  Lab Results   Component Value Date    HDL 53 06/02/2021    LDLCALC 53 05/23/2017    TRIG 71 06/02/2021     LIVER PROFILE:No results for input(s): AST, ALT, LABALBU in the last 72 hours.     IMPRESSION:    Patient Active Problem List   Diagnosis    Insomnia    Allergic rhinitis    Malignant basal cell neoplasm of skin    Benign prostatic hyperplasia with lower urinary tract symptoms    Branch retinal vein occlusion, left eye    Cervical radiculopathy    Essential hypertension    Gastroesophageal reflux disease without esophagitis    Iron deficiency anemia    Arthritis, degenerative    Diverticulosis of large intestine without hemorrhage    Elevated PSA    B12 deficiency    Diastolic dysfunction    Elevated liver enzymes    Pure hypercholesterolemia    Irritable bowel syndrome without diarrhea    Paroxysmal atrial fibrillation (HCC)    Low testosterone    Bradycardia    Chronic bilateral low back pain without sciatica    Gout    Anxiety    Chronic systolic congestive heart failure (HCC)    DDD (degenerative disc disease), cervical    DDD (degenerative disc disease), lumbar    Closed compression fracture of second lumbar vertebra ([address]    Acquired spondylolisthesis    Spinal stenosis of lumbar region with neurogenic claudication    Calcium pyrophosphate deposition disease of ankle    Depression    Bladder outlet obstruction       RECOMMENDATIONS:  See above     Discussed with patient and Nurse.     Electronically signed by Messi Julio on 8/20/2021 at 7:31 [address] [place] [address] [facility]      [phone]

## 2021-08-20 NOTE — PROGRESS NOTES
Physical Therapy  Facility/Department: [facility] RENAL//MED SURG  Daily Treatment Note  NAME: Zachary Ugalde  : 1931  MRN: [id number]    Date of Service: 2021    Discharge Recommendations:    Further therapy recommended at discharge and the patient should be able to tolerate at least 3 hours per day over 5 days or 15 hours over 7 days. PT Equipment Recommendations  Equipment Needed: Yes  Mobility Devices: [place]: Rolling    Assessment   Body structures, Functions, Activity limitations: Decreased functional mobility ; Decreased posture;Decreased endurance;Decreased ROM; Decreased strength;Decreased balance;Decreased safe awareness; Increased pain  Assessment: Pt required MIN A x2 to amb 2 ft to chair with RW. Limited by dizziness, decreased balance and endurance. Unsafe to return to prior living at this time due to amount of physical assist required for all mobility and high fall risk. Recommend aggressive PT after d/c to address deficits. Pt very motivated to improve. Prognosis: Good  REQUIRES PT FOLLOW UP: Yes  Activity Tolerance  Activity Tolerance: Treatment limited secondary to medical complications (free text); Patient Tolerated treatment well  Activity Tolerance: dizziness     Patient Diagnosis(es): The encounter diagnosis was Acute postoperative pain. has a past medical history of Acute MI ([facility] [address] Allergic rhinitis, Anemia, Atrial fibrillation ([facility] [address] Basal cell cancer, Basal cell cancer, Cervical radiculopathy, Diverticulitis, GERD (gastroesophageal reflux disease), Glaucoma, Hyperlipidemia, Hypertension, Hyponatremia, IBS (irritable bowel syndrome), Insomnia, Osteoarthritis, Osteoarthritis/neck pain. , Renal calculi, Shingles, Urinary frequency, Wears glasses, and Wears hearing aid in both ears. has a past surgical history that includes Spine surgery (2003); Lithotripsy; Skin cancer excision; skin biopsy (13); Skin cancer excision (Left, 2013);  Colonoscopy (); Colonoscopy (08/2013); skin biopsy (Left, 02/11/14); Mohs surgery (Left, 03/10/14); Mohs surgery (12/30/14); other surgical history (9/6/15); Cholecystectomy (9-9-15); Skin cancer excision (Right, 08/12/2016); Mohs surgery (Left, 08/2017); Mohs surgery (Left, 10/21/2019); Mohs surgery (12/14/2020); hernia repair (Right); Prostatectomy (08/18/2021); and Prostatectomy (N/A, 8/18/2021). Restrictions  Restrictions/Precautions  Restrictions/Precautions: Up as Tolerated, Fall Risk  Required Braces or Orthoses?: No  Position Activity Restriction  Other position/activity restrictions: Quintana catheter, NEYMAR drain  Subjective   General  Response To Previous Treatment: Patient with no complaints from previous session. Family / Caregiver Present: No  Subjective  Subjective: Pt  resting in bed upon arrival, agreeable to PT, reports frequently getting dizzy, pleasant and cooperative  Pain Screening  Patient Currently in Pain: Yes  Pain Assessment  Pain Assessment: Faces  Pain Type: Surgical pain;Acute pain  Pain Location: Abdomen  Response to Pain Intervention: Patient Satisfied  Vital Signs  Patient Currently in Pain: Yes  Pre Treatment Pain Screening  Intervention List: Patient able to continue with treatment    Orientation  Orientation  Overall Orientation Status: Within Functional Limits  Cognition      Objective   Bed mobility  Rolling to Right: Minimal assistance  Supine to Sit: Minimal assistance (with HOB raised and use of railings, increased time to complete)  Scooting: Minimal assistance  Transfers  Sit to Stand: Minimal Assistance  Stand to sit: Minimal Assistance  Bed to Chair: Minimal assistance;2 Person Assistance  Comment: Pt c/o significant dizziness upon standing, requiring ~2 min to stand static, prior to transfer.  Bp 119/80, RN aware  Ambulation  Ambulation?: Yes  Ambulation 1  Surface: level tile  Device: Rolling Walker  Assistance: Minimal assistance;2 Person assistance  Quality of Gait: Very small, choppy steps, difficulty advancing BLE  Distance: ~2 ft to chair  Comments: Increased time to complte due to c/o dizziness     Balance  Posture: Fair  Sitting - Static: Good;-  Sitting - Dynamic: Good;-  Standing - Static: Fair  Standing - Dynamic: Fair;-  Comments: Standing balance assessed with RW, MIN A x2 for safety due to weak, dizzy, decreased balance   Exercise  Supine heel slides, ankle pumps, quad set, glut set x 10  Seated ankle pump, LAQ x 10    AM-PAC Score  AM-PAC Inpatient Mobility Raw Score : 15 (08/20/21 1033)  AM-PAC Inpatient T-Scale Score : 39.45 (08/20/21 1033)  Mobility Inpatient CMS 0-100% Score: 57.7 (08/20/21 1033)  Mobility Inpatient CMS G-Code Modifier : CK (08/20/21 1033)          Goals  Short term goals  Time Frame for Short term goals: 14  Short term goal 1: Pt to perform bed mobility independently  Short term goal 2: Pt to demonstrate functional transfers independently  Short term goal 3: Ambulate 200ft w/ least restrictive AD with supervision  Short term goal 4: Ascend/descend 2 stairs with bilateral rails to simulate home environment SBA  Patient Goals   Patient goals :  To go home    Plan    Plan  Times per week: 5-6x/week  Current Treatment Recommendations: Strengthening, Transfer Training, Endurance Training, Patient/Caregiver Education & Training, ROM, Equipment Evaluation, Education, & procurement, Balance Training, Gait Training, Home Exercise Program, Functional Mobility Training, Stair training, Safety Education & Training  Safety Devices  Type of devices: Call light within reach, Gait belt, Nurse notified, Left in chair, Chair alarm in place  Restraints  Initially in place: No     Therapy Time   Individual Concurrent Group Co-treatment   Time In 0843         Time Out 0918         Minutes 35         Timed Code Treatment Minutes: [address], [facility]

## 2021-08-20 NOTE — PROGRESS NOTES
Urology Progress Note      Subjective: Tyrese Montiel is a 80 y.o. male. His/Her current Diet is: ADULT DIET; Regular. Since the previous note, the patient reports the following:  No acute issues overnight. No fevers or chills. No nausea or vomiting. No chest pain or shortness of breath. No calf pain. Pain Controlled. Abdomen sore  Not ambulating, feels dizzy when standing  Tolerating PO Diet. Not passing gas    NEYMAR Drain 25 cc/8h      Vitals and Labs:  Vitals:    08/19/21 1234 08/19/21 1344 08/19/21 1621 08/19/21 1930   BP: 106/68 102/61 112/81 135/89   Pulse: 107 83 105 106   Resp: 16 12 19   Temp: 97.8 °F (36.6 °C)  98.4 °F (36.9 °C) 98.1 °F (36.7 °C)   TempSrc: Temporal  Oral Oral   SpO2: 95%  96% 96%   Weight:       Height:         I/O last 3 completed shifts:  In: -   Out: 6895 [Urine:6850; Drains:45]    Recent Labs     08/19/21  0306 08/19/21  1621 08/20/21  0518   WBC 15.0* 11.6* 9.1   HGB 13.0 12.7* 11.8*   HCT 41.1 39.5* 36.9*   MCV 94.7 92.7 98.1    156 343     Recent Labs     08/18/21  1544 08/19/21  0306 08/19/21  1621   * 127* 127*   K 4.7 5.0 5.1   CL 98 96* 95*   CO2 18* 18* 18*   BUN 20 17 13   CREATININE 1.00 0.75 0.85       No results for input(s): COLORU, PHUR, LABCAST, WBCUA, RBCUA, MUCUS, TRICHOMONAS, YEAST, BACTERIA, CLARITYU, SPECGRAV, LEUKOCYTESUR, UROBILINOGEN, BILIRUBINUR, BLOODU in the last 72 hours. Invalid input(s): NITRATE, GLUCOSEUKETONESUAMORPHOUS    Physical Exam:  NAD  A/O x 3  RRR  No accessory muscles of inspiration  Abdomen soft, non-tender, non-distended. No CVA tenderness. Quintana in place. Clear yellow UOP. No calf pain. EPCs on. Machine turned on.     Impression:    POD#2 from simple prostatectomy    Plan:  Regular diet  CBI clamped  Remove NEYMAR today  Appreciate cardiology recommendations  Levsin and trospium PRN for bladder spasms      Samule Goodell, MD  5:59 AM 8/20/2021

## 2021-08-21 VITALS
SYSTOLIC BLOOD PRESSURE: 157 MMHG | WEIGHT: 159 LBS | HEART RATE: 89 BPM | DIASTOLIC BLOOD PRESSURE: 80 MMHG | TEMPERATURE: 97.7 F | HEIGHT: 64 IN | BODY MASS INDEX: 27.14 KG/M2 | OXYGEN SATURATION: 100 % | RESPIRATION RATE: 16 BRPM

## 2021-08-21 LAB
ANION GAP SERPL CALCULATED.3IONS-SCNC: 13 MMOL/L (ref 9–17)
BUN BLDV-MCNC: 9 MG/DL (ref 8–23)
BUN/CREAT BLD: ABNORMAL (ref 9–20)
CALCIUM SERPL-MCNC: 9 MG/DL (ref 8.6–10.4)
CHLORIDE BLD-SCNC: 94 MMOL/L (ref 98–107)
CO2: 19 MMOL/L (ref 20–31)
CREAT SERPL-MCNC: 0.68 MG/DL (ref 0.7–1.2)
GFR AFRICAN AMERICAN: >60 ML/MIN
GFR NON-AFRICAN AMERICAN: >60 ML/MIN
GFR SERPL CREATININE-BSD FRML MDRD: ABNORMAL ML/MIN/{1.73_M2}
GFR SERPL CREATININE-BSD FRML MDRD: ABNORMAL ML/MIN/{1.73_M2}
GLUCOSE BLD-MCNC: 96 MG/DL (ref 70–99)
HCT VFR BLD CALC: 43.5 % (ref 40.7–50.3)
HEMOGLOBIN: 13.6 G/DL (ref 13–17)
MCH RBC QN AUTO: 29.7 PG (ref 25.2–33.5)
MCHC RBC AUTO-ENTMCNC: 31.3 G/DL (ref 28.4–34.8)
MCV RBC AUTO: 95 FL (ref 82.6–102.9)
NRBC AUTOMATED: 0 PER 100 WBC
PDW BLD-RTO: 14.8 % (ref 11.8–14.4)
PLATELET # BLD: 157 K/UL (ref 138–453)
PMV BLD AUTO: 10.2 FL (ref 8.1–13.5)
POTASSIUM SERPL-SCNC: 4.2 MMOL/L (ref 3.7–5.3)
RBC # BLD: 4.58 M/UL (ref 4.21–5.77)
SODIUM BLD-SCNC: 126 MMOL/L (ref 135–144)
WBC # BLD: 11.4 K/UL (ref 3.5–11.3)

## 2021-08-21 PROCEDURE — 6370000000 HC RX 637 (ALT 250 FOR IP): Performed by: STUDENT IN AN ORGANIZED HEALTH CARE EDUCATION/TRAINING PROGRAM

## 2021-08-21 PROCEDURE — 80048 BASIC METABOLIC PNL TOTAL CA: CPT

## 2021-08-21 PROCEDURE — 97535 SELF CARE MNGMENT TRAINING: CPT

## 2021-08-21 PROCEDURE — 36415 COLL VENOUS BLD VENIPUNCTURE: CPT

## 2021-08-21 PROCEDURE — 85027 COMPLETE CBC AUTOMATED: CPT

## 2021-08-21 RX ORDER — WARFARIN SODIUM 5 MG/1
2.5 TABLET ORAL WEEKLY
Qty: 30 TABLET | Refills: 3
Start: 2021-08-21 | End: 2021-08-21 | Stop reason: SDUPTHER

## 2021-08-21 RX ORDER — PHENAZOPYRIDINE HYDROCHLORIDE 100 MG/1
200 TABLET, FILM COATED ORAL
Status: DISCONTINUED | OUTPATIENT
Start: 2021-08-21 | End: 2021-08-21 | Stop reason: HOSPADM

## 2021-08-21 RX ORDER — WARFARIN SODIUM 5 MG/1
2.5 TABLET ORAL WEEKLY
Qty: 30 TABLET | Refills: 3
Start: 2021-08-21 | End: 2021-11-24 | Stop reason: DRUGHIGH

## 2021-08-21 RX ORDER — PHENAZOPYRIDINE HYDROCHLORIDE 100 MG/1
100 TABLET, FILM COATED ORAL 3 TIMES DAILY PRN
Qty: 9 TABLET | Refills: 0 | Status: SHIPPED | OUTPATIENT
Start: 2021-08-21 | End: 2021-08-24

## 2021-08-21 RX ADMIN — ACETAMINOPHEN 650 MG: 325 TABLET ORAL at 11:11

## 2021-08-21 RX ADMIN — METOPROLOL TARTRATE 25 MG: 25 TABLET ORAL at 07:37

## 2021-08-21 RX ADMIN — ACETAMINOPHEN 650 MG: 325 TABLET ORAL at 04:44

## 2021-08-21 RX ADMIN — DOCUSATE SODIUM 100 MG: 100 CAPSULE ORAL at 07:36

## 2021-08-21 RX ADMIN — TAMSULOSIN HYDROCHLORIDE 0.4 MG: 0.4 CAPSULE ORAL at 07:36

## 2021-08-21 RX ADMIN — PREDNISONE 5 MG: 5 TABLET ORAL at 07:36

## 2021-08-21 RX ADMIN — PANTOPRAZOLE SODIUM 40 MG: 40 TABLET, DELAYED RELEASE ORAL at 07:36

## 2021-08-21 RX ADMIN — POLYETHYLENE GLYCOL 3350 17 G: 17 POWDER, FOR SOLUTION ORAL at 07:36

## 2021-08-21 RX ADMIN — ALLOPURINOL 100 MG: 100 TABLET ORAL at 07:36

## 2021-08-21 RX ADMIN — ONDANSETRON 4 MG: 4 TABLET, ORALLY DISINTEGRATING ORAL at 04:26

## 2021-08-21 RX ADMIN — BUSPIRONE HYDROCHLORIDE 10 MG: 10 TABLET ORAL at 07:36

## 2021-08-21 RX ADMIN — PHENAZOPYRIDINE HYDROCHLORIDE 200 MG: 100 TABLET ORAL at 11:11

## 2021-08-21 ASSESSMENT — PAIN SCALES - GENERAL
PAINLEVEL_OUTOF10: 5
PAINLEVEL_OUTOF10: 7

## 2021-08-21 NOTE — PROGRESS NOTES
Occupational Therapy  Facility/Department: [facility] RENAL//MED SURG  Daily Treatment Note  NAME: Orestes Lynn  : 1931  MRN: [id number]    Date of Service: 2021    Discharge Recommendations:  Patient would benefit from continued therapy after discharge       Assessment   Performance deficits / Impairments: Decreased functional mobility ; Decreased ADL status; Decreased strength;Decreased safe awareness;Decreased cognition;Decreased endurance;Decreased balance;Decreased high-level IADLs;Decreased coordination;Decreased fine motor control  Prognosis: Good  OT Education: OT Role;Plan of Care;ADL Adaptive Strategies;Transfer Training;Energy Conservation; Family Education;Equipment;Precautions  REQUIRES OT FOLLOW UP: Yes  Activity Tolerance  Activity Tolerance: Patient Tolerated treatment well;Patient limited by fatigue  Activity Tolerance: dizziness limiting session  Safety Devices  Safety Devices in place: Yes  Type of devices: Call light within reach; Bed alarm in place;Nurse notified; Left in bed  Restraints  Initially in place: No         Patient Diagnosis(es): The encounter diagnosis was Acute postoperative pain. has a past medical history of Acute MI ([facility] [address] Allergic rhinitis, Anemia, Atrial fibrillation ([facility] [address] Basal cell cancer, Basal cell cancer, Cervical radiculopathy, Diverticulitis, GERD (gastroesophageal reflux disease), Glaucoma, Hyperlipidemia, Hypertension, Hyponatremia, IBS (irritable bowel syndrome), Insomnia, Osteoarthritis, Osteoarthritis/neck pain. , Renal calculi, Shingles, Urinary frequency, Wears glasses, and Wears hearing aid in both ears. has a past surgical history that includes Spine surgery (2003); Lithotripsy; Skin cancer excision; skin biopsy (13); Skin cancer excision (Left, 2013); Colonoscopy (); Colonoscopy (2013); skin biopsy (Left, 14); Mohs surgery (Left, 03/10/14); Mohs surgery (14); other surgical history (9/6/15);  Cholecystectomy (9-9-15); Plan   Plan  Times per week: 3-5x/wk  Current Treatment Recommendations: Balance Training, Functional Mobility Training, Endurance Training, Strengthening, Patient/Caregiver Education & Training, Equipment Evaluation, Education, & procurement, Self-Care / ADL, Safety Education & Training    AM-PAC Score        AM-PAC Inpatient Daily Activity Raw Score: 17 (08/21/21 1304)  AM-PAC Inpatient ADL T-Scale Score : 37.26 (08/21/21 1304)  ADL Inpatient CMS 0-100% Score: 50.11 (08/21/21 1304)  ADL Inpatient CMS G-Code Modifier : CK (08/21/21 1304)    Goals  Short term goals  Time Frame for Short term goals: Pt will, by discharge:  Short term goal 1: Perform functional transfers/functional mobility with mod IND using least restrictive AD  Short term goal 2:  Independently demo good safety awareness during engagement in all functional tasks  Short term goal 3: Perform ADL tasks independently  Short term goal 4: Demo 10+ minutes standing tolerance with use of least restrictive AD for increased participation in ADLs       Therapy Time   Individual Concurrent Group Co-treatment   Time In 1138         Time Out 1202         Minutes 24         Timed Code Treatment Minutes: 23 Minutes       Parth Mckeon OTR/L

## 2021-08-21 NOTE — PROGRESS NOTES
Urology Progress Note      Subjective: Daljit Fulton is a 80 y.o. male. His/Her current Diet is: ADULT DIET; Regular. Since the previous note, the patient reports the following:  No acute issues overnight. No fevers or chills. No nausea or vomiting. No chest pain or shortness of breath. No calf pain. Pain Controlled. Abdomen sore  Ambulated with PT, feels dizzy when standing  Tolerating PO Diet. Passing gas  Needs to go to the restroom today  UOP 2.4L/24 hours   NEYMAR remove yesterday      Vitals and Labs:  Vitals:    08/20/21 0930 08/20/21 1206 08/20/21 1557 08/20/21 2011   BP: 119/80 139/79 115/71 (!) 140/74   Pulse: 69 83 81 72   Resp:  16 16 16   Temp:  97.7 °F (36.5 °C) 98.4 °F (36.9 °C) 98.1 °F (36.7 °C)   TempSrc:  Oral Oral Oral   SpO2:  97% 99% 98%   Weight:       Height:         I/O last 3 completed shifts: In: 9894 [P.O.:600; I.V.:3307]  Out: 2400 [Urine:2400]    Recent Labs     08/19/21  1621 08/20/21  0518 08/21/21  0519   WBC 11.6* 9.1 11.4*   HGB 12.7* 11.8* 13.6   HCT 39.5* 36.9* 43.5   MCV 92.7 98.1 95.0    343 157     Recent Labs     08/19/21  1621 08/20/21  0518 08/21/21  0519   * 128* 126*   K 5.1 4.7 4.2   CL 95* 97* 94*   CO2 18* 17* 19*   BUN 13 11 9   CREATININE 0.85 0.72 0.68*       No results for input(s): COLORU, PHUR, LABCAST, WBCUA, RBCUA, MUCUS, TRICHOMONAS, YEAST, BACTERIA, CLARITYU, SPECGRAV, LEUKOCYTESUR, UROBILINOGEN, BILIRUBINUR, BLOODU in the last 72 hours. Invalid input(s): NITRATE, GLUCOSEUKETONESUAMORPHOUS    Physical Exam:  NAD  A/O x 3  RRR  No accessory muscles of inspiration  Abdomen soft, non-tender, non-distended. No CVA tenderness. Quintana in place. Reddish yellow UOP. No calf pain. EPCs on. Machine turned on.     Impression:    POD#3 from simple prostatectomy    Plan:  Regular diet  NEYMAR removed yesterday  CBI clamped  Pain control and antiemetic as needed  Appreciate cardiology recommendations, stop digoxin continue BBB, restart anticoagulation when okay with urology   Levsin and trospium PRN for bladder spasms  PT/OT, ambulate  Incentive spirometry use   Discharge today to rehab facility     Faby Pearson MD  7:52 AM 8/21/2021     I have reviewed the history above and agree. I have repeated the key portions of the physical exam and concur with the resident's findings. I have reviewed all laboratory findings and imaging reports/films. I agree with the plan as noted above.     Electronically signed by Pamela Potter MD on 8/21/2021 at 10:01 AM

## 2021-08-21 NOTE — CARE COORDINATION
Transitional planning:      [facility], spoke with Christian Núñez, pt is going to room 226. Call [phone] for nurse report. [address] demographic, medical necessity and transportation form. 1115 HENS completed. Mai Zavala calls back and states [facility] does not require a HENS and no precert. For this pt.     [address], [facility] can transport via stretcher at 2 pm today. [address], son notified of discharge time. [address]  and notified pt needs to be seen. PS Dr. Gautam Guerrero to notify of transport time. Transport packet completed with MAR, PT therapy notes, AVS, HENS, Rx scripts, H&P, transport forms. [address], RN called, pt not picked up yet. [address] states crew is late, but assigned no on their way.

## 2021-08-25 NOTE — DISCHARGE SUMMARY
DISCHARGE SUMMARY NOTE:      Patient Identification  PATIENT: Orestes Lynn   MRN: [id number]  :  1931  Admit Date:  2021  Discharge date:  21                                  Disposition: Acute rehab center  Discharged Condition:  good  Discharge Diagnoses:   Patient Active Problem List   Diagnosis    Insomnia    Allergic rhinitis    Malignant basal cell neoplasm of skin    Benign prostatic hyperplasia with lower urinary tract symptoms    Branch retinal vein occlusion, left eye    Cervical radiculopathy    Essential hypertension    Gastroesophageal reflux disease without esophagitis    Iron deficiency anemia    Arthritis, degenerative    Diverticulosis of large intestine without hemorrhage    Elevated PSA    B12 deficiency    Diastolic dysfunction    Elevated liver enzymes    Pure hypercholesterolemia    Irritable bowel syndrome without diarrhea    Paroxysmal atrial fibrillation (HCC)    Low testosterone    Bradycardia    Chronic bilateral low back pain without sciatica    Gout    Anxiety    Chronic systolic congestive heart failure ([address]    DDD (degenerative disc disease), cervical    DDD (degenerative disc disease), lumbar    Closed compression fracture of second lumbar vertebra (HCC)    Acquired spondylolisthesis    Spinal stenosis of lumbar region with neurogenic claudication    Calcium pyrophosphate deposition disease of ankle    Depression    Bladder outlet obstruction       Consults: cardiology    Surgery: Robotic simple prostatectomy    Patient Instructions:    Activity: Per instructions  Diet: As tolerated  Patient told to follow up with Dr. Roxana Hendrickson in 1 weeks  Discharge Medications:   Jaycob Benítez   Home Medication Instructions [id number]    Printed on:21   Medication Information                      allopurinol (ZYLOPRIM) 100 MG tablet  TAKE ONE TABLET BY MOUTH DAILY             bimatoprost (LUMIGAN) 0.01 % SOLN ophthalmic drops  Place PACU without incident. He was transferred to the floor on CBI. On postoperative day 1, he had tachycardia with dizziness and hypotension. Cardizem was held. Cardiology was consulted and they noted him to be in A. fib with RVR. They administered fluids and which improved his blood pressure but he continued to be in A. fib. They administered digoxin IV and ordered an echo. On postoperative day 2, he was tolerating regular diet and his CBI was clamped. Echo showed normal LVEF. On postoperative day 3, he was deemed ready for discharge to a rehab facility due to PT recommendations. Cardiology recommended that he discontinue his Cardizem and continue his beta-blocker. He was okay to restart his warfarin in 10 days. He was discharged to acute rehab facility with instructions to follow-up with Dr. Ryanne Weaver in 1 week.     Patricia Sanchez MD  9:02 PM 8/24/2021

## 2021-08-26 ENCOUNTER — OFFICE VISIT (OUTPATIENT)
Dept: UROLOGY | Age: 86
End: 2021-08-26

## 2021-08-26 VITALS
SYSTOLIC BLOOD PRESSURE: 121 MMHG | WEIGHT: 159 LBS | TEMPERATURE: 96.5 F | HEIGHT: 64 IN | BODY MASS INDEX: 27.14 KG/M2 | HEART RATE: 71 BPM | DIASTOLIC BLOOD PRESSURE: 79 MMHG

## 2021-08-26 DIAGNOSIS — Z90.79 S/P PROSTATECTOMY: ICD-10-CM

## 2021-08-26 DIAGNOSIS — N40.1 BENIGN PROSTATIC HYPERPLASIA WITH INCOMPLETE BLADDER EMPTYING: ICD-10-CM

## 2021-08-26 DIAGNOSIS — R39.14 BENIGN PROSTATIC HYPERPLASIA WITH INCOMPLETE BLADDER EMPTYING: ICD-10-CM

## 2021-08-26 DIAGNOSIS — C61 PROSTATE CANCER (HCC): Primary | ICD-10-CM

## 2021-08-26 DIAGNOSIS — Z90.79 STATUS POST PROSTATECTOMY: ICD-10-CM

## 2021-08-26 PROCEDURE — 99024 POSTOP FOLLOW-UP VISIT: CPT | Performed by: UROLOGY

## 2021-08-26 ASSESSMENT — ENCOUNTER SYMPTOMS
ABDOMINAL PAIN: 0
EYES NEGATIVE: 1
WHEEZING: 0
SHORTNESS OF BREATH: 0
COUGH: 0
CONSTIPATION: 0
GASTROINTESTINAL NEGATIVE: 1
NAUSEA: 0
DIARRHEA: 0
RESPIRATORY NEGATIVE: 1
EYE PAIN: 0
BACK PAIN: 0
VOMITING: 0
EYE REDNESS: 0

## 2021-08-26 NOTE — PROGRESS NOTES
 COLONOSCOPY  2002    COLONOSCOPY  08/2013    HERNIA REPAIR Right     inguinal    LITHOTRIPSY      MOHS SURGERY Left 03/10/14    cheek    MOHS SURGERY  12/30/14    post scalp    MOHS SURGERY Left 08/2017    X2 left cheek    MOHS SURGERY Left 10/21/2019    temple    MOHS SURGERY  12/14/2020    top of head    OTHER SURGICAL HISTORY  9/6/15    attempted ERCP    PROSTATECTOMY  08/18/2021    PROSTATECTOMY N/A 8/18/2021    XI ROBOTIC LAPAROSCOPIC SIMPLE PROSTATECTOMY performed by Armando Best MD at [facility]  03/11/13    3 areas    SKIN BIOPSY Left 02/11/14    cheek /basal cell carinoma    SKIN CANCER EXCISION      left face and top of head    SKIN CANCER EXCISION Left 03/2013    forehead, cheek, chin, basal cell.  SKIN CANCER EXCISION Right 08/12/2016    with skin graft    SPINE SURGERY  04/2003     Family History   Problem Relation Age of Onset    Heart Disease Mother     Lung Cancer Father     Cancer Father         lung    Heart Disease Brother      Outpatient Medications Marked as Taking for the 8/26/21 encounter (Office Visit) with Armando Best MD   Medication Sig Dispense Refill    warfarin (COUMADIN) 5 MG tablet Take 0.5 tablets by mouth once a week Indications:  Wednesdays; INR goal 2-2.5 Managed by [facility], Dr. Liset Hurtado, will stop 5 days before surgery on 8/18/21, and restart 10 days following surgery on 8/31/21 30 tablet 3    docusate sodium (COLACE) 100 MG capsule Take 1 capsule by mouth 2 times daily 60 capsule 0    polyethylene glycol (GLYCOLAX) 17 GM/SCOOP powder Take 17 g by mouth daily for 14 days 238 g 0    predniSONE (DELTASONE) 5 MG tablet TAKE ONE TABLET BY MOUTH DAILY 30 tablet 4    allopurinol (ZYLOPRIM) 100 MG tablet TAKE ONE TABLET BY MOUTH DAILY 90 tablet 2    lisinopril (PRINIVIL;ZESTRIL) 10 MG tablet Take 1 tablet by mouth Daily with lunch 90 tablet 3    bimatoprost (LUMIGAN) 0.01 % SOLN ophthalmic drops Place 1 drop into both eyes nightly      naproxen (NAPROSYN) 500 MG tablet Take 1 tablet by mouth 2 times daily as needed for Pain (Take with meals) 60 tablet 1    pantoprazole (PROTONIX) 40 MG tablet Take 1 tablet by mouth every morning (before breakfast) 7 tablet 0    busPIRone (BUSPAR) 10 MG tablet Take 1 tablet by mouth 3 times daily 90 tablet 11    tamsulosin (FLOMAX) 0.4 MG capsule Take 1 capsule by mouth 2 times daily 180 capsule 3    latanoprost (XALATAN) 0.005 % ophthalmic solution Place 1 drop into both eyes nightly       melatonin 5 MG TABS tablet Take 1 tablet by mouth nightly       metoprolol tartrate (LOPRESSOR) 25 MG tablet Take 25 mg by mouth 2 times daily         Celebrex [celecoxib] and Mobic  Social History     Tobacco Use   Smoking Status Former Smoker    Packs/day: 1.00    Years: 10.00    Pack years: 10.00    Quit date: 1961    Years since quittin.6   Smokeless Tobacco Never Used   Tobacco Comment    quit        Social History     Substance and Sexual Activity   Alcohol Use Not Currently    Alcohol/week: 0.0 standard drinks       Physical Exam:      Vitals:    21 1128   BP: 121/79   Pulse: 71   Temp: 96.5 °F (35.8 °C)     Body mass index is 27.29 kg/m². Patient is a 80 y.o. male in no acute distress and alert and oriented to person, place andtime. Physical Exam  Constitutional: Patient in no acute distress. Well nourished, no physical deformities, normally developed. Good grooming.: Alert and oriented to person, place and time. Psych: Mood and affect normal  HEENT: Negative  Lungs: No labored breathing, no use of accessory muscles. Cardiovascular: Normal temperature. Skin colorpink. Musculoskeletal:  Weak, wheelchair. Bladder non-tender and not distended. Assessment and Plan      1. Prostate cancer ([address]    2. Benign prostatic hyperplasia with incomplete bladder emptying    3. S/P prostatectomy    4. Status post prostatectomy           Plan:   1.  Reviewed path with patient, prostate CA is a new diagnosis. Per Dr. Jovanna Martin, he would like to repeat a PSA in 6 mos with office visit to discuss results. 2.  Cath removed in office today- restart blood thinners on Saturday 28th, 2021.    3. Increase water intake, keep an eye on urination. Return to rehab facility. If patient unable to urinate within 6 hours of arriving, please re-insert a 16Fr leyva catheter and notify our office. 4. F/U 6 weeks with UA, urine culture, PVR.     5. F/U 6 mos for PSA. Return for 6 weeks with UA, culture and PVR. 6 mos with PSA with Marianna. Prescriptions Ordered:  No orders of the defined types were placed in this encounter. Orders Placed:  No orders of the defined types were placed in this encounter. Angelique Loo MD    Reviewed and agree with the ROS entered by the MA.

## 2021-08-26 NOTE — PATIENT INSTRUCTIONS
Plan:   1. Reviewed path with patient, prostate CA is a new diagnosis. Per Dr. Ana Laura Vela, he would like to repeat a PSA in 6 mos with office visit to discuss results. 2.  Cath removed in office today- restart blood thinners on Saturday 28th, 2021.    3. Increase water intake, keep an eye on urination. Return to rehab facility. If patient unable to urinate within 6 hours of arriving, please re-insert a 16Fr leyva catheter and notify our office. 4. F/U 6 weeks with UA, urine culture, PVR.     5. F/U 6 mos for PSA. Return for 6 weeks with UA, culture and PVR. 6 mos with PSA with Marianna.

## 2021-08-26 NOTE — PROGRESS NOTES
Review of Systems   Constitutional: Negative. Negative for appetite change, chills and fatigue. Eyes: Negative. Negative for pain, redness and visual disturbance. Respiratory: Negative. Negative for cough, shortness of breath and wheezing. Cardiovascular: Negative. Negative for chest pain and leg swelling. Gastrointestinal: Negative. Negative for abdominal pain, constipation, diarrhea, nausea and vomiting. Genitourinary: Negative for difficulty urinating, dysuria, flank pain, frequency, hematuria and urgency. Musculoskeletal: Negative. Negative for back pain, joint swelling and myalgias. Skin: Negative. Negative for rash and wound. Neurological: Negative. Negative for dizziness, weakness and numbness. Hematological: Does not bruise/bleed easily.

## 2021-09-03 ENCOUNTER — TELEPHONE (OUTPATIENT)
Dept: FAMILY MEDICINE CLINIC | Age: 86
End: 2021-09-03

## 2021-09-03 NOTE — TELEPHONE ENCOUNTER
----- Message from Apollo Morrell sent at 9/3/2021  2:59 PM EDT -----  Subject: Message to Provider    QUESTIONS  Information for Provider? 19 [facility] need a verbal order   stating pcp with will work with home health care.  ---------------------------------------------------------------------------  --------------  [address]  What is the best way for the office to contact you? OK to leave message on   voicemail  Preferred Call Back Phone Number? [phone]  ---------------------------------------------------------------------------  --------------  SCRIPT ANSWERS  Relationship to Patient?  Third Party  Representative Name? [facility]

## 2021-09-03 NOTE — TELEPHONE ENCOUNTER
I spoke with [facility] ALLEN @ [facility] and gave her a verbal ok for PCP to follow for home care.

## 2021-09-07 ENCOUNTER — TELEPHONE (OUTPATIENT)
Dept: FAMILY MEDICINE CLINIC | Age: 86
End: 2021-09-07

## 2021-09-07 ENCOUNTER — CARE COORDINATION (OUTPATIENT)
Dept: CASE MANAGEMENT | Age: 86
End: 2021-09-07

## 2021-09-07 DIAGNOSIS — Z90.79 STATUS POST PROSTATECTOMY: Primary | ICD-10-CM

## 2021-09-07 DIAGNOSIS — F41.9 ANXIETY: Primary | ICD-10-CM

## 2021-09-07 DIAGNOSIS — F32.A DEPRESSION, UNSPECIFIED DEPRESSION TYPE: ICD-10-CM

## 2021-09-07 PROCEDURE — 1111F DSCHRG MED/CURRENT MED MERGE: CPT | Performed by: FAMILY MEDICINE

## 2021-09-07 NOTE — CARE COORDINATION
Shirlene 45 Transitions Initial Follow Up Call    Call within 2 business days of discharge: Yes    Patient: Juan Harris Patient : 1931   MRN: <[id number]>  Reason for Admission: Robotic simple prostatectomy  Discharge Date: 21 RARS: Readmission Risk Score: 9      Last Discharge [facility]       Complaint Diagnosis Description Type Department Provider    21  Acute postoperative pain Admission (Discharged) [address], [place]      Acute Care Course: Patient admitted to [facility] for a procedure Robotic simple prostatectomy from -. He discharged to [facility] for rehab from -9/3/21. Sig Hx: Adenocarcinoma    DME: Walker    Conversation: Patient states he is doing well. Denies pain, sob, fever, n/v, bleeding, bowel or urination concerns. Appetite improving everyday. States he is independent for ADL's and ambulation. He started his exercises today on his own. He is working with PT. States his goal is to walk the length of his driveway and back until he is back to his normal block and a half walk. Jayant Mars nurse has been out to the home today. She ryley his INR. PT made a visit today. Patient gave permission to review medications with daughter Charmaine Bowen. Medications reviewed. Not in Epic is Melatonin 5mg every night. Patient seen by urology . Per MD note patient is doing well. They declined assistance to schedule a PCP f/u. Protocol explained. Follow up plan: Hand off to [facility]     Non-face-to-face services provided:      Care Transitions 24 Hour Call    Do you have any ongoing symptoms?: No  Do you have a copy of your discharge instructions?: Yes  Do you have all of your prescriptions and are they filled?: Yes  Have you been contacted by a [facility] Pharmacist?: No  Have you scheduled your follow up appointment?: Yes (Comment: Seen by urology . Declined assistance to schedule PCP.  Will schedule self)  Were you discharged with any Home Care or Post Acute Services: Yes  Post Acute Services: Home Health (Comment: Amarjit Ball [address])  Do you feel like you have everything you need to keep you well at home?: Yes  Care Transitions Interventions         Follow Up  Future Appointments   Date Time Provider Chanelle Ballesterosisti   10/6/2021 10:45 AM Skylar Torres DPM [address]   10/7/2021  1:30 PM NASRA Gonzales - CNP [facility]   2021 10:45 AM Skylar Torres DPM [facility]   2/15/2022  1:40 PM Kirstie Lopez MD [address] [facility]   Transitions of Care Initial Call    Was this an external facility discharge? No     Challenges to be reviewed by the provider   Additional needs identified to be addressed with provider: Yes  advance care planning-Patient and daughter Brant Ahumada would like to discuss              Method of communication with provider : none      Advance Care Planning:   Does patient have an Advance Directive: reviewed and current, reviewed and needs to be updated, not on file; education provided, not on file, patient declined education, decision maker updated and referral to internal ACP facilitator. Was this a readmission? No  Patient stated reason for admission: Adenocarcinoma  Patients top risk factors for readmission: medical condition-complications/diagnosis list    Care Transition Nurse (CTN) contacted the patient by telephone to perform post hospital discharge assessment. Verified name and  with patient as identifiers. Provided introduction to self, and explanation of the CTN role. CTN reviewed discharge instructions, medical action plan and red flags with family who verbalized understanding. Family given an opportunity to ask questions and does not have any further questions or concerns at this time. Were discharge instructions available to patient? Yes. Reviewed appropriate site of care based on symptoms and resources available to patient including: PCP, Specialist and When to call 911.  The family agrees to contact the PCP office for questions related to their healthcare. Medication reconciliation was performed with family, who verbalizes understanding of administration of home medications. Advised obtaining a 90-day supply of all daily and as-needed medications. Covid Risk Education     Educated patient about risk for severe COVID-19 due to risk factors according to CDC guidelines. CTN reviewed discharge instructions, medical action plan and red flag symptoms with the family who verbalized understanding. Discussed COVID vaccination status: Yes. Education provided on COVID-19 vaccination as appropriate. Discussed exposure protocols and quarantine with CDC Guidelines. Family was given an opportunity to verbalize any questions and concerns and agrees to contact CTN or health care provider for questions related to their healthcare. Reviewed and educated family on any new and changed medications related to discharge diagnosis. Was patient discharged with a pulse oximeter? No Discussed and confirmed pulse oximeter discharge instructions and when to notify provider or seek emergency care. CTN provided contact information. Plan for follow-up call in 3-5 days based on severity of symptoms and risk factors.   Plan for next call: symptom management-Simple prostatectomy        Elva Walsh RN

## 2021-09-07 NOTE — TELEPHONE ENCOUNTER
I will put in a referral for our behavioral therapist. It is an internal referral and they should call him to set up an appt. Let us know if he doesn't hear from them by the end of the week to schedule an appt.

## 2021-09-07 NOTE — TELEPHONE ENCOUNTER
Patient was referred to a counselor by his PCP. Patient has forgotten the information, and is asking if a new referral could be placed and mailed to him so that he can get scheduled. Please advise.

## 2021-09-08 ENCOUNTER — CARE COORDINATION (OUTPATIENT)
Dept: CARE COORDINATION | Age: 86
End: 2021-09-08

## 2021-09-08 NOTE — CARE COORDINATION
Per [employer] Email:    Facility Discharging and Phone Number: [facility] [address]  Discharge Date: 9/3/2021  Services at Discharge: home health through [employer]  Patient Agreeable to Calls: unknown, unable to engage telephonically with patient  Brief Background: Patient is an 80year old male admitted to acute for a planned prostatectomy secondary to BPH. Post op patient developed low BP and required a cardiology consult due to inability to tolerate upright position. CLOF: Patient ambulates 220 feet CGA with FWW and negotiates 2 steps CGA. Patient SBA for transfers. Patient SBA for ADLs and toileting.        Thank you,        Matias Palencia Coordinator      M: [phone]

## 2021-09-09 ENCOUNTER — CARE COORDINATION (OUTPATIENT)
Dept: CARE COORDINATION | Age: 86
End: 2021-09-09

## 2021-09-09 NOTE — CARE COORDINATION
Per [employer] Email:    I was able to engage with [facility] patient Angus King daughter, Dinora Tolentino, via telephone this date. She is welcoming of follow up calls from the [address] [place] [address] Floor care team and requests that calls go to her as Mr. Morfin is hard of hearing.   Please see her contact information below:    Jason Silvestre - daughter  [phone]    Thank you,        Matias Garrett Coordinator      M: [phone]

## 2021-09-13 ENCOUNTER — CARE COORDINATION (OUTPATIENT)
Dept: CARE COORDINATION | Age: 86
End: 2021-09-13

## 2021-09-13 NOTE — CARE COORDINATION
Spoke at length Daughter calls back, discuss what Case management is, she was really excited about homw many people called and wanted to discuss things. Once I was able to explain to her my role, collaboration with Dr. Shane Tai office and that I was an RN she was much more willing to converse with me. Her dad is very independent, she lives about 5 miles away from him. They have home care coming out with physical therapy and occupational therapy. She feels it's going well. One worry both her and her father have is the stamina. He is not as strong as he was pre-hospital, he wants to get his strength back up. He is note eating as well right now due to a tooth problem. We discussed meal replacement shakes, he does use Ensure. Discussed consulting a dietician for a recommendation on high protein, high calorie that is acceptable with his current medical conditions. Daughter does not want another person calling her or her father so if she could make a recommendation and I could call her back that is preferred. Dietician Recommended Ensure Enlive which will provide- 350 calories,  20gms of protein per bottle if patient is unable to eat protein sources  Like eggs, meat, cottage cheese, ect.      Will mail coupons for Ensure to patient's home. [address] [facility]     DAKOTA Del Valle

## 2021-09-13 NOTE — CARE COORDINATION
Incoming message from [facility], Estefani Palomares, VERONICA regarding meal  Replacement nutritional supplement recommendation for patient. Requesting information for patient's daughter who does not want   Any further phone calls- just a recommendation. Patient not eating   Well due to lack of strength and problems with his teeth. Recommended Ensure Enlive  which will provide- 350 calories,  20gms of protein per bottle if patient is unable to eat protein sources  Like eggs, meat, cottage cheese, ect. Will mail coupons for Ensure to patient's home. [facility]    DAKOTA Del Valle

## 2021-09-14 ENCOUNTER — TELEPHONE (OUTPATIENT)
Dept: UROLOGY | Age: 86
End: 2021-09-14

## 2021-09-14 ENCOUNTER — HOSPITAL ENCOUNTER (OUTPATIENT)
Facility: CLINIC | Age: 86
Setting detail: SPECIMEN
Discharge: HOME | End: 2021-09-14
Payer: MEDICARE

## 2021-09-14 LAB
INR BLD: 1.9
PROTHROMBIN TIME: 20.1 SEC (ref 9.4–12.6)

## 2021-09-14 PROCEDURE — 85610 PROTHROMBIN TIME: CPT

## 2021-09-14 NOTE — TELEPHONE ENCOUNTER
Received a call from Meme [facility], patients home care nurse. She states that she received a call from patients daughter regarding patients Flomax medication. Patient had stopped it and noticed an increase in frequency at night. AlonsoLudlow states that he didn't remember receiving instructions to stop medication so she wanted to get clarification. Patient is scheduled for 10/7 for PVR and UA. Patient recently had catheter removed on 8/26 - did well after leyva removal. Did have frequency after removal but was told this would be normal.   Patient was instructed to continue to take the Flomax after he was discharged from [facility] - but he stopped it on his own since having the surgery. Writer instructed to have patient continue medication at original dosage - Flomax 0.4mg BID until further instructions from Wali Woodward CNP and Dr. Wyatt Irwin. AlonsoLudlow states she will contact the patient and his daughter and relay this information. Instructed [facility] to call with any other questions or concerns.

## 2021-09-14 NOTE — CARE COORDINATION
Called and spoke with daughter Yessenia Goldman to relay the recommended Ensure Enlive drinks, very appreciative of the call. Will touch base next week to see how things are going and if there are further needs.     Payton Maldonadoly [phone]

## 2021-09-16 ENCOUNTER — OFFICE VISIT (OUTPATIENT)
Dept: FAMILY MEDICINE CLINIC | Age: 86
End: 2021-09-16
Payer: MEDICARE

## 2021-09-16 ENCOUNTER — TELEPHONE (OUTPATIENT)
Dept: FAMILY MEDICINE CLINIC | Age: 86
End: 2021-09-16

## 2021-09-16 VITALS
RESPIRATION RATE: 16 BRPM | DIASTOLIC BLOOD PRESSURE: 80 MMHG | TEMPERATURE: 97.1 F | WEIGHT: 155 LBS | SYSTOLIC BLOOD PRESSURE: 124 MMHG | BODY MASS INDEX: 26.61 KG/M2

## 2021-09-16 DIAGNOSIS — M10.9 GOUT, UNSPECIFIED CAUSE, UNSPECIFIED CHRONICITY, UNSPECIFIED SITE: ICD-10-CM

## 2021-09-16 DIAGNOSIS — G47.00 INSOMNIA, UNSPECIFIED TYPE: ICD-10-CM

## 2021-09-16 DIAGNOSIS — E78.00 PURE HYPERCHOLESTEROLEMIA: ICD-10-CM

## 2021-09-16 DIAGNOSIS — R63.0 DECREASED APPETITE: ICD-10-CM

## 2021-09-16 DIAGNOSIS — M19.90 OSTEOARTHRITIS, UNSPECIFIED OSTEOARTHRITIS TYPE, UNSPECIFIED SITE: ICD-10-CM

## 2021-09-16 DIAGNOSIS — R74.8 ELEVATED LIVER ENZYMES: ICD-10-CM

## 2021-09-16 DIAGNOSIS — D50.9 IRON DEFICIENCY ANEMIA, UNSPECIFIED IRON DEFICIENCY ANEMIA TYPE: ICD-10-CM

## 2021-09-16 DIAGNOSIS — M51.36 DDD (DEGENERATIVE DISC DISEASE), LUMBAR: ICD-10-CM

## 2021-09-16 DIAGNOSIS — I48.0 PAROXYSMAL ATRIAL FIBRILLATION (HCC): ICD-10-CM

## 2021-09-16 DIAGNOSIS — E87.1 HYPONATREMIA: ICD-10-CM

## 2021-09-16 DIAGNOSIS — I50.22 CHRONIC SYSTOLIC CONGESTIVE HEART FAILURE (HCC): ICD-10-CM

## 2021-09-16 DIAGNOSIS — F41.9 ANXIETY: ICD-10-CM

## 2021-09-16 DIAGNOSIS — E53.8 B12 DEFICIENCY: ICD-10-CM

## 2021-09-16 DIAGNOSIS — F32.A DEPRESSION, UNSPECIFIED DEPRESSION TYPE: ICD-10-CM

## 2021-09-16 DIAGNOSIS — K58.9 IRRITABLE BOWEL SYNDROME WITHOUT DIARRHEA: ICD-10-CM

## 2021-09-16 DIAGNOSIS — M50.30 DDD (DEGENERATIVE DISC DISEASE), CERVICAL: ICD-10-CM

## 2021-09-16 DIAGNOSIS — I10 ESSENTIAL HYPERTENSION: Primary | ICD-10-CM

## 2021-09-16 DIAGNOSIS — H61.21 IMPACTED CERUMEN OF RIGHT EAR: ICD-10-CM

## 2021-09-16 DIAGNOSIS — K21.9 GASTROESOPHAGEAL REFLUX DISEASE WITHOUT ESOPHAGITIS: ICD-10-CM

## 2021-09-16 DIAGNOSIS — C61 PROSTATE CANCER (HCC): ICD-10-CM

## 2021-09-16 DIAGNOSIS — Z23 NEED FOR INFLUENZA VACCINATION: ICD-10-CM

## 2021-09-16 PROCEDURE — 4040F PNEUMOC VAC/ADMIN/RCVD: CPT | Performed by: FAMILY MEDICINE

## 2021-09-16 PROCEDURE — 90694 VACC AIIV4 NO PRSRV 0.5ML IM: CPT | Performed by: FAMILY MEDICINE

## 2021-09-16 PROCEDURE — 1111F DSCHRG MED/CURRENT MED MERGE: CPT | Performed by: FAMILY MEDICINE

## 2021-09-16 PROCEDURE — G0008 ADMIN INFLUENZA VIRUS VAC: HCPCS | Performed by: FAMILY MEDICINE

## 2021-09-16 PROCEDURE — 99214 OFFICE O/P EST MOD 30 MIN: CPT | Performed by: FAMILY MEDICINE

## 2021-09-16 PROCEDURE — 1123F ACP DISCUSS/DSCN MKR DOCD: CPT | Performed by: FAMILY MEDICINE

## 2021-09-16 PROCEDURE — G8417 CALC BMI ABV UP PARAM F/U: HCPCS | Performed by: FAMILY MEDICINE

## 2021-09-16 PROCEDURE — G8427 DOCREV CUR MEDS BY ELIG CLIN: HCPCS | Performed by: FAMILY MEDICINE

## 2021-09-16 PROCEDURE — 1036F TOBACCO NON-USER: CPT | Performed by: FAMILY MEDICINE

## 2021-09-16 RX ORDER — OMEPRAZOLE 20 MG/1
20 TABLET, DELAYED RELEASE ORAL DAILY
COMMUNITY

## 2021-09-16 RX ORDER — MIRTAZAPINE 15 MG/1
15 TABLET, FILM COATED ORAL NIGHTLY
Qty: 30 TABLET | Refills: 3 | Status: SHIPPED | OUTPATIENT
Start: 2021-09-16 | End: 2022-04-27

## 2021-09-16 ASSESSMENT — ENCOUNTER SYMPTOMS
SORE THROAT: 0
BACK PAIN: 1
SHORTNESS OF BREATH: 0
CHEST TIGHTNESS: 0
COUGH: 0
ABDOMINAL PAIN: 0
VOMITING: 0
CONSTIPATION: 0
DIARRHEA: 0
ABDOMINAL DISTENTION: 0
RHINORRHEA: 0
NAUSEA: 0

## 2021-09-16 NOTE — TELEPHONE ENCOUNTER
Patient would like to know if he should be taking colace (docusate SODIUM) to help increase sodium level. ? Explained to patient that Colace was to help with bowel movement. Patient acknowledge that was why he has it, but dose not need a lacerative. He only wants it take it for sodium. What should patient take for Sodium, as he recalls today appointment he was notified that sodium level?

## 2021-09-16 NOTE — PROGRESS NOTES
Jacqueline Post MD    [address] [place] [address] [facility]ILY [facility]INE  [id number] [address]  [address] [address]  Dept: [phone]  Dept Fax: [phone]     Patient ID: Tyrese Montiel is a 80 y.o. male. HPI    Established patient here today for f/u on chronic medical problems, go over labs and/or diagnostic studies, and medication refills. Pt denies any fever or chills. Pt today denies any HA, chest pain, or SOB. Pt denies any N/V/D/C or abdominal pain. Pt did have a prostatectomy since his last visit and the path report did come back adenocarcinoma. He has been feeling depressed and he thought it was from lack of sleep with up all night urinating, but he states he has been feeling down and having depressed thoughts. He is taking the Buspar and it is helping his anxiety. His appetite is decreased, but he had a bad tooth and has not been able to eat. Pt with occasional heartburn, but stable on meds. Pt with arthralgias/back pain on and off, but stable on meds. Pt denies any gouty attacks. Otherwise pt doing well on current tx and no other concerns today. The patient's past medical, surgical, social, and family history as well as his current medications and allergies were reviewed as documented in today's encounter. My previous office notes, consult notes, labs and diagnostic studies were reviewed prior to and during encounter. Current Outpatient Medications on File Prior to Visit   Medication Sig Dispense Refill    omeprazole (PRILOSEC OTC) 20 MG tablet Take 20 mg by mouth daily      warfarin (COUMADIN) 5 MG tablet Take 0.5 tablets by mouth once a week Indications:  Wednesdays; INR goal 2-2.5 Managed by [facility], Dr. Steven Caceres, will stop 5 days before surgery on 8/18/21, and restart 10 days following surgery on 8/31/21 30 tablet 3    docusate sodium (COLACE) 100 MG capsule Take 1 capsule by mouth 2 times daily 60 capsule 0    predniSONE (DELTASONE) 5 MG tablet TAKE ONE TABLET BY MOUTH DAILY 30 tablet 4    allopurinol (ZYLOPRIM) 100 MG tablet TAKE ONE TABLET BY MOUTH DAILY 90 tablet 2    lisinopril (PRINIVIL;ZESTRIL) 10 MG tablet Take 1 tablet by mouth Daily with lunch 90 tablet 3    busPIRone (BUSPAR) 10 MG tablet Take 1 tablet by mouth 3 times daily 90 tablet 11    tamsulosin (FLOMAX) 0.4 MG capsule Take 1 capsule by mouth 2 times daily 180 capsule 3    latanoprost (XALATAN) 0.005 % ophthalmic solution Place 1 drop into both eyes nightly       melatonin 5 MG TABS tablet Take 1 tablet by mouth nightly       metoprolol tartrate (LOPRESSOR) 25 MG tablet Take 25 mg by mouth 2 times daily       No current facility-administered medications on file prior to visit. Subjective:     Review of Systems   Constitutional: Negative for appetite change, fatigue and fever. HENT: Negative for congestion, ear pain, rhinorrhea and sore throat. Respiratory: Negative for cough, chest tightness and shortness of breath. Cardiovascular: Negative for chest pain and palpitations. Gastrointestinal: Negative for abdominal distention, abdominal pain, constipation, diarrhea, nausea and vomiting. Occ heartburn   Genitourinary: Positive for frequency. Negative for difficulty urinating and dysuria. Musculoskeletal: Positive for arthralgias and back pain. Negative for myalgias. Skin: Negative for rash. Neurological: Negative for dizziness, weakness, light-headedness and headaches. Hematological: Negative for adenopathy. Psychiatric/Behavioral: Positive for dysphoric mood and sleep disturbance. Negative for behavioral problems. The patient is nervous/anxious. Objective:     Physical Exam  Vitals reviewed. Constitutional:       General: He is not in acute distress. Appearance: He is well-developed. HENT:      Head: Normocephalic and atraumatic.       Right Ear: External ear normal.      Left Ear: External ear normal.      Nose: Nose normal.      Mouth/Throat:      Pharynx: No oropharyngeal exudate. Eyes:      Conjunctiva/sclera: Conjunctivae normal.      Pupils: Pupils are equal, round, and reactive to light. Cardiovascular:      Rate and Rhythm: Normal rate. Rhythm irregularly irregular. Heart sounds: Normal heart sounds. No murmur heard. Pulmonary:      Effort: Pulmonary effort is normal. No respiratory distress. Breath sounds: Normal breath sounds. No wheezing. Chest:      Chest wall: No tenderness. Abdominal:      General: Bowel sounds are normal. There is no distension. Palpations: Abdomen is soft. There is no mass. Tenderness: There is no abdominal tenderness. Musculoskeletal:         General: No tenderness. Normal range of motion. Cervical back: Normal range of motion. Lymphadenopathy:      Cervical: No cervical adenopathy. Skin:     Findings: No rash. Neurological:      Mental Status: He is alert and oriented to person, place, and time. Deep Tendon Reflexes: Reflexes are normal and symmetric. Psychiatric:         Behavior: Behavior normal.         Assessment:      Diagnosis Orders   1. Essential hypertension  CBC    Basic Metabolic Panel   2. Pure hypercholesterolemia     3. Paroxysmal atrial fibrillation (HCC)     4. Chronic systolic congestive heart failure ([address]     5. Iron deficiency anemia, unspecified iron deficiency anemia type  CBC   6. B12 deficiency     7. Gout, unspecified cause, unspecified chronicity, unspecified site     8. Elevated liver enzymes     9. Gastroesophageal reflux disease without esophagitis     10. Osteoarthritis, unspecified osteoarthritis type, unspecified site     11. Irritable bowel syndrome without diarrhea     12. Anxiety  mirtazapine (REMERON) 15 MG tablet    [facility]   13. Hyponatremia  Basic Metabolic Panel   14. Insomnia, unspecified type  mirtazapine (REMERON) 15 MG tablet    [facility]   15.  DDD (degenerative disc disease), cervical     16. DDD (degenerative disc disease), lumbar     17. Prostate cancer ([facility] [address]     18. Need for influenza vaccination  INFLUENZA, QUADV, ADJUVANTED, 65 YRS =, IM, PF, PREFILL SYR, 0.5ML (FLUAD)   19. Depression, unspecified depression type  [facility]   20. Decreased appetite  mirtazapine (REMERON) 15 MG tablet   21. Impacted cerumen of right ear           Plan:     Orders Placed This Encounter   Procedures    INFLUENZA, QUADV, ADJUVANTED, 72 YRS =, IM, PF, PREFILL SYR, 0.5ML (FLUAD)    CBC     Standing Status:   Future     Standing Expiration Date:   9/16/2022    Basic Metabolic Panel     Standing Status:   Future     Standing Expiration Date:   9/16/2022   [facility]     Referral Priority:   Routine     Referral Type:   Eval and Treat     Referral Reason:   Specialty Services Required     Requested Specialty:   Behavioral Health     Number of Visits Requested:   1      Orders Placed This Encounter   Medications    mirtazapine (REMERON) 15 MG tablet     Sig: Take 1 tablet by mouth nightly     Dispense:  30 tablet     Refill:  3     Will cont with the Lisinopril and Lopressor as prescribed for BP control    Will cont with low fat/chol diet     Will cont with the Buspar as prescribed and I am going to start the Remeron 15mg to take at night for his depression, but may also help with his appetite    Will get him in to see Liset Paniagua for counseling    Will cont with the Allopurinol as prescribed for gout prophylaxis    Cont to follow with Coumadin clinic for Coumadin dosing    Will repeat a BMP secondary to the hyponatremia    Will cont to follow with Dr. Milton White as instructed for his prostate cancer surveillance    Will cont to follow with Dr. Sanju Bethea as instructed    Will do a right ear wash at his nest visit    Rest of systems unchanged, continue current treatments. Medications, labs, diagnostic studies, consultations and follow-up as documented in this encounter.

## 2021-09-16 NOTE — TELEPHONE ENCOUNTER
Colace is not for his sodium. Have him drink a bottle of Gatorade a day to help increase his sodium level.

## 2021-09-20 ENCOUNTER — HOSPITAL ENCOUNTER (OUTPATIENT)
Age: 86
Setting detail: SPECIMEN
Discharge: HOME | End: 2021-09-20
Payer: MEDICARE

## 2021-09-20 LAB
INR BLD: 3
PROTHROMBIN TIME: 30.9 SEC (ref 11.8–14.6)

## 2021-09-20 PROCEDURE — 85610 PROTHROMBIN TIME: CPT

## 2021-09-21 ENCOUNTER — TELEPHONE (OUTPATIENT)
Dept: FAMILY MEDICINE CLINIC | Age: 86
End: 2021-09-21

## 2021-09-21 NOTE — TELEPHONE ENCOUNTER
----- Message from Rachana Mckeon sent at 9/21/2021 11:46 AM EDT -----  Subject: Message to Provider    QUESTIONS  Information for Provider? Pt has a drippy nose and has a nasal spray   called ipratropium and wants to know if it's okay for him to use this. Please advise   ---------------------------------------------------------------------------  --------------  CALL BACK INFO  What is the best way for the office to contact you? OK to leave message on   voicemail  Preferred Call Back Phone Number? [phone]  ---------------------------------------------------------------------------  --------------  SCRIPT ANSWERS  Relationship to Patient?  Self

## 2021-09-22 ENCOUNTER — TELEPHONE (OUTPATIENT)
Dept: PSYCHOLOGY | Age: 86
End: 2021-09-22

## 2021-09-22 ENCOUNTER — CARE COORDINATION (OUTPATIENT)
Dept: CARE COORDINATION | Age: 86
End: 2021-09-22

## 2021-09-22 NOTE — TELEPHONE ENCOUNTER
[employer] [facility] outreach to complete warm-hand off, LVM for patient to call office back if they have any questions regarding upcoming visit scheduled for 10/4.

## 2021-09-22 NOTE — CARE COORDINATION
Ambulatory Care Coordination Note  9/22/2021  CM Risk Score: 6  Charlson 10 Year Mortality Risk Score: 100%     ACC: NAYAN SAINZ, RN    Summary Note:  Spoke with daughter Matt Fournier, very greatful for the call. We reviewed many things. Pt is taking Flomax again, got confused post discharged and d/c'd it himself but has resumed. PT and OT through [facility] has discharged him, doing very well at home. Has resumed all pre hospital activities without assistance. Daughter continues to spend most time with him but is able to leave and do her own thing, they are both very happy about this. Taking Remeron at night , has been sleeping better and improved appetite. Re aReason for CC : Referral from CTN Patient admitted to [facility] for a procedure Robotic simple prostatectomy from 8/18-8/21. He discharged to [facility] for rehab from 8/21-9/3/21CC:      Episode Started: 9/9/2021    Care Coordination Plan of Care: Assisess and identify further SDOH    Reason for Call:Patient admitted to [facility] for a procedure Robotic simple prostatectomy from 8/18-8/21. He discharged to [facility] for rehab from 8/21-9/3/21      Plan for Next Outreach: How is sleep; appetite  Review medications / appts  Behavioral health appt for depression / Urology f/u        Assessments:  General Assessment    Do you have any symptoms that are causing concern?: No     Zone / Symptoms Management reviewed : No    Medications Reviewed : Yes  Patient verbalizes HAS/DOES NOT HAVE: has have all medications.       Goals reviewed :Yes       Education Reviewed : Yes       See Education Module:     Primary and specialty provider appts:   Future Appointments   Date Time Provider Chanelle Phillips   10/4/2021 10:00 AM [address], [id number] [address] [facility]   10/7/2021  1:30 PM Ruddy Deeds, [address]   10/18/2021 10:00 AM Brendon Hagen MD [facility]   10/28/2021  2:15 PM Tim Zepeda REECE [place] [facility]   12/8/2021 10:45 AM William PimentelREECE [address]    2/15/2022  1:40 PM Isadora Escalera MD [address] [facility]        Notes reviewed:    SDOH Reviewed:   · Reliable Transportation: Yes  · Financial Strain: No  · Physically Active: Yes   · Lifestyle Choices (smoking, etoh, substance abuse)none     ED visits or Hospitalizations: Yes     ED:     Admission:Patient admitted to [facility] for a procedure Robotic simple prostatectomy from 8/18-8/21. He discharged to [facility] for rehab from 8/21-9/3/21    There are no preventive care reminders to display for this patient. Care Coordination Interventions    Program Enrollment: Complex Care  Referral from Primary Care Provider: No  Suggested Interventions and Community Resources  [facility] (Comment: First appt 10/4/2021)  Home Health Services: Completed (Comment: [address])  Physical Therapy: Completed (Comment: [address])  Registered Dietician: In Process (Comment: consult for meal replacment shakes)         Goals Addressed                 This Visit's Progress     Conditions and Symptoms        I will schedule office visits, as directed by my provider. I will keep my appointment or reschedule if I have to cancel. I will notify my provider of any barriers to my plan of care. I will follow my Zone Management tool to seek urgent or emergent care. I will notify my provider of any symptoms that indicate a worsening of my condition.     Barriers: fear of failure and stress  Plan for overcoming my barriers: communication with daughter and providers  Confidence: 8/10  Anticipated Goal Completion Date:  12/221/2201                      Ambulatory Care Coordination Assessment    Care Coordination Protocol  Program Enrollment: Complex Care  Referral from Primary Care Provider: No  Week 1 - Initial Assessment     Do you have all of your prescriptions and are they filled?: Yes  Barriers to medication adherence: None  Are you able to afford your medications?: Yes  How often do you have trouble taking your medications the way you have been told to take them?: I always take them as prescribed. Do you have Home O2 Therapy?: No      Ability to seek help/take action for Emergent Urgent situations i.e. fire, crime, inclement weather or health crisis. : Independent  Ability to ambulate to restroom: Independent  Ability handle personal hygeine needs (bathing/dressing/grooming): Independent  Ability to manage Medications: Independent  Ability to prepare Food Preparation: Independent  Ability to maintain home (clean home, laundry): Independent  Ability to drive and/or has transportation: Needs Assistance  Ability to do shopping: Needs Assistance  Ability to manage finances: Independent  Is patient able to live independently?: Yes     Current Housing: Private Residence        Per the Fall Risk Screening, did the patient have 2 or more falls or 1 fall with injury in the past year?: No     Frequent urination at night?: Yes  Do you use rails/bars?: Yes  Do you have a non-slip tub mat?: Yes        Suggested Interventions and [employer] [facility]: In Process (Comment: First appt 10/4/2021)     [facility]: Com[facility] [address] [facility]   Physical Therapy: Completed   Registered Dietician: In Process         Set up/Review an Education Plan, Set up/Review Goals              Prior to Admission medications    Medication Sig Start Date End Date Taking? Authorizing Provider   omeprazole (PRILOSEC OTC) 20 MG tablet Take 20 mg by mouth daily   Yes Historical Provider, MD   mirtazapine (REMERON) 15 MG tablet Take 1 tablet by mouth nightly 9/16/21  Yes Liberty Varela MD   warfarin (COUMADIN) 5 MG tablet Take 0.5 tablets by mouth once a week Indications:  Wednesdays; INR goal 2-2.5 [facility] Dr. José Luis Taylor, will stop 5 days before surgery on 8/18/21, and restart 10 days following surgery on 8/31/21 8/21/21  Yes Jony Roman MD   predniSONE (DELTASONE) 5 MG tablet TAKE ONE TABLET BY MOUTH DAILY 5/7/21  Yes Brianna Alvarez MD   allopurinol (ZYLOPRIM) 100 MG tablet TAKE ONE TABLET BY MOUTH DAILY 4/9/21  Yes Brianna Alvarez MD   lisinopril (PRINIVIL;ZESTRIL) 10 MG tablet Take 1 tablet by mouth Daily with lunch 3/16/21  Yes Brianna Alvarez MD   busPIRone (BUSPAR) 10 MG tablet Take 1 tablet by mouth 3 times daily 2/5/21  Yes Brianna Alvarez MD   tamsulosin [facility]) 0.4 MG capsule Take 1 capsule by mouth 2 times daily 9/15/20  Yes NASRA Elder - CNP   latanoprost (XALATAN) 0.005 % ophthalmic solution Place 1 drop into both eyes nightly    Yes Historical Provider, MD   melatonin 5 MG TABS tablet Take 1 tablet by mouth nightly    Yes Historical Provider, MD   metoprolol tartrate (LOPRESSOR) 25 MG tablet Take 25 mg by mouth 2 times daily   Yes Historical Provider, MD       Future Appointments   Date Time Provider Chanelle Jacqueline   10/4/2021 10:00 AM [address], [address]   10/7/2021  1:30 PM Juan Arias APRN - [address]   10/18/2021 10:00 AM Brianna Alvarez MD [facility] FM [facility]   10/28/2021  2:15 PM Skylar Torres DPM [place] [facility]   12/8/2021 10:45 AM Skylar Torres DPM [place] [facility]   2/15/2022  1:40 PM Kirstie Lopez MD [address] [place] [address]

## 2021-10-01 ENCOUNTER — APPOINTMENT (OUTPATIENT)
Dept: CT IMAGING | Age: 86
End: 2021-10-01
Payer: MEDICARE

## 2021-10-01 ENCOUNTER — HOSPITAL ENCOUNTER (EMERGENCY)
Age: 86
Discharge: HOME | End: 2021-10-01
Attending: STUDENT IN AN ORGANIZED HEALTH CARE EDUCATION/TRAINING PROGRAM
Payer: MEDICARE

## 2021-10-01 VITALS
DIASTOLIC BLOOD PRESSURE: 105 MMHG | SYSTOLIC BLOOD PRESSURE: 149 MMHG | HEART RATE: 122 BPM | BODY MASS INDEX: 26.46 KG/M2 | TEMPERATURE: 97.8 F | RESPIRATION RATE: 18 BRPM | HEIGHT: 64 IN | OXYGEN SATURATION: 99 % | WEIGHT: 155 LBS

## 2021-10-01 DIAGNOSIS — S00.81XA ABRASION OF FACE, INITIAL ENCOUNTER: ICD-10-CM

## 2021-10-01 DIAGNOSIS — S09.90XA CLOSED HEAD INJURY, INITIAL ENCOUNTER: Primary | ICD-10-CM

## 2021-10-01 LAB
ABSOLUTE BANDS #: 0.07 K/UL (ref 0–1)
ABSOLUTE EOS #: 0 K/UL (ref 0–0.4)
ABSOLUTE IMMATURE GRANULOCYTE: ABNORMAL K/UL (ref 0–0.3)
ABSOLUTE LYMPH #: 0.07 K/UL (ref 1–4.8)
ABSOLUTE MONO #: 1.26 K/UL (ref 0.1–1.3)
ANION GAP SERPL CALCULATED.3IONS-SCNC: 9 MMOL/L (ref 9–17)
BANDS: 1 % (ref 0–10)
BASOPHILS # BLD: 0 % (ref 0–2)
BASOPHILS ABSOLUTE: 0 K/UL (ref 0–0.2)
BUN BLDV-MCNC: 14 MG/DL (ref 8–23)
BUN/CREAT BLD: NORMAL (ref 9–20)
CALCIUM SERPL-MCNC: 9.4 MG/DL (ref 8.6–10.4)
CHLORIDE BLD-SCNC: 103 MMOL/L (ref 98–107)
CO2: 27 MMOL/L (ref 20–31)
CREAT SERPL-MCNC: 0.89 MG/DL (ref 0.7–1.2)
DIFFERENTIAL TYPE: ABNORMAL
EOSINOPHILS RELATIVE PERCENT: 0 % (ref 0–4)
GFR AFRICAN AMERICAN: >60 ML/MIN
GFR NON-AFRICAN AMERICAN: >60 ML/MIN
GFR SERPL CREATININE-BSD FRML MDRD: NORMAL ML/MIN/{1.73_M2}
GFR SERPL CREATININE-BSD FRML MDRD: NORMAL ML/MIN/{1.73_M2}
GLUCOSE BLD-MCNC: 98 MG/DL (ref 70–99)
HCT VFR BLD CALC: 41.4 % (ref 41–53)
HEMOGLOBIN: 13.4 G/DL (ref 13.5–17.5)
IMMATURE GRANULOCYTES: ABNORMAL %
INR BLD: 2
LYMPHOCYTES # BLD: 1 % (ref 24–44)
MCH RBC QN AUTO: 30 PG (ref 26–34)
MCHC RBC AUTO-ENTMCNC: 32.4 G/DL (ref 31–37)
MCV RBC AUTO: 92.6 FL (ref 80–100)
MONOCYTES # BLD: 17 % (ref 1–7)
MORPHOLOGY: ABNORMAL
NRBC AUTOMATED: ABNORMAL PER 100 WBC
PARTIAL THROMBOPLASTIN TIME: 49.1 SEC (ref 24–36)
PDW BLD-RTO: 16.1 % (ref 11.5–14.9)
PLATELET # BLD: 184 K/UL (ref 150–450)
PLATELET ESTIMATE: ABNORMAL
PMV BLD AUTO: 8.2 FL (ref 6–12)
POTASSIUM SERPL-SCNC: 4.8 MMOL/L (ref 3.7–5.3)
PROTHROMBIN TIME: 22.3 SEC (ref 11.8–14.6)
RBC # BLD: 4.48 M/UL (ref 4.5–5.9)
RBC # BLD: ABNORMAL 10*6/UL
SEG NEUTROPHILS: 81 % (ref 36–66)
SEGMENTED NEUTROPHILS ABSOLUTE COUNT: 6 K/UL (ref 1.3–9.1)
SODIUM BLD-SCNC: 139 MMOL/L (ref 135–144)
WBC # BLD: 7.4 K/UL (ref 3.5–11)
WBC # BLD: ABNORMAL 10*3/UL

## 2021-10-01 PROCEDURE — 6360000002 HC RX W HCPCS: Performed by: STUDENT IN AN ORGANIZED HEALTH CARE EDUCATION/TRAINING PROGRAM

## 2021-10-01 PROCEDURE — 85025 COMPLETE CBC W/AUTO DIFF WBC: CPT

## 2021-10-01 PROCEDURE — 85730 THROMBOPLASTIN TIME PARTIAL: CPT

## 2021-10-01 PROCEDURE — 99284 EMERGENCY DEPT VISIT MOD MDM: CPT

## 2021-10-01 PROCEDURE — 90715 TDAP VACCINE 7 YRS/> IM: CPT | Performed by: STUDENT IN AN ORGANIZED HEALTH CARE EDUCATION/TRAINING PROGRAM

## 2021-10-01 PROCEDURE — 36415 COLL VENOUS BLD VENIPUNCTURE: CPT

## 2021-10-01 PROCEDURE — 70486 CT MAXILLOFACIAL W/O DYE: CPT

## 2021-10-01 PROCEDURE — 70450 CT HEAD/BRAIN W/O DYE: CPT

## 2021-10-01 PROCEDURE — 85610 PROTHROMBIN TIME: CPT

## 2021-10-01 PROCEDURE — 80048 BASIC METABOLIC PNL TOTAL CA: CPT

## 2021-10-01 PROCEDURE — 90471 IMMUNIZATION ADMIN: CPT | Performed by: STUDENT IN AN ORGANIZED HEALTH CARE EDUCATION/TRAINING PROGRAM

## 2021-10-01 PROCEDURE — 72125 CT NECK SPINE W/O DYE: CPT

## 2021-10-01 RX ADMIN — TETANUS TOXOID, REDUCED DIPHTHERIA TOXOID AND ACELLULAR PERTUSSIS VACCINE, ADSORBED 0.5 ML: 5; 2.5; 8; 8; 2.5 SUSPENSION INTRAMUSCULAR at 11:43

## 2021-10-01 ASSESSMENT — ENCOUNTER SYMPTOMS
VOMITING: 0
COLOR CHANGE: 0
BACK PAIN: 0
NAUSEA: 0
EYE REDNESS: 0
RHINORRHEA: 0
COUGH: 0
ABDOMINAL PAIN: 0
SORE THROAT: 0
EYE PAIN: 0
FACIAL SWELLING: 0
SHORTNESS OF BREATH: 0
DIARRHEA: 0

## 2021-10-01 NOTE — ED PROVIDER NOTES
EMERGENCY DEPARTMENT ENCOUNTER    Pt Name: Kamla Saez  MRN: [id number]  [place] 12/2/1931  Date of evaluation: 10/1/21  CHIEF COMPLAINT       Chief Complaint   Patient presents with    Fall    Laceration     forehead     HISTORY OF PRESENT ILLNESS   HPI  80-year-old male history of A. rosa isela on Coumadin, left sided retinal vein occlusion following with ophthalmology presents for evaluation after a fall. Patient was walking to an eye doctor appointment when he tripped over a curb and fell forward. He struck his head and face on the pavement. He did not lose consciousness. His daughter was there and called 911 and her in the firemen helped him get up and walk to the car. He then came in for evaluation. This happened immediately prior to arrival.  He is having pain on the right forehead and nose. No significant headache. No nausea or vomiting. No other neurologic complaints. Small skin tear over the left elbow but no other injuries. No other recent illness. Symptoms are moderate and constant. REVIEW OF SYSTEMS     Review of Systems   Constitutional: Negative for chills and fatigue. HENT: Negative for facial swelling, nosebleeds, rhinorrhea and sore throat. Eyes: Negative for pain and redness. Respiratory: Negative for cough and shortness of breath. Cardiovascular: Negative for chest pain and leg swelling. Gastrointestinal: Negative for abdominal pain, diarrhea, nausea and vomiting. Genitourinary: Negative for flank pain and hematuria. Musculoskeletal: Negative for arthralgias and back pain. Skin: Positive for wound. Negative for color change and rash. Neurological: Negative for dizziness, tremors, facial asymmetry, speech difficulty, weakness and numbness.      PASTMEDICAL HISTORY     Past Medical History:   Diagnosis Date    Acute MI ([facility]     Allergic rhinitis 09/02/2011    Anemia     Atrial fibrillation (HCC)     Dr. Susan Menezes, [facility] cell cancer 03/2013 left side of head, face chin    Basal cell cancer 03/10/2014    left cheek    Cervical radiculopathy     Diverticulitis 02/17/2013    GERD (gastroesophageal reflux disease)     Glaucoma     left eye    Hyperlipidemia     Hypertension     Dr. Barry Tim    Hyponatremia 09/02/2011    IBS (irritable bowel syndrome) 09/02/2011    Insomnia 09/02/2011    Osteoarthritis     Osteoarthritis/neck pain.  09/02/2011    Prostate cancer ([address] 08/18/2021    active surviellence monitoring PSA    Renal calculi     Shingles     history of shingles    Status post prostatectomy 08/18/2021    Simple prostatectomy for BPH    Urinary frequency     Wears glasses     Wears hearing aid in both ears      Past Problem List  Patient Active Problem List   Diagnosis Code    Insomnia G47.00    Allergic rhinitis J30.9    Malignant basal cell neoplasm of skin C44.91    Benign prostatic hyperplasia with lower urinary tract symptoms N40.1    Branch retinal vein occlusion, left eye F74.8970    Cervical radiculopathy M54.12    Essential hypertension I10    Gastroesophageal reflux disease without esophagitis K21.9    Iron deficiency anemia D50.9    Arthritis, degenerative M19.90    Diverticulosis of large intestine without hemorrhage K57.30    Elevated PSA R97.20    B12 deficiency Z05.2    Diastolic dysfunction V90.99    Elevated liver enzymes R74.8    Pure hypercholesterolemia E78.00    Irritable bowel syndrome without diarrhea K58.9    Paroxysmal atrial fibrillation (HCC) I48.0    Low testosterone R79.89    Bradycardia R00.1    Chronic bilateral low back pain without sciatica M54.50, G89.29    Gout M10.9    Anxiety F41.9    Chronic systolic congestive heart failure (HCC) I50.22    DDD (degenerative disc disease), cervical M50.30    DDD (degenerative disc disease), lumbar M51.36    Closed compression fracture of second lumbar vertebra (HCC) S32.020A    Acquired spondylolisthesis M43.10    Spinal stenosis of lumbar region with neurogenic claudication M48.062    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 A    Bladder outlet obstruction N32.0    Status post prostatectomy Z90.79    Prostate cancer [facility]) C61     SURGICAL HISTORY       Past Surgical History:   Procedure Laterality Date    CHOLECYSTECTOMY  9-9-15    laparoscopic with cholangiogram    COLONOSCOPY  2002    COLONOSCOPY  08/2013    HERNIA REPAIR Right     inguinal    LITHOTRIPSY      MOHS SURGERY Left 03/10/14    cheek    MOHS SURGERY  12/30/14    post scalp    MOHS SURGERY Left 08/2017    X2 left cheek    MOHS SURGERY Left 10/21/2019    temple    MOHS SURGERY  12/14/2020    top of head    OTHER SURGICAL HISTORY  9/6/15    attempted ERCP    PROSTATECTOMY  08/18/2021    PROSTATECTOMY N/A 8/18/2021    XI ROBOTIC LAPAROSCOPIC SIMPLE PROSTATECTOMY performed by Mary Grace Euceda MD [address]  03/11/13    3 areas    SKIN BIOPSY Left 02/11/14    cheek /basal cell carinoma    SKIN CANCER EXCISION      left face and top of head    SKIN CANCER EXCISION Left 03/2013    forehead, cheek, chin, basal cell.  SKIN CANCER EXCISION Right 08/12/2016    with skin graft    SPINE SURGERY  04/2003     CURRENT MEDICATIONS       Discharge Medication List as of 10/1/2021  1:05 PM      CONTINUE these medications which have NOT CHANGED    Details   omeprazole (PRILOSEC OTC) 20 MG tablet Take 20 mg by mouth dailyHistorical Med      mirtazapine (REMERON) 15 MG tablet Take 1 tablet by mouth nightly, Disp-30 tablet, R-3Normal      warfarin (COUMADIN) 5 MG tablet Take 0.5 tablets by mouth once a week Indications:  Wednesdays; INR goal 2-2.5 [facility] Dr. Tonya Joy, will stop 5 days before surgery on 8/18/21, and restart 10 days following surgery on 8/31/21, Disp-30 tablet, R-3NO PRIN T      predniSONE (DELTASONE) 5 MG tablet TAKE ONE TABLET BY MOUTH DAILY, Disp-30 tablet, R-4Normal allopurinol (ZYLOPRIM) 100 MG tablet TAKE ONE TABLET BY MOUTH DAILY, Disp-90 tablet, R-2Normal      lisinopril (PRINIVIL;ZESTRIL) 10 MG tablet Take 1 tablet by mouth Daily with lunch, Disp-90 tablet, R-3Normal      busPIRone (BUSPAR) 10 MG tablet Take 1 tablet by mouth 3 times daily, Disp-90 tablet, R-11Normal      tamsulosin (FLOMAX) 0.4 MG capsule Take 1 capsule by mouth 2 times daily, Disp-180 capsule,R-3Normal      latanoprost (XALATAN) 0.005 % ophthalmic solution Place 1 drop into both eyes nightly Historical Med      melatonin 5 MG TABS tablet Take 1 tablet by mouth nightly Historical Med      metoprolol tartrate (LOPRESSOR) 25 MG tablet Take 25 mg by mouth 2 times dailyHistorical Med           ALLERGIES     is allergic to celebrex [celecoxib] and mobic. FAMILY HISTORY     He indicated that his mother is . He indicated that his father is . He indicated that the status of his brother is unknown. SOCIAL HISTORY       Social History     Tobacco Use    Smoking status: Former Smoker     Packs/day: 1.00     Years: 10.00     Pack years: 10.00     Quit date: 1961     Years since quittin.7    Smokeless tobacco: Never Used    Tobacco comment: quit    Vaping Use    Vaping Use: Never used   Substance Use Topics    Alcohol use: Not Currently     Alcohol/week: 0.0 standard drinks    Drug use: No     PHYSICAL EXAM     INITIAL VITALS: BP (!) 149/105   Pulse 122   Temp 97.8 °F (36.6 °C) (Oral)   Resp 18   Ht 5' 4.38\" (1.635 m)   Wt 155 lb (70.3 kg)   SpO2 99%   BMI 26.29 kg/m²    Physical Exam  Constitutional:       Appearance: Normal appearance. HENT:      Head: Normocephalic and atraumatic. Nose: Nose normal.      Comments: No nasal septal hematoma no signs of intra-oral trauma   Eyes:      Extraocular Movements: Extraocular movements intact. Pupils: Pupils are equal, round, and reactive to light.       Comments: Right eye subconjunctival hemorrhage no proptosis no hyphema pupils reactive full EOM   Cardiovascular:      Rate and Rhythm: Normal rate and regular rhythm. Pulmonary:      Effort: Pulmonary effort is normal. No respiratory distress. Breath sounds: Normal breath sounds. Abdominal:      General: Abdomen is flat. There is no distension. Palpations: Abdomen is soft. There is no mass. Musculoskeletal:         General: No swelling. Normal range of motion. Cervical back: Normal range of motion. No rigidity. Skin:     General: Skin is warm and dry. Comments: Abrasion and hematoma over left forehead, abrasion over nose no active bleeding   Neurological:      General: No focal deficit present. Mental Status: He is alert. Mental status is at baseline. Cranial Nerves: No cranial nerve deficit. MEDICAL DECISION MAKINyear old male presents for evaluation after a fall. Will image head, face, neck, update tetanus, check basic labs, INR. INR 2.0. Imaging negative. Discussed with patient low but nonzero risk of delayed intracranial bleeding with anticoagulation. Family member in the room. Will discharge home for home observation. #12 - Emergency Medicine: Utilization of CT for Minor Blunt Head Trauma (Adult)   [ x] Patient is 25 or older, presenting with minor blunt head trauma. Head CT (including cosigned orders) was ordered by an emergency care clinician for trauma because (select one or more): [SATISFIES MIPS PERFORMANCE]  Reasons:  [x ] Patient is 72 or older             CRITICAL CARE:       PROCEDURES:    Procedures    DIAGNOSTIC RESULTS   EKG:All EKG's are interpreted by the Emergency Department Physician who either signs or Co-signs this chart in the absence of a cardiologist.        RADIOLOGY:All plain film, CT, MRI, and formal ultrasound images (except ED bedside ultrasound) are read by the radiologist, see reports below, unless otherwisenoted in MDM or here.   CT Cervical Spine WO Contrast   Final Result   No acute intracranial abnormality. Small right frontal scalp hematoma. Chronic microvascular ischemic changes. No evidence of facial bone fracture. No acute fracture dislocation involving cervical spine. CT MAXILLOFACIAL WO CONTRAST   Final Result   No acute intracranial abnormality. Small right frontal scalp hematoma. Chronic microvascular ischemic changes. No evidence of facial bone fracture. No acute fracture dislocation involving cervical spine. CT Head WO Contrast   Final Result   No acute intracranial abnormality. Small right frontal scalp hematoma. Chronic microvascular ischemic changes. No evidence of facial bone fracture. No acute fracture dislocation involving cervical spine. LABS: All lab results were reviewed by myself, and all abnormals are listed below. Labs Reviewed   CBC WITH AUTO DIFFERENTIAL - Abnormal; Notable for the following components:       Result Value    RBC 4.48 (*)     Hemoglobin 13.4 (*)     RDW 16.1 (*)     Seg Neutrophils 81 (*)     Lymphocytes 1 (*)     Monocytes 17 (*)     Absolute Lymph # 0.07 (*)     All other components within normal limits   APTT - Abnormal; Notable for the following components:    PTT 49.1 (*)     All other components within normal limits   PROTIME-INR - Abnormal; Notable for the following components:    Protime 22.3 (*)     All other components within normal limits   BASIC METABOLIC PANEL W/ REFLEX TO MG FOR LOW K       EMERGENCY DEPARTMENTCOURSE:         Vitals:    Vitals:    10/01/21 1100   BP: (!) 149/105   Pulse: 122   Resp: 18   Temp: 97.8 °F (36.6 °C)   TempSrc: Oral   SpO2: 99%   Weight: 155 lb (70.3 kg)   Height: 5' 4.38\" (1.635 m)       The patient was given the following medications while in the emergency department:  Orders Placed This Encounter   Medications    Tetanus-Diphth-Acell Pertussis (BOOSTRIX) injection 0.5 mL     CONSULTS:  None    FINAL IMPRESSION      1.  Closed head injury, initial encounter    2.  Abrasion of face, initial encounter          DISPOSITION/PLAN   DISPOSITION Decision To Discharge 10/01/2021 01:04:45 PM      PATIENT REFERRED TO:  Barrington Gomez MD  [address] [place] [address] [phone]    Call   As needed    DISCHARGE MEDICATIONS:  Discharge Medication List as of 10/1/2021  1:05 PM        Robert Harris MD  Attending Emergency Physician                   Robert Harris MD  10/01/21 2349

## 2021-10-04 ENCOUNTER — CARE COORDINATION (OUTPATIENT)
Dept: CARE COORDINATION | Age: 86
End: 2021-10-04

## 2021-10-04 ENCOUNTER — OFFICE VISIT (OUTPATIENT)
Dept: PSYCHOLOGY | Age: 86
End: 2021-10-04
Payer: MEDICARE

## 2021-10-04 DIAGNOSIS — F41.9 ANXIETY: Primary | ICD-10-CM

## 2021-10-04 PROCEDURE — 1036F TOBACCO NON-USER: CPT | Performed by: SOCIAL WORKER

## 2021-10-04 PROCEDURE — 90791 PSYCH DIAGNOSTIC EVALUATION: CPT | Performed by: SOCIAL WORKER

## 2021-10-04 NOTE — CARE COORDINATION
Ambulatory Care Coordination Note  10/4/2021  CM Risk Score: 6  Charlson 10 Year Mortality Risk Score: 100%     ACC: NAYAN SAINZ, RN    Summary Note:  Abelardo Christensen we reviewed his recent ED visit, reports he tripped states didn't pick his feet up enough walking and just fell forward. He reports everything his ok and healing. He has f/u with . Dr. Urban Semen office soon. [facility] Referral from [facility] Patient admitted to [facility] for a procedure Robotic simple prostatectomy from 8/18-8/21. He discharged to [facility] for rehab from 8/21-9/3/21CC:      Episode Started: 9/9/2021    Care Coordination Plan of Care: Assisess and identify further SDOH    Reason for Call:Patient admitted to [facility] for a procedure Robotic simple prostatectomy from 8/18-8/21. He discharged to [facility] for rehab from 8/21-9/3/21      Plan for Next Outreach: How is sleep; appetite  Review medications / appts  Behavioral health appt for depression / Urology f/u        Assessments:  General Assessment       Zone / Symptoms Management reviewed : No    Medications Reviewed : Yes  Patient verbalizes HAS/DOES NOT HAVE: has have all medications. Goals reviewed :Yes       Education Reviewed : Yes       See Education Module:     Primary and specialty provider appts:   Future Appointments   Date Time Provider Chanelle Phillips   10/6/2021 11:00 AM Makenzie Ortiz, APRN - [address] [facility]   10/13/2021 11:00 [facility]   10/18/2021 10:00 AM Anoop Medina MD [facility]   10/28/2021  2:15 PM Everett Mack DPM [place] [facility]   12/8/2021 10:45 AM Everett Mack DPM [address]   2/15/2022  1:40 PM Isai Macias MD [address] [place] [address]        Notes reviewed:    SDOH Reviewed:   · Reliable Transportation: Yes  · Financial Strain: No  · Physically Active:  Yes   · Lifestyle Choices (smoking, etoh, substance abuse)none     ED visits or Hospitalizations: Yes     ED:     Admission:Patient admitted to [facility] for a procedure Robotic simple prostatectomy from 8/18-8/21. He discharged to [facility] for rehab from 8/21-9/3/21    There are no preventive care reminders to display for this patient. Care Coordination Interventions    Program Enrollment: Complex Care  Referral from Primary Care Provider: No  Suggested Interventions and Community Resources  Behavorial Health: In Process (Comment: First appt 10/4/2021)  Home Health Services: Completed (Comment: [address])  Physical Therapy: Completed (Comment: [address])  Registered Dietician: In Process (Comment: consult for meal replacment shakes)         Goals Addressed    None                 Ambulatory Care Coordination Assessment    Care Coordination Protocol  Program Enrollment: Complex Care  Referral from Primary Care Provider: No  Week 1 - Initial Assessment     Do you have all of your prescriptions and are they filled?: Yes  Barriers to medication adherence: None  Are you able to afford your medications?: Yes  How often do you have trouble taking your medications the way you have been told to take them?: I always take them as prescribed. Do you have Home O2 Therapy?: No      Ability to seek help/take action for Emergent Urgent situations i.e. fire, crime, inclement weather or health crisis. : Independent  Ability to ambulate to restroom: Independent  Ability handle personal hygeine needs (bathing/dressing/grooming): Independent  Ability to manage Medications: Independent  Ability to prepare Food Preparation: Independent  Ability to maintain home (clean home, laundry): Independent  Ability to drive and/or has transportation: Needs Assistance  Ability to do shopping: Needs Assistance  Ability to manage finances:  Independent  Is patient able to live independently?: Yes     Current Housing: Private Residence        Per the Fall Risk Screening, did the patient have 2 or more falls or 1 fall with injury in the past year?: No     Frequent urination at night?: Yes  Do you use rails/bars?: Yes  Do you have a non-slip tub mat?: Yes        Suggested Interventions and [employer] Health: In Process (Comment: First appt 10/4/2021)     Home Health Services: Completed (Comment: [address] [facility])   Physical Therapy: Completed   Registered Dietician: In Process                  Prior to Admission medications    Medication Sig Start Date End Date Taking? Authorizing Provider   omeprazole (PRILOSEC OTC) 20 MG tablet Take 20 mg by mouth daily    Historical Provider, MD   mirtazapine (REMERON) 15 MG tablet Take 1 tablet by mouth nightly 9/16/21   Pooja Lagos MD   warfarin (COUMADIN) 5 MG tablet Take 0.5 tablets by mouth once a week Indications:  Wednesdays; INR goal 2-2.5 Managed by [facility]ticoagulation [facility], Dr. Charley Knox, will stop 5 days before surgery on 8/18/21, and restart 10 days following surgery on 8/31/21 8/21/21   Bucky Laughlin MD   predniSONE (DELTASONE) 5 MG tablet TAKE ONE TABLET BY MOUTH DAILY 5/7/21   Pooja Lagos MD   allopurinol (ZYLOPRIM) 100 MG tablet TAKE ONE TABLET BY MOUTH DAILY 4/9/21   Pooja Lagos MD   lisinopril (PRINIVIL;ZESTRIL) 10 MG tablet Take 1 tablet by mouth Daily with lunch 3/16/21   Pooja Lagos MD   busPIRone (BUSPAR) 10 MG tablet Take 1 tablet by mouth 3 times daily 2/5/21   Pooja Lagos MD   tamsulosin [facility]) 0.4 MG capsule Take 1 capsule by mouth 2 times daily 9/15/20   NASRA Baker CNP   latanoprost (XALATAN) 0.005 % ophthalmic solution Place 1 drop into both eyes nightly     Historical Provider, MD   melatonin 5 MG TABS tablet Take 1 tablet by mouth nightly     Historical Provider, MD   metoprolol tartrate (LOPRESSOR) 25 MG tablet Take 25 mg by mouth 2 times daily    Historical Provider, MD       Future Appointments   Date Time Provider Chanelle Phillips   10/6/2021 11:00 AM Nam Kothari Puneet, APRN - [facility]   10/13/2021 [facility]   10/18/2021 10:00 [facility]   10/28/2021  2:15 PM [facility]   12/8/2021 10:45 AM Koffi Kurtz DPM [facility]   2/15/2022  1:40 PM Derick Sánchez MD [address] [place] [address]
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
## 2021-10-05 NOTE — PROGRESS NOTES
[address] [address]  Dept: [address] [facility] Urology Office Note - Established    Patient:  Herminio Bowman  YOB: 1931  Date: 10/6/2021    The patient is a 80 y.o. male who presents todayfor evaluation of the following problems:   Chief Complaint   Patient presents with    Urinary Frequency     6 weeks with UA, Culture and PVR, Urine Cx and UA not completed will send out today. HPI  Patient presenting for 6 week post-op f/u s/p simple prostatectomy 8/18/2021 for BPH. His final cath showed  [place] 3+3 =6 and a small area (<5%) of [place] 3+4=7: He has a repeat PSA in February. Cath was removed in office 8/26/2021. Patient reports moderate symptoms of BPH. His AUA Symptom Score is 14.  manifested as irritative symptoms including frequency, urgency, nocturia, and incontinence (wearing 2-3 briefs/day). He denies incomplete emptying, intermittency, straining and weak stream.  His PVR was 43mL. No hematuria or dysuria. Sometimes has constipation but has stool softeners to manage. No fevers/chills. Summary of old records: N/A    Additional History: N/A    Procedures Today: N/A    Urinalysis today:  No results found for this visit on 10/06/21. Last several PSA's:  No results found for: PSA  Last total testosterone:  Lab Results   Component Value Date    TESTOSTERONE 141.0 (L) 02/22/2016       AUA Symptom Score (10/6/2021):   INCOMPLETE EMPTYING: How often have you had the sensation of not emptying your bladder?: Less than 1 to 5 times  FREQUENCY: How often do you have to urinate less than every two hours?: About Half the time  INTERMITTENCY: How often have you found you stopped and started again several times when you urinated?: Less than 1 to 5 times  URGENCY: How often have you found it difficult to postpone urination?: Less than Half the time  WEAK STREAM: How often have you had a weak urinary stream?: Less than Half the time  STRAINING: How often have you had to strain to start  urination?: Not at all  NOCTURIA: How many times did you typically get up at night to uriniate?: 5 Times  TOTAL I-PSS SCORE[de-identified] 14  How would you feel if you were to spend the rest of your life with your urinary condition?: Unhappy    Last BUN and creatinine:  Lab Results   Component Value Date    BUN 14 10/01/2021     Lab Results   Component Value Date    CREATININE 0.89 10/01/2021       Additional Lab/Culture results: none    Imaging Reviewed during this Office Visit: none  (results were independently reviewed by physician and radiology report verified)    PAST MEDICAL, FAMILY AND SOCIAL HISTORY UPDATE:  Past Medical History:   Diagnosis Date    Acute MI ([facility]     Allergic rhinitis 09/02/2011    Anemia     Atrial fibrillation ([facility]     Dr. Dayan Gates, [facility]    Basal cell cancer 03/2013    left side of head, face chin    Basal cell cancer 03/10/2014    left cheek    Cervical radiculopathy     Diverticulitis 02/17/2013    GERD (gastroesophageal reflux disease)     Glaucoma     left eye    Hyperlipidemia     Hypertension     Dr. Erika Kerns    Hyponatremia 09/02/2011    IBS (irritable bowel syndrome) 09/02/2011    Insomnia 09/02/2011    Osteoarthritis     Osteoarthritis/neck pain.  09/02/2011    Prostate cancer ([facility] 08/18/2021    active surviellence monitoring PSA    Renal calculi     Shingles     history of shingles    Status post prostatectomy 08/18/2021    Simple prostatectomy for BPH    Urinary frequency     Wears glasses     Wears hearing aid in both ears      Past Surgical History:   Procedure Laterality Date    CHOLECYSTECTOMY  9-9-15    laparoscopic with cholangiogram    COLONOSCOPY  2002    COLONOSCOPY  08/2013    HERNIA REPAIR Right     inguinal    LITHOTRIPSY      MOHS SURGERY Left 03/10/14    cheek    MOHS SURGERY  12/30/14    post scalp    MOHS SURGERY Left 08/2017    X2 left cheek    MOHS SURGERY Left 10/21/2019    temple    MOHS SURGERY  12/14/2020    top of head    OTHER SURGICAL HISTORY  9/6/15    attempted ERCP    PROSTATECTOMY  08/18/2021    PROSTATECTOMY N/A 8/18/2021    XI ROBOTIC LAPAROSCOPIC SIMPLE PROSTATECTOMY performed by Jean Peguero MD at [address]  03/11/13    3 areas    SKIN BIOPSY Left 02/11/14    cheek /basal cell carinoma    SKIN CANCER EXCISION      left face and top of head    SKIN CANCER EXCISION Left 03/2013    forehead, cheek, chin, basal cell.  SKIN CANCER EXCISION Right 08/12/2016    with skin graft    SPINE SURGERY  04/2003     Family History   Problem Relation Age of Onset    Heart Disease Mother     Lung Cancer Father     Cancer Father         lung    Heart Disease Brother      Outpatient Medications Marked as Taking for the 10/6/21 encounter (Office Visit) with NASRA Moreno - CNP   Medication Sig Dispense Refill    omeprazole (PRILOSEC OTC) 20 MG tablet Take 20 mg by mouth daily      mirtazapine (REMERON) 15 MG tablet Take 1 tablet by mouth nightly 30 tablet 3    warfarin (COUMADIN) 5 MG tablet Take 0.5 tablets by mouth once a week Indications:  Wednesdays; INR goal 2-2.5 Managed by [facility], Dr. Vilma Corona, will stop 5 days before surgery on 8/18/21, and restart 10 days following surgery on 8/31/21 30 tablet 3    predniSONE (DELTASONE) 5 MG tablet TAKE ONE TABLET BY MOUTH DAILY 30 tablet 4    allopurinol (ZYLOPRIM) 100 MG tablet TAKE ONE TABLET BY MOUTH DAILY 90 tablet 2    lisinopril (PRINIVIL;ZESTRIL) 10 MG tablet Take 1 tablet by mouth Daily with lunch 90 tablet 3    busPIRone (BUSPAR) 10 MG tablet Take 1 tablet by mouth 3 times daily 90 tablet 11    latanoprost (XALATAN) 0.005 % ophthalmic solution Place 1 drop into both eyes nightly       melatonin 5 MG TABS tablet Take 1 tablet by mouth nightly       metoprolol tartrate (LOPRESSOR) 25 MG tablet Take 25 mg by mouth 2 times daily Celebrex [celecoxib] and Mobic  Social History     Tobacco Use   Smoking Status Former Smoker    Packs/day: 1.00    Years: 10.00    Pack years: 10.00    Quit date: 1961    Years since quittin.8   Smokeless Tobacco Never Used   Tobacco Comment    quit      (Ifpatient a smoker, smoking cessation counseling offered)    Social History     Substance and Sexual Activity   Alcohol Use Not Currently    Alcohol/week: 0.0 standard drinks       REVIEW OF SYSTEMS:  Review of Systems    Physical Exam:      Vitals:    10/06/21 1051   BP: 120/78   Temp: 96 °F (35.6 °C)     Body mass index is 26.61 kg/m². Patient is a 80 y.o. male in no acute distress and alert and oriented to person, place and time. Physical Exam  Constitutional: Patient in no acute distress. Neuro: Alert and oriented to person, place and time. Psych: Mood normal, affect normal  Skin: No rash noted. Bruising to left eye- states he fell walking into the eye doctor. Lungs: Respiratory effort is normal  Cardiovascular: Warm & Pink  Abdomen: Soft, non-tender, non-distended   Bladder non-tender and not distended. Musculoskeletal: unsteady      Assessment and Plan      1. Benign prostatic hyperplasia with incomplete bladder emptying    2. Status post prostatectomy    3. Prostate cancer ([facility]    4. Urinary frequency    5. Incomplete emptying of bladder           Plan:   1. He is seeing some improvements since the prostatectomy but is bothered by worsening urinary incontinence. 2. Decrease fluid intake 3 hrs prior to bed, recommended to stop alcohol intake prior to bed as well. Reviewed bladder irritants and list provided. Manage constipation- recommend miralax or colace. 3. Timed and double voiding, keep an eye on brief count. 4. Start Kegel exercise: squeeze and hold 3-4 seconds, relax and release 3-6 seconds, repeat 10times in a row, at least 6 times  per day. 5. Stop flomax.      6. Urine culture and PSA ordered,

## 2021-10-06 ENCOUNTER — OFFICE VISIT (OUTPATIENT)
Dept: UROLOGY | Age: 86
End: 2021-10-06
Payer: MEDICARE

## 2021-10-06 VITALS
DIASTOLIC BLOOD PRESSURE: 78 MMHG | TEMPERATURE: 96 F | BODY MASS INDEX: 26.46 KG/M2 | HEIGHT: 64 IN | WEIGHT: 155 LBS | SYSTOLIC BLOOD PRESSURE: 120 MMHG

## 2021-10-06 DIAGNOSIS — C61 PROSTATE CANCER (HCC): ICD-10-CM

## 2021-10-06 DIAGNOSIS — R33.9 INCOMPLETE EMPTYING OF BLADDER: ICD-10-CM

## 2021-10-06 DIAGNOSIS — R39.14 BENIGN PROSTATIC HYPERPLASIA WITH INCOMPLETE BLADDER EMPTYING: Primary | ICD-10-CM

## 2021-10-06 DIAGNOSIS — R35.0 URINARY FREQUENCY: ICD-10-CM

## 2021-10-06 DIAGNOSIS — N40.1 BENIGN PROSTATIC HYPERPLASIA WITH INCOMPLETE BLADDER EMPTYING: Primary | ICD-10-CM

## 2021-10-06 DIAGNOSIS — Z90.79 STATUS POST PROSTATECTOMY: ICD-10-CM

## 2021-10-06 PROCEDURE — 51798 US URINE CAPACITY MEASURE: CPT | Performed by: NURSE PRACTITIONER

## 2021-10-06 PROCEDURE — 99024 POSTOP FOLLOW-UP VISIT: CPT | Performed by: NURSE PRACTITIONER

## 2021-10-06 ASSESSMENT — ENCOUNTER SYMPTOMS
EYE PAIN: 1
CONSTIPATION: 0
SHORTNESS OF BREATH: 0
VOMITING: 0
NAUSEA: 0
EYE REDNESS: 0
COUGH: 0
WHEEZING: 0
BACK PAIN: 0
DIARRHEA: 0
ABDOMINAL PAIN: 0

## 2021-10-06 NOTE — PROGRESS NOTES
Review of Systems   Constitutional: Negative for appetite change, chills and fatigue. Eyes: Positive for pain. Negative for redness and visual disturbance. From fall    Respiratory: Negative for cough, shortness of breath and wheezing. Cardiovascular: Negative for chest pain and leg swelling. Gastrointestinal: Negative for abdominal pain, constipation, diarrhea, nausea and vomiting. Genitourinary: Positive for frequency. Negative for difficulty urinating, dysuria, flank pain, hematuria and urgency. Musculoskeletal: Negative for back pain, joint swelling and myalgias. Skin: Negative for rash and wound. Neurological: Negative for dizziness, weakness and numbness. Hematological: Bruises/bleeds easily.

## 2021-10-08 ENCOUNTER — TELEPHONE (OUTPATIENT)
Dept: FAMILY MEDICINE CLINIC | Age: 86
End: 2021-10-08

## 2021-10-12 ENCOUNTER — HOSPITAL ENCOUNTER (OUTPATIENT)
Age: 86
Setting detail: SPECIMEN
Discharge: HOME | End: 2021-10-12
Payer: MEDICARE

## 2021-10-12 DIAGNOSIS — Z90.79 STATUS POST PROSTATECTOMY: ICD-10-CM

## 2021-10-12 DIAGNOSIS — R35.0 URINARY FREQUENCY: ICD-10-CM

## 2021-10-12 DIAGNOSIS — D50.9 IRON DEFICIENCY ANEMIA, UNSPECIFIED IRON DEFICIENCY ANEMIA TYPE: ICD-10-CM

## 2021-10-12 DIAGNOSIS — I10 ESSENTIAL HYPERTENSION: ICD-10-CM

## 2021-10-12 DIAGNOSIS — E87.1 HYPONATREMIA: ICD-10-CM

## 2021-10-12 DIAGNOSIS — R33.9 INCOMPLETE EMPTYING OF BLADDER: ICD-10-CM

## 2021-10-12 DIAGNOSIS — C61 PROSTATE CANCER (HCC): ICD-10-CM

## 2021-10-12 LAB
ANION GAP SERPL CALCULATED.3IONS-SCNC: 14 MMOL/L (ref 9–17)
BUN BLDV-MCNC: 13 MG/DL (ref 8–23)
BUN/CREAT BLD: NORMAL (ref 9–20)
CALCIUM SERPL-MCNC: 9 MG/DL (ref 8.6–10.4)
CHLORIDE BLD-SCNC: 103 MMOL/L (ref 98–107)
CO2: 23 MMOL/L (ref 20–31)
CREAT SERPL-MCNC: 0.97 MG/DL (ref 0.7–1.2)
GFR AFRICAN AMERICAN: >60 ML/MIN
GFR NON-AFRICAN AMERICAN: >60 ML/MIN
GFR SERPL CREATININE-BSD FRML MDRD: NORMAL ML/MIN/{1.73_M2}
GFR SERPL CREATININE-BSD FRML MDRD: NORMAL ML/MIN/{1.73_M2}
GLUCOSE BLD-MCNC: 84 MG/DL (ref 70–99)
HCT VFR BLD CALC: 43.9 % (ref 40.7–50.3)
HEMOGLOBIN: 12.9 G/DL (ref 13–17)
MCH RBC QN AUTO: 29.3 PG (ref 25.2–33.5)
MCHC RBC AUTO-ENTMCNC: 29.4 G/DL (ref 28.4–34.8)
MCV RBC AUTO: 99.5 FL (ref 82.6–102.9)
NRBC AUTOMATED: 0 PER 100 WBC
PDW BLD-RTO: 15 % (ref 11.8–14.4)
PLATELET # BLD: 225 K/UL (ref 138–453)
PMV BLD AUTO: 11.4 FL (ref 8.1–13.5)
POTASSIUM SERPL-SCNC: 4.4 MMOL/L (ref 3.7–5.3)
PROSTATE SPECIFIC ANTIGEN: 1.01 UG/L
RBC # BLD: 4.41 M/UL (ref 4.21–5.77)
SODIUM BLD-SCNC: 140 MMOL/L (ref 135–144)
WBC # BLD: 7.6 K/UL (ref 3.5–11.3)

## 2021-10-13 ENCOUNTER — OFFICE VISIT (OUTPATIENT)
Dept: PSYCHOLOGY | Age: 86
End: 2021-10-13
Payer: MEDICARE

## 2021-10-13 DIAGNOSIS — F41.9 ANXIETY: Primary | ICD-10-CM

## 2021-10-13 PROCEDURE — 1036F TOBACCO NON-USER: CPT | Performed by: SOCIAL WORKER

## 2021-10-13 PROCEDURE — 90834 PSYTX W PT 45 MINUTES: CPT | Performed by: SOCIAL WORKER

## 2021-10-14 LAB
CULTURE: ABNORMAL
Lab: ABNORMAL
SPECIMEN DESCRIPTION: ABNORMAL

## 2021-10-15 DIAGNOSIS — N30.00 ACUTE CYSTITIS WITHOUT HEMATURIA: Primary | ICD-10-CM

## 2021-10-15 RX ORDER — AMOXICILLIN 500 MG/1
500 CAPSULE ORAL 3 TIMES DAILY
Qty: 21 CAPSULE | Refills: 0 | Status: SHIPPED | OUTPATIENT
Start: 2021-10-15 | End: 2021-10-22

## 2021-10-18 ENCOUNTER — OFFICE VISIT (OUTPATIENT)
Dept: FAMILY MEDICINE CLINIC | Age: 86
End: 2021-10-18
Payer: MEDICARE

## 2021-10-18 DIAGNOSIS — F41.9 ANXIETY: ICD-10-CM

## 2021-10-18 DIAGNOSIS — G47.00 INSOMNIA, UNSPECIFIED TYPE: ICD-10-CM

## 2021-10-18 DIAGNOSIS — E78.00 PURE HYPERCHOLESTEROLEMIA: ICD-10-CM

## 2021-10-18 DIAGNOSIS — F32.A DEPRESSION, UNSPECIFIED DEPRESSION TYPE: Primary | ICD-10-CM

## 2021-10-18 DIAGNOSIS — M10.9 GOUT, UNSPECIFIED CAUSE, UNSPECIFIED CHRONICITY, UNSPECIFIED SITE: ICD-10-CM

## 2021-10-18 DIAGNOSIS — E53.8 B12 DEFICIENCY: ICD-10-CM

## 2021-10-18 DIAGNOSIS — H61.21 IMPACTED CERUMEN OF RIGHT EAR: ICD-10-CM

## 2021-10-18 DIAGNOSIS — C61 PROSTATE CANCER (HCC): ICD-10-CM

## 2021-10-18 DIAGNOSIS — D50.9 IRON DEFICIENCY ANEMIA, UNSPECIFIED IRON DEFICIENCY ANEMIA TYPE: ICD-10-CM

## 2021-10-18 DIAGNOSIS — I10 ESSENTIAL HYPERTENSION: ICD-10-CM

## 2021-10-18 PROCEDURE — 1123F ACP DISCUSS/DSCN MKR DOCD: CPT | Performed by: FAMILY MEDICINE

## 2021-10-18 PROCEDURE — 99214 OFFICE O/P EST MOD 30 MIN: CPT | Performed by: FAMILY MEDICINE

## 2021-10-18 PROCEDURE — G8428 CUR MEDS NOT DOCUMENT: HCPCS | Performed by: FAMILY MEDICINE

## 2021-10-18 PROCEDURE — 1036F TOBACCO NON-USER: CPT | Performed by: FAMILY MEDICINE

## 2021-10-18 PROCEDURE — 69210 REMOVE IMPACTED EAR WAX UNI: CPT | Performed by: FAMILY MEDICINE

## 2021-10-18 PROCEDURE — 4040F PNEUMOC VAC/ADMIN/RCVD: CPT | Performed by: FAMILY MEDICINE

## 2021-10-18 PROCEDURE — G8484 FLU IMMUNIZE NO ADMIN: HCPCS | Performed by: FAMILY MEDICINE

## 2021-10-18 PROCEDURE — G8417 CALC BMI ABV UP PARAM F/U: HCPCS | Performed by: FAMILY MEDICINE

## 2021-10-18 RX ORDER — BUSPIRONE HYDROCHLORIDE 15 MG/1
15 TABLET ORAL 3 TIMES DAILY
Qty: 90 TABLET | Refills: 1 | Status: SHIPPED | OUTPATIENT
Start: 2021-10-18 | End: 2022-08-18

## 2021-10-18 ASSESSMENT — ENCOUNTER SYMPTOMS
SORE THROAT: 0
CHEST TIGHTNESS: 0
VOMITING: 0
ABDOMINAL DISTENTION: 0
RHINORRHEA: 0
ABDOMINAL PAIN: 0
COUGH: 0
SHORTNESS OF BREATH: 0
BACK PAIN: 0
NAUSEA: 0
CONSTIPATION: 0
DIARRHEA: 0

## 2021-10-18 NOTE — PROGRESS NOTES
Jacqueline Maria MD  4 [facility]  [address] [address], [address]  [address] [address]  Dept: [phone]  Dept Fax: [phone]     Patient ID: Herminio Bowman is a 80 y.o. male. HPI    Established patient here today for follow up on anxiety and depression, go over his labs, and an ear wash. We did start him on Remeron at his last viist and he has noticed a difference and has help with his mood. He is still feeling anxious during the day and is taking the Buspar three times per day. He has been going for counseling and is getting benefit. Pt denies any fever or chills. Pt today denies any HA, chest pain, or SOB. Pt denies any N/V/D/C or abdominal pain. He still is urinating quite a bit and was hoping after surgery this would improve. Otherwise pt doing well on current tx and no other concerns today. The patient's past medical, surgical, social, and family history as well as his current medications and allergies were reviewed as documented in today's encounter. My previous office notes, consult notes, labs and diagnostic studies were reviewed prior to and during encounter. Current Outpatient Medications on File Prior to Visit   Medication Sig Dispense Refill    amoxicillin (AMOXIL) 500 MG capsule Take 1 capsule by mouth 3 times daily for 7 days 21 capsule 0    omeprazole (PRILOSEC OTC) 20 MG tablet Take 20 mg by mouth daily      mirtazapine (REMERON) 15 MG tablet Take 1 tablet by mouth nightly 30 tablet 3    warfarin (COUMADIN) 5 MG tablet Take 0.5 tablets by mouth once a week Indications:  Wednesdays; INR goal 2-2.5 [facility], Dr. Dayan Gates, will stop 5 days before surgery on 8/18/21, and restart 10 days following surgery on 8/31/21 30 tablet 3    predniSONE (DELTASONE) 5 MG tablet TAKE ONE TABLET BY MOUTH DAILY 30 tablet 4    allopurinol (ZYLOPRIM) 100 MG tablet TAKE ONE TABLET BY MOUTH DAILY 90 tablet 2  lisinopril (PRINIVIL;ZESTRIL) 10 MG tablet Take 1 tablet by mouth Daily with lunch 90 tablet 3    latanoprost (XALATAN) 0.005 % ophthalmic solution Place 1 drop into both eyes nightly       melatonin 5 MG TABS tablet Take 1 tablet by mouth nightly       metoprolol tartrate (LOPRESSOR) 25 MG tablet Take 25 mg by mouth 2 times daily       No current facility-administered medications on file prior to visit. Subjective:     Review of Systems   Constitutional: Negative for appetite change, fatigue and fever. HENT: Negative for congestion, ear pain, rhinorrhea and sore throat. Respiratory: Negative for cough, chest tightness and shortness of breath. Cardiovascular: Negative for chest pain and palpitations. Gastrointestinal: Negative for abdominal distention, abdominal pain, constipation, diarrhea, nausea and vomiting. Genitourinary: Positive for frequency. Negative for difficulty urinating and dysuria. Musculoskeletal: Negative for arthralgias, back pain and myalgias. Skin: Negative for rash. Neurological: Negative for dizziness, weakness, light-headedness and headaches. Hematological: Negative for adenopathy. Psychiatric/Behavioral: Positive for dysphoric mood. Negative for behavioral problems and sleep disturbance. The patient is nervous/anxious. Objective:     Physical Exam  Vitals reviewed. Constitutional:       General: He is not in acute distress. Appearance: He is well-developed. HENT:      Head: Normocephalic and atraumatic. Right Ear: External ear normal. There is impacted cerumen. Left Ear: External ear normal.      Nose: Nose normal.      Mouth/Throat:      Pharynx: No oropharyngeal exudate. Eyes:      Conjunctiva/sclera: Conjunctivae normal.      Pupils: Pupils are equal, round, and reactive to light. Cardiovascular:      Rate and Rhythm: Normal rate. Rhythm irregularly irregular. Heart sounds: Normal heart sounds. No murmur heard. Standing Status:   Future     Standing Expiration Date:   10/18/2022    MT REMOVAL IMPACTED CERUMEN INSTRUMENTATION UNILAT      Orders Placed This Encounter   Medications    busPIRone (BUSPAR) 15 MG tablet     Sig: Take 15 mg by mouth 3 times daily     Dispense:  90 tablet     Refill:  1     Will cont with the Remeron 15mg at night and will increase the Buspar to 15mg three times per day    Will cont with counseling as he is getting benefit and getting some coping skills    Will cont to follow with Dr. Declan Purvis as instructed for his prostate cancer surveillance    Rest of systems unchanged, continue current treatments. Medications, labs, diagnostic studies, consultations and follow-up as documented in this encounter. Rest of systems unchanged, continue current treatments    On this date October 18, 2021,  I have spent greater than 50% of this visit reviewing previous notes, test results and face to face with the patient discussing the diagnoses, importance of compliance with the treatment plan, counseling, coordinating care as well as documenting on the day of the visit. Jacqueline Rivers MD

## 2021-10-20 ENCOUNTER — CARE COORDINATION (OUTPATIENT)
Dept: CARE COORDINATION | Age: 86
End: 2021-10-20

## 2021-10-20 NOTE — CARE COORDINATION
Attempt to contact patient today regarding care coordination, unable to reach. Recent encounters and notes reviewed.     Future Appointments   Date Time Provider Chanelle Phillips   10/28/2021  2:15 PM Rai Huizar DPM [address]   11/3/2021 11:00 AM NASRA Larkin - [facility]   11/10/2021 11:00 AM Tal Schuler PSY [facility]   12/8/2021 10:45 AM Rai Huizar DPM [place] [facility]   1/21/2022 11:00 AM Valerie Beebe MD [facility]   2/15/2022  1:40 PM Lilian Siu MD [address] [place] [address] RN Care Manager  [phone]

## 2021-10-26 ENCOUNTER — CARE COORDINATION (OUTPATIENT)
Dept: CARE COORDINATION | Age: 86
End: 2021-10-26

## 2021-10-26 NOTE — CARE COORDINATION
Attempt to contact patient today regarding care coordination, unable to reach. Recent encounters and notes reviewed.     Future Appointments   Date Time Provider Chanelle Jacqueline   10/28/2021  2:15 PM Sona Euceda DPM [address]   11/3/2021 11:00 AM NASRA Hernandez - CNP [facility]   11/10/2021 11:00 AM Loree Waldrop PSY [facility]   12/8/2021 10:45 AM Sona Euceda DPM [place] [facility]   1/21/2022 11:00 AM Fabio Choi MD [facility]   2/15/2022  1:40 PM Mary Grace Euceda MD [address] [place] [address] RN Care Manager  [phone]

## 2021-10-28 ENCOUNTER — OFFICE VISIT (OUTPATIENT)
Dept: PODIATRY | Age: 86
End: 2021-10-28
Payer: MEDICARE

## 2021-10-28 VITALS — HEIGHT: 64 IN | WEIGHT: 154 LBS | BODY MASS INDEX: 26.29 KG/M2

## 2021-10-28 DIAGNOSIS — B35.1 ONYCHOMYCOSIS: Primary | ICD-10-CM

## 2021-10-28 DIAGNOSIS — M79.675 PAIN IN TOES OF BOTH FEET: ICD-10-CM

## 2021-10-28 DIAGNOSIS — M79.674 PAIN IN TOES OF BOTH FEET: ICD-10-CM

## 2021-10-28 PROCEDURE — 11721 DEBRIDE NAIL 6 OR MORE: CPT | Performed by: PODIATRIST

## 2021-10-28 ASSESSMENT — ENCOUNTER SYMPTOMS
COLOR CHANGE: 0
NAUSEA: 0
VOMITING: 0
DIARRHEA: 0
CONSTIPATION: 0

## 2021-10-28 NOTE — PROGRESS NOTES
[facility]  Return Patient    Chief Complaint   Patient presents with    Nail Problem     b/l nail trim/ last seen Dr. Mendieta Saint [facility] 10/18/2021       Subjective: This Clydell Ion comes to clinic for foot and nail care. He would like all of his nails trimmed. He cannot take care of them himself. They are painful when long and he tries to wear shoes. Pt's primary care physician is Philip Avila MD.       Past Medical History:   Diagnosis Date    Acute MI [facility])     Allergic rhinitis 09/02/2011    Anemia     Atrial fibrillation ([facility] [address]     Dr. Pete Rodriguez, [facility]    Basal cell cancer 03/2013    left side of head, face chin    Basal cell cancer 03/10/2014    left cheek    Cervical radiculopathy     Diverticulitis 02/17/2013    GERD (gastroesophageal reflux disease)     Glaucoma     left eye    Hyperlipidemia     Hypertension     Dr. Obed Patricia    Hyponatremia 09/02/2011    IBS (irritable bowel syndrome) 09/02/2011    Insomnia 09/02/2011    Osteoarthritis     Osteoarthritis/neck pain. 09/02/2011    Prostate cancer ([facility] [address] 08/18/2021    active surviellence monitoring PSA    Renal calculi     Shingles     history of shingles    Status post prostatectomy 08/18/2021    Simple prostatectomy for BPH    Urinary frequency     Wears glasses     Wears hearing aid in both ears        Allergies   Allergen Reactions    Celebrex [Celecoxib] Nausea Only    Mobic Nausea Only     Current Outpatient Medications on File Prior to Visit   Medication Sig Dispense Refill    busPIRone (BUSPAR) 15 MG tablet Take 15 mg by mouth 3 times daily 90 tablet 1    omeprazole (PRILOSEC OTC) 20 MG tablet Take 20 mg by mouth daily      mirtazapine (REMERON) 15 MG tablet Take 1 tablet by mouth nightly 30 tablet 3    warfarin (COUMADIN) 5 MG tablet Take 0.5 tablets by mouth once a week Indications:  Wednesdays; INR goal 2-2.5 Managed by [facility]ticoagulation Service, Dr. Pete Rodriguez, will stop 5 days before surgery on 8/18/21, and restart 10 days following surgery on 8/31/21 (Patient taking differently: Take 2.5 mg by mouth once a week Indications: Wednesdays; INR goal 2-2.5 ) 30 tablet 3    predniSONE (DELTASONE) 5 MG tablet TAKE ONE TABLET BY MOUTH DAILY 30 tablet 4    allopurinol (ZYLOPRIM) 100 MG tablet TAKE ONE TABLET BY MOUTH DAILY 90 tablet 2    lisinopril (PRINIVIL;ZESTRIL) 10 MG tablet Take 1 tablet by mouth Daily with lunch (Patient taking differently: Take 10 mg by mouth daily ) 90 tablet 3    latanoprost (XALATAN) 0.005 % ophthalmic solution Place 1 drop into the left eye nightly       metoprolol tartrate (LOPRESSOR) 25 MG tablet Take 25 mg by mouth 2 times daily       No current facility-administered medications on file prior to visit. Review of Systems   Constitutional: Negative for chills, diaphoresis, fatigue, fever and unexpected weight change. Cardiovascular: Negative for leg swelling. Gastrointestinal: Negative for constipation, diarrhea, nausea and vomiting. Musculoskeletal: Positive for gait problem. Negative for arthralgias and joint swelling. Skin: Negative for color change, pallor, rash and wound. Neurological: Negative for weakness and numbness. Objective:  General: AAO x 3 in NAD. Derm  Left hallux nail incurvated medial border with slight redness and pain to palpation.     Sites of Onychomycosis Involvement (Check affected area)  [x] [x] [x] [x] [x] [x] [x] [x] [x] [x]  5 4 3 2 1 1 2 3 4 5                          Right                                        Left    Thickness  [x] [x] [x] [x] [x] [x] [x] [x] [x] [x]  5 4 3 2 1 1 2 3 4 5                         Right                                        Left    Dystrophic Changes                                                                 [x] [x] [x] [x] [x] [x] [x] [x] [x] [x]  5 4 3 2 1 1 2 3 4 5                         Right                                        Left    Color [x] [x] [x] [x] [x] [x] [x] [x] [x] [x]  5 4 3 2 1 1 2 3 4 5                          Right                                        Left    Incurvation/Ingrowin                                                                   [] [] [] [] [] [] [] [] [] []  5 4 3 2 1 1 2 3 4 5                         Right                                        Left    Inflammation/Pain                                                                   [x] [x] [x] [x] [x] [x] [x] [x] [x] [x]  5 4 3 2 1 1 2 3 4 5                         Right                                        Left    Vascular:  DP/PT pulses palpable 2/4, Bilateral.    CFT <3 seconds to digits 1-5, Bilateral .   Hair growth present to level of digits, Bilateral.    Neurological:  Sensation present to light touch to level of digits, Bilateral.    Assessment:  80 y.o. male with:   1. Onychomycosis    2. Pain in toes of both feet       Orders Placed This Encounter   Procedures    OH DEBRIDEMENT OF NAILS, 6 OR MORE         Plan:   Pt was evaluated and examined. Patient was given personalized discharge instructions. Nails 1-10 were debrided in length and thickness sharply with a nail nipper and  without incident, slant back performed. Pt will follow up in 9-12 weeks or sooner if any problems arise. Diagnosis was discussed with the pt and all of their questions were answered in detail. Proper foot hygiene and care was discussed with the pt. Patient to check feet daily and contact the office with any questions/problems/concerns. Other comorbidity noted and will be managed by PCP. Pain waiver discussed with patient and confirmed.          10/28/2021    Electronically signed by Lorie Zavala DPM on 10/28/2021 at 2:52 PM
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
## 2021-11-02 ENCOUNTER — CARE COORDINATION (OUTPATIENT)
Dept: CARE COORDINATION | Age: 86
End: 2021-11-02

## 2021-11-02 NOTE — CARE COORDINATION
Attempt to contact patient today regarding care coordination, unable to reach. Recent encounters and notes reviewed.     Future Appointments   Date Time Provider Chanelle Jacqueline   11/3/2021 11:00 AM NASRA Preston - CNP . C [facility]   11/10/2021 11:00 AM Mei MARIANO PSY [facility]   1/6/2022  1:00 PM Vasiliy Abrams DPM [place] [facility]   1/21/2022 11:00 AM Claudia Deleon MD [facility]   2/15/2022  1:40 PM Tim Key MD  [facility]   3/10/2022  2:00 PM Vasiliy Abrams, [address] RN Care Manager  [phone]

## 2021-11-08 RX ORDER — PREDNISONE 1 MG/1
TABLET ORAL
Qty: 30 TABLET | Refills: 4 | Status: SHIPPED | OUTPATIENT
Start: 2021-11-08 | End: 2022-04-12 | Stop reason: SDUPTHER

## 2021-11-09 ENCOUNTER — CARE COORDINATION (OUTPATIENT)
Dept: CARE COORDINATION | Age: 86
End: 2021-11-09

## 2021-11-09 NOTE — CARE COORDINATION
Attempt to contact patient today regarding care coordination, unable to reach. 4th and final CC attempt will resolve CC episode for now. Recent encounters and notes reviewed.     Future Appointments   Date Time Provider Chanelle Phillips   11/10/2021 11:00 AM Patsy Aguirre [facility]   1/6/2022  1:00 PM Lonnie Quick DPM [facility]   1/21/2022 11:00 AM Gabi Hayes MD [facility]   2/10/2022 11:00 AM Gina Gutierrez MD [address] [place] [address]   2/15/2022  1:40 PM Gina Gutierrez MD [address] [place] [address]   3/10/2022  2:00 PM Lonnie Quick, [address] [place] [address] RN Care Manager  [phone]

## 2021-11-10 ENCOUNTER — OFFICE VISIT (OUTPATIENT)
Dept: PSYCHOLOGY | Age: 86
End: 2021-11-10
Payer: MEDICARE

## 2021-11-10 DIAGNOSIS — F41.9 ANXIETY: Primary | ICD-10-CM

## 2021-11-10 PROCEDURE — 90834 PSYTX W PT 45 MINUTES: CPT | Performed by: SOCIAL WORKER

## 2021-11-10 PROCEDURE — 1036F TOBACCO NON-USER: CPT | Performed by: SOCIAL WORKER

## 2021-11-17 ENCOUNTER — HOSPITAL ENCOUNTER (OUTPATIENT)
Age: 86
Setting detail: SPECIMEN
Discharge: HOME | End: 2021-11-17

## 2021-11-17 ENCOUNTER — VIRTUAL VISIT (OUTPATIENT)
Dept: FAMILY MEDICINE CLINIC | Age: 86
End: 2021-11-17
Payer: MEDICARE

## 2021-11-17 ENCOUNTER — TELEPHONE (OUTPATIENT)
Dept: FAMILY MEDICINE CLINIC | Age: 86
End: 2021-11-17

## 2021-11-17 ENCOUNTER — NURSE ONLY (OUTPATIENT)
Dept: FAMILY MEDICINE CLINIC | Age: 86
End: 2021-11-17

## 2021-11-17 DIAGNOSIS — R06.02 SOB (SHORTNESS OF BREATH): ICD-10-CM

## 2021-11-17 DIAGNOSIS — R05.9 COUGH: ICD-10-CM

## 2021-11-17 DIAGNOSIS — R53.83 FATIGUE, UNSPECIFIED TYPE: ICD-10-CM

## 2021-11-17 DIAGNOSIS — R05.9 COUGH: Primary | ICD-10-CM

## 2021-11-17 PROCEDURE — G8427 DOCREV CUR MEDS BY ELIG CLIN: HCPCS | Performed by: FAMILY MEDICINE

## 2021-11-17 PROCEDURE — 4040F PNEUMOC VAC/ADMIN/RCVD: CPT | Performed by: FAMILY MEDICINE

## 2021-11-17 PROCEDURE — 99213 OFFICE O/P EST LOW 20 MIN: CPT | Performed by: FAMILY MEDICINE

## 2021-11-17 PROCEDURE — 1123F ACP DISCUSS/DSCN MKR DOCD: CPT | Performed by: FAMILY MEDICINE

## 2021-11-17 ASSESSMENT — ENCOUNTER SYMPTOMS
SORE THROAT: 0
ABDOMINAL DISTENTION: 0
CONSTIPATION: 0
BACK PAIN: 0
CHEST TIGHTNESS: 0
VOMITING: 0
NAUSEA: 0
ABDOMINAL PAIN: 0
SHORTNESS OF BREATH: 1
DIARRHEA: 0
RHINORRHEA: 0
COUGH: 1

## 2021-11-17 NOTE — PROGRESS NOTES
daily ) 90 tablet 3    latanoprost (XALATAN) 0.005 % ophthalmic solution Place 1 drop into the left eye nightly       metoprolol tartrate (LOPRESSOR) 25 MG tablet Take 25 mg by mouth 2 times daily       No current facility-administered medications on file prior to visit. Subjective:     Review of Systems   Constitutional: Positive for fatigue. Negative for appetite change and fever. HENT: Negative for congestion, ear pain, rhinorrhea and sore throat. Respiratory: Positive for cough and shortness of breath. Negative for chest tightness. Cardiovascular: Negative for chest pain and palpitations. Gastrointestinal: Negative for abdominal distention, abdominal pain, constipation, diarrhea, nausea and vomiting. Genitourinary: Negative for difficulty urinating and dysuria. Musculoskeletal: Negative for arthralgias, back pain and myalgias. Skin: Negative for rash. Neurological: Negative for dizziness, weakness, light-headedness and headaches. Hematological: Negative for adenopathy. Psychiatric/Behavioral: Negative for behavioral problems and sleep disturbance. The patient is not nervous/anxious. Objective:     Physical Exam  Vitals reviewed: Vital signs unavailable, as this is a virtual visit. Constitutional:       General: He is not in acute distress. Appearance: Normal appearance. He is not ill-appearing or toxic-appearing. Pulmonary:      Effort: Pulmonary effort is normal. No tachypnea, accessory muscle usage or respiratory distress. Comments: Patient able to talk in full sentences without difficulty   Neurological:      General: No focal deficit present. Mental Status: He is alert and oriented to person, place, and time. Psychiatric:         Mood and Affect: Mood normal.         Speech: Speech normal.         Behavior: Behavior normal. Behavior is cooperative. Assessment:      Diagnosis Orders   1. Cough     2.  Fatigue, unspecified type         Plan: Orders Placed This Encounter   Procedures    COVID-19     Standing Status:   Future     Standing Expiration Date:   11/17/2022     Scheduling Instructions:      1) Due to current limited availability of the COVID-19 test, tests will be prioritized based on responses to questions above. Testing may be delayed due to volume. 2) Print and instruct patient to adhere to CDC home isolation program. (Link Above)              3) Set up or refer patient for a monitoring program.              4) Have patient sign up for and leverage MyChart (if not previously done). Order Specific Question:   Is this test for diagnosis or screening? Answer:   Diagnosis of ill patient     Order Specific Question:   Symptomatic for COVID-19 as defined by CDC? Answer:   Yes     Order Specific Question:   Date of Symptom Onset     Answer:   11/15/2021     Order Specific Question:   Hospitalized for COVID-19? Answer:   No     Order Specific Question:   Admitted to ICU for COVID-19? Answer:   No     Order Specific Question:   Employed in healthcare setting? Answer:   No     Order Specific Question:   Resident in a congregate (group) care setting? Answer:   No     Order Specific Question:   Pregnant: Answer:   No      With his symptoms, I am going to test him for Covid. He was instructed to go to the [facility] in [place] for the test and I will follow up on the results    He was instructed to self quarantine for 14 days from the onset of symptoms or exposure, wear a mask, social distance, good hand washing, and can take Tylenol for the symptoms, and instructed to follow up if any change or worsening in symptoms. Rest of systems unchanged, continue current treatments. Medications, labs, diagnostic studies, consultations and follow-up as documented in this encounter.  Rest of systems unchanged, continue current treatments    On this date November 17, 2021,  I have spent greater than 50% of this visit reviewing previous notes, test results and face to face with the patient discussing the diagnoses, importance of compliance with the treatment plan, counseling, coordinating care as well as documenting on the day of the visit. Darling Courtney is a 80 y.o. male being evaluated by a Virtual Visit (video visit) encounter to address concerns as mentioned above. A caregiver was present when appropriate. Due to this being a TeleHealth encounter (During [facility]-05 public health emergency), evaluation of the following organ systems was limited: Vitals/Constitutional/EENT/Resp/CV/GI//MS/Neuro/Skin/Heme-Lymph-Imm. Pursuant to the emergency declaration under the [address] [place] [address] authority and the [facility] and [employer] Act, this Virtual Visit was conducted with patient's (and/or legal guardian's) consent, to reduce the patient's risk of exposure to COVID-19 and provide necessary medical care. The patient (and/or legal guardian) has also been advised to contact this office for worsening conditions or problems, and seek emergency medical treatment and/or call 911 if deemed necessary. Patient identification was verified at the start of the visit: Yes    Total time spent for this encounter: Not billed by time    Services were provided through a video synchronous discussion virtually to substitute for in-person clinic visit. Patient and provider were located at their individual homes. --Fabián Cornell MD on 11/17/2021 at 10:30 AM    An electronic signature was used to authenticate this note. Jacqueline Grubbs MD

## 2021-11-17 NOTE — TELEPHONE ENCOUNTER
Patient C/O dry cough, breathing feels heavy, and he is very fatigued. His son feels he may have covid and wants him tested. I explained that I will discuss with the provider and call him back. He did state he cannot do a VV.

## 2021-11-18 LAB
SARS-COV-2: NORMAL
SARS-COV-2: NOT DETECTED
SOURCE: NORMAL

## 2021-11-22 ENCOUNTER — TELEPHONE (OUTPATIENT)
Dept: FAMILY MEDICINE CLINIC | Age: 86
End: 2021-11-22

## 2021-11-22 RX ORDER — AMOXICILLIN 875 MG/1
875 TABLET, COATED ORAL 2 TIMES DAILY
Qty: 20 TABLET | Refills: 0 | Status: SHIPPED | OUTPATIENT
Start: 2021-11-22 | End: 2021-11-30

## 2021-11-22 NOTE — TELEPHONE ENCOUNTER
Patient has been having sinus related issues for the past couple weeks. Patient has already spoke with PCP about this, and took a COVID-19 test that came back negative. Patient is still having symptoms, and is asking if PCP would send something in for the patient to have \"some relief\". Patient has been taking \"ipratropium\" and that hasn't helped. Symptoms:  - trouble breathing still (Patient stating that he can breath, but it doesn't take much for him to become out of breath)      Please advise.

## 2021-11-22 NOTE — TELEPHONE ENCOUNTER
Have him get on OTC Flonase and Mucinex and I will send over an ATB. Call if still no improvement in his symptoms. He will need to let the [facility]madin Clinic know I am putting him on an ATBB because it can mess with his Coumadin levels.

## 2021-11-22 NOTE — TELEPHONE ENCOUNTER
Spoke with pt - Verbal understanding. Patient will be calling Coumadin Clinic to inform.  Pt very grateful

## 2021-11-24 ENCOUNTER — APPOINTMENT (OUTPATIENT)
Dept: GENERAL RADIOLOGY | Age: 86
DRG: 291 | End: 2021-11-24
Payer: MEDICARE

## 2021-11-24 ENCOUNTER — TELEPHONE (OUTPATIENT)
Dept: FAMILY MEDICINE CLINIC | Age: 86
End: 2021-11-24

## 2021-11-24 ENCOUNTER — HOSPITAL ENCOUNTER (INPATIENT)
Age: 86
LOS: 3 days | Discharge: HOME | DRG: 291 | End: 2021-11-27
Attending: EMERGENCY MEDICINE | Admitting: INTERNAL MEDICINE
Payer: MEDICARE

## 2021-11-24 DIAGNOSIS — J81.0 ACUTE PULMONARY EDEMA (HCC): ICD-10-CM

## 2021-11-24 DIAGNOSIS — I48.91 ATRIAL FIBRILLATION WITH RVR (HCC): Primary | ICD-10-CM

## 2021-11-24 PROBLEM — I50.43 ACUTE ON CHRONIC COMBINED SYSTOLIC AND DIASTOLIC CHF (CONGESTIVE HEART FAILURE) (HCC): Status: ACTIVE | Noted: 2021-11-24

## 2021-11-24 LAB
-: ABNORMAL
ABSOLUTE EOS #: 0 K/UL (ref 0–0.4)
ABSOLUTE IMMATURE GRANULOCYTE: ABNORMAL K/UL (ref 0–0.3)
ABSOLUTE LYMPH #: 0.5 K/UL (ref 1–4.8)
ABSOLUTE MONO #: 1.3 K/UL (ref 0.1–1.3)
ALBUMIN SERPL-MCNC: 3.9 G/DL (ref 3.5–5.2)
ALBUMIN/GLOBULIN RATIO: ABNORMAL (ref 1–2.5)
ALP BLD-CCNC: 92 U/L (ref 40–129)
ALT SERPL-CCNC: 20 U/L (ref 5–41)
AMORPHOUS: ABNORMAL
ANION GAP SERPL CALCULATED.3IONS-SCNC: 12 MMOL/L (ref 9–17)
AST SERPL-CCNC: 28 U/L
BACTERIA: ABNORMAL
BASOPHILS # BLD: 0 % (ref 0–2)
BASOPHILS ABSOLUTE: 0 K/UL (ref 0–0.2)
BILIRUB SERPL-MCNC: 1.09 MG/DL (ref 0.3–1.2)
BILIRUBIN URINE: NEGATIVE
BNP INTERPRETATION: ABNORMAL
BUN BLDV-MCNC: 25 MG/DL (ref 8–23)
BUN/CREAT BLD: ABNORMAL (ref 9–20)
C-REACTIVE PROTEIN: 11.9 MG/L (ref 0–5)
CALCIUM SERPL-MCNC: 9.6 MG/DL (ref 8.6–10.4)
CASTS UA: ABNORMAL /LPF
CHLORIDE BLD-SCNC: 96 MMOL/L (ref 98–107)
CHOLESTEROL/HDL RATIO: 3
CHOLESTEROL: 163 MG/DL
CO2: 22 MMOL/L (ref 20–31)
COLOR: YELLOW
COMMENT UA: ABNORMAL
CREAT SERPL-MCNC: 1.05 MG/DL (ref 0.7–1.2)
CRYSTALS, UA: ABNORMAL /HPF
D-DIMER QUANTITATIVE: 0.42 MG/L FEU (ref 0–0.59)
DIFFERENTIAL TYPE: ABNORMAL
EKG ATRIAL RATE: 147 BPM
EKG Q-T INTERVAL: 304 MS
EKG QRS DURATION: 88 MS
EKG QTC CALCULATION (BAZETT): 411 MS
EKG R AXIS: 34 DEGREES
EKG T AXIS: -154 DEGREES
EKG VENTRICULAR RATE: 110 BPM
EOSINOPHILS RELATIVE PERCENT: 0 % (ref 0–4)
EPITHELIAL CELLS UA: ABNORMAL /HPF
GFR AFRICAN AMERICAN: >60 ML/MIN
GFR NON-AFRICAN AMERICAN: >60 ML/MIN
GFR SERPL CREATININE-BSD FRML MDRD: ABNORMAL ML/MIN/{1.73_M2}
GFR SERPL CREATININE-BSD FRML MDRD: ABNORMAL ML/MIN/{1.73_M2}
GLUCOSE BLD-MCNC: 136 MG/DL (ref 70–99)
GLUCOSE URINE: NEGATIVE
HCT VFR BLD CALC: 40.9 % (ref 41–53)
HDLC SERPL-MCNC: 54 MG/DL
HEMOGLOBIN: 13.2 G/DL (ref 13.5–17.5)
IMMATURE GRANULOCYTES: ABNORMAL %
INFLUENZA A: NOT DETECTED
INFLUENZA B: NOT DETECTED
INR BLD: 2
KETONES, URINE: ABNORMAL
LACTATE DEHYDROGENASE: 163 U/L (ref 135–225)
LDL CHOLESTEROL: 92 MG/DL (ref 0–130)
LEUKOCYTE ESTERASE, URINE: NEGATIVE
LIPASE: 19 U/L (ref 13–60)
LYMPHOCYTES # BLD: 6 % (ref 24–44)
MCH RBC QN AUTO: 27.5 PG (ref 26–34)
MCHC RBC AUTO-ENTMCNC: 32.2 G/DL (ref 31–37)
MCV RBC AUTO: 85.4 FL (ref 80–100)
MONOCYTES # BLD: 15 % (ref 1–7)
MUCUS: ABNORMAL
NITRITE, URINE: NEGATIVE
NRBC AUTOMATED: ABNORMAL PER 100 WBC
OTHER OBSERVATIONS UA: ABNORMAL
PARTIAL THROMBOPLASTIN TIME: 34.9 SEC (ref 24–36)
PDW BLD-RTO: 16.1 % (ref 11.5–14.9)
PH UA: 5.5 (ref 5–8)
PLATELET # BLD: 218 K/UL (ref 150–450)
PLATELET ESTIMATE: ABNORMAL
PMV BLD AUTO: 8.7 FL (ref 6–12)
POTASSIUM SERPL-SCNC: 4.9 MMOL/L (ref 3.7–5.3)
PRO-BNP: ABNORMAL PG/ML
PROCALCITONIN: 0.13 NG/ML
PROTEIN UA: ABNORMAL
PROTHROMBIN TIME: 22.5 SEC (ref 11.8–14.6)
RBC # BLD: 4.79 M/UL (ref 4.5–5.9)
RBC # BLD: ABNORMAL 10*6/UL
RBC UA: ABNORMAL /HPF
RENAL EPITHELIAL, UA: ABNORMAL /HPF
SARS-COV-2 RNA, RT PCR: NOT DETECTED
SEG NEUTROPHILS: 79 % (ref 36–66)
SEGMENTED NEUTROPHILS ABSOLUTE COUNT: 7.1 K/UL (ref 1.3–9.1)
SODIUM BLD-SCNC: 130 MMOL/L (ref 135–144)
SOURCE: NORMAL
SPECIFIC GRAVITY UA: 1.03 (ref 1–1.03)
SPECIMEN DESCRIPTION: NORMAL
TOTAL PROTEIN: 6.6 G/DL (ref 6.4–8.3)
TRICHOMONAS: ABNORMAL
TRIGL SERPL-MCNC: 83 MG/DL
TROPONIN INTERP: ABNORMAL
TROPONIN INTERP: ABNORMAL
TROPONIN T: ABNORMAL NG/ML
TROPONIN T: ABNORMAL NG/ML
TROPONIN, HIGH SENSITIVITY: 46 NG/L (ref 0–22)
TROPONIN, HIGH SENSITIVITY: 46 NG/L (ref 0–22)
TURBIDITY: CLEAR
URINE HGB: ABNORMAL
UROBILINOGEN, URINE: NORMAL
VLDLC SERPL CALC-MCNC: NORMAL MG/DL (ref 1–30)
WBC # BLD: 9 K/UL (ref 3.5–11)
WBC # BLD: ABNORMAL 10*3/UL
WBC UA: ABNORMAL /HPF
YEAST: ABNORMAL

## 2021-11-24 PROCEDURE — 84484 ASSAY OF TROPONIN QUANT: CPT

## 2021-11-24 PROCEDURE — 96375 TX/PRO/DX INJ NEW DRUG ADDON: CPT

## 2021-11-24 PROCEDURE — 80053 COMPREHEN METABOLIC PANEL: CPT

## 2021-11-24 PROCEDURE — 71045 X-RAY EXAM CHEST 1 VIEW: CPT

## 2021-11-24 PROCEDURE — 81001 URINALYSIS AUTO W/SCOPE: CPT

## 2021-11-24 PROCEDURE — 84145 PROCALCITONIN (PCT): CPT

## 2021-11-24 PROCEDURE — 83615 LACTATE (LD) (LDH) ENZYME: CPT

## 2021-11-24 PROCEDURE — 85379 FIBRIN DEGRADATION QUANT: CPT

## 2021-11-24 PROCEDURE — 2580000003 HC RX 258: Performed by: EMERGENCY MEDICINE

## 2021-11-24 PROCEDURE — 87636 SARSCOV2 & INF A&B AMP PRB: CPT

## 2021-11-24 PROCEDURE — 2500000003 HC RX 250 WO HCPCS: Performed by: EMERGENCY MEDICINE

## 2021-11-24 PROCEDURE — 83880 ASSAY OF NATRIURETIC PEPTIDE: CPT

## 2021-11-24 PROCEDURE — 99285 EMERGENCY DEPT VISIT HI MDM: CPT

## 2021-11-24 PROCEDURE — 86140 C-REACTIVE PROTEIN: CPT

## 2021-11-24 PROCEDURE — 96376 TX/PRO/DX INJ SAME DRUG ADON: CPT

## 2021-11-24 PROCEDURE — 96365 THER/PROPH/DIAG IV INF INIT: CPT

## 2021-11-24 PROCEDURE — 2060000000 HC ICU INTERMEDIATE R&B

## 2021-11-24 PROCEDURE — 87086 URINE CULTURE/COLONY COUNT: CPT

## 2021-11-24 PROCEDURE — 36415 COLL VENOUS BLD VENIPUNCTURE: CPT

## 2021-11-24 PROCEDURE — 85730 THROMBOPLASTIN TIME PARTIAL: CPT

## 2021-11-24 PROCEDURE — 6370000000 HC RX 637 (ALT 250 FOR IP)

## 2021-11-24 PROCEDURE — 85610 PROTHROMBIN TIME: CPT

## 2021-11-24 PROCEDURE — 6360000002 HC RX W HCPCS: Performed by: EMERGENCY MEDICINE

## 2021-11-24 PROCEDURE — 80061 LIPID PANEL: CPT

## 2021-11-24 PROCEDURE — 85025 COMPLETE CBC W/AUTO DIFF WBC: CPT

## 2021-11-24 PROCEDURE — 83690 ASSAY OF LIPASE: CPT

## 2021-11-24 PROCEDURE — 99223 1ST HOSP IP/OBS HIGH 75: CPT | Performed by: INTERNAL MEDICINE

## 2021-11-24 RX ORDER — ACETAMINOPHEN 650 MG/1
650 SUPPOSITORY RECTAL EVERY 6 HOURS PRN
Status: DISCONTINUED | OUTPATIENT
Start: 2021-11-24 | End: 2021-11-27 | Stop reason: HOSPADM

## 2021-11-24 RX ORDER — ALLOPURINOL 100 MG/1
1 TABLET ORAL DAILY
Status: CANCELLED | OUTPATIENT
Start: 2021-11-24

## 2021-11-24 RX ORDER — ALLOPURINOL 100 MG/1
100 TABLET ORAL DAILY
Status: DISCONTINUED | OUTPATIENT
Start: 2021-11-24 | End: 2021-11-27 | Stop reason: HOSPADM

## 2021-11-24 RX ORDER — LATANOPROST 50 UG/ML
1 SOLUTION/ DROPS OPHTHALMIC NIGHTLY
Status: DISCONTINUED | OUTPATIENT
Start: 2021-11-24 | End: 2021-11-27 | Stop reason: HOSPADM

## 2021-11-24 RX ORDER — DILTIAZEM HYDROCHLORIDE 5 MG/ML
10 INJECTION INTRAVENOUS ONCE
Status: COMPLETED | OUTPATIENT
Start: 2021-11-24 | End: 2021-11-24

## 2021-11-24 RX ORDER — ONDANSETRON 4 MG/1
4 TABLET, ORALLY DISINTEGRATING ORAL EVERY 8 HOURS PRN
Status: DISCONTINUED | OUTPATIENT
Start: 2021-11-24 | End: 2021-11-27 | Stop reason: HOSPADM

## 2021-11-24 RX ORDER — ACETAMINOPHEN 325 MG/1
650 TABLET ORAL EVERY 6 HOURS PRN
Status: DISCONTINUED | OUTPATIENT
Start: 2021-11-24 | End: 2021-11-27 | Stop reason: HOSPADM

## 2021-11-24 RX ORDER — FUROSEMIDE 10 MG/ML
40 INJECTION INTRAMUSCULAR; INTRAVENOUS DAILY
Status: DISCONTINUED | OUTPATIENT
Start: 2021-11-25 | End: 2021-11-26

## 2021-11-24 RX ORDER — POTASSIUM CHLORIDE 20 MEQ/1
40 TABLET, EXTENDED RELEASE ORAL PRN
Status: DISCONTINUED | OUTPATIENT
Start: 2021-11-24 | End: 2021-11-27 | Stop reason: HOSPADM

## 2021-11-24 RX ORDER — BUSPIRONE HYDROCHLORIDE 15 MG/1
15 TABLET ORAL 3 TIMES DAILY
Status: DISCONTINUED | OUTPATIENT
Start: 2021-11-24 | End: 2021-11-27 | Stop reason: HOSPADM

## 2021-11-24 RX ORDER — LISINOPRIL 10 MG/1
10 TABLET ORAL DAILY
COMMUNITY
End: 2022-02-01

## 2021-11-24 RX ORDER — MIRTAZAPINE 15 MG/1
15 TABLET, FILM COATED ORAL NIGHTLY
Status: DISCONTINUED | OUTPATIENT
Start: 2021-11-24 | End: 2021-11-27 | Stop reason: HOSPADM

## 2021-11-24 RX ORDER — WARFARIN SODIUM 5 MG/1
5 TABLET ORAL
Status: COMPLETED | OUTPATIENT
Start: 2021-11-24 | End: 2021-11-24

## 2021-11-24 RX ORDER — FUROSEMIDE 10 MG/ML
40 INJECTION INTRAMUSCULAR; INTRAVENOUS ONCE
Status: COMPLETED | OUTPATIENT
Start: 2021-11-24 | End: 2021-11-24

## 2021-11-24 RX ORDER — WARFARIN SODIUM 5 MG/1
5 TABLET ORAL DAILY
COMMUNITY

## 2021-11-24 RX ORDER — MAGNESIUM SULFATE 1 G/100ML
1000 INJECTION INTRAVENOUS PRN
Status: DISCONTINUED | OUTPATIENT
Start: 2021-11-24 | End: 2021-11-27 | Stop reason: HOSPADM

## 2021-11-24 RX ORDER — POTASSIUM CHLORIDE 7.45 MG/ML
10 INJECTION INTRAVENOUS PRN
Status: DISCONTINUED | OUTPATIENT
Start: 2021-11-24 | End: 2021-11-27 | Stop reason: HOSPADM

## 2021-11-24 RX ORDER — WARFARIN SODIUM 5 MG/1
5 TABLET ORAL DAILY
Status: CANCELLED | OUTPATIENT
Start: 2021-11-24

## 2021-11-24 RX ORDER — PANTOPRAZOLE SODIUM 40 MG/1
40 TABLET, DELAYED RELEASE ORAL
Status: DISCONTINUED | OUTPATIENT
Start: 2021-11-25 | End: 2021-11-27 | Stop reason: HOSPADM

## 2021-11-24 RX ORDER — ONDANSETRON 2 MG/ML
4 INJECTION INTRAMUSCULAR; INTRAVENOUS EVERY 6 HOURS PRN
Status: DISCONTINUED | OUTPATIENT
Start: 2021-11-24 | End: 2021-11-27 | Stop reason: HOSPADM

## 2021-11-24 RX ORDER — POLYETHYLENE GLYCOL 3350 17 G/17G
17 POWDER, FOR SOLUTION ORAL DAILY PRN
Status: DISCONTINUED | OUTPATIENT
Start: 2021-11-24 | End: 2021-11-27 | Stop reason: HOSPADM

## 2021-11-24 RX ADMIN — MIRTAZAPINE 15 MG: 15 TABLET, FILM COATED ORAL at 20:44

## 2021-11-24 RX ADMIN — WARFARIN SODIUM 5 MG: 5 TABLET ORAL at 18:47

## 2021-11-24 RX ADMIN — LATANOPROST 1 DROP: 50 SOLUTION OPHTHALMIC at 20:45

## 2021-11-24 RX ADMIN — ACETAMINOPHEN 650 MG: 325 TABLET ORAL at 22:54

## 2021-11-24 RX ADMIN — DILTIAZEM HYDROCHLORIDE 5 MG/HR: 5 INJECTION INTRAVENOUS at 15:55

## 2021-11-24 RX ADMIN — DILTIAZEM HYDROCHLORIDE 10 MG: 5 INJECTION INTRAVENOUS at 15:41

## 2021-11-24 RX ADMIN — BUSPIRONE HYDROCHLORIDE 15 MG: 15 TABLET ORAL at 20:44

## 2021-11-24 RX ADMIN — FUROSEMIDE 40 MG: 10 INJECTION INTRAMUSCULAR; INTRAVENOUS at 15:41

## 2021-11-24 ASSESSMENT — ENCOUNTER SYMPTOMS
RHINORRHEA: 1
CONSTIPATION: 0
SORE THROAT: 0
COLOR CHANGE: 0
SINUS PRESSURE: 1
COUGH: 1
COUGH: 0
BLOOD IN STOOL: 0
ABDOMINAL PAIN: 0
VOMITING: 0
SHORTNESS OF BREATH: 1
ABDOMINAL PAIN: 1
DIARRHEA: 0
NAUSEA: 0
TROUBLE SWALLOWING: 0
BACK PAIN: 0

## 2021-11-24 ASSESSMENT — PAIN SCALES - GENERAL: PAINLEVEL_OUTOF10: 1

## 2021-11-24 NOTE — ED PROVIDER NOTES
[address]  EMERGENCY DEPARTMENT ENCOUNTER    Pt Name: Jacob Molina  MRN: [id number]  YOB: 1931  Date of evaluation:11/24/21  PCP: Maki Menezes MD    CHIEF COMPLAINT       Chief Complaint   Patient presents with    Shortness of Breath    Cough       HISTORY OF PRESENT ILLNESS    Jacob Molina is a 80 y.o. male who presents chief complaint of shortness of breath. Patient states that he has been sick for about 2 weeks. He states he originally had congestion and sinus pressure. He apparently had a Covid test which was negative about a week ago. He states since then he has progressed to having more shortness of breath, fatigue, worse with exertion. No fevers that he knows of. No chest pain, palpitations. He does endorse a loss of appetite but no nausea, vomiting, diarrhea or any loss of taste or smell. Patient is vaccinated for COVID-19. He does not wear oxygen at home. Symptoms are acute. Symptoms are moderate. Symptoms are worse with exertion. Nothing alleviates his symptoms. Patient has no other complaints at this time. REVIEW OF SYSTEMS       Review of Systems   Constitutional: Positive for appetite change and fatigue. Negative for chills and fever. HENT: Positive for congestion. Negative for ear pain, sore throat and trouble swallowing. Eyes: Negative for visual disturbance. Respiratory: Positive for cough and shortness of breath. Cardiovascular: Negative for chest pain, palpitations and leg swelling. Gastrointestinal: Negative for abdominal pain, blood in stool, constipation, diarrhea, nausea and vomiting. Genitourinary: Negative for dysuria and flank pain. Musculoskeletal: Negative for arthralgias, back pain, myalgias and neck pain. Skin: Negative for color change, rash and wound. Neurological: Negative for dizziness, weakness, light-headedness, numbness and headaches. Psychiatric/Behavioral: Negative for confusion.    All other systems % OPHTHALMIC SOLUTION    Place 1 drop into both eyes nightly     LISINOPRIL (PRINIVIL;ZESTRIL) 10 MG TABLET    Take 10 mg by mouth daily    METOPROLOL TARTRATE (LOPRESSOR) 25 MG TABLET    Take 25 mg by mouth 2 times daily    MIRTAZAPINE (REMERON) 15 MG TABLET    Take 1 tablet by mouth nightly    OMEPRAZOLE (PRILOSEC OTC) 20 MG TABLET    Take 20 mg by mouth daily    PREDNISONE (DELTASONE) 5 MG TABLET    TAKE ONE TABLET BY MOUTH DAILY    WARFARIN (COUMADIN) 5 MG TABLET    Take 5 mg by mouth daily Indications: INR goal 2-2.5 [facility]       ALLERGIES     is allergic to celebrex [celecoxib] and mobic. FAMILY HISTORY     He indicated that his mother is . He indicated that his father is . He indicated that the status of his brother is unknown.     family history includes Cancer in his father; Heart Disease in his brother and mother; Bartholome Bucky in his father. SOCIAL HISTORY      reports that he quit smoking about 60 years ago. He has a 10.00 pack-year smoking history. He has never used smokeless tobacco. He reports previous alcohol use. He reports that he does not use drugs. PHYSICAL EXAM     INITIAL VITALS:  oral temperature is 97.7 °F (36.5 °C). His blood pressure is 145/101 (abnormal) and his pulse is 126. His respiration is 25 and oxygen saturation is 95%. Physical Exam  Vitals and nursing note reviewed. Constitutional:       General: He is not in acute distress. HENT:      Head: Normocephalic and atraumatic. Eyes:      Conjunctiva/sclera: Conjunctivae normal.      Pupils: Pupils are equal, round, and reactive to light. Cardiovascular:      Rate and Rhythm: Normal rate and regular rhythm. Heart sounds: Normal heart sounds. No murmur heard. Pulmonary:      Effort: Pulmonary effort is normal. No respiratory distress. Breath sounds: Normal breath sounds.    Abdominal:      General: Bowel sounds are normal. There is no distension. Palpations: Abdomen is soft. Tenderness: There is no abdominal tenderness. Musculoskeletal:         General: No tenderness. Cervical back: Neck supple. Right lower leg: Edema present. Left lower leg: Edema present. Lymphadenopathy:      Cervical: No cervical adenopathy. Skin:     General: Skin is warm and dry. Findings: No rash. Neurological:      Mental Status: He is alert and oriented to person, place, and time. Psychiatric:         Judgment: Judgment normal.           DIFFERENTIAL DIAGNOSIS/MDM:   66-year-old male presents with about 2 weeks of congestion, cough and now with shortness of breath. No chest pain. He is afebrile, nontoxic, normal vital signs other than heart rate. Heart rate is irregular and ranging from the 100s to 120s. He does have a history of atrial fibrillation on warfarin. We will get broad work-up including cardiac work-up, will check electrolytes. Will check for COVID-19, influenza. He is not any respiratory distress. Not requiring oxygen at this time. DIAGNOSTIC RESULTS     EKG: All EKG's are interpreted by the Emergency Department Physician who either signs or Co-signs this chart in the absence of a cardiologist.    EKG Interpretation    Interpreted by me    Rhythm: Atrial fibrillation  Rate: 110  Axis: normal  Ectopy: none  Conduction: normal  ST Segments: Nonspecific changes  T Waves: Nonspecific, inversion 1, aVL, V4, V5, V6  Q Waves: Nonspecific    Clinical Impression: Atrial fibrillation with rapid ventricular response with multiple T wave inversions not seen on prior EKG in 2015    RADIOLOGY:   I directly visualized the following  images and reviewed the radiologist interpretations:  XR CHEST PORTABLE   Final Result   Nonspecific interstitial and airspace opacities. Given the presence of   cardiac silhouette enlargement/cardiomegaly and effusions, pulmonary edema is   primary consideration.                  ED BEDSIDE ULTRASOUND:      LABS:  Labs Reviewed   CBC WITH AUTO DIFFERENTIAL - Abnormal; Notable for the following components:       Result Value    Hemoglobin 13.2 (*)     Hematocrit 40.9 (*)     RDW 16.1 (*)     Seg Neutrophils 79 (*)     Lymphocytes 6 (*)     Monocytes 15 (*)     Absolute Lymph # 0.50 (*)     All other components within normal limits   COMPREHENSIVE METABOLIC PANEL W/ REFLEX TO MG FOR LOW K - Abnormal; Notable for the following components:    Glucose 136 (*)     BUN 25 (*)     Sodium 130 (*)     Chloride 96 (*)     All other components within normal limits   TROPONIN - Abnormal; Notable for the following components:    Troponin, High Sensitivity 46 (*)     All other components within normal limits   PROTIME-INR - Abnormal; Notable for the following components:    Protime 22.5 (*)     All other components within normal limits   PROCALCITONIN - Abnormal; Notable for the following components:    Procalcitonin 0.13 (*)     All other components within normal limits   URINE RT REFLEX TO CULTURE - Abnormal; Notable for the following components:    Ketones, Urine TRACE (*)     Urine Hgb MOD (*)     Protein, UA 4+ (*)     All other components within normal limits   MICROSCOPIC URINALYSIS - Abnormal; Notable for the following components:    Bacteria, UA FEW (*)     All other components within normal limits   COVID-19 & INFLUENZA COMBO   CULTURE, URINE   LIPASE   LACTATE DEHYDROGENASE   D-DIMER, QUANTITATIVE   APTT   BRAIN NATRIURETIC PEPTIDE   C-REACTIVE PROTEIN         EMERGENCY DEPARTMENT COURSE:   Vitals:    Vitals:    11/24/21 1417   BP: (!) 145/101   Pulse: 126   Resp: 25   Temp: 97.7 °F (36.5 °C)   TempSrc: Oral   SpO2: 95%     3:59 PM EST  COVID-19 test is negative. Low suspicion for COVID-19 with 2 - test now within the past week. His x-ray does show bilateral opacities concerning more for pulmonary edema per radiology read. He does have swelling in his lower extremities.   Recent echocardiogram in 2019

## 2021-11-24 NOTE — PROGRESS NOTES
Pharmacy Note  Warfarin Consult    Kirstie Christina is a 80 y.o. male for whom pharmacy has been consulted to manage warfarin therapy. Consulting Physician: DEANNA Grey    Warfarin dose prior to admission: 5 mg daily  Warfarin indication: afib  Target INR range: 2-2.5     Past Medical History:   Diagnosis Date    Acute MI ([facility] [address]     Allergic rhinitis 09/02/2011    Anemia     [facility]    Basal cell cancer 03/2013    left side of head, face chin    Basal cell cancer 03/10/2014    left cheek    Cervical radiculopathy     Diverticulitis 02/17/2013    GERD (gastroesophageal reflux disease)     Glaucoma     left eye    Hyperlipidemia     Hypertension     Dr. Yoseph Randall    Hyponatremia 09/02/2011    IBS (irritable bowel syndrome) 09/02/2011    Insomnia 09/02/2011    Osteoarthritis     Osteoarthritis/neck pain. 09/02/2011    [facility] 08/18/2021    active surviellence monitoring PSA    Renal calculi     Shingles     history of shingles    Status post prostatectomy 08/18/2021    Simple prostatectomy for BPH    Urinary frequency     Wears glasses     Wears hearing aid in both ears                 Recent Labs     11/24/21  1435   INR 2.0     Recent Labs     11/24/21  1435   HGB 13.2*   HCT 40.9*          Current warfarin drug-drug interactions: allopurinol      Date             INR        Dose   11/24/2021            2.0       5    Daily PT/INR while inpatient. Thank you for the consult. Will continue to follow.    Dillon Villalba RPh  11/24/2021  6:18 PM

## 2021-11-24 NOTE — TELEPHONE ENCOUNTER
Daughter states patient looks physically worse than she has ever seen him. He is doing nothing but sleeping, has no appetite, is very weak. He is very weak and stumbling when trying to walk. She said he told her he feels bad. I did tell her that with this information she should take him to the ER. She said she has a Thanksgiving dinner coming tomorrow and would like to wait and see if he will eat any of that or is that unreasonable to ask? I again stressed that she should take him to the ER. She asked what to do if he says he doesn't want to go and was it ok to tell him it was me who said he should go and I told her of course she should.

## 2021-11-24 NOTE — TELEPHONE ENCOUNTER
----- Message from [facility] sent at 11/24/2021 11:21 AM EST -----  Subject: Message to Provider    QUESTIONS  Information for Provider? Patients dtr Rosalba Trevino) called stating she has   questions about her dads upper respiratory infection. She stated he just   started medication yesterday 11/24 but he is feeling sob while walking and   experiencing loss of appetite. She would like to be advised on what to do   if his condition worsens. ---------------------------------------------------------------------------  --------------  Ariana REEVES  What is the best way for the office to contact you? OK to leave message on   voicemail  Preferred Call Back Phone Number? [phone]  ---------------------------------------------------------------------------  --------------  SCRIPT ANSWERS  Relationship to Patient? Other  Representative Name? nyasia champagne  Is the Representative on the appropriate HIPAA document in Epic?  Yes

## 2021-11-24 NOTE — PROGRESS NOTES
Medication History completed:    New medications: none    Medications discontinued: none    Changes to dosing:   Warfarin changed to 5 mg daily    Stated allergies: As listed    Other pertinent information: Medications confirmed with [employer]. [facility] Medication Management notes reviewed. The patient's last appointment was 11/4/21 with an INR of 2.2 on that date. The patient started a 10 day course of amoxicillin on 11/23/21.      Thank you,  Braden Chong, PharmD, BCPS  [phone]

## 2021-11-24 NOTE — ED NOTES
Bed: 04  Expected date:   Expected time:   Means of arrival:   Comments:  [facility] VERONICA Parr  11/24/21 0022

## 2021-11-24 NOTE — TELEPHONE ENCOUNTER
Patient's daughter called states the ems was called to take Patient to the hospital [facility] b/c symptoms weren't getting any better.

## 2021-11-24 NOTE — H&P
[address]      [address] [facility]     HISTORY AND PHYSICAL EXAMINATION            Date:   11/24/2021  Patient name:  Lauro Payton  Date of admission:  11/24/2021  2:13 PM  MRN:   [id number]  Account:  [de-identified]  YOB: 1931  PCP:    Claudia Ragsdale MD  Room:   [facility]  Code Status:    Full Code    Chief Complaint:     Chief Complaint   Patient presents with    Shortness of Breath    Cough       History Obtained From:     patient    History of Present Illness: The patient is a 80 y.o. Non- / non  male who presents with Shortness of Breath on exertion. Patient states for the past week or so, he has been having increased sinus pressure with associated difficulty breathing. He also noticed shortness of breath with minimal exertion, such as tying his shoe. He tried nasal breathing strips, which did not provide much relief. Denies any cough associated with this. Was seen by his PCP recently who believed he may have had COVID; test was negative on 11/17. He was given a course of amoxicillin for sinusitis and has taken a few days worth of his course at this time. He states he has never been diagnosed with congestive heart failure but has had atrial fibrillation for quite some time and that his blood pressure has been well controlled. Denies headache, chest pain, orthopnea, abdominal pain. States he has not noticed any leg swelling. In the ED, he was in atrial fibrillation with rapid ventricular response. CBC wnl, no leukocytosis. CMP wnl aside from hyponatremia (sodium 130). Pro-BNP 30,086. Troponin 46. Procalcitonin 0.13. Repeat Covid test negative. One time dose of IV lasix 40mg administered.    Chest x-ray showed nonspecific interstitial and airspace opacities, given the presence of cardiac silhouette enlargement/cardiomegaly and effusions, pulmonary edema is primary consideration. Cardizem drip was started for a-fib with RVR as well as IV lasix 40mg daily. He is being admitted to the hospital for the management of  CHF exacerbation. Past Medical History:     Past Medical History:   Diagnosis Date    Acute MI ([facility] [address]     Allergic rhinitis 09/02/2011    Anemia     Atrial fibrillation (HCC)     [facility] [place]    Basal cell cancer 03/2013    left side of head, face chin    Basal cell cancer 03/10/2014    left cheek    Cervical radiculopathy     Diverticulitis 02/17/2013    GERD (gastroesophageal reflux disease)     Glaucoma     left eye    Hyperlipidemia     Hypertension     Dr. Dereck Mcbride    Hyponatremia 09/02/2011    IBS (irritable bowel syndrome) 09/02/2011    Insomnia 09/02/2011    Osteoarthritis     Osteoarthritis/neck pain.  09/02/2011    Prostate cancer ([facility] [address] 08/18/2021    active surviellence monitoring PSA    Renal calculi     Shingles     history of shingles    Status post prostatectomy 08/18/2021    Simple prostatectomy for BPH    Urinary frequency     Wears glasses     Wears hearing aid in both ears         Past SurgicalHistory:     Past Surgical History:   Procedure Laterality Date    CHOLECYSTECTOMY  9-9-15    laparoscopic with cholangiogram    COLONOSCOPY  2002    COLONOSCOPY  08/2013    HERNIA REPAIR Right     inguinal    LITHOTRIPSY      MOHS SURGERY Left 03/10/14    cheek    MOHS SURGERY  12/30/14    post scalp    MOHS SURGERY Left 08/2017    X2 left cheek    MOHS SURGERY Left 10/21/2019    temple    MOHS SURGERY  12/14/2020    top of head    OTHER SURGICAL HISTORY  9/6/15    attempted ERCP    PROSTATECTOMY  08/18/2021    PROSTATECTOMY N/A 8/18/2021    XI ROBOTIC LAPAROSCOPIC SIMPLE PROSTATECTOMY performed by Ayleen Mares MD at [address] [place] [address] Way  03/11/13    3 areas    SKIN BIOPSY Left 02/11/14    cheek /basal cell carinoma    SKIN lung    Heart Disease Brother        Review of Systems:     Positive and Negative as described in HPI. Review of Systems   Constitutional: Negative for chills and fever. HENT: Positive for rhinorrhea (occasional, from right nostril) and sinus pressure. Respiratory: Positive for shortness of breath. Negative for cough. Cardiovascular: Negative for chest pain and leg swelling. Denies orthopnea   Gastrointestinal: Positive for abdominal pain (mild lower abdominal pain s/p prostatectomy). Negative for constipation and diarrhea. Neurological: Negative for headaches. All other systems reviewed and are negative. Physical Exam:   BP (!) 130/97   Pulse 64   Temp 98 °F (36.7 °C) (Oral)   Resp 20   Ht 5' 4\" (1.626 m)   Wt 168 lb 6.9 oz (76.4 kg)   SpO2 98%   BMI 28.91 kg/m²   Temp (24hrs), Av.2 °F (36.8 °C), Min:97.7 °F (36.5 °C), Max:98.8 °F (37.1 °C)    No results for input(s): POCGLU in the last 72 hours. Intake/Output Summary (Last 24 hours) at 2021 1826  Last data filed at 2021 1817  Gross per 24 hour   Intake --   Output 1060 ml   Net -1060 ml       Physical Exam  Constitutional:       General: He is not in acute distress. Appearance: Normal appearance. He is normal weight. He is not ill-appearing. HENT:      Nose:      Right Sinus: No maxillary sinus tenderness or frontal sinus tenderness. Left Sinus: No maxillary sinus tenderness or frontal sinus tenderness. Mouth/Throat:      Pharynx: Oropharynx is clear. No oropharyngeal exudate or posterior oropharyngeal erythema. Eyes:      General: No scleral icterus. Extraocular Movements: Extraocular movements intact. Conjunctiva/sclera: Conjunctivae normal.   Cardiovascular:      Rate and Rhythm: Tachycardia present. Rhythm irregular. Pulmonary:      Effort: No respiratory distress. Breath sounds: Rales (left lower lung field faint crackles) present. No wheezing or rhonchi.    Abdominal: General: Abdomen is flat. Bowel sounds are normal. There is no distension. Palpations: Abdomen is soft. Tenderness: There is abdominal tenderness (suprapubic, s/p recent prostatectomy). Musculoskeletal:      Right lower leg: Edema (2+ pitting) present. Left lower leg: Edema (2+ pitting) present. Neurological:      General: No focal deficit present. Mental Status: He is alert. Cranial Nerves: No cranial nerve deficit. Psychiatric:         Mood and Affect: Mood normal.         Behavior: Behavior normal.         Investigations:     Laboratory Testing:  Recent Results (from the past 24 hour(s))   EKG 12 Lead    Collection Time: 11/24/21  2:20 PM   Result Value Ref Range    Ventricular Rate 110 BPM    Atrial Rate 147 BPM    QRS Duration 88 ms    Q-T Interval 304 ms    QTc Calculation (Bazett) 411 ms    R Axis 34 degrees    T Axis -154 degrees   COVID-19 & Influenza Combo    Collection Time: 11/24/21  2:34 PM    Specimen: Nasopharyngeal Swab   Result Value Ref Range    Specimen Description . NASOPHARYNGEAL SWAB     Source . NASOPHARYNGEAL SWAB     SARS-CoV-2 RNA, RT PCR Not Detected Not Detected    INFLUENZA A Not Detected Not Detected    INFLUENZA B Not Detected Not Detected   CBC Auto Differential    Collection Time: 11/24/21  2:35 PM   Result Value Ref Range    WBC 9.0 3.5 - 11.0 k/uL    RBC 4.79 4.5 - 5.9 m/uL    Hemoglobin 13.2 (L) 13.5 - 17.5 g/dL    Hematocrit 40.9 (L) 41 - 53 %    MCV 85.4 80 - 100 fL    MCH 27.5 26 - 34 pg    MCHC 32.2 31 - 37 g/dL    RDW 16.1 (H) 11.5 - 14.9 %    Platelets 998 313 - 964 k/uL    MPV 8.7 6.0 - 12.0 fL    NRBC Automated NOT REPORTED per 100 WBC    Differential Type NOT REPORTED     Seg Neutrophils 79 (H) 36 - 66 %    Lymphocytes 6 (L) 24 - 44 %    Monocytes 15 (H) 1 - 7 %    Eosinophils % 0 0 - 4 %    Basophils 0 0 - 2 %    Immature Granulocytes NOT REPORTED 0 %    Segs Absolute 7.10 1.3 - 9.1 k/uL    Absolute Lymph # 0.50 (L) 1.0 - 4.8 k/uL    Absolute Ref Range    Protime 22.5 (H) 11.8 - 14.6 sec    INR 2.0    APTT    Collection Time: 11/24/21  2:35 PM   Result Value Ref Range    PTT 34.9 24.0 - 36.0 sec   Procalcitonin    Collection Time: 11/24/21  2:35 PM   Result Value Ref Range    Procalcitonin 0.13 (H) <0.09 ng/mL   Urinalysis Reflex to Culture    Collection Time: 11/24/21  3:34 PM    Specimen: Urine, clean catch   Result Value Ref Range    Color, UA Yellow Yellow    Turbidity UA Clear Clear    Glucose, Ur NEGATIVE NEGATIVE    Bilirubin Urine NEGATIVE NEGATIVE    Ketones, Urine TRACE (A) NEGATIVE    Specific Gravity, UA 1.026 1.000 - 1.030    Urine Hgb MOD (A) NEGATIVE    pH, UA 5.5 5.0 - 8.0    Protein, UA 4+ (A) NEGATIVE    Urobilinogen, Urine Normal Normal    Nitrite, Urine NEGATIVE NEGATIVE    Leukocyte Esterase, Urine NEGATIVE NEGATIVE    Urinalysis Comments NOT REPORTED    Microscopic Urinalysis    Collection Time: 11/24/21  3:34 PM   Result Value Ref Range    -          WBC, UA 10 TO 20 /HPF    RBC, UA 10 TO 20 /HPF    Casts UA NOT REPORTED /LPF    Crystals, UA NOT REPORTED None /HPF    Epithelial Cells UA 0 TO 2 /HPF    Renal Epithelial, UA NOT REPORTED 0 /HPF    Bacteria, UA FEW (A) None    Mucus, UA NOT REPORTED None    Trichomonas, UA NOT REPORTED None    Amorphous, UA NOT REPORTED None    Other Observations UA NOT REPORTED NOT REQ. Yeast, UA NOT REPORTED None       Imaging/Diagnostics:  XR CHEST PORTABLE    Result Date: 11/24/2021  EXAMINATION: ONE XRAY VIEW OF THE CHEST 11/24/2021 2:26 pm COMPARISON: January 4, 2019 HISTORY: ORDERING SYSTEM PROVIDED HISTORY: SOB TECHNOLOGIST PROVIDED HISTORY: SOB Reason for Exam: Pt states sob. Acuity: Unknown Type of Exam: Unknown Additional signs and symptoms: Pt states sob. FINDINGS: Cardiac silhouette is enlarged. Ill definition of the central pulmonary vasculature with diffuse interstitial noted.   Bibasilar opacities, right greater than left consistent with the small right pleural effusion and trace left pleural effusion probably associated with atelectasis/infiltrates. No pneumothorax. Nonspecific interstitial and airspace opacities. Given the presence of cardiac silhouette enlargement/cardiomegaly and effusions, pulmonary edema is primary consideration.        Assessment :      Primary Problem  Acute on chronic combined systolic and diastolic CHF (congestive heart failure) [facility])    Active Hospital Problems    Diagnosis Date Noted    Acute on chronic combined systolic and diastolic CHF (congestive heart failure) ([facility] [address] [I50.43] 11/24/2021    Prostate cancer ([facility] [address] Joo Vaughn 08/18/2021    Chronic systolic congestive heart failure ([facility] [address] [I50.22] 10/17/2019    Gout [W04.2]     Diastolic dysfunction [Y43.72] 09/05/2015    Essential hypertension [I10] 08/12/2015    Gastroesophageal reflux disease without esophagitis [K21.9] 08/12/2015    Allergic rhinitis [J30.9] 09/02/2011       Plan:     Patient status Admit as inpatient in the  Progressive Unit/Step down    Congestive heart failure, acute exacerbation  -proBNP 30,086, chest x-ray showed nonspecific interstitial and airspace opacities, with pulmonary edema is primary consideration  -One-time dose Lasix IV 40 mg given in ED  -Will continue IV Lasix 40 mg daily  -Echocardiogram from 8/18/2021 showed EF 50 to 55%  -Echocardiogram from 8/5/2019 showed EF 30 to 35%  -Will obtain updated echo this admission  -troponin elevated at 46; no chest pain, trend x2  -cardiology consult    Atrial fibrillation with RVR, not rate controlled, anticoagulated  -Patient takes warfarin 5 mg daily at home, as well as metoprolol  -Patient in RVR on admission, Cardizem drip started, home meds held  -Pharmacy to dose warfarin    Essential Hypertension  -home meds held for now as patient is on cardizem drip    Gout  -continue home allopurinol 100mg daily    GI ppx: protonix 40mg daily PO  DVT ppx: pt already anticoagulated on warfarin    Consultations:   IP CONSULT TO INTERNAL MEDICINE  IP CONSULT TO CARDIOLOGY  PHARMACY TO DOSE WARFARIN  IP CONSULT TO HEART FAILURE NURSE/COORDINATOR    Patient is admitted as inpatient status because of co-morbidities listed above, severity of signs and symptoms as outlined, requirement for current medical therapies and most importantly because of direct risk to patient if care not provided in a hospital setting. Verónica Swanson MD  11/24/2021  6:26 PM    Copy sent to Dr. Ed Girard MD    Attending Physician Statement  I have discussed the care of Norris Bethea with the resident team. I have examined the patient myself and taken ros and hpi , including pertinent history and exam findings,  with the resident. I have reviewed the key elements of all parts of the encounter with the resident. I agree with the assessment, plan and orders as documented by the resident. Principal Problem:    Acute on chronic combined systolic and diastolic CHF (congestive heart failure) (HCC)  Active Problems:    Allergic rhinitis    Essential hypertension    Gastroesophageal reflux disease without esophagitis    Diastolic dysfunction    Gout    Chronic systolic congestive heart failure (HCC)    Prostate cancer ([facility] [address]  Resolved Problems:    * No resolved hospital problems.  *      11/25  CHF exacerbatin- diuresed with iv lasix, chronic diastolic failure  afib RVR- off cardizem, rated controlled on metoprolol, c/w coumadin  Hyponatremia- improving  ECHO pending    likely discharge tomorrow      Electronically signed by Lucila Marino MD

## 2021-11-25 LAB
ABSOLUTE EOS #: 0 K/UL (ref 0–0.4)
ABSOLUTE IMMATURE GRANULOCYTE: ABNORMAL K/UL (ref 0–0.3)
ABSOLUTE LYMPH #: 0.76 K/UL (ref 1–4.8)
ABSOLUTE MONO #: 1.52 K/UL (ref 0.1–1.3)
ANION GAP SERPL CALCULATED.3IONS-SCNC: 7 MMOL/L (ref 9–17)
BASOPHILS # BLD: 0 % (ref 0–2)
BASOPHILS ABSOLUTE: 0 K/UL (ref 0–0.2)
BUN BLDV-MCNC: 18 MG/DL (ref 8–23)
BUN/CREAT BLD: ABNORMAL (ref 9–20)
CALCIUM SERPL-MCNC: 8.6 MG/DL (ref 8.6–10.4)
CHLORIDE BLD-SCNC: 100 MMOL/L (ref 98–107)
CO2: 26 MMOL/L (ref 20–31)
CREAT SERPL-MCNC: 0.85 MG/DL (ref 0.7–1.2)
CULTURE: NORMAL
DIFFERENTIAL TYPE: ABNORMAL
EOSINOPHILS RELATIVE PERCENT: 0 % (ref 0–4)
GFR AFRICAN AMERICAN: >60 ML/MIN
GFR NON-AFRICAN AMERICAN: >60 ML/MIN
GFR SERPL CREATININE-BSD FRML MDRD: ABNORMAL ML/MIN/{1.73_M2}
GFR SERPL CREATININE-BSD FRML MDRD: ABNORMAL ML/MIN/{1.73_M2}
GLUCOSE BLD-MCNC: 110 MG/DL (ref 70–99)
HCT VFR BLD CALC: 37.2 % (ref 41–53)
HEMOGLOBIN: 12.1 G/DL (ref 13.5–17.5)
IMMATURE GRANULOCYTES: ABNORMAL %
INR BLD: 2.2
LYMPHOCYTES # BLD: 10 % (ref 24–44)
Lab: NORMAL
MCH RBC QN AUTO: 27.7 PG (ref 26–34)
MCHC RBC AUTO-ENTMCNC: 32.5 G/DL (ref 31–37)
MCV RBC AUTO: 85.2 FL (ref 80–100)
MONOCYTES # BLD: 20 % (ref 1–7)
MORPHOLOGY: ABNORMAL
NRBC AUTOMATED: ABNORMAL PER 100 WBC
PDW BLD-RTO: 15.9 % (ref 11.5–14.9)
PLATELET # BLD: 136 K/UL (ref 150–450)
PLATELET ESTIMATE: ABNORMAL
PMV BLD AUTO: 8.9 FL (ref 6–12)
POTASSIUM SERPL-SCNC: 4.2 MMOL/L (ref 3.7–5.3)
PROTHROMBIN TIME: 24 SEC (ref 11.8–14.6)
RBC # BLD: 4.36 M/UL (ref 4.5–5.9)
RBC # BLD: ABNORMAL 10*6/UL
SEG NEUTROPHILS: 70 % (ref 36–66)
SEGMENTED NEUTROPHILS ABSOLUTE COUNT: 5.32 K/UL (ref 1.3–9.1)
SODIUM BLD-SCNC: 133 MMOL/L (ref 135–144)
SPECIMEN DESCRIPTION: NORMAL
WBC # BLD: 7.6 K/UL (ref 3.5–11)
WBC # BLD: ABNORMAL 10*3/UL

## 2021-11-25 PROCEDURE — 36415 COLL VENOUS BLD VENIPUNCTURE: CPT

## 2021-11-25 PROCEDURE — 2500000003 HC RX 250 WO HCPCS: Performed by: EMERGENCY MEDICINE

## 2021-11-25 PROCEDURE — 6370000000 HC RX 637 (ALT 250 FOR IP): Performed by: INTERNAL MEDICINE

## 2021-11-25 PROCEDURE — 2060000000 HC ICU INTERMEDIATE R&B

## 2021-11-25 PROCEDURE — 80048 BASIC METABOLIC PNL TOTAL CA: CPT

## 2021-11-25 PROCEDURE — 2580000003 HC RX 258: Performed by: EMERGENCY MEDICINE

## 2021-11-25 PROCEDURE — 85610 PROTHROMBIN TIME: CPT

## 2021-11-25 PROCEDURE — 6360000002 HC RX W HCPCS

## 2021-11-25 PROCEDURE — 85025 COMPLETE CBC W/AUTO DIFF WBC: CPT

## 2021-11-25 PROCEDURE — 6370000000 HC RX 637 (ALT 250 FOR IP)

## 2021-11-25 RX ORDER — WARFARIN SODIUM 5 MG/1
5 TABLET ORAL ONCE
Status: COMPLETED | OUTPATIENT
Start: 2021-11-25 | End: 2021-11-25

## 2021-11-25 RX ADMIN — BUSPIRONE HYDROCHLORIDE 15 MG: 15 TABLET ORAL at 10:01

## 2021-11-25 RX ADMIN — METOPROLOL TARTRATE 25 MG: 25 TABLET, FILM COATED ORAL at 14:17

## 2021-11-25 RX ADMIN — PANTOPRAZOLE SODIUM 40 MG: 40 TABLET, DELAYED RELEASE ORAL at 05:56

## 2021-11-25 RX ADMIN — BUSPIRONE HYDROCHLORIDE 15 MG: 15 TABLET ORAL at 20:26

## 2021-11-25 RX ADMIN — WARFARIN SODIUM 5 MG: 5 TABLET ORAL at 17:48

## 2021-11-25 RX ADMIN — ALLOPURINOL 100 MG: 100 TABLET ORAL at 10:01

## 2021-11-25 RX ADMIN — BUSPIRONE HYDROCHLORIDE 15 MG: 15 TABLET ORAL at 14:17

## 2021-11-25 RX ADMIN — FUROSEMIDE 40 MG: 10 INJECTION INTRAMUSCULAR; INTRAVENOUS at 10:01

## 2021-11-25 RX ADMIN — MIRTAZAPINE 15 MG: 15 TABLET, FILM COATED ORAL at 20:26

## 2021-11-25 RX ADMIN — METOPROLOL TARTRATE 25 MG: 25 TABLET, FILM COATED ORAL at 20:26

## 2021-11-25 RX ADMIN — LATANOPROST 1 DROP: 50 SOLUTION OPHTHALMIC at 20:31

## 2021-11-25 RX ADMIN — DILTIAZEM HYDROCHLORIDE 10 MG/HR: 5 INJECTION INTRAVENOUS at 00:15

## 2021-11-25 ASSESSMENT — ENCOUNTER SYMPTOMS
COUGH: 0
ABDOMINAL PAIN: 0
SINUS PRESSURE: 0
SINUS PAIN: 0
ABDOMINAL DISTENTION: 0
BLOOD IN STOOL: 0
SHORTNESS OF BREATH: 0
DIARRHEA: 0
CONSTIPATION: 0

## 2021-11-25 ASSESSMENT — PAIN SCALES - GENERAL: PAINLEVEL_OUTOF10: 0

## 2021-11-25 NOTE — PROGRESS NOTES
Pharmacy Note  Warfarin Consult follow-up      Recent Labs     11/24/21  1435 11/25/21  0726   INR 2.0 2.2     Recent Labs     11/24/21  1435 11/25/21  0850   HGB 13.2* 12.1*   HCT 40.9* 37.2*    136*       Significant Drug-Drug Interactions:  New warfarin drug-drug interactions: none  Discontinued drug-drug interactions: none  Current warfarin drug-drug interactions: Remeron, Tylenol      Date             INR        Dose given previous day  Dose scheduled for today  11/25/2021            2.2       5 mg           5 mg        Notes:                     Daily PT/INR while inpatient.      Cade Roman RPH,PharmD,  11/25/2021, 1:58 PM

## 2021-11-25 NOTE — PROGRESS NOTES
[facility]    PROGRESS NOTE             11/25/2021    12:38 PM    Name:   Yaakov Gaxiola  MRN:     [id number]     Acct:      [de-identified]   Room:   2106/2106-01  IP Day:  1  Admit Date:  11/24/2021  2:13 PM    PCP:  Dinora Foley MD  Code Status:  Full Code    Subjective:     C/C:   Chief Complaint   Patient presents with    Shortness of Breath    Cough     Interval History Status: improved. Patient states he is doing well today. Feels improved as compared to yesterday. Does not have as much shortness of breath, though states it is difficult to assess as he has not been moving around as much and is not exerting himself. His primary complaint was shortness of breath on exertion. Denies orthopnea, nasal/sinus congestion, abdominal pain, constipation, diarrhea. Has not paid attention to lower extremity edema but states he does not feel uncomfortable. Plan for echo today. Brief History:     The patient is a 80 y.o. Non- / non  male who presents with Shortness of Breath on exertion.      Patient states for the past week or so, he has been having increased sinus pressure with associated difficulty breathing. He also noticed shortness of breath with minimal exertion, such as tying his shoe. He tried nasal breathing strips, which did not provide much relief. Denies any cough associated with this. Was seen by his PCP recently who believed he may have had COVID; test was negative on 11/17. He was given a course of amoxicillin for sinusitis and has taken a few days worth of his course at this time.      He states he has never been diagnosed with congestive heart failure but has had atrial fibrillation for quite some time and that his blood pressure has been well controlled. Denies headache, chest pain, orthopnea, abdominal pain.   States he has not noticed any leg swelling.     In the ED, he was in atrial fibrillation with rapid ventricular response. CBC wnl, no leukocytosis. CMP wnl aside from hyponatremia (sodium 130). Pro-BNP 30,086. Troponin 46. Procalcitonin 0.13. Repeat Covid test negative. One time dose of IV lasix 40mg administered. Chest x-ray showed nonspecific interstitial and airspace opacities, given the presence of cardiac silhouette enlargement/cardiomegaly and effusions, pulmonary edema is primary consideration.        Cardizem drip was started for a-fib with RVR as well as IV lasix 40mg daily.      He is being admitted to the hospital for the management of  CHF exacerbation. Review of Systems:     Review of Systems   Constitutional: Negative for chills and fever. HENT: Negative for congestion, sinus pressure and sinus pain. Respiratory: Negative for cough and shortness of breath. Cardiovascular: Negative for chest pain and leg swelling. Gastrointestinal: Negative for abdominal distention, abdominal pain, blood in stool, constipation and diarrhea. Neurological: Negative for light-headedness and headaches. Medications: Allergies:     Allergies   Allergen Reactions    Celebrex [Celecoxib] Nausea Only    Mobic Nausea Only       Current Meds:   Scheduled Meds:    busPIRone  15 mg Oral TID    latanoprost  1 drop Both Eyes Nightly    mirtazapine  15 mg Oral Nightly    pantoprazole  40 mg Oral QAM AC    allopurinol  100 mg Oral Daily    furosemide  40 mg IntraVENous Daily    warfarin (COUMADIN) daily dosing (placeholder)   Other RX Placeholder     Continuous Infusions:    dilTIAZem (CARDIZEM) 125 mg in dextrose 5% 125 mL infusion 7.5 mg/hr (11/25/21 0602)     PRN Meds: ondansetron **OR** ondansetron, polyethylene glycol, acetaminophen **OR** acetaminophen, potassium chloride **OR** potassium alternative oral replacement **OR** potassium chloride, magnesium sulfate    Data:     Past Medical History:   has a past medical history of Acute MI ([address] Allergic rhinitis, Anemia, Atrial fibrillation ([facility] [address] Basal cell cancer, Basal cell cancer, Cervical radiculopathy, Diverticulitis, GERD (gastroesophageal reflux disease), Glaucoma, Hyperlipidemia, Hypertension, Hyponatremia, IBS (irritable bowel syndrome), Insomnia, Osteoarthritis, Osteoarthritis/neck pain. , Prostate cancer [facility]), Renal calculi, Shingles, Status post prostatectomy, Urinary frequency, Wears glasses, and Wears hearing aid in both ears. Social History:   reports that he quit smoking about 60 years ago. He has a 10.00 pack-year smoking history. He has never used smokeless tobacco. He reports previous alcohol use. He reports that he does not use drugs. Family History:   Family History   Problem Relation Age of Onset    Heart Disease Mother     Lung Cancer Father     Cancer Father         lung    Heart Disease Brother        Vitals:  /64   Pulse 77   Temp 97.7 °F (36.5 °C) (Oral)   Resp 18   Ht 5' 4\" (1.626 m)   Wt 168 lb 6.3 oz (76.4 kg)   SpO2 98%   BMI 28.90 kg/m²   Temp (24hrs), Av.8 °F (36.6 °C), Min:97.3 °F (36.3 °C), Max:98.8 °F (37.1 °C)    No results for input(s): POCGLU in the last 72 hours. I/O(24Hr):     Intake/Output Summary (Last 24 hours) at 2021 1238  Last data filed at 2021 1138  Gross per 24 hour   Intake 240 ml   Output 3835 ml   Net -3595 ml       Labs:    CBC with Differential:    Lab Results   Component Value Date    WBC 7.6 2021    RBC 4.36 2021    RBC 4.86 2017    HGB 12.1 2021    HCT 37.2 2021     2021    MCV 85.2 2021    MCH 27.7 2021    MCHC 32.5 2021    RDW 15.9 2021    LYMPHOPCT 10 2021    LYMPHOPCT 15.0 2017    MONOPCT 20 2021    MONOPCT 7.7 10/05/2012    EOSPCT 1.3 2017    BASOPCT 0 2021    BASOPCT 0.3 2017    MONOSABS 1.52 2021    MONOSABS 0.6 10/05/2012    LYMPHSABS 0.76 2021    LYMPHSABS 1.1 10/05/2012    EOSABS 0.00 2021    EOSABS 0.1 10/05/2012    BASOSABS 0.00 11/25/2021    DIFFTYPE NOT REPORTED 11/25/2021     CMP:    Lab Results   Component Value Date     11/25/2021    K 4.2 11/25/2021     11/25/2021    CO2 26 11/25/2021    BUN 18 11/25/2021    CREATININE 0.85 11/25/2021    GFRAA >60 11/25/2021    LABGLOM >60 11/25/2021    GLUCOSE 110 11/25/2021    GLUCOSE 89 05/23/2017    PROT 6.6 11/24/2021    LABALBU 3.9 11/24/2021    CALCIUM 8.6 11/25/2021    BILITOT 1.09 11/24/2021    ALKPHOS 92 11/24/2021    AST 28 11/24/2021    ALT 20 11/24/2021     BMP:    Lab Results   Component Value Date     11/25/2021    K 4.2 11/25/2021     11/25/2021    CO2 26 11/25/2021    BUN 18 11/25/2021    LABALBU 3.9 11/24/2021    CREATININE 0.85 11/25/2021    CALCIUM 8.6 11/25/2021    GFRAA >60 11/25/2021    LABGLOM >60 11/25/2021    GLUCOSE 110 11/25/2021    GLUCOSE 89 05/23/2017     Last 3 Troponin:    Lab Results   Component Value Date    TROPONINI <0.01 07/31/2013    TROPONINI <0.01 07/31/2013    TROPONINI <0.01 07/31/2013     U/A:    Lab Results   Component Value Date    NITRITE neg 06/01/2021    COLORU Yellow 11/24/2021    PROTEINU 4+ 11/24/2021    PHUR 5.5 11/24/2021    WBCUA 10 TO 20 11/24/2021    RBCUA 10 TO 20 11/24/2021    MUCUS NOT REPORTED 11/24/2021    TRICHOMONAS NOT REPORTED 11/24/2021    YEAST NOT REPORTED 11/24/2021    BACTERIA FEW 11/24/2021    CLARITYU clear 06/01/2021    SPECGRAV 1.026 11/24/2021    LEUKOCYTESUR NEGATIVE 11/24/2021    UROBILINOGEN Normal 11/24/2021    BILIRUBINUR NEGATIVE 11/24/2021    BLOODU trace 06/01/2021    GLUCOSEU NEGATIVE 11/24/2021    AMORPHOUS NOT REPORTED 11/24/2021         Radiology:    XR CHEST PORTABLE    Result Date: 11/24/2021  EXAMINATION: ONE XRAY VIEW OF THE CHEST 11/24/2021 2:26 pm COMPARISON: January 4, 2019 HISTORY: ORDERING SYSTEM PROVIDED HISTORY: SOB TECHNOLOGIST PROVIDED HISTORY: SOB Reason for Exam: Pt states sob.  Acuity: Unknown Type of Exam: Unknown Additional signs and symptoms: Pt states sob. FINDINGS: Cardiac silhouette is enlarged. Ill definition of the central pulmonary vasculature with diffuse interstitial noted. Bibasilar opacities, right greater than left consistent with the small right pleural effusion and trace left pleural effusion probably associated with atelectasis/infiltrates. No pneumothorax. Nonspecific interstitial and airspace opacities. Given the presence of cardiac silhouette enlargement/cardiomegaly and effusions, pulmonary edema is primary consideration. Physical Examination:        Physical Exam  Constitutional:       General: He is not in acute distress. Appearance: Normal appearance. He is normal weight. He is not ill-appearing. Cardiovascular:      Rate and Rhythm: Rhythm irregular. Pulmonary:      Effort: Pulmonary effort is normal. No respiratory distress. Breath sounds: Normal breath sounds. No wheezing, rhonchi or rales. Abdominal:      General: Abdomen is flat. Bowel sounds are normal. There is no distension. Palpations: Abdomen is soft. Tenderness: There is no abdominal tenderness. Musculoskeletal:      Right lower leg: Edema (2+) present. Left lower leg: Edema (2+) present. Neurological:      Mental Status: He is alert.            Assessment:        Primary Problem  Acute on chronic combined systolic and diastolic CHF (congestive heart failure) [facility])    Active Hospital Problems    Diagnosis Date Noted    Acute on chronic combined systolic and diastolic CHF (congestive heart failure) ([facility] [I50.43] 11/24/2021    Prostate cancer ([facility] Missy Lott 08/18/2021    Chronic systolic congestive heart failure ([facility] [I50.22] 10/17/2019    Gout [F43.4]     Diastolic dysfunction [E56.04] 09/05/2015    Essential hypertension [I10] 08/12/2015    Gastroesophageal reflux disease without esophagitis [K21.9] 08/12/2015    Allergic rhinitis [J30.9] 09/02/2011       Plan:        Congestive heart failure, acute exacerbation  -proBNP 30,086 on admission, chest x-ray showed nonspecific interstitial and airspace opacities, with pulmonary edema as primary consideration  -One-time dose Lasix IV 40 mg given in ED; continue IV Lasix 40 mg daily  -Echocardiogram from 8/18/2021 showed EF 50 to 55%  -Echocardiogram from 8/5/2019 showed EF 30 to 35%  -New echocardiogram pending  -troponin elevated at 46 x2; no chest pain, likely baseline  -cardiology consulted     Atrial fibrillation with RVR, not rate controlled, anticoagulated  -Patient takes warfarin 5 mg daily at home, as well as metoprolol  -Patient in RVR on admission  -Still on Cardizem drip, home BP/A. fib meds held  -Pharmacy to dose warfarin     Essential Hypertension  -home meds held for now as patient is on cardizem drip     Gout  -continue home allopurinol 100mg daily     GI ppx: protonix 40mg daily PO  DVT ppx: pt already anticoagulated on warfarin    Yuriy Terry MD  11/25/2021  12:38 PM

## 2021-11-25 NOTE — PLAN OF CARE
Problem: Falls - Risk of:  Goal: Will remain free from falls  Description: Will remain free from falls  Outcome: Ongoing     Problem: Falls - Risk of:  Goal: Absence of physical injury  Description: Absence of physical injury  Outcome: Ongoing     Problem: Safety:  Goal: Free from accidental physical injury  Description: Free from accidental physical injury  Outcome: Ongoing     Problem: Safety:  Goal: Free from intentional harm  Description: Free from intentional harm  Outcome: Ongoing     Problem: Daily Care:  Goal: Daily care needs are met  Description: Daily care needs are met  Outcome: Ongoing     Problem: Pain:  Goal: Patient's pain/discomfort is manageable  Description: Patient's pain/discomfort is manageable  Outcome: Ongoing     Problem: Skin Integrity:  Goal: Skin integrity will stabilize  Description: Skin integrity will stabilize  Outcome: Ongoing

## 2021-11-25 NOTE — CONSULTS
[address]  [phone]               Cardiology Consult           Date of Admission:  11/24/2021  Date of Consultation:  11/25/2021      PCP:  Anoop Medina MD      Chief Complaint: dyspnea    History of Present Illness:  Gurpreet Nicholas is a 80 y.o. male who presents with admitted with 4 day hx of dyspnea. Found to have evidence of CHF and A-fib with RVR. Currently feeling much better. Was not taking metoprolol at home that was previously prescribed. PMH:   has a past medical history of Acute MI ([facility] [address] Allergic rhinitis, Anemia, Atrial fibrillation ([facility] [address] Basal cell cancer, Basal cell cancer, Cervical radiculopathy, Diverticulitis, GERD (gastroesophageal reflux disease), Glaucoma, Hyperlipidemia, Hypertension, Hyponatremia, IBS (irritable bowel syndrome), Insomnia, Osteoarthritis, Osteoarthritis/neck pain. , Prostate cancer [facility]), Renal calculi, Shingles, Status post prostatectomy, Urinary frequency, Wears glasses, and Wears hearing aid in both ears. PSH:   has a past surgical history that includes Spine surgery (04/2003); Lithotripsy; Skin cancer excision; skin biopsy (03/11/13); Skin cancer excision (Left, 03/2013); Colonoscopy (2002); Colonoscopy (08/2013); skin biopsy (Left, 02/11/14); Mohs surgery (Left, 03/10/14); Mohs surgery (12/30/14); other surgical history (9/6/15); Cholecystectomy (9-9-15); Skin cancer excision (Right, 08/12/2016); Mohs surgery (Left, 08/2017); Mohs surgery (Left, 10/21/2019); Mohs surgery (12/14/2020); hernia repair (Right); Prostatectomy (08/18/2021); and Prostatectomy (N/A, 8/18/2021). Allergies: Allergies   Allergen Reactions    Celebrex [Celecoxib] Nausea Only    Mobic Nausea Only        Home Meds:    Prior to Admission medications    Medication Sig Start Date End Date Taking?  Authorizing Provider   warfarin (COUMADIN) 5 MG tablet Take 5 mg by mouth daily Indications: INR goal 2-2.5 Dosing per [facility] Medication Management Service   Yes Historical Provider, MD   lisinopril (PRINIVIL;ZESTRIL) 10 MG tablet Take 10 mg by mouth daily   Yes Historical Provider, MD   amoxicillin (AMOXIL) 875 MG tablet Take 1 tablet by mouth 2 times daily for 10 days 11/22/21 12/2/21 Yes Kamilah Gomes MD   predniSONE (DELTASONE) 5 MG tablet TAKE ONE TABLET BY MOUTH DAILY 11/8/21  Yes NASRA Murrieta CNP   busPIRone (BUSPAR) 15 MG tablet Take 15 mg by mouth 3 times daily 10/18/21  Yes Kamilah Gomes MD   omeprazole (PRILOSEC OTC) 20 MG tablet Take 20 mg by mouth daily   Yes Historical Provider, MD   mirtazapine (REMERON) 15 MG tablet Take 1 tablet by mouth nightly 9/16/21  Yes Kamilah Gomes MD   allopurinol (ZYLOPRIM) 100 MG tablet TAKE ONE TABLET BY MOUTH DAILY 4/9/21  Yes Kamilah Gomes MD   latanoprost (XALATAN) 0.005 % ophthalmic solution Place 1 drop into both eyes nightly    Yes Historical Provider, MD   metoprolol tartrate (LOPRESSOR) 25 MG tablet Take 25 mg by mouth 2 times daily   Yes Historical Provider, MD        Hospital Meds:    Current Facility-Administered Medications   Medication Dose Route Frequency Provider Last Rate Last Admin    metoprolol tartrate (LOPRESSOR) tablet 25 mg  25 mg Oral BID Viktor Khan DO        dilTIAZem 125 mg in dextrose 5 % 125 mL infusion  5-15 mg/hr IntraVENous Continuous Viktor Khan DO        busPIRone (BUSPAR) tablet 15 mg  15 mg Oral TID James Gregorio MD   15 mg at 11/25/21 1001    latanoprost (XALATAN) 0.005 % ophthalmic solution 1 drop  1 drop Both Eyes Nightly James Gregorio MD   1 drop at 11/24/21 2045    mirtazapine (REMERON) tablet 15 mg  15 mg Oral Nightly James Gregorio MD   15 mg at 11/24/21 2044    pantoprazole (PROTONIX) tablet 40 mg  40 mg Oral QAM AC James Gregorio MD   40 mg at 11/25/21 0556    allopurinol (ZYLOPRIM) tablet 100 mg  100 mg Oral Daily James Gregorio MD   100 mg at 11/25/21 1001    ondansetron (ZOFRAN-ODT) disintegrating tablet 4 mg  4 mg Oral Q8H PRN Chichi Campbell MD        Or    ondansetron [facility]) injection 4 mg  4 mg IntraVENous Q6H PRN Chichi Campbell MD        polyethylene glycol [facility]) packet 17 g  17 g Oral Daily PRN Chichi Campbell MD        acetaminophen (TYLENOL) tablet 650 mg  650 mg Oral Q6H PRN Chichi Campbell MD   650 mg at 11/24/21 2254    Or    acetaminophen (TYLENOL) suppository 650 mg  650 mg Rectal Q6H PRN Chichi Campbell MD        potassium chloride (KLOR-CON M) extended release tablet 40 mEq  40 mEq Oral PRN Chichi Campbell MD        Or    potassium bicarb-citric acid (EFFER-K) effervescent tablet 40 mEq  40 mEq Oral PRN Chichi Campbell MD        Or    potassium chloride 10 mEq/100 mL IVPB (Peripheral Line)  10 mEq IntraVENous PRN Chichi Campbell MD        furosemide (LASIX) injection 40 mg  40 mg IntraVENous Daily Chichi Campbell MD   40 mg at 11/25/21 1001    magnesium sulfate 1000 mg in dextrose 5% 100 mL IVPB  1,000 mg IntraVENous PRN Chichi Campbell MD        warfarin (COUMADIN) daily dosing (placeholder)   Other RX Placeholder Chichi Campbell MD           Social History:       TOBACCO:   reports that he quit smoking about 60 years ago. He has a 10.00 pack-year smoking history. He has never used smokeless tobacco.  ETOH:   reports previous alcohol use. DRUGS:  reports no history of drug use. OCCUPATION:          Family Histroy:         Problem Relation Age of Onset    Heart Disease Mother     Lung Cancer Father     Cancer Father         lung    Heart Disease Brother            Review of Systems:   · Constitutional: there has been no unanticipated weight loss. There's been no change in energy level, sleep pattern, or activity level. · Eyes: No visual changes or diplopia. No scleral icterus. · ENT: No Headaches, hearing loss or vertigo. No mouth sores or sore throat. · Cardiovascular: No chest pain, dyspnea on exertion, palpitations or loss of consciousness.  No cough, hemoptysis, MCV 85.4 85.2    136*     BMP:   Recent Labs     11/24/21  1435 11/25/21  0850   * 133*   K 4.9 4.2   CL 96* 100   CO2 22 26   BUN 25* 18   CREATININE 1.05 0.85     PT/INR:   Recent Labs     11/24/21  1435 11/25/21  0726   PROTIME 22.5* 24.0*   INR 2.0 2.2     APTT:   Recent Labs     11/24/21  1435   APTT 34.9     MAG: No results for input(s): MG in the last 72 hours. D Dimer:   Recent Labs     11/24/21  1435   DDIMER 0.42     Troponin T   Recent Labs     11/24/21  1435 11/24/21  1811   TROPHS 46* 46*     ProBNP Invalid input(s): PRO-BNP    XR CHEST PORTABLE    Result Date: 11/24/2021  Nonspecific interstitial and airspace opacities. Given the presence of cardiac silhouette enlargement/cardiomegaly and effusions, pulmonary edema is primary consideration. Diagnosis:  Principal Problem:    Acute on chronic combined systolic and diastolic CHF (congestive heart failure) (HCC)  Active Problems:    Allergic rhinitis    Essential hypertension    Gastroesophageal reflux disease without esophagitis    Diastolic dysfunction    Gout    Chronic systolic congestive heart failure (HCC)    Prostate cancer ([facility]  Resolved Problems:    * No resolved hospital problems. *          Plan:  Troponin elevation:  Secondary to A-fib with RVR and CHF    Resume metoprolol and titrate as needed for rate control. Wean off diltiazem. Continue diuresis. Continue warfarin.           Electronically signed by Ankita Cotter DO on 11/25/2021 at 12:59 PM

## 2021-11-26 ENCOUNTER — APPOINTMENT (OUTPATIENT)
Dept: NON INVASIVE DIAGNOSTICS | Age: 86
DRG: 291 | End: 2021-11-26
Payer: MEDICARE

## 2021-11-26 LAB
ABSOLUTE EOS #: 0.09 K/UL (ref 0–0.4)
ABSOLUTE IMMATURE GRANULOCYTE: ABNORMAL K/UL (ref 0–0.3)
ABSOLUTE LYMPH #: 0.6 K/UL (ref 1–4.8)
ABSOLUTE MONO #: 1.89 K/UL (ref 0.1–1.3)
ANION GAP SERPL CALCULATED.3IONS-SCNC: 10 MMOL/L (ref 9–17)
BASOPHILS # BLD: 0 % (ref 0–2)
BASOPHILS ABSOLUTE: 0 K/UL (ref 0–0.2)
BUN BLDV-MCNC: 18 MG/DL (ref 8–23)
BUN/CREAT BLD: NORMAL (ref 9–20)
CALCIUM SERPL-MCNC: 9.1 MG/DL (ref 8.6–10.4)
CHLORIDE BLD-SCNC: 102 MMOL/L (ref 98–107)
CO2: 27 MMOL/L (ref 20–31)
CREAT SERPL-MCNC: 1.11 MG/DL (ref 0.7–1.2)
DIFFERENTIAL TYPE: ABNORMAL
EOSINOPHILS RELATIVE PERCENT: 1 % (ref 0–4)
GFR AFRICAN AMERICAN: >60 ML/MIN
GFR NON-AFRICAN AMERICAN: >60 ML/MIN
GFR SERPL CREATININE-BSD FRML MDRD: NORMAL ML/MIN/{1.73_M2}
GFR SERPL CREATININE-BSD FRML MDRD: NORMAL ML/MIN/{1.73_M2}
GLUCOSE BLD-MCNC: 95 MG/DL (ref 70–99)
HCT VFR BLD CALC: 44.7 % (ref 41–53)
HEMOGLOBIN: 14.7 G/DL (ref 13.5–17.5)
IMMATURE GRANULOCYTES: ABNORMAL %
INR BLD: 2.5
LV EF: 25 %
LVEF MODALITY: NORMAL
LYMPHOCYTES # BLD: 7 % (ref 24–44)
MAGNESIUM: 2.2 MG/DL (ref 1.6–2.6)
MCH RBC QN AUTO: 28 PG (ref 26–34)
MCHC RBC AUTO-ENTMCNC: 32.8 G/DL (ref 31–37)
MCV RBC AUTO: 85.3 FL (ref 80–100)
MONOCYTES # BLD: 22 % (ref 1–7)
MORPHOLOGY: ABNORMAL
MORPHOLOGY: ABNORMAL
NRBC AUTOMATED: ABNORMAL PER 100 WBC
PDW BLD-RTO: 15.9 % (ref 11.5–14.9)
PLATELET # BLD: 202 K/UL (ref 150–450)
PLATELET ESTIMATE: ABNORMAL
PMV BLD AUTO: 8.6 FL (ref 6–12)
POTASSIUM SERPL-SCNC: 3.9 MMOL/L (ref 3.7–5.3)
PROTHROMBIN TIME: 26.6 SEC (ref 11.8–14.6)
RBC # BLD: 5.24 M/UL (ref 4.5–5.9)
RBC # BLD: ABNORMAL 10*6/UL
SEG NEUTROPHILS: 70 % (ref 36–66)
SEGMENTED NEUTROPHILS ABSOLUTE COUNT: 6.02 K/UL (ref 1.3–9.1)
SODIUM BLD-SCNC: 139 MMOL/L (ref 135–144)
WBC # BLD: 8.6 K/UL (ref 3.5–11)
WBC # BLD: ABNORMAL 10*3/UL

## 2021-11-26 PROCEDURE — 6360000004 HC RX CONTRAST MEDICATION: Performed by: INTERNAL MEDICINE

## 2021-11-26 PROCEDURE — 2060000000 HC ICU INTERMEDIATE R&B

## 2021-11-26 PROCEDURE — 83735 ASSAY OF MAGNESIUM: CPT

## 2021-11-26 PROCEDURE — 36415 COLL VENOUS BLD VENIPUNCTURE: CPT

## 2021-11-26 PROCEDURE — 6370000000 HC RX 637 (ALT 250 FOR IP)

## 2021-11-26 PROCEDURE — 80048 BASIC METABOLIC PNL TOTAL CA: CPT

## 2021-11-26 PROCEDURE — 99232 SBSQ HOSP IP/OBS MODERATE 35: CPT | Performed by: INTERNAL MEDICINE

## 2021-11-26 PROCEDURE — 6360000002 HC RX W HCPCS: Performed by: INTERNAL MEDICINE

## 2021-11-26 PROCEDURE — 85025 COMPLETE CBC W/AUTO DIFF WBC: CPT

## 2021-11-26 PROCEDURE — 2500000003 HC RX 250 WO HCPCS: Performed by: INTERNAL MEDICINE

## 2021-11-26 PROCEDURE — 6370000000 HC RX 637 (ALT 250 FOR IP): Performed by: INTERNAL MEDICINE

## 2021-11-26 PROCEDURE — 6360000002 HC RX W HCPCS

## 2021-11-26 PROCEDURE — C8929 TTE W OR WO FOL WCON,DOPPLER: HCPCS

## 2021-11-26 PROCEDURE — 85610 PROTHROMBIN TIME: CPT

## 2021-11-26 RX ORDER — FUROSEMIDE 40 MG/1
40 TABLET ORAL DAILY
Status: DISCONTINUED | OUTPATIENT
Start: 2021-11-26 | End: 2021-11-27 | Stop reason: HOSPADM

## 2021-11-26 RX ORDER — FLUTICASONE PROPIONATE 50 MCG
1 SPRAY, SUSPENSION (ML) NASAL DAILY
Status: DISCONTINUED | OUTPATIENT
Start: 2021-11-26 | End: 2021-11-27 | Stop reason: HOSPADM

## 2021-11-26 RX ORDER — METOPROLOL TARTRATE 50 MG/1
50 TABLET, FILM COATED ORAL 2 TIMES DAILY
Status: DISCONTINUED | OUTPATIENT
Start: 2021-11-26 | End: 2021-11-27 | Stop reason: HOSPADM

## 2021-11-26 RX ORDER — DIGOXIN 0.25 MG/ML
250 INJECTION INTRAMUSCULAR; INTRAVENOUS
Status: DISPENSED | OUTPATIENT
Start: 2021-11-26 | End: 2021-11-26

## 2021-11-26 RX ORDER — METOPROLOL TARTRATE 5 MG/5ML
5 INJECTION INTRAVENOUS ONCE
Status: COMPLETED | OUTPATIENT
Start: 2021-11-26 | End: 2021-11-26

## 2021-11-26 RX ORDER — LISINOPRIL 10 MG/1
10 TABLET ORAL
Status: DISCONTINUED | OUTPATIENT
Start: 2021-11-26 | End: 2021-11-27 | Stop reason: HOSPADM

## 2021-11-26 RX ORDER — WARFARIN SODIUM 5 MG/1
5 TABLET ORAL ONCE
Status: COMPLETED | OUTPATIENT
Start: 2021-11-26 | End: 2021-11-26

## 2021-11-26 RX ADMIN — FUROSEMIDE 40 MG: 40 TABLET ORAL at 20:05

## 2021-11-26 RX ADMIN — PANTOPRAZOLE SODIUM 40 MG: 40 TABLET, DELAYED RELEASE ORAL at 06:37

## 2021-11-26 RX ADMIN — FLUTICASONE PROPIONATE 1 SPRAY: 50 SPRAY, METERED NASAL at 14:14

## 2021-11-26 RX ADMIN — WARFARIN SODIUM 5 MG: 5 TABLET ORAL at 14:13

## 2021-11-26 RX ADMIN — DIGOXIN 250 MCG: 0.25 INJECTION INTRAMUSCULAR; INTRAVENOUS at 09:14

## 2021-11-26 RX ADMIN — LATANOPROST 1 DROP: 50 SOLUTION OPHTHALMIC at 21:40

## 2021-11-26 RX ADMIN — METOPROLOL TARTRATE 50 MG: 50 TABLET, FILM COATED ORAL at 09:05

## 2021-11-26 RX ADMIN — BUSPIRONE HYDROCHLORIDE 15 MG: 15 TABLET ORAL at 20:05

## 2021-11-26 RX ADMIN — POTASSIUM CHLORIDE 40 MEQ: 1500 TABLET, EXTENDED RELEASE ORAL at 09:06

## 2021-11-26 RX ADMIN — DILTIAZEM HYDROCHLORIDE 30 MG: 30 TABLET, FILM COATED ORAL at 09:04

## 2021-11-26 RX ADMIN — BUSPIRONE HYDROCHLORIDE 15 MG: 15 TABLET ORAL at 14:06

## 2021-11-26 RX ADMIN — BUSPIRONE HYDROCHLORIDE 15 MG: 15 TABLET ORAL at 09:05

## 2021-11-26 RX ADMIN — METOPROLOL TARTRATE 50 MG: 50 TABLET, FILM COATED ORAL at 20:05

## 2021-11-26 RX ADMIN — PERFLUTREN 2.2 MG: 6.52 INJECTION, SUSPENSION INTRAVENOUS at 15:01

## 2021-11-26 RX ADMIN — DILTIAZEM HYDROCHLORIDE 30 MG: 30 TABLET, FILM COATED ORAL at 17:07

## 2021-11-26 RX ADMIN — ALLOPURINOL 100 MG: 100 TABLET ORAL at 09:05

## 2021-11-26 RX ADMIN — FUROSEMIDE 40 MG: 10 INJECTION INTRAMUSCULAR; INTRAVENOUS at 09:10

## 2021-11-26 RX ADMIN — MIRTAZAPINE 15 MG: 15 TABLET, FILM COATED ORAL at 20:05

## 2021-11-26 RX ADMIN — METOPROLOL TARTRATE 5 MG: 1 INJECTION, SOLUTION INTRAVENOUS at 06:51

## 2021-11-26 ASSESSMENT — ENCOUNTER SYMPTOMS
CONSTIPATION: 0
SHORTNESS OF BREATH: 0
BLOOD IN STOOL: 0
DIARRHEA: 0
ABDOMINAL PAIN: 0
ABDOMINAL DISTENTION: 0
COUGH: 0

## 2021-11-26 NOTE — PROGRESS NOTES
Pharmacy Note  Warfarin Consult follow-up      Recent Labs     11/24/21  1435 11/25/21  0726 11/26/21  0655   INR 2.0 2.2 2.5     Recent Labs     11/24/21  1435 11/25/21  0850 11/26/21  0655   HGB 13.2* 12.1* 14.7   HCT 40.9* 37.2* 44.7    136* 202       Significant Drug-Drug Interactions:  New warfarin drug-drug interactions: none  Discontinued drug-drug interactions: none  Current warfarin drug-drug interactions: mirtazipine      Date             INR        Dose given previous day  Dose scheduled for today  11/26/2021           2.5       5 mg          5 mg        Notes:                   INR - 2.5, will give warfarin 5 mg today. Daily PT/INR while inpatient. Mark Loaiza, Pharm. [facility]

## 2021-11-26 NOTE — PROGRESS NOTES
Pt  Woke up confused and does not believe us that he is at [facility]. I had him call his daughter Bay Antonio so she could reassure him that he was at the hospital. Patient states he is in a detention and not allow ed to leave. Patients HR is anywhere from 120's to 150's. Dr. Consuelo Hargrove paged.

## 2021-11-26 NOTE — PROGRESS NOTES
Nurse notified Makayla Huang stating  Patient's HR has been between 120 and 150's for the past hour. Last BP was 144/97 but was as high as 153/125. Pt was also confused which is new for him. I tried to call Cardiology but no call back.    Waiting for a call back

## 2021-11-26 NOTE — PLAN OF CARE
Nutrition Problem #1: Inadequate oral intake  Intervention: Food and/or Nutrient Delivery: Continue Current Diet  Nutritional Goals: PO intake % of meals

## 2021-11-26 NOTE — PROGRESS NOTES
[facility]ARDIOLOG[facility] Progress Note    11/26/2021 7:31 AM      Subjective:  Mr. Leopold Min  is feeling better  With improved shortness of breath and decreased leg edema since admission reportedly. Patient denies any chest pain or palpitations or lightheadedness or dizziness. Review of systems:  No fever or chills. No cough. No diarrhea. No headaches. Reviewed prior cardiology entry by Dr. Stephany Leigh in the EHR. I am seeing for the 1st time this admission.              LABS:     Recent Results (from the past 24 hour(s))   CBC WITH AUTO DIFFERENTIAL    Collection Time: 11/25/21  8:50 AM   Result Value Ref Range    WBC 7.6 3.5 - 11.0 k/uL    RBC 4.36 (L) 4.5 - 5.9 m/uL    Hemoglobin 12.1 (L) 13.5 - 17.5 g/dL    Hematocrit 37.2 (L) 41 - 53 %    MCV 85.2 80 - 100 fL    MCH 27.7 26 - 34 pg    MCHC 32.5 31 - 37 g/dL    RDW 15.9 (H) 11.5 - 14.9 %    Platelets 890 (L) 075 - 450 k/uL    MPV 8.9 6.0 - 12.0 fL    NRBC Automated NOT REPORTED per 100 WBC    Differential Type NOT REPORTED     Immature Granulocytes NOT REPORTED 0 %    Absolute Immature Granulocyte NOT REPORTED 0.00 - 0.30 k/uL    WBC Morphology NOT REPORTED     RBC Morphology NOT REPORTED     Platelet Estimate NOT REPORTED     Seg Neutrophils 70 (H) 36 - 66 %    Lymphocytes 10 (L) 24 - 44 %    Monocytes 20 (H) 1 - 7 %    Eosinophils % 0 0 - 4 %    Basophils 0 0 - 2 %    Segs Absolute 5.32 1.3 - 9.1 k/uL    Absolute Lymph # 0.76 (L) 1.0 - 4.8 k/uL    Absolute Mono # 1.52 (H) 0.1 - 1.3 k/uL    Absolute Eos # 0.00 0.0 - 0.4 k/uL    Basophils Absolute 0.00 0.0 - 0.2 k/uL    Morphology ANISOCYTOSIS PRESENT    Basic Metabolic Panel w/ Reflex to MG    Collection Time: 11/25/21  8:50 AM   Result Value Ref Range    Glucose 110 (H) 70 - 99 mg/dL    BUN 18 8 - 23 mg/dL    CREATININE 0.85 0.70 - 1.20 mg/dL    Bun/Cre Ratio NOT REPORTED 9 - 20    Calcium 8.6 8.6 - 10.4 mg/dL    Sodium 133 (L) 135 - 144 mmol/L    Potassium 4.2 3.7 - 5.3 mmol/L    Chloride 100 98 - 107 mmol/L    CO2 26 20 - 31 mmol/L    Anion Gap 7 (L) 9 - 17 mmol/L    GFR Non-African American >60 >60 mL/min    GFR African American >60 >60 mL/min    GFR Comment          GFR Staging NOT REPORTED    PROTIME-INR    Collection Time: 21  6:55 AM   Result Value Ref Range    Protime 26.6 (H) 11.8 - 14.6 sec    INR 2.5    CBC WITH AUTO DIFFERENTIAL    Collection Time: 21  6:55 AM   Result Value Ref Range    WBC 8.6 3.5 - 11.0 k/uL    RBC 5.24 4.5 - 5.9 m/uL    Hemoglobin 14.7 13.5 - 17.5 g/dL    Hematocrit 44.7 41 - 53 %    MCV 85.3 80 - 100 fL    MCH 28.0 26 - 34 pg    MCHC 32.8 31 - 37 g/dL    RDW 15.9 (H) 11.5 - 14.9 %    Platelets 351 634 - 451 k/uL    MPV 8.6 6.0 - 12.0 fL    NRBC Automated NOT REPORTED per 100 WBC    Differential Type NOT REPORTED     Seg Neutrophils PENDING %    Lymphocytes PENDING %    Monocytes PENDING %    Eosinophils % PENDING %    Basophils PENDING %    Immature Granulocytes NOT REPORTED 0 %    Segs Absolute PENDING k/uL    Absolute Lymph # PENDING k/uL    Absolute Mono # PENDING k/uL    Absolute Eos # PENDING k/uL    Basophils Absolute PENDING 0.0 - 0.2 k/uL    Absolute Immature Granulocyte NOT REPORTED 0.00 - 0.30 k/uL    WBC Morphology NOT REPORTED     RBC Morphology NOT REPORTED     Platelet Estimate NOT REPORTED        Pulse Ox:  SpO2  Av.2 %  Min: 97 %  Max: 98 %    Supplemental O2:       Current Meds:    metoprolol tartrate  25 mg Oral BID    busPIRone  15 mg Oral TID    latanoprost  1 drop Both Eyes Nightly    mirtazapine  15 mg Oral Nightly    pantoprazole  40 mg Oral QAM AC    allopurinol  100 mg Oral Daily    furosemide  40 mg IntraVENous Daily    warfarin (COUMADIN) daily dosing (placeholder)   Other RX Placeholder         Continuous Infusions:    dilTIAZem (CARDIZEM) 125 mg in dextrose 5% 125 mL infusion Stopped (21 1500)              VITAL SIGNS:    BP (!) 144/97   Pulse 144   Temp 98 °F (36.7 °C) (Oral)   Resp 18   Ht 5' 4\" (1.626 m)   Wt 160 lb 7.9 oz (72.8 kg)   SpO2 97%   BMI 27.55 kg/m²         Admit Weight:  168 lb 6.9 oz (76.4 kg)    Last 3 weights: Wt Readings from Last 3 Encounters:   11/26/21 160 lb 7.9 oz (72.8 kg)   10/28/21 154 lb (69.9 kg)   10/06/21 155 lb (70.3 kg)       BMI: Body mass index is 27.55 kg/m². INPUT/OUTPUT:          Intake/Output Summary (Last 24 hours) at 11/26/2021 0731  Last data filed at 11/26/2021 0033  Gross per 24 hour   Intake 480 ml   Output 1925 ml   Net -1445 ml         Telemetry shows  Atrial fibrillation with RVR. EXAM:     General appearance: awake, alert. Pleasant. Sitting at bedside eating breakfast.  Neck: No JVD or thyromegaly  Chest: clearer bilaterally. No tenderness. No rhonchi or wheezing. Cardiac:   Tachycardic. Irregularly irregular rate and rhythm. No significant murmur or gallop or rubs. Abdomen: soft, non-tender. Extremities: no cyanosis, no clubbing, no calf tenderness. Mild lower leg edema. Pulses: intact bilateral radial pulses. Skin:  warm and dry. Neuro:  Able to move all 4 extremities. No new focal deficits. 2D Echocardiogram Aug 2021:    Summary  Left ventricle is normal in size. Global left ventricular systolic function  is difficult to assess due to heart rate and rhythm but appears overall  normal. Estimated ejection fraction is 50-55 % . Calculated EF via Carrington's method is 53 %. Normal left ventricular wall thickness. Left atrium is mildly dilated. Right atrium is mildly dilated . Normal right ventricular size and function. The aortic valve is calcified with reduced cusp mobility. Aortic valve is trileaflet. Mean gradient of 14 mmHg indicating at least mild aortic valve stenosis. Trivial to mild aortic insufficiency. Mild mitral regurgitation. Mild tricuspid regurgitation. Estimated right ventricular systolic pressure is 25 mmHg.       EKG  Nov 24, 2021:    Atrial fibrillation with rapid ventricular response  Septal infarct , age undetermined  Abnormal ECG      ASSESSMENT:    Atrial fibrillation with rapid ventricular rate. Acute on chronic diastolic heart failure with preserved LVEF 50-55%. Mild aortic stenosis. Mild mitral regurgitation. Mild tricuspid regurgitation. Advanced age. Other problems as charted. REC/PLAN:      As ordered. Will give IV digoxin 0.25 mg q.2 hours x 2 doses for better ventricular rate control, add diltiazem 30 mg q.6 hours with holding parameters, increase metoprolol from 25 mg to 50 mg b.i.d. for with holding parameters and for better ventricular rate control. Continue on IV Lasix 40 mg daily, maintain potassium level around 4.0 and magnesium level around 2.0 range. Continue on lisinopril 10 mg daily at lunch with holding parameters. Daily BMP. Other supportive care as you are doing. Discussed with patient's  Daughter Kaylee Dawson at bedside. Discussed with the nurses. Will follow. Electronically signed by Cassi De La Cruz MD, [facility] [place]        PLEASE NOTE:  This progress note was completed using a voice transcription system. Every effort was made to ensure accuracy. However, inadvertent computerized transcription errors may be present.

## 2021-11-26 NOTE — PROGRESS NOTES
[address]    PROGRESS NOTE             11/26/2021    11:20 AM    Name:   Ava Bedolla  MRN:     [id number]     Acct:      [de-identified]   Room:   [facility] [place]  IP Day:  2  Admit Date:  11/24/2021  2:13 PM    PCP:  Krysta Hernandez MD  Code Status:  Full Code    Subjective:     C/C:   Chief Complaint   Patient presents with    Shortness of Breath    Cough     Interval History Status: improved. Overnight/early this morning, patient had an episode of confusion where he did not know where he was and felt he was getting \"grilled\" by the nurses and he asked one for her 's license at one point. He states that once he saw the [employer] sign outside his window, it all \"started to come back\" to him and he gradually improved to baseline. During this episode, he had increased blood pressure and heart rate. Cardiology was paged and IV digoxin and oral diltiazem were started for the patient. He has since continued to have RVR, but BP has returned to baseline. According to the patient and his daughter, he has never experienced any episodes of confusion like this before. Brief History:     The patient is E 89 y.o.  Non- / non  male who presents with Shortness of Breath on exertion.      Patient states for the past week or so, he has been having increased sinus pressure with associated difficulty breathing. He also noticed shortness of breath with minimal exertion, such as tying his shoe. He tried nasal breathing strips, which did not provide much relief. Denies any cough associated with this. Was seen by his PCP recently who believed he may have had COVID; test was negative on 11/17.  He was given a course of amoxicillin for sinusitis and has taken a few days worth of his course at this time.      He states he has never been diagnosed with congestive heart failure but has had atrial fibrillation for quite some time and that his blood pressure has been well controlled. Denies headache, chest pain, orthopnea, abdominal pain.  States he has not noticed any leg swelling.     In the ED, he was in atrial fibrillation with rapid ventricular response. CBC wnl, no leukocytosis. CMP wnl aside from hyponatremia (sodium 130). Pro-BNP 30,086. Troponin 46. Procalcitonin 0.13.  Repeat Covid test negative.  One time dose of IV lasix 40mg administered. Chest x-ray showed nonspecific interstitial and airspace opacities, given the presence of cardiac silhouette enlargement/cardiomegaly and effusions, pulmonary edema is primary consideration.        Cardizem drip was started for a-fib with RVR as well as IV lasix 40mg daily.      He is being admitted to the hospital for the management of  CHF exacerbation.     11/26: overnight had episode of confusion, was in a-fib with RVR, was started on IV digoxin 250mcg Q2h and oral diltiazem 30mg Q6h       Review of Systems:     Review of Systems   Constitutional: Negative for appetite change, chills and fever. Respiratory: Negative for cough and shortness of breath. Cardiovascular: Negative for chest pain and leg swelling. Gastrointestinal: Negative for abdominal distention, abdominal pain, blood in stool, constipation and diarrhea. Neurological: Negative for dizziness, weakness, light-headedness and headaches. Psychiatric/Behavioral: Positive for confusion (episode overnight). All other systems reviewed and are negative. Medications: Allergies:     Allergies   Allergen Reactions    Celebrex [Celecoxib] Nausea Only    Mobic Nausea Only       Current Meds:   Scheduled Meds:    metoprolol tartrate  50 mg Oral BID    dilTIAZem  30 mg Oral 4 times per day    lisinopril  10 mg Oral Lunch    digoxin  250 mcg IntraVENous Q2H    busPIRone  15 mg Oral TID    latanoprost  1 drop Both Eyes Nightly    mirtazapine  15 mg Oral Nightly    pantoprazole  40 mg Oral QAM AC    allopurinol  100 mg Oral Daily    furosemide  40 mg IntraVENous Daily    warfarin (COUMADIN) daily dosing (placeholder)   Other RX Placeholder     Continuous Infusions:   PRN Meds: ondansetron **OR** ondansetron, polyethylene glycol, acetaminophen **OR** acetaminophen, potassium chloride **OR** potassium alternative oral replacement **OR** potassium chloride, magnesium sulfate    Data:     Past Medical History:   has a past medical history of Acute MI ([facility] [address] Allergic rhinitis, Anemia, Atrial fibrillation ([facility] [address] Basal cell cancer, Basal cell cancer, Cervical radiculopathy, Diverticulitis, GERD (gastroesophageal reflux disease), Glaucoma, Hyperlipidemia, Hypertension, Hyponatremia, IBS (irritable bowel syndrome), Insomnia, Osteoarthritis, Osteoarthritis/neck pain. , Prostate cancer [facility]), Renal calculi, Shingles, Status post prostatectomy, Urinary frequency, Wears glasses, and Wears hearing aid in both ears. Social History:   reports that he quit smoking about 60 years ago. He has a 10.00 pack-year smoking history. He has never used smokeless tobacco. He reports previous alcohol use. He reports that he does not use drugs. Family History:   Family History   Problem Relation Age of Onset    Heart Disease Mother     Lung Cancer Father     Cancer Father         lung    Heart Disease Brother        Vitals:  /79   Pulse 125   Temp 98 °F (36.7 °C) (Oral)   Resp 18   Ht 5' 4\" (1.626 m)   Wt 160 lb 7.9 oz (72.8 kg)   SpO2 98%   BMI 27.55 kg/m²   Temp (24hrs), Av °F (36.7 °C), Min:97.7 °F (36.5 °C), Max:98.2 °F (36.8 °C)    No results for input(s): POCGLU in the last 72 hours. I/O(24Hr):     Intake/Output Summary (Last 24 hours) at 2021 1120  Last data filed at 2021 0033  Gross per 24 hour   Intake 240 ml   Output 1725 ml   Net -1485 ml       Labs:    CBC:   Lab Results   Component Value Date    WBC 8.6 2021    RBC 5.24 2021    RBC 4.86 2017    HGB 14.7 2021    HCT 44.7 11/26/2021    MCV 85.3 11/26/2021    MCH 28.0 11/26/2021    MCHC 32.8 11/26/2021    RDW 15.9 11/26/2021     11/26/2021    MPV 8.6 11/26/2021     BMP:    Lab Results   Component Value Date     11/26/2021    K 3.9 11/26/2021     11/26/2021    CO2 27 11/26/2021    BUN 18 11/26/2021    LABALBU 3.9 11/24/2021    CREATININE 1.11 11/26/2021    CALCIUM 9.1 11/26/2021    GFRAA >60 11/26/2021    LABGLOM >60 11/26/2021    GLUCOSE 95 11/26/2021    GLUCOSE 89 05/23/2017       Lab Results   Component Value Date/Time    SPECIAL NOT REPORTED 11/24/2021 03:35 PM     Lab Results   Component Value Date/Time    CULTURE NO SIGNIFICANT GROWTH 11/24/2021 03:35 PM         Radiology:    XR CHEST PORTABLE    Result Date: 11/24/2021  EXAMINATION: ONE XRAY VIEW OF THE CHEST 11/24/2021 2:26 pm COMPARISON: January 4, 2019 HISTORY: ORDERING SYSTEM PROVIDED HISTORY: SOB TECHNOLOGIST PROVIDED HISTORY: SOB Reason for Exam: Pt states sob. Acuity: Unknown Type of Exam: Unknown Additional signs and symptoms: Pt states sob. FINDINGS: Cardiac silhouette is enlarged. Ill definition of the central pulmonary vasculature with diffuse interstitial noted. Bibasilar opacities, right greater than left consistent with the small right pleural effusion and trace left pleural effusion probably associated with atelectasis/infiltrates. No pneumothorax. Nonspecific interstitial and airspace opacities. Given the presence of cardiac silhouette enlargement/cardiomegaly and effusions, pulmonary edema is primary consideration. Physical Examination:        Physical Exam  Constitutional:       General: He is not in acute distress. Appearance: Normal appearance. He is normal weight. He is not ill-appearing. Eyes:      General: No scleral icterus. Extraocular Movements: Extraocular movements intact. Conjunctiva/sclera: Conjunctivae normal.   Cardiovascular:      Rate and Rhythm: Rhythm irregular.       Heart sounds: No murmur heard.  No friction rub. No gallop. Pulmonary:      Effort: Pulmonary effort is normal. No respiratory distress. Breath sounds: Normal breath sounds. No wheezing, rhonchi or rales. Abdominal:      General: Abdomen is flat. Bowel sounds are normal. There is no distension. Palpations: Abdomen is soft. Tenderness: There is abdominal tenderness (suprapubic, s/p prostatectomy). Musculoskeletal:      Right lower leg: Edema (2+ pitting edema to mid shin) present. Left lower leg: Edema (2+ pitting edema to mid shin) present. Skin:     General: Skin is warm and dry. Neurological:      General: No focal deficit present. Mental Status: He is alert and oriented to person, place, and time. Cranial Nerves: No cranial nerve deficit. Motor: No weakness. Coordination: Coordination normal.      Gait: Gait normal.   Psychiatric:         Mood and Affect: Mood normal.         Behavior: Behavior normal.         Thought Content:  Thought content normal.         Judgment: Judgment normal.         Assessment:        Primary Problem  Acute on chronic combined systolic and diastolic CHF (congestive heart failure) [facility])    Active Hospital Problems    Diagnosis Date Noted    Acute on chronic combined systolic and diastolic CHF (congestive heart failure) ([facility] [address] [I50.43] 11/24/2021    Prostate cancer ([facility] [address] Ulysses Spangler 08/18/2021    [facility] [I50.22] 10/17/2019    Gout [K02.6]     Diastolic dysfunction [H78.72] 09/05/2015    Essential hypertension [I10] 08/12/2015    Gastroesophageal reflux disease without esophagitis [K21.9] 08/12/2015    Allergic rhinitis [J30.9] 09/02/2011       Plan:        Congestive heart failure, acute exacerbation  -proBNP 30,086 on admission, chest x-ray showed nonspecific interstitial and airspace opacities, with pulmonary edema as primary consideration  -IV Lasix 40 mg daily  -Echocardiogram from 8/18/2021 showed EF 50 to 55%  -Echocardiogram from 8/5/2019 showed EF 30 to 35%  -New echocardiogram pending  -cardiology following    Altered mental status, resolved  -early morning 11/26, pt had acute episode of confusion and disorientation  -was in A-fib with RVR and hypertensive at this time  -cardiology paged and IV digoxin and cardizem PO added to patient's regimen  -episode has since resolved, BP within normal range, HR still elevated       Atrial fibrillation with RVR, not rate controlled, anticoagulated  -Patient takes warfarin 5 mg daily at home, as well as metoprolol  -Patient in RVR on admission  -IV digoxin 250mcg, PO cardizem 30mg Q6, PO metoprolol 50mg BID, lisinopril 10mg daily  -Pharmacy to dose warfarin     Essential Hypertension  -digoxin 250mcg Q2h  -cardizem 30mg Q6h  -lisinopril 10mg daily  -metoprolol 50mg BID      Gout  -continue home allopurinol 100mg daily     GI ppx: protonix 40mg daily PO  DVT ppx: pt already anticoagulated on warfarin, pharmacy to dose      Shamika Fields MD  11/26/2021  11:20 AM     Attending Physician Statement  I have discussed the care of Alex Youssef with the resident team. I have examined the patient myself and taken ros and hpi , including pertinent history and exam findings,  with the resident. I have reviewed the key elements of all parts of the encounter with the resident. I agree with the assessment, plan and orders as documented by the resident. Principal Problem:    Acute on chronic combined systolic and diastolic CHF (congestive heart failure) (HCC)  Active Problems:    Allergic rhinitis    Essential hypertension    Gastroesophageal reflux disease without esophagitis    Diastolic dysfunction    Gout    Chronic systolic congestive heart failure (HCC)    Prostate cancer ([facility] [address]  Resolved Problems:    * No resolved hospital problems.  *    Transient confusional state this am, resolved since  Went into RVR again this morning,  Was given 5mg lopressor,  digoxin,, started on cardizem. Rate and BP controlled. Awaiting echo read.   Expected discharge tomorrow morning      Electronically signed by Tyrese Chen MD

## 2021-11-26 NOTE — CARE COORDINATION
ONGOING DISCHARGE PLAN:    Patient is alert and oriented x4. Spoke with patient regarding discharge plan and patient confirms that plan is still to discharge to home with no needs  Cardio following   Patient had confusion overnight   Afib     Will continue to follow for additional discharge needs.     Electronically signed by Adis Weems RN on 11/26/2021 at 3:55 PM

## 2021-11-26 NOTE — PROGRESS NOTES
Comprehensive Nutrition Assessment    Type and Reason for Visit:  Initial, Positive Nutrition Screen (Poor appetite and intake, difficulty chewing or swallowing (missing teeth/poor dentition))    Nutrition Recommendations/Plan:  Continue current diet. Nutrition Assessment:  Pt is being treated for CHF. Pt and daughter states pt ate well yesterday (%) and is beginning to eat breakfast now. Daughter states when pt is doing well, he eats adequately and does not have any trouble chewing or swallowing. Intake was decreased somewhat prior to admission due to difficulty breathing. Pt spoke of missing added salt on meal trays currently. Briefly suggested limiting or avoiding high sodium foods at home such as regular canned soups and certain frozen meals. Physician now here to see pt. Will continue current diet. Malnutrition Assessment:  Malnutrition Status: At risk for malnutrition (Comment)    Context:  Acute Illness     Findings of the 6 clinical characteristics of malnutrition:  Energy Intake:  Mild decrease in energy intake (Comment)  Weight Loss:  Unable to assess (Fluid shifts)     Body Fat Loss:  No significant body fat loss     Muscle Mass Loss:  No significant muscle mass loss    Fluid Accumulation:  7 - Moderate to Severe (CHF) Extremities   Strength:  Not Performed    Estimated Daily Nutrient Needs:  Energy (kcal):  1700 based on Noble-St. Jeor with 1.3 factor; Weight Used for Energy Requirements:  Current (72.8kg)     Protein (g):  71 based on 1.2 gm per kg; Weight Used for Protein Requirements:  Ideal          Nutrition Related Findings:  Hx: GERD, IBS. Remeron, Lasix, Coumadin. Other meds and labs reviewed. Edema: +1 RUE, LUE; +2 pitting RLE, LLE. Wounds:  None       Current Nutrition Therapies:    ADULT DIET; Regular;  Low Sodium (2 gm)    Anthropometric Measures:  · Height: 5' 4\" (162.6 cm)  · Current Body Weight: 160 lb 7.9 oz (72.8 kg) (Diuresis)   · Admission Body Weight: 168 lb 6.9 oz (76.4 kg) (CHF)    · Usual Body Weight: 164 lb 0.4 oz (74.4 kg) (7/20/21 method not specified)     · Ideal Body Weight: 130 lbs; % Ideal Body Weight 123.5 %   · BMI: 27.5  · BMI Categories: Overweight (BMI 25.0-29. 9)       Nutrition Diagnosis:   · Inadequate oral intake related to cardiac dysfunction (CHF, SOB) as evidenced by poor intake prior to admission    Nutrition Interventions:   Food and/or Nutrient Delivery:  Continue Current Diet  Nutrition Education/Counseling:  Education initiated   Coordination of Nutrition Care:  No recommendation at this time    Goals:  PO intake % of meals       Nutrition Monitoring and Evaluation:   Behavioral-Environmental Outcomes:  None Identified   Food/Nutrient Intake Outcomes:  Food and Nutrient Intake  Physical Signs/Symptoms Outcomes:  Weight, Fluid Status or Edema     Discharge Planning:    Continue current diet (Consider some liberalization in view of advanced age)     Some areas of assessment may be incomplete due to standard COVID-19 Precautions. Abiel Marquez R.D., L.D.   Phone: [phone]

## 2021-11-26 NOTE — PLAN OF CARE
Problem: Falls - Risk of:  Goal: Will remain free from falls  Description: Will remain free from falls  11/26/2021 0213 by Tosha Cedeño RN  Outcome: Ongoing     Problem: Falls - Risk of:  Goal: Absence of physical injury  Description: Absence of physical injury  Outcome: Ongoing     Problem: Infection:  Goal: Will remain free from infection  Description: Will remain free from infection  11/26/2021 0213 by Tosha Cedeño RN  Outcome: Ongoing     Problem: Safety:  Goal: Free from accidental physical injury  Description: Free from accidental physical injury  11/26/2021 0213 by Tosha Cedeño RN  Outcome: Ongoing     Problem: Safety:  Goal: Free from intentional harm  Description: Free from intentional harm  Outcome: Ongoing     Problem: Daily Care:  Goal: Daily care needs are met  Description: Daily care needs are met  11/26/2021 0213 by Tosha Cedeño RN  Outcome: Ongoing     Problem: Pain:  Goal: Patient's pain/discomfort is manageable  Description: Patient's pain/discomfort is manageable  11/26/2021 0213 by Tosha Cedeño RN  Outcome: Ongoing     Problem: Skin Integrity:  Goal: Skin integrity will stabilize  Description: Skin integrity will stabilize  Outcome: Ongoing     Problem: Discharge Planning:  Goal: Patients continuum of care needs are met  Description: Patients continuum of care needs are met  Outcome: Ongoing

## 2021-11-27 VITALS
OXYGEN SATURATION: 99 % | HEART RATE: 71 BPM | SYSTOLIC BLOOD PRESSURE: 110 MMHG | HEIGHT: 64 IN | DIASTOLIC BLOOD PRESSURE: 49 MMHG | BODY MASS INDEX: 26.46 KG/M2 | RESPIRATION RATE: 18 BRPM | TEMPERATURE: 98.5 F | WEIGHT: 154.98 LBS

## 2021-11-27 LAB
ABSOLUTE EOS #: 0 K/UL (ref 0–0.4)
ABSOLUTE IMMATURE GRANULOCYTE: ABNORMAL K/UL (ref 0–0.3)
ABSOLUTE LYMPH #: 1.11 K/UL (ref 1–4.8)
ABSOLUTE MONO #: 2.63 K/UL (ref 0.1–1.3)
ANION GAP SERPL CALCULATED.3IONS-SCNC: 10 MMOL/L (ref 9–17)
BASOPHILS # BLD: 0 % (ref 0–2)
BASOPHILS ABSOLUTE: 0 K/UL (ref 0–0.2)
BNP INTERPRETATION: ABNORMAL
BUN BLDV-MCNC: 17 MG/DL (ref 8–23)
BUN/CREAT BLD: ABNORMAL (ref 9–20)
CALCIUM SERPL-MCNC: 9.4 MG/DL (ref 8.6–10.4)
CHLORIDE BLD-SCNC: 96 MMOL/L (ref 98–107)
CO2: 32 MMOL/L (ref 20–31)
CREAT SERPL-MCNC: 1.11 MG/DL (ref 0.7–1.2)
DIFFERENTIAL TYPE: ABNORMAL
EOSINOPHILS RELATIVE PERCENT: 0 % (ref 0–4)
GFR AFRICAN AMERICAN: >60 ML/MIN
GFR NON-AFRICAN AMERICAN: >60 ML/MIN
GFR SERPL CREATININE-BSD FRML MDRD: ABNORMAL ML/MIN/{1.73_M2}
GFR SERPL CREATININE-BSD FRML MDRD: ABNORMAL ML/MIN/{1.73_M2}
GLUCOSE BLD-MCNC: 115 MG/DL (ref 70–99)
HCT VFR BLD CALC: 45.1 % (ref 41–53)
HEMOGLOBIN: 14.6 G/DL (ref 13.5–17.5)
IMMATURE GRANULOCYTES: ABNORMAL %
INR BLD: 2.5
LYMPHOCYTES # BLD: 11 % (ref 24–44)
MCH RBC QN AUTO: 27.7 PG (ref 26–34)
MCHC RBC AUTO-ENTMCNC: 32.3 G/DL (ref 31–37)
MCV RBC AUTO: 85.7 FL (ref 80–100)
MONOCYTES # BLD: 26 % (ref 1–7)
MORPHOLOGY: ABNORMAL
NRBC AUTOMATED: ABNORMAL PER 100 WBC
PDW BLD-RTO: 15.7 % (ref 11.5–14.9)
PLATELET # BLD: 227 K/UL (ref 150–450)
PLATELET ESTIMATE: ABNORMAL
PMV BLD AUTO: 8.9 FL (ref 6–12)
POTASSIUM SERPL-SCNC: 4.3 MMOL/L (ref 3.7–5.3)
PRO-BNP: ABNORMAL PG/ML
PROTHROMBIN TIME: 27 SEC (ref 11.8–14.6)
RBC # BLD: 5.26 M/UL (ref 4.5–5.9)
RBC # BLD: ABNORMAL 10*6/UL
SEG NEUTROPHILS: 63 % (ref 36–66)
SEGMENTED NEUTROPHILS ABSOLUTE COUNT: 6.36 K/UL (ref 1.3–9.1)
SODIUM BLD-SCNC: 138 MMOL/L (ref 135–144)
WBC # BLD: 10.1 K/UL (ref 3.5–11)
WBC # BLD: ABNORMAL 10*3/UL

## 2021-11-27 PROCEDURE — 99239 HOSP IP/OBS DSCHRG MGMT >30: CPT | Performed by: INTERNAL MEDICINE

## 2021-11-27 PROCEDURE — 83880 ASSAY OF NATRIURETIC PEPTIDE: CPT

## 2021-11-27 PROCEDURE — 80048 BASIC METABOLIC PNL TOTAL CA: CPT

## 2021-11-27 PROCEDURE — 6370000000 HC RX 637 (ALT 250 FOR IP)

## 2021-11-27 PROCEDURE — 6370000000 HC RX 637 (ALT 250 FOR IP): Performed by: INTERNAL MEDICINE

## 2021-11-27 PROCEDURE — 85025 COMPLETE CBC W/AUTO DIFF WBC: CPT

## 2021-11-27 PROCEDURE — 36415 COLL VENOUS BLD VENIPUNCTURE: CPT

## 2021-11-27 PROCEDURE — 85610 PROTHROMBIN TIME: CPT

## 2021-11-27 RX ORDER — WARFARIN SODIUM 5 MG/1
5 TABLET ORAL ONCE
Status: DISCONTINUED | OUTPATIENT
Start: 2021-11-27 | End: 2021-11-27 | Stop reason: HOSPADM

## 2021-11-27 RX ORDER — FUROSEMIDE 40 MG/1
40 TABLET ORAL DAILY
Qty: 60 TABLET | Refills: 3 | Status: SHIPPED
Start: 2021-11-28 | End: 2022-08-19 | Stop reason: DRUGHIGH

## 2021-11-27 RX ADMIN — FLUTICASONE PROPIONATE 1 SPRAY: 50 SPRAY, METERED NASAL at 09:37

## 2021-11-27 RX ADMIN — PANTOPRAZOLE SODIUM 40 MG: 40 TABLET, DELAYED RELEASE ORAL at 06:17

## 2021-11-27 RX ADMIN — DILTIAZEM HYDROCHLORIDE 30 MG: 30 TABLET, FILM COATED ORAL at 06:17

## 2021-11-27 RX ADMIN — METOPROLOL TARTRATE 50 MG: 50 TABLET, FILM COATED ORAL at 09:35

## 2021-11-27 RX ADMIN — BUSPIRONE HYDROCHLORIDE 15 MG: 15 TABLET ORAL at 09:35

## 2021-11-27 RX ADMIN — FUROSEMIDE 40 MG: 40 TABLET ORAL at 09:35

## 2021-11-27 RX ADMIN — BUSPIRONE HYDROCHLORIDE 15 MG: 15 TABLET ORAL at 14:01

## 2021-11-27 RX ADMIN — DILTIAZEM HYDROCHLORIDE 30 MG: 30 TABLET, FILM COATED ORAL at 00:24

## 2021-11-27 RX ADMIN — ALLOPURINOL 100 MG: 100 TABLET ORAL at 09:36

## 2021-11-27 RX ADMIN — DILTIAZEM HYDROCHLORIDE 30 MG: 30 TABLET, FILM COATED ORAL at 14:01

## 2021-11-27 RX ADMIN — LISINOPRIL 10 MG: 10 TABLET ORAL at 14:01

## 2021-11-27 ASSESSMENT — ENCOUNTER SYMPTOMS
GASTROINTESTINAL NEGATIVE: 1
CHOKING: 0
ALLERGIC/IMMUNOLOGIC NEGATIVE: 1
ABDOMINAL PAIN: 0
STRIDOR: 0
APNEA: 0
SHORTNESS OF BREATH: 1
CONSTIPATION: 0
BLOOD IN STOOL: 0
COUGH: 0
WHEEZING: 0
EYES NEGATIVE: 1
VOMITING: 0
DIARRHEA: 0
CHEST TIGHTNESS: 0
NAUSEA: 0
ABDOMINAL DISTENTION: 0

## 2021-11-27 ASSESSMENT — PAIN SCALES - GENERAL: PAINLEVEL_OUTOF10: 0

## 2021-11-27 NOTE — CARE COORDINATION
ONGOING DISCHARGE PLAN:    Patient is alert and oriented x4. Spoke with patient regarding discharge plan and patient confirms that plan is still to return to home alone. Denies VNS. Pt's Daughter, Lives very close, can assist w/ needs. Pro- BNP - 18,041. On PO Lasix, Cardio following, ? [facility] Clinic. Pt. Is on Coumadin, PTA, INR today 2.5. Will continue to follow for additional discharge needs.     Electronically signed by Sunni Kiran RN on 11/27/2021 at 12:03 PM

## 2021-11-27 NOTE — FLOWSHEET NOTE
11/27/21 1232   Provider Notification   Reason for Communication Evaluate   Provider Name Abhishek Frye  Ty Hotter )   Provider Notification Physician   Method of Communication Call   Response No new orders   Notification Time [phone]   cardiology cleared patient for discharged. Cardiology informed on echo results.

## 2021-11-27 NOTE — PROGRESS NOTES
[address] [place]  [phone]          Progress Note    Patient Name:  Yaakov Gaxiola    :  2021 12:30 PM      SUBJECTIVE       Mr. Kamilah Carver  has no chest pain, shortness of breath, palpitations, nausea or vomiting. Heart rate has been well controlled. OBJECTIVE     Vital signs:    /68   Pulse 88   Temp 98.3 °F (36.8 °C) (Oral)   Resp 18   Ht 5' 4\" (1.626 m)   Wt 154 lb 15.7 oz (70.3 kg)   SpO2 98%   BMI 26.60 kg/m²     . tro    Admit Weight:  168 lb 6.9 oz (76.4 kg)    Last 3 weights: Wt Readings from Last 3 Encounters:   21 154 lb 15.7 oz (70.3 kg)   10/28/21 154 lb (69.9 kg)   10/06/21 155 lb (70.3 kg)       BMI: Body mass index is 26.6 kg/m².     Input/Output:       Intake/Output Summary (Last 24 hours) at 2021 1230  Last data filed at 2021 2210  Gross per 24 hour   Intake 720 ml   Output 1975 ml   Net -1255 ml         Exam:     General appearance: awake and alert moves all ext   Lungs: no rhonchi, no wheezes, no rales  Heart: S1 and S2 no murmur  Abdomen: positive bowel sounds, no bruits, no masses  Extremities: warm and dry, no cyanosis, no clubbing        Laboratory Studies:     CBC:   Recent Labs     21  0850 21  0655 21  0506   WBC 7.6 8.6 10.1   HGB 12.1* 14.7 14.6   HCT 37.2* 44.7 45.1   MCV 85.2 85.3 85.7   * 202 227     BMP:   Recent Labs     21  0850 21  0655 21  0506   * 139 138   K 4.2 3.9 4.3    102 96*   CO2 26 27 32*   BUN 18 18 17   CREATININE 0.85 1.11 1.11     PT/INR:   Recent Labs     21  0726 21  0655 21  0506   PROTIME 24.0* 26.6* 27.0*   INR 2.2 2.5 2.5     APTT:   Recent Labs     21  1435   APTT 34.9     MAG:   Recent Labs     21  0655   MG 2.2     D Dimer:   Recent Labs     21  1435   DDIMER 0.42     Troponin    Recent Labs     21  1435 21  1811   TROPHS 46* 46*

## 2021-11-27 NOTE — PROGRESS NOTES
[address]    PROGRESS NOTE             11/27/2021    9:44 AM    Name:   Ava Bedolla  MRN:     [id number]     Acct:      [de-identified]   Room:   2106/2106-01  IP Day:  3  Admit Date:  11/24/2021  2:13 PM    PCP:  Krysta Hernandez MD  Code Status:  Full Code    Subjective:     C/C:   Chief Complaint   Patient presents with    Shortness of Breath    Cough     Interval History Status: improved. Patient was seen and examined at bedside. Per nurse, patient had an episode of some confusion overnight, however no episodes of atrial fibrillation with RVR overnight. At bedside, patient, Delisa Mejia, is sitting in bedside chair with his clothing on and is alert, oriented. He is conversational and pleasant. He appears well. Patient reports he is feeling better than when he first came into the hospital, denies any chest pain, or shortness of breath. He denies changes or difficulty with bowel or bladder function. He has been ambulating down the hallways, and experiences only minor shortness of breath on exertion. Brief History:     The patient is P 16 y.o.  Non- / non  male who presents with Shortness of Breath on exertion.      Patient states for the past week or so, he has been having increased sinus pressure with associated difficulty breathing. He also noticed shortness of breath with minimal exertion, such as tying his shoe. He tried nasal breathing strips, which did not provide much relief. Denies any cough associated with this. Was seen by his PCP recently who believed he may have had COVID; test was negative on 11/17. He was given a course of amoxicillin for sinusitis and has taken a few days worth of his course at this time.      He states he has never been diagnosed with congestive heart failure but has had atrial fibrillation for quite some time and that his blood pressure has been well controlled.   Denies headache, chest pain, orthopnea, abdominal pain.  States he has not noticed any leg swelling.     In the ED, he was in atrial fibrillation with rapid ventricular response. CBC wnl, no leukocytosis. CMP wnl aside from hyponatremia (sodium 130). Pro-BNP 30,086. Troponin 46. Procalcitonin 0.13.  Repeat Covid test negative.  One time dose of IV lasix 40mg administered. Chest x-ray showed nonspecific interstitial and airspace opacities, given the presence of cardiac silhouette enlargement/cardiomegaly and effusions, pulmonary edema is primary consideration.        Cardizem drip was started for a-fib with RVR as well as IV lasix 40mg daily.      He is being admitted to the hospital for the management of  CHF exacerbation.      11/26: overnight had episode of confusion, was in a-fib with RVR, was started on IV digoxin 250mcg Q2h and oral diltiazem 30mg Q6h    11/27: Patient had an episode of confusion, but no atrial fibrillation with RVR overnight. Review of Systems:     Review of Systems   Constitutional: Negative. Negative for chills, fatigue and fever. HENT: Negative. Eyes: Negative. Respiratory: Positive for shortness of breath (Mild on exertion). Negative for apnea, cough, choking, chest tightness, wheezing and stridor. Cardiovascular: Positive for leg swelling. Negative for chest pain and palpitations. Gastrointestinal: Negative. Negative for abdominal distention, abdominal pain, blood in stool, constipation, diarrhea, nausea and vomiting. Endocrine: Negative. Genitourinary: Negative. Negative for decreased urine volume, difficulty urinating, dysuria and urgency. Musculoskeletal: Negative. Skin: Negative. Allergic/Immunologic: Negative. Neurological: Negative. Hematological: Negative. Psychiatric/Behavioral: Positive for confusion (Brief episode reported overnight). Medications: Allergies:     Allergies   Allergen Reactions    Celebrex [Celecoxib] Nausea Only    Mobic Nausea Only       Current Meds:   Scheduled Meds:    warfarin  5 mg Oral Once    fluticasone  1 spray Each Nostril Daily    metoprolol tartrate  50 mg Oral BID    dilTIAZem  30 mg Oral 4 times per day    lisinopril  10 mg Oral Lunch    furosemide  40 mg Oral Daily    busPIRone  15 mg Oral TID    latanoprost  1 drop Both Eyes Nightly    mirtazapine  15 mg Oral Nightly    pantoprazole  40 mg Oral QAM AC    allopurinol  100 mg Oral Daily    warfarin (COUMADIN) daily dosing (placeholder)   Other RX Placeholder     Continuous Infusions:   PRN Meds: ondansetron **OR** ondansetron, polyethylene glycol, acetaminophen **OR** acetaminophen, potassium chloride **OR** potassium alternative oral replacement **OR** potassium chloride, magnesium sulfate    Data:     Past Medical History:   has a past medical history of Acute MI ([facility] [address] Allergic rhinitis, Anemia, Atrial fibrillation ([facility] [address] Basal cell cancer, Basal cell cancer, Cervical radiculopathy, Diverticulitis, GERD (gastroesophageal reflux disease), Glaucoma, Hyperlipidemia, Hypertension, Hyponatremia, IBS (irritable bowel syndrome), Insomnia, Osteoarthritis, Osteoarthritis/neck pain. , Prostate cancer [facility]), Renal calculi, Shingles, Status post prostatectomy, Urinary frequency, Wears glasses, and Wears hearing aid in both ears. Social History:   reports that he quit smoking about 60 years ago. He has a 10.00 pack-year smoking history. He has never used smokeless tobacco. He reports previous alcohol use. He reports that he does not use drugs.      Family History:   Family History   Problem Relation Age of Onset    Heart Disease Mother     Lung Cancer Father     Cancer Father         lung    Heart Disease Brother        Vitals:  /68   Pulse 88   Temp 98.3 °F (36.8 °C) (Oral)   Resp 18   Ht 5' 4\" (1.626 m)   Wt 154 lb 15.7 oz (70.3 kg)   SpO2 98%   BMI 26.60 kg/m²   Temp (24hrs), Av °F (36.7 °C), Min:97.3 °F (36.3 °C), Max:98.3 °F (36.8 °C)    No Chest:      Chest wall: No tenderness. Abdominal:      General: Abdomen is flat. There is no distension. Palpations: Abdomen is soft. There is no mass. Tenderness: There is no abdominal tenderness. There is no guarding or rebound. Hernia: No hernia is present. Musculoskeletal:         General: Swelling present. Normal range of motion. Cervical back: Normal range of motion. Right lower leg: Edema (Mild pitting edema) present. Left lower leg: Edema (Mild pitting edema) present. Skin:     General: Skin is warm and dry. Neurological:      General: No focal deficit present. Mental Status: He is alert. Psychiatric:         Mood and Affect: Mood normal.         Behavior: Behavior normal.         Thought Content: Thought content normal.         Judgment: Judgment normal.           Assessment:        Primary Problem  Acute on chronic combined systolic and diastolic CHF (congestive heart failure) [facility])    Active Hospital Problems    Diagnosis Date Noted    Acute on chronic combined systolic and diastolic CHF (congestive heart failure) ([facility] [address] [I50.43] 11/24/2021    Prostate cancer ([facility] Maxi Gelleras 08/18/2021    Depression [G11. A] 07/20/2021    Chronic systolic congestive [facility] ([facility] [I50.22] 10/17/2019    Gout [I86.2]     Diastolic dysfunction [J87.81] 09/05/2015    Essential hypertension [I10] 08/12/2015    Gastroesophageal reflux disease without esophagitis [K21.9] 08/12/2015    Allergic rhinitis [J30.9] 09/02/2011       Plan:        Congestive heart failure, acute exacerbation  -proBNP 30,086 on admission, chest x-ray showed nonspecific interstitial and airspace opacities, with pulmonary edema as primary consideration  -IV Lasix 40 mg daily  -Echocardiogram from 8/18/2021 showed EF 50 to 55%  -New echocardiogram pending  -cardiology following, appreciate their recommendations for further management of atrial fibrillation on discharge     Altered mental status, resolved  -1 episode of confusion overnight, brief and resolved, no associated atrial fibrillation patient with RVR at that time  -cardiology paged and IV digoxin and cardizem PO added to patient's regimen  -episode has since resolved, BP within normal range, HR with a normal limits     Atrial fibrillation with RVR, not rate controlled, anticoagulated -better controlled with addition of Cardizem, in addition to home metoprolol, lisinopril  -warfarin 5 mg daily, metoprolol home medications  -Patient in RVR on admission, no episodes overnight  -IV digoxin 250mcg 1 dose given  -PO cardizem 30mg Q6, PO metoprolol 50mg BID, lisinopril 10mg daily  -Pharmacy to dose warfarin     Essential Hypertension  -digoxin 250mcg given once  -cardizem 30mg Q6h  -lisinopril 10mg daily  -metoprolol 50mg BID      Gout  -continue home allopurinol 100mg daily     GI ppx: protonix 40mg daily PO  DVT ppx: pt already anticoagulated on warfarin, pharmacy to dose    Disposition: Await cardiology recommendations for further medical management of atrial fibrillation with RVR. Patient still currently in atrial fibrillation, however is rate controlled on diltiazem, metoprolol. Home dose of warfarin continued, lisinopril continued. Reportedly, patient has good rate control of atrial fibrillation when on added diltiazem. Consider continuing diltiazem once discharged. Oriana Jimenez MD  11/27/2021  9:44 AM     Attending Physician Statement  I have discussed the care of Erin Aviles with the resident team. I have examined the patient myself and taken ros and hpi , including pertinent history and exam findings,  with the resident. I have reviewed the key elements of all parts of the encounter with the resident. I agree with the assessment, plan and orders as documented by the resident.     Principal Problem:    Acute on chronic combined systolic and diastolic CHF (congestive heart failure) (HCC)  Active Problems:    Allergic rhinitis Essential hypertension    Gastroesophageal reflux disease without esophagitis    Diastolic dysfunction    Gout    Chronic systolic congestive heart failure (HCC)    Depression    Prostate cancer ([facility] [address]  Resolved Problems:    * No resolved hospital problems. *    Mild confusion overnight self resolved, likey sleep related  Note Echo result- does have Pulm hypertension  Discharge today  [facility] cards f/up.     Electronically signed by Lucila Marino MD

## 2021-11-27 NOTE — PROGRESS NOTES
Pharmacy Note  Warfarin Consult follow-up      Recent Labs     11/25/21  0726 11/26/21  0655 11/27/21  0506   INR 2.2 2.5 2.5     Recent Labs     11/25/21  0850 11/26/21  0655 11/27/21  0506   HGB 12.1* 14.7 14.6   HCT 37.2* 44.7 45.1   * 202 227       Significant Drug-Drug Interactions:  New warfarin drug-drug interactions: Tylenol, allopurinol  Discontinued drug-drug interactions: none  Current warfarin drug-drug interactions: mirtazapine         Date             INR        Dose given previous day  Dose scheduled for today  11/27/2021     2.5        5  mg           5 mg        Notes:     Therapeutic, continue home dose of 5 mg. Daily PT/INR while inpatient. Harinder Dexter. Ph.  11/27/2021  6:47 AM

## 2021-11-27 NOTE — PROGRESS NOTES
Discharge teaching and instructions for diagnosis/procedure of CHF completed with patient using teachback method. AVS reviewed. Printed prescriptions given to patient. Patient voiced understanding regarding prescriptions, follow up appointments, and care of self at home.  Discharged ambulatory to  home with support per family

## 2021-11-27 NOTE — DISCHARGE SUMMARY
[address]    Discharge Summary     Patient ID: Darling Courtney  :  1931   MRN: [id number]     ACCOUNT:  [de-identified]   Patient's PCP: Fabián Cornell MD  Admit Date: 2021   Discharge Date: 2021    Length of Stay: 3  Code Status:  Full Code  Admitting Physician: Patricia Welch MD  Discharge Physician: Rafi Arriola MD     Active Discharge Diagnoses:       Primary Problem  Acute on chronic combined systolic and diastolic CHF (congestive heart failure) [facility] Problems    Diagnosis Date Noted    Acute on chronic combined systolic and diastolic CHF (congestive heart failure) ([facility] [I50.43] 2021    Prostate [facility] Tari Philipizzard 2021    Depression [F32. A] 2021    Chronic systolic congestive heart failure ([facility] [I50.22] 10/17/2019    Gout [C14.9]     Diastolic dysfunction [P05.20] 2015    Essential hypertension [I10] 2015    Gastroesophageal reflux disease without esophagitis [K21.9] 2015    Allergic rhinitis [J30.9] 2011       Admission Condition:  fair     Discharged Condition: good    Hospital Stay:       Hospital Course:  Darling Courtney is a 80 y.o. male who was admitted for the management of   Acute on chronic combined systolic and diastolic CHF (congestive heart failure) ([facility] , presented to ER with Shortness of Breath and Cough    The patient is a 80 y.o. Non- / non  male who presents with Shortness of Breath on exertion. Patient states for the past week or so, he has been having increased sinus pressure with associated difficulty breathing. He also noticed shortness of breath with minimal exertion, such as tying his shoe. He tried nasal breathing strips, which did not provide much relief. Denies any cough associated with this. Was seen by his PCP recently who believed he may have had COVID; test was negative on . He was given a course of amoxicillin for sinusitis and has taken a few days worth of his course at this time. He states he has never been diagnosed with congestive heart failure but has had atrial fibrillation for quite some time and that his blood pressure has been well controlled. Denies headache, chest pain, orthopnea, abdominal pain. States he has not noticed any leg swelling. In the ED, he was in atrial fibrillation with rapid ventricular response. CBC wnl, no leukocytosis. CMP wnl aside from hyponatremia (sodium 130). Pro-BNP 30,086. Troponin 46. Procalcitonin 0.13. Repeat Covid test negative. One time dose of IV lasix 40mg administered. Chest x-ray showed nonspecific interstitial and airspace opacities, given the presence of cardiac silhouette enlargement/cardiomegaly and effusions, pulmonary edema is primary consideration. Cardizem drip was started for a-fib with RVR as well as IV lasix 40mg daily. He is being admitted to the hospital for the management of  CHF exacerbation. 11/26: overnight had episode of confusion, was in a-fib with RVR, was started on IV digoxin 250mcg, given 1 dose and oral diltiazem 30mg Q6h     11/27: Patient had an episode of confusion, but no atrial fibrillation with RVR overnight. Patient is feeling well. From cardiology standpoint, okay to discharge home with diltiazem and Lasix.       Significant therapeutic interventions:   Cardizem drip  Lasix  Digoxin      Significant Diagnostic Studies:   Labs / Micro:  CBC:   Lab Results   Component Value Date    WBC 10.1 11/27/2021    RBC 5.26 11/27/2021    RBC 4.86 05/23/2017    HGB 14.6 11/27/2021    HCT 45.1 11/27/2021    MCV 85.7 11/27/2021    MCH 27.7 11/27/2021    MCHC 32.3 11/27/2021    RDW 15.7 11/27/2021     11/27/2021     BMP:    Lab Results   Component Value Date    GLUCOSE 115 11/27/2021    GLUCOSE 89 05/23/2017     11/27/2021    K 4.3 11/27/2021    CL 96 11/27/2021    CO2 32 11/27/2021 ANIONGAP 10 11/27/2021    BUN 17 11/27/2021    CREATININE 1.11 11/27/2021    BUNCRER NOT REPORTED 11/27/2021    CALCIUM 9.4 11/27/2021    LABGLOM >60 11/27/2021    GFRAA >60 11/27/2021    GFR      11/27/2021    GFR NOT REPORTED 11/27/2021     Pro-BNP on 11/27/2021 was 18,041    Radiology:    ECHO Complete 2D W Doppler W Color    Result Date: 11/27/2021  [address] Transthoracic Echocardiography Report (TTE)  Patient Name Rahul Diaz    Date of Study               11/26/2021               Kylee Encarnacion   Date of      12/02/1931  Gender                      Male  Birth   Age          80 year(s)  Race                           Room Number  2106        Height:                     64 inch, 162.56 cm   Corporate ID [id number]    Weight:                     164 pounds, 74.4 kg  #   Patient Acct [de-identified]   BSA:          1.8 m^2       BMI:      28.15  #                                                              kg/m^2   MR #         [id number]      Sonographer                 Marina Gray   Accession #  [id number]  Interpreting Physician      Marimar Arrieta   Fellow                   Referring Nurse                           Practitioner   Interpreting             Referring Physician         Narinder Crowell  Fellow                                               Rupert Tee  Type of Study   TTE procedure:2D Echocardiogram, M-Mode, Doppler, Color Doppler, Contrast  study. Procedure Date Date: 11/26/2021 Start: 02:23 PM Study Location: [address] [place] [address] Technical Quality: Fair visualization Indications:Congestive heart failure. History / Tech. Comments: HTN CA Patient Status: Inpatient Height: 64 inches Weight: 164 pounds BSA: 1.8 m^2 BMI: 28.15 kg/m^2 Rhythm: Sinus arrhythmia HR: 104 bpm BP: 139/79 mmHg CONCLUSIONS Summary Echo contrast utilized on this technically difficult study. Left ventricle is normal in size. Estimated LV EF 25 %. Mild left ventricular hypertrophy.  Right ventricular dilatation with reduced systolic function. Left atrial dilatation. Right atrial dilatation. Aortic valve is trileaflet. Mild aortic stenosis. Mild aortic insufficiency. Normal aortic root dimension. Thickened mitral valve leaflets. Moderate mitral regurgitation. Multiple MR jets noted. Grossly normal tricuspid valve structure and function. Mild to moderate tricuspid regurgitation. Estimated right ventricular systolic pressure is 45 mmHg. Moderately elevated right ventricular systolic pressure. IVC Increased diameter, but still has inspiratory variation. No significant pericardial effusion is seen. Signature ----------------------------------------------------------------------------  Electronically signed by Marina Gray(Sonographer) on 11/26/2021 05:05  PM ---------------------------------------------------------------------------- ----------------------------------------------------------------------------  Electronically signed by Marimar Arrieta(Interpreting physician) on  11/27/2021 11:33 AM ---------------------------------------------------------------------------- FINDINGS Left Atrium Left atrial dilatation. Left Ventricle Left ventricle is normal in size. Estimated LV EF 25 %. Mild left ventricular hypertrophy. Right Atrium Right atrial dilatation. Right Ventricle Right ventricular dilatation with reduced systolic function. Mitral Valve Thickened mitral valve leaflets. Moderate mitral regurgitation. Multiple MR jets noted. Aortic Valve Aortic valve is trileaflet. Mild aortic stenosis. Mild aortic insufficiency. Tricuspid Valve Grossly normal tricuspid valve structure and function. Mild to moderate tricuspid regurgitation. Estimated right ventricular systolic pressure is 45 mmHg. Moderately elevated right ventricular systolic pressure. Pulmonic Valve The pulmonic valve is normal in structure. Trivial pulmonic insufficiency. Pericardial Effusion No significant pericardial effusion is seen.  Miscellaneous Normal aortic root dimension. IVC Increased diameter, but still has inspiratory variation. M-mode / 2D Measurements & Calculations:   LVIDd:4.43 cm(3.7 - 5.6 cm)      Diastolic NNRVZR:973 ml  YBDOW:3.97 cm(2.2 - 4.0 cm)      Systolic VHLBPU:18.6 ml  ZIUY:6.55 cm(0.6 - 1.1 cm)       Aortic Root:3.3 cm(2.0 - 3.7 cm)  LVPWd:1.3 cm(0.6 - 1.1 cm)       LA Dimension: 5.4 cm(1.9 - 4.0 cm)  Fractional Shortenin.51 %    LA volume/Index: 68.1 ml /38m^2  Calculated LVEF (%): 42.48 %     LVOT:2.1 cm   Mitral:                               Aortic   Peak E-Wave: 0.98 m/s                 Peak Velocity: 2.22 m/s                                        Mean Velocity: 1.44 m/s  Peak Gradient: 3.8 mmHg               Peak Gradient: 19.71 mmHg  Mean Gradient: 3 mmHg                 Mean Gradient: 12 mmHg  Deceleration Time: 116 msec                                         Area (continuity): 1.17 cm^2  MR Velocity: 5.36 m/s                 AV VTI: 40.6 cm  JEFFREY Volumetric: 0.11 cm^2  Area (continuity): 1.98 cm^2  Mean Velocity: 0.60 m/s  MR VTI: 177 cm   Tricuspid:                            Pulmonic:   Estimated RVSP: 45 mmHg  Peak TR Velocity: 3.03 m/s  Peak TR Gradient: 36.7236 mmHg  Estimated RA Pressure: 8 mmHg                                        Estimated PASP: 44.72 mmHg      XR CHEST PORTABLE    Result Date: 2021  EXAMINATION: ONE XRAY VIEW OF THE CHEST 2021 2:26 pm COMPARISON: 2019 HISTORY: ORDERING SYSTEM PROVIDED HISTORY: SOB TECHNOLOGIST PROVIDED HISTORY: SOB Reason for Exam: Pt states sob. Acuity: Unknown Type of Exam: Unknown Additional signs and symptoms: Pt states sob. FINDINGS: Cardiac silhouette is enlarged. Ill definition of the central pulmonary vasculature with diffuse interstitial noted. Bibasilar opacities, right greater than left consistent with the small right pleural effusion and trace left pleural effusion probably associated with atelectasis/infiltrates. No pneumothorax. Nonspecific interstitial and airspace opacities. Given the presence of cardiac silhouette enlargement/cardiomegaly and effusions, pulmonary edema is primary consideration. Consultations:    Consults:     Final Specialist Recommendations/Findings:   IP CONSULT TO INTERNAL MEDICINE  IP CONSULT TO CARDIOLOGY  PHARMACY TO DOSE WARFARIN  IP CONSULT TO HEART FAILURE NURSE/COORDINATOR      The patient was seen and examined on day of discharge and this discharge summary is in conjunction with any daily progress note from day of discharge. Discharge plan:       Disposition: Home    Physician Follow Up:     Olegario Draper MD  [address] [place] [address], [id number]  [place] [address]  [phone]    In 1 week  follow up       Requiring Further Evaluation/Follow Up POST HOSPITALIZATION/Incidental Findings: Follow-up with your cardiologist for your atrial fibrillation    Diet: regular diet, recommended low-salt, less than 2 g due to heart failure. Activity: As tolerated    Instructions to Patient:   Follow-up with your cardiologist regarding the new medications prescribed at discharge for your atrial fibrillation as well as heart failure.     Discharge Medications:      Medication List        START taking these medications      dilTIAZem 30 MG tablet  Commonly known as: CARDIZEM  Take 1 tablet by mouth 4 times daily     furosemide 40 MG tablet  Commonly known as: LASIX  Take 1 tablet by mouth daily  Start taking on: November 28, 2021            CONTINUE taking these medications      allopurinol 100 MG tablet  Commonly known as: ZYLOPRIM  TAKE ONE TABLET BY MOUTH DAILY     amoxicillin 875 MG tablet  Commonly known as: AMOXIL  Take 1 tablet by mouth 2 times daily for 10 days     busPIRone 15 MG tablet  Commonly known as: BUSPAR  Take 15 mg by mouth 3 times daily     latanoprost 0.005 % ophthalmic solution  Commonly known as: XALATAN     lisinopril 10 MG tablet  Commonly known as: PRINIVIL;ZESTRIL     metoprolol tartrate 25 MG tablet  Commonly known as: LOPRESSOR     mirtazapine 15 MG tablet  Commonly known as: Remeron  Take 1 tablet by mouth nightly     predniSONE 5 MG tablet  Commonly known as: DELTASONE  TAKE ONE TABLET BY MOUTH DAILY     PriLOSEC OTC 20 MG tablet  Generic drug: omeprazole     warfarin 5 MG tablet  Commonly known as: COUMADIN               Where to Get Your Medications        These medications were sent to [address], [place] [address]      Phone: [phone]   dilTIAZem 30 MG tablet  furosemide 40 MG tablet          Electronically signed by   Deana Do MD  11/27/2021  6:01 PM      Thank you Dr. Kamilah Gomes MD for the opportunity to be involved in this patient's care. Attending Physician Statement  I have discussed the care of Norris Bethea and I have examined the patient myselft and taken ros and hpi , including pertinent history and exam findings,  with the resident. I have reviewed the key elements of all parts of the encounter with the resident. I agree with the assessment, plan and orders as documented by the resident. I spent approx 35 mins in direct patient care as above and discussing discharge with patient, reviewing medications and counseling for discharge .     Electronically signed by Angie Payne MD

## 2021-11-27 NOTE — FLOWSHEET NOTE
11/27/21 1231   Provider Notification   Reason for Communication Review case   Provider Name Internal Medicine   Provider Notification Physician   Method of Communication Call   Response No new orders   Internal medicine notified of Echo results and RN plan.

## 2021-11-29 ENCOUNTER — TELEPHONE (OUTPATIENT)
Dept: FAMILY MEDICINE CLINIC | Age: 86
End: 2021-11-29

## 2021-11-29 ENCOUNTER — CARE COORDINATION (OUTPATIENT)
Dept: CASE MANAGEMENT | Age: 86
End: 2021-11-29

## 2021-11-29 NOTE — TELEPHONE ENCOUNTER
[facility] Transitions Initial Follow Up Call    Outreach made within 2 business days of discharge: Yes    Patient: Eliane Li Patient : 1931   MRN: [id number]  Reason for Admission: There are no discharge diagnoses documented for the most recent discharge. Discharge Date: 21       Spoke with: Patient    Discharge department/facility: [facility]    [facility] Interactive Patient Contact:  Was patient able to fill all prescriptions: Yes  Was patient instructed to bring all medications to the follow-up visit: Yes  Is patient taking all medications as directed in the discharge summary?  Yes  Does patient understand their discharge instructions: Yes  Does patient have questions or concerns that need addressed prior to 7-14 day follow up office visit: no    Scheduled appointment with PCP within 7-14 days    Follow Up  Future Appointments   Date Time Provider Chanelle Phillips   2021 10:30 AM [facility]   2021 11:00 AM Shamir Shay [facility] GABRIELE CHAVES [facility]   2022  1:00 PM [address]   2022 11:00 AM MD CURL[facility]   2/10/2022 11:00 AM Susan Fitzgerald MD [address] [place] [address]   2/15/2022  1:40 PM Susan Fitzgerald MD [address] [place] [address]   3/10/2022  2:00 PM [address]ani [address] [place], [place]

## 2021-11-29 NOTE — CARE COORDINATION
Shirl[facility] 45 Transitions Initial Follow Up Call #1 -Attempted initial 24 hour transitional call to patient. Left VM to return call directly to CTN  Attempted both of patient's contact # as well as daughter's contact #    Care Transitions Outreach Attempt #1    Call within 2 business days of discharge: Yes   Attempted to reach patient for transitions of care follow up. Unable to reach patient. Patient: Jd Lawler   Patient : 1931   MRN: [id number]    Reason for Admission: CHF  Discharge Date: 21   RARS: Readmission Risk Score: 15.9 ( )    Last Discharge [facility]       Complaint Diagnosis Description Type Department Provider    21 Shortness of Breath; Cough Atrial fibrillation with RVR ([address] . .. ED to Hosp-Admission (Discharged) (ADMITTED) Char Marinelli MD; Andrey Mark. .. Was this an external facility discharge?  No   Noted following upcoming appointments from discharge chart review:   [facility] follow up appointment(s):   Future Appointments   Date Time Provider Chanelle Phillips   2021 10:30 AM MD CURLY Dasilva [facility]   2021 11:00 AM ODETTE Irene [place] G[facility]   2022  1:00 PM [address]ani [address]   2022 11:00 AM MD CURLY Dasilva  [facility]   2/10/2022 11:00 AM Manuel Cook MD [address] [place] [address]   2/15/2022  1:40 PM Manuel Cook MD [address] [place] [address]   3/10/2022  2:00 PM Alexandro Martel DPM [address] [place] [address] Drive: [facility]  Non-face-to-face services provided:  Obtained and reviewed discharge summary and/or continuity of care documents        Follow Up  Future Appointments   Date Time Provider Chanelle Phillips   2021 10:30 AM MD CURLY Dasilva  [facility]   2021 11:00 AM Stacy Robbins [facility] [place] [facility]   2022  1:00 PM Alexandro Martel DPM [facility]   2022 11:00 AM MD CURLY Dasilva  [facility]   2/10/2022 11:00 AM Teresa Corea Clarisse Rachel, [address]   2/15/2022  1:40 PM Terri Rivera MD [facility]   3/10/2022  2:00 PM [facility]

## 2021-11-30 ENCOUNTER — CARE COORDINATION (OUTPATIENT)
Dept: CASE MANAGEMENT | Age: 86
End: 2021-11-30

## 2021-11-30 DIAGNOSIS — I50.43 ACUTE ON CHRONIC COMBINED SYSTOLIC AND DIASTOLIC CHF (CONGESTIVE HEART FAILURE) (HCC): Primary | ICD-10-CM

## 2021-11-30 PROCEDURE — 1111F DSCHRG MED/CURRENT MED MERGE: CPT | Performed by: FAMILY MEDICINE

## 2021-11-30 NOTE — CARE COORDINATION
Shirlene 45 Transitions Initial Follow Up Call - unable to reach patient, but reached daughter, Sandy Lugo    Call within 2 business days of discharge: Yes    Patient: Victoria Sandhoff   Patient : 1931   MRN: [id number]    Reason for Admission: CHF, chronic AF  Discharge Date: 21   RARS: Readmission Risk Score: 15.9 ( )      Last Discharge [facility]       Complaint Diagnosis Description Type Department Provider    21 Shortness of Breath; Cough Atrial fibrillation with RVR ([address] . .. ED to Hosp-Admission (Discharged) (ADMITTED) Jas Ramirez MD; [facility]. .. Spoke with: patient's daughter, Sandy Lugo. She lives close to patient's home & sees patient frequently. She tells me he gets frustrated on the phone as he is [facility] & only hears bits & pieces of conversation. She sets up all medications for him. Otherwise fairly independent - walks a half mile a few times weekly - celebrating his 89th BD this week. Daughter reports patient has no further symptoms at this time - did have some swelling of hands & feet prior to admission. She is very interested in hearing information on CHF & treatment/prevention/controlling CHF. Patient has had appt with Dr Winsome Carver, cardio yesterday & he reviewed new meds - lasix + cardizem with them. I informed daughter that a CHF clinic referral may be beneficial - she is interested & plans to discuss with physicians. She plans to buy patient a scale so he can start daily weights. Routed referral for clinical pharmacy & diet education as per CHF protocol.   Informed daughter of Care Transitions & CTN contact #    Facility: [facility]-face-to-face services provided:  Scheduled appointment with PCP-  Scheduled appointment with Specialist-cardio   Obtained and reviewed discharge summary and/or continuity of care documents    Care Transitions 24 Hour Call    Schedule Follow Up Appointment with PCP: Completed  Do you have any ongoing symptoms?: No  Do you have a copy of your discharge instructions?: Yes  Do you have all of your prescriptions and are they filled?: Yes  Have you been contacted by a [address]?: No  Have you scheduled your follow up appointment?: Yes (Comment: Winsome Carver cardio , PCP )  How are you going to get to your appointment?: Car - family or friend to transport  Were you discharged with any Home Care or Post Acute Services: No  Do you have support at home?: Alone  Do you feel like you have everything you need to keep you well at home?: Yes  Are you an active caregiver in your home?: No  Care Transitions Interventions         Transitions of Care Initial Call    Was this an external facility discharge? No     Challenges to be reviewed by the provider   Additional needs identified to be addressed with provider: No  none             Method of communication with provider : none      Advance Care Planning:   Does patient have an Advance Directive: not on file. DM is daughter - she is checking on paperwork at home & will have filed if available    Was this a readmission? No  Patient stated reason for admission: CHF  Patients top risk factors for readmission: lack of knowledge about disease, medical condition-CHF and medication management    Care Transition Nurse (CTN) contacted the family [facility] by telephone to perform post hospital discharge assessment. Verified name and  with family as identifiers. Provided introduction to self, and explanation of the CTN role. CTN reviewed discharge instructions, medical action plan and red flags with family who verbalized understanding. Family given an opportunity to ask questions and does not have any further questions or concerns at this time. Were discharge instructions available to patient? Yes. Reviewed appropriate site of care based on symptoms and resources available to patient including: PCP, Specialist and CTN.  The family agrees to contact the PCP office for questions related to their healthcare. Medication reconciliation was performed with family, who verbalizes understanding of administration of home medications. Covid Risk Education     Educated patient about risk for severe COVID-19 due to risk factors according to CDC guidelines. CTN reviewed discharge instructions, medical action plan and red flag symptoms with the family who verbalized understanding. Discussed COVID vaccination status: VACCINATED plus booster Yes. Education provided on COVID-19 vaccination as appropriate. Discussed exposure protocols and quarantine with CDC Guidelines. Family was given an opportunity to verbalize any questions and concerns and agrees to contact CTN or health care provider for questions related to their healthcare. Reviewed and educated family on any new and changed medications related to discharge diagnosis. Was patient discharged with a pulse oximeter? No       CTN provided contact information. Plan for follow-up call in 5-7 days based on severity of symptoms and risk factors.   Plan for next call: symptom management-reassess, follow up appointment-review and medication management-recheck if changes at appt        Follow Up  Future Appointments   Date Time Provider \[facility]\""   12/2/2021 10:30 AM MD CURLY Bunch  [facility]   12/8/2021 11:00 AM ODETTE Ferris [facility]   1/6/2022  1:00 PM Lorie Zavala DPM [facility]   1/21/2022 11:00 AM MD CURLY Bunch  [facility]   2/10/2022 11:00 AM Farida Davis MD .  [facility]   2/15/2022  1:40 PM Farida Davis MD [address] [place] [address]   3/10/2022  2:00 PM Cindy Lisa RN

## 2021-12-01 ENCOUNTER — CARE COORDINATION (OUTPATIENT)
Dept: CARE COORDINATION | Age: 86
End: 2021-12-01

## 2021-12-01 ENCOUNTER — TELEPHONE (OUTPATIENT)
Dept: PHARMACY | Facility: CLINIC | Age: 86
End: 2021-12-01

## 2021-12-01 NOTE — TELEPHONE ENCOUNTER
Prachi De Jesus, RN  [facility] Clinical [facility];          New CHF patient - please do med review    Pleas call patient's daughter, Sarah Wasserman - he is [facility] & gets frustrated on phone - daughter says she will convey info to patient - her # is in contacts - [phone]. Kavita Molina Care Transitions

## 2021-12-01 NOTE — TELEPHONE ENCOUNTER
Received a referral:  from Care Coordinator to review patients medications. Called patient to schedule a time to speak with a pharmacist over the telephone. No answer left VM: Please contact us at  [phone] to schedule this appointment. Pharmacists are available Monday thru Friday 7:30 AM till 5:30 PM.      Narcisa Cortés CP.    [facility] free: [phone]

## 2021-12-01 NOTE — CARE COORDINATION
RN noted to contact Tat[address] daughter, Angeles Fox. RD contacted Angeles Fox and left voicemail regarding Dietitian referral. Left call back number and will follow up as appropriate.        [address] [facility], [address] [place] [address]

## 2021-12-02 ENCOUNTER — OFFICE VISIT (OUTPATIENT)
Dept: FAMILY MEDICINE CLINIC | Age: 86
End: 2021-12-02
Payer: MEDICARE

## 2021-12-02 ENCOUNTER — TELEPHONE (OUTPATIENT)
Dept: UROLOGY | Age: 86
End: 2021-12-02

## 2021-12-02 VITALS
TEMPERATURE: 98.4 F | HEART RATE: 54 BPM | SYSTOLIC BLOOD PRESSURE: 138 MMHG | BODY MASS INDEX: 27.29 KG/M2 | WEIGHT: 159 LBS | RESPIRATION RATE: 18 BRPM | OXYGEN SATURATION: 96 % | DIASTOLIC BLOOD PRESSURE: 76 MMHG

## 2021-12-02 DIAGNOSIS — E87.1 HYPONATREMIA: ICD-10-CM

## 2021-12-02 DIAGNOSIS — I48.91 ATRIAL FIBRILLATION WITH RAPID VENTRICULAR RESPONSE (HCC): ICD-10-CM

## 2021-12-02 DIAGNOSIS — C61 PROSTATE CANCER (HCC): Primary | ICD-10-CM

## 2021-12-02 DIAGNOSIS — I50.43 ACUTE ON CHRONIC COMBINED SYSTOLIC AND DIASTOLIC CHF (CONGESTIVE HEART FAILURE) (HCC): Primary | ICD-10-CM

## 2021-12-02 PROCEDURE — 1111F DSCHRG MED/CURRENT MED MERGE: CPT | Performed by: FAMILY MEDICINE

## 2021-12-02 PROCEDURE — 99496 TRANSJ CARE MGMT HIGH F2F 7D: CPT | Performed by: FAMILY MEDICINE

## 2021-12-02 NOTE — PROGRESS NOTES
Post-Discharge Transitional Care Management Services or Hospital Follow Up      [address]   YOB: 1931    Date of Office Visit:  12/2/2021  Date of Hospital Admission: 11/24/21  Date of Hospital Discharge: 11/27/21  Risk of hospital readmission (high >=14%.  Medium >=10%) :Readmission Risk Score: 15.9 ( )      Care management risk score Rising risk (score 2-5) and Complex Care (Scores >=6): 6     Non face to face  following discharge, date last encounter closed (first attempt may have been earlier): 11/30/2021  2:41 PM    Call initiated 2 business days of discharge: Yes    Patient Active Problem List   Diagnosis    Hyponatremia    Insomnia    Allergic rhinitis    Malignant basal cell neoplasm of skin    Atrial fibrillation with rapid ventricular response ([address]    Benign prostatic hyperplasia with lower urinary tract symptoms    Branch retinal vein occlusion, left eye    Cervical radiculopathy    Essential hypertension    Gastroesophageal reflux disease without esophagitis    Iron deficiency anemia    Arthritis, degenerative    Diverticulosis of large intestine without hemorrhage    Elevated PSA    B12 deficiency    Diastolic dysfunction    Elevated liver enzymes    Pure hypercholesterolemia    Irritable bowel syndrome without diarrhea    Paroxysmal atrial fibrillation (HCC)    Low testosterone    Bradycardia    Chronic bilateral low back pain without sciatica    Gout    Anxiety    Chronic systolic congestive heart failure ([address]    DDD (degenerative disc disease), cervical    DDD (degenerative disc disease), lumbar    Closed compression fracture of second lumbar vertebra (HCC)    Acquired spondylolisthesis    Spinal stenosis of lumbar region with neurogenic claudication    Calcium pyrophosphate deposition disease of ankle    Depression    Bladder outlet obstruction    Status post prostatectomy    Prostate cancer ([address]    Acute on chronic combined systolic and diastolic CHF (congestive heart failure) ([facility])       Allergies   Allergen Reactions    Celebrex [Celecoxib] Nausea Only    Mobic Nausea Only       Medications listed as ordered at the time of discharge from hospital  @DISCHARGEMEDSLIST(<NOROUTINE> error)@      Medications marked \"taking\" at this time  Outpatient Medications Marked as Taking for the 12/2/21 encounter (Office Visit) with Matt Barajas MD   Medication Sig Dispense Refill    dilTIAZem (CARDIZEM) 30 MG tablet Take 1 tablet by mouth 4 times daily 120 tablet 0    furosemide (LASIX) 40 MG tablet Take 1 tablet by mouth daily 60 tablet 3    warfarin (COUMADIN) 5 MG tablet Take 5 mg by mouth daily Indications: INR goal 2-2.5 Dosing per [facility]      lisinopril (PRINIVIL;ZESTRIL) 10 MG tablet Take 10 mg by mouth daily      predniSONE (DELTASONE) 5 MG tablet TAKE ONE TABLET BY MOUTH DAILY 30 tablet 4    busPIRone (BUSPAR) 15 MG tablet Take 15 mg by mouth 3 times daily 90 tablet 1    omeprazole (PRILOSEC OTC) 20 MG tablet Take 20 mg by mouth daily      mirtazapine (REMERON) 15 MG tablet Take 1 tablet by mouth nightly 30 tablet 3    allopurinol (ZYLOPRIM) 100 MG tablet TAKE ONE TABLET BY MOUTH DAILY 90 tablet 2    latanoprost (XALATAN) 0.005 % ophthalmic solution Place 1 drop into both eyes nightly       metoprolol tartrate (LOPRESSOR) 25 MG tablet Take 25 mg by mouth 2 times daily          Medications patient taking as of now reconciled against medications ordered at time of hospital discharge: Yes    Chief Complaint   Patient presents with    Follow-Up from Hospital       History of Present illness - Follow up of Hospital diagnosis(es): Pt here today for hospital follow up secondary to atrial fibrillation with RVR and acute CHF. He was given IV Lasix and put on a Cardizem drip. He did have some confusion in the hospital which resolved at the time of discharge.  His sodium level was low, but did normalize at the time of discharge. He did have an ECHO and his EF did drop to 25% down from 50-55% in August. He was changed over to oral Lasix and Cardizem and clinically improved. He has seen Dr. Cherrie Maria since discharge and is keeping him on his current meds. He is going to repeat an ECHO in 2 months and hoping will improve as he is hoping the exacerbation was possible secondary to dehydration. Inpatient course: Discharge summary reviewed- see chart. Interval history/Current status: See HPI    A comprehensive review of systems was negative except for what was noted in the HPI. Vitals:    12/02/21 1028   BP: 138/76   Pulse: 54   Resp: 18   Temp: 98.4 °F (36.9 °C)   SpO2: 96%   Weight: 159 lb (72.1 kg)     Body mass index is 27.29 kg/m². Wt Readings from Last 3 Encounters:   12/02/21 159 lb (72.1 kg)   11/27/21 154 lb 15.7 oz (70.3 kg)   10/28/21 154 lb (69.9 kg)     BP Readings from Last 3 Encounters:   12/02/21 138/76   11/27/21 (!) 110/49   10/06/21 120/78        Physical Exam:  General Appearance: alert and oriented to person, place and time, well-developed and well-nourished, in no acute distress  Pulmonary/Chest: clear to auscultation bilaterally- no wheezes, rales or rhonchi, normal air movement, no respiratory distress  Cardiovascular: normal rate, no murmurs, and irregularly irregular rhythm noted  Abdomen: soft, non-tender, non-distended, normal bowel sounds, no masses or organomegaly  Extremities: mild edema both LE's    Assessment/Plan:  1. Acute on chronic combined systolic and diastolic CHF (congestive heart failure) (HCC)  - Will cont with the Lasix, Lopressor, and Lisinopril as prescribed  - Increase potassium in diet  - Follow up with Dr. Cherrie Maria as instructed and he is going to repeat an ECHO in 2 monhts    2. Atrial fibrillation with rapid ventricular response (HCC)  - Cont with the Cardizem and Lopressor for rate control and the Coumadin as prescribed    3.  Hyponatremia  - Back to normal at discharge  - Repeat a CMP in 1 month        Medical Decision Making: high complexity

## 2021-12-02 NOTE — TELEPHONE ENCOUNTER
2nd Attempt Documentation:  2nd attempt to contact this patient regarding the previous message  CLINICAL PHARMACY: REFERRAL  Patient unavailable at the time of call. Left following message on home TAD: please call back at toll-free [phone] to retrieve previous message. Letter mailed to patient.

## 2021-12-02 NOTE — PATIENT INSTRUCTIONS
Patient Education        Learning About High-Potassium Foods  What foods are high in potassium? The foods you eat contain nutrients, such as vitamins and minerals. Potassium is a nutrient. Your body needs the right amount to stay healthy and work as it should. You can use the list below to help you make choices about which foods to eat. Foods are high in potassium if they have more than 200 mg per serving. Fruits  · Apricots, 2 raw  · Avocado, ½ fruit  · Banana, 1 medium  · [place], 1 fruit  · Nectarine, 1 fruit  · Orange, 1 fruit  · Prunes, 5 fruits  · Raisins, ¼ cup  Vegetables  · Artichoke, 1 medium  · Beets, ½ cup  · Broccoli, ½ cup  · Kale (raw), 1 cup  · Potato with skin, 1 medium  · Spinach, ½ cup  · Sweet potato, 1 medium  · Tomato sauce, ½ cup  · Zucchini, ½ cup  Dairy and dairy alternatives  · Milk, 1 cup  · Soy milk, 1 cup  · Yogurt, 6 oz  Meats and other protein foods  · Beans (lima, navy, white), ½ cup  · Beef, ground, 3 oz  · Chicken, 3 oz  · Fish (halibut, tuna, cod, snapper), 3 oz  · Nuts (almonds, hazelnuts, [place], cashew, pistachios), 1 oz  · Peanut butter, 2 Tbsp  · Peanuts, 1 oz  · [place] 3 oz  Seasonings  · Salt substitutes  Work with your doctor to find out how much of this nutrient you need. Depending on your health, you may need more or less of it in your diet. Where can you learn more? Go to [email or web address] and sign in to your [employer] account. Enter P450 in the [employer] box to learn more about \"Learning About High-Potassium Foods. \"     If you do not have an account, please click on the \"Sign Up Now\" link. Current as of: December 17, 2020               Content Version: 13.0  © 2006-2021 Healthwise, Incorporated. Care instructions adapted under license by [facility] ([facility]).  If you have questions about a medical condition or this instruction, always ask your healthcare professional. [address] any warranty or liability for your use of this information.

## 2021-12-03 ENCOUNTER — TELEPHONE (OUTPATIENT)
Dept: FAMILY MEDICINE CLINIC | Age: 86
End: 2021-12-03

## 2021-12-03 NOTE — TELEPHONE ENCOUNTER
Patient has been having bad muscle spasms. Patient has left over Tizanidine HCL 2mg tablets and is asking if he could use those for the spasms. Patient wants to make sure the Tizanidine medication doesn't interfere with his new heart mediation.

## 2021-12-06 ENCOUNTER — TELEPHONE (OUTPATIENT)
Dept: FAMILY MEDICINE CLINIC | Age: 86
End: 2021-12-06

## 2021-12-06 NOTE — TELEPHONE ENCOUNTER
----- Message from Sam Villalobos sent at 12/6/2021  2:28 PM EST -----  Subject: Message to Provider    QUESTIONS  Information for Provider? Patient is calling in about his appointment on   01/21 and wanting to know if he has to fast for that blood work or not. Please let the patient know. Thank you.   ---------------------------------------------------------------------------  --------------  CALL BACK INFO  What is the best way for the office to contact you? OK to leave message on   voicemail  Preferred Call Back Phone Number? [phone]  ---------------------------------------------------------------------------  --------------  SCRIPT ANSWERS  Relationship to Patient?  Self

## 2021-12-08 ENCOUNTER — CARE COORDINATION (OUTPATIENT)
Dept: CARE COORDINATION | Age: 86
End: 2021-12-08

## 2021-12-08 ENCOUNTER — OFFICE VISIT (OUTPATIENT)
Dept: PSYCHOLOGY | Age: 86
End: 2021-12-08
Payer: MEDICARE

## 2021-12-08 DIAGNOSIS — F41.9 ANXIETY: Primary | ICD-10-CM

## 2021-12-08 PROCEDURE — 1036F TOBACCO NON-USER: CPT | Performed by: SOCIAL WORKER

## 2021-12-08 PROCEDURE — 90832 PSYTX W PT 30 MINUTES: CPT | Performed by: SOCIAL WORKER
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
## 2021-12-08 NOTE — CARE COORDINATION
RN noted to contact [address] daughter, Ranulfo tomlin. RD contacted Ranulfo tomlin regarding Dietitian referral. RD unable to leave VM with call back number at this time- mailbox is full and not accepting messages at this time. RD will follow up as appropriate.      [address] [facility], [address] [place] [address]

## 2021-12-09 ENCOUNTER — CARE COORDINATION (OUTPATIENT)
Dept: CASE MANAGEMENT | Age: 86
End: 2021-12-09
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
## 2021-12-09 NOTE — CARE COORDINATION
[facility] Transitions Follow Up Call - reached patient's son after attempts to reach patient & Marlon Alvaro, daughter. Request to son, Patrick Marie that patient or daughter return call    2021    Patient: Jonh Burleson    Patient : 1931   MRN: [id number]    Reason for Admission:   Discharge Date: 21   RARS: Readmission Risk Score: 15.9 ( )         Spoke with: son, Patrick Marie - reports that patient is \"progressing well. \"  Ambulates a lot as before - weather permitting or goes to the grocery store to ambulate. Describes patient as very sharp & independent after celebrating his recent  BD. Reports that patient is doing well with new meds - lasix included. Has already seen PCP & cardiologist - has another appt with Dr Milagros Vang next month. Informed Patrick Marie that sister, Pamela's voicemail is full. She sees patient daily & is described as best contact since patient is [place] on phone. Informed Patrick Marie of dietician's contact number as she has been trying to reach Marlon Alvaro re: CHF dietary education. Care Transitions Follow Up Call    Needs to be reviewed by the provider   Additional needs identified to be addressed with provider: No  none             Method of communication with provider : none      Care Transition Nurse (CTN) contacted the family by telephone to follow up after admission Verified name and  with family as identifiers. Addressed changes since last contact: reviewed PCP appt/cardio appt  Discussed follow-up appointments. If no appointment was previously scheduled, appointment scheduling offered: Yes. Is follow up appointment scheduled within 7 days of discharge? Yes. CTN reviewed discharge instructions, medical action plan and red flags with family and discussed any barriers to care and/or understanding of plan of care after discharge. Discussed appropriate site of care based on symptoms and resources available to patient including: PCP, Specialist and CTN.  The family agrees to contact the PCP office for questions related to their healthcare. Patients top risk factors for readmission: medical condition-CHF  Interventions to address risk factors: Scheduled appointment with PCP-12/2, Scheduled appointment with Specialist-cardio 11/29 and Obtained and reviewed discharge summary and/or continuity of care documents      Non-[facility] follow up appointment(s): Radhames Bertha [place] - Dr Nimisha Thomas - 11/29    CTN provided contact information for future needs. Plan for follow-up call in 7-10 days based on severity of symptoms and risk factors. Plan for next call: symptom management-reassess  follow up appointment-review  referral to ambulatory care manager-plan to refer to ACM after transition - CHF          Care Transitions Subsequent and Final Call    Schedule Follow Up Appointment with PCP: Completed  Subsequent and Final Calls  Do you have any ongoing symptoms?: No  Have your medications changed?: No  Do you have any questions related to your medications?: No  Do you currently have any active services?: No  Are you currently active with any services?: Home Health  Do you have any needs or concerns that I can assist you with?: No  Care Transitions Interventions  Other Interventions:                Follow Up  Future Appointments   Date Time Provider Chanelle Phillips   1/6/2022  1:00 PM Analilia Serrato DPM [place] [facility]   1/21/2022 11:00 [facility]   2/10/2022 11:00 AM Logan Rodgers MD [address] [place] [address]   3/10/2022  2:00 PM Analilia Serrato [place], [place]

## 2021-12-13 ENCOUNTER — TELEPHONE (OUTPATIENT)
Dept: FAMILY MEDICINE CLINIC | Age: 86
End: 2021-12-13

## 2021-12-13 NOTE — TELEPHONE ENCOUNTER
----- Message from Millicentlinda Frost sent at 12/13/2021  8:54 AM EST -----  Subject: Appointment Request    Reason for Call: Routine Existing Condition Follow Up    QUESTIONS  Type of Appointment? Established Patient  Reason for appointment request? No appointments available during search  Additional Information for Provider? Pt was in on 12/2 w/ Dr Felicia Jacobo - Dr Felicia Jacobo wants a 2 weeks fup appt; no appts available until Feb - screened   green  ---------------------------------------------------------------------------  --------------  CALL BACK INFO  What is the best way for the office to contact you? OK to leave message on   voicemail  Preferred Call Back Phone Number? [phone]  ---------------------------------------------------------------------------  --------------  SCRIPT ANSWERS  Relationship to Patient? Self  Is this follow up request related to routine Diabetes Management? No  Have you been diagnosed with, awaiting test results for, or told that you   are suspected of having COVID-19 (Coronavirus)? (If patient has tested   negative or was tested as a requirement for work, school, or travel and   not based on symptoms, answer no)? No  Within the past two weeks have you developed any of the following symptoms   (answer no if symptoms have been present longer than 2 weeks or began   more than 2 weeks ago)? Fever or Chills, Cough, Shortness of breath or   difficulty breathing, Loss of taste or smell, Sore throat, Nasal   congestion, Sneezing or runny nose, Fatigue or generalized body aches   (answer no if pain is specific to a body part e.g. back pain), Diarrhea,   Headache? No  Have you had close contact with someone with COVID-19 in the last 14 days? No  (Service Expert  click yes below to proceed with [employer] As Usual   Scheduling)?  Yes

## 2021-12-13 NOTE — TELEPHONE ENCOUNTER
Patient is convinced he was to return in 2 weeks so he could have his lungs listened to and a BP check. I did tell him that those were both good at his recent appointment and to call if he needs anything in the meantime.  I reminded him that the upcoming appointment is on 01/21/22 @ 11am.

## 2021-12-15 ENCOUNTER — CARE COORDINATION (OUTPATIENT)
Dept: CARE COORDINATION | Age: 86
End: 2021-12-15

## 2021-12-15 NOTE — CARE COORDINATION
RN noted to contact Tatiana 89 daughter, Stan Prieto. RD contacted Stan Lozoyaleny and left voicemail regarding Dietitian referral. Left call back number. Per chart review, patient was seen inpatient by RD and patient's current diet order is: ADULT DIET; Regular; Low Sodium (2 gm). RD outreached 12/1, 12/8 and today 12/15. RD will notify CTN, Yohannes Goddard. RD will continue to follow/assist with patient return call.        [facility], [address] [place] [address]

## 2021-12-21 ENCOUNTER — CARE COORDINATION (OUTPATIENT)
Dept: CASE MANAGEMENT | Age: 86
End: 2021-12-21

## 2021-12-21 NOTE — CARE COORDINATION
Shirlene 45 Transitions Follow Up Call -Attempted to reach patient's daughter, Daniela Joel for subsequent transitional call. VM left to return call to CTN. 2021    [place] did leave return message for me previously after I spoke with patient's son, Yesy Mccloud. Patient reportedly has difficulty hearing on phone, so I have been advised that Daniela Joel is the appropriate family member to speak with. Patient has already seen PCP & cardiologist - has another appt with Dr Paul Puckett next month.     Will try again for a final transition call - plan was to refer to ambulatory care if able to connect on phone calls.       Patient: Gurpreet Nicholas    Patient : 1931   MRN: [id number]    Reason for Admission:  CHF, chronic AF  Discharge Date: 21   RARS: Readmission Risk Score: 15.9 ( )      Follow Up  Future Appointments   Date Time Provider Chanelle Leonardoi   2022  1:00 PM Everett Mack [address] [place] [address]    2022 11:00 AM Anoop Medina MD [facility]   2/10/2022 11:00 AM Isai Macias MD [address] [place] [address]   3/10/2022  2:00 PM [facility]

## 2021-12-23 ENCOUNTER — CARE COORDINATION (OUTPATIENT)
Dept: CASE MANAGEMENT | Age: 86
End: 2021-12-23

## 2021-12-23 NOTE — CARE COORDINATION
Shirlene 45 Transitions Follow Up Call. Attempted to reach patient for subsequent transitional call. VM left to return call to CTN    Daughter, Marcus Rice returned my call - spoke with briefly     2021    Patient: Storm Davis    Patient : 1931   MRN: [id number]    Reason for Admission: CHF, chronic AF  Discharge Date: 21   RARS: Readmission Risk Score: 15.9 ( )      Spoke with: Marcus Rice called back, but is unable to talk now - she does want to talk with CTN, so she wants to connect next week re: plans for [facility] referral & wants to discuss other needs patient may have. Noted that dietician has been trying to connect with daughter also. Care Transitions Subsequent and Final Call    Schedule Follow Up Appointment with PCP: Completed  Subsequent and Final Calls  Are you currently active with any services?: Home Health  Care Transitions Interventions  Other Interventions:            Follow Up  Future Appointments   Date Time Provider Chanelle Leonardoi   2022  1:00 PM Nehemiah Mg DPM [facility]   2022 11:00 AM [facility]   2/10/2022 11:00 AM Danette Slater MD [address] [place] [address]   3/10/2022  2:00 PM Cindy Harris [facility]

## 2021-12-29 ENCOUNTER — CARE COORDINATION (OUTPATIENT)
Dept: CASE MANAGEMENT | Age: 86
End: 2021-12-29

## 2021-12-30 NOTE — CARE COORDINATION
Shirlene 45 Transitions Final Follow Up Call    2021    Patient: Storm Davis    Patient : 1931   MRN: [id number]    Reason for Admission: CHF,AF  Discharge Date: 21   RARS: Readmission Risk Score: 15.9 ( )         Spoke with: Marcus Rice, daughter who returned call to CTN after I left VM message for daughter & patient. We discussed CHF & CHF clinic referral - she is interested & thinks it will help patient. Plans to check with Dr Salbador Rae for referral at [facility] in January. Plans to get a scale for patient so he can start daily weights. Patient is physically active for a 80year old - although she notices he gets more fatigued lately, but other than that, he has no current symptoms. No edema or SOB. Interested also in [facility] referral - referral routed. Informed of final CT call - CT episode ended. Has CTN # & she will call if any issues or concerns. Care Transitions Subsequent and Final Call    Schedule Follow Up Appointment with PCP: Completed  Subsequent and Final Calls  Do you have any ongoing symptoms?: Yes  Patient-reported symptoms: Fatigue  Have your medications changed?: No  Do you have any questions related to your medications?: No  Do you currently have any active services?: No  Are you currently active with any services?: Home Health  Do you have any needs or concerns that I can assist you with?:  (Comment: Discussed possible CHF referral with patient's daughter - she is interested & plans to discuss with Dr Salbador Rae)  Care Transitions Interventions  Other Interventions:            Follow Up  Future Appointments   Date Time Provider Chanelle Jacqueline   2022  1:00 PM Nehemiah Mg DPM [facility]   2022 11:00 AM Barrington Gomez MD [facility]   2/10/2022 11:00 AM Danette Slater MD [address] [place] [address]   3/10/2022  2:00 PM Cindy Harris [facility]

## 2022-01-05 ENCOUNTER — CARE COORDINATION (OUTPATIENT)
Dept: CARE COORDINATION | Age: 87
End: 2022-01-05

## 2022-01-05 NOTE — CARE COORDINATION
Ambulatory Care Coordination Note  1/5/2022  CM Risk Score: 6  Charlson 10 Year Mortality Risk Score: 100%     ACC: NAYAN SAINZ, RN    Summary Note:  Spoke daughter Kemi Ash states Lin Mcnulty is doing very well at home. Appetite is good, taking all medications even new ones without difficulty. He is taking Lasix at 4 am so he can schedule doctors appt around 2 and be able to go. He is still living at home independently. She would like me to call back after 1/20/2022 when they see cardiology. We discussed CHF clinic  Discussed dietician that has tried to reach out - states she will call her back  Discussed Medication management that has tried to reach out - states she will call back  And she has my number for questions or concerns in the meantime. Very thankful for the call and information  CHF diet, sx / reviewed.      Leatha Samuel for CC : Referral from CTN Patient pwkvokin26/24/2021 - 11/27/2021 (3 days)  [address] [place] [address] Dr Chowdhury on chronic combined systolic and diastolic CHF (congestive heart failure) [facility])          Episode Started: 1/5/2022    Care Coordination Plan of Care:New CHF diagnosis    Reason for Call: Referral from [address] [place] [address] Drive Patient rbhjmgni90/24/2021 - 11/27/2021 (3 days)  [address] [place] [address] Dr Chowdhury on chronic combined systolic and diastolic CHF (congestive heart failure) ([facility] [address]       Plan for Next Outreach:  Review Cardiac appt   Review chf zone tools          Assessments:  General Assessment    Do you have any symptoms that are causing concern?: No       Congestive Heart Failure Assessment    Are you currently restricting fluids?: No Restriction  Do you understand a low sodium diet?: Yes  Do you understand how to read food labels?: Yes  How many restaurant meals do you eat per week?: 1-2  Do you salt your food before tasting it?: Yes     No patient-reported symptoms      Symptoms:     Symptom course: stable  Weight trend: stable         Zone / Symptoms Management reviewed : No    Medications Reviewed : Yes  Patient verbalizes HAS/DOES NOT HAVE: has have all medications. Goals reviewed :Yes       Education Reviewed : Yes       See Education Module: CHF    Primary and specialty provider appts:   Future Appointments   Date Time Provider Chanelle Phillips   1/21/2022 11:00 AM Meño Cardona MD [facility]   2/10/2022 11:00 AM Jhonatan Knutson MD [address] [place] [address]   3/10/2022  2:00 PM Carson Ladd DPM [address]        Notes reviewed:    SDOH Reviewed:   · Reliable Transportation: Yes  · Financial Strain: No  · Physically Active: Yes   · Lifestyle Choices (smoking, etoh, substance abuse)none     ED visits or Hospitalizations: Yes     ED:     Admission:     There are no preventive care reminders to display for this patient. Goals Addressed                 This Visit's Progress     Conditions and Symptoms   On track     I will schedule office visits, as directed by my provider. I will keep my appointment or reschedule if I have to cancel. I will notify my provider of any barriers to my plan of care. I will follow my Zone Management tool to seek urgent or emergent care. I will notify my provider of any symptoms that indicate a worsening of my condition. Barriers: fear of failure and stress  Plan for overcoming my barriers: communication with daughter and providers  Confidence: 8/10  Anticipated Goal Completion Date:  12/221/2201                      Ambulatory Care Coordination Assessment    Care Coordination Protocol  Week 1 - Initial Assessment     Do you have all of your prescriptions and are they filled?: Yes  Barriers to medication adherence: None  Are you able to afford your medications?: Yes  How often do you have trouble taking your medications the way you have been told to take them?: I always take them as prescribed.      Do you have Home O2 Therapy?: No      Ability to seek help/take action for Emergent Urgent situations i.e. fire, crime, inclement weather or health crisis. : Independent  Ability to ambulate to restroom: Independent  Ability handle personal hygeine needs (bathing/dressing/grooming): Independent  Ability to manage Medications: Independent  Ability to prepare Food Preparation: Independent  Ability to maintain home (clean home, laundry): Independent  Ability to drive and/or has transportation: Needs Assistance  Ability to do shopping: Needs Assistance  Ability to manage finances: Independent  Is patient able to live independently?: Yes     Current Housing: Private Residence           Frequent urination at night?: Yes  Do you use rails/bars?: Yes  Do you have a non-slip tub mat?: Yes        Suggested Interventions and Community Resources                  Prior to Admission medications    Medication Sig Start Date End Date Taking?  Authorizing Provider   dilTIAZem (CARDIZEM) 30 MG tablet Take 1 tablet by mouth 4 times daily 11/27/21 12/27/21  Sven Jaramillo MD   furosemide (LASIX) 40 MG tablet Take 1 tablet by mouth daily 11/28/21   Sven Jaramillo MD   warfarin (COUMADIN) 5 MG tablet Take 5 mg by mouth daily Indications: INR goal 2-2.5 Dosing per [facility] Medication Management [facility]    Historical Provider, MD   lisinopril (PRINIVIL;ZESTRIL) 10 MG tablet Take 10 mg by mouth daily    Historical Provider, MD   predniSONE (DELTASONE) 5 MG tablet TAKE ONE TABLET BY MOUTH DAILY 11/8/21   NASRA Fontaine CNP   busPIRone (BUSPAR) 15 MG tablet Take 15 mg by mouth 3 times daily 10/18/21   Maurice Meade MD   omeprazole (PRILOSEC OTC) 20 MG tablet Take 20 mg by mouth daily    Historical Provider, MD   mirtazapine (REMERON) 15 MG tablet Take 1 tablet by mouth nightly 9/16/21   Maurice Meade MD   allopurinol (ZYLOPRIM) 100 MG tablet TAKE ONE TABLET BY MOUTH DAILY 4/9/21   Maurice Meade MD   latanoprost (XALATAN) 0.005 % ophthalmic solution Place 1 drop into both eyes nightly     Historical Provider, MD   metoprolol tartrate (LOPRESSOR) 25 MG tablet Take 25 mg by mouth 2 times daily    Historical Provider, MD       Future Appointments   Date Time Provider Chanelle Jacqueline   1/21/2022 11:00 AM Luann Edwards MD [facility]   2/10/2022 11:00 AM Urszula Salmon MD [address] [place] [address]   3/10/2022  2:00 PM Win Lo, [address] [place] [address]

## 2022-01-06 ENCOUNTER — CARE COORDINATION (OUTPATIENT)
Dept: CARE COORDINATION | Age: 87
End: 2022-01-06

## 2022-01-06 NOTE — CARE COORDINATION
RD outreached 12/1, 12/8 and today 12/15 regarding Dietitian referral for CHF. RD received VM from patient's daughter, Esperanza Sánchez on 12/23. RD outreached today 1/6 regarding dietitian referral. RD unable to leave VM with call back number- mailbox is full and not accepting messages at this time. RD will follow up as appropriate.     [facility], [address] [place] [address]

## 2022-01-07 ENCOUNTER — CARE COORDINATION (OUTPATIENT)
Dept: CARE COORDINATION | Age: 87
End: 2022-01-07

## 2022-01-07 NOTE — CARE COORDINATION
Veronique Lilian Morfin  January 7, 2022    Initial Referral Reason: CHF    Patient Care Team:  Oriana Mercer MD as PCP - General (Family Medicine)  Oriana Mercer MD as PCP - [facility]  Verito Valdovinos MD as Consulting Physician (Dermatology)  Deyvi Davis MD as Consulting Physician (Cardiology)  Abel Gandhi MD as Consulting Physician (Gastroenterology)  Angus Pike MD as Surgeon (Ophthalmology)  Humberto Ovalles MD as Surgeon (Ophthalmology)  Daniel Fallon MD as Consulting Physician (Urology)  Viki Kelley DO as Consulting Physician (General Surgery)  Yamini Meng DPM as Consulting Physician (Podiatry)  Shaq Whitney MD as Consulting Physician (Internal Medicine)  Ruma Knott MD as Consulting Physician (Urology)  Pavan Szymanski, RN as Ambulatory Care Manager  Pavan Szymanski RN as Ambulatory Care Manager (Care Coordinator)  Mechelle Méndez RD, LD as Dietitian (Dietitian)    Past Medical History:    Current Outpatient Medications   Medication Sig Dispense Refill    dilTIAZem (CARDIZEM) 30 MG tablet Take 1 tablet by mouth 4 times daily 120 tablet 0    furosemide (LASIX) 40 MG tablet Take 1 tablet by mouth daily 60 tablet 3    warfarin (COUMADIN) 5 MG tablet Take 5 mg by mouth daily Indications: INR goal 2-2.5 Dosing per [facility]ication Management [facility]      lisinopril (PRINIVIL;ZESTRIL) 10 MG tablet Take 10 mg by mouth daily      predniSONE (DELTASONE) 5 MG tablet TAKE ONE TABLET BY MOUTH DAILY 30 tablet 4    busPIRone (BUSPAR) 15 MG tablet Take 15 mg by mouth 3 times daily 90 tablet 1    omeprazole (PRILOSEC OTC) 20 MG tablet Take 20 mg by mouth daily      mirtazapine (REMERON) 15 MG tablet Take 1 tablet by mouth nightly 30 tablet 3    allopurinol (ZYLOPRIM) 100 MG tablet TAKE ONE TABLET BY MOUTH DAILY 90 tablet 2    latanoprost (XALATAN) 0.005 % ophthalmic solution Place 1 drop into both eyes nightly       metoprolol tartrate (LOPRESSOR) 25 MG tablet Take 25 mg by mouth 2 times daily       No current facility-administered medications for this visit. Biochemical Data, Medical Tests and Procedures:    No results found for: LABA1C  No results found for: EAG    Lab Results   Component Value Date    CHOL 163 11/24/2021    CHOL 143 06/02/2021    CHOL 171 01/07/2021     Lab Results   Component Value Date    TRIG 83 11/24/2021    TRIG 71 06/02/2021    TRIG 74 01/07/2021     Lab Results   Component Value Date    HDL 54 11/24/2021    HDL 53 06/02/2021    HDL 64 01/07/2021     Lab Results   Component Value Date    LDLCALC 53 05/23/2017    LDLCALC 66 11/14/2016    LDLCALC 40 02/12/2016     Lab Results   Component Value Date    LABVLDL 12 05/23/2017    LABVLDL 15 11/14/2016    LABVLDL 10 02/12/2016     Lab Results   Component Value Date    CHOLHDLRATIO 3.0 11/24/2021    CHOLHDLRATIO 2.7 06/02/2021    CHOLHDLRATIO 2.7 01/07/2021       Lab Results   Component Value Date    WBC 10.1 11/27/2021    HGB 14.6 11/27/2021    HCT 45.1 11/27/2021    MCV 85.7 11/27/2021     11/27/2021       Lab Results   Component Value Date    CREATININE 1.11 11/27/2021    BUN 17 11/27/2021     11/27/2021    K 4.3 11/27/2021    CL 96 (L) 11/27/2021    CO2 32 (H) 11/27/2021         Anthropometric Measurements:    Height: 64 inches (162.6 cm)  Weight: 159 lb (72.1 kg)  BMI: 27.29   IBW: 130 lb (59.1 kg) +-10%  %IBW: 122.3%    Physical Exam Findings:  Deferred    Nutrition Interview: RN noted to contact patient's daughter, Astrid Grijalva. RD called Astrid Grijalva, explained reason for call and role in care. R D explained the importance of watching sodium to prevent body from holding extra fluid. Explained the American Heart Association recommends no more than 2300 mg of sodium per day, which is equivalent to 1 teaspoon of salt to put it into perspective. RD explained the nutrition plan for heart failure usually limits the sodium from food and beverages to no more than 2000 mg per day.  Discussed avoiding the salt shaker when eating/cooking- RD encouraged pt to put it in a place that is not accessible and to use alternatives such as Mrs. Dash, black pepper, rosemary, etc. Explained how sodium is hidden in a lot of foods and the importance of reading food labels-choosing foods with 140 mg of sodium or less per serving. Discussed draining/rinsing canned goods to decrease sodium content. Explained restaurant foods tend to be higher in sodium- discussed utilizing restaurant's menus online prior to eating out. Reviewed foods high in sodium to avoid/limit eating and discussed choosing frozen meals with <600 mg of sodium per serving. Rachael Sites asked specifically about a [place]usta bar- RD reviewed food label and discussed it is low in sodium. Explained the importance of moderation. RD reviewed the importance of weighing self daily. Reviewed to call PCP if pt has a 3 lb weight gain in one day or a 5 lb weight gain in 2 days. No nutrition related questions at this time. RD offered to mail educational handouts to pt to reinforce concepts discussed during phone conversation, Rachael Sites accepted and very appreciative. RD verified address. Nutrition Diagnosis:  #1 Problem Food and nutrition-related knowledge deficit       Etiology related to lack of prior nutrition related education regarding sodium       Signs/Symptoms as evidenced by referral for CHF nutrition education    Nutrition Intervention:     Estimated Needs  cardiac diet providing 1700 kcals to promote wt maintenance ([address] based on CBW). Estimated daily Protein Needs: 58-72 g (based on 0.8-1.0 g/kg based on CBW)  Estimated daily Fluid Needs: 64 oz. #1 Nutrition Information: Provided patient with Heart Failure Nutrition Therapy handouts. For reinforcement of concepts discussed during nutrition interview. #2 Nutrition Counseling: Used open-ended questions to assess patients perceived susceptibility and severity of disease state.  Discussed potential impact of health threat on patient's lifestyle. Used open-ended questions to assess patient's perception regarding benefits of and barriers to implementation of nutrition therapy. #3 Nutrition Education: Clearly defined the benefits of nutrition therapy. Summarized and affirmed positive aspects of current nutrition patterns. Provided education regarding value of adherence to cardiac diet. Discussed ways to establish applying concepts of alternatives and choices regarding implementation of diet. Explored ideas for small, incremental goals to initiate behavior change. Nutrition Monitoring and Evaluation:     Indicator/Goal Criteria   #1 Eat balanced meals consistently throughout the day. #1 Focus on eating 3 meals/day and make these meals balanced using the MyPlate [id number]-5/8 plate fruits and/or vegetables, 1/4 plate protein and 1/4 plate starchy carbohydrates with 8 oz glass of low fat milk if desired. #2 Monitor daily sodium intake. Keep sodium from food and beverages to no more than 2000 mg/day. #2 Avoid the salt shaker. Read food labels to help choose lower sodium options (<140 mg of sodium per serving). #3 Monitor daily weights and keep a log. #3 Start weighing self daily and notify doctor of sudden weight gain. Follow Up: RD will call pt in 2 weeks to follow up and make sure pt received handouts in mail. RD will answer any nutrition related questions at this time.      [address] [place], [address] [facility]

## 2022-01-10 ENCOUNTER — VIRTUAL VISIT (OUTPATIENT)
Dept: FAMILY MEDICINE CLINIC | Age: 87
End: 2022-01-10
Payer: MEDICARE

## 2022-01-10 ENCOUNTER — TELEPHONE (OUTPATIENT)
Dept: FAMILY MEDICINE CLINIC | Age: 87
End: 2022-01-10

## 2022-01-10 DIAGNOSIS — J01.90 ACUTE SINUSITIS, RECURRENCE NOT SPECIFIED, UNSPECIFIED LOCATION: Primary | ICD-10-CM

## 2022-01-10 PROCEDURE — 4040F PNEUMOC VAC/ADMIN/RCVD: CPT | Performed by: FAMILY MEDICINE

## 2022-01-10 PROCEDURE — G8427 DOCREV CUR MEDS BY ELIG CLIN: HCPCS | Performed by: FAMILY MEDICINE

## 2022-01-10 PROCEDURE — 99213 OFFICE O/P EST LOW 20 MIN: CPT | Performed by: FAMILY MEDICINE

## 2022-01-10 PROCEDURE — 1123F ACP DISCUSS/DSCN MKR DOCD: CPT | Performed by: FAMILY MEDICINE

## 2022-01-10 RX ORDER — AMOXICILLIN AND CLAVULANATE POTASSIUM 875; 125 MG/1; MG/1
1 TABLET, FILM COATED ORAL 2 TIMES DAILY
Qty: 20 TABLET | Refills: 0 | Status: SHIPPED | OUTPATIENT
Start: 2022-01-10 | End: 2022-01-20

## 2022-01-10 ASSESSMENT — ENCOUNTER SYMPTOMS
SINUS PAIN: 1
BACK PAIN: 0
NAUSEA: 0
CONSTIPATION: 0
DIARRHEA: 0
ABDOMINAL PAIN: 0
COUGH: 1
SORE THROAT: 0
ABDOMINAL DISTENTION: 0
CHEST TIGHTNESS: 0
VOMITING: 0
SINUS PRESSURE: 1
RHINORRHEA: 0
SHORTNESS OF BREATH: 0

## 2022-01-10 NOTE — TELEPHONE ENCOUNTER
Pt called in c/o congestion, cough x3 weeks. He has been taking mucinex and it has helped break up the mucus, but cough is not productive. Pt is asking for suggestions as he is still miserable. Antibiotic, OTC meds, just continue mucinex?

## 2022-01-10 NOTE — PROGRESS NOTES
Jacqueline Monson MD  [facility]  [id number] , [address]  [address] [address]  Dept: [phone]  Dept Fax: [phone]     Patient ID: Jim Ruelas is a 80 y.o. male. HPI     Established patient presents via virtual visit for an acute visit secondary to sinus congestion, PND, productive cough, and chest congestion x 3 weeks. He has been taking OTC Mucinex DM and not getting much relief. No loss of taste or smell. No exposure to Covid. She has been fully vaccinated against Covid. Pt denies any fever or chills. Pt today denies any HA, chest pain, or SOB. Pt denies any N/V/D/C or abdominal pain. Otherwise pt doing well on current tx and no other concerns today. The patient's past medical, surgical, social, and family history as well as his current medications and allergies were reviewed as documented in today's encounter. My previous office notes, consult notes, labs and diagnostic studies were reviewed prior to and during encounter.     Current Outpatient Medications on File Prior to Visit   Medication Sig Dispense Refill    dilTIAZem (CARDIZEM) 30 MG tablet Take 1 tablet by mouth 4 times daily 120 tablet 0    furosemide (LASIX) 40 MG tablet Take 1 tablet by mouth daily 60 tablet 3    warfarin (COUMADIN) 5 MG tablet Take 5 mg by mouth daily Indications: INR goal 2-2.5 Dosing per [facility] Medication Management [facility]      lisinopril (PRINIVIL;ZESTRIL) 10 MG tablet Take 10 mg by mouth daily      predniSONE (DELTASONE) 5 MG tablet TAKE ONE TABLET BY MOUTH DAILY 30 tablet 4    busPIRone (BUSPAR) 15 MG tablet Take 15 mg by mouth 3 times daily 90 tablet 1    omeprazole (PRILOSEC OTC) 20 MG tablet Take 20 mg by mouth daily      mirtazapine (REMERON) 15 MG tablet Take 1 tablet by mouth nightly 30 tablet 3    allopurinol (ZYLOPRIM) 100 MG tablet TAKE ONE TABLET BY MOUTH DAILY 90 tablet 2    latanoprost (XALATAN) 0.005 % ophthalmic solution Place 1 drop into both eyes nightly       metoprolol tartrate (LOPRESSOR) 25 MG tablet Take 25 mg by mouth 2 times daily       No current facility-administered medications on file prior to visit. Subjective:     Review of Systems   Constitutional: Negative for appetite change, chills, fatigue and fever. HENT: Positive for congestion, postnasal drip, sinus pressure and sinus pain. Negative for ear pain, rhinorrhea and sore throat. Respiratory: Positive for cough. Negative for chest tightness and shortness of breath. Cardiovascular: Negative for chest pain and palpitations. Gastrointestinal: Negative for abdominal distention, abdominal pain, constipation, diarrhea, nausea and vomiting. Genitourinary: Negative for difficulty urinating and dysuria. Musculoskeletal: Negative for arthralgias, back pain and myalgias. Skin: Negative for rash. Neurological: Negative for dizziness, weakness, light-headedness and headaches. Hematological: Negative for adenopathy. Psychiatric/Behavioral: Negative for behavioral problems and sleep disturbance. The patient is not nervous/anxious. Objective:     Physical Exam  Vitals reviewed: Vital signs unavailable, as this is a virtual visit. Constitutional:       General: He is not in acute distress. Appearance: Normal appearance. He is not ill-appearing or toxic-appearing. Pulmonary:      Effort: Pulmonary effort is normal. No tachypnea, accessory muscle usage or respiratory distress. Comments: Patient able to talk in full sentences without difficulty   Neurological:      General: No focal deficit present. Mental Status: He is alert and oriented to person, place, and time. Psychiatric:         Mood and Affect: Mood normal.         Speech: Speech normal.         Behavior: Behavior normal. Behavior is cooperative. Assessment:      Diagnosis Orders   1.  Acute sinusitis, recurrence not specified, unspecified location  amoxicillin-clavulanate (AUGMENTIN) 875-125 MG per tablet       Plan: Will have him get on OTC Flonase, cont with the Mucinex DM, and will call in the Augmentin as prescribed    He will call the [facility] to let them know we started the ATB    Stay hydrated and can take OTC Tylenol prn    Pt instructed to call back if no improvement or worsening of symptoms    Rest of systems unchanged, continue current treatments. Medications, labs, diagnostic studies, consultations and follow-up as documented in this encounter. Rest of systems unchanged, continue current treatments    On this date January 10, 2022,  I have spent greater than 50% of this visit reviewing previous notes, test results and face to face with the patient discussing the diagnoses, importance of compliance with the treatment plan, counseling, coordinating care as well as documenting on the day of the visit. Tex Hernandez is a 80 y.o. male being evaluated by a Virtual Visit (video visit) encounter to address concerns as mentioned above. A caregiver was present when appropriate. Due to this being a TeleHealth encounter (During IVR-91 public health emergency), evaluation of the following organ systems was limited: Vitals/Constitutional/EENT/Resp/CV/GI//MS/Neuro/Skin/Heme-Lymph-Imm. Pursuant to the emergency declaration under the [address] [place] [address] authority and the [employer] Act, this Virtual Visit was conducted with patient's (and/or legal guardian's) consent, to reduce the patient's risk of exposure to COVID-19 and provide necessary medical care. The patient (and/or legal guardian) has also been advised to contact this office for worsening conditions or problems, and seek emergency medical treatment and/or call 911 if deemed necessary.      Patient identification was verified at the start of the visit: Yes    Total time spent for this encounter: Not billed by time    Services were provided through a video synchronous discussion virtually to substitute for in-person clinic visit. Patient and provider were located at their individual homes. --Nickie Mullen MD on 1/10/2022 at 9:38 AM    An electronic signature was used to authenticate this note. Jacqueline Hamilton MD

## 2022-01-12 ENCOUNTER — CARE COORDINATION (OUTPATIENT)
Dept: CARE COORDINATION | Age: 87
End: 2022-01-12

## 2022-01-14 ENCOUNTER — TELEPHONE (OUTPATIENT)
Dept: FAMILY MEDICINE CLINIC | Age: 87
End: 2022-01-14

## 2022-01-14 NOTE — TELEPHONE ENCOUNTER
Patient called to inform physcian that the ATB is helping but still is having deep coughs and coughing up phlegm.

## 2022-01-17 ENCOUNTER — TELEPHONE (OUTPATIENT)
Dept: FAMILY MEDICINE CLINIC | Age: 87
End: 2022-01-17

## 2022-01-17 ENCOUNTER — HOSPITAL ENCOUNTER (OUTPATIENT)
Dept: GENERAL RADIOLOGY | Facility: CLINIC | Age: 87
Discharge: HOME | End: 2022-01-19
Payer: MEDICARE

## 2022-01-17 ENCOUNTER — HOSPITAL ENCOUNTER (OUTPATIENT)
Facility: CLINIC | Age: 87
Discharge: HOME | End: 2022-01-19
Payer: MEDICARE

## 2022-01-17 DIAGNOSIS — R05.9 COUGH: Primary | ICD-10-CM

## 2022-01-17 DIAGNOSIS — R05.9 COUGH: ICD-10-CM

## 2022-01-17 PROCEDURE — 71046 X-RAY EXAM CHEST 2 VIEWS: CPT

## 2022-01-17 RX ORDER — ALBUTEROL SULFATE 90 UG/1
2 AEROSOL, METERED RESPIRATORY (INHALATION) EVERY 4 HOURS PRN
Qty: 18 G | Refills: 5 | Status: SHIPPED | OUTPATIENT
Start: 2022-01-17

## 2022-01-17 NOTE — TELEPHONE ENCOUNTER
Patient is currently on augmentin and is also taking mucinex dm BID but is not getting any better. He said his lungs are very congested and he coughs so hard he is incontinent of urine. He has appointments this week with our office and Dr. Denise King and won't be able to go if he is still sick.

## 2022-01-18 ENCOUNTER — HOSPITAL ENCOUNTER (OUTPATIENT)
Age: 87
Setting detail: SPECIMEN
Discharge: HOME | End: 2022-01-18

## 2022-01-18 DIAGNOSIS — D50.9 IRON DEFICIENCY ANEMIA, UNSPECIFIED IRON DEFICIENCY ANEMIA TYPE: ICD-10-CM

## 2022-01-18 DIAGNOSIS — I10 ESSENTIAL HYPERTENSION: ICD-10-CM

## 2022-01-18 DIAGNOSIS — M10.9 GOUT, UNSPECIFIED CAUSE, UNSPECIFIED CHRONICITY, UNSPECIFIED SITE: ICD-10-CM

## 2022-01-18 DIAGNOSIS — E53.8 B12 DEFICIENCY: ICD-10-CM

## 2022-01-18 DIAGNOSIS — E78.00 PURE HYPERCHOLESTEROLEMIA: ICD-10-CM

## 2022-01-18 LAB
ALBUMIN SERPL-MCNC: 4 G/DL (ref 3.5–5.2)
ALBUMIN/GLOBULIN RATIO: 1.3 (ref 1–2.5)
ALP BLD-CCNC: 94 U/L (ref 40–129)
ALT SERPL-CCNC: 18 U/L (ref 5–41)
ANION GAP SERPL CALCULATED.3IONS-SCNC: 12 MMOL/L (ref 9–17)
AST SERPL-CCNC: 22 U/L
BILIRUB SERPL-MCNC: 0.82 MG/DL (ref 0.3–1.2)
BUN BLDV-MCNC: 13 MG/DL (ref 8–23)
BUN/CREAT BLD: NORMAL (ref 9–20)
CALCIUM SERPL-MCNC: 9.6 MG/DL (ref 8.6–10.4)
CHLORIDE BLD-SCNC: 101 MMOL/L (ref 98–107)
CHOLESTEROL/HDL RATIO: 3.2
CHOLESTEROL: 201 MG/DL
CO2: 29 MMOL/L (ref 20–31)
CREAT SERPL-MCNC: 0.94 MG/DL (ref 0.7–1.2)
GFR AFRICAN AMERICAN: >60 ML/MIN
GFR NON-AFRICAN AMERICAN: >60 ML/MIN
GFR SERPL CREATININE-BSD FRML MDRD: NORMAL ML/MIN/{1.73_M2}
GFR SERPL CREATININE-BSD FRML MDRD: NORMAL ML/MIN/{1.73_M2}
GLUCOSE BLD-MCNC: 90 MG/DL (ref 70–99)
HCT VFR BLD CALC: 45.2 % (ref 40.7–50.3)
HDLC SERPL-MCNC: 63 MG/DL
HEMOGLOBIN: 13.9 G/DL (ref 13–17)
LDL CHOLESTEROL: 117 MG/DL (ref 0–130)
MCH RBC QN AUTO: 27.1 PG (ref 25.2–33.5)
MCHC RBC AUTO-ENTMCNC: 30.8 G/DL (ref 28.4–34.8)
MCV RBC AUTO: 88.3 FL (ref 82.6–102.9)
NRBC AUTOMATED: 0 PER 100 WBC
PDW BLD-RTO: 17.6 % (ref 11.8–14.4)
PLATELET # BLD: 271 K/UL (ref 138–453)
PMV BLD AUTO: 10.9 FL (ref 8.1–13.5)
POTASSIUM SERPL-SCNC: 4 MMOL/L (ref 3.7–5.3)
RBC # BLD: 5.12 M/UL (ref 4.21–5.77)
SODIUM BLD-SCNC: 142 MMOL/L (ref 135–144)
TOTAL PROTEIN: 7.2 G/DL (ref 6.4–8.3)
TRIGL SERPL-MCNC: 104 MG/DL
URIC ACID: 5.1 MG/DL (ref 3.4–7)
VITAMIN B-12: 350 PG/ML (ref 232–1245)
VLDLC SERPL CALC-MCNC: ABNORMAL MG/DL (ref 1–30)
WBC # BLD: 8.5 K/UL (ref 3.5–11.3)

## 2022-01-21 ENCOUNTER — TELEPHONE (OUTPATIENT)
Dept: PODIATRY | Age: 87
End: 2022-01-21

## 2022-01-21 ENCOUNTER — CARE COORDINATION (OUTPATIENT)
Dept: CARE COORDINATION | Age: 87
End: 2022-01-21

## 2022-01-21 PROBLEM — S32.020A CLOSED COMPRESSION FRACTURE OF SECOND LUMBAR VERTEBRA (HCC): Status: RESOLVED | Noted: 2020-10-27 | Resolved: 2022-01-21

## 2022-01-21 PROBLEM — M43.10 ACQUIRED SPONDYLOLISTHESIS: Status: RESOLVED | Noted: 2020-10-27 | Resolved: 2022-01-21

## 2022-01-21 PROBLEM — N32.0 BLADDER OUTLET OBSTRUCTION: Status: RESOLVED | Noted: 2021-08-18 | Resolved: 2022-01-21

## 2022-01-21 PROBLEM — M11.879: Status: RESOLVED | Noted: 2021-02-17 | Resolved: 2022-01-21

## 2022-01-21 PROBLEM — I50.43 ACUTE ON CHRONIC COMBINED SYSTOLIC AND DIASTOLIC CHF (CONGESTIVE HEART FAILURE) (HCC): Status: RESOLVED | Noted: 2021-11-24 | Resolved: 2022-01-21

## 2022-01-21 NOTE — CARE COORDINATION
Contacted [address] and left voicemail regarding Dietitian follow up. Left call back number and will follow up as appropriate.        [address] [facility], [address] [place] [address]

## 2022-01-24 ENCOUNTER — CARE COORDINATION (OUTPATIENT)
Dept: CARE COORDINATION | Age: 87
End: 2022-01-24

## 2022-01-24 NOTE — CARE COORDINATION
Attempt to contact patient today regarding care coordination, unable to reach. Recent encounters and notes reviewed.     Future Appointments   Date Time Provider Chanelle Phillips   1/25/2022  3:30 PM [facility]   2/10/2022 11:00 AM Ruma Knott MD [address] [place] [address]   2/11/2022 11:15 AM Oriana Mercer MD [facility]   3/10/2022  2:00 PM Yamini Meng, [address] RN Care Manager  [phone]

## 2022-01-25 ENCOUNTER — OFFICE VISIT (OUTPATIENT)
Dept: PODIATRY | Age: 87
End: 2022-01-25
Payer: MEDICARE

## 2022-01-25 VITALS — BODY MASS INDEX: 27.14 KG/M2 | WEIGHT: 159 LBS | HEIGHT: 64 IN

## 2022-01-25 DIAGNOSIS — M76.821 POSTERIOR TIBIAL TENDINITIS, RIGHT: ICD-10-CM

## 2022-01-25 DIAGNOSIS — M72.2 PLANTAR FASCIITIS, RIGHT: Primary | ICD-10-CM

## 2022-01-25 DIAGNOSIS — M25.571 ACUTE RIGHT ANKLE PAIN: ICD-10-CM

## 2022-01-25 PROCEDURE — G8417 CALC BMI ABV UP PARAM F/U: HCPCS | Performed by: PODIATRIST

## 2022-01-25 PROCEDURE — 99213 OFFICE O/P EST LOW 20 MIN: CPT | Performed by: PODIATRIST

## 2022-01-25 PROCEDURE — 4040F PNEUMOC VAC/ADMIN/RCVD: CPT | Performed by: PODIATRIST

## 2022-01-25 PROCEDURE — G8484 FLU IMMUNIZE NO ADMIN: HCPCS | Performed by: PODIATRIST

## 2022-01-25 PROCEDURE — G8427 DOCREV CUR MEDS BY ELIG CLIN: HCPCS | Performed by: PODIATRIST

## 2022-01-25 PROCEDURE — 29540 STRAPPING ANKLE &/FOOT: CPT | Performed by: PODIATRIST

## 2022-01-25 PROCEDURE — 1123F ACP DISCUSS/DSCN MKR DOCD: CPT | Performed by: PODIATRIST

## 2022-01-25 PROCEDURE — 1036F TOBACCO NON-USER: CPT | Performed by: PODIATRIST

## 2022-01-25 ASSESSMENT — ENCOUNTER SYMPTOMS
DIARRHEA: 0
VOMITING: 0
NAUSEA: 0
COLOR CHANGE: 0
CONSTIPATION: 0

## 2022-01-25 NOTE — PROGRESS NOTES
[facility]  Return Patient     Altagracia Morfin 80 y.o. male that presents for follow-up of   Chief Complaint   Patient presents with    Foot Pain     right foot, arch collasped last thursday       right arch and ankle pain. He called last Friday to get in emergently. Would like wrapped. This has helped in the past on the right and left. No injury, has been walking a lot. Some swelling, wearing new balance and powersteps. Currently denies F/C/N/V. Allergies   Allergen Reactions    Celebrex [Celecoxib] Nausea Only    Mobic Nausea Only       Past Medical History:   Diagnosis Date    Acute MI ([facility] [address]     Allergic rhinitis 09/02/2011    Anemia     Atrial fibrillation (HCC)     Dr. Miri Wren, [facility] cell cancer 03/2013    left side of head, face chin    Basal cell cancer 03/10/2014    left cheek    Cervical radiculopathy     Diverticulitis 02/17/2013    GERD (gastroesophageal reflux disease)     Glaucoma     left eye    Hyperlipidemia     Hypertension     Dr. Muna Pitt    Hyponatremia 09/02/2011    IBS (irritable bowel syndrome) 09/02/2011    Insomnia 09/02/2011    Osteoarthritis     Osteoarthritis/neck pain. 09/02/2011    Prostate cancer ([facility] [address] 08/18/2021    active surviellence monitoring PSA    Renal calculi     Shingles     history of shingles    Status post prostatectomy 08/18/2021    Simple prostatectomy for BPH    Urinary frequency     Wears glasses     Wears hearing aid in both ears        Prior to Admission medications    Medication Sig Start Date End Date Taking?  Authorizing Provider   albuterol sulfate HFA (VENTOLIN HFA) 108 (90 Base) MCG/ACT inhaler Inhale 2 puffs into the lungs every 4 hours as needed for Wheezing 1/17/22  Yes Zoltan Queen MD   dilTIAZem (CARDIZEM) 30 MG tablet Take 1 tablet by mouth 4 times daily 11/27/21 1/29/22 Yes Jose Cruz Sims MD   furosemide (LASIX) 40 MG tablet Take 1 tablet by mouth daily 11/28/21  Yes Jose Cruz Sims MD warfarin (COUMADIN) 5 MG tablet Take 5 mg by mouth daily Indications: INR goal 2-2.5 Dosing per [facility] Medication Management Service   Yes Historical Provider, MD   lisinopril (PRINIVIL;ZESTRIL) 10 MG tablet Take 10 mg by mouth daily   Yes Historical Provider, MD   predniSONE (DELTASONE) 5 MG tablet TAKE ONE TABLET BY MOUTH DAILY 11/8/21  Yes NASRA Silvestre - CNP   busPIRone (BUSPAR) 15 MG tablet Take 15 mg by mouth 3 times daily 10/18/21  Yes Fátima Charles MD   omeprazole (PRILOSEC OTC) 20 MG tablet Take 20 mg by mouth daily   Yes Historical Provider, MD   mirtazapine (REMERON) 15 MG tablet Take 1 tablet by mouth nightly 9/16/21  Yes Fátima Charles MD   allopurinol (ZYLOPRIM) 100 MG tablet TAKE ONE TABLET BY MOUTH DAILY 4/9/21  Yes Fátima Charles MD   latanoprost (XALATAN) 0.005 % ophthalmic solution Place 1 drop into both eyes nightly    Yes Historical Provider, MD   metoprolol tartrate (LOPRESSOR) 25 MG tablet Take 25 mg by mouth 2 times daily   Yes Historical Provider, MD       Review of Systems   Constitutional: Negative for chills, diaphoresis, fatigue, fever and unexpected weight change. Cardiovascular: Positive for leg swelling. Gastrointestinal: Negative for constipation, diarrhea, nausea and vomiting. Musculoskeletal: Positive for gait problem. Negative for arthralgias and joint swelling. Skin: Negative for color change, pallor, rash and wound. Neurological: Negative for weakness and numbness. Lower Extremity Physical Examination:     Vitals: There were no vitals filed for this visit. General: AAO x 3 in NAD. Integument:   Warm, dry, supple, Bilateral.  Open lesion absent, Bilateral.      Vascular: DP and PT pulses palpable 2/4, Bilateral.  CFT <3 seconds, Bilateral.  Hair growth absent to the level of the digits, Bilateral.  Edema present, Left.   Varicosities absent, Bilateral. Erythema absent, Left    Neurological:  Sensation present to light touch to level of digits, Bilateral.    Musculoskeletal: Muscle strength 5/5, Left. Pain present upon palpation of central heel, medial and plantar central arch, pain along the pt tendon, right. normal medial longitudinal arch, Bilateral.  Ankle ROM normal,Left. Asessment: Patient is a 80 y.o. male with:   1. Plantar fasciitis, right    2. Posterior tibial tendinitis, right    3. Acute right ankle pain        Plan: Pt was evaluated and examined. Patient was given personalized discharge instructions. Pt will follow up in 2 weeks or sooner if any problems arise. Diagnosis was discussed with the pt and all of their questions were answered in detail. Proper foot hygiene and care was discussed with the pt. Patient to check feet daily and contact the office with any questions/problems/concerns. Other comorbidity noted and will be managed by PCP. Rest, good shoes, powersteps    Foot and ankle strapping/ Low-dye strapping was applied to the right, with 2 inch and 1 inch tape and a scaffoid pad. This is designed to off-load the plantar fascia and encourage healing and pain reduction. Discussed with the patient that the Low dye taping is a short term treatment and its off-loading effects vary from patient to patient. I advised the patient to leave the tape on for 3-5 days, but sometimes you may need to replace it sooner in order to remain effective. Orders Placed This Encounter   Procedures    ME STRAPPING; ANKLE &/OR FOOT     No orders of the defined types were placed in this encounter.        RTC in 2week(s).    1/25/2022

## 2022-01-28 ENCOUNTER — CARE COORDINATION (OUTPATIENT)
Dept: CARE COORDINATION | Age: 87
End: 2022-01-28

## 2022-01-28 NOTE — CARE COORDINATION
[address]  1/28/2022    Registered Dietitian Progress Note for Care Coordination    Assessment: Amber Saldaña is a 80 y.o. male. RD referred for CHF. RN noted to contact patient's daughter, Lorene Fernando. RD spoke with Lorene Fernando for patient's initial nutrition assessment on 1/7. RD outreached 1/21 for follow up and left VM this outreach. RD called to follow up today 1/28 and spoke with Lorene Fernando. Nailasaurabh Fernando requested educational handouts be sent in the mail to her address: [address] [place] [address] [address] [place], [address] [facility]. RD will mail Heart Failure Nutrition Therapy. RD discussed previous goals. Lorene Fernando explains patient is eating really well and is not using the salt shaker. RD acknowledged this. Reviewed the importance of following a low sodium diet and monitoring weights daily. No nutrition related questions at this time. Nutrition Monitoring and Evaluation  Indicator/Goal Criteria Progress   #1 Eat balanced meals consistently throughout the day.  #1 Focus on eating 3 meals/day and make these meals balanced using the MyPlate IMILEGHQU-4/9 plate fruits and/or vegetables, 1/4 plate protein and 1/4 plate starchy carbohydrates with 8 oz glass of low fat milk if desired. #1 Lorene Fernando states patient is eating really well. #2  Monitor daily sodium intake. Keep sodium from food and beverages to no more than 2000 mg/day. #2 Avoid the salt shaker. Read food labels to help choose lower sodium options (<140 mg of sodium per serving). #2 Lorene Fernando states patient is not using the salt shaker. Reviewed the importance of reading labels    #3  Monitor daily weights and keep a log.  #3 Start weighing self daily and notify doctor of sudden weight gain. #3 Not monitoring daily weights. Plan of Care:  RD encouraged pt to keep working toward goals set. RD will mail educational handouts to Pamela's address as requested. RD will follow up to ensure handouts were received, discuss any questions and check the progress toward goals.      Follow Up: RD will call in 2 weeks to follow up and answer any nutrition related questions at that time.      [address] [facility], [address] [place] [address]

## 2022-02-01 ENCOUNTER — CARE COORDINATION (OUTPATIENT)
Dept: CARE COORDINATION | Age: 87
End: 2022-02-01

## 2022-02-01 RX ORDER — LISINOPRIL 10 MG/1
TABLET ORAL
Qty: 90 TABLET | Refills: 3 | Status: SHIPPED | OUTPATIENT
Start: 2022-02-01

## 2022-02-01 NOTE — CARE COORDINATION
Attempt to contact patient today regarding care coordination, unable to reach. Recent encounters and notes reviewed.     Future Appointments   Date Time Provider [facility] Jacqueline   2/11/2022 11:15 AM Zoltan Queen MD [facility]   2/15/2022  3:10 PM Maya Fuentes MD [address] [place] [address]   3/10/2022  2:00 PM Yaritza Pearl DPM [facility]   5/11/2022  3:00 PM Yaritza Pearl, [facility] RN Care Manager  [phone]

## 2022-02-07 ENCOUNTER — CARE COORDINATION (OUTPATIENT)
Dept: CARE COORDINATION | Age: 87
End: 2022-02-07

## 2022-02-07 NOTE — CARE COORDINATION
Attempt to contact patient today regarding care coordination, unable to reach. Recent encounters and notes reviewed.     Future Appointments   Date Time Provider Chanelle Jacqueline   2/11/2022 11:15 AM Renny Akhtar MD [facility]   2/15/2022  3:10 PM Luz Moralez MD [address] [place] [address]   3/10/2022  2:00 [facility]   5/11/2022  3:00 PM Shantanu Pradhan, [facility] RN Care Manager  [phone]

## 2022-02-09 DIAGNOSIS — M10.9 GOUT, UNSPECIFIED CAUSE, UNSPECIFIED CHRONICITY, UNSPECIFIED SITE: ICD-10-CM

## 2022-02-09 RX ORDER — ALLOPURINOL 100 MG/1
TABLET ORAL
Qty: 90 TABLET | Refills: 2 | Status: SHIPPED | OUTPATIENT
Start: 2022-02-09 | End: 2022-10-20

## 2022-02-09 NOTE — TELEPHONE ENCOUNTER
Please Approve or Refuse.   Send to Pharmacy per Pt's Request:      Next Visit Date:  2/11/2022   Last Visit Date: 1/10/2022    No results found for: LABA1C          ( goal A1C is < 7)   BP Readings from Last 3 Encounters:   12/02/21 138/76   11/27/21 (!) 110/49   10/06/21 120/78          (goal 120/80)  BUN   Date Value Ref Range Status   01/18/2022 13 8 - 23 mg/dL Final     CREATININE   Date Value Ref Range Status   01/18/2022 0.94 0.70 - 1.20 mg/dL Final     Potassium   Date Value Ref Range Status   01/18/2022 4.0 3.7 - 5.3 mmol/L Final

## 2022-02-11 ENCOUNTER — OFFICE VISIT (OUTPATIENT)
Dept: FAMILY MEDICINE CLINIC | Age: 87
End: 2022-02-11
Payer: MEDICARE

## 2022-02-11 ENCOUNTER — CARE COORDINATION (OUTPATIENT)
Dept: CARE COORDINATION | Age: 87
End: 2022-02-11

## 2022-02-11 VITALS
OXYGEN SATURATION: 97 % | WEIGHT: 164.2 LBS | HEART RATE: 88 BPM | BODY MASS INDEX: 28.18 KG/M2 | DIASTOLIC BLOOD PRESSURE: 78 MMHG | SYSTOLIC BLOOD PRESSURE: 136 MMHG

## 2022-02-11 DIAGNOSIS — C61 PROSTATE CANCER (HCC): ICD-10-CM

## 2022-02-11 DIAGNOSIS — F32.A DEPRESSION, UNSPECIFIED DEPRESSION TYPE: ICD-10-CM

## 2022-02-11 DIAGNOSIS — I50.22 CHRONIC SYSTOLIC CONGESTIVE HEART FAILURE (HCC): ICD-10-CM

## 2022-02-11 DIAGNOSIS — I48.0 PAROXYSMAL ATRIAL FIBRILLATION (HCC): ICD-10-CM

## 2022-02-11 DIAGNOSIS — R74.8 ELEVATED LIVER ENZYMES: ICD-10-CM

## 2022-02-11 DIAGNOSIS — K58.9 IRRITABLE BOWEL SYNDROME WITHOUT DIARRHEA: ICD-10-CM

## 2022-02-11 DIAGNOSIS — I10 ESSENTIAL HYPERTENSION: Primary | ICD-10-CM

## 2022-02-11 DIAGNOSIS — Z23 NEED FOR VIRAL IMMUNIZATION: ICD-10-CM

## 2022-02-11 DIAGNOSIS — F41.9 ANXIETY: ICD-10-CM

## 2022-02-11 DIAGNOSIS — D50.9 IRON DEFICIENCY ANEMIA, UNSPECIFIED IRON DEFICIENCY ANEMIA TYPE: ICD-10-CM

## 2022-02-11 DIAGNOSIS — G47.00 INSOMNIA, UNSPECIFIED TYPE: ICD-10-CM

## 2022-02-11 DIAGNOSIS — M51.36 DDD (DEGENERATIVE DISC DISEASE), LUMBAR: ICD-10-CM

## 2022-02-11 DIAGNOSIS — E53.8 B12 DEFICIENCY: ICD-10-CM

## 2022-02-11 DIAGNOSIS — M10.9 GOUT, UNSPECIFIED CAUSE, UNSPECIFIED CHRONICITY, UNSPECIFIED SITE: ICD-10-CM

## 2022-02-11 DIAGNOSIS — E87.1 HYPONATREMIA: ICD-10-CM

## 2022-02-11 DIAGNOSIS — M19.90 OSTEOARTHRITIS, UNSPECIFIED OSTEOARTHRITIS TYPE, UNSPECIFIED SITE: ICD-10-CM

## 2022-02-11 DIAGNOSIS — E78.00 PURE HYPERCHOLESTEROLEMIA: ICD-10-CM

## 2022-02-11 DIAGNOSIS — K21.9 GASTROESOPHAGEAL REFLUX DISEASE WITHOUT ESOPHAGITIS: ICD-10-CM

## 2022-02-11 DIAGNOSIS — M50.30 DDD (DEGENERATIVE DISC DISEASE), CERVICAL: ICD-10-CM

## 2022-02-11 PROCEDURE — G8484 FLU IMMUNIZE NO ADMIN: HCPCS | Performed by: FAMILY MEDICINE

## 2022-02-11 PROCEDURE — G8417 CALC BMI ABV UP PARAM F/U: HCPCS | Performed by: FAMILY MEDICINE

## 2022-02-11 PROCEDURE — 99214 OFFICE O/P EST MOD 30 MIN: CPT | Performed by: FAMILY MEDICINE

## 2022-02-11 PROCEDURE — G8427 DOCREV CUR MEDS BY ELIG CLIN: HCPCS | Performed by: FAMILY MEDICINE

## 2022-02-11 PROCEDURE — 4040F PNEUMOC VAC/ADMIN/RCVD: CPT | Performed by: FAMILY MEDICINE

## 2022-02-11 PROCEDURE — 1123F ACP DISCUSS/DSCN MKR DOCD: CPT | Performed by: FAMILY MEDICINE

## 2022-02-11 PROCEDURE — 1036F TOBACCO NON-USER: CPT | Performed by: FAMILY MEDICINE

## 2022-02-11 RX ORDER — ZOSTER VACCINE RECOMBINANT, ADJUVANTED 50 MCG/0.5
0.5 KIT INTRAMUSCULAR SEE ADMIN INSTRUCTIONS
Qty: 0.5 ML | Refills: 0 | Status: SHIPPED | OUTPATIENT
Start: 2022-02-11 | End: 2022-08-10

## 2022-02-11 ASSESSMENT — ENCOUNTER SYMPTOMS
COUGH: 1
BACK PAIN: 1

## 2022-02-11 NOTE — CARE COORDINATION
[address]  2/11/2022    Registered Dietitian Progress Note for Care Coordination    Assessment: Aziza Cooper is a 80 y.o. male. RD referred for CHF. RN noted to contact patient's daughter, Douglas Brown. RD spoke with Douglas Brown for patient's initial nutrition assessment on 1/7 and first follow up on 1/28. RD called to follow up with pt today 2/11 and spoke with Douglas Brown. RD discussed previous goals with pt. Douglas Brown states they received the educational handouts in the [employer] the information very helpful and very appreciative. Patient continues to follow a low sodium diet. No nutrition related questions at this time. Nutrition Monitoring and Evaluation  Indicator/Goal Criteria Progress   #1 Eat balanced meals consistently throughout the day.  #1 Focus on eating 3 meals/day and make these meals balanced using the MyPlate NMMIGWIFO-2/2 plate fruits and/or vegetables, 1/4 plate protein and 1/4 plate starchy carbohydrates with 8 oz glass of low fat milk if desired. #1 Douglas Brown states patient is eating really well. #2  Monitor daily sodium intake. Keep sodium from food and beverages to no more than 2000 mg/day. #2 Avoid the salt shaker. Read food labels to help choose lower sodium options (<140 mg of sodium per serving). #2 Douglas Brown states patient is not using the salt shaker. Reviewed the importance of reading labels    #3  Monitor daily weights and keep a log.  #3 Start weighing self daily and notify doctor of sudden weight gain. #3 Not monitoring daily weights.       Plan of Care:  RD encouraged pt to keep working toward goals set. RD explained this is final follow up call and provided contact information. Encouraged pt to call RD in future with any nutrition related questions or concerns. Follow Up:    Final follow up call today 2/11/22. RD will continue to follow/assist with patient return call.         [facility], [facility]

## 2022-02-11 NOTE — PROGRESS NOTES
Jacqueline Salas MD    [address] [place] [address]  [id number] [address], [address]  [address] [address]  Dept: [phone]  Dept Fax: [phone]     Patient ID: Desi Bush is a 80 y.o. male. HPI    Established patient here today for f/u on chronic medical problems, go over labs and/or diagnostic studies, and medication refills. Pt denies any fever or chills. Pt today denies any HA, chest pain, or SOB. Pt denies any N/V/D/C or abdominal pain. Pt is just now feeling better after all his congestion and coughing. Pt did have a repeat ECHO and his EF did improve. Pt with occasional heartburn, but stable on meds. Pt with arthralgias on and off, but stable on meds. Pt denies any gouty attacks. Pt states mood is stable on meds. Otherwise pt doing well on current tx and no other concerns today. The patient's past medical, surgical, social, and family history as well as his current medications and allergies were reviewed as documented in today's encounter. My previous office notes, consult notes, labs and diagnostic studies were reviewed prior to and during encounter.     Current Outpatient Medications on File Prior to Visit   Medication Sig Dispense Refill    allopurinol (ZYLOPRIM) 100 MG tablet TAKE ONE TABLET BY MOUTH DAILY 90 tablet 2    lisinopril (PRINIVIL;ZESTRIL) 10 MG tablet TAKE ONE TABLET BY MOUTH DAILY WITH LUNCH 90 tablet 3    albuterol sulfate HFA (VENTOLIN HFA) 108 (90 Base) MCG/ACT inhaler Inhale 2 puffs into the lungs every 4 hours as needed for Wheezing 18 g 5    dilTIAZem (CARDIZEM) 30 MG tablet Take 1 tablet by mouth 4 times daily 120 tablet 0    furosemide (LASIX) 40 MG tablet Take 1 tablet by mouth daily (Patient taking differently: Take 20 mg by mouth daily ) 60 tablet 3    warfarin (COUMADIN) 5 MG tablet Take 5 mg by mouth daily Indications: INR goal 2-2.5 Dosing per [facility]      predniSONE (DELTASONE) 5 MG tablet TAKE ONE TABLET BY MOUTH DAILY 30 tablet 4    busPIRone (BUSPAR) 15 MG tablet Take 15 mg by mouth 3 times daily 90 tablet 1    omeprazole (PRILOSEC OTC) 20 MG tablet Take 20 mg by mouth daily      mirtazapine (REMERON) 15 MG tablet Take 1 tablet by mouth nightly 30 tablet 3    latanoprost (XALATAN) 0.005 % ophthalmic solution Place 1 drop into both eyes nightly       metoprolol tartrate (LOPRESSOR) 25 MG tablet Take 25 mg by mouth 2 times daily       No current facility-administered medications on file prior to visit. Subjective:     Review of Systems   HENT: Positive for congestion (improved). Respiratory: Positive for cough (improved). Gastrointestinal:        Occ heartburn   Musculoskeletal: Positive for arthralgias and back pain. Psychiatric/Behavioral: Positive for dysphoric mood (stable). The patient is nervous/anxious (stable). Objective:     Physical Exam  Vitals reviewed. Constitutional:       General: He is not in acute distress. Appearance: He is well-developed. HENT:      Head: Normocephalic and atraumatic. Right Ear: External ear normal.      Left Ear: External ear normal.      Nose: Nose normal.      Mouth/Throat:      Pharynx: No oropharyngeal exudate. Eyes:      Conjunctiva/sclera: Conjunctivae normal.      Pupils: Pupils are equal, round, and reactive to light. Cardiovascular:      Rate and Rhythm: Normal rate. Rhythm irregularly irregular. Heart sounds: Normal heart sounds. No murmur heard. Pulmonary:      Effort: Pulmonary effort is normal. No respiratory distress. Breath sounds: Normal breath sounds. No wheezing. Chest:      Chest wall: No tenderness. Abdominal:      General: Bowel sounds are normal. There is no distension. Palpations: Abdomen is soft. There is no mass. Tenderness: There is no abdominal tenderness. Musculoskeletal:         General: No tenderness. Normal range of motion. Cervical back: Normal range of motion. Lymphadenopathy:      Cervical: No cervical adenopathy. Skin:     Findings: No rash. Neurological:      Mental Status: He is alert and oriented to person, place, and time. Deep Tendon Reflexes: Reflexes are normal and symmetric. Psychiatric:         Behavior: Behavior normal.         Assessment:      Diagnosis Orders   1. Essential hypertension     2. Pure hypercholesterolemia     3. Paroxysmal atrial fibrillation (HCC)     4. Chronic systolic congestive heart failure ([address]     5. Iron deficiency anemia, unspecified iron deficiency anemia type     6. B12 deficiency     7. Hyponatremia      improved   8. Gout, unspecified cause, unspecified chronicity, unspecified site     9. Gastroesophageal reflux disease without esophagitis     10. Osteoarthritis, unspecified osteoarthritis type, unspecified site     11. Elevated liver enzymes      improved   12. Irritable bowel syndrome without diarrhea     13. DDD (degenerative disc disease), lumbar     14. DDD (degenerative disc disease), cervical     15. Depression, unspecified depression type     16. Anxiety     17. Insomnia, unspecified type     18. Prostate cancer [facility])      s/p prostatectomy    19.  Need for viral immunization  zoster recombinant adjuvanted vaccine [facility]) 50 MCG/0.5ML SUSR injection         Plan:        Orders Placed This Encounter   Medications    zoster recombinant adjuvanted vaccine (SHINGRIX) 50 MCG/0.5ML SUSR injection     Sig: Inject 0.5 mLs into the muscle See Admin Instructions 1 dose now and repeat in 2-6 months     Dispense:  0.5 mL     Refill:  0     Will cont with current treatment as pt is currently stable on current treatment    Will cont with the Lisinopril, Lopressor, and Cardizem as prescribed for BP control    Will cont with low fat/chol diet     Will cont with the Buspar and Remeron as prescribed as he/ is doing well    Will cont with the Allopurinol as prescribed for gout prophylaxis    Will cont with high fiber diet    Will cont to follow with Cardiology as instructed    Will cont to follow with Urology as instructed for his prostate cancer surveillance    Cont to follow with Coumadin clinic for Coumadin dosing    Rest of systems unchanged, continue current treatments. Medications, labs, diagnostic studies, consultations and follow-up as documented in this encounter. Rest of systems unchanged, continue current treatments    On this date February 11, 2022,  I have spent greater than 50% of visit reviewing previous notes, test results and face to face with the patient discussing the diagnoses, importance of compliance with the treatment plan, counseling, coordinating care as well as documenting on the day of the visit. Jacqueline Umana MD

## 2022-02-14 ENCOUNTER — HOSPITAL ENCOUNTER (OUTPATIENT)
Age: 87
Setting detail: SPECIMEN
Discharge: HOME | End: 2022-02-14

## 2022-02-14 DIAGNOSIS — C61 PROSTATE CANCER (HCC): ICD-10-CM

## 2022-02-15 ENCOUNTER — OFFICE VISIT (OUTPATIENT)
Dept: UROLOGY | Age: 87
End: 2022-02-15
Payer: MEDICARE

## 2022-02-15 VITALS
SYSTOLIC BLOOD PRESSURE: 138 MMHG | BODY MASS INDEX: 28 KG/M2 | TEMPERATURE: 96.6 F | WEIGHT: 164 LBS | DIASTOLIC BLOOD PRESSURE: 86 MMHG | HEIGHT: 64 IN

## 2022-02-15 DIAGNOSIS — R39.14 BENIGN PROSTATIC HYPERPLASIA WITH INCOMPLETE BLADDER EMPTYING: Primary | ICD-10-CM

## 2022-02-15 DIAGNOSIS — C61 PROSTATE CANCER (HCC): ICD-10-CM

## 2022-02-15 DIAGNOSIS — N40.1 BENIGN PROSTATIC HYPERPLASIA WITH INCOMPLETE BLADDER EMPTYING: Primary | ICD-10-CM

## 2022-02-15 LAB — PROSTATE SPECIFIC ANTIGEN: 0.97 UG/L

## 2022-02-15 PROCEDURE — G8427 DOCREV CUR MEDS BY ELIG CLIN: HCPCS | Performed by: UROLOGY

## 2022-02-15 PROCEDURE — G8484 FLU IMMUNIZE NO ADMIN: HCPCS | Performed by: UROLOGY

## 2022-02-15 PROCEDURE — 1036F TOBACCO NON-USER: CPT | Performed by: UROLOGY

## 2022-02-15 PROCEDURE — 99213 OFFICE O/P EST LOW 20 MIN: CPT | Performed by: UROLOGY

## 2022-02-15 PROCEDURE — 1123F ACP DISCUSS/DSCN MKR DOCD: CPT | Performed by: UROLOGY

## 2022-02-15 PROCEDURE — 4040F PNEUMOC VAC/ADMIN/RCVD: CPT | Performed by: UROLOGY

## 2022-02-15 PROCEDURE — G8417 CALC BMI ABV UP PARAM F/U: HCPCS | Performed by: UROLOGY

## 2022-02-15 ASSESSMENT — ENCOUNTER SYMPTOMS
BACK PAIN: 0
SHORTNESS OF BREATH: 0
WHEEZING: 0
NAUSEA: 0
COUGH: 0
EYE PAIN: 0
CONSTIPATION: 0
VOMITING: 0
ABDOMINAL PAIN: 0
EYE REDNESS: 0
DIARRHEA: 0

## 2022-02-15 NOTE — PROGRESS NOTES
[address] [facility] [id number]  Dept: [id number] [facility] Urology Office Note - Established    Patient:  Gonzalo Dobbins  YOB: 1931  Date: 2/15/2022    The patient is a 80 y.o. male who presents todayfor evaluation of the following problems:   Chief Complaint   Patient presents with    Results     PSA    Check-Up     4 month        HPI  Here for f/u. Had simple prostatectomy 8/18/21 for bph and found to have [place] 6 and small area (<5% mabel 7). PSA is 0.97 (down from 1.01 in October 2021). No longer on flomax. He is urinating well. Stream is strong and steady, he feels he empties well.   still experiencing some stress incontinence, worse at end of day. Summary of old records: N/A    Additional History: N/A    Procedures Today: N/A    Urinalysis today:  No results found for this visit on 02/15/22. Last several PSA's:  Lab Results   Component Value Date    PSA 0.97 02/14/2022    PSA 1.01 10/12/2021     Last total testosterone:  Lab Results   Component Value Date    TESTOSTERONE 141.0 (L) 02/22/2016       AUA Symptom Score (2/15/2022):   INCOMPLETE EMPTYING: How often have you had the sensation of not emptying your bladder?: Not at all  FREQUENCY: How often do you have to urinate less than every two hours?: Not at all  INTERMITTENCY: How often have you found you stopped and started again several times when you urinated?: Not at all  URGENCY: How often have you found it difficult to postpone urination?: Not at all  WEAK STREAM: How often have you had a weak urinary stream?: Not at all  STRAINING: How often have you had to strain to start  urination?: Not at all  NOCTURIA: How many times did you typically get up at night to uriniate?: 3 Times  TOTAL I-PSS SCORE[de-identified] 3  How would you feel if you were to spend the rest of your life with your urinary condition?: 3 areas    SKIN BIOPSY Left 02/11/14    cheek /basal cell carinoma    SKIN CANCER EXCISION      left face and top of head    SKIN CANCER EXCISION Left 03/2013    forehead, cheek, chin, basal cell.     SKIN CANCER EXCISION Right 08/12/2016    with skin graft    SPINE SURGERY  04/2003     Family History   Problem Relation Age of Onset    Heart Disease Mother     Lung Cancer Father     Cancer Father         lung    Heart Disease Brother      Outpatient Medications Marked as Taking for the 2/15/22 encounter (Office Visit) with Abhilash Vera MD   Medication Sig Dispense Refill    zoster recombinant adjuvanted vaccine [facility]) 50 MCG/0.5ML SUSR injection Inject 0.5 mLs into the muscle See Admin Instructions 1 dose now and repeat in 2-6 months 0.5 mL 0    allopurinol (ZYLOPRIM) 100 MG tablet TAKE ONE TABLET BY MOUTH DAILY 90 tablet 2    lisinopril (PRINIVIL;ZESTRIL) 10 MG tablet TAKE ONE TABLET BY MOUTH DAILY WITH LUNCH 90 tablet 3    albuterol sulfate HFA (VENTOLIN HFA) 108 (90 Base) MCG/ACT inhaler Inhale 2 puffs into the lungs every 4 hours as needed for Wheezing 18 g 5    furosemide (LASIX) 40 MG tablet Take 1 tablet by mouth daily (Patient taking differently: Take 20 mg by mouth daily ) 60 tablet 3    warfarin (COUMADIN) 5 MG tablet Take 5 mg by mouth daily Indications: INR goal 2-2.5 Dosing per [facility]      predniSONE (DELTASONE) 5 MG tablet TAKE ONE TABLET BY MOUTH DAILY 30 tablet 4    busPIRone (BUSPAR) 15 MG tablet Take 15 mg by mouth 3 times daily 90 tablet 1    omeprazole (PRILOSEC OTC) 20 MG tablet Take 20 mg by mouth daily      mirtazapine (REMERON) 15 MG tablet Take 1 tablet by mouth nightly 30 tablet 3    latanoprost (XALATAN) 0.005 % ophthalmic solution Place 1 drop into both eyes nightly       metoprolol tartrate (LOPRESSOR) 25 MG tablet Take 25 mg by mouth 2 times daily         Celebrex [celecoxib] and Mobic  Social History     Tobacco Use Smoking Status Former Smoker    Packs/day: 1.00    Years: 10.00    Pack years: 10.00    Quit date: 1961    Years since quittin.1   Smokeless Tobacco Never Used   Tobacco Comment    quit      (Ifpatient a smoker, smoking cessation counseling offered)    Social History     Substance and Sexual Activity   Alcohol Use Not Currently    Alcohol/week: 0.0 standard drinks       REVIEW OF SYSTEMS:  Review of Systems    Physical Exam:      Vitals:    02/15/22 1459   BP: 138/86   Temp: 96.6 °F (35.9 °C)     Body mass index is 28.15 kg/m². Patient is a 80 y.o. male in no acute distress and alert and oriented to person, place and time. Physical Exam  Constitutional: Patient in no acute distress. Neuro: Alert and oriented to person, place and time. Psych: Mood normal, affect normal  Skin: No rash noted  HEENT: Head: Normocephalic andatraumatic  Conjunctivae and EOM are normal. Pupils are equal, round  Nose:Normal  Right External Ear: Normal; Left External Ear: Normal  Mouth: Mucosa Moist  Neck: Supple  Lungs: Respiratory effort is normal  Cardiovascular: Warm & Pink  Abdomen: Soft, non-tender, non-distended with no CVA,  No flank tenderness,  Or hepatosplenomegaly       Assessment and Plan      1. Benign prostatic hyperplasia with incomplete bladder emptying    2. Prostate cancer [facility])           Plan:       Return in about 6 months (around 8/15/2022) for Follow up. Prescriptions Ordered:  No orders of the defined types were placed in this encounter. Orders Placed:  Orders Placed This Encounter   Procedures    PSA, Diagnostic     Standing Status:   Future     Standing Expiration Date:   2/15/2023           Yomaira Segundo MD    Agree with the ROS entered by the MA.

## 2022-02-15 NOTE — PROGRESS NOTES
Review of Systems   Constitutional: Negative for chills, fatigue and fever. Eyes: Negative for pain, redness and visual disturbance. Respiratory: Negative for cough, shortness of breath and wheezing. Cardiovascular: Negative for chest pain and leg swelling. Gastrointestinal: Negative for abdominal pain, constipation, diarrhea, nausea and vomiting. Genitourinary: Negative for difficulty urinating, dysuria, flank pain, frequency, hematuria, scrotal swelling, testicular pain and urgency. Musculoskeletal: Negative for back pain, joint swelling and myalgias. Skin: Negative for rash and wound. Neurological: Negative for dizziness, tremors and numbness. Hematological: Bruises/bleeds easily.

## 2022-02-21 ENCOUNTER — CARE COORDINATION (OUTPATIENT)
Dept: CARE COORDINATION | Age: 87
End: 2022-02-21

## 2022-03-07 ENCOUNTER — CARE COORDINATION (OUTPATIENT)
Dept: CARE COORDINATION | Age: 87
End: 2022-03-07

## 2022-03-07 NOTE — CARE COORDINATION
Attempt to contact patient today regarding care coordination, unable to reach. 4th final attempt    Recent encounters and notes reviewed.     Future Appointments   Date Time Provider Chanelle Phillips   3/10/2022  2:00 PM Kalani Finnegan Dr   5/11/2022  3:00 PM Revonda Dandy [facility]   5/17/2022 11:00 AM Shaneka Mccloud MD [facility]   8/16/2022  2:10 PM Kayley Mcpherson MD [address] [place] [address] RN Care Manager  [phone]

## 2022-03-10 ENCOUNTER — OFFICE VISIT (OUTPATIENT)
Dept: PODIATRY | Age: 87
End: 2022-03-10
Payer: MEDICARE

## 2022-03-10 VITALS — HEIGHT: 64 IN | BODY MASS INDEX: 28 KG/M2 | WEIGHT: 164 LBS

## 2022-03-10 DIAGNOSIS — M79.674 PAIN IN TOES OF BOTH FEET: ICD-10-CM

## 2022-03-10 DIAGNOSIS — B35.1 ONYCHOMYCOSIS: Primary | ICD-10-CM

## 2022-03-10 DIAGNOSIS — M79.675 PAIN IN TOES OF BOTH FEET: ICD-10-CM

## 2022-03-10 PROCEDURE — 11721 DEBRIDE NAIL 6 OR MORE: CPT | Performed by: PODIATRIST

## 2022-03-10 PROCEDURE — 99999 PR OFFICE/OUTPT VISIT,PROCEDURE ONLY: CPT | Performed by: PODIATRIST

## 2022-03-10 ASSESSMENT — ENCOUNTER SYMPTOMS
VOMITING: 0
NAUSEA: 0
COLOR CHANGE: 0
DIARRHEA: 0
CONSTIPATION: 0

## 2022-03-10 NOTE — PROGRESS NOTES
[facility]  Return Patient    Chief Complaint   Patient presents with    Nail Problem     nail trim/last saw Nehemiah Renee 2/11/22       Subjective: This [place] Emmanuel comes to clinic for foot and nail care. He would like all of his nails trimmed. He cannot take care of them himself. They are painful when long and he tries to wear shoes. Pt's primary care physician is Rachel Norwood MD.       Past Medical History:   Diagnosis Date    Acute MI [facility])     Allergic rhinitis 09/02/2011    Anemia     Atrial fibrillation ([facility] [address]     Dr. Bryce Winston, [facility]    Basal cell cancer 03/2013    left side of head, face chin    Basal cell cancer 03/10/2014    left cheek    Cervical radiculopathy     Diverticulitis 02/17/2013    GERD (gastroesophageal reflux disease)     Glaucoma     left eye    Hyperlipidemia     Hypertension     Dr. Jarocho Patel    Hyponatremia 09/02/2011    IBS (irritable bowel syndrome) 09/02/2011    Insomnia 09/02/2011    Osteoarthritis     Osteoarthritis/neck pain.  09/02/2011    Prostate cancer ([facility] [address] 08/18/2021    active surviellence monitoring PSA    Renal calculi     Shingles     history of shingles    Status post prostatectomy 08/18/2021    Simple prostatectomy for BPH    Urinary frequency     Wears glasses     Wears hearing aid in both ears        Allergies   Allergen Reactions    Celebrex [Celecoxib] Nausea Only    Mobic Nausea Only     Current Outpatient Medications on File Prior to Visit   Medication Sig Dispense Refill    allopurinol (ZYLOPRIM) 100 MG tablet TAKE ONE TABLET BY MOUTH DAILY 90 tablet 2    lisinopril (PRINIVIL;ZESTRIL) 10 MG tablet TAKE ONE TABLET BY MOUTH DAILY WITH LUNCH 90 tablet 3    albuterol sulfate HFA (VENTOLIN HFA) 108 (90 Base) MCG/ACT inhaler Inhale 2 puffs into the lungs every 4 hours as needed for Wheezing 18 g 5    dilTIAZem (CARDIZEM) 30 MG tablet Take 1 tablet by mouth 4 times daily 120 tablet 0    furosemide (LASIX) 40 MG tablet Take 1 tablet by mouth daily (Patient taking differently: Take 20 mg by mouth daily ) 60 tablet 3    warfarin (COUMADIN) 5 MG tablet Take 5 mg by mouth daily Indications: INR goal 2-2.5 Dosing per [facility]      predniSONE (DELTASONE) 5 MG tablet TAKE ONE TABLET BY MOUTH DAILY 30 tablet 4    busPIRone (BUSPAR) 15 MG tablet Take 15 mg by mouth 3 times daily 90 tablet 1    omeprazole (PRILOSEC OTC) 20 MG tablet Take 20 mg by mouth daily      mirtazapine (REMERON) 15 MG tablet Take 1 tablet by mouth nightly 30 tablet 3    latanoprost (XALATAN) 0.005 % ophthalmic solution Place 1 drop into both eyes nightly       metoprolol tartrate (LOPRESSOR) 25 MG tablet Take 25 mg by mouth 2 times daily      zoster recombinant adjuvanted vaccine (SHINGRIX) 50 MCG/0.5ML SUSR injection Inject 0.5 mLs into the muscle See Admin Instructions 1 dose now and repeat in 2-6 months 0.5 mL 0     No current facility-administered medications on file prior to visit. Review of Systems   Constitutional: Negative for chills, diaphoresis, fatigue, fever and unexpected weight change. Cardiovascular: Negative for leg swelling. Gastrointestinal: Negative for constipation, diarrhea, nausea and vomiting. Musculoskeletal: Positive for gait problem. Negative for arthralgias and joint swelling. Skin: Negative for color change, pallor, rash and wound. Neurological: Negative for weakness and numbness. Objective:  General: AAO x 3 in NAD. Derm  Left hallux nail incurvated medial border with slight redness and pain to palpation.     Sites of Onychomycosis Involvement (Check affected area)  [x] [x] [x] [x] [x] [x] [x] [x] [x] [x]  5 4 3 2 1 1 2 3 4 5                          Right                                        Left    Thickness  [x] [x] [x] [x] [x] [x] [x] [x] [x] [x]  5 4 3 2 1 1 2 3 4 5                         Right                                        Left    Dystrophic Changes DPM on 3/10/2022 at 3:04 PM

## 2022-04-13 RX ORDER — PREDNISONE 1 MG/1
TABLET ORAL
Qty: 90 TABLET | Refills: 3 | Status: SHIPPED | OUTPATIENT
Start: 2022-04-13

## 2022-04-19 ENCOUNTER — OFFICE VISIT (OUTPATIENT)
Dept: PODIATRY | Age: 87
End: 2022-04-19
Payer: MEDICARE

## 2022-04-19 VITALS — HEIGHT: 64 IN | BODY MASS INDEX: 28 KG/M2 | WEIGHT: 164 LBS

## 2022-04-19 DIAGNOSIS — M72.2 PLANTAR FASCIITIS, RIGHT: ICD-10-CM

## 2022-04-19 DIAGNOSIS — M76.821 POSTERIOR TIBIAL TENDINITIS, RIGHT: Primary | ICD-10-CM

## 2022-04-19 PROCEDURE — G8428 CUR MEDS NOT DOCUMENT: HCPCS | Performed by: PODIATRIST

## 2022-04-19 PROCEDURE — 4040F PNEUMOC VAC/ADMIN/RCVD: CPT | Performed by: PODIATRIST

## 2022-04-19 PROCEDURE — 29540 STRAPPING ANKLE &/FOOT: CPT | Performed by: PODIATRIST

## 2022-04-19 PROCEDURE — 1036F TOBACCO NON-USER: CPT | Performed by: PODIATRIST

## 2022-04-19 PROCEDURE — 99213 OFFICE O/P EST LOW 20 MIN: CPT | Performed by: PODIATRIST

## 2022-04-19 PROCEDURE — 1123F ACP DISCUSS/DSCN MKR DOCD: CPT | Performed by: PODIATRIST

## 2022-04-19 PROCEDURE — G8417 CALC BMI ABV UP PARAM F/U: HCPCS | Performed by: PODIATRIST

## 2022-04-19 ASSESSMENT — ENCOUNTER SYMPTOMS
DIARRHEA: 0
CONSTIPATION: 0
VOMITING: 0
COLOR CHANGE: 0
NAUSEA: 0

## 2022-04-19 NOTE — PROGRESS NOTES
[facility]  Return Patient     Eladia Morfin 80 y.o. male that presents for follow-up of   Chief Complaint   Patient presents with    Foot Pain     right foot unna boot        right arch and ankle pain. He called today to get in. He was doing a lot of walking this morning. Would like wrapped. This has helped in the past on the right and left. No injury, has been walking a lot. Some swelling, wearing new balance and powersteps. Currently denies F/C/N/V. Allergies   Allergen Reactions    Celebrex [Celecoxib] Nausea Only    Mobic Nausea Only       Past Medical History:   Diagnosis Date    Acute [facility] ([facility] [address]     Allergic rhinitis 09/02/2011    Anemia     Atrial fibrillation (HCC)     Dr. Jonah Lo, [facility] cell cancer 03/2013    left side of head, face chin    Basal cell cancer 03/10/2014    left cheek    Cervical radiculopathy     Diverticulitis 02/17/2013    GERD (gastroesophageal reflux disease)     Glaucoma     left eye    Hyperlipidemia     Hypertension     Dr. Jessi Barr    Hyponatremia 09/02/2011    IBS (irritable bowel syndrome) 09/02/2011    Insomnia 09/02/2011    Osteoarthritis     Osteoarthritis/neck pain. 09/02/2011    Prostate cancer ([facility] [address] 08/18/2021    active surviellence monitoring PSA    Renal calculi     Shingles     history of shingles    Status post prostatectomy 08/18/2021    Simple prostatectomy for BPH    Urinary frequency     Wears glasses     Wears hearing aid in both ears        Prior to Admission medications    Medication Sig Start Date End Date Taking?  Authorizing Provider   predniSONE (DELTASONE) 5 MG tablet TAKE ONE TABLET BY MOUTH DAILY 4/13/22  Yes Radha Gibson MD   zoster recombinant adjuvanted vaccine [facility]) 50 MCG/0.5ML SUSR injection Inject 0.5 mLs into the muscle See Admin Instructions 1 dose now and repeat in 2-6 months 2/11/22 8/10/22 Yes Radha Gibson MD   allopurinol (ZYLOPRIM) 100 MG tablet TAKE ONE TABLET BY MOUTH DAILY 2/9/22  Yes Nilam Suarez MD   lisinopril (PRINIVIL;ZESTRIL) 10 MG tablet TAKE ONE TABLET BY MOUTH DAILY WITH LUNCH 2/1/22  Yes Nilam Suarez MD   albuterol sulfate HFA (VENTOLIN HFA) 108 (90 Base) MCG/ACT inhaler Inhale 2 puffs into the lungs every 4 hours as needed for Wheezing 1/17/22  Yes Nilam Suarez MD   furosemide (LASIX) 40 MG tablet Take 1 tablet by mouth daily  Patient taking differently: Take 20 mg by mouth daily  11/28/21  Yes Jazmin Robert MD   warfarin (COUMADIN) 5 MG tablet Take 5 mg by mouth daily Indications: INR goal 2-2.5 Dosing per [facility]   Yes Historical Provider, MD   busPIRone (BUSPAR) 15 MG tablet Take 15 mg by mouth 3 times daily 10/18/21  Yes Nilam Suarez MD   omeprazole (PRILOSEC OTC) 20 MG tablet Take 20 mg by mouth daily   Yes Historical Provider, MD   mirtazapine (REMERON) 15 MG tablet Take 1 tablet by mouth nightly 9/16/21  Yes Nilam Suarez MD   latanoprost (XALATAN) 0.005 % ophthalmic solution Place 1 drop into both eyes nightly    Yes Historical Provider, MD   metoprolol tartrate (LOPRESSOR) 25 MG tablet Take 25 mg by mouth 2 times daily   Yes Historical Provider, MD   dilTIAZem (CARDIZEM) 30 MG tablet Take 1 tablet by mouth 4 times daily 11/27/21 3/10/22  Jazmin Robert MD       Review of Systems   Constitutional: Negative for chills, diaphoresis, fatigue, fever and unexpected weight change. Cardiovascular: Positive for leg swelling. Gastrointestinal: Negative for constipation, diarrhea, nausea and vomiting. Musculoskeletal: Positive for gait problem. Negative for arthralgias and joint swelling. Skin: Negative for color change, pallor, rash and wound. Neurological: Negative for weakness and numbness. Lower Extremity Physical Examination:     Vitals: There were no vitals filed for this visit. General: AAO x 3 in NAD.      Integument:   Warm, dry, supple, Bilateral.  Open lesion absent, Bilateral.

## 2022-04-27 DIAGNOSIS — F41.9 ANXIETY: ICD-10-CM

## 2022-04-27 DIAGNOSIS — R63.0 DECREASED APPETITE: ICD-10-CM

## 2022-04-27 DIAGNOSIS — G47.00 INSOMNIA, UNSPECIFIED TYPE: ICD-10-CM

## 2022-04-27 RX ORDER — MIRTAZAPINE 15 MG/1
TABLET, FILM COATED ORAL
Qty: 30 TABLET | Refills: 3 | Status: SHIPPED | OUTPATIENT
Start: 2022-04-27 | End: 2022-08-19 | Stop reason: SDUPTHER

## 2022-05-11 ENCOUNTER — OFFICE VISIT (OUTPATIENT)
Dept: PODIATRY | Age: 87
End: 2022-05-11
Payer: MEDICARE

## 2022-05-11 VITALS — HEIGHT: 64 IN | WEIGHT: 164 LBS | BODY MASS INDEX: 28 KG/M2

## 2022-05-11 DIAGNOSIS — M79.674 PAIN IN TOES OF BOTH FEET: ICD-10-CM

## 2022-05-11 DIAGNOSIS — M79.675 PAIN IN TOES OF BOTH FEET: ICD-10-CM

## 2022-05-11 DIAGNOSIS — B35.1 ONYCHOMYCOSIS: Primary | ICD-10-CM

## 2022-05-11 PROCEDURE — 99999 PR OFFICE/OUTPT VISIT,PROCEDURE ONLY: CPT | Performed by: PODIATRIST

## 2022-05-11 PROCEDURE — 11721 DEBRIDE NAIL 6 OR MORE: CPT | Performed by: PODIATRIST

## 2022-05-11 ASSESSMENT — ENCOUNTER SYMPTOMS
DIARRHEA: 0
CONSTIPATION: 0
VOMITING: 0
NAUSEA: 0
COLOR CHANGE: 0

## 2022-05-11 NOTE — PROGRESS NOTES
[facility]  Return Patient     Cher Morfin 80 y.o. male that presents for follow-up of   Chief Complaint   Patient presents with    Nail Problem     nail trim/last saw Karlie Yan 2/11/22       right arch and ankle pain. He called today to get in. He was doing a lot of walking this morning. Would like wrapped. This has helped in the past on the right and left. No injury, has been walking a lot. Some swelling, wearing new balance and powersteps. Currently denies F/C/N/V. Allergies   Allergen Reactions    Celebrex [Celecoxib] Nausea Only    Mobic Nausea Only       Past Medical History:   Diagnosis Date    Acute [facility] ([facility] [address]     Allergic rhinitis 09/02/2011    Anemia     Atrial fibrillation (HCC)     Dr. Chavo Elaine, [address] [place] [address] cell cancer 03/2013    left side of head, face chin    Basal cell cancer 03/10/2014    left cheek    Cervical radiculopathy     Diverticulitis 02/17/2013    GERD (gastroesophageal reflux disease)     Glaucoma     left eye    Hyperlipidemia     Hypertension     Dr. Obed Hayden    Hyponatremia 09/02/2011    IBS (irritable bowel syndrome) 09/02/2011    Insomnia 09/02/2011    Osteoarthritis     Osteoarthritis/neck pain. 09/02/2011    Prostate cancer ([facility] [address] 08/18/2021    active surviellence monitoring PSA    Renal calculi     Shingles     history of shingles    Status post prostatectomy 08/18/2021    Simple prostatectomy for BPH    Urinary frequency     Wears glasses     Wears hearing aid in both ears        Prior to Admission medications    Medication Sig Start Date End Date Taking?  Authorizing Provider   mirtazapine (REMERON) 15 MG tablet TAKE ONE TABLET BY MOUTH ONCE NIGHTLY 4/27/22  Yes Laquita Gee MD   predniSONE (DELTASONE) 5 MG tablet TAKE ONE TABLET BY MOUTH DAILY 4/13/22  Yes Laquita Gee MD   zoster recombinant adjuvanted vaccine [facility]) 50 MCG/0.5ML SUSR injection Inject 0.5 mLs into the muscle See Admin Instructions 1 dose now and repeat in 2-6 months 2/11/22 8/10/22 Yes Barry Alonzo MD   allopurinol (ZYLOPRIM) 100 MG tablet TAKE ONE TABLET BY MOUTH DAILY 2/9/22  Yes Barry Alonzo MD   lisinopril (PRINIVIL;ZESTRIL) 10 MG tablet TAKE ONE TABLET BY MOUTH DAILY WITH LUNCH 2/1/22  Yes Barry Alonzo MD   albuterol sulfate HFA (VENTOLIN HFA) 108 (90 Base) MCG/ACT inhaler Inhale 2 puffs into the lungs every 4 hours as needed for Wheezing 1/17/22  Yes Barry Alonzo MD   furosemide (LASIX) 40 MG tablet Take 1 tablet by mouth daily  Patient taking differently: Take 20 mg by mouth daily  11/28/21  Yes Alice Abraham MD   warfarin (COUMADIN) 5 MG tablet Take 5 mg by mouth daily Indications: INR goal 2-2.5 Dosing per [facility]   Yes Historical Provider, MD   busPIRone (BUSPAR) 15 MG tablet Take 15 mg by mouth 3 times daily 10/18/21  Yes Barry Alonzo MD   omeprazole (PRILOSEC OTC) 20 MG tablet Take 20 mg by mouth daily   Yes Historical Provider, MD   latanoprost (XALATAN) 0.005 % ophthalmic solution Place 1 drop into both eyes nightly    Yes Historical Provider, MD   metoprolol tartrate (LOPRESSOR) 25 MG tablet Take 25 mg by mouth 2 times daily   Yes Historical Provider, MD   dilTIAZem (CARDIZEM) 30 MG tablet Take 1 tablet by mouth 4 times daily 11/27/21 3/10/22  Alice Abraham MD       Review of Systems   Constitutional: Negative for chills, diaphoresis, fatigue, fever and unexpected weight change. Cardiovascular: Positive for leg swelling. Gastrointestinal: Negative for constipation, diarrhea, nausea and vomiting. Musculoskeletal: Positive for gait problem. Negative for arthralgias and joint swelling. Skin: Negative for color change, pallor, rash and wound. Neurological: Negative for weakness and numbness. Lower Extremity Physical Examination:     Vitals: There were no vitals filed for this visit. General: AAO x 3 in NAD.      Integument:   Warm, dry, supple, Bilateral. Open lesion absent, Bilateral.      Vascular: DP and PT pulses palpable 2/4, Bilateral.  CFT <3 seconds, Bilateral.  Hair growth absent to the level of the digits, Bilateral.  Edema present, Left. Varicosities absent, Bilateral. Erythema absent, Left    Neurological:  Sensation present to light touch to level of digits, Bilateral.    Musculoskeletal: Muscle strength 5/5, Left. Pain present upon palpation of central heel, medial and plantar central arch, pain along the pt tendon, right. normal medial longitudinal arch, Bilateral.  Ankle ROM normal,Left. Asessment: Patient is a 80 y.o. male with:   1. Onychomycosis    2. Pain in toes of both feet        Plan: Pt was evaluated and examined. Patient was given personalized discharge instructions. Pt will follow up in 2 weeks or sooner if any problems arise. Diagnosis was discussed with the pt and all of their questions were answered in detail. Proper foot hygiene and care was discussed with the pt. Patient to check feet daily and contact the office with any questions/problems/concerns. Other comorbidity noted and will be managed by PCP. Rest, good shoes, powersteps    Foot and ankle strapping/ Low-dye strapping was applied to the right, with 2 inch and 1 inch tape and a scaffoid pad. This is designed to off-load the plantar fascia and encourage healing and pain reduction. Discussed with the patient that the Low dye taping is a short term treatment and its off-loading effects vary from patient to patient. I advised the patient to leave the tape on for 3-5 days, but sometimes you may need to replace it sooner in order to remain effective. Orders Placed This Encounter   Procedures    OR DEBRIDEMENT OF NAILS, 6 OR MORE     No orders of the defined types were placed in this encounter. RTC in 2week(s).     5/11/2022

## 2022-05-17 ENCOUNTER — OFFICE VISIT (OUTPATIENT)
Dept: FAMILY MEDICINE CLINIC | Age: 87
End: 2022-05-17
Payer: MEDICARE

## 2022-05-17 VITALS
OXYGEN SATURATION: 92 % | SYSTOLIC BLOOD PRESSURE: 136 MMHG | HEART RATE: 50 BPM | WEIGHT: 166.4 LBS | HEIGHT: 64 IN | BODY MASS INDEX: 28.41 KG/M2 | DIASTOLIC BLOOD PRESSURE: 82 MMHG

## 2022-05-17 DIAGNOSIS — M50.30 DDD (DEGENERATIVE DISC DISEASE), CERVICAL: ICD-10-CM

## 2022-05-17 DIAGNOSIS — E78.00 PURE HYPERCHOLESTEROLEMIA: ICD-10-CM

## 2022-05-17 DIAGNOSIS — G47.00 INSOMNIA, UNSPECIFIED TYPE: ICD-10-CM

## 2022-05-17 DIAGNOSIS — I48.0 PAROXYSMAL ATRIAL FIBRILLATION (HCC): ICD-10-CM

## 2022-05-17 DIAGNOSIS — M19.90 OSTEOARTHRITIS, UNSPECIFIED OSTEOARTHRITIS TYPE, UNSPECIFIED SITE: ICD-10-CM

## 2022-05-17 DIAGNOSIS — I10 ESSENTIAL HYPERTENSION: Primary | ICD-10-CM

## 2022-05-17 DIAGNOSIS — D50.9 IRON DEFICIENCY ANEMIA, UNSPECIFIED IRON DEFICIENCY ANEMIA TYPE: ICD-10-CM

## 2022-05-17 DIAGNOSIS — E87.1 HYPONATREMIA: ICD-10-CM

## 2022-05-17 DIAGNOSIS — H81.10 BENIGN PAROXYSMAL POSITIONAL VERTIGO, UNSPECIFIED LATERALITY: ICD-10-CM

## 2022-05-17 DIAGNOSIS — M10.9 GOUT, UNSPECIFIED CAUSE, UNSPECIFIED CHRONICITY, UNSPECIFIED SITE: ICD-10-CM

## 2022-05-17 DIAGNOSIS — F41.9 ANXIETY: ICD-10-CM

## 2022-05-17 DIAGNOSIS — F32.A DEPRESSION, UNSPECIFIED DEPRESSION TYPE: ICD-10-CM

## 2022-05-17 DIAGNOSIS — Z85.46 HISTORY OF PROSTATE CANCER: ICD-10-CM

## 2022-05-17 DIAGNOSIS — E53.8 B12 DEFICIENCY: ICD-10-CM

## 2022-05-17 DIAGNOSIS — K21.9 GASTROESOPHAGEAL REFLUX DISEASE WITHOUT ESOPHAGITIS: ICD-10-CM

## 2022-05-17 DIAGNOSIS — I50.22 CHRONIC SYSTOLIC CONGESTIVE HEART FAILURE (HCC): ICD-10-CM

## 2022-05-17 DIAGNOSIS — M51.36 DDD (DEGENERATIVE DISC DISEASE), LUMBAR: ICD-10-CM

## 2022-05-17 DIAGNOSIS — R74.8 ELEVATED LIVER ENZYMES: ICD-10-CM

## 2022-05-17 PROBLEM — C61 PROSTATE CANCER (HCC): Status: RESOLVED | Noted: 2021-08-18 | Resolved: 2022-05-17

## 2022-05-17 PROCEDURE — 1036F TOBACCO NON-USER: CPT | Performed by: FAMILY MEDICINE

## 2022-05-17 PROCEDURE — 4040F PNEUMOC VAC/ADMIN/RCVD: CPT | Performed by: FAMILY MEDICINE

## 2022-05-17 PROCEDURE — G8417 CALC BMI ABV UP PARAM F/U: HCPCS | Performed by: FAMILY MEDICINE

## 2022-05-17 PROCEDURE — G8427 DOCREV CUR MEDS BY ELIG CLIN: HCPCS | Performed by: FAMILY MEDICINE

## 2022-05-17 PROCEDURE — 99214 OFFICE O/P EST MOD 30 MIN: CPT | Performed by: FAMILY MEDICINE

## 2022-05-17 PROCEDURE — 1123F ACP DISCUSS/DSCN MKR DOCD: CPT | Performed by: FAMILY MEDICINE

## 2022-05-17 ASSESSMENT — PATIENT HEALTH QUESTIONNAIRE - PHQ9
6. FEELING BAD ABOUT YOURSELF - OR THAT YOU ARE A FAILURE OR HAVE LET YOURSELF OR YOUR FAMILY DOWN: 0
4. FEELING TIRED OR HAVING LITTLE ENERGY: 0
7. TROUBLE CONCENTRATING ON THINGS, SUCH AS READING THE NEWSPAPER OR WATCHING TELEVISION: 0
5. POOR APPETITE OR OVEREATING: 0
SUM OF ALL RESPONSES TO PHQ QUESTIONS 1-9: 1
8. MOVING OR SPEAKING SO SLOWLY THAT OTHER PEOPLE COULD HAVE NOTICED. OR THE OPPOSITE, BEING SO FIGETY OR RESTLESS THAT YOU HAVE BEEN MOVING AROUND A LOT MORE THAN USUAL: 0
1. LITTLE INTEREST OR PLEASURE IN DOING THINGS: 0
2. FEELING DOWN, DEPRESSED OR HOPELESS: 0
10. IF YOU CHECKED OFF ANY PROBLEMS, HOW DIFFICULT HAVE THESE PROBLEMS MADE IT FOR YOU TO DO YOUR WORK, TAKE CARE OF THINGS AT HOME, OR GET ALONG WITH OTHER PEOPLE: 0
9. THOUGHTS THAT YOU WOULD BE BETTER OFF DEAD, OR OF HURTING YOURSELF: 0
SUM OF ALL RESPONSES TO PHQ9 QUESTIONS 1 & 2: 0
3. TROUBLE FALLING OR STAYING ASLEEP: 1
SUM OF ALL RESPONSES TO PHQ QUESTIONS 1-9: 1

## 2022-05-17 ASSESSMENT — ENCOUNTER SYMPTOMS
VOMITING: 0
CONSTIPATION: 0
NAUSEA: 0
RHINORRHEA: 0
DIARRHEA: 0
ABDOMINAL DISTENTION: 0
BACK PAIN: 1
SORE THROAT: 0
ABDOMINAL PAIN: 0
SHORTNESS OF BREATH: 0
CHEST TIGHTNESS: 0
COUGH: 0

## 2022-05-17 NOTE — PATIENT INSTRUCTIONS
Patient Education        Epley Maneuver at Home for Vertigo: Exercises  Introduction  Vertigo is a spinning or whirling sensation when you move your head. Your doctor may have moved you in different positions to help your vertigo get better faster. This is called the Epley maneuver. Your doctor also may haveasked you to do these exercises at home. Do the exercises as often as your doctor recommends. If your vertigo is gettingworse, your doctor may have you change the exercise or stop it. Step 1  Step 1    1. Sit on the edge of a bed or sofa. Step 2    1. Turn your head 45 degrees in the direction your doctor told you to. This should be toward the ear that causes the most vertigo for you. In this picture, the woman is turning toward her left ear. Step 3    1. Tilt yourself backward until you are lying on your back. Your head should still be at a 45-degree turn. Your head should be about midway between looking straight ahead and looking out to your side. Hold for 30 seconds. If you have vertigo, stay in this position until it stops. Step 4    1. Turn your head 90 degrees toward the ear that has the least vertigo. In this picture, the woman is turning to the right because she has vertigo on her left side. The point of your chin should be raised and over your shoulder. Hold for 30 seconds. Step 5    1. Roll onto the side with the least vertigo. You should now be looking at the floor. Hold for 30 seconds. Follow-up care is a key part of your treatment and safety. Be sure to make and go to all appointments, and call your doctor if you are having problems. It's also a good idea to know your test results and keep alist of the medicines you take. Where can you learn more? Go to https://chperaeeb[employer]. org and sign in to your [employer] account. Enter R110 in the [employer] box to learn more about \"Epley Maneuver at Home for Vertigo: Exercises. \"     If you do not have an account, please click

## 2022-05-17 NOTE — PROGRESS NOTES
Jacqueline Sandra MD    [address] [place] [address] [facility]  [id number]  [place] [address]  [address] [address]  Dept: [phone]  Dept Fax: [phone]     Patient ID: Shira Slaughter is a 80 y.o. male. HPI    Established patient here today for f/u on chronic medical problems, go over labs and/or diagnostic studies, and medication refills. Pt denies any fever or chills. Pt today denies any HA, chest pain, or SOB. Pt denies any N/V/D/C or abdominal pain. Pt with occasional heartburn, but stable on meds. Pt with arthralgias on and off, but stable on meds. Pt denies any gouty attacks. Pt states mood is stable on meds. Pt has been c/o vertigo when looking up. Otherwise pt doing well on current tx and no other concerns today. The patient's past medical, surgical, social, and family history as well as his current medications and allergies were reviewed as documented in today's encounter. My previous office notes, consult notes, labs and diagnostic studies were reviewed prior to and during encounter.     Current Outpatient Medications on File Prior to Visit   Medication Sig Dispense Refill    mirtazapine (REMERON) 15 MG tablet TAKE ONE TABLET BY MOUTH ONCE NIGHTLY 30 tablet 3    predniSONE (DELTASONE) 5 MG tablet TAKE ONE TABLET BY MOUTH DAILY 90 tablet 3    allopurinol (ZYLOPRIM) 100 MG tablet TAKE ONE TABLET BY MOUTH DAILY 90 tablet 2    lisinopril (PRINIVIL;ZESTRIL) 10 MG tablet TAKE ONE TABLET BY MOUTH DAILY WITH LUNCH 90 tablet 3    dilTIAZem (CARDIZEM) 30 MG tablet Take 1 tablet by mouth 4 times daily 120 tablet 0    furosemide (LASIX) 40 MG tablet Take 1 tablet by mouth daily (Patient taking differently: Take 20 mg by mouth daily ) 60 tablet 3    warfarin (COUMADIN) 5 MG tablet Take 5 mg by mouth daily Indications: INR goal 2-2.5 Dosing per [facility]      busPIRone (BUSPAR) 15 MG tablet Take 15 mg by mouth 3 times daily 90 tablet 1    omeprazole (PRILOSEC OTC) 20 MG tablet Take 20 mg by mouth daily      latanoprost (XALATAN) 0.005 % ophthalmic solution Place 1 drop into both eyes nightly       metoprolol tartrate (LOPRESSOR) 25 MG tablet Take 25 mg by mouth 2 times daily      zoster recombinant adjuvanted vaccine [facility]) 50 MCG/0.5ML SUSR injection Inject 0.5 mLs into the muscle See Admin Instructions 1 dose now and repeat in 2-6 months (Patient not taking: Reported on 5/17/2022) 0.5 mL 0    albuterol sulfate HFA (VENTOLIN HFA) 108 (90 Base) MCG/ACT inhaler Inhale 2 puffs into the lungs every 4 hours as needed for Wheezing (Patient not taking: Reported on 5/17/2022) 18 g 5     No current facility-administered medications on file prior to visit. Subjective:     Review of Systems   Constitutional: Negative for appetite change, fatigue and fever. HENT: Negative for congestion, ear pain, rhinorrhea and sore throat. Respiratory: Negative for cough, chest tightness and shortness of breath. Cardiovascular: Negative for chest pain and palpitations. Gastrointestinal: Negative for abdominal distention, abdominal pain, constipation, diarrhea, nausea and vomiting. Occ heartburn   Genitourinary: Negative for difficulty urinating and dysuria. Musculoskeletal: Positive for arthralgias and back pain. Negative for myalgias. Skin: Negative for rash. Neurological: Negative for dizziness, weakness, light-headedness and headaches. Vertigo when looking up   Hematological: Negative for adenopathy. Psychiatric/Behavioral: Positive for dysphoric mood (stable). Negative for behavioral problems and sleep disturbance. The patient is nervous/anxious (stable). Objective:     Physical Exam  Vitals reviewed. Constitutional:       General: He is not in acute distress. Appearance: He is well-developed. HENT:      Head: Normocephalic and atraumatic.       Right Ear: External ear normal.      Left Ear: External ear normal.      Nose: Nose normal.      Mouth/Throat:      Pharynx: No oropharyngeal exudate. Eyes:      Conjunctiva/sclera: Conjunctivae normal.      Pupils: Pupils are equal, round, and reactive to light. Cardiovascular:      Rate and Rhythm: Normal rate. Rhythm irregularly irregular. Heart sounds: Normal heart sounds. No murmur heard. Pulmonary:      Effort: Pulmonary effort is normal. No respiratory distress. Breath sounds: Normal breath sounds. No wheezing. Chest:      Chest wall: No tenderness. Abdominal:      General: Bowel sounds are normal. There is no distension. Palpations: Abdomen is soft. There is no mass. Tenderness: There is no abdominal tenderness. Musculoskeletal:         General: No tenderness. Normal range of motion. Cervical back: Normal range of motion. Lymphadenopathy:      Cervical: No cervical adenopathy. Skin:     Findings: No rash. Neurological:      Mental Status: He is alert and oriented to person, place, and time. Deep Tendon Reflexes: Reflexes are normal and symmetric. Psychiatric:         Behavior: Behavior normal.         Assessment:      Diagnosis Orders   1. Essential hypertension  CBC    Comprehensive Metabolic Panel   2. Pure hypercholesterolemia  Lipid Panel   3. Paroxysmal atrial fibrillation (HCC)     4. Chronic systolic congestive heart failure (HCC)  Comprehensive Metabolic Panel   5. Elevated liver enzymes  Comprehensive Metabolic Panel   6. Hyponatremia  Comprehensive Metabolic Panel   7. B12 deficiency  Vitamin B12   8. Iron deficiency anemia, unspecified iron deficiency anemia type  CBC   9. Gout, unspecified cause, unspecified chronicity, unspecified site  Uric Acid   10. Gastroesophageal reflux disease without esophagitis     11. Depression, unspecified depression type     12. Anxiety     13. Insomnia, unspecified type     14. Osteoarthritis, unspecified osteoarthritis type, unspecified site     15.  DDD (degenerative disc disease), cervical     16. DDD (degenerative disc disease), lumbar     17. History of prostate cancer      s/p prostatectomy    18. Benign paroxysmal positional vertigo, unspecified laterality           Plan:     Orders Placed This Encounter   Procedures    CBC     Standing Status:   Future     Standing Expiration Date:   5/17/2023    Comprehensive Metabolic Panel     Standing Status:   Future     Standing Expiration Date:   5/17/2023    Lipid Panel     Standing Status:   Future     Standing Expiration Date:   5/17/2023     Order Specific Question:   Is Patient Fasting?/# of Hours     Answer:   8-10 Hours    Vitamin B12     Standing Status:   Future     Standing Expiration Date:   5/17/2023    Uric Acid     Standing Status:   Future     Standing Expiration Date:   5/17/2023      Will cont with current treatment as pt is currently stable on current treatment    Will cont with the Lisinopril, Lopressor, and Cardizem as prescribed for BP control    Will cont with low fat/chol diet     Will cont with the Buspar and Remeron as prescribed as he is doing well    Will cont with the Allopurinol as prescribed for gout prophylaxis    Will cont with high fiber diet    I did give him the Epley maneuver exercises and if no improvement, will refer him to PT    Will cont to follow with Cardiology as instructed    Will cont to follow with Urology as instructed for his prostate cancer surveillance    Cont to follow with Coumadin clinic for Coumadin dosing    Rest of systems unchanged, continue current treatments. Medications, labs, diagnostic studies, consultations and follow-up as documented in this encounter.  Rest of systems unchanged, continue current treatments    On this date May 17, 2022,  I have spent greater than 50% of visit reviewing previous notes, test results and face to face with the patient discussing the diagnoses, importance of compliance with the treatment plan, counseling, coordinating care as well as documenting on the day of the visit. Jacqueline Guevara MD

## 2022-05-24 ENCOUNTER — TELEPHONE (OUTPATIENT)
Dept: FAMILY MEDICINE CLINIC | Age: 87
End: 2022-05-24

## 2022-05-24 NOTE — TELEPHONE ENCOUNTER
Improving on step 4 of vertigo therapy still having difficulty with step 5    Would like to continue and update Monday    Also wanted to let you know that he had some stomach issues due to eating some of the contaminated J[employer] peanut butter- says is treating with Pepto Bismol and just wanted you to know

## 2022-05-27 ENCOUNTER — TELEPHONE (OUTPATIENT)
Dept: FAMILY MEDICINE CLINIC | Age: 87
End: 2022-05-27

## 2022-05-27 NOTE — TELEPHONE ENCOUNTER
Patient was given exercises for vertigo from the PCP. Patient is asking how many times per day that he should be doing these exercises. Please advise.

## 2022-06-09 ENCOUNTER — OFFICE VISIT (OUTPATIENT)
Dept: PODIATRY | Age: 87
End: 2022-06-09
Payer: MEDICARE

## 2022-06-09 VITALS — WEIGHT: 162 LBS | HEIGHT: 64 IN | BODY MASS INDEX: 27.66 KG/M2

## 2022-06-09 DIAGNOSIS — M72.2 PLANTAR FASCIITIS, RIGHT: ICD-10-CM

## 2022-06-09 DIAGNOSIS — M76.821 POSTERIOR TIBIAL TENDINITIS, RIGHT: Primary | ICD-10-CM

## 2022-06-09 PROCEDURE — 1123F ACP DISCUSS/DSCN MKR DOCD: CPT | Performed by: PODIATRIST

## 2022-06-09 PROCEDURE — 1036F TOBACCO NON-USER: CPT | Performed by: PODIATRIST

## 2022-06-09 PROCEDURE — 99999 PR OFFICE/OUTPT VISIT,PROCEDURE ONLY: CPT | Performed by: PODIATRIST

## 2022-06-09 PROCEDURE — 29540 STRAPPING ANKLE &/FOOT: CPT | Performed by: PODIATRIST

## 2022-06-09 PROCEDURE — G8417 CALC BMI ABV UP PARAM F/U: HCPCS | Performed by: PODIATRIST

## 2022-06-09 PROCEDURE — G8427 DOCREV CUR MEDS BY ELIG CLIN: HCPCS | Performed by: PODIATRIST

## 2022-06-09 ASSESSMENT — ENCOUNTER SYMPTOMS
CONSTIPATION: 0
COLOR CHANGE: 0
DIARRHEA: 0
NAUSEA: 0
VOMITING: 0

## 2022-06-09 NOTE — PROGRESS NOTES
[facility]  Return Patient     Sherri Morfin 80 y.o. male that presents for follow-up of   Chief Complaint   Patient presents with    Foot Pain     right foot, unna boot       right arch and ankle pain. He called today to get in. He was doing a lot of walking this morning. Would like wrapped. This has helped in the past on the right and left. No injury, has been walking a lot. Some swelling, wearing new balance and powersteps. Currently denies F/C/N/V. Allergies   Allergen Reactions    Celebrex [Celecoxib] Nausea Only    Mobic Nausea Only       Past Medical History:   Diagnosis Date    [facility] ([facility] [address]     Allergic rhinitis 09/02/2011    Anemia     Atrial fibrillation (HCC)     Dr. Liset Hurtado, [facility] cell cancer 03/2013    left side of head, face chin    Basal cell cancer 03/10/2014    left cheek    Cervical radiculopathy     Diverticulitis 02/17/2013    GERD (gastroesophageal reflux disease)     Glaucoma     left eye    Hyperlipidemia     Hypertension     Dr. Francine Valera    Hyponatremia 09/02/2011    IBS (irritable bowel syndrome) 09/02/2011    Insomnia 09/02/2011    Osteoarthritis     Osteoarthritis/neck pain. 09/02/2011    Prostate cancer ([facility] [address] 08/18/2021    active surviellence monitoring PSA    Renal calculi     Shingles     history of shingles    Status post prostatectomy 08/18/2021    Simple prostatectomy for BPH    Urinary frequency     Wears glasses     Wears hearing aid in both ears        Prior to Admission medications    Medication Sig Start Date End Date Taking?  Authorizing Provider   mirtazapine (REMERON) 15 MG tablet TAKE ONE TABLET BY MOUTH ONCE NIGHTLY 4/27/22  Yes Nicki House MD   predniSONE (DELTASONE) 5 MG tablet TAKE ONE TABLET BY MOUTH DAILY 4/13/22  Yes Nicki House MD   zoster recombinant adjuvanted vaccine [facility]) 50 MCG/0.5ML SUSR injection Inject 0.5 mLs into the muscle See Admin Instructions 1 dose now and repeat in 2-6 months 2/11/22 8/10/22 Yes Jluis Feldman MD   allopurinol (ZYLOPRIM) 100 MG tablet TAKE ONE TABLET BY MOUTH DAILY 2/9/22  Yes Jluis Feldman MD   lisinopril (PRINIVIL;ZESTRIL) 10 MG tablet TAKE ONE TABLET BY MOUTH DAILY WITH LUNCH 2/1/22  Yes Jluis Feldman MD   albuterol sulfate HFA (VENTOLIN HFA) 108 (90 Base) MCG/ACT inhaler Inhale 2 puffs into the lungs every 4 hours as needed for Wheezing 1/17/22  Yes Jluis Feldman MD   furosemide (LASIX) 40 MG tablet Take 1 tablet by mouth daily  Patient taking differently: Take 20 mg by mouth daily  11/28/21  Yes Juan C Lujan MD   warfarin (COUMADIN) 5 MG tablet Take 5 mg by mouth daily Indications: INR goal 2-2.5 Dosing per [facility]   Yes Historical Provider, MD   busPIRone (BUSPAR) 15 MG tablet Take 15 mg by mouth 3 times daily 10/18/21  Yes Jluis Feldman MD   omeprazole (PRILOSEC OTC) 20 MG tablet Take 20 mg by mouth daily   Yes Historical Provider, MD   latanoprost (XALATAN) 0.005 % ophthalmic solution Place 1 drop into both eyes nightly    Yes Historical Provider, MD   metoprolol tartrate (LOPRESSOR) 25 MG tablet Take 25 mg by mouth 2 times daily   Yes Historical Provider, MD   dilTIAZem (CARDIZEM) 30 MG tablet Take 1 tablet by mouth 4 times daily 11/27/21 5/17/22  Juan C Lujan MD       Review of Systems   Constitutional: Negative for chills, diaphoresis, fatigue, fever and unexpected weight change. Cardiovascular: Positive for leg swelling. Gastrointestinal: Negative for constipation, diarrhea, nausea and vomiting. Musculoskeletal: Positive for gait problem. Negative for arthralgias and joint swelling. Skin: Negative for color change, pallor, rash and wound. Neurological: Negative for weakness and numbness. Lower Extremity Physical Examination:     Vitals: There were no vitals filed for this visit. General: AAO x 3 in NAD.      Integument:   Warm, dry, supple, Bilateral.  Open lesion absent, Bilateral.      Vascular: DP and PT pulses palpable 2/4, Bilateral.  CFT <3 seconds, Bilateral.  Hair growth absent to the level of the digits, Bilateral.  Edema present, Left. Varicosities absent, Bilateral. Erythema absent, Left    Neurological:  Sensation present to light touch to level of digits, Bilateral.    Musculoskeletal: Muscle strength 5/5, Left. Pain present upon palpation of central heel, medial and plantar central arch, pain along the pt tendon, right. normal medial longitudinal arch, Bilateral.  Ankle ROM normal,Left. Asessment: Patient is a 80 y.o. male with:   1. Posterior tibial tendinitis, right    2. Plantar fasciitis, right        Plan: Pt was evaluated and examined. Patient was given personalized discharge instructions. Pt will follow up in 2 weeks or sooner if any problems arise. Diagnosis was discussed with the pt and all of their questions were answered in detail. Proper foot hygiene and care was discussed with the pt. Patient to check feet daily and contact the office with any questions/problems/concerns. Other comorbidity noted and will be managed by PCP. Rest, good shoes, powersteps    Foot and ankle strapping/ Low-dye strapping was applied to the right, with 2 inch and 1 inch tape and a scaffoid pad. This is designed to off-load the plantar fascia and encourage healing and pain reduction. Discussed with the patient that the Low dye taping is a short term treatment and its off-loading effects vary from patient to patient. I advised the patient to leave the tape on for 3-5 days, but sometimes you may need to replace it sooner in order to remain effective. Orders Placed This Encounter   Procedures    NM STRAPPING; ANKLE &/OR FOOT     No orders of the defined types were placed in this encounter. RTC in 2week(s).     6/9/2022

## 2022-06-29 ENCOUNTER — TELEPHONE (OUTPATIENT)
Dept: FAMILY MEDICINE CLINIC | Age: 87
End: 2022-06-29

## 2022-06-29 NOTE — TELEPHONE ENCOUNTER
Pt noticed a varicose vein on his right leg this morning with some spider veins. He is concerned it may \"turn into a clot\". He is wondering if there is something he can do about it.

## 2022-07-18 ENCOUNTER — TELEPHONE (OUTPATIENT)
Dept: FAMILY MEDICINE CLINIC | Age: 87
End: 2022-07-18

## 2022-07-18 RX ORDER — TIZANIDINE 2 MG/1
2 TABLET ORAL 3 TIMES DAILY
Qty: 40 TABLET | Refills: 0 | Status: SHIPPED | OUTPATIENT
Start: 2022-07-18

## 2022-07-18 NOTE — TELEPHONE ENCOUNTER
Patient stated that there is pain between shoulders and feels like electric shocks, or somebody squeezing shoulder, that started Friday. Patient is unable to move from laying position, and a lot of pain from sitting position. Patient is asking for advice or a muscle relaxer.     Anderson schroeder [place]

## 2022-07-21 ENCOUNTER — OFFICE VISIT (OUTPATIENT)
Dept: PODIATRY | Age: 87
End: 2022-07-21
Payer: MEDICARE

## 2022-07-21 VITALS — BODY MASS INDEX: 27.66 KG/M2 | HEIGHT: 64 IN | WEIGHT: 162 LBS

## 2022-07-21 DIAGNOSIS — M79.675 PAIN IN TOES OF BOTH FEET: ICD-10-CM

## 2022-07-21 DIAGNOSIS — B35.1 ONYCHOMYCOSIS: Primary | ICD-10-CM

## 2022-07-21 DIAGNOSIS — M79.674 PAIN IN TOES OF BOTH FEET: ICD-10-CM

## 2022-07-21 PROCEDURE — 99999 PR OFFICE/OUTPT VISIT,PROCEDURE ONLY: CPT | Performed by: PODIATRIST

## 2022-07-21 PROCEDURE — 11721 DEBRIDE NAIL 6 OR MORE: CPT | Performed by: PODIATRIST

## 2022-07-21 ASSESSMENT — ENCOUNTER SYMPTOMS
NAUSEA: 0
COLOR CHANGE: 0
CONSTIPATION: 0
DIARRHEA: 0
VOMITING: 0

## 2022-07-21 NOTE — PROGRESS NOTES
2-6 months 0.5 mL 0    allopurinol (ZYLOPRIM) 100 MG tablet TAKE ONE TABLET BY MOUTH DAILY 90 tablet 2    lisinopril (PRINIVIL;ZESTRIL) 10 MG tablet TAKE ONE TABLET BY MOUTH DAILY WITH LUNCH 90 tablet 3    albuterol sulfate HFA (VENTOLIN HFA) 108 (90 Base) MCG/ACT inhaler Inhale 2 puffs into the lungs every 4 hours as needed for Wheezing 18 g 5    furosemide (LASIX) 40 MG tablet Take 1 tablet by mouth daily (Patient taking differently: Take 20 mg by mouth in the morning.) 60 tablet 3    warfarin (COUMADIN) 5 MG tablet Take 5 mg by mouth daily Indications: INR goal 2-2.5 Dosing per [facility]      busPIRone (BUSPAR) 15 MG tablet Take 15 mg by mouth 3 times daily 90 tablet 1    omeprazole (PRILOSEC OTC) 20 MG tablet Take 20 mg by mouth daily      latanoprost (XALATAN) 0.005 % ophthalmic solution Place 1 drop into both eyes nightly       metoprolol tartrate (LOPRESSOR) 25 MG tablet Take 25 mg by mouth 2 times daily      dilTIAZem (CARDIZEM) 30 MG tablet Take 1 tablet by mouth 4 times daily 120 tablet 0     No current facility-administered medications on file prior to visit. Review of Systems   Constitutional:  Negative for chills, diaphoresis, fatigue, fever and unexpected weight change. Cardiovascular:  Negative for leg swelling. Gastrointestinal:  Negative for constipation, diarrhea, nausea and vomiting. Musculoskeletal:  Positive for gait problem. Negative for arthralgias and joint swelling. Skin:  Negative for color change, pallor, rash and wound. Neurological:  Negative for weakness and numbness. Objective:  General: AAO x 3 in NAD. Derm  Left hallux nail incurvated medial border with slight redness and pain to palpation.     Sites of Onychomycosis Involvement (Check affected area)  [x] [x] [x] [x] [x] [x] [x] [x] [x] [x]  5 4 3 2 1 1 2 3 4 5                          Right questions/problems/concerns. Other comorbidity noted and will be managed by PCP. Pain waiver discussed with patient and confirmed.          7/21/2022    Electronically signed by Gurdeep Moon DPM on 7/21/2022 at 2:24 PM

## 2022-07-22 RX ORDER — TIZANIDINE 2 MG/1
TABLET ORAL
Qty: 40 TABLET | Refills: 0 | OUTPATIENT
Start: 2022-07-22

## 2022-08-11 ENCOUNTER — HOSPITAL ENCOUNTER (OUTPATIENT)
Age: 87
Setting detail: SPECIMEN
Discharge: HOME | End: 2022-08-11

## 2022-08-11 DIAGNOSIS — E87.1 HYPONATREMIA: ICD-10-CM

## 2022-08-11 DIAGNOSIS — I10 ESSENTIAL HYPERTENSION: ICD-10-CM

## 2022-08-11 DIAGNOSIS — R74.8 ELEVATED LIVER ENZYMES: ICD-10-CM

## 2022-08-11 DIAGNOSIS — I50.22 CHRONIC SYSTOLIC CONGESTIVE HEART FAILURE (HCC): ICD-10-CM

## 2022-08-11 DIAGNOSIS — E78.00 PURE HYPERCHOLESTEROLEMIA: ICD-10-CM

## 2022-08-11 DIAGNOSIS — M10.9 GOUT, UNSPECIFIED CAUSE, UNSPECIFIED CHRONICITY, UNSPECIFIED SITE: ICD-10-CM

## 2022-08-11 DIAGNOSIS — D50.9 IRON DEFICIENCY ANEMIA, UNSPECIFIED IRON DEFICIENCY ANEMIA TYPE: ICD-10-CM

## 2022-08-11 DIAGNOSIS — E53.8 B12 DEFICIENCY: ICD-10-CM

## 2022-08-11 LAB
ALBUMIN SERPL-MCNC: 4 G/DL (ref 3.5–5.2)
ALBUMIN/GLOBULIN RATIO: 1.4 (ref 1–2.5)
ALP BLD-CCNC: 77 U/L (ref 40–129)
ALT SERPL-CCNC: 14 U/L (ref 5–41)
ANION GAP SERPL CALCULATED.3IONS-SCNC: 15 MMOL/L (ref 9–17)
AST SERPL-CCNC: 19 U/L
BILIRUB SERPL-MCNC: 1.03 MG/DL (ref 0.3–1.2)
BUN BLDV-MCNC: 18 MG/DL (ref 8–23)
CALCIUM SERPL-MCNC: 9.9 MG/DL (ref 8.6–10.4)
CHLORIDE BLD-SCNC: 97 MMOL/L (ref 98–107)
CHOLESTEROL/HDL RATIO: 3.4
CHOLESTEROL: 206 MG/DL
CO2: 26 MMOL/L (ref 20–31)
CREAT SERPL-MCNC: 1.14 MG/DL (ref 0.7–1.2)
GFR AFRICAN AMERICAN: >60 ML/MIN
GFR NON-AFRICAN AMERICAN: >60 ML/MIN
GFR SERPL CREATININE-BSD FRML MDRD: ABNORMAL ML/MIN/{1.73_M2}
GLUCOSE BLD-MCNC: 75 MG/DL (ref 70–99)
HCT VFR BLD CALC: 49.8 % (ref 40.7–50.3)
HDLC SERPL-MCNC: 60 MG/DL
HEMOGLOBIN: 14.5 G/DL (ref 13–17)
LDL CHOLESTEROL: 126 MG/DL (ref 0–130)
MCH RBC QN AUTO: 28.7 PG (ref 25.2–33.5)
MCHC RBC AUTO-ENTMCNC: 29.1 G/DL (ref 28.4–34.8)
MCV RBC AUTO: 98.4 FL (ref 82.6–102.9)
NRBC AUTOMATED: 0 PER 100 WBC
PDW BLD-RTO: 15.9 % (ref 11.8–14.4)
PLATELET # BLD: 172 K/UL (ref 138–453)
PMV BLD AUTO: 11.8 FL (ref 8.1–13.5)
POTASSIUM SERPL-SCNC: 5.2 MMOL/L (ref 3.7–5.3)
RBC # BLD: 5.06 M/UL (ref 4.21–5.77)
SODIUM BLD-SCNC: 138 MMOL/L (ref 135–144)
TOTAL PROTEIN: 6.8 G/DL (ref 6.4–8.3)
TRIGL SERPL-MCNC: 102 MG/DL
URIC ACID: 5.3 MG/DL (ref 3.4–7)
VITAMIN B-12: 229 PG/ML (ref 232–1245)
WBC # BLD: 9.8 K/UL (ref 3.5–11.3)

## 2022-08-16 ENCOUNTER — HOSPITAL ENCOUNTER (OUTPATIENT)
Age: 87
Setting detail: SPECIMEN
Discharge: HOME | End: 2022-08-16

## 2022-08-16 DIAGNOSIS — N40.1 BENIGN PROSTATIC HYPERPLASIA WITH INCOMPLETE BLADDER EMPTYING: ICD-10-CM

## 2022-08-16 DIAGNOSIS — R39.14 BENIGN PROSTATIC HYPERPLASIA WITH INCOMPLETE BLADDER EMPTYING: ICD-10-CM

## 2022-08-16 LAB — PROSTATE SPECIFIC ANTIGEN: 1.27 NG/ML

## 2022-08-18 RX ORDER — BUSPIRONE HYDROCHLORIDE 15 MG/1
TABLET ORAL
Qty: 90 TABLET | Refills: 1 | Status: SHIPPED | OUTPATIENT
Start: 2022-08-18

## 2022-08-18 SDOH — ECONOMIC STABILITY: FOOD INSECURITY: WITHIN THE PAST 12 MONTHS, YOU WORRIED THAT YOUR FOOD WOULD RUN OUT BEFORE YOU GOT MONEY TO BUY MORE.: NEVER TRUE

## 2022-08-18 SDOH — ECONOMIC STABILITY: FOOD INSECURITY: WITHIN THE PAST 12 MONTHS, THE FOOD YOU BOUGHT JUST DIDN'T LAST AND YOU DIDN'T HAVE MONEY TO GET MORE.: NEVER TRUE

## 2022-08-18 ASSESSMENT — SOCIAL DETERMINANTS OF HEALTH (SDOH): HOW HARD IS IT FOR YOU TO PAY FOR THE VERY BASICS LIKE FOOD, HOUSING, MEDICAL CARE, AND HEATING?: NOT HARD AT ALL

## 2022-08-18 NOTE — TELEPHONE ENCOUNTER
Please Approve or Refuse.   Send to Pharmacy per Pt's Request:      Next Visit Date:  8/19/2022   Last Visit Date: 5/17/2022    No results found for: LABA1C          ( goal A1C is < 7)   BP Readings from Last 3 Encounters:   05/17/22 136/82   02/15/22 138/86   02/11/22 136/78          (goal 120/80)  BUN   Date Value Ref Range Status   08/11/2022 18 8 - 23 mg/dL Final     Creatinine   Date Value Ref Range Status   08/11/2022 1.14 0.70 - 1.20 mg/dL Final     Potassium   Date Value Ref Range Status   08/11/2022 5.2 3.7 - 5.3 mmol/L Final

## 2022-08-19 ENCOUNTER — OFFICE VISIT (OUTPATIENT)
Dept: FAMILY MEDICINE CLINIC | Age: 87
End: 2022-08-19
Payer: MEDICARE

## 2022-08-19 VITALS
BODY MASS INDEX: 31.34 KG/M2 | HEART RATE: 58 BPM | OXYGEN SATURATION: 96 % | SYSTOLIC BLOOD PRESSURE: 118 MMHG | TEMPERATURE: 97.1 F | DIASTOLIC BLOOD PRESSURE: 74 MMHG | RESPIRATION RATE: 16 BRPM | HEIGHT: 61 IN | WEIGHT: 166 LBS

## 2022-08-19 DIAGNOSIS — I10 ESSENTIAL HYPERTENSION: ICD-10-CM

## 2022-08-19 DIAGNOSIS — R74.8 ELEVATED LIVER ENZYMES: ICD-10-CM

## 2022-08-19 DIAGNOSIS — M19.90 OSTEOARTHRITIS, UNSPECIFIED OSTEOARTHRITIS TYPE, UNSPECIFIED SITE: ICD-10-CM

## 2022-08-19 DIAGNOSIS — K58.9 IRRITABLE BOWEL SYNDROME WITHOUT DIARRHEA: ICD-10-CM

## 2022-08-19 DIAGNOSIS — Z85.46 HISTORY OF PROSTATE CANCER: ICD-10-CM

## 2022-08-19 DIAGNOSIS — G47.00 INSOMNIA, UNSPECIFIED TYPE: ICD-10-CM

## 2022-08-19 DIAGNOSIS — I48.0 PAROXYSMAL ATRIAL FIBRILLATION (HCC): ICD-10-CM

## 2022-08-19 DIAGNOSIS — I50.22 CHRONIC SYSTOLIC CONGESTIVE HEART FAILURE (HCC): ICD-10-CM

## 2022-08-19 DIAGNOSIS — F32.A DEPRESSION, UNSPECIFIED DEPRESSION TYPE: ICD-10-CM

## 2022-08-19 DIAGNOSIS — Z00.00 MEDICARE ANNUAL WELLNESS VISIT, SUBSEQUENT: Primary | ICD-10-CM

## 2022-08-19 DIAGNOSIS — K21.9 GASTROESOPHAGEAL REFLUX DISEASE WITHOUT ESOPHAGITIS: ICD-10-CM

## 2022-08-19 DIAGNOSIS — D50.9 IRON DEFICIENCY ANEMIA, UNSPECIFIED IRON DEFICIENCY ANEMIA TYPE: ICD-10-CM

## 2022-08-19 DIAGNOSIS — E78.00 PURE HYPERCHOLESTEROLEMIA: ICD-10-CM

## 2022-08-19 DIAGNOSIS — M51.36 DDD (DEGENERATIVE DISC DISEASE), LUMBAR: ICD-10-CM

## 2022-08-19 DIAGNOSIS — M10.9 GOUT, UNSPECIFIED CAUSE, UNSPECIFIED CHRONICITY, UNSPECIFIED SITE: ICD-10-CM

## 2022-08-19 DIAGNOSIS — M50.30 DDD (DEGENERATIVE DISC DISEASE), CERVICAL: ICD-10-CM

## 2022-08-19 DIAGNOSIS — F41.9 ANXIETY: ICD-10-CM

## 2022-08-19 DIAGNOSIS — E53.8 B12 DEFICIENCY: ICD-10-CM

## 2022-08-19 PROCEDURE — 1036F TOBACCO NON-USER: CPT | Performed by: FAMILY MEDICINE

## 2022-08-19 PROCEDURE — G0439 PPPS, SUBSEQ VISIT: HCPCS | Performed by: FAMILY MEDICINE

## 2022-08-19 PROCEDURE — 99214 OFFICE O/P EST MOD 30 MIN: CPT | Performed by: FAMILY MEDICINE

## 2022-08-19 PROCEDURE — 1123F ACP DISCUSS/DSCN MKR DOCD: CPT | Performed by: FAMILY MEDICINE

## 2022-08-19 PROCEDURE — G8427 DOCREV CUR MEDS BY ELIG CLIN: HCPCS | Performed by: FAMILY MEDICINE

## 2022-08-19 PROCEDURE — 96372 THER/PROPH/DIAG INJ SC/IM: CPT | Performed by: FAMILY MEDICINE

## 2022-08-19 PROCEDURE — G8417 CALC BMI ABV UP PARAM F/U: HCPCS | Performed by: FAMILY MEDICINE

## 2022-08-19 RX ORDER — MIRTAZAPINE 15 MG/1
15 TABLET, FILM COATED ORAL NIGHTLY
Qty: 90 TABLET | Refills: 3 | Status: SHIPPED | OUTPATIENT
Start: 2022-08-19

## 2022-08-19 RX ORDER — DILTIAZEM HYDROCHLORIDE 180 MG/1
1 CAPSULE, COATED, EXTENDED RELEASE ORAL DAILY
COMMUNITY
Start: 2022-07-29

## 2022-08-19 RX ORDER — CYANOCOBALAMIN 1000 UG/ML
1000 INJECTION INTRAMUSCULAR; SUBCUTANEOUS ONCE
Status: COMPLETED | OUTPATIENT
Start: 2022-08-19 | End: 2022-08-19

## 2022-08-19 RX ORDER — FUROSEMIDE 20 MG/1
1 TABLET ORAL DAILY
COMMUNITY
Start: 2022-07-22

## 2022-08-19 RX ADMIN — CYANOCOBALAMIN 1000 MCG: 1000 INJECTION INTRAMUSCULAR; SUBCUTANEOUS at 12:20

## 2022-08-19 ASSESSMENT — ENCOUNTER SYMPTOMS
CONSTIPATION: 0
ABDOMINAL DISTENTION: 0
RHINORRHEA: 0
SORE THROAT: 0
VOMITING: 0
NAUSEA: 0
COUGH: 0
DIARRHEA: 0
ABDOMINAL PAIN: 0
SHORTNESS OF BREATH: 0
BACK PAIN: 1
CHEST TIGHTNESS: 0

## 2022-08-19 ASSESSMENT — PATIENT HEALTH QUESTIONNAIRE - PHQ9
3. TROUBLE FALLING OR STAYING ASLEEP: 0
SUM OF ALL RESPONSES TO PHQ QUESTIONS 1-9: 0
2. FEELING DOWN, DEPRESSED OR HOPELESS: 0
9. THOUGHTS THAT YOU WOULD BE BETTER OFF DEAD, OR OF HURTING YOURSELF: 0
1. LITTLE INTEREST OR PLEASURE IN DOING THINGS: 0
SUM OF ALL RESPONSES TO PHQ QUESTIONS 1-9: 0
4. FEELING TIRED OR HAVING LITTLE ENERGY: 0
5. POOR APPETITE OR OVEREATING: 0
SUM OF ALL RESPONSES TO PHQ QUESTIONS 1-9: 0
8. MOVING OR SPEAKING SO SLOWLY THAT OTHER PEOPLE COULD HAVE NOTICED. OR THE OPPOSITE, BEING SO FIGETY OR RESTLESS THAT YOU HAVE BEEN MOVING AROUND A LOT MORE THAN USUAL: 0
SUM OF ALL RESPONSES TO PHQ9 QUESTIONS 1 & 2: 0
10. IF YOU CHECKED OFF ANY PROBLEMS, HOW DIFFICULT HAVE THESE PROBLEMS MADE IT FOR YOU TO DO YOUR WORK, TAKE CARE OF THINGS AT HOME, OR GET ALONG WITH OTHER PEOPLE: 0
SUM OF ALL RESPONSES TO PHQ QUESTIONS 1-9: 0
7. TROUBLE CONCENTRATING ON THINGS, SUCH AS READING THE NEWSPAPER OR WATCHING TELEVISION: 0
6. FEELING BAD ABOUT YOURSELF - OR THAT YOU ARE A FAILURE OR HAVE LET YOURSELF OR YOUR FAMILY DOWN: 0

## 2022-08-19 ASSESSMENT — LIFESTYLE VARIABLES
HOW MANY STANDARD DRINKS CONTAINING ALCOHOL DO YOU HAVE ON A TYPICAL DAY: 1 OR 2
HOW OFTEN DO YOU HAVE A DRINK CONTAINING ALCOHOL: MONTHLY OR LESS

## 2022-08-19 NOTE — PROGRESS NOTES
Jacqueline Giraldo MD    [address] [place] [address] [facility]  [id number]  [address]  [address] [address]  Dept: [phone]  Dept Fax: [phone]     Patient ID: Kamla Saez is a 80 y.o. male. HPI    Established patient here today for f/u on chronic medical problems, go over labs and/or diagnostic studies, and medication refills. Pt denies any fever or chills. Pt today denies any HA, chest pain, or SOB. Pt denies any N/V/D/C or abdominal pain. Pt with occasional heartburn, but stable on meds. Pt with arthralgias on and off, but stable on meds. Pt denies any gouty attacks. Pt states mood/sleep is stable on meds. Otherwise pt doing well on current tx and no other concerns today. The patient's past medical, surgical, social, and family history as well as his current medications and allergies were reviewed as documented in today's encounter. My previous office notes, consult notes, labs and diagnostic studies were reviewed prior to and during encounter. Current Outpatient Medications on File Prior to Visit   Medication Sig Dispense Refill    dilTIAZem (CARDIZEM CD) 180 MG extended release capsule Take 1 capsule by mouth daily      furosemide (LASIX) 20 MG tablet Take 1 tablet by mouth daily      busPIRone (BUSPAR) 15 MG tablet TAKE ONE TABLET BY MOUTH THREE TIMES A DAY 90 tablet 1    tiZANidine (ZANAFLEX) 2 MG tablet Take 1 tablet by mouth in the morning and 1 tablet at noon and 1 tablet before bedtime.  40 tablet 0    predniSONE (DELTASONE) 5 MG tablet TAKE ONE TABLET BY MOUTH DAILY 90 tablet 3    allopurinol (ZYLOPRIM) 100 MG tablet TAKE ONE TABLET BY MOUTH DAILY 90 tablet 2    lisinopril (PRINIVIL;ZESTRIL) 10 MG tablet TAKE ONE TABLET BY MOUTH DAILY WITH LUNCH 90 tablet 3    albuterol sulfate HFA (VENTOLIN HFA) 108 (90 Base) MCG/ACT inhaler Inhale 2 puffs into the lungs every 4 hours as needed for Wheezing 18 g 5    warfarin (COUMADIN) 5 MG tablet Take 5 mg by mouth daily Indications: INR goal 2-2.5 Dosing per [facility]      omeprazole (PRILOSEC OTC) 20 MG tablet Take 20 mg by mouth daily      latanoprost (XALATAN) 0.005 % ophthalmic solution Place 1 drop into both eyes nightly       metoprolol tartrate (LOPRESSOR) 25 MG tablet Take 25 mg by mouth 2 times daily       No current facility-administered medications on file prior to visit. Subjective:     Review of Systems   Constitutional:  Negative for appetite change, fatigue and fever. HENT:  Negative for congestion, ear pain, rhinorrhea and sore throat. Respiratory:  Negative for cough, chest tightness and shortness of breath. Cardiovascular:  Negative for chest pain and palpitations. Gastrointestinal:  Negative for abdominal distention, abdominal pain, constipation, diarrhea, nausea and vomiting. Occ heartburn   Genitourinary:  Negative for difficulty urinating and dysuria. Musculoskeletal:  Positive for arthralgias and back pain. Negative for myalgias. Skin:  Negative for rash. Neurological:  Negative for dizziness, weakness, light-headedness and headaches. Hematological:  Negative for adenopathy. Psychiatric/Behavioral:  Positive for dysphoric mood (stable) and sleep disturbance (stable). Negative for behavioral problems. The patient is nervous/anxious (stable). Objective:     Physical Exam  Vitals reviewed. Constitutional:       General: He is not in acute distress. Appearance: Normal appearance. He is well-developed. He is not ill-appearing. HENT:      Head: Normocephalic and atraumatic. Right Ear: External ear normal.      Left Ear: External ear normal.      Nose: Nose normal.      Mouth/Throat:      Mouth: Mucous membranes are moist.      Pharynx: No oropharyngeal exudate. Eyes:      Extraocular Movements: Extraocular movements intact.       Conjunctiva/sclera: Conjunctivae normal.      Pupils: Pupils are equal, round, and reactive to light. Cardiovascular:      Rate and Rhythm: Normal rate and regular rhythm. Heart sounds: Normal heart sounds. No murmur heard. Pulmonary:      Effort: Pulmonary effort is normal. No respiratory distress. Breath sounds: Normal breath sounds. No wheezing. Chest:      Chest wall: No tenderness. Abdominal:      General: Bowel sounds are normal. There is no distension. Palpations: Abdomen is soft. There is no mass. Tenderness: There is no abdominal tenderness. Musculoskeletal:         General: No swelling or tenderness. Normal range of motion. Cervical back: Normal range of motion. Lymphadenopathy:      Cervical: No cervical adenopathy. Skin:     General: Skin is warm. Findings: No rash. Neurological:      Mental Status: He is alert and oriented to person, place, and time. Deep Tendon Reflexes: Reflexes are normal and symmetric. Psychiatric:         Mood and Affect: Mood normal.         Behavior: Behavior normal.       Assessment:      Diagnosis Orders   1. Medicare annual wellness visit, subsequent        2. Essential hypertension        3. Pure hypercholesterolemia        4. Chronic systolic congestive heart failure (HCC)        5. Paroxysmal atrial fibrillation ([facility] [address]        6. B12 deficiency  cyanocobalamin injection 1,000 mcg      7. Iron deficiency anemia, unspecified iron deficiency anemia type        8. Elevated liver enzymes        9. Depression, unspecified depression type        10. Anxiety  mirtazapine (REMERON) 15 MG tablet      11. Insomnia, unspecified type  mirtazapine (REMERON) 15 MG tablet      12. Gout, unspecified cause, unspecified chronicity, unspecified site        13. Gastroesophageal reflux disease without esophagitis        14. Osteoarthritis, unspecified osteoarthritis type, unspecified site        15. Irritable bowel syndrome without diarrhea        16. DDD (degenerative disc disease), lumbar        17.  DDD (degenerative disc disease), cervical        18. History of prostate cancer              Plan:     Orders Placed This Encounter   Medications    cyanocobalamin injection 1,000 mcg    mirtazapine (REMERON) 15 MG tablet     Sig: Take 1 tablet by mouth nightly     Dispense:  90 tablet     Refill:  3         Will cont with current treatment as pt is currently stable on current treatment    Will cont with the Lisinopril, Lopressor, and Cardizem as prescribed for BP control    Will cont with low fat/chol diet     Will cont with the Buspar and Remeron as prescribed as he is doing well    Will cont with the Allopurinol as prescribed for gout prophylaxis    Will cont with high fiber diet    Will give him a B12 injection 1000mg here in the office    Will cont to follow with Cardiology as instructed    Will cont to follow with Urology as instructed for his prostate cancer surveillance    Cont to follow with Coumadin clinic for Coumadin dosing    Rest of systems unchanged, continue current treatments. Medications, labs, diagnostic studies, consultations and follow-up as documented in this encounter. Rest of systems unchanged, continue current treatments    On this date August 19, 2022,  I have spent greater than 50% of visit reviewing previous notes, test results and face to face with the patient discussing the diagnoses, importance of compliance with the treatment plan, counseling, coordinating care as well as documenting on the day of the visit. Jacqueline Valdez MD   Medicare Annual Wellness Visit    Dino Vergara is here for Medicare AWV and Hypertension (labs)    Assessment & Plan   Medicare annual wellness visit, subsequent  Essential hypertension  Pure hypercholesterolemia  Chronic systolic congestive heart failure (HCC)  Paroxysmal atrial fibrillation (HCC)  B12 deficiency  -     cyanocobalamin injection 1,000 mcg; 1,000 mcg, IntraMUSCular, ONCE, 1 dose, On Fri 8/19/22 at 1230  Iron deficiency anemia, unspecified iron deficiency anemia type  Elevated liver enzymes  Depression, unspecified depression type  Anxiety  -     mirtazapine (REMERON) 15 MG tablet; Take 1 tablet by mouth nightly, Disp-90 tablet, R-3Normal  Insomnia, unspecified type  -     mirtazapine (REMERON) 15 MG tablet; Take 1 tablet by mouth nightly, Disp-90 tablet, R-3Normal  Gout, unspecified cause, unspecified chronicity, unspecified site  Gastroesophageal reflux disease without esophagitis  Osteoarthritis, unspecified osteoarthritis type, unspecified site  Irritable bowel syndrome without diarrhea  DDD (degenerative disc disease), lumbar  DDD (degenerative disc disease), cervical  History of prostate cancer    Recommendations for Preventive Services Due: see orders and patient instructions/AVS.  Recommended screening schedule for the next 5-10 years is provided to the patient in written form: see Patient Instructions/AVS.     Return in 3 months (on 11/19/2022) for Medicare Annual Wellness Visit in 1 year, htn, hyperlip, depression, anxiety, CHF. Subjective   The following acute and/or chronic problems were also addressed today:  See additional note     Patient's complete Health Risk Assessment and screening values have been reviewed and are found in Flowsheets. The following problems were reviewed today and where indicated follow up appointments were made and/or referrals ordered.     Positive Risk Factor Screenings with Interventions:             General Health and ACP:  General  In general, how would you say your health is?: Very Good  In the past 7 days, have you experienced any of the following: New or Increased Pain, New or Increased Fatigue, Loneliness, Social Isolation, Stress or Anger?: No  Do you get the social and emotional support that you need?: Yes  Do you have a Living Will?: Yes    Advance Directives       Power of  Living Will ACP-Advance Directive ACP-Power of     Not on File Not on File Not on File Not on [address] [facility] Interventions:  Cont with a healthy diet and staying active    Health Habits/Nutrition:  Physical Activity: Insufficiently Active    Days of Exercise per Week: 4 days    Minutes of Exercise per Session: 20 min     Have you lost any weight without trying in the past 3 months?: No  Body mass index: (!) 31.75  Have you seen the dentist within the past year?: Yes  Health Habits/Nutrition Interventions:  Cont with a healthy diet and staying active             Objective   Vitals:    08/19/22 1159   BP: 118/74   Pulse: 58   Resp: 16   Temp: 97.1 °F (36.2 °C)   SpO2: 96%   Weight: 166 lb (75.3 kg)   Height: 5' 0.63\" (1.54 m)      Body mass index is 31.75 kg/m². Allergies   Allergen Reactions    Celebrex [Celecoxib] Nausea Only    Mobic Nausea Only     Prior to Visit Medications    Medication Sig Taking? Authorizing Provider   dilTIAZem (CARDIZEM CD) 180 MG extended release capsule Take 1 capsule by mouth daily Yes Historical Provider, MD   furosemide (LASIX) 20 MG tablet Take 1 tablet by mouth daily Yes Historical Provider, MD   mirtazapine (REMERON) 15 MG tablet Take 1 tablet by mouth nightly Yes Felicia Saunders MD   busPIRone (BUSPAR) 15 MG tablet TAKE ONE TABLET BY MOUTH THREE TIMES A DAY Yes Felicia Saunders MD   tiZANidine (ZANAFLEX) 2 MG tablet Take 1 tablet by mouth in the morning and 1 tablet at noon and 1 tablet before bedtime.  Yes Felicia Saunders MD   predniSONE (DELTASONE) 5 MG tablet TAKE ONE TABLET BY MOUTH DAILY Yes Felicia Saunders MD   allopurinol (ZYLOPRIM) 100 MG tablet TAKE ONE TABLET BY MOUTH DAILY Yes Felicia Saunders MD   lisinopril (PRINIVIL;ZESTRIL) 10 MG tablet TAKE ONE TABLET BY MOUTH DAILY WITH LUNCH Yes Felicia Saunders MD   albuterol sulfate HFA (VENTOLIN HFA) 108 (90 Base) MCG/ACT inhaler Inhale 2 puffs into the lungs every 4 hours as needed for Wheezing Yes Felicia Saunders MD   warfarin (COUMADIN) 5 MG tablet Take 5 mg by mouth daily Indications: INR goal 2-2.5 Dosing per [facility] Yes Historical Provider, MD   omeprazole (PRILOSEC OTC) 20 MG tablet Take 20 mg by mouth daily Yes Historical Provider, MD   latanoprost (XALATAN) 0.005 % ophthalmic solution Place 1 drop into both eyes nightly  Yes Historical Provider, MD   metoprolol tartrate (LOPRESSOR) 25 MG tablet Take 25 mg by mouth 2 times daily Yes Historical Provider, MD Patel (Including outside providers/suppliers regularly involved in providing care):   Patient Care Team:  Maki Menezes MD as PCP - General (Family Medicine)  Maki Menezes [facility] as [facility]  Gloria Gann MD as Consulting Physician (Dermatology)  Hung Lyle MD as Consulting Physician (Cardiology)  Renee Andersen MD as Consulting Physician (Gastroenterology)  Jose G Welch MD as Surgeon (Ophthalmology)  Geneva Ho MD as Surgeon (Ophthalmology)  Bonnie Roger MD as Consulting Physician (Urology)  Ashley Monsivais DO as Consulting Physician (General Surgery)  Glenna Pratt DPM as Consulting Physician ([facility])  Jack Corona MD as Consulting Physician (Internal Medicine)  Emma Byrne MD as Consulting Physician (Urology)     Reviewed and updated this visit:  Tobacco  Allergies  Meds  Problems  Med Hx  Surg Hx  Soc Hx  Fam Hx

## 2022-08-19 NOTE — PATIENT INSTRUCTIONS
Personalized Preventive Plan for Coleman Schmitt - 8/19/2022  Medicare offers a range of preventive health benefits. Some of the tests and screenings are paid in full while other may be subject to a deductible, co-insurance, and/or copay. Some of these benefits include a comprehensive review of your medical history including lifestyle, illnesses that may run in your family, and various assessments and screenings as appropriate. After reviewing your medical record and screening and assessments performed today your provider may have ordered immunizations, labs, imaging, and/or referrals for you. A list of these orders (if applicable) as well as your Preventive Care list are included within your After Visit Summary for your review. Other Preventive Recommendations:    A preventive eye exam performed by an eye specialist is recommended every 1-2 years to screen for glaucoma; cataracts, macular degeneration, and other eye disorders. A preventive dental visit is recommended every 6 months. Try to get at least 150 minutes of exercise per week or 10,000 steps per day on a pedometer . Order or download the FREE \"Exercise & Physical Activity: Your Everyday Guide\" from [employer]. Call 3-490.539.6374 or search [employer] online. You need 0533-7606 mg of calcium and 8062-2072 IU of vitamin D per day. It is possible to meet your calcium requirement with diet alone, but a vitamin D supplement is usually necessary to meet this goal.  When exposed to the sun, use a sunscreen that protects against both UVA and UVB radiation with an SPF of 30 or greater. Reapply every 2 to 3 hours or after sweating, drying off with a towel, or swimming. Always wear a seat belt when traveling in a car. Always wear a helmet when riding a bicycle or motorcycle. Heart-Healthy Diet   Sodium, Fat, and Cholesterol Controlled Diet       What Is a Heart Healthy Diet?    A heart-healthy diet is one that limits sodium , certain types of fat , and cholesterol . This type of diet is recommended for:   People with any form of cardiovascular disease (eg, coronary heart disease , peripheral vascular disease , previous heart attack , previous stroke )   People with risk factors for cardiovascular disease, such as high blood pressure , high cholesterol , or diabetes   Anyone who wants to lower their risk of developing cardiovascular disease   Sodium    Sodium is a mineral found in many foods. In general, most people consume much more sodium than they need. Diets high in sodium can increase blood pressure and lead to edema (water retention). On a heart-healthy diet, you should consume no more than 2,300 mg (milligrams) of sodium per dayabout the amount in one teaspoon of table salt. The foods highest in sodium include table salt (about 50% sodium), processed foods, convenience foods, and preserved foods. Cholesterol    Cholesterol is a fat-like, waxy substance in your blood. Our bodies make some cholesterol. It is also found in animal products, with the highest amounts in fatty meat, egg yolks, whole milk, cheese, shellfish, and organ meats. On a heart-healthy diet, you should limit your cholesterol intake to less than 200 mg per day. It is normal and important to have some cholesterol in your bloodstream. But too much cholesterol can cause plaque to build up within your arteries, which can eventually lead to a heart attack or stroke. The two types of cholesterol that are most commonly referred to are:   Low-density lipoprotein (LDL) cholesterol  Also known as bad cholesterol, this is the cholesterol that tends to build up along your arteries. Bad cholesterol levels are increased by eating fats that are saturated or hydrogenated. Optimal level of this cholesterol is less than 100. Over 130 starts to get risky for heart disease.    High-density lipoprotein (HDL) cholesterol  Also known as good cholesterol, this type of cholesterol actually carries cholesterol away from your arteries and may, therefore, help lower your risk of having a heart attack. You want this level to be high (ideally greater than 60). It is a risk to have a level less than 40. You can raise this good cholesterol by eating olive oil, canola oil, avocados, or nuts. Exercise raises this level, too. Fat    Fat is calorie dense and packs a lot of calories into a small amount of food. Even though fats should be limited due to their high calorie content, not all fats are bad. In fact, some fats are quite healthful. Fat can be broken down into four main types. The good-for-you fats are:   Monounsaturated fat  found in oils such as olive and canola, avocados, and nuts and natural nut butters; can decrease cholesterol levels, while keeping levels of HDL cholesterol high   Polyunsaturated fat  found in oils such as safflower, sunflower, soybean, corn, and sesame; can decrease total cholesterol and LDL cholesterol   Omega-3 fatty acids  particularly those found in fatty fish (such as salmon, trout, tuna, mackerel, herring, and sardines); can decrease risk of arrhythmias, decrease triglyceride levels, and slightly lower blood pressure   The fats that you want to limit are:   Saturated fat  found in animal products, many fast foods, and a few vegetables; increases total blood cholesterol, including LDL levels   Animal fats that are saturated include: butter, lard, whole-milk dairy products, meat fat, and poultry skin   Vegetable fats that are saturated include: hydrogenated shortening, palm oil, coconut oil, cocoa butter   Hydrogenated or trans fat  found in margarine and vegetable shortening, most shelf stable snack foods, and fried foods; increases LDL and decreases HDL     It is generally recommended that you limit your total fat for the day to less than 30% of your total calories.  If you follow an 1800-calorie heart healthy diet, for example, this would mean 60 grams of fat or less per day. Saturated fat and trans fat in your diet raises your blood cholesterol the most, much more than dietary cholesterol does. For this reason, on a heart-healthy diet, less than 7% of your calories should come from saturated fat and ideally 0% from trans fat. On an 1800-calorie diet, this translates into less than 14 grams of saturated fat per day, leaving 46 grams of fat to come from mono- and polyunsaturated fats.    Food Choices on a Heart Healthy Diet   Food Category   Foods Recommended   Foods to Avoid   Grains   Breads and rolls without salted tops Most dry and cooked cereals Unsalted crackers and breadsticks Low-sodium or homemade breadcrumbs or stuffing All rice and pastas   Breads, rolls, and crackers with salted tops High-fat baked goods (eg, muffins, donuts, pastries) Quick breads, self-rising flour, and biscuit mixes Regular bread crumbs Instant hot cereals Commercially prepared rice, pasta, or stuffing mixes   Vegetables   Most fresh, frozen, and low-sodium canned vegetables Low-sodium and salt-free vegetable juices Canned vegetables if unsalted or rinsed   Regular canned vegetables and juices, including sauerkraut and pickled vegetables Frozen vegetables with sauces Commercially prepared potato and vegetable mixes   Fruits   Most fresh, frozen, and canned fruits All fruit juices   Fruits processed with salt or sodium   Milk   Nonfat or low-fat (1%) milk Nonfat or low-fat yogurt Cottage cheese, low-fat ricotta, cheeses labeled as low-fat and low-sodium   Whole milk Reduced-fat (2%) milk Malted and chocolate milk Full fat yogurt Most cheeses (unless low-fat and low salt) Buttermilk (no more than 1 cup per week)   Meats and Beans   Lean cuts of fresh or frozen beef, veal, lamb, or pork (look for the word loin) Fresh or frozen poultry without the skin Fresh or frozen fish and some shellfish Egg whites and egg substitutes (Limit whole eggs to three per week) Tofu Nuts or seeds (unsalted, dry-roasted), low-sodium peanut butter Dried peas, beans, and lentils   Any smoked, cured, salted, or canned meat, fish, or poultry (including nazario, chipped beef, cold cuts, hot dogs, sausages, sardines, and anchovies) Poultry skins Breaded and/or fried fish or meats Canned peas, beans, and lentils Salted nuts   Fats and Oils   Olive oil and canola oil Low-sodium, low-fat salad dressings and mayonnaise   Butter, margarine, coconut and palm oils, nazario fat   Snacks, Sweets, and Condiments   Low-sodium or unsalted versions of broths, soups, soy sauce, and condiments Pepper, herbs, and spices; vinegar, lemon, or lime juice Low-fat frozen desserts (yogurt, sherbet, fruit bars) Sugar, cocoa powder, honey, syrup, jam, and preserves Low-fat, trans-fat free cookies, cakes, and pies Bishop and animal crackers, fig bars, tanya snaps   High-fat desserts Broth, soups, gravies, and sauces, made from instant mixes or other high-sodium ingredients Salted snack foods Canned olives Meat tenderizers, seasoning salt, and most flavored vinegars   Beverages   Low-sodium carbonated beverages Tea and coffee in moderation Soy milk   Commercially softened water   Suggestions   Make whole grains, fruits, and vegetables the base of your diet. Choose heart-healthy fats such as canola, olive, and flaxseed oil, and foods high in heart-healthy fats, such as nuts, seeds, soybeans, tofu, and fish. Eat fish at least twice per week; the fish highest in omega-3 fatty acids and lowest in mercury include salmon, herring, mackerel, sardines, and canned chunk light tuna. If you eat fish less than twice per week or have high triglycerides, talk to your doctor about taking fish oil supplements. Read food labels. For products low in fat and cholesterol, look for fat free, low-fat, cholesterol free, saturated fat free, and trans fat freeAlso scan the Nutrition Facts Label, which lists saturated fat, trans fat, and cholesterol amounts. For products low in sodium, look for sodium free, very low sodium, low sodium, no added salt, and unsalted   Skip the salt when cooking or at the table; if food needs more flavor, get creative and try out different herbs and spices. Garlic and onion also add substantial flavor to foods. Trim any visible fat off meat and poultry before cooking, and drain the fat off after monterroso. Use cooking methods that require little or no added fat, such as grilling, boiling, baking, poaching, broiling, roasting, steaming, stir-frying, and sauting. Avoid fast food and convenience food. They tend to be high in saturated and trans fat and have a lot of added salt. Talk to a registered dietitian for individualized diet advice. Last Reviewed: March 2011 Anni Nelson MS, MPH, RD   Updated: 3/29/2011     High-Fiber Diet     What Is Fiber? Dietary fiber is a form of carbohydrate found in plants that cannot be digested by humans. All plants contain fiber, including fruits, vegetables, grains, and legumes. Fiber is often classified into two categories: soluble and insoluble. Soluble fiber draws water into the bowel and can help slow digestion. Examples of foods that are high in soluble fiber include oatmeal, oat bran, barley, legumes (eg, beans and peas), apples, and strawberries. Insoluble fiber speeds digestion and can add bulk to the stool. Examples of foods that are high in insoluble fiber include whole-wheat products, wheat bran, cauliflower, green beans, and potatoes. Why Follow a High-Fiber Diet? A high-fiber diet is often recommended to prevent and treat constipation , hemorrhoids , diverticulitis , and irritable bowel syndrome . Eating a high-fiber diet can also help improve your cholesterol levels, lower your risk of coronary heart disease , reduce your risk of type 2 diabetes , and lower your weight. For people with type 1 or 2 diabetes, a high-fiber diet can also help stabilize blood sugar levels. How Much Fiber Should I Eat? A high-fiber diet should contain  20-35 grams  of fiber a day. This is actually the amount recommended for the general adult population; however, most Americans eat only 15 grams of fiber per day. Digestion of Fiber   Eating a higher fiber diet than usual can take some getting used to by your body's digestive system. To avoid the side effects of sudden increases in dietary fiber (eg, gas, cramping, bloating, and diarrhea), increase fiber gradually and be sure to drink plenty of fluids every day. Tips for Increasing Fiber Intake   Whenever possible, choose whole grains over refined grains (eg, brown rice instead of white rice, whole-wheat bread instead of white bread). Include a variety of grains in your diet, such as wheat, rye, barley, oats, quinoa, and bulgur. Eat more vegetarian-based meals. Here are some ideas: black bean burgers, eggplant lasagna, and veggie tofu stir-vázquez. Choose high-fiber snacks, such as fruits, popcorn, whole-grain crackers, and nuts. Make whole-grain cereal or whole-grain toast part of your daily breakfast regime. When eating out, whether ordering a sandwich or dinner, ask for extra vegetables. When baking, replace part of the white flour with whole-wheat flour. Whole-wheat flour is particularly easy to incorporate into a recipe. High-Fiber Diet Eating Guide   Food Category   Foods Recommended   Notes   Grains   Whole-grain breads, muffins, bagels, or ilda bread Rye bread Whole-wheat crackers or crisp breads Whole-grain or bran cereals Oatmeal, oat bran, or grits Wheat germ Whole-wheat pasta and brown rice   Read the ingredients list on food labels. Look for products that list \"whole\" as the first ingredient (eg, whole-wheat, whole oats). Choose cereals with at least 2 grams of fiber per serving.    Vegetables   All vegetables, especially asparagus, bean sprouts, broccoli, Anna sprouts, cabbage, carrots, cauliflower, celery, corn, greens, green beans, green pepper, onions, peas, potatoes (with skin), snow peas, spinach, squash, sweet potatoes, tomatoes, zucchini   For maximum fiber intake, eat the peels of fruits and vegetablesjust be sure to wash them well first.   Fruits   All fruits, especially apples, berries, grapefruits, mangoes, nectarines, oranges, peaches, pears, dried fruits (figs, dates, prunes, raisins)   Choose raw fruits and vegetables over juice, cooked, or cannedraw fruit has more fiber. Dried fruit is also a good source of fiber. Milk   With the exception of yogurt containing inulin (a type of fiber), dairy foods provide little fiber. Add more fiber by topping your yogurt or cottage cheese with fresh fruit, whole grain or bran cereals, nuts, or seeds. Meats and Beans   All beans and peas, especially Garbanzo beans, kidney beans, lentils, lima beans, split peas, and mckeon beans All nuts and seeds, especially almonds, peanuts, [place] nuts, cashews, peanut butter, walnuts, sesame and sunflower seeds All meat, poultry, fish, and eggs   Increase fiber in meat dishes by adding mkceon beans, kidney beans, black-eyed peas, bran, or oatmeal. If you are following a low-fat diet, use nuts and seeds only in moderation. Fats and Oils   All in moderation   Fats and oils do not provide fiber   Snacks, Sweets, and Condiments   Fruit Nuts Popcorn, whole-wheat pretzels, or trail mix made with dried fruits, nuts, and seeds Cakes, breads, and cookies made with oatmeal or whole-wheat flour   Most snack foods do not provide much fiber. Choose snacks with at least 2 grams of fiber per serving. Last Reviewed: March 2011 Sandy Sanchez MS, MPH, RD   Updated: 3/29/2011     Keep Your Memory Yasmine Wagoner       Many factors can affect your ability to remembera hectic lifestyle, aging, stress, chronic disease, and certain medicines. But, there are steps you can take to sharpen your mind and help preserve your memory.    Challenge Your Brain   Regularly ginseng , and the supplement phosphatidylserine (PS). So far, though, the evidence is inconsistent as to whether these products can improve memory or thinking. If you are interested in taking herbs and supplements, talk to your doctor first because they may interact with other medicines that you are taking. Exercise Regularly    Among the many benefits of regular exercise are increased blood flow to the brain and decreased risk of certain diseases that can interfere with memory function. One study found that even moderate exercise has a beneficial effect. Examples of \"moderate\" exercise include:   Playing 18 holes of golf once a week, without a cart   Playing tennis twice a week   Walking one mile per day   Manage Stress    It can be tough to remember what is important when your mind is cluttered. Make time for relaxation. Choose activities that calm you down, and make it routine. Manage Chronic Conditions    Side effects of high blood pressure , diabetes, and heart disease can interfere with mental function. Many of the lifestyle steps discussed here can help manage these conditions. Strive to eat a healthy diet, exercise regularly, get stress under control, and follow your doctor's advice for your condition. Minimize Medications    Talk to your doctor about the medicines that you take. Some may be unnecessary. Also, healthy lifestyle habits may lower the need for certain drugs. Last Reviewed: April 2010 Henri Abel MD   Updated: 4/13/2010     Keeping Home a [address]       As we get older, changes in balance, gait, strength, vision, hearing, and cognition make even the most youthful senior more prone to accidents. Falls are one of the leading health risks for older people.  This increased risk of falling is related to:   Aging process (eg, decreased muscle strength, slowed reflexes)   Higher incidence of chronic health problems (eg, arthritis, diabetes) that may limit mobility, agility or sensory awareness   Side effects of medicine (eg, dizziness, blurred vision)especially medicines like prescription pain medicines and drugs used to treat mental health conditions   Depending on the brittleness of your bones, the consequences of a fall can be serious and long lasting. Home Life   Research by the [employer] [facility]) shows that some home accidents among older adults can be prevented by making simple lifestyle changes and basic modifications and repairs to the home environment. Here are some lifestyle changes that experts recommend:   Have your hearing and vision checked regularly. Be sure to wear prescription glasses that are right for you. Speak to your doctor or pharmacist about the possible side effects of your medicines. A number of medicines can cause dizziness. If you have problems with sleep, talk to your doctor. Limit your intake of alcohol. If necessary, use a cane or walker to help maintain your balance. Wear supportive, rubber-soled shoes, even at home. If you live in a region that gets wintry weather, you may want to put special cleats on your shoes to prevent you from slipping on the snow and ice. Exercise regularly to help maintain muscle tone, agility, and balance. Always hold the banister when going up or down stairs. Also, use  bars when getting in or out of the bath or shower, or using the toilet. To avoid dizziness, get up slowly from a lying down position. Sit up first, dangling your legs for a minute or two before rising to a standing position. Overall Home Safety Check   According to the [employer]'s \"Older Consumer Home Safety Checklist,\" it is important to check for potential hazards in each room. And remember, proper lighting is an essential factor in home safety. If you cannot see clearly, you are more likely to fall.    Important questions to ask yourself include:   Are lamp, electric, extension, and telephone cords placed out of the flow of traffic and maintained in good condition? Have frayed cords been replaced? Are all small rugs and runners slip resistant? If not, you can secure them to the floor with a special double-sided carpet tape. Are smoke detectors properly locatedone on every floor of your home and one outside of every sleeping area? Are they in good working order? Are batteries replaced at least once a year? Do you have a well-maintained carbon monoxide detector outside every sleeping are in your home? Does your furniture layout leave plenty of space to maneuver between and around chairs, tables, beds, and sofas? Are hallways, stairs and passages between rooms well lit? Can you reach a lamp without getting out of bed? Are floor surfaces well maintained? Shag rugs, high-pile carpeting, tile floors, and polished wood floors can be particularly slippery. Stairs should always have handrails and be carpeted or fitted with a non-skid tread. Is your telephone easily reachable. Is the cord safely tucked away? Room by Room   According to the Association of Aging, bathrooms and jenae are the two most potentially hazardous rooms in your home. In the Kitchen    Be sure your stove is in proper working order and always make sure burners and the oven are off before you go out or go to sleep. Keep pots on the back burners, turn handles away from the front of the stove, and keep stove clean and free of grease build-up. Kitchen ventilation systems and range exhausts should be working properly. Keep flammable objects such as towels and pot holders away from the cooking area except when in use. Make sure kitchen curtains are tied back. Move cords and appliances away from the sink and hot surfaces. If extension cords are needed, install wiring guides so they do not hang over the sink, range, or working areas. Look for coffee pots, kettles and toaster ovens with automatic shut-offs.     Keep a mop handy in the kitchen so you can wipe up spills instantly. You should also have a small fire extinguisher. Arrange your kitchen with frequently used items on lower shelves to avoid the need to stand on a stepstool to reach them. Make sure countertops are well-lit to avoid injuries while cutting and preparing food. In the Bathroom    Use a non-slip mat or decals in the tub and shower, since wet, soapy tile or porcelain surfaces are extremely slippery. Make sure bathroom rugs are non-skid or tape them firmly to the floor. Bathtubs should have at least one, preferably two, grab bars, firmly attached to structural supports in the wall. (Do not use built-in soap holders or glass shower doors as grab bars.)    Tub seats fitted with non-slip material on the legs allow you to wash sitting down. For people with limited mobility, bathtub transfer benches allow you to slide safely into the tub. Raised toilet seats and toilet safety rails are helpful for those with knee or hip problems. In the [facility]    Make sure you use a nightlight and that the area around your bed is clear of potential obstacles. Be careful with electric blankets and never go to sleep with a heating pad, which can cause serious burns even if on a low setting. Use fire-resistant mattress covers and pillows, and NEVER smoke in bed. Keep a phone next to the bed that is programmed to dial 911 at the push of a button. If you have a chronic condition, you may want to sign on with an automatic call-in service. Typically the system includes a small pendant that connects directly to an emergency medical voice-response system. You should also make arrangements to stay in contact with someonefriend, neighbor, family memberon a regular schedule. Fire Prevention   According to the [place]. (Smoke Alarms for Every) [facility], senior citizens are one of the two highest risk groups for death and serious injuries due to residential fires. When cooking, wear short-sleeved items, never a bulky long-sleeved robe. The [facility]'s Safety Checklist for Older Consumers emphasizes the importance of checking basements, garages, workshops and storage areas for fire hazards, such as volatile liquids, piles of old rags or clothing and overloaded circuits. Never smoke in bed or when lying down on a couch or recliner chair. Small portable electric or kerosene heaters are responsible for many home fires and should be used cautiously if at all. If you do use one, be sure to keep them away from flammable materials. In case of fire, make sure you have a pre-established emergency exit plan. Have a professional check your fireplace and other fuel-burning appliances yearly. [employer]   Baby boomers entering the moncada years will continue to see the development of new products to help older adults live safely and independently in spite of age-related changes. Making Life More Livable  , by [employer], lists over 1,000 products for \"living well in the mature years,\" such as bathing and mobility aids, household security devices, ergonomically designed knives and peelers, and faucet valves and knobs for temperature control. Medical supply stores and organizations are good sources of information about products that improve your quality of life and insure your safety. Last Reviewed: November 2009 Alfred Metzger MD   Updated: 3/7/2011            Advance Care Planning: Care Instructions  Your Care Instructions     It can be hard to live with an illness that cannot be cured. But if your health is getting worse, you may want to make decisions about end-of-life care. Planning for the end of your life does not mean that you are giving up. It is a way to make sure that your wishes are met. Clearly stating your wishes can make it easier for your loved ones.  Making plans while you are still able may alsoease your mind and make your final days less stressful and more meaningful. Follow-up care is a key part of your treatment and safety. Be sure to make and go to all appointments, and call your doctor if you are having problems. It's also a good idea to know your test results and keep alist of the medicines you take. What can you do to plan for the end of life? You can bring these issues up with your doctor. You do not need to wait until your doctor starts the conversation. You might start with, \"What makes life worth living for me is. Portillo Lechuga \" And then follow it with, \"I would not be willing to live with . Cloteal Ankush Clotjose Lechuga Clotjose Lechuga \" When you complete this sentence it helps your doctor understand your wishes. Talk openly and honestly with your doctor. This is the best way to understand the decisions you will need to make as your health changes. Know that you can always change your mind. Ask your doctor about commonly used life-support measures. These include tube feedings, breathing machines, and fluids given through a vein (IV). Understanding these treatments will help you decide whether you want them. You may choose to have these life-supporting treatments for a limited time. This allows a trial period to see whether they will help you. You may also decide that you want your doctor to take only certain measures to keep you alive. It may help to think about the big picture, like what makes life worth living for you or what your values and goals are. Talk to your doctor about how long you are likely to live. Your doctor may be able to give you an idea of what usually happens with your specific illness. Think about preparing papers that state your wishes. These papers are called advance directives. If you do this early and review them often, there will not be any confusion about what you want. You can change your instructions at any time. Which papers should you prepare? Advance directives are legal papers that tell doctors how you want to be cared for at the end of your life.  You do not need a  to write these papers. Ask your doctor or your state health department for information on how to write your advance directives. They may have the forms for each of these types of papers. Make sure your doctor has a copy of these on file, and give a copy to afamily member or close friend. Consider a do-not-resuscitate order (DNR). This order asks that no extra treatments be done if your heart stops or you stop breathing. Extra treatments may include cardiopulmonary resuscitation (CPR), electrical shock to restart your heart, or a machine to breathe for you. If you decide to have a DNR order, ask your doctor to explain and write it. Place the order in your home where everyone can easily see it. Consider a living will. A living will explains your wishes about life support and other treatments at the end of your life if you become unable to speak for yourself. Living ma tell doctors to use or not use treatments that would keep you alive. You must have one or two witnesses or a notary present when you sign this form. A living will may be called something else in your state. Consider a medical power of . This form allows you to name a person to make decisions about your care if you are not able to. Most people ask a close friend or family member. Talk to this person about the kinds of treatments you want and those that you do not want. Make sure this person understands your wishes. A medical power of  may be called something else in your state. These legal papers are simple to change. Tell your doctor what you want to change, and ask him or her to make a note in your medical file. Give yourfamily updated copies of the papers. Where can you learn more? Go to [email or web address] and sign in to your QThru account. Enter P184 in the [facility] box to learn more about \"Advance Care Planning: Care Instructions. \"     If you do not have an account, please click on the \"Sign Up Now\" link. Current as of: October 18, 2021               Content Version: 13.3  © 2006-2022 Healthwise, Incorporated. Care instructions adapted under license by [facility] ([facility]). If you have questions about a medical condition or this instruction, always ask your healthcare professional. [place] [address] any warranty or liability for your use of this information.

## 2022-08-23 DIAGNOSIS — F41.9 ANXIETY: ICD-10-CM

## 2022-08-23 DIAGNOSIS — G47.00 INSOMNIA, UNSPECIFIED TYPE: ICD-10-CM

## 2022-08-23 RX ORDER — MIRTAZAPINE 15 MG/1
15 TABLET, FILM COATED ORAL NIGHTLY
Qty: 90 TABLET | Refills: 3 | OUTPATIENT
Start: 2022-08-23

## 2022-08-30 ENCOUNTER — OFFICE VISIT (OUTPATIENT)
Dept: UROLOGY | Age: 87
End: 2022-08-30
Payer: MEDICARE

## 2022-08-30 VITALS
SYSTOLIC BLOOD PRESSURE: 124 MMHG | BODY MASS INDEX: 32.59 KG/M2 | WEIGHT: 166 LBS | HEART RATE: 84 BPM | HEIGHT: 60 IN | OXYGEN SATURATION: 97 % | TEMPERATURE: 97.2 F | DIASTOLIC BLOOD PRESSURE: 86 MMHG

## 2022-08-30 DIAGNOSIS — R39.14 BENIGN PROSTATIC HYPERPLASIA WITH INCOMPLETE BLADDER EMPTYING: Primary | ICD-10-CM

## 2022-08-30 DIAGNOSIS — N40.1 BENIGN PROSTATIC HYPERPLASIA WITH INCOMPLETE BLADDER EMPTYING: Primary | ICD-10-CM

## 2022-08-30 DIAGNOSIS — R35.1 NOCTURIA: ICD-10-CM

## 2022-08-30 PROCEDURE — 1123F ACP DISCUSS/DSCN MKR DOCD: CPT | Performed by: UROLOGY

## 2022-08-30 PROCEDURE — G8417 CALC BMI ABV UP PARAM F/U: HCPCS | Performed by: UROLOGY

## 2022-08-30 PROCEDURE — 1036F TOBACCO NON-USER: CPT | Performed by: UROLOGY

## 2022-08-30 PROCEDURE — G8427 DOCREV CUR MEDS BY ELIG CLIN: HCPCS | Performed by: UROLOGY

## 2022-08-30 PROCEDURE — 99213 OFFICE O/P EST LOW 20 MIN: CPT | Performed by: UROLOGY

## 2022-08-30 ASSESSMENT — ENCOUNTER SYMPTOMS
DIARRHEA: 0
WHEEZING: 0
SORE THROAT: 0
NAUSEA: 0
COUGH: 0
SHORTNESS OF BREATH: 0
VOMITING: 0

## 2022-08-30 NOTE — PROGRESS NOTES
[address] [address]  Dept: [address] [facility] Urology Office Note - Established    Patient:  Sita Dunn  YOB: 1931  Date: 8/30/2022    The patient is a 80 y.o. male who presents todayfor evaluation of the following problems:   Chief Complaint   Patient presents with    6 Month Follow-Up     Follow up with PSA        HPI  Here for bph, psa is 1.27. he had simple prostate in past. No incontinence. Empttying well    Summary of old records: N/A    Additional History: N/A    Procedures Today: N/A    Urinalysis today:  No results found for this visit on 08/30/22. Last several PSA's:  Lab Results   Component Value Date    PSA 1.27 08/16/2022    PSA 0.97 02/14/2022    PSA 1.01 10/12/2021     Last total testosterone:  Lab Results   Component Value Date    TESTOSTERONE 141.0 (L) 02/22/2016       AUA Symptom Score (8/30/2022):                                Last BUN and creatinine:  Lab Results   Component Value Date    BUN 18 08/11/2022     Lab Results   Component Value Date    CREATININE 1.14 08/11/2022       Additional Lab/Culture results: none    Imaging Reviewed during this Office Visit: none  (results were independently reviewed by physician and radiology report verified)    PAST MEDICAL, FAMILY AND SOCIAL HISTORY UPDATE:  Past Medical History:   Diagnosis Date    Acute MI ([facility] [address]     Allergic rhinitis 09/02/2011    Anemia     Atrial fibrillation ([facility] [address]     Dr. Abiel Romero, [address] [facility] cell cancer 03/2013    left side of head, face chin    Basal cell cancer 03/10/2014    left cheek    Cervical radiculopathy     Diverticulitis 02/17/2013    GERD (gastroesophageal reflux disease)     Glaucoma     left eye    Hyperlipidemia     Hypertension     Dr. Maye Sanchez    Hyponatremia 09/02/2011    IBS (irritable bowel syndrome) 09/02/2011    Insomnia 09/02/2011    Osteoarthritis Osteoarthritis/neck pain. 09/02/2011    Prostate cancer ([address] 08/18/2021    active surviellence monitoring PSA    Renal calculi     Shingles     history of shingles    Status post prostatectomy 08/18/2021    Simple prostatectomy for BPH    Urinary frequency     Wears glasses     Wears hearing aid in both ears      Past Surgical History:   Procedure Laterality Date    CHOLECYSTECTOMY  9-9-15    laparoscopic with cholangiogram    COLONOSCOPY  2002    COLONOSCOPY  08/2013    HERNIA REPAIR Right     inguinal    LITHOTRIPSY      MOHS SURGERY Left 03/10/14    cheek    MOHS SURGERY  12/30/14    post scalp    MOHS SURGERY Left 08/2017    X2 left cheek    MOHS SURGERY Left 10/21/2019    temple    MOHS SURGERY  12/14/2020    top of head    OTHER SURGICAL HISTORY  9/6/15    attempted ERCP    PROSTATECTOMY  08/18/2021    PROSTATECTOMY N/A 8/18/2021    XI ROBOTIC LAPAROSCOPIC SIMPLE PROSTATECTOMY performed by Ayleen Mares MD at [address] [facility] [address]  03/11/13    3 areas    SKIN BIOPSY Left 02/11/14    cheek /basal cell carinoma    SKIN CANCER EXCISION      left face and top of head    SKIN CANCER EXCISION Left 03/2013    forehead, cheek, chin, basal cell.     SKIN CANCER EXCISION Right 08/12/2016    with skin graft    SPINE SURGERY  04/2003     Family History   Problem Relation Age of Onset    Heart Disease Mother     Lung Cancer Father     Cancer Father         lung    Heart Disease Brother      Outpatient Medications Marked as Taking for the 8/30/22 encounter (Office Visit) with Ayleen Mares MD   Medication Sig Dispense Refill    dilTIAZem (CARDIZEM CD) 180 MG extended release capsule Take 1 capsule by mouth daily      furosemide (LASIX) 20 MG tablet Take 1 tablet by mouth daily      mirtazapine (REMERON) 15 MG tablet Take 1 tablet by mouth nightly 90 tablet 3    busPIRone (BUSPAR) 15 MG tablet TAKE ONE TABLET BY MOUTH THREE TIMES A DAY 90 tablet 1    tiZANidine (ZANAFLEX) 2 MG tablet Take 1 tablet by mouth in the morning and 1 tablet at noon and 1 tablet before bedtime. 40 tablet 0    predniSONE (DELTASONE) 5 MG tablet TAKE ONE TABLET BY MOUTH DAILY 90 tablet 3    allopurinol (ZYLOPRIM) 100 MG tablet TAKE ONE TABLET BY MOUTH DAILY 90 tablet 2    lisinopril (PRINIVIL;ZESTRIL) 10 MG tablet TAKE ONE TABLET BY MOUTH DAILY WITH LUNCH 90 tablet 3    albuterol sulfate HFA (VENTOLIN HFA) 108 (90 Base) MCG/ACT inhaler Inhale 2 puffs into the lungs every 4 hours as needed for Wheezing 18 g 5    warfarin (COUMADIN) 5 MG tablet Take 5 mg by mouth daily Indications: INR goal 2-2.5 Dosing per [facility]      omeprazole (PRILOSEC OTC) 20 MG tablet Take 20 mg by mouth daily      latanoprost (XALATAN) 0.005 % ophthalmic solution Place 1 drop into both eyes nightly       metoprolol tartrate (LOPRESSOR) 25 MG tablet Take 25 mg by mouth 2 times daily         Celebrex [celecoxib] and Mobic  Social History     Tobacco Use   Smoking Status Former    Packs/day: 1.00    Years: 10.00    Pack years: 10.00    Types: Cigarettes    Quit date: 1961    Years since quittin.7   Smokeless Tobacco Never   Tobacco Comments    quit      (Ifpatient a smoker, smoking cessation counseling offered)    Social History     Substance and Sexual Activity   Alcohol Use Not Currently    Alcohol/week: 0.0 standard drinks       REVIEW OF SYSTEMS:  Review of Systems    Physical Exam:      Vitals:    22 1355   BP: 124/86   Pulse: 84   Temp: 97.2 °F (36.2 °C)   SpO2: 97%     Body mass index is 32.42 kg/m². Patient is a [address] y.o. male in no acute distress and alert and oriented to person, place and time. Physical Exam  Constitutional: Patient in no acute distress. Neuro: Alert and oriented to person, place and time.   Psych: Mood normal, affect normal  Skin: No rash noted  HEENT: Head: Normocephalic andatraumatic  Conjunctivae and EOM are normal. Pupils are equal, round  Nose:Normal  Right External Ear: Normal; Left External Ear: Normal  Mouth: Mucosa Moist  Neck: Supple  Lungs: Respiratory effort is normal  Cardiovascular: Warm & Pink  Abdomen: Soft, non-tender, non-distended with no     Assessment and Plan      1. Benign prostatic hyperplasia with incomplete bladder emptying    2. Nocturia           Plan:       Return if symptoms worsen or fail to improve. Prescriptions Ordered:  No orders of the defined types were placed in this encounter. Orders Placed:  No orders of the defined types were placed in this encounter. Manuel Cook MD    Agree with the ROS entered by the MA.

## 2022-09-27 ENCOUNTER — TELEPHONE (OUTPATIENT)
Dept: FAMILY MEDICINE CLINIC | Age: 87
End: 2022-09-27

## 2022-09-27 ENCOUNTER — TELEMEDICINE (OUTPATIENT)
Dept: FAMILY MEDICINE CLINIC | Age: 87
End: 2022-09-27
Payer: MEDICARE

## 2022-09-27 DIAGNOSIS — U07.1 COVID-19 VIRUS INFECTION: Primary | ICD-10-CM

## 2022-09-27 PROCEDURE — G8427 DOCREV CUR MEDS BY ELIG CLIN: HCPCS | Performed by: FAMILY MEDICINE

## 2022-09-27 PROCEDURE — 1123F ACP DISCUSS/DSCN MKR DOCD: CPT | Performed by: FAMILY MEDICINE

## 2022-09-27 PROCEDURE — 99213 OFFICE O/P EST LOW 20 MIN: CPT | Performed by: FAMILY MEDICINE

## 2022-09-27 ASSESSMENT — ENCOUNTER SYMPTOMS
BACK PAIN: 0
ABDOMINAL PAIN: 0
RHINORRHEA: 0
CONSTIPATION: 0
CHEST TIGHTNESS: 0
DIARRHEA: 0
NAUSEA: 0
SORE THROAT: 0
SHORTNESS OF BREATH: 0
SINUS PRESSURE: 1
VOMITING: 0
ABDOMINAL DISTENTION: 0
SINUS PAIN: 0
COUGH: 0

## 2022-09-27 NOTE — TELEPHONE ENCOUNTER
Have him stay hydrated, OTC Mucinex, Tylenol prn and rest. Quarantine for 5 days. Call if any change in symptoms. Does he want Paxlovid? If so, will need a VV.

## 2022-09-27 NOTE — PROGRESS NOTES
Jacqueline Irwin MD  [address] [facility]  [id number]  [address] [address]  Dept: [phone]  Dept Fax: [phone]     Patient ID: Verito Walker is a 80 y.o. male. HPI    Established patient presents via virtual visit for an acute visit secondary to Covid infection. Pt started with congestion, cough, and sinus pressure x 4 days and tested positive today. Pt denies any fever or chills. Pt today denies any HA, chest pain, or SOB. Pt denies any N/V/D/C or abdominal pain. Otherwise pt doing well on current tx and no other concerns today. The patient's past medical, surgical, social, and family history as well as his current medications and allergies were reviewed as documented in today's encounter. My previous office notes, consult notes, labs and diagnostic studies were reviewed prior to and during encounter. Current Outpatient Medications on File Prior to Visit   Medication Sig Dispense Refill    dilTIAZem (CARDIZEM CD) 180 MG extended release capsule Take 1 capsule by mouth daily      furosemide (LASIX) 20 MG tablet Take 1 tablet by mouth daily      mirtazapine (REMERON) 15 MG tablet Take 1 tablet by mouth nightly 90 tablet 3    busPIRone (BUSPAR) 15 MG tablet TAKE ONE TABLET BY MOUTH THREE TIMES A DAY 90 tablet 1    tiZANidine (ZANAFLEX) 2 MG tablet Take 1 tablet by mouth in the morning and 1 tablet at noon and 1 tablet before bedtime.  40 tablet 0    predniSONE (DELTASONE) 5 MG tablet TAKE ONE TABLET BY MOUTH DAILY 90 tablet 3    allopurinol (ZYLOPRIM) 100 MG tablet TAKE ONE TABLET BY MOUTH DAILY 90 tablet 2    lisinopril (PRINIVIL;ZESTRIL) 10 MG tablet TAKE ONE TABLET BY MOUTH DAILY WITH LUNCH 90 tablet 3    albuterol sulfate HFA (VENTOLIN HFA) 108 (90 Base) MCG/ACT inhaler Inhale 2 puffs into the lungs every 4 hours as needed for Wheezing 18 g 5    warfarin (COUMADIN) 5 MG tablet Take 5 mg by mouth daily Indications: INR goal 2-2.5 Dosing per [facility] Medication Management Service      omeprazole (PRILOSEC OTC) 20 MG tablet Take 20 mg by mouth daily      latanoprost (XALATAN) 0.005 % ophthalmic solution Place 1 drop into both eyes nightly       metoprolol tartrate (LOPRESSOR) 25 MG tablet Take 25 mg by mouth 2 times daily       No current facility-administered medications on file prior to visit. Subjective:     Review of Systems   Constitutional:  Negative for appetite change, chills, fatigue and fever. HENT:  Positive for congestion and sinus pressure. Negative for ear pain, rhinorrhea, sinus pain and sore throat. Eyes:  Negative for visual disturbance. Respiratory:  Negative for cough, chest tightness and shortness of breath. Cardiovascular:  Negative for chest pain and palpitations. Gastrointestinal:  Negative for abdominal distention, abdominal pain, constipation, diarrhea, nausea and vomiting. Genitourinary:  Negative for difficulty urinating, dysuria, frequency and urgency. Musculoskeletal:  Negative for arthralgias, back pain, myalgias and neck pain. Skin:  Negative for rash. Neurological:  Negative for dizziness, weakness, light-headedness, numbness and headaches. Hematological:  Negative for adenopathy. Psychiatric/Behavioral:  Negative for behavioral problems, dysphoric mood and sleep disturbance. The patient is not nervous/anxious. Objective:     Physical Exam  Vitals reviewed: Vital signs unavailable, as this is a virtual visit. Constitutional:       General: He is not in acute distress. Appearance: Normal appearance. He is not ill-appearing or toxic-appearing. Pulmonary:      Effort: Pulmonary effort is normal. No tachypnea, accessory muscle usage or respiratory distress. Comments: Patient able to talk in full sentences without difficulty   Neurological:      General: No focal deficit present. Mental Status: He is alert and oriented to person, place, and time. Psychiatric:         Mood and Affect: Mood normal.         Speech: Speech normal.         Behavior: Behavior normal. Behavior is cooperative. Assessment:      Diagnosis Orders   1. COVID-19 virus infection  nirmatrelvir/ritonavir (PAXLOVID) 20 x 150 MG & 10 x 100MG TBPK          Plan:      Orders Placed This Encounter   Medications    nirmatrelvir/ritonavir (PAXLOVID) 20 x 150 MG & 10 x 100MG TBPK     Sig: Take 3 tablets (two 150 mg nirmatrelvir and one 100 mg ritonavir tablets) by mouth every 12 hours for 5 days. Dispense:  30 tablet     Refill:  0     Order Specific Question:   Does this patient qualify for COVID-19 antIviral therapy based on criteria for treatment? Answer:   Yes      - COVID positive  - Patient does have mild to moderate symptoms and is considered high risk for progression.  - The FDA has authorized a medication called Paxlovid. This investigational medicine is used to treat mild-to-moderate COVID-19 in both adults and children (15years of age and older, weighing at least 80 lbs) and those who are at high risk for progression to severe COVID-19, including hospitalization  - Patient has been instructed on the medication itself and side affects, verbalizes understanding and is agreeable to starting the medication.   - Paxlovid may affect how birth controls work. Females who are able to become pregnant should use another effective alternative form of contraception or additional barrier method  - Patient has been instructed to take 2 tablets of the Nirmatrelvir with 1 tabs of the Ritonavir by mouth twice daily x 5 days. Swallow the tablets whole; do not crush or chew  - Given the new recommendation by the CDC, continue quarantine for 5 days from the onset of symptoms. - If symptoms are improving and has been afebrile, without the use of fever reducing agents for 24 hours, quarantine can be discontinued but will need to wear a mask for the following 5 days.   - Continue social distancing and good hand washing.   - Tylenol/ibuprofen as needed  - OTC symptom relief     Pt instructed to call back if no improvement or worsening of symptoms     Rest of systems unchanged, continue current treatments. Medications, labs, diagnostic studies, consultations and follow-up as documented in this encounter. Rest of systems unchanged, continue current treatments    On this date September 27, 2022,  I have spent greater than 50% of this visit reviewing previous notes, test results and face to face with the patient discussing the diagnoses, importance of compliance with the treatment plan, counseling, coordinating care as well as documenting on the day of the visit. Naida Solis is a 80 y.o. male being evaluated by a Virtual Visit (video visit) encounter to address concerns as mentioned above. A caregiver was present when appropriate. Due to this being a TeleHealth encounter (During [facility] public health emergency), evaluation of the following organ systems was limited: Vitals/Constitutional/EENT/Resp/CV/GI//MS/Neuro/Skin/Heme-Lymph-Imm. Pursuant to the emergency declaration under the [address] [place] [address], [address] [place] [address] authority and the [employer] Act, this Virtual Visit was conducted with patient's (and/or legal guardian's) consent, to reduce the patient's risk of exposure to COVID-19 and provide necessary medical care. The patient (and/or legal guardian) has also been advised to contact this office for worsening conditions or problems, and seek emergency medical treatment and/or call 911 if deemed necessary. Patient identification was verified at the start of the visit: Yes    Total time spent for this encounter: Not billed by time    Services were provided through a video synchronous discussion virtually to substitute for in-person clinic visit.  Patient and provider were located at their individual homes.    --Barrington Gomez MD on 9/27/2022 at 3:41 PM    An electronic signature was used to authenticate this note. Jacqueline Rodney MD

## 2022-09-27 NOTE — TELEPHONE ENCOUNTER
Patient called states he tested positive for Covid-19 today  symptoms congestion, raspy dry throat. Patient just wanted to inform the physician.

## 2022-10-03 ENCOUNTER — TELEPHONE (OUTPATIENT)
Dept: FAMILY MEDICINE CLINIC | Age: 87
End: 2022-10-03

## 2022-10-03 NOTE — TELEPHONE ENCOUNTER
Patient called into office to notify after 5th day of taking PAXLOVID he feels completley better and he will like to stay Thank You.

## 2022-10-13 ENCOUNTER — TELEPHONE (OUTPATIENT)
Dept: SURGERY | Age: 87
End: 2022-10-13

## 2022-10-13 NOTE — TELEPHONE ENCOUNTER
Patient getting over Covid recently. He is wondering when he can get his flu shot and his next covid booster. Please advise.

## 2022-10-13 NOTE — TELEPHONE ENCOUNTER
He can get his flu shot anytime, but he really doesn't  need the Covid for 90 days because he has natural immunity.

## 2022-10-19 ENCOUNTER — OFFICE VISIT (OUTPATIENT)
Dept: PODIATRY | Age: 87
End: 2022-10-19
Payer: MEDICARE

## 2022-10-19 VITALS — BODY MASS INDEX: 28.34 KG/M2 | WEIGHT: 166 LBS | HEIGHT: 64 IN

## 2022-10-19 DIAGNOSIS — B35.1 ONYCHOMYCOSIS: Primary | ICD-10-CM

## 2022-10-19 DIAGNOSIS — M79.674 PAIN IN TOES OF BOTH FEET: ICD-10-CM

## 2022-10-19 DIAGNOSIS — M79.675 PAIN IN TOES OF BOTH FEET: ICD-10-CM

## 2022-10-19 PROCEDURE — 99999 PR OFFICE/OUTPT VISIT,PROCEDURE ONLY: CPT | Performed by: PODIATRIST

## 2022-10-19 PROCEDURE — 11721 DEBRIDE NAIL 6 OR MORE: CPT | Performed by: PODIATRIST

## 2022-10-19 ASSESSMENT — ENCOUNTER SYMPTOMS
CONSTIPATION: 0
VOMITING: 0
DIARRHEA: 0
NAUSEA: 0
COLOR CHANGE: 0

## 2022-10-19 NOTE — PROGRESS NOTES
[facility]  Return Patient    Chief Complaint   Patient presents with    Nail Problem     Nail trim/last saw Dontae Salgado 9/27/22       Subjective: This Simmie Shade comes to clinic for foot and nail care. He would like all of his nails trimmed. He cannot take care of them himself. They are painful when long and he tries to wear shoes. Pt's primary care physician is Blaise Mireles MD.       Past Medical History:   Diagnosis Date    Acute [facility])     Allergic rhinitis 09/02/2011    Anemia     Atrial fibrillation ([facility] [address]     Dr. Gemma Marino, [facility]    Basal cell cancer 03/2013    left side of head, face chin    Basal cell cancer 03/10/2014    left cheek    Cervical radiculopathy     Diverticulitis 02/17/2013    GERD (gastroesophageal reflux disease)     Glaucoma     left eye    Hyperlipidemia     Hypertension     Dr. Joey Miller    Hyponatremia 09/02/2011    IBS (irritable bowel syndrome) 09/02/2011    Insomnia 09/02/2011    Osteoarthritis     Osteoarthritis/neck pain. 09/02/2011    Prostate cancer ([facility] [address] 08/18/2021    active surviellence monitoring PSA    Renal calculi     Shingles     history of shingles    Status post prostatectomy 08/18/2021    Simple prostatectomy for BPH    Urinary frequency     Wears glasses     Wears hearing aid in both ears        Allergies   Allergen Reactions    Celebrex [Celecoxib] Nausea Only    Mobic Nausea Only     Current Outpatient Medications on File Prior to Visit   Medication Sig Dispense Refill    dilTIAZem (CARDIZEM CD) 180 MG extended release capsule Take 1 capsule by mouth daily      furosemide (LASIX) 20 MG tablet Take 1 tablet by mouth daily      mirtazapine (REMERON) 15 MG tablet Take 1 tablet by mouth nightly 90 tablet 3    busPIRone (BUSPAR) 15 MG tablet TAKE ONE TABLET BY MOUTH THREE TIMES A DAY 90 tablet 1    tiZANidine (ZANAFLEX) 2 MG tablet Take 1 tablet by mouth in the morning and 1 tablet at noon and 1 tablet before bedtime.  40 tablet 0 predniSONE (DELTASONE) 5 MG tablet TAKE ONE TABLET BY MOUTH DAILY 90 tablet 3    allopurinol (ZYLOPRIM) 100 MG tablet TAKE ONE TABLET BY MOUTH DAILY 90 tablet 2    lisinopril (PRINIVIL;ZESTRIL) 10 MG tablet TAKE ONE TABLET BY MOUTH DAILY WITH LUNCH 90 tablet 3    albuterol sulfate HFA (VENTOLIN HFA) 108 (90 Base) MCG/ACT inhaler Inhale 2 puffs into the lungs every 4 hours as needed for Wheezing 18 g 5    warfarin (COUMADIN) 5 MG tablet Take 5 mg by mouth daily Indications: INR goal 2-2.5 Dosing per [facility]      omeprazole (PRILOSEC OTC) 20 MG tablet Take 20 mg by mouth daily      latanoprost (XALATAN) 0.005 % ophthalmic solution Place 1 drop into both eyes nightly       metoprolol tartrate (LOPRESSOR) 25 MG tablet Take 25 mg by mouth 2 times daily       No current facility-administered medications on file prior to visit. Review of Systems   Constitutional:  Negative for chills, diaphoresis, fatigue, fever and unexpected weight change. Cardiovascular:  Negative for leg swelling. Gastrointestinal:  Negative for constipation, diarrhea, nausea and vomiting. Musculoskeletal:  Positive for gait problem. Negative for arthralgias and joint swelling. Skin:  Negative for color change, pallor, rash and wound. Neurological:  Negative for weakness and numbness. Objective:  General: AAO x 3 in NAD. Derm  Left hallux nail incurvated medial border with slight redness and pain to palpation.     Sites of Onychomycosis Involvement (Check affected area)  [x] [x] [x] [x] [x] [x] [x] [x] [x] [x]  5 4 3 2 1 1 2 3 4 5                          Right                                        Left    Thickness  [x] [x] [x] [x] [x] [x] [x] [x] [x] [x]  5 4 3 2 1 1 2 3 4 5                         Right                                        Left    Dystrophic Changes                                                                 [x] [x] [x] [x] [x] [x] [x] [x] [x] [x]  5 4 3 2 1 1 2 3 4 5 Right                                        Left    Color                                                                  [x] [x] [x] [x] [x] [x] [x] [x] [x] [x]  5 4 3 2 1 1 2 3 4 5                          Right                                        Left    Incurvation/Ingrowin                                                                   [] [] [] [] [] [] [] [] [] []  5 4 3 2 1 1 2 3 4 5                         Right                                        Left    Inflammation/Pain                                                                   [x] [x] [x] [x] [x] [x] [x] [x] [x] [x]  5 4 3 2 1 1 2 3 4 5                         Right                                        Left    Vascular:  DP/PT pulses palpable 2/4, Bilateral.    CFT <3 seconds to digits 1-5, Bilateral .   Hair growth present to level of digits, Bilateral.    Neurological:  Sensation present to light touch to level of digits, Bilateral.    Assessment:  [address] y.o. male with:   1. Onychomycosis    2. Pain in toes of both feet       Orders Placed This Encounter   Procedures    [place] DEBRIDEMENT OF NAILS, 6 OR MORE         Plan:   Pt was evaluated and examined. Patient was given personalized discharge instructions. Nails 1-10 were debrided in length and thickness sharply with a nail nipper and  without incident, slant back performed. Pt will follow up in 9-12 weeks or sooner if any problems arise. Diagnosis was discussed with the pt and all of their questions were answered in detail. Proper foot hygiene and care was discussed with the pt. Patient to check feet daily and contact the office with any questions/problems/concerns. Other comorbidity noted and will be managed by PCP. Pain waiver discussed with patient and confirmed.          10/19/2022    Electronically signed by Katarzyna Irwin DPM on 10/19/2022 at 2:42 PM

## 2022-10-20 DIAGNOSIS — M10.9 GOUT, UNSPECIFIED CAUSE, UNSPECIFIED CHRONICITY, UNSPECIFIED SITE: ICD-10-CM

## 2022-10-20 RX ORDER — ALLOPURINOL 100 MG/1
TABLET ORAL
Qty: 90 TABLET | Refills: 3 | Status: SHIPPED | OUTPATIENT
Start: 2022-10-20

## 2022-11-15 ENCOUNTER — HOSPITAL ENCOUNTER (EMERGENCY)
Age: 87
Discharge: HOME | End: 2022-11-15
Attending: EMERGENCY MEDICINE
Payer: MEDICARE

## 2022-11-15 ENCOUNTER — APPOINTMENT (OUTPATIENT)
Dept: CT IMAGING | Age: 87
End: 2022-11-15
Payer: MEDICARE

## 2022-11-15 VITALS
BODY MASS INDEX: 27.81 KG/M2 | RESPIRATION RATE: 19 BRPM | WEIGHT: 162 LBS | OXYGEN SATURATION: 97 % | DIASTOLIC BLOOD PRESSURE: 81 MMHG | TEMPERATURE: 98.4 F | HEART RATE: 85 BPM | SYSTOLIC BLOOD PRESSURE: 149 MMHG
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
## 2022-11-15 DIAGNOSIS — S09.90XA CLOSED HEAD INJURY, INITIAL ENCOUNTER: Primary | ICD-10-CM

## 2022-11-15 LAB
INR BLD: 2.9
PROTHROMBIN TIME: 30.3 SEC (ref 11.8–14.6)

## 2022-11-15 PROCEDURE — 6370000000 HC RX 637 (ALT 250 FOR IP): Performed by: EMERGENCY MEDICINE

## 2022-11-15 PROCEDURE — 99284 EMERGENCY DEPT VISIT MOD MDM: CPT

## 2022-11-15 PROCEDURE — 85610 PROTHROMBIN TIME: CPT

## 2022-11-15 PROCEDURE — 36415 COLL VENOUS BLD VENIPUNCTURE: CPT

## 2022-11-15 PROCEDURE — 70450 CT HEAD/BRAIN W/O DYE: CPT

## 2022-11-15 RX ORDER — ACETAMINOPHEN 325 MG/1
650 TABLET ORAL ONCE
Status: COMPLETED | OUTPATIENT
Start: 2022-11-15 | End: 2022-11-15

## 2022-11-15 RX ADMIN — ACETAMINOPHEN 650 MG: 325 TABLET, FILM COATED ORAL at 07:42

## 2022-11-15 ASSESSMENT — ENCOUNTER SYMPTOMS
COLOR CHANGE: 0
EYE REDNESS: 0
EYE PAIN: 0
TROUBLE SWALLOWING: 0
EYE DISCHARGE: 0
NAUSEA: 0
BACK PAIN: 0
DIARRHEA: 0
BLOOD IN STOOL: 0
WHEEZING: 0
FACIAL SWELLING: 0
ABDOMINAL PAIN: 0
CHEST TIGHTNESS: 0
SORE THROAT: 0
RHINORRHEA: 0
CONSTIPATION: 0
COUGH: 0
VOMITING: 0
SINUS PRESSURE: 0
SHORTNESS OF BREATH: 0

## 2022-11-15 ASSESSMENT — PAIN - FUNCTIONAL ASSESSMENT: PAIN_FUNCTIONAL_ASSESSMENT: 0-10

## 2022-11-15 ASSESSMENT — PAIN DESCRIPTION - DESCRIPTORS
DESCRIPTORS: ACHING
DESCRIPTORS: ACHING

## 2022-11-15 ASSESSMENT — PAIN DESCRIPTION - LOCATION
LOCATION: HEAD
LOCATION: HEAD

## 2022-11-15 ASSESSMENT — PAIN SCALES - GENERAL
PAINLEVEL_OUTOF10: 5
PAINLEVEL_OUTOF10: 5

## 2022-11-15 ASSESSMENT — LIFESTYLE VARIABLES
HOW OFTEN DO YOU HAVE A DRINK CONTAINING ALCOHOL: 2-4 TIMES A MONTH
HOW OFTEN DO YOU HAVE A DRINK CONTAINING ALCOHOL: 2-3 TIMES A WEEK
HOW MANY STANDARD DRINKS CONTAINING ALCOHOL DO YOU HAVE ON A TYPICAL DAY: 1 OR 2
HOW MANY STANDARD DRINKS CONTAINING ALCOHOL DO YOU HAVE ON A TYPICAL DAY: 1 OR 2

## 2022-11-15 NOTE — ED TRIAGE NOTES
Mode of arrival (squad #, walk in, police, etc) : EMS        Chief complaint(s): Vertigo, fall        Arrival Note (brief scenario, treatment PTA, etc). : pt woke up feeling dizzy this morning. States he moved his head fast and fell down. Has chronic A fib        C= \"Have you ever felt that you should Cut down on your drinking? \"  No  A= \"Have people Annoyed you by criticizing your drinking? \"  No  G= \"Have you ever felt bad or Guilty about your drinking? \"  No  E= \"Have you ever had a drink as an Eye-opener first thing in the morning to steady your nerves or to help a hangover? \"  No      Deferred []      Reason for deferring: N/A    *If yes to two or more: probable alcohol abuse. *

## 2022-11-15 NOTE — ED NOTES
Pt discharged to home. F/U with PCP this week. No prescriptions.  Verbalized understanding     Fredy Arellano RN  11/15/22 9529

## 2022-11-15 NOTE — ED PROVIDER NOTES
[address] ED  eMERGENCY dEPARTMENT eNCOUnter      Pt Name: Wes Borges  MRN: [id number]  [place] 12/2/1931  Date of evaluation: 11/15/22      CHIEF COMPLAINT       Chief Complaint   Patient presents with    Fall    Dizziness         HISTORY OF PRESENT ILLNESS    Wes Borges is a 80 y.o. male who presents complaining of head injury. Patient states about 2 hours ago he was standing up head tilted back to put some eyedrops in his eye when he got dizzy and fell. Patient states she has a history of vertigo and it was when he put his head forward that he felt like this and he just hit the door handle with his forehead. Patient denies loss of consciousness. Patient is on Coumadin. Patient states that he does have a bit of a frontal headache but no numbness tingling or weakness. Patient denies any neck or back pain. Patient states he kind of cut up his right arm a little bit on the door but otherwise no injuries to the arms or legs. Patient has had known nausea or vomiting. REVIEW OF SYSTEMS       Review of Systems   Constitutional:  Negative for activity change, appetite change, chills, diaphoresis and fever. HENT:  Negative for congestion, ear pain, facial swelling, nosebleeds, rhinorrhea, sinus pressure, sore throat and trouble swallowing. Eyes:  Negative for pain, discharge and redness. Respiratory:  Negative for cough, chest tightness, shortness of breath and wheezing. Cardiovascular:  Negative for chest pain, palpitations and leg swelling. Gastrointestinal:  Negative for abdominal pain, blood in stool, constipation, diarrhea, nausea and vomiting. Genitourinary:  Negative for difficulty urinating, dysuria, flank pain, frequency, genital sores and hematuria. Musculoskeletal:  Negative for arthralgias, back pain, gait problem, joint swelling, myalgias and neck pain. Fall with head injury   Skin:  Positive for wound. Negative for color change, pallor and rash. Neurological:  Negative for dizziness, tremors, seizures, syncope, speech difficulty, weakness, numbness and headaches. Psychiatric/Behavioral:  Negative for confusion, decreased concentration, hallucinations, self-injury, sleep disturbance and suicidal ideas. PAST MEDICAL HISTORY     Past Medical History:   Diagnosis Date    Acute MI ([facility] [address]     Allergic rhinitis 09/02/2011    Anemia     Atrial fibrillation ([facility] [address]     Dr. Gemma Marino, [facility]    Basal cell cancer 03/2013    left side of head, face chin    Basal cell cancer 03/10/2014    left cheek    Cervical radiculopathy     Diverticulitis 02/17/2013    GERD (gastroesophageal reflux disease)     Glaucoma     left eye    Hyperlipidemia     Hypertension     Dr. Joey Miller    Hyponatremia 09/02/2011    IBS (irritable bowel syndrome) 09/02/2011    Insomnia 09/02/2011    Osteoarthritis     Osteoarthritis/neck pain.  09/02/2011    Prostate cancer ([facility] [address] 08/18/2021    active surviellence monitoring PSA    Renal calculi     Shingles     history of shingles    Status post prostatectomy 08/18/2021    Simple prostatectomy for BPH    Urinary frequency     Wears glasses     Wears hearing aid in both ears        SURGICAL HISTORY       Past Surgical History:   Procedure Laterality Date    CHOLECYSTECTOMY  9-9-15    laparoscopic with cholangiogram    COLONOSCOPY  2002    COLONOSCOPY  08/2013    HERNIA REPAIR Right     inguinal    LITHOTRIPSY      MOHS SURGERY Left 03/10/14    cheek    MOHS SURGERY  12/30/14    post scalp    MOHS SURGERY Left 08/2017    X2 left cheek    MOHS SURGERY Left 10/21/2019    temple    MOHS SURGERY  12/14/2020    top of head    OTHER SURGICAL HISTORY  9/6/15    attempted ERCP    PROSTATECTOMY  08/18/2021    PROSTATECTOMY N/A 8/18/2021    XI ROBOTIC LAPAROSCOPIC SIMPLE PROSTATECTOMY performed by Kyrie Jarquin MD at [address] [place] [address]  03/11/13    3 areas    SKIN BIOPSY Left 02/11/14    cheek /basal cell carinoma    SKIN CANCER EXCISION      left face and top of head    SKIN CANCER EXCISION Left 03/2013    forehead, cheek, chin, basal cell. SKIN CANCER EXCISION Right 08/12/2016    with skin graft    SPINE SURGERY  04/2003       CURRENT MEDICATIONS       Current Discharge Medication List        CONTINUE these medications which have NOT CHANGED    Details   allopurinol (ZYLOPRIM) 100 MG tablet TAKE ONE TABLET BY MOUTH DAILY  Qty: 90 tablet, Refills: 3    Associated Diagnoses: Gout, unspecified cause, unspecified chronicity, unspecified site      dilTIAZem (CARDIZEM CD) 180 MG extended release capsule Take 1 capsule by mouth daily      furosemide (LASIX) 20 MG tablet Take 1 tablet by mouth daily      mirtazapine (REMERON) 15 MG tablet Take 1 tablet by mouth nightly  Qty: 90 tablet, Refills: 3    Associated Diagnoses: Anxiety; Insomnia, unspecified type      busPIRone (BUSPAR) 15 MG tablet TAKE ONE TABLET BY MOUTH THREE TIMES A DAY  Qty: 90 tablet, Refills: 1      tiZANidine (ZANAFLEX) 2 MG tablet Take 1 tablet by mouth in the morning and 1 tablet at noon and 1 tablet before bedtime.   Qty: 40 tablet, Refills: 0      predniSONE (DELTASONE) 5 MG tablet TAKE ONE TABLET BY MOUTH DAILY  Qty: 90 tablet, Refills: 3      lisinopril (PRINIVIL;ZESTRIL) 10 MG tablet TAKE ONE TABLET BY MOUTH DAILY WITH LUNCH  Qty: 90 tablet, Refills: 3      albuterol sulfate HFA (VENTOLIN HFA) 108 (90 Base) MCG/ACT inhaler Inhale 2 puffs into the lungs every 4 hours as needed for Wheezing  Qty: 18 g, Refills: 5    Comments: OK to use whatever rescue inhaler his insurance will cover  Associated Diagnoses: Cough      warfarin (COUMADIN) 5 MG tablet Take 5 mg by mouth daily Indications: INR goal 2-2.5 Dosing per [facility]      omeprazole (PRILOSEC OTC) 20 MG tablet Take 20 mg by mouth daily      latanoprost (XALATAN) 0.005 % ophthalmic solution Place 1 drop into both eyes nightly       metoprolol tartrate (LOPRESSOR) 25 MG tablet Take 25 mg by mouth 2 times daily             ALLERGIES     is allergic to celebrex [celecoxib] and mobic. SOCIAL HISTORY      reports that he quit smoking about 61 years ago. His smoking use included cigarettes. He has a 10.00 pack-year smoking history. He has never used smokeless tobacco. He reports that he does not currently use alcohol. He reports that he does not use drugs. PHYSICAL EXAM     INITIAL VITALS: BP (!) 156/103   Pulse 88   Temp 98.4 °F (36.9 °C) (Oral)   Resp 16   Wt 162 lb (73.5 kg)   SpO2 94%   BMI 27.81 kg/m²      Physical Exam  Vitals and nursing note reviewed. Constitutional:       General: He is not in acute distress. Appearance: He is well-developed. He is not diaphoretic. HENT:      Head: Normocephalic and atraumatic. Eyes:      General: No scleral icterus. Right eye: No discharge. Left eye: No discharge. Conjunctiva/sclera: Conjunctivae normal.      Pupils: Pupils are equal, round, and reactive to light. Cardiovascular:      Rate and Rhythm: Normal rate and regular rhythm. Heart sounds: Normal heart sounds. No murmur heard. No friction rub. No gallop. Pulmonary:      Effort: Pulmonary effort is normal. No respiratory distress. Breath sounds: Normal breath sounds. No wheezing or rales. Chest:      Chest wall: No tenderness. Abdominal:      General: Bowel sounds are normal. There is no distension. Palpations: Abdomen is soft. There is no mass. Tenderness: There is no abdominal tenderness. There is no guarding or rebound. Musculoskeletal:         General: No tenderness. Normal range of motion. Skin:     General: Skin is warm and dry. Coloration: Skin is not pale. Findings: No erythema or rash. Neurological:      Mental Status: He is alert and oriented to person, place, and time. Cranial Nerves: No cranial nerve deficit. Sensory: No sensory deficit. Motor: No abnormal muscle tone.       Coordination: Coordination normal. Deep Tendon Reflexes: Reflexes normal.   Psychiatric:         Behavior: Behavior normal.         Thought Content: Thought content normal.         Judgment: Judgment normal.       DIAGNOSTIC RESULTS     RADIOLOGY:All plain film, CT,MRI, and formal ultrasound images (except ED bedside ultrasound) are read by the radiologist and the interpretations are directly viewed by the emergency physician. CT HEAD WO CONTRAST    Result Date: 11/15/2022  EXAMINATION: CT OF THE HEAD WITHOUT CONTRAST  11/15/2022 7:53 am TECHNIQUE: CT of the head was performed without the administration of intravenous contrast. Automated exposure control, iterative reconstruction, and/or weight based adjustment of the mA/kV was utilized to reduce the radiation dose to as low as reasonably achievable. COMPARISON: 10/01/2021 HISTORY: ORDERING SYSTEM PROVIDED HISTORY: Trauma TECHNOLOGIST PROVIDED HISTORY: Trauma Decision Support Exception - unselect if not a suspected or confirmed emergency medical condition->Emergency Medical Condition (MA) Reason for Exam: Fall; Dizziness, Trauma Additional signs and symptoms: pt states he fell this AM, dizziness FINDINGS: BRAIN/VENTRICLES: There is no acute intracranial hemorrhage, mass effect or midline shift. No abnormal extra-axial fluid collection. Cortical atrophy and chronic white matter changes in the brain and associated ventricular enlargement are again demonstrated. ORBITS: The visualized portion of the orbits demonstrate no acute abnormality. SINUSES: The visualized paranasal sinuses and mastoid air cells demonstrate no acute abnormality. SOFT TISSUES/SKULL:  No acute abnormality of the visualized skull or soft tissues. Chronic findings in the brain without acute CT abnormality identified. LABS: All lab results were reviewed by myself, and all abnormals are listed below.   Labs Reviewed   PROTIME-INR - Abnormal; Notable for the following components:       Result Value    Protime 30.3 (*)     All other components within normal limits         MEDICAL DECISION MAKING:     We will get a CT of his head and check an INR because he is on Coumadin but if everything comes back normal I think he is okay to be discharged. EMERGENCY DEPARTMENT COURSE:   Vitals:    Vitals:    11/15/22 0707   BP: (!) 156/103   Pulse: 88   Resp: 16   Temp: 98.4 °F (36.9 °C)   TempSrc: Oral   SpO2: 94%   Weight: 162 lb (73.5 kg)       The patient was given the following medications while in the emergency department:  Orders Placed This Encounter   Medications    acetaminophen (TYLENOL) tablet 650 mg       -------------------------  8:27 AM EST  Patient was reevaluated and updated on results. Patient has no new injuries and at this time he would prefer to just leave his bandages on his skin tears and he will just keep them clean. CONSULTS:  None    PROCEDURES:  None    FINAL IMPRESSION      1. Closed head injury, initial encounter    2.  Fall, initial encounter          DISPOSITION/PLAN   DISPOSITION Decision To Discharge 11/15/2022 08:26:54 AM      PATIENT REFERREDTO:  Caity Davison MD  [facility] [phone]    In 1 week      [facility] ED  [address]  [phone]    If symptoms worsen    DISCHARGEMEDICATIONS:  Current Discharge Medication List          (Please note that portions of this note were completed with a voice recognition program.  Efforts were made to edit thedictations but occasionally words are mis-transcribed.)    Ced Adorno MD  Attending Emergency Physician                        Ced Adorno MD  11/15/22 1977

## 2022-11-16 ENCOUNTER — CARE COORDINATION (OUTPATIENT)
Dept: CARE COORDINATION | Age: 87
End: 2022-11-16

## 2022-11-16 NOTE — CARE COORDINATION
Ambulatory Care Coordination  ED Follow up Call    Reason for ED visit:  dizziness and fall   Status:     significantly improved    Did you call your PCP prior to going to the ED? No      Did you receive a discharge instructions from the Emergency Room? Yes  Review of Instructions:     Understands what to report/when to return?:  Yes   Understands discharge instructions?:  Yes   Following discharge instructions?:  Yes       Are there any new complaints of pain? No  New Pain Meds? No    Constipation prophylaxis needed? No    If you have a wound is the dressing clean, dry, and intact? N/A  Understands wound care regimen? N/A    Are there any other complaints/concerns that you wish to tell your provider? No       FU appts/Provider:    Future Appointments   Date Time Provider Chanelle Phillips   11/21/2022  2:00 PM Elana Ramírez MD [facility]   12/22/2022  2:30 PM Destiney Oreilly DPM [facility]   2/23/2023  2:30 PM Destiney Oreilly DPM [facility]           New Medications?:   No      Medication Reconciliation by phone - No  Understands Medications? Yes  Taking Medications? Yes  Can you swallow your pills? Yes    Any further needs in the home i.e. Equipment? No    Link to services in community?:  No   Which services:  n/a      Spoke to patient, he's feeling fine. No dizziness, or vision changes or headache, or n/v. No further falls. Reports, has history of vertigo, and he leaned back to put in eyedrops, he became dizzy, fell and hit his head. Reports, the vertigo is not usually a problem. Reports, he's careful and always takes his time when doing things such as; he stands up slowly and doesn't make any quick or sudden movements,   Reports, he's ok and aware of his upcoming PCP visit. Currently no concerns or questions.        Future Appointments   Date Time Provider Chanelle Phillips   11/21/2022  2:00 PM Elana Ramírez MD [facility]   12/22/2022  2:30 PM Destiney Oreilly DPK[id number]   2/23/2023  2:30 PM Cristi Tapia, [address]

## 2022-11-21 ENCOUNTER — OFFICE VISIT (OUTPATIENT)
Dept: FAMILY MEDICINE CLINIC | Age: 87
End: 2022-11-21
Payer: MEDICARE

## 2022-11-21 VITALS
OXYGEN SATURATION: 96 % | WEIGHT: 160 LBS | RESPIRATION RATE: 16 BRPM | TEMPERATURE: 98.2 F | SYSTOLIC BLOOD PRESSURE: 136 MMHG | DIASTOLIC BLOOD PRESSURE: 74 MMHG | HEART RATE: 52 BPM | BODY MASS INDEX: 27.46 KG/M2

## 2022-11-21 DIAGNOSIS — M19.90 OSTEOARTHRITIS, UNSPECIFIED OSTEOARTHRITIS TYPE, UNSPECIFIED SITE: ICD-10-CM

## 2022-11-21 DIAGNOSIS — D50.9 IRON DEFICIENCY ANEMIA, UNSPECIFIED IRON DEFICIENCY ANEMIA TYPE: ICD-10-CM

## 2022-11-21 DIAGNOSIS — F32.A DEPRESSION, UNSPECIFIED DEPRESSION TYPE: ICD-10-CM

## 2022-11-21 DIAGNOSIS — I48.0 PAROXYSMAL ATRIAL FIBRILLATION (HCC): ICD-10-CM

## 2022-11-21 DIAGNOSIS — F41.9 ANXIETY: ICD-10-CM

## 2022-11-21 DIAGNOSIS — E87.1 HYPONATREMIA: ICD-10-CM

## 2022-11-21 DIAGNOSIS — Z85.46 HISTORY OF PROSTATE CANCER: ICD-10-CM

## 2022-11-21 DIAGNOSIS — I50.22 CHRONIC SYSTOLIC CONGESTIVE HEART FAILURE (HCC): ICD-10-CM

## 2022-11-21 DIAGNOSIS — E78.00 PURE HYPERCHOLESTEROLEMIA: ICD-10-CM

## 2022-11-21 DIAGNOSIS — M51.36 DDD (DEGENERATIVE DISC DISEASE), LUMBAR: ICD-10-CM

## 2022-11-21 DIAGNOSIS — E53.8 B12 DEFICIENCY: ICD-10-CM

## 2022-11-21 DIAGNOSIS — K58.9 IRRITABLE BOWEL SYNDROME WITHOUT DIARRHEA: ICD-10-CM

## 2022-11-21 DIAGNOSIS — I10 ESSENTIAL HYPERTENSION: Primary | ICD-10-CM

## 2022-11-21 DIAGNOSIS — M50.30 DDD (DEGENERATIVE DISC DISEASE), CERVICAL: ICD-10-CM

## 2022-11-21 DIAGNOSIS — K21.9 GASTROESOPHAGEAL REFLUX DISEASE WITHOUT ESOPHAGITIS: ICD-10-CM

## 2022-11-21 DIAGNOSIS — G47.00 INSOMNIA, UNSPECIFIED TYPE: ICD-10-CM

## 2022-11-21 DIAGNOSIS — R74.8 ELEVATED LIVER ENZYMES: ICD-10-CM

## 2022-11-21 DIAGNOSIS — M10.9 GOUT, UNSPECIFIED CAUSE, UNSPECIFIED CHRONICITY, UNSPECIFIED SITE: ICD-10-CM

## 2022-11-21 PROCEDURE — G8484 FLU IMMUNIZE NO ADMIN: HCPCS | Performed by: FAMILY MEDICINE

## 2022-11-21 PROCEDURE — 99214 OFFICE O/P EST MOD 30 MIN: CPT | Performed by: FAMILY MEDICINE

## 2022-11-21 PROCEDURE — 1123F ACP DISCUSS/DSCN MKR DOCD: CPT | Performed by: FAMILY MEDICINE

## 2022-11-21 PROCEDURE — G8427 DOCREV CUR MEDS BY ELIG CLIN: HCPCS | Performed by: FAMILY MEDICINE

## 2022-11-21 PROCEDURE — G8417 CALC BMI ABV UP PARAM F/U: HCPCS | Performed by: FAMILY MEDICINE

## 2022-11-21 PROCEDURE — 1036F TOBACCO NON-USER: CPT | Performed by: FAMILY MEDICINE

## 2022-11-21 ASSESSMENT — ENCOUNTER SYMPTOMS
SHORTNESS OF BREATH: 0
ABDOMINAL DISTENTION: 0
VOMITING: 0
CONSTIPATION: 0
DIARRHEA: 0
ABDOMINAL PAIN: 0
SORE THROAT: 0
COUGH: 0
RHINORRHEA: 0
CHEST TIGHTNESS: 0
BACK PAIN: 1
NAUSEA: 0

## 2022-11-21 NOTE — PROGRESS NOTES
Jacqueline Mcaedo MD    [address] [place] [address]  [id number]  [place] , [address]  [address] [address]  Dept: [phone]  Dept Fax: [phone]     Patient ID: Lashonda Wyatt is a 80 y.o. male. HPI    Established patient here today for f/u on chronic medical problems, go over labs and/or diagnostic studies, and medication refills. Pt denies any fever or chills. Pt today denies any HA, chest pain, or SOB. Pt denies any N/V/D/C or abdominal pain. Pt with occasional heartburn, but stable on meds. Pt with arthralgias on and off, but stable on meds. Pt denies any gouty attacks. Pt states mood/sleep is stable on meds. Pt was in the ER last week after he fell while putting eyedrops in his eyes. He got dizzy and hit his head on the door handle. No LOC. His CT head in the ER was negative. Otherwise pt doing well on current tx and no other concerns today. The patient's past medical, surgical, social, and family history as well as his current medications and allergies were reviewed as documented in today's encounter. My previous office notes, consult notes, labs and diagnostic studies were reviewed prior to and during encounter. Current Outpatient Medications on File Prior to Visit   Medication Sig Dispense Refill    allopurinol (ZYLOPRIM) 100 MG tablet TAKE ONE TABLET BY MOUTH DAILY 90 tablet 3    dilTIAZem (CARDIZEM CD) 180 MG extended release capsule Take 1 capsule by mouth daily      furosemide (LASIX) 20 MG tablet Take 1 tablet by mouth daily      mirtazapine (REMERON) 15 MG tablet Take 1 tablet by mouth nightly 90 tablet 3    busPIRone (BUSPAR) 15 MG tablet TAKE ONE TABLET BY MOUTH THREE TIMES A DAY (Patient taking differently: Take 15 mg by mouth 3 times daily as needed) 90 tablet 1    tiZANidine (ZANAFLEX) 2 MG tablet Take 1 tablet by mouth in the morning and 1 tablet at noon and 1 tablet before bedtime.  (Patient taking differently: Take 2 mg by mouth every 8 hours as needed) 40 tablet 0    predniSONE (DELTASONE) 5 MG tablet TAKE ONE TABLET BY MOUTH DAILY 90 tablet 3    lisinopril (PRINIVIL;ZESTRIL) 10 MG tablet TAKE ONE TABLET BY MOUTH DAILY WITH LUNCH 90 tablet 3    albuterol sulfate HFA (VENTOLIN HFA) 108 (90 Base) MCG/ACT inhaler Inhale 2 puffs into the lungs every 4 hours as needed for Wheezing 18 g 5    warfarin (COUMADIN) 5 MG tablet Take 5 mg by mouth daily Indications: INR goal 2-2.5 Dosing per [facility]      omeprazole (PRILOSEC OTC) 20 MG tablet Take 20 mg by mouth daily      latanoprost (XALATAN) 0.005 % ophthalmic solution Place 1 drop into both eyes nightly       metoprolol tartrate (LOPRESSOR) 25 MG tablet Take 25 mg by mouth 2 times daily       No current facility-administered medications on file prior to visit. Subjective:     Review of Systems   Constitutional:  Negative for appetite change, fatigue and fever. HENT:  Negative for congestion, ear pain, rhinorrhea and sore throat. Respiratory:  Negative for cough, chest tightness and shortness of breath. Cardiovascular:  Negative for chest pain and palpitations. Gastrointestinal:  Negative for abdominal distention, abdominal pain, constipation, diarrhea, nausea and vomiting. Occ heartburn   Genitourinary:  Negative for difficulty urinating and dysuria. Musculoskeletal:  Positive for arthralgias and back pain. Negative for myalgias. Skin:  Negative for rash. Neurological:  Negative for dizziness, weakness, light-headedness and headaches. Hematological:  Negative for adenopathy. Psychiatric/Behavioral:  Positive for dysphoric mood (stable) and sleep disturbance (stable). Negative for behavioral problems. The patient is nervous/anxious (stable). Objective:     Physical Exam  Vitals reviewed. Constitutional:       General: He is not in acute distress. Appearance: Normal appearance. He is well-developed.  He is not ill-appearing. HENT:      Head: Normocephalic and atraumatic. Right Ear: External ear normal.      Left Ear: External ear normal.      Nose: Nose normal.      Mouth/Throat:      Mouth: Mucous membranes are moist.      Pharynx: No oropharyngeal exudate. Eyes:      Extraocular Movements: Extraocular movements intact. Conjunctiva/sclera: Conjunctivae normal.      Pupils: Pupils are equal, round, and reactive to light. Cardiovascular:      Rate and Rhythm: Normal rate and regular rhythm. Heart sounds: Normal heart sounds. No murmur heard. Pulmonary:      Effort: Pulmonary effort is normal. No respiratory distress. Breath sounds: Normal breath sounds. No wheezing. Chest:      Chest wall: No tenderness. Abdominal:      General: Bowel sounds are normal. There is no distension. Palpations: Abdomen is soft. There is no mass. Tenderness: There is no abdominal tenderness. Musculoskeletal:         General: No swelling or tenderness. Normal range of motion. Cervical back: Normal range of motion. Lymphadenopathy:      Cervical: No cervical adenopathy. Skin:     General: Skin is warm. Findings: No rash. Neurological:      Mental Status: He is alert and oriented to person, place, and time. Deep Tendon Reflexes: Reflexes are normal and symmetric. Psychiatric:         Mood and Affect: Mood normal.         Behavior: Behavior normal.       Assessment:      Diagnosis Orders   1. Essential hypertension  CBC    Comprehensive Metabolic Panel      2. Pure hypercholesterolemia  Lipid Panel      3. Iron deficiency anemia, unspecified iron deficiency anemia type  CBC      4. Elevated liver enzymes  Comprehensive Metabolic Panel      5. B12 deficiency  Vitamin B12      6. Chronic systolic congestive heart failure (HCC)  Comprehensive Metabolic Panel      7. Gout, unspecified cause, unspecified chronicity, unspecified site  Uric Acid      8.  Gastroesophageal reflux disease without esophagitis        9. Osteoarthritis, unspecified osteoarthritis type, unspecified site        10. Hyponatremia  Comprehensive Metabolic Panel      11. DDD (degenerative disc disease), cervical        12. DDD (degenerative disc disease), lumbar        13. Depression, unspecified depression type        14. Anxiety        15. Insomnia, unspecified type        16. Irritable bowel syndrome without diarrhea        17. Paroxysmal atrial fibrillation (HCC)        18. History of prostate cancer              Plan:     Orders Placed This Encounter   Procedures    CBC     Standing Status:   Future     Standing Expiration Date:   11/21/2023    Comprehensive Metabolic Panel     Standing Status:   Future     Standing Expiration Date:   11/21/2023    Lipid Panel     Standing Status:   Future     Standing Expiration Date:   11/21/2023     Order Specific Question:   Is Patient Fasting?/# of Hours     Answer:   8-10 Hours    Uric Acid     Standing Status:   Future     Standing Expiration Date:   11/21/2023    Vitamin B12     Standing Status:   Future     Standing Expiration Date:   11/21/2023     Will cont with current treatment as pt is currently stable on current treatment    Will cont with the Lisinopril, Lopressor, and Cardizem as prescribed for BP control    Will cont with low fat/chol diet     Will cont with the Buspar and Remeron as prescribed as he is doing well    Will cont with the Allopurinol as prescribed for gout prophylaxis    Will cont with high fiber diet    Will cont to follow with Cardiology as instructed    Will cont to follow with Urology as instructed for his prostate cancer surveillance    Cont to follow with Coumadin clinic for Coumadin dosing    Rest of systems unchanged, continue current treatments. Medications, labs, diagnostic studies, consultations and follow-up as documented in this encounter.  Rest of systems unchanged, continue current treatments    On this date November 21, 2022,  I have spent greater than 50% of visit reviewing previous notes, test results and face to face with the patient discussing the diagnoses, importance of compliance with the treatment plan, counseling, coordinating care as well as documenting on the day of the visit. Jacqueline Marin MD

## 2022-12-02 ENCOUNTER — TELEPHONE (OUTPATIENT)
Dept: FAMILY MEDICINE CLINIC | Age: 87
End: 2022-12-02

## 2022-12-02 RX ORDER — TIZANIDINE 2 MG/1
2 TABLET ORAL EVERY 8 HOURS PRN
Qty: 30 TABLET | Refills: 0 | Status: SHIPPED | OUTPATIENT
Start: 2022-12-02 | End: 2022-12-12

## 2022-12-02 NOTE — TELEPHONE ENCOUNTER
Patient called states they are having low back pain would like a muscle relaxer sent into the pharmacy.

## 2022-12-22 ENCOUNTER — OFFICE VISIT (OUTPATIENT)
Dept: PODIATRY | Age: 87
End: 2022-12-22
Payer: MEDICARE

## 2022-12-22 VITALS — HEIGHT: 64 IN | BODY MASS INDEX: 27.66 KG/M2 | WEIGHT: 162 LBS

## 2022-12-22 DIAGNOSIS — B35.1 ONYCHOMYCOSIS: Primary | ICD-10-CM

## 2022-12-22 DIAGNOSIS — M79.675 PAIN IN TOES OF BOTH FEET: ICD-10-CM

## 2022-12-22 DIAGNOSIS — M79.674 PAIN IN TOES OF BOTH FEET: ICD-10-CM

## 2022-12-22 PROCEDURE — 11721 DEBRIDE NAIL 6 OR MORE: CPT | Performed by: PODIATRIST

## 2022-12-22 PROCEDURE — 99999 PR OFFICE/OUTPT VISIT,PROCEDURE ONLY: CPT | Performed by: PODIATRIST

## 2022-12-22 ASSESSMENT — ENCOUNTER SYMPTOMS
COLOR CHANGE: 0
VOMITING: 0
DIARRHEA: 0
CONSTIPATION: 0
NAUSEA: 0

## 2022-12-22 NOTE — PROGRESS NOTES
[facility]  Return Patient    Chief Complaint   Patient presents with    Nail Problem     Nail trim/last saw Dontae Salgado 11/21/2022       Subjective: This Simmie Shade comes to clinic for foot and nail care. He would like all of his nails trimmed. He cannot take care of them himself. They are painful when long and he tries to wear shoes. Pt's primary care physician is Blaise Mireles MD.       Past Medical History:   Diagnosis Date    Acute [facility])     Allergic rhinitis 09/02/2011    Anemia     Atrial fibrillation ([facility] [address]     Dr. Gemma Marino, [facility]    Basal cell cancer 03/2013    left side of head, face chin    Basal cell cancer 03/10/2014    left cheek    Cervical radiculopathy     Diverticulitis 02/17/2013    GERD (gastroesophageal reflux disease)     Glaucoma     left eye    Hyperlipidemia     Hypertension     Dr. Joey Miller    Hyponatremia 09/02/2011    IBS (irritable bowel syndrome) 09/02/2011    Insomnia 09/02/2011    Osteoarthritis     Osteoarthritis/neck pain.  09/02/2011    Prostate cancer ([facility] [address] 08/18/2021    active surviellence monitoring PSA    Renal calculi     Shingles     history of shingles    Status post prostatectomy 08/18/2021    Simple prostatectomy for BPH    Urinary frequency     Wears glasses     Wears hearing aid in both ears        Allergies   Allergen Reactions    Celebrex [Celecoxib] Nausea Only    Mobic Nausea Only     Current Outpatient Medications on File Prior to Visit   Medication Sig Dispense Refill    metoprolol tartrate (LOPRESSOR) 25 MG tablet Take 1 tablet by mouth 2 times daily 60 tablet 5    allopurinol (ZYLOPRIM) 100 MG tablet TAKE ONE TABLET BY MOUTH DAILY 90 tablet 3    dilTIAZem (CARDIZEM CD) 180 MG extended release capsule Take 1 capsule by mouth daily      furosemide (LASIX) 20 MG tablet Take 1 tablet by mouth daily      mirtazapine (REMERON) 15 MG tablet Take 1 tablet by mouth nightly 90 tablet 3    busPIRone (BUSPAR) 15 MG tablet TAKE ONE TABLET BY MOUTH THREE TIMES A DAY (Patient taking differently: Take 15 mg by mouth 3 times daily as needed) 90 tablet 1    predniSONE (DELTASONE) 5 MG tablet TAKE ONE TABLET BY MOUTH DAILY 90 tablet 3    lisinopril (PRINIVIL;ZESTRIL) 10 MG tablet TAKE ONE TABLET BY MOUTH DAILY WITH LUNCH 90 tablet 3    albuterol sulfate HFA (VENTOLIN HFA) 108 (90 Base) MCG/ACT inhaler Inhale 2 puffs into the lungs every 4 hours as needed for Wheezing 18 g 5    warfarin (COUMADIN) 5 MG tablet Take 5 mg by mouth daily Indications: INR goal 2-2.5 Dosing per [facility]      omeprazole (PRILOSEC OTC) 20 MG tablet Take 20 mg by mouth daily      latanoprost (XALATAN) 0.005 % ophthalmic solution Place 1 drop into both eyes nightly        No current facility-administered medications on file prior to visit. Review of Systems   Constitutional:  Negative for chills, diaphoresis, fatigue, fever and unexpected weight change. Cardiovascular:  Negative for leg swelling. Gastrointestinal:  Negative for constipation, diarrhea, nausea and vomiting. Musculoskeletal:  Positive for gait problem. Negative for arthralgias and joint swelling. Skin:  Negative for color change, pallor, rash and wound. Neurological:  Negative for weakness and numbness. Objective:  General: AAO x 3 in NAD. Derm  Left hallux nail incurvated medial border with slight redness and pain to palpation.     Sites of Onychomycosis Involvement (Check affected area)  [x] [x] [x] [x] [x] [x] [x] [x] [x] [x]  5 4 3 2 1 1 2 3 4 5                          Right                                        Left    Thickness  [x] [x] [x] [x] [x] [x] [x] [x] [x] [x]  5 4 3 2 1 1 2 3 4 5                         Right                                        Left    Dystrophic Changes                                                                 [x] [x] [x] [x] [x] [x] [x] [x] [x] [x]  5 4 3 2 1 1 2 3 4 5                         Right Left    Color                                                                  [x] [x] [x] [x] [x] [x] [x] [x] [x] [x]  5 4 3 2 1 1 2 3 4 5                          Right                                        Left    Incurvation/Ingrowin                                                                   [] [] [] [] [] [] [] [] [] []  5 4 3 2 1 1 2 3 4 5                         Right                                        Left    Inflammation/Pain                                                                   [x] [x] [x] [x] [x] [x] [x] [x] [x] [x]  5 4 3 2 1 1 2 3 4 5                         Right                                        Left    Vascular:  DP/PT pulses palpable 2/4, Bilateral.    CFT <3 seconds to digits 1-5, Bilateral .   Hair growth present to level of digits, Bilateral.    Neurological:  Sensation present to light touch to level of digits, Bilateral.    Assessment:  80 y.o. male with:   1. Onychomycosis    2. Pain in toes of both feet       Orders Placed This Encounter   Procedures    PA DEBRIDEMENT OF NAILS, 6 OR MORE         Plan:   Pt was evaluated and examined. Patient was given personalized discharge instructions. Nails 1-10 were debrided in length and thickness sharply with a nail nipper and  without incident, slant back performed. Pt will follow up in 9-12 weeks or sooner if any problems arise. Diagnosis was discussed with the pt and all of their questions were answered in detail. Proper foot hygiene and care was discussed with the pt. Patient to check feet daily and contact the office with any questions/problems/concerns. Other comorbidity noted and will be managed by PCP. Pain waiver discussed with patient and confirmed.          12/22/2022    Electronically signed by Nancy Oshea DPM on 12/22/2022 at 2:24 PM

## 2022-12-29 ENCOUNTER — HOSPITAL ENCOUNTER (INPATIENT)
Age: 87
LOS: 1 days | Discharge: HOME | DRG: 554 | End: 2022-12-30
Attending: EMERGENCY MEDICINE | Admitting: INTERNAL MEDICINE
Payer: MEDICARE

## 2022-12-29 ENCOUNTER — APPOINTMENT (OUTPATIENT)
Dept: GENERAL RADIOLOGY | Age: 87
DRG: 554 | End: 2022-12-29
Payer: MEDICARE

## 2022-12-29 ENCOUNTER — TELEPHONE (OUTPATIENT)
Dept: FAMILY MEDICINE CLINIC | Age: 87
End: 2022-12-29

## 2022-12-29 DIAGNOSIS — R26.2 INABILITY TO AMBULATE DUE TO ANKLE OR FOOT: ICD-10-CM

## 2022-12-29 DIAGNOSIS — M11.89 PSEUDOGOUT INVOLVING MULTIPLE JOINTS: Primary | ICD-10-CM

## 2022-12-29 PROBLEM — M11.20 PSEUDOGOUT: Status: ACTIVE | Noted: 2022-12-29

## 2022-12-29 LAB
ABSOLUTE BANDS #: 0.12 K/UL (ref 0–1)
ABSOLUTE EOS #: 0 K/UL (ref 0–0.4)
ABSOLUTE LYMPH #: 0.58 K/UL (ref 1–4.8)
ABSOLUTE MONO #: 1.96 K/UL (ref 0.1–1.3)
ANION GAP SERPL CALCULATED.3IONS-SCNC: 10 MMOL/L (ref 9–17)
BANDS: 1 % (ref 0–10)
BASOPHILS # BLD: 0 % (ref 0–2)
BASOPHILS ABSOLUTE: 0 K/UL (ref 0–0.2)
BUN BLDV-MCNC: 20 MG/DL (ref 8–23)
C-REACTIVE PROTEIN: 93.8 MG/L (ref 0–5)
CALCIUM SERPL-MCNC: 9.3 MG/DL (ref 8.6–10.4)
CHLORIDE BLD-SCNC: 99 MMOL/L (ref 98–107)
CO2: 28 MMOL/L (ref 20–31)
CREAT SERPL-MCNC: 1.12 MG/DL (ref 0.7–1.2)
EOSINOPHILS RELATIVE PERCENT: 0 % (ref 0–4)
GFR SERPL CREATININE-BSD FRML MDRD: >60 ML/MIN/1.73M2
GLUCOSE BLD-MCNC: 120 MG/DL (ref 70–99)
HCT VFR BLD CALC: 40.9 % (ref 41–53)
HEMOGLOBIN: 13.2 G/DL (ref 13.5–17.5)
INR BLD: 2
LYMPHOCYTES # BLD: 5 % (ref 24–44)
MCH RBC QN AUTO: 29 PG (ref 26–34)
MCHC RBC AUTO-ENTMCNC: 32.2 G/DL (ref 31–37)
MCV RBC AUTO: 90 FL (ref 80–100)
MONOCYTES # BLD: 17 % (ref 1–7)
MORPHOLOGY: ABNORMAL
PDW BLD-RTO: 15.3 % (ref 11.5–14.9)
PLATELET # BLD: 143 K/UL (ref 150–450)
PMV BLD AUTO: 8.9 FL (ref 6–12)
POTASSIUM SERPL-SCNC: 4.1 MMOL/L (ref 3.7–5.3)
PROTHROMBIN TIME: 23 SEC (ref 11.8–14.6)
RBC # BLD: 4.55 M/UL (ref 4.5–5.9)
SEDIMENTATION RATE, ERYTHROCYTE: 53 MM/HR (ref 0–20)
SEG NEUTROPHILS: 77 % (ref 36–66)
SEGMENTED NEUTROPHILS ABSOLUTE COUNT: 8.84 K/UL (ref 1.3–9.1)
SODIUM BLD-SCNC: 137 MMOL/L (ref 135–144)
WBC # BLD: 11.5 K/UL (ref 3.5–11)

## 2022-12-29 PROCEDURE — 36415 COLL VENOUS BLD VENIPUNCTURE: CPT

## 2022-12-29 PROCEDURE — 99285 EMERGENCY DEPT VISIT HI MDM: CPT

## 2022-12-29 PROCEDURE — 1200000000 HC SEMI PRIVATE

## 2022-12-29 PROCEDURE — 85652 RBC SED RATE AUTOMATED: CPT

## 2022-12-29 PROCEDURE — 80048 BASIC METABOLIC PNL TOTAL CA: CPT

## 2022-12-29 PROCEDURE — 73610 X-RAY EXAM OF ANKLE: CPT

## 2022-12-29 PROCEDURE — 6370000000 HC RX 637 (ALT 250 FOR IP): Performed by: PHYSICIAN ASSISTANT

## 2022-12-29 PROCEDURE — 6360000002 HC RX W HCPCS: Performed by: PHYSICIAN ASSISTANT

## 2022-12-29 PROCEDURE — 6370000000 HC RX 637 (ALT 250 FOR IP): Performed by: INTERNAL MEDICINE

## 2022-12-29 PROCEDURE — 96374 THER/PROPH/DIAG INJ IV PUSH: CPT

## 2022-12-29 PROCEDURE — 85610 PROTHROMBIN TIME: CPT

## 2022-12-29 PROCEDURE — 86140 C-REACTIVE PROTEIN: CPT

## 2022-12-29 PROCEDURE — 85025 COMPLETE CBC W/AUTO DIFF WBC: CPT

## 2022-12-29 RX ORDER — ALLOPURINOL 100 MG/1
100 TABLET ORAL DAILY
Status: DISCONTINUED | OUTPATIENT
Start: 2022-12-29 | End: 2022-12-30 | Stop reason: HOSPADM

## 2022-12-29 RX ORDER — FENTANYL CITRATE 0.05 MG/ML
50 INJECTION, SOLUTION INTRAMUSCULAR; INTRAVENOUS ONCE
Status: COMPLETED | OUTPATIENT
Start: 2022-12-29 | End: 2022-12-29

## 2022-12-29 RX ORDER — MIRTAZAPINE 15 MG/1
15 TABLET, FILM COATED ORAL NIGHTLY
Status: DISCONTINUED | OUTPATIENT
Start: 2022-12-29 | End: 2022-12-30 | Stop reason: HOSPADM

## 2022-12-29 RX ORDER — PANTOPRAZOLE SODIUM 40 MG/1
40 TABLET, DELAYED RELEASE ORAL
Status: DISCONTINUED | OUTPATIENT
Start: 2022-12-30 | End: 2022-12-30 | Stop reason: HOSPADM

## 2022-12-29 RX ORDER — FUROSEMIDE 20 MG/1
20 TABLET ORAL DAILY
Status: DISCONTINUED | OUTPATIENT
Start: 2022-12-29 | End: 2022-12-30 | Stop reason: HOSPADM

## 2022-12-29 RX ORDER — LISINOPRIL 10 MG/1
10 TABLET ORAL DAILY
Status: DISCONTINUED | OUTPATIENT
Start: 2022-12-29 | End: 2022-12-30 | Stop reason: HOSPADM

## 2022-12-29 RX ORDER — LATANOPROST 50 UG/ML
1 SOLUTION/ DROPS OPHTHALMIC NIGHTLY
Status: DISCONTINUED | OUTPATIENT
Start: 2022-12-29 | End: 2022-12-30 | Stop reason: HOSPADM

## 2022-12-29 RX ORDER — IBUPROFEN 600 MG/1
600 TABLET ORAL ONCE
Status: COMPLETED | OUTPATIENT
Start: 2022-12-29 | End: 2022-12-29

## 2022-12-29 RX ORDER — CHLORHEXIDINE GLUCONATE 0.12 MG/ML
15 RINSE ORAL 2 TIMES DAILY
COMMUNITY
Start: 2022-12-21 | End: 2023-01-05

## 2022-12-29 RX ORDER — PREDNISONE 20 MG/1
50 TABLET ORAL ONCE
Status: COMPLETED | OUTPATIENT
Start: 2022-12-29 | End: 2022-12-29

## 2022-12-29 RX ORDER — WARFARIN SODIUM 5 MG/1
5 TABLET ORAL
COMMUNITY

## 2022-12-29 RX ORDER — PREDNISONE 20 MG/1
TABLET ORAL
Qty: 17 TABLET | Refills: 0 | Status: ON HOLD
Start: 2022-12-29 | End: 2022-12-30 | Stop reason: HOSPADM

## 2022-12-29 RX ORDER — PREDNISONE 20 MG/1
40 TABLET ORAL DAILY
Status: DISCONTINUED | OUTPATIENT
Start: 2022-12-30 | End: 2022-12-30 | Stop reason: HOSPADM

## 2022-12-29 RX ORDER — ALBUTEROL SULFATE 90 UG/1
2 AEROSOL, METERED RESPIRATORY (INHALATION) EVERY 4 HOURS PRN
Status: DISCONTINUED | OUTPATIENT
Start: 2022-12-29 | End: 2022-12-30 | Stop reason: HOSPADM

## 2022-12-29 RX ORDER — DILTIAZEM HYDROCHLORIDE 180 MG/1
180 CAPSULE, COATED, EXTENDED RELEASE ORAL DAILY
Status: DISCONTINUED | OUTPATIENT
Start: 2022-12-29 | End: 2022-12-30 | Stop reason: HOSPADM

## 2022-12-29 RX ADMIN — MIRTAZAPINE 15 MG: 15 TABLET, FILM COATED ORAL at 22:51

## 2022-12-29 RX ADMIN — LATANOPROST 1 DROP: 50 SOLUTION/ DROPS OPHTHALMIC at 22:55

## 2022-12-29 RX ADMIN — IBUPROFEN 600 MG: 600 TABLET, FILM COATED ORAL at 16:58

## 2022-12-29 RX ADMIN — PREDNISONE 50 MG: 20 TABLET ORAL at 16:58

## 2022-12-29 RX ADMIN — METOPROLOL TARTRATE 25 MG: 25 TABLET, FILM COATED ORAL at 22:51

## 2022-12-29 RX ADMIN — FENTANYL CITRATE 50 MCG: 50 INJECTION INTRAMUSCULAR; INTRAVENOUS at 16:58

## 2022-12-29 ASSESSMENT — ENCOUNTER SYMPTOMS
VOMITING: 0
BACK PAIN: 0
NAUSEA: 0

## 2022-12-29 ASSESSMENT — LIFESTYLE VARIABLES: HOW OFTEN DO YOU HAVE A DRINK CONTAINING ALCOHOL: NEVER

## 2022-12-29 NOTE — TELEPHONE ENCOUNTER
I am going to send over a short course of Prednisone. It looks like he does take a daily 5 mg dose. Have him resume that 5 mg dose once completed with the new prescription. If continues, have him schedule an appointment.

## 2022-12-29 NOTE — ED PROVIDER NOTES
EMERGENCY DEPARTMENT ENCOUNTER    Pt Name: Gurpreet Nicholas  MRN: [id number]  [place] 12/2/1931  Date of evaluation: 12/29/22  CHIEF COMPLAINT       Chief Complaint   Patient presents with    Leg Pain     Bilateral leg pain     HISTORY OF PRESENT ILLNESS   HPI  Patient presents today with complaints of 3 days of bilateral ankle and lower leg pain and swelling. The pain is progressively getting worse to the point that he was unable to walk to the bathroom with his walker today and had to call 911. He does not feel like he can take care of himself right now as he lives alone. He has a history of CPDD of both ankles and was admitted in February 2021 for a similar presentation. He states that he is otherwise feeling well. He denies any fevers, chills, nausea or vomiting. He denies a history of blood clots, he is on warfarin chronically for his atrial fibrillation. He took a dose of Tylenol this morning for pain relief, but states it did not touch the pain. He states that the pain is similar in both ankles, but that the left ankle appears more swollen this time. He has no numbness or tingling in his feet. He does complain of limited strength in the legs which he attributes to his current pain. REVIEW OF SYSTEMS     Review of Systems   Constitutional:  Negative for chills, diaphoresis, fatigue and fever. Gastrointestinal:  Negative for nausea and vomiting. Genitourinary:  Negative for difficulty urinating and flank pain. Musculoskeletal:  Positive for gait problem and joint swelling. Negative for back pain and neck pain. Skin:  Negative for wound. Neurological:  Negative for speech difficulty and numbness. Psychiatric/Behavioral:  Negative for confusion.     PASTMEDICAL HISTORY     Past Medical History:   Diagnosis Date    Acute MI ([facility] [address]     Allergic rhinitis 09/02/2011    Anemia     Atrial fibrillation ([facility] [address]     Dr. Paul Puckett, [facility] [place]    Basal cell cancer 03/2013    left side of head, face chin Basal cell cancer 03/10/2014    left cheek    Cervical radiculopathy     Diverticulitis 02/17/2013    GERD (gastroesophageal reflux disease)     Glaucoma     left eye    Hyperlipidemia     Hypertension     Dr. Peace Chaparro    Hyponatremia 09/02/2011    IBS (irritable bowel syndrome) 09/02/2011    Insomnia 09/02/2011    Osteoarthritis     Osteoarthritis/neck pain. 09/02/2011    Prostate cancer ([address] 08/18/2021    active surviellence monitoring PSA    Renal calculi     Shingles     history of shingles    Status post prostatectomy 08/18/2021    Simple prostatectomy for BPH    Urinary frequency     Wears glasses     Wears hearing aid in both ears      Patient Active Problem List   Diagnosis Code    Hyponatremia E87.1    Insomnia G47.00    Allergic rhinitis J30.9    Branch retinal vein occlusion, left eye P42.1619    Cervical radiculopathy M54.12    Essential hypertension I10    Gastroesophageal reflux disease without esophagitis K21.9    Iron deficiency anemia D50.9    Arthritis, degenerative M19.90    Diverticulosis of large intestine without hemorrhage K57.30    B12 deficiency M97.5    Diastolic dysfunction N63.85    Elevated liver enzymes R74.8    Pure hypercholesterolemia E78.00    Irritable bowel syndrome without diarrhea K58.9    Paroxysmal atrial fibrillation (HCC) I48.0    Low testosterone R79.89    Bradycardia R00.1    Chronic bilateral low back pain without sciatica M54.50, G89.29    Gout M10.9    Anxiety F41.9    Chronic systolic congestive heart failure (HCC) I50.22    DDD (degenerative disc disease), cervical M50.30    DDD (degenerative disc disease), lumbar M51.36    Spinal stenosis of lumbar region with neurogenic claudication M48.062    Depression F32. A    Status post prostatectomy Z90.79    History of prostate cancer Z85.46     SURGICAL HISTORY       Past Surgical History:   Procedure Laterality Date    CHOLECYSTECTOMY  9-9-15    laparoscopic with cholangiogram    COLONOSCOPY  2002    COLONOSCOPY  08/2013 HERNIA REPAIR Right     inguinal    LITHOTRIPSY      MOHS SURGERY Left 03/10/14    cheek    MOHS SURGERY  12/30/14    post scalp    MOHS SURGERY Left 08/2017    X2 left cheek    MOHS SURGERY Left 10/21/2019    temple    MOHS SURGERY  12/14/2020    top of head    OTHER SURGICAL HISTORY  9/6/15    attempted ERCP    PROSTATECTOMY  08/18/2021    PROSTATECTOMY N/A 8/18/2021    XI ROBOTIC LAPAROSCOPIC SIMPLE PROSTATECTOMY performed by Logan Rodgers MD at [address] [place] [address]  03/11/13    3 areas    SKIN BIOPSY Left 02/11/14    cheek /basal cell carinoma    SKIN CANCER EXCISION      left face and top of head    SKIN CANCER EXCISION Left 03/2013    forehead, cheek, chin, basal cell. SKIN CANCER EXCISION Right 08/12/2016    with skin graft    SPINE SURGERY  04/2003     CURRENT MEDICATIONS       No current facility-administered medications on file prior to encounter.      Current Outpatient Medications on File Prior to Encounter   Medication Sig Dispense Refill    predniSONE (DELTASONE) 20 MG tablet Take 2 tabs daily x 4 days, then 1 tab daily x 6 days, then 1/2 tab daily x 6 days 17 tablet 0    metoprolol tartrate (LOPRESSOR) 25 MG tablet Take 1 tablet by mouth 2 times daily 60 tablet 5    allopurinol (ZYLOPRIM) 100 MG tablet TAKE ONE TABLET BY MOUTH DAILY 90 tablet 3    dilTIAZem (CARDIZEM CD) 180 MG extended release capsule Take 1 capsule by mouth daily      furosemide (LASIX) 20 MG tablet Take 1 tablet by mouth daily      mirtazapine (REMERON) 15 MG tablet Take 1 tablet by mouth nightly 90 tablet 3    busPIRone (BUSPAR) 15 MG tablet TAKE ONE TABLET BY MOUTH THREE TIMES A DAY (Patient taking differently: Take 15 mg by mouth 3 times daily as needed) 90 tablet 1    predniSONE (DELTASONE) 5 MG tablet TAKE ONE TABLET BY MOUTH DAILY 90 tablet 3    lisinopril (PRINIVIL;ZESTRIL) 10 MG tablet TAKE ONE TABLET BY MOUTH DAILY WITH LUNCH 90 tablet 3    albuterol sulfate HFA (VENTOLIN HFA) 108 (90 Base) MCG/ACT inhaler Inhale 2 puffs into the lungs every 4 hours as needed for Wheezing 18 g 5    warfarin (COUMADIN) 5 MG tablet Take 5 mg by mouth daily Indications: INR goal 2-2.5 Dosing per [facility]      omeprazole (PRILOSEC OTC) 20 MG tablet Take 20 mg by mouth daily      latanoprost (XALATAN) 0.005 % ophthalmic solution Place 1 drop into both eyes nightly        ALLERGIES     is allergic to celebrex [celecoxib] and mobic. FAMILY HISTORY     He indicated that his mother is . He indicated that his father is . He indicated that the status of his brother is unknown. SOCIAL HISTORY       Social History     Tobacco Use    Smoking status: Former     Packs/day: 1.00     Years: 10.00     Pack years: 10.00     Types: Cigarettes     Quit date: 1961     Years since quittin.0    Smokeless tobacco: Never    Tobacco comments:     quit    Vaping Use    Vaping Use: Never used   Substance Use Topics    Alcohol use: Not Currently     Alcohol/week: 0.0 standard drinks    Drug use: No     PHYSICAL EXAM     INITIAL VITALS: /71   Pulse 62   Temp 99.1 °F (37.3 °C)   Resp 18   Ht 5' 4\" (1.626 m)   Wt 162 lb (73.5 kg)   SpO2 96%   BMI 27.81 kg/m²    Physical Exam  Vitals and nursing note reviewed. Constitutional:       General: He is not in acute distress. Appearance: Normal appearance. He is well-developed and well-groomed. He is not ill-appearing, toxic-appearing or diaphoretic. Comments: Patient laying in bed, brought in by EMS, unable to stand up   HENT:      Head: Normocephalic and atraumatic. Nose: Nose normal.      Mouth/Throat:      Mouth: Mucous membranes are moist.   Eyes:      Conjunctiva/sclera: Conjunctivae normal.   Cardiovascular:      Rate and Rhythm: Normal rate. Rhythm irregular. Pulmonary:      Effort: Pulmonary effort is normal.      Breath sounds: Normal breath sounds. Musculoskeletal:      Cervical back: Neck supple.       Comments: Right ankle minimally swollen, slightly warm, tender to palpation. Pulses 2+. Mild diffuse tenderness extending throughout lower leg. Calf is soft. No overlying erythema. Intact sensation throughout the foot. Left ankle moderately swollen, warm, slightly pink medially, tender to palpation. Pulses 2+. Mild diffuse tenderness extending throughout lower leg. Calf is soft. Intact sensation throughout the foot. Skin:     General: Skin is warm and dry. Neurological:      General: No focal deficit present. Mental Status: He is alert and oriented to person, place, and time. Psychiatric:         Attention and Perception: Attention normal.         Mood and Affect: Mood normal.         Behavior: Behavior normal. Behavior is cooperative. Thought Content: Thought content normal.         Judgment: Judgment normal.     MEDICAL DECISION MAKING AND ED COURSE:   Summary of Patient Presentation:    1)  Number and Complexity of Problems  Problem List This Visit:  bilateral ankle pain, inability to ambulate  Differential Diagnosis: pseudogout flare-up, septic arthritis, DJD flare  Diagnoses Considered but Do Not Suspect:  fracture, DVT  Pertinent Comorbid Conditions:  Calcium pyrophosphate deposition disease of ankles, atrial fibrillation on chronic warfarin  2)  Data Reviewed  See more data below for the lab and radiology tests and orders. 3)  Treatment and Disposition  Disposition discussion with patient/family: Patient is not in a position to care for himself at present. He lives by himself and is not able to ambulate at all at this time. He does not have anyone who is able to take care of him right now. Discussed with Dr. Kevin Hunter who agrees appropriate to admit to the hospital for treatment of this pseudogout flareup. We will give first dose of pain meds and steroids here in the ED and obtain some basic imaging and labs.    Case discussed with consulting clinician: Case discussed with Dr. Reynaldo Park determinants of health impacting treatment or disposition: Patient is elderly, lives by himself, lack of social support, unable to ambulate    \"ED Course\" Notes From Epic Narrator:  ED Course as of 12/29/22 1806   Thu Dec 29, 2022   4682 Discussed with Dr. Jessica Gonzales who agreed to accept the admission [ET]      ED Course User Index  [ET] SANTOS Cole       DATA FOR LAB AND RADIOLOGY TESTS ORDERED BELOW ARE REVIEWED BY THE ED CLINICIAN:    RADIOLOGY: All x-rays, CT, MRI, and formal ultrasound images (except ED bedside ultrasound) are read by the radiologist, see reports below, unless otherwise noted in MDM or here. Reports below are reviewed by myself. XR ANKLE LEFT (MIN 3 VIEWS)   Final Result   No acute bony abnormalities are noted of the bilateral ankles         XR ANKLE RIGHT (MIN 3 VIEWS)   Final Result   No acute bony abnormalities are noted of the bilateral ankles             LABS: Lab orders shown below, the results are reviewed by myself, and all abnormals are listed below.   Labs Reviewed   BASIC METABOLIC PANEL - Abnormal; Notable for the following components:       Result Value    Glucose 120 (*)     All other components within normal limits   CBC WITH AUTO DIFFERENTIAL - Abnormal; Notable for the following components:    WBC 11.5 (*)     Hemoglobin 13.2 (*)     Hematocrit 40.9 (*)     RDW 15.3 (*)     Platelets 091 (*)     Seg Neutrophils 77 (*)     Lymphocytes 5 (*)     Monocytes 17 (*)     Absolute Lymph # 0.58 (*)     Absolute Mono # 1.96 (*)     All other components within normal limits   SEDIMENTATION RATE - Abnormal; Notable for the following components:    Sed Rate 53 (*)     All other components within normal limits   C-REACTIVE PROTEIN       Vitals Reviewed:    Vitals:    12/29/22 1447   BP: 112/71   Pulse: 62   Resp: 18   Temp: 99.1 °F (37.3 °C)   SpO2: 96%   Weight: 162 lb (73.5 kg)   Height: 5' 4\" (1.626 m)     MEDICATIONS GIVEN TO PATIENT THIS ENCOUNTER:  Orders Placed This Encounter Medications    predniSONE (DELTASONE) tablet 50 mg    fentaNYL (SUBLIMAZE) injection 50 mcg    ibuprofen (ADVIL;MOTRIN) tablet 600 mg     DISCHARGE PRESCRIPTIONS:  New Prescriptions    No medications on file     PHYSICIAN CONSULTS ORDERED THIS ENCOUNTER:  IP CONSULT TO INTERNAL MEDICINE  FINAL IMPRESSION      1. Pseudogout involving multiple joints    2. Inability to ambulate due to ankle or foot        DISPOSITION/PLAN   DISPOSITION Decision To Admit 12/29/2022 05:53:58 PM    The care is provided during an unprecedented national emergency due to the novel coronavirus, COVID 19.   [address] [facility] Milli LECHUGA [address] [place] [address]  12/29/22 6189

## 2022-12-29 NOTE — TELEPHONE ENCOUNTER
Patient called in and states that he is having foot pain. His left ankle is swollen and he cant even stand on his right foot. He usually sees Dr. Dontae Vines but he is out of the office until the new year. He has been taking tylenol but it is not helping the pain he is wanting to know if something a little stronger would be able to be called into [address] [place] [address].

## 2022-12-29 NOTE — PROGRESS NOTES
Medication History completed:    New medications:    Medications discontinued:    Medications flagged for review:    Changes to dosing:   Warfarin changed to 2.5 mg Wed, Sat and 5 mg all other days (INR goal 2-2.5)    Stated allergies: Other pertinent information: Medications confirmed with [employer]. [facility] Med[facility] reviewed. The patient's most recent outpatient INR was 1.9 on 12/20/22. The patient was prescribed a prednisone taper today, but is chronically on prednisone 5 mg daily.     Thank you,  Jevon Shaw, PharmD, BCPS  [phone]

## 2022-12-30 VITALS
BODY MASS INDEX: 27.66 KG/M2 | OXYGEN SATURATION: 95 % | HEIGHT: 64 IN | DIASTOLIC BLOOD PRESSURE: 81 MMHG | RESPIRATION RATE: 18 BRPM | SYSTOLIC BLOOD PRESSURE: 151 MMHG | HEART RATE: 71 BPM | WEIGHT: 162 LBS | TEMPERATURE: 97.2 F

## 2022-12-30 PROBLEM — Z79.01 CHRONIC ANTICOAGULATION: Status: ACTIVE | Noted: 2022-12-30

## 2022-12-30 LAB
ABSOLUTE EOS #: 0 K/UL (ref 0–0.4)
ABSOLUTE LYMPH #: 0.5 K/UL (ref 1–4.8)
ABSOLUTE MONO #: 0.9 K/UL (ref 0.1–1.3)
ANION GAP SERPL CALCULATED.3IONS-SCNC: 12 MMOL/L (ref 9–17)
BASOPHILS # BLD: 0 % (ref 0–2)
BASOPHILS ABSOLUTE: 0 K/UL (ref 0–0.2)
BUN BLDV-MCNC: 22 MG/DL (ref 8–23)
CALCIUM SERPL-MCNC: 9.6 MG/DL (ref 8.6–10.4)
CHLORIDE BLD-SCNC: 100 MMOL/L (ref 98–107)
CO2: 26 MMOL/L (ref 20–31)
CREAT SERPL-MCNC: 1.06 MG/DL (ref 0.7–1.2)
EOSINOPHILS RELATIVE PERCENT: 0 % (ref 0–4)
GFR SERPL CREATININE-BSD FRML MDRD: >60 ML/MIN/1.73M2
GLUCOSE BLD-MCNC: 118 MG/DL (ref 70–99)
HCT VFR BLD CALC: 43.9 % (ref 41–53)
HEMOGLOBIN: 14.1 G/DL (ref 13.5–17.5)
INR BLD: 2.1
LYMPHOCYTES # BLD: 7 % (ref 24–44)
MCH RBC QN AUTO: 29.2 PG (ref 26–34)
MCHC RBC AUTO-ENTMCNC: 32.2 G/DL (ref 31–37)
MCV RBC AUTO: 90.8 FL (ref 80–100)
MONOCYTES # BLD: 12 % (ref 1–7)
PDW BLD-RTO: 15.9 % (ref 11.5–14.9)
PLATELET # BLD: 143 K/UL (ref 150–450)
PMV BLD AUTO: 8.8 FL (ref 6–12)
POTASSIUM SERPL-SCNC: 4.5 MMOL/L (ref 3.7–5.3)
PROTHROMBIN TIME: 23.1 SEC (ref 11.8–14.6)
RBC # BLD: 4.84 M/UL (ref 4.5–5.9)
SEG NEUTROPHILS: 81 % (ref 36–66)
SEGMENTED NEUTROPHILS ABSOLUTE COUNT: 6 K/UL (ref 1.3–9.1)
SODIUM BLD-SCNC: 138 MMOL/L (ref 135–144)
WBC # BLD: 7.4 K/UL (ref 3.5–11)

## 2022-12-30 PROCEDURE — 85025 COMPLETE CBC W/AUTO DIFF WBC: CPT

## 2022-12-30 PROCEDURE — 6370000000 HC RX 637 (ALT 250 FOR IP): Performed by: INTERNAL MEDICINE

## 2022-12-30 PROCEDURE — 97161 PT EVAL LOW COMPLEX 20 MIN: CPT

## 2022-12-30 PROCEDURE — 80048 BASIC METABOLIC PNL TOTAL CA: CPT

## 2022-12-30 PROCEDURE — 36415 COLL VENOUS BLD VENIPUNCTURE: CPT

## 2022-12-30 PROCEDURE — 97165 OT EVAL LOW COMPLEX 30 MIN: CPT

## 2022-12-30 PROCEDURE — 85610 PROTHROMBIN TIME: CPT

## 2022-12-30 PROCEDURE — 99223 1ST HOSP IP/OBS HIGH 75: CPT | Performed by: INTERNAL MEDICINE

## 2022-12-30 RX ORDER — PREDNISONE 20 MG/1
40 TABLET ORAL DAILY
Qty: 8 TABLET | Refills: 0 | Status: SHIPPED | OUTPATIENT
Start: 2022-12-31 | End: 2023-01-04

## 2022-12-30 RX ORDER — WARFARIN SODIUM 5 MG/1
5 TABLET ORAL
Status: DISCONTINUED | OUTPATIENT
Start: 2022-12-30 | End: 2022-12-30 | Stop reason: HOSPADM

## 2022-12-30 RX ADMIN — DILTIAZEM HYDROCHLORIDE 180 MG: 180 CAPSULE, COATED, EXTENDED RELEASE ORAL at 07:54

## 2022-12-30 RX ADMIN — LISINOPRIL 10 MG: 10 TABLET ORAL at 07:54

## 2022-12-30 RX ADMIN — ALLOPURINOL 100 MG: 100 TABLET ORAL at 07:54

## 2022-12-30 RX ADMIN — PANTOPRAZOLE SODIUM 40 MG: 40 TABLET, DELAYED RELEASE ORAL at 05:59

## 2022-12-30 RX ADMIN — FUROSEMIDE 20 MG: 20 TABLET ORAL at 07:53

## 2022-12-30 RX ADMIN — METOPROLOL TARTRATE 25 MG: 25 TABLET, FILM COATED ORAL at 07:53

## 2022-12-30 RX ADMIN — PREDNISONE 40 MG: 20 TABLET ORAL at 07:53

## 2022-12-30 NOTE — PROGRESS NOTES
Physical Therapy  Facility/Department: [facility] MED SURG  Physical Therapy Initial Assessment    Name: Herminio Bowman  : 1931  MRN: [id number]  Date of Service: 2022    Discharge Recommendations:  Home with assist PRN   PT Equipment Recommendations  Equipment Needed: No      Patient Diagnosis(es): The primary encounter diagnosis was Pseudogout involving multiple joints. A diagnosis of Inability to ambulate due to ankle or foot was also pertinent to this visit. Past Medical History:  has a past medical history of Acute MI ([facility] [address] Allergic rhinitis, Anemia, Atrial fibrillation ([facility] [address] Basal cell cancer, Basal cell cancer, Cervical radiculopathy, Diverticulitis, GERD (gastroesophageal reflux disease), Glaucoma, Hyperlipidemia, Hypertension, Hyponatremia, IBS (irritable bowel syndrome), Insomnia, Osteoarthritis, Osteoarthritis/neck pain. , Prostate cancer [facility]), Renal calculi, Shingles, Status post prostatectomy, Urinary frequency, Wears glasses, and Wears hearing aid in both ears. Past Surgical History:  has a past surgical history that includes Spine surgery (2003); Lithotripsy; Skin cancer excision; skin biopsy (13); Skin cancer excision (Left, 2013); Colonoscopy (); Colonoscopy (2013); skin biopsy (Left, 14); Mohs surgery (Left, 03/10/14); Mohs surgery (14); other surgical history (9/6/15); Cholecystectomy (9-9-15); Skin cancer excision (Right, 2016); Mohs surgery (Left, 2017); Mohs surgery (Left, 10/21/2019); Mohs surgery (2020); hernia repair (Right); Prostatectomy (2021); and Prostatectomy (N/A, 2021).     Assessment   Assessment: pt demonstrates safe, functional mobility at this time  Decision Making: Low Complexity  History: Advanced age  Exam: WFLs  Clinical Presentation: stable  Requires PT Follow-Up: No  Activity Tolerance  Activity Tolerance: Patient tolerated evaluation without incident     Plan   Physcial Therapy Plan  Additional Comments: no further in-pt PT needed at this time  Safety Devices  Type of Devices: All fall risk precautions in place, Call light within reach, Patient at risk for falls, Left in chair, Nurse notified     Restrictions  Restrictions/Precautions  Restrictions/Precautions: Fall Risk  Required Braces or Orthoses?: No     Subjective   Pain: Pt denied  General  Patient assessed for rehabilitation services?: Yes  Family / Caregiver Present: Yes  Follows Commands: Within Functional Limits  Subjective  Subjective: pt eager to go home- reports feeling much better         Social/Functional History  Social/Functional History  Lives With: Alone  Type of Home: House  Home Layout: One level  Home Access: Stairs to enter with rails  Entrance Stairs - Number of Steps: 2  Entrance Stairs - Rails: Both (can reach both at same time)  Bathroom Shower/Tub: Tub/Shower unit, Doors  Bathroom Toilet: Standard  Bathroom Equipment: Grab bars in shower, Hand-held shower, Tub transfer bench  Home Equipment: C/Ulices Hadley, quad, Angus Highman, rolling, Reacher, Long-handled shoehorn ((transport chair - did not use)  Has the patient had two or more falls in the past year or any fall with injury in the past year?: No  ADL Assistance: [address] [place] [address] Avenue: Independent  Homemaking Responsibilities: Yes  Ambulation Assistance: Independent  Transfer Assistance: Independent  Occupation: Retired  Type of Occupation: [employer]  IADL Comments: sleeps in a flat bed  Additional Comments: Daughter does not work and lives nearby if needed.  Pt's daughter informed OT and PT that she will be staying with her father       Cognition   Orientation  Overall Orientation Status: Within Functional Limits  Orientation Level: Oriented X4  Cognition  Overall Cognitive Status: WFL     Objective   O2 Device: None (Room air)     AROM RLE (degrees)  RLE AROM: WFL  AROM LLE (degrees)  LLE AROM : WFL  Strength RLE  Strength RLE: WFL  Strength LLE  Strength LLE: WFL      Bed mobility  Bed Mobility Comments: ALVARADO- pt sitting up in chair at beginning and end of session  Transfers  Sit to Stand: Supervision  Stand to Sit: Supervision  Bed to Chair: Supervision  Ambulation  Device: Rolling [address]: [facility]  Distance: 150ft  Stairs/Curb  Stairs?: Yes  Stairs  # Steps : 2  Stairs Height: 6\"  Device: Rolling walker  Assistance: Stand by assistance     Balance  Sitting - Static: Good  Sitting - Dynamic: Good  Standing - Static: Fair;+  Standing - Dynamic: Fair;+     Therapy Time   Individual Concurrent Group Co-treatment   Time In 1400         Time Out 1415         Minutes [address] [facility],

## 2022-12-30 NOTE — PROGRESS NOTES
Pharmacy Note  Warfarin Consult follow-up      Recent Labs     12/29/22  1949 12/30/22  0558   INR 2.0 2.1     Recent Labs     12/29/22  1637 12/30/22  0558   HGB 13.2* 14.1   HCT 40.9* 43.9   * 143*       Significant Drug-Drug Interactions:  New warfarin drug-drug interactions: none  Discontinued drug-drug interactions: none  Current warfarin drug-drug interactions: allopurinol, mirtazapine, pantoprazole, prednisone    Date             INR        Dose given previous day  Dose scheduled for today  12/30/2022            2.1       5 mg at home     5 mg    Notes:                   Give 5 mg dose today    Daily PT/INR while inpatient.      Margueritte Hatchet, Suzi, BCSCP  12/30/2022   6:45 AM

## 2022-12-30 NOTE — PROGRESS NOTES
Ava Bedolla is a 80 y.o. Non- / non  male who presents with Leg Pain (Bilateral leg pain)   and is admitted to the hospital for the management of Pseudogout. According to patient, he had a severe gout attack today, stating that he was unable to get up and ambulate to the bathroom because of severe pain. Patient reports that symptoms have been progressively worsening over the past few days. Reports past history of similar episodes; takes allopurinol daily. Reports that pain was most severe in his left ankle, right calf, and right knee. Symptoms are associated with swelling in his left ankle and right knee. Denies fever, chills, chest pain, cough, abdominal pain, nausea, vomiting, diarrhea, and urinary symptoms. There are no known aggravating or alleviating factors. Reports that past flares have been triggered by eating seafood; however, denies eating seafood or anything else out of the ordinary recently. Symptoms are reported as constant and severe; improved since arrival to the ED. Patient reports that he lives home alone but states that his daughter is very close by and checks on him frequently. Today, he called EMS for transport as he was unable to bear any weight on his painful ankles. Patient status inpatient in the Med/Surge    Pseudogout involving multiple joints  -Patient with past history of gout presents with inability to ambulate due to severe pain and swelling  -CRP 93.8; sed rate 53  -No acute bony abnormalities noted of bilateral ankles on x-ray  -Prednisone 50 mg administered x1 in ED  --Continue 40 mg p.o. daily x5 doses  --Continue home dose allopurinol  -Patient reports that Dr. Issa Gonzalez is his podiatrist  --Consider consult if no improvement  -PT and OT eval and treat  -Social service consult for discharge planning  --Patient reports that he has been to [facility] in [facility] in the past    Chronic Anticoagulation  Patient anticoagulated d/t paroxysmal A. fib  -INR -2.0  -Check PT/INR daily  -Pharmacy to dose Coumadin  -monitor for signs of bleeding     2. Disposition 3 days      Consultations:   IP CONSULT TO INTERNAL MEDICINE  PHARMACY TO DOSE WARFARIN    Patient is admitted as inpatient status because of co-morbidities listed above, severity of signs and symptoms as outlined, requirement for current medical therapies and most importantly because of direct risk to patient if care not provided in a hospital setting. Expected length of stay > 48 hours.     NASRA Gallego CNP  12/30/2022  3:05 AM

## 2022-12-30 NOTE — PROGRESS NOTES
Pharmacy Note  Warfarin Consult    Victoria Sandhoff is a 80 y.o. male for whom pharmacy has been consulted to manage warfarin therapy. Consulting Physician: Dr Marvel Mireles  Reason for Admission: pseudogout    Warfarin dose prior to admission: 2.5 mg Wed, Sat and 5 mg all other days   Warfarin indication: afib  Target INR range: 2-2.5     Past Medical History:   Diagnosis Date    Acute MI ([address]     Allergic rhinitis 09/02/2011    Anemia     Atrial fibrillation (HCC)     Dr. Winsome Carver, [facility] cell cancer 03/2013    left side of head, face chin    Basal cell cancer 03/10/2014    left cheek    Cervical radiculopathy     Diverticulitis 02/17/2013    GERD (gastroesophageal reflux disease)     Glaucoma     left eye    Hyperlipidemia     Hypertension     Dr. Antoinette Alcocer    Hyponatremia 09/02/2011    IBS (irritable bowel syndrome) 09/02/2011    Insomnia 09/02/2011    Osteoarthritis     Osteoarthritis/neck pain. 09/02/2011    Prostate cancer ([facility] [address] 08/18/2021    active surviellence monitoring PSA    Renal calculi     Shingles     history of shingles    Status post prostatectomy 08/18/2021    Simple prostatectomy for BPH    Urinary frequency     Wears glasses     Wears hearing aid in both ears                 No results for input(s): INR in the last 72 hours. Recent Labs     12/29/22  1637   HGB 13.2*   HCT 40.9*   *       Current warfarin drug-drug interactions: allopurinol      Date                  INR                          Dose   12/29/2022            pending                5 mg dose taken at home prior to arrival    Daily PT/INR while inpatient. Thank you for the consult. Will continue to follow.    Marce Allen RPh  12/29/2022  8:05 PM

## 2022-12-30 NOTE — DISCHARGE INSTR - COC
Continuity of Care Form    Patient Name: Jonh Burleson   :  1931  MRN:  [id number]    Admit date:  2022  Discharge date:  ***    Code Status Order: Full Code   Advance Directives:     Admitting Physician:  Johnnie Martinez MD  PCP: Orlando Briones MD    Discharging Nurse: [facility] Unit/Room#: /-  Discharging Unit Phone Number: ***    Emergency Contact:   Extended Emergency Contact Information  Primary Emergency Contact: [address] Phone: [phone]  Relation: Child  Secondary Emergency Contact: NavjotInderjit   [address] Phone: [phone]  Relation: Child    Past Surgical History:  Past Surgical History:   Procedure Laterality Date    CHOLECYSTECTOMY  9-9-15    laparoscopic with cholangiogram    COLONOSCOPY      COLONOSCOPY  2013    HERNIA REPAIR Right     inguinal    LITHOTRIPSY      MOHS SURGERY Left 03/10/14    cheek    MOHS SURGERY  14    post scalp    MOHS SURGERY Left 08/2017    X2 left cheek    MOHS SURGERY Left 10/21/2019    temple    MOHS SURGERY  2020    top of head    OTHER SURGICAL HISTORY  9/6/15    attempted ERCP    PROSTATECTOMY  2021    PROSTATECTOMY N/A 2021    XI ROBOTIC LAPAROSCOPIC SIMPLE PROSTATECTOMY performed by Maude Serrato MD at [address] [place] [address]  13    3 areas    SKIN BIOPSY Left 14    cheek /basal cell carinoma    SKIN CANCER EXCISION      left face and top of head    SKIN CANCER EXCISION Left 2013    forehead, cheek, chin, basal cell.     SKIN CANCER EXCISION Right 2016    with skin graft    SPINE SURGERY  2003       Immunization History:   Immunization History   Administered Date(s) Administered    COVID-19, MODERNA BLUE border, Primary or Immunocompromised, (age 12y+), IM, 100 mcg/0.5mL 2021, 2021, 2021, 2022    COVID-19, MODERNA Bivalent BOOSTER, (age 12y+), IM, 48 mcg/0.5 mL 2022    Influenza Vaccine, unspecified formulation 10/05/2015    Influenza Whole 10/26/2010    Influenza, FLUAD, (age 72 y+), Adjuvanted, 0.5mL 09/14/2020, 09/16/2021    Influenza, Restrepo Nickels (age 72 y+), High Dose, 0.7mL 10/21/2022    Influenza, High Dose (Fluzone 65 yrs and older) 09/27/2012, 09/16/2013, 08/27/2014, 09/16/2016, 09/12/2017, 10/20/2018    Influenza, Triv, inactivated, subunit, adjuvanted, IM (Fluad 65 yrs and older) 08/27/2019    PPD Test 08/08/2019    Pneumococcal Conjugate 13-valent (Smieozg22) 11/12/2015    Pneumococcal Polysaccharide ([id number]) 09/27/2008    Tdap (Boostrix, Adacel) 08/23/2010, 10/01/2021    Zoster Live (Zostavax) 07/15/2012    Zoster Recombinant (Shingrix) 04/06/2022, 06/17/2022       Active Problems:  Patient Active Problem List   Diagnosis Code    Hyponatremia E87.1    Insomnia G47.00    Allergic rhinitis J30.9    Branch retinal vein occlusion, left eye F66.3637    Cervical radiculopathy M54.12    Essential hypertension I10    Gastroesophageal reflux disease without esophagitis K21.9    Iron deficiency anemia D50.9    Arthritis, degenerative M19.90    Diverticulosis of large intestine without hemorrhage K57.30    B12 deficiency S97.0    Diastolic dysfunction O17.15    Elevated liver enzymes R74.8    Pure hypercholesterolemia E78.00    Irritable bowel syndrome without diarrhea K58.9    Paroxysmal atrial fibrillation (HCC) I48.0    Low testosterone R79.89    Bradycardia R00.1    Chronic bilateral low back pain without sciatica M54.50, G89.29    Gout M10.9    Anxiety F41.9    Chronic systolic congestive heart failure (HCC) I50.22    DDD (degenerative disc disease), cervical M50.30    DDD (degenerative disc disease), lumbar M51.36    Spinal stenosis of lumbar region with neurogenic claudication M48.062    Depression F32. A    Status post prostatectomy Z90.79    History of prostate cancer Z85.46    Pseudogout M11.20    Chronic anticoagulation Z79.01       Isolation/Infection:   Isolation            No Isolation Patient Infection Status       Infection Onset Added Last Indicated Last Indicated By Review Planned Expiration Resolved Resolved By    None active    Resolved    COVID-19 (Rule Out) 11/24/21 11/24/21 11/24/21 COVID-19 & Influenza Combo (Ordered)   11/24/21 Rule-Out Test Resulted    COVID-19 (Rule Out) 11/17/21 11/17/21 11/17/21 COVID-19 (Ordered)   11/18/21 Rule-Out Test Resulted            Nurse Assessment:  Last Vital Signs: BP (!) 151/81   Pulse 71   Temp 97.2 °F (36.2 °C)   Resp 18   Ht 5' 4\" (1.626 m)   Wt 162 lb (73.5 kg)   SpO2 95%   BMI 27.81 kg/m²     Last documented pain score (0-10 scale):    Last Weight:   Wt Readings from Last 1 Encounters:   12/29/22 162 lb (73.5 kg)     Mental Status:  {IP PT MENTAL STATUS:17165}    IV Access:  { MIRLANDE IV ACCESS:609883326}    Nursing Mobility/ADLs:  Walking   {P DME BKNZ:910038809}  Transfer  {P DME SEEK:990914328}  Bathing  {P DME YVHH:844792098}  Dressing  {CHP DME HRZQ:444488878}  Toileting  {CHP DME IRZS:556124563}  Feeding  {P DME LJWD:989598165}  Med Admin  {P DME KCPQ:211606354}  Med Delivery   { MIRLANDE MED Delivery:473472417}    Wound Care Documentation and Therapy:  Incision 08/18/21 Abdomen Lower;Medial (Active)   Number of days: 498        Elimination:  Continence:    Bowel: {YES / YD:49259}  Bladder: {YES / ZY:04332}  Urinary Catheter: {Urinary Catheter:364869413}   Colostomy/Ileostomy/Ileal Conduit: {YES / XR:56076}       Date of Last BM: ***    Intake/Output Summary (Last 24 hours) at 12/30/2022 0950  Last data filed at 12/30/2022 0303  Gross per 24 hour   Intake --   Output 200 ml   Net -200 ml     I/O last 3 completed shifts:  In: -   Out: 200 [Urine:200]    Safety Concerns:     [address] Safety Concerns:613985139}    Impairments/Disabilities:      [address] Impairments/Disabilities:220267447}    Nutrition Therapy:  Current Nutrition Therapy:   [address] Diet List:699579914}    Routes of Feeding: {CHP DME Other Feedings:433132630}  Liquids: {Slp liquid thickness:73597}  Daily Fluid Restriction: {CHP DME Yes amt example:063737281}  Last Modified Barium Swallow with Video (Video Swallowing Test): {Done Not Done UPZW:209706146}    Treatments at the Time of Hospital Discharge:   Respiratory Treatments: ***  Oxygen Therapy:  {Therapy; copd oxygen:75439}  Ventilator:    {[facility] Vent RBBB:690788395}    Rehab Therapies: {THERAPEUTIC INTERVENTION:9093819192}  Weight Bearing Status/Restrictions: { CC Weight Bearin}  Other Medical Equipment (for information only, NOT a DME order):  {EQUIPMENT:655716523}  Other Treatments: ***    Patient's personal belongings (please select all that are sent with patient):  {P DME Belongings:968620450}    RN SIGNATURE:  {Esignature:066873778}    CASE MANAGEMENT/SOCIAL WORK SECTION    Inpatient Status Date: ***    Readmission Risk Assessment Score:  Readmission Risk              Risk of Unplanned Readmission:  12           Discharging to Facility/ Agency   Name:   Address:  Phone:  Fax:    Dialysis Facility (if applicable)   Name:  Address:  Dialysis Schedule:  Phone:  Fax:    / signature: {Esignature:376099390}    PHYSICIAN SECTION    Prognosis: {Prognosis:2350630607}    Condition at Discharge: [address] [place] [address] Patient Condition:897785073}    Rehab Potential (if transferring to Rehab): {Prognosis:2885459381}    Recommended Labs or Other Treatments After Discharge: ***    Physician Certification: I certify the above information and transfer of Tahira Diaz  is necessary for the continuing treatment of the diagnosis listed and that he requires {Admit to Appropriate Level of Care:93222} for {GREATER/LESS:320243901} 30 days.      Update Admission H&P: {CHP DME Changes in YVHTW:157488735}    PHYSICIAN SIGNATURE:  Electronically signed by Winnie Closs, MD on 22 at 9:50 AM EST

## 2022-12-30 NOTE — PROGRESS NOTES
[address]    Occupational Therapy Evaluation  Date: 22  Patient Name: Whitney Jones       Room: [facility]/6692-83  MRN: [id number]  Account: [de-identified]   : 1931  (80 y.o.) Gender: male     Discharge Recommendations: The patient's needs are being met with no further Occupational Therapy recommended at discharge. Referring Practitioner: Gerrianne Cranker, MD  Diagnosis: Pseudogout           Past Medical History:  has a past medical history of Acute MI ([facility] [address] Allergic rhinitis, Anemia, Atrial fibrillation ([facility] [address] Basal cell cancer, Basal cell cancer, Cervical radiculopathy, Diverticulitis, GERD (gastroesophageal reflux disease), Glaucoma, Hyperlipidemia, Hypertension, Hyponatremia, IBS (irritable bowel syndrome), Insomnia, Osteoarthritis, Osteoarthritis/neck pain. , Prostate cancer [facility]), Renal calculi, Shingles, Status post prostatectomy, Urinary frequency, Wears glasses, and Wears hearing aid in both ears. Past Surgical History:   has a past surgical history that includes Spine surgery (2003); Lithotripsy; Skin cancer excision; skin biopsy (13); Skin cancer excision (Left, 2013); Colonoscopy (); Colonoscopy (2013); skin biopsy (Left, 14); Mohs surgery (Left, 03/10/14); Mohs surgery (14); other surgical history (9/6/15); Cholecystectomy (9-9-15); Skin cancer excision (Right, 2016); Mohs surgery (Left, 2017); Mohs surgery (Left, 10/21/2019); Mohs surgery (2020); hernia repair (Right); Prostatectomy (2021); and Prostatectomy (N/A, 2021).     Restrictions  Restrictions/Precautions  Restrictions/Precautions: Fall Risk  Required Braces or Orthoses?: No      Vitals  Vitals  Heart Rate: 71  BP: (!) 151/81  MAP (Calculated): 104  Resp: 18  SpO2: 95 %  O2 Device: None (Room air)     Subjective  Subjective: Pt is pleasant and agreeable for therapy  Comments: Okay for OT PT eval per VERONICA Andersen  Subjective  Pain: Pt denied      Social/Functional History  Social/Functional History  Lives With: Alone  Type of Home: House  Home Layout: One level  Home Access: Stairs to enter with rails  Entrance Stairs - Number of Steps: 2  Entrance Stairs - Rails: Both (can reach both at same time)  Bathroom Shower/Tub: Tub/Shower unit, Doors  Bathroom Toilet: Standard  Bathroom Equipment: Grab bars in shower, Hand-held shower, Tub transfer bench  Home Equipment: [place], [place], quad, Walker, rolling, Reacher, Long-handled shoehorn ((transport chair - did not use)  Has the patient had two or more falls in the past year or any fall with injury in the past year?: No  ADL Assistance: [facility] [address]: Independent  Homemaking Responsibilities: Yes  Ambulation Assistance: Independent  Transfer Assistance: Independent  Occupation: Retired  Type of Occupation: [employer]  IADL Comments: sleeps in a flat bed  Additional Comments: Daughter does not work and lives nearby if needed. Pt's daughter informed OT and PT that she will be staying with her father      Objective  Orientation  Overall Orientation Status: Within Functional Limits  Orientation Level: Oriented X4  Cognition  Overall Cognitive Status: WFL        ADL  Feeding: Independent  Grooming: Independent  UE Bathing: Modified independent   LE Bathing: Modified independent   UE Dressing: Modified independent   LE Dressing: Modified independent   LE Dressing Skilled Clinical Factors: Pt threaded LEs through pants seated on chair, stood to pull over hips. Pt able to tacos/doff B slippers, uses long handled shoe horn as needed  Toileting: Modified independent   Additional Comments: ADL scores based on skilled observation and clinical reasoning, unless otherwise noted.          UE Function  LUE AROM (degrees)  LUE AROM : WFL  Left Hand AROM (degrees)  Left Hand AROM: WFL  Tone LUE  LUE Tone: Normotonic  LUE Strength  L Hand General: 4+/5  LUE Strength Comment: Overall 4+/5    RUE AROM (degrees)  RUE AROM : WFL  Right Hand AROM (degrees)  Right Hand AROM: WFL  Tone RUE  RUE Tone: Normotonic  RUE Strength  R Hand General: 4+/5  RUE Strength Comment: Overall 4+/5         Fine Motor Skills/Coordination  Coordination  Movements Are Fluid And Coordinated: Yes              Bed Mobility  Bed mobility  Bed Mobility Comments: ALVARADO- pt sitting up in chair at beginning and end of session    Balance  Balance  Sitting Balance: Independent  Standing Balance: Supervision  Standing Balance  Time: 2-3 minutes, 1-2 minutes x2  Activity: functional transfers, functional mobility, stair management  Comment: UE support throughout    Transfers  Transfers  Sit to stand: Supervision  Stand to sit: Supervision    Functional Mobility  Functional - Mobility Device: Rolling Walker  Activity: Other (Throughout hallway, completing community distances)  Assist Level: Supervision  Functional Mobility Comments: No LOB noted. Assessment  Assessment  Assessment: Pt is independent with self care and functional mobility. D/C OT as no skilled services are identified. Decision Making: Low Complexity  Discharge Recommendations: Home with assist PRN         Safety Devices  Type of Devices:  All fall risk precautions in place, Call light within reach, Patient at risk for falls, Left in chair, Nurse notified    Patient Education  Patient Education  Education Given To: Patient, Family  Education Provided: Role of Therapy, Plan of Care, Transfer Training  Education Method: Verbal  Barriers to Learning: None  Education Outcome: Verbalized understanding, Demonstrated understanding      Functional Outcome Measures  AM-PAC Daily Activity Inpatient   How much help for putting on and taking off regular lower body clothing?: None  How much help for Bathing?: None  How much help for Toileting?: None  How much help for putting on and taking off regular upper body clothing?: None  How much help for taking care of personal grooming?: None  How much help for eating meals?: None  AM-PAC Inpatient Daily Activity Raw Score: 24  AM-PAC Inpatient ADL T-Scale Score : 57.54  ADL Inpatient CMS 0-100% Score: 0  ADL Inpatient CMS G-Code Modifier : CH       Goals     Short Term Goals  Time Frame for Short Term Goals: D/C OT    Plan         OT Individual Minutes  OT Individual Minutes  Time In: 1400  Time Out: 1648  Minutes: 15           Electronically signed by Randi Christiansen OT on 12/30/22 at 3:14 PM EST

## 2022-12-30 NOTE — ED PROVIDER NOTES
eMERGENCY dEPARTMENT eNCOUnter   Independent Attestation     Pt Name: Eliane Li  MRN: [id number]  [place] 12/2/1931  Date of evaluation: 12/30/22     Eliane Li is a 80 y.o. male with CC: Leg Pain (Bilateral leg pain)      Based on the medical record the care appears appropriate. I was personally available for consultation in the Emergency Department.     Abdelrahman Vaughn MD  Attending Emergency Physician                  Abdelrahman Vaughn MD  12/30/22 6871

## 2022-12-30 NOTE — PROGRESS NOTES
12/30/22 1018   Encounter Summary   Referral/Consult From: Patient   Support System [employer] community   Plan and Referrals   Plan/Referrals Contacted support as requested per patient/family request Plan  (Gera García at [address])

## 2022-12-30 NOTE — CARE COORDINATION
CASE MANAGEMENT NOTE:    Admission Date:  12/29/2022 Melissa Ku is a 80 y.o.  male    Admitted for : Pseudogout [M11.20]  Inability to ambulate due to ankle or foot [R26.2]  Pseudogout involving multiple joints [M11.89]    Met with:  Patient    PCP:  Amada Rios MD                                Insurance:  [employer]      Is patient alert and oriented at time of discussion:  Yes    Current Residence/ Living Arrangements:  independently at home alone, 1 story             Current Services PTA:  No    Does patient go to outpatient dialysis: No  If yes, location and chair time:   Who is their nephrologist?     Is patient agreeable to VNS: No    Freedom of choice provided:  NA    List of [address] provided: NA    VNS chosen:  NA    DME:  straight cane, walker, rollator, shower bench, transport chair    Home Oxygen: No    Nebulizer: No    CPAP/BIPAP: No    Supplier: N/A    Potential Assistance Needed: No    SNF needed: No    Freedom of choice and list provided: NA    Pharm[facility]:  [facility]       Is patient currently receiving oral anticoagulation therapy? Yes, coumadin [facility]    Is the Patient an [facility] with Readmission Risk Score greater than 14%? Yes  If yes, pt needs a follow up appointment made within 7 days.     Family Members/Caregivers that pt would like involved in their care:    Yes    If yes, list name here:  Alexandra-daughter    Transportation Provider:  Patient             Discharge Plan:  12/30/2022 Medicare/[facility];  independently at home alone, 1 story; DME cane, walker, rollator, shower bench, transport chair; VNS denies needs; coumadin [facility]; discharge home no needs; [place] 14%//KR  IMM 12/30 @1020                                Electronically signed by: Bossman Duran RN on 12/30/2022 at 10:25 AM

## 2022-12-30 NOTE — PROGRESS NOTES
Nurse went over discharge instructions. All questions answered. Iv removed. Pt signed.  Meds given tot the patient

## 2022-12-30 NOTE — H&P
[facility] [address]    HISTORY AND PHYSICAL EXAMINATION            Date:   12/30/2022  Patient name:  Ana Comer  Date of admission:  12/29/2022  2:47 PM  MRN:   [id number]  Account:  [de-identified]  YOB: 1931  PCP:    Pooja Lagos MD  Room:   2071/2071-01  Code Status:    Full Code    Chief Complaint:     Chief Complaint   Patient presents with    Leg Pain     Bilateral leg pain       History Obtained From:     patient, electronic medical record    History of Present Illness: The patient is a 80 y.o. Non- / non  male who presents with Leg Pain (Bilateral leg pain)   and he is admitted to the hospital for the management of    Patient past medical history multiple medical problems include hypertension, CHF, atrial fibrillation, on anticoagulation, aortic stenosis, history of pseudogout, patient, follows with Dr. Charley Knox  Patient presented with complaint of pain in both ankles, symptoms are going on for last few days before presentation  He told us that his gout flareup when he ate some seafood, this time he has not eaten anything which can precipitate his pseudogout  Patient was started on steroid  X-ray ankles were done negative for fracture  Feeling much better today      Past Medical History:     Past Medical History:   Diagnosis Date    Acute MI ([address]     Allergic rhinitis 09/02/2011    Anemia     Atrial fibrillation ([address]     Dr. Charley Knox, [facility] cell cancer 03/2013    left side of head, face chin    Basal cell cancer 03/10/2014    left cheek    Cervical radiculopathy     Diverticulitis 02/17/2013    GERD (gastroesophageal reflux disease)     Glaucoma     left eye    Hyperlipidemia     Hypertension     Dr. Roman Ortez    Hyponatremia 09/02/2011    IBS (irritable bowel syndrome) 09/02/2011    Insomnia 09/02/2011    Osteoarthritis     Osteoarthritis/neck pain.  09/02/2011    Prostate cancer ([address] 08/18/2021    active surviellence monitoring PSA    Renal calculi     Shingles     history of shingles    Status post prostatectomy 08/18/2021    Simple prostatectomy for BPH    Urinary frequency     Wears glasses     Wears hearing aid in both ears         Past Surgical History:     Past Surgical History:   Procedure Laterality Date    CHOLECYSTECTOMY  9-9-15    laparoscopic with cholangiogram    COLONOSCOPY  2002    COLONOSCOPY  08/2013    HERNIA REPAIR Right     inguinal    LITHOTRIPSY      MOHS SURGERY Left 03/10/14    cheek    MOHS SURGERY  12/30/14    post scalp    MOHS SURGERY Left 08/2017    X2 left cheek    MOHS SURGERY Left 10/21/2019    temple    MOHS SURGERY  12/14/2020    top of head    OTHER SURGICAL HISTORY  9/6/15    attempted ERCP    PROSTATECTOMY  08/18/2021    PROSTATECTOMY N/A 8/18/2021    XI ROBOTIC LAPAROSCOPIC SIMPLE PROSTATECTOMY performed by Afton Holstein, MD at [address] [place] [address]  03/11/13    3 areas    SKIN BIOPSY Left 02/11/14    cheek /basal cell carinoma    SKIN CANCER EXCISION      left face and top of head    SKIN CANCER EXCISION Left 03/2013    forehead, cheek, chin, basal cell. SKIN CANCER EXCISION Right 08/12/2016    with skin graft    SPINE SURGERY  04/2003        Medications Prior to Admission:     Prior to Admission medications    Medication Sig Start Date End Date Taking?  Authorizing Provider   chlorhexidine (PERIDEX) 0.12 % solution Take 15 mLs by mouth 2 times daily For 15 days starting 12/21/22 12/21/22 1/5/23 Yes Historical Provider, MD   warfarin (COUMADIN) 5 MG tablet Take 5 mg by mouth Five times weekly Indications: Sun, Mon, Tues, Thurs, Fri - INR goal 2-2.5 Dosing per [facility]   Yes Historical Provider, MD   predniSONE (DELTASONE) 20 MG tablet Take 2 tabs daily x 4 days, then 1 tab daily x 6 days, then 1/2 tab daily x 6 days 12/29/22 1/8/23  NASRA Dorman - NP   metoprolol tartrate (LOPRESSOR) 25 MG tablet Take 1 tablet by mouth 2 times daily 12/13/22   Gabi Hayes MD   allopurinol (ZYLOPRIM) 100 MG tablet TAKE ONE TABLET BY MOUTH DAILY 10/20/22   Gabi Hayes MD   dilTIAZem (CARDIZEM CD) 180 MG extended release capsule Take 180 mg by mouth daily 7/29/22   Helen Provider, MD   furosemide (LASIX) 20 MG tablet Take 20 mg by mouth daily 7/22/22   Helen Provider, MD   mirtazapine (REMERON) 15 MG tablet Take 1 tablet by mouth nightly 8/19/22   Gabi Hayes MD   busPIRone (BUSPAR) 15 MG tablet TAKE ONE TABLET BY MOUTH THREE TIMES A DAY  Patient taking differently: Take 15 mg by mouth 3 times daily as needed 8/18/22   Gabi Hayes MD   predniSONE (DELTASONE) 5 MG tablet TAKE ONE TABLET BY MOUTH DAILY 4/13/22   Gabi Hayes MD   lisinopril (PRINIVIL;ZESTRIL) 10 MG tablet TAKE ONE TABLET BY MOUTH DAILY WITH LUNCH 2/1/22   Gabi Hayes MD   albuterol sulfate HFA (VENTOLIN HFA) 108 (90 Base) MCG/ACT inhaler Inhale 2 puffs into the lungs every 4 hours as needed for Wheezing 1/17/22   Gabi Hayes MD   warfarin (COUMADIN) 5 MG tablet Take 2.5 mg by mouth Twice a Week Indications: Wed/Sat - INR goal 2-2.5 Dosing per [facility]    Historical Provider, MD   omeprazole (PRILOSEC OTC) 20 MG tablet Take 20 mg by mouth daily    Historical Provider, MD   latanoprost (XALATAN) 0.005 % ophthalmic solution Place 1 drop into both eyes nightly     Historical Provider, MD        Allergies:     Celebrex [celecoxib] and Mobic    Social History:     Tobacco:    reports that he quit smoking about 62 years ago. His smoking use included cigarettes. He has a 10.00 pack-year smoking history. He has never used smokeless tobacco.  Alcohol:      reports that he does not currently use alcohol. Drug Use:  reports no history of drug use.     Family History:     Family History   Problem Relation Age of Onset    Heart Disease Mother     Lung Cancer Father     Cancer Father         lung    Heart Disease Brother Review of Systems:     Positive and Negative as described in HPI. CONSTITUTIONAL:  negative for fevers, chills, sweats, fatigue, weight loss  HEENT:  negative for vision, hearing changes, runny nose, throat pain  RESPIRATORY:  negative for shortness of breath, cough, congestion, wheezing. CARDIOVASCULAR:  negative for chest pain, palpitations. GASTROINTESTINAL:  negative for nausea, vomiting, diarrhea, constipation, change in bowel habits, abdominal pain   GENITOURINARY:  negative for difficulty of urination, burning with urination, frequency   INTEGUMENT:  negative for rash, skin lesions, easy bruising   HEMATOLOGIC/LYMPHATIC:  negative for swelling/edema   ALLERGIC/IMMUNOLOGIC:  negative for urticaria , itching  ENDOCRINE:  negative increase in drinking, increase in urination, hot or cold intolerance  MUSCULOSKELETAL: Pain in both ankles, left more than right  NEUROLOGICAL:  negative for headaches, dizziness, lightheadedness, numbness, pain, tingling extremities  BEHAVIOR/PSYCH:  negative for depression, anxiety    Physical Exam:   BP (!) 151/81   Pulse 71   Temp 97.2 °F (36.2 °C)   Resp 18   Ht 5' 4\" (1.626 m)   Wt 162 lb (73.5 kg)   SpO2 95%   BMI 27.81 kg/m²   Temp (24hrs), Av.1 °F (36.7 °C), Min:97.2 °F (36.2 °C), Max:99.1 °F (37.3 °C)    No results for input(s): POCGLU in the last 72 hours. Intake/Output Summary (Last 24 hours) at 2022 1517  Last data filed at 2022 0303  Gross per 24 hour   Intake --   Output 200 ml   Net -200 ml       General Appearance:  alert, well appearing, and in no acute distress  Mental status: oriented to person, place, and time with normal affect  Head:  normocephalic, atraumatic.   Eye: no icterus, redness, pupils equal and reactive, extraocular eye movements intact, conjunctiva clear  Ear: normal external ear, no discharge, hearing intact  Nose:  no drainage noted  Mouth: mucous membranes moist  Neck: supple, no carotid bruits, thyroid not palpable  Lungs: Bilateral equal air entry, clear to ausculation, no wheezing, rales or rhonchi, normal effort  Cardiovascular: Irregular irregular heart rate, loud murmur in aortic area  Abdomen: Soft, nontender, nondistended, normal bowel sounds, no hepatomegaly or splenomegaly  Neurologic: There are no new focal motor or sensory deficits, normal muscle tone and bulk, no abnormal sensation, normal speech, cranial nerves II through XII grossly intact  Skin: No gross lesions, rashes, bruising or bleeding on exposed skin area  Extremities: Pain in both ankles, left more than right, left ankle is swollen, slightly tender to touch  Psych: normal affect     Investigations:      Laboratory Testing:  Recent Results (from the past 24 hour(s))   Basic Metabolic Panel    Collection Time: 12/29/22  4:37 PM   Result Value Ref Range    Glucose 120 (H) 70 - 99 mg/dL    BUN 20 8 - 23 mg/dL    Creatinine 1.12 0.70 - 1.20 mg/dL    Est, Glom Filt Rate >60 >60 mL/min/1.73m2    Calcium 9.3 8.6 - 10.4 mg/dL    Sodium 137 135 - 144 mmol/L    Potassium 4.1 3.7 - 5.3 mmol/L    Chloride 99 98 - 107 mmol/L    CO2 28 20 - 31 mmol/L    Anion Gap 10 9 - 17 mmol/L   CBC with Auto Differential    Collection Time: 12/29/22  4:37 PM   Result Value Ref Range    WBC 11.5 (H) 3.5 - 11.0 k/uL    RBC 4.55 4.5 - 5.9 m/uL    Hemoglobin 13.2 (L) 13.5 - 17.5 g/dL    Hematocrit 40.9 (L) 41 - 53 %    MCV 90.0 80 - 100 fL    MCH 29.0 26 - 34 pg    MCHC 32.2 31 - 37 g/dL    RDW 15.3 (H) 11.5 - 14.9 %    Platelets 847 (L) 562 - 450 k/uL    MPV 8.9 6.0 - 12.0 fL    Seg Neutrophils 77 (H) 36 - 66 %    Lymphocytes 5 (L) 24 - 44 %    Monocytes 17 (H) 1 - 7 %    Eosinophils % 0 0 - 4 %    Basophils 0 0 - 2 %    Bands 1 0 - 10 %    Segs Absolute 8.84 1.3 - 9.1 k/uL    Absolute Lymph # 0.58 (L) 1.0 - 4.8 k/uL    Absolute Mono # 1.96 (H) 0.1 - 1.3 k/uL    Absolute Eos # 0.00 0.0 - 0.4 k/uL    Basophils Absolute 0.00 0.0 - 0.2 k/uL    Absolute Bands # 0.12 0.0 - 1.0 k/uL    Morphology ANISOCYTOSIS PRESENT    Sedimentation Rate    Collection Time: 12/29/22  4:37 PM   Result Value Ref Range    Sed Rate 53 (H) 0 - 20 mm/Hr   C-Reactive Protein    Collection Time: 12/29/22  4:37 PM   Result Value Ref Range    CRP 93.8 (H) 0.0 - 5.0 mg/L   Protime-INR    Collection Time: 12/29/22  7:49 PM   Result Value Ref Range    Protime 23.0 (H) 11.8 - 14.6 sec    INR 2.0    Protime-INR    Collection Time: 12/30/22  5:58 AM   Result Value Ref Range    Protime 23.1 (H) 11.8 - 14.6 sec    INR 2.1    CBC with Auto Differential    Collection Time: 12/30/22  5:58 AM   Result Value Ref Range    WBC 7.4 3.5 - 11.0 k/uL    RBC 4.84 4.5 - 5.9 m/uL    Hemoglobin 14.1 13.5 - 17.5 g/dL    Hematocrit 43.9 41 - 53 %    MCV 90.8 80 - 100 fL    MCH 29.2 26 - 34 pg    MCHC 32.2 31 - 37 g/dL    RDW 15.9 (H) 11.5 - 14.9 %    Platelets 333 (L) 772 - 450 k/uL    MPV 8.8 6.0 - 12.0 fL    Seg Neutrophils 81 (H) 36 - 66 %    Lymphocytes 7 (L) 24 - 44 %    Monocytes 12 (H) 1 - 7 %    Eosinophils % 0 0 - 4 %    Basophils 0 0 - 2 %    Segs Absolute 6.00 1.3 - 9.1 k/uL    Absolute Lymph # 0.50 (L) 1.0 - 4.8 k/uL    Absolute Mono # 0.90 0.1 - 1.3 k/uL    Absolute Eos # 0.00 0.0 - 0.4 k/uL    Basophils Absolute 0.00 0.0 - 0.2 k/uL   Basic Metabolic Panel    Collection Time: 12/30/22  5:58 AM   Result Value Ref Range    Glucose 118 (H) 70 - 99 mg/dL    BUN 22 8 - 23 mg/dL    Creatinine 1.06 0.70 - 1.20 mg/dL    Est, Glom Filt Rate >60 >60 mL/min/1.73m2    Calcium 9.6 8.6 - 10.4 mg/dL    Sodium 138 135 - 144 mmol/L    Potassium 4.5 3.7 - 5.3 mmol/L    Chloride 100 98 - 107 mmol/L    CO2 26 20 - 31 mmol/L    Anion Gap 12 9 - 17 mmol/L       Imaging/Diagnostics:        Assessment :      Primary Problem  Pseudogout    Active Hospital Problems    Diagnosis Date Noted    Chronic anticoagulation [Z79.01] 12/30/2022     Priority: Medium    Pseudogout [M11.20] 12/29/2022     Priority: Medium    Paroxysmal atrial fibrillation (HCC) [I48.0] 11/12/2015       Plan:     Patient status Admit as observation in the  Med/Surge    Admitted with pain in both ankles, history of pseudogout, patient much improved with steroid, on allopurinol  History of heart failure with reduced ejection fraction, on Lasix, Lopressor, lisinopril, compensated  History of A. fib, on Cardizem, still in A. fib but rate controlled, INR is therapeutic, on Coumadin, pharmacy dosing Coumadin  History of arctic stenosis, patient has appointment coming with his primary cardiologist, will have serial echocardiogram  PT/OT following  Likely discharge later today      Consultations:   IP CONSULT TO INTERNAL MEDICINE  PHARMACY TO DOSE WARFARIN    Patient is admitted as inpatient status because of co-morbidities listed above, severity of signs and symptoms as outlined, requirement for current medical therapies and most importantly because of direct risk to patient if care not provided in a hospital setting. Jyoti Crawford MD  12/30/2022  9:42 AM    Copy sent to Dr. Anoop Medina MD    Please note that this chart was generated using voice recognition Dragon dictation software. Although every effort was made to ensure the accuracy of this automated transcription, some errors in transcription may have occurred.

## 2023-01-03 ENCOUNTER — OFFICE VISIT (OUTPATIENT)
Dept: PODIATRY | Age: 88
End: 2023-01-03
Payer: MEDICARE

## 2023-01-03 ENCOUNTER — CARE COORDINATION (OUTPATIENT)
Dept: CASE MANAGEMENT | Age: 88
End: 2023-01-03

## 2023-01-03 ENCOUNTER — TELEPHONE (OUTPATIENT)
Dept: FAMILY MEDICINE CLINIC | Age: 88
End: 2023-01-03

## 2023-01-03 VITALS — HEIGHT: 64 IN | BODY MASS INDEX: 27.66 KG/M2 | WEIGHT: 162 LBS

## 2023-01-03 DIAGNOSIS — M10.071 ACUTE IDIOPATHIC GOUT OF RIGHT FOOT: ICD-10-CM

## 2023-01-03 DIAGNOSIS — M72.2 PLANTAR FASCIITIS, RIGHT: ICD-10-CM

## 2023-01-03 DIAGNOSIS — M76.821 POSTERIOR TIBIAL TENDINITIS, RIGHT: Primary | ICD-10-CM

## 2023-01-03 DIAGNOSIS — I50.22 CHRONIC SYSTOLIC CONGESTIVE HEART FAILURE (HCC): Primary | ICD-10-CM

## 2023-01-03 PROCEDURE — 99213 OFFICE O/P EST LOW 20 MIN: CPT | Performed by: PODIATRIST

## 2023-01-03 PROCEDURE — G8417 CALC BMI ABV UP PARAM F/U: HCPCS | Performed by: PODIATRIST

## 2023-01-03 PROCEDURE — G8484 FLU IMMUNIZE NO ADMIN: HCPCS | Performed by: PODIATRIST

## 2023-01-03 PROCEDURE — 1111F DSCHRG MED/CURRENT MED MERGE: CPT

## 2023-01-03 PROCEDURE — 1111F DSCHRG MED/CURRENT MED MERGE: CPT | Performed by: PODIATRIST

## 2023-01-03 PROCEDURE — 29540 STRAPPING ANKLE &/FOOT: CPT | Performed by: PODIATRIST

## 2023-01-03 PROCEDURE — G8427 DOCREV CUR MEDS BY ELIG CLIN: HCPCS | Performed by: PODIATRIST

## 2023-01-03 PROCEDURE — 1036F TOBACCO NON-USER: CPT | Performed by: PODIATRIST

## 2023-01-03 PROCEDURE — 1123F ACP DISCUSS/DSCN MKR DOCD: CPT | Performed by: PODIATRIST

## 2023-01-03 ASSESSMENT — ENCOUNTER SYMPTOMS
COLOR CHANGE: 0
VOMITING: 0
CONSTIPATION: 0
NAUSEA: 0
DIARRHEA: 0

## 2023-01-03 NOTE — CARE COORDINATION
[facility] Care Transitions Initial Follow Up Call    Call within 2 business days of discharge: Yes    Care Transition Nurse contacted the patient by telephone to perform post hospital discharge assessment. Verified name and  with patient as identifiers. Provided introduction to self, and explanation of the Care Transition Nurse role. Patient: Coleman Schmitt Patient : 1931   MRN: [id number]  Reason for Admission: leg pain  Discharge Date: 22 RARS: Readmission Risk Score: 13.5      Last Discharge  Street       Date Complaint Diagnosis Description Type Department Provider    22 Leg Pain Pseudogout involving multiple joints . .. ED to Hosp-Admission (Discharged) (ADMITTED) Rufina Hunt MD; Elmer Victor. .. Was this an external facility discharge? No Discharge Facility: [facility]    Challenges to be reviewed by the provider   Additional needs identified to be addressed with provider: No  none               Method of communication with provider: none. Writer spoke to patient, reviewed discharge instructions and medications, 1111F order completed, patient doing well, has an appointment with the podiatrist today at [phone], has HFU with pcp on 23, denies any sob, cp, f/c, n/v/d, dizziness or pain, reviewed s/s of CHF, explained role of CTN provided contact information, will continue to follow//JU    Care Transition Nurse reviewed discharge instructions, medical action plan, and red flags with patient who verbalized understanding. The patient was given an opportunity to ask questions and does not have any further questions or concerns at this time. Were discharge instructions available to patient? Yes. Reviewed appropriate site of care based on symptoms and resources available to patient including: PCP  Specialist  Urgent care clinics  When to call 911. The patient agrees to contact the PCP office for questions related to their healthcare.      Advance Care Planning:   Does patient have an Advance Directive: reviewed and current. Medication reconciliation was performed with patient, who verbalizes understanding of administration of home medications. Medications reviewed, 1111F entered: yes    Was patient discharged with a pulse oximeter? Non-face-to-face services provided:  Scheduled appointment with PCP-1/11/23  Scheduled appointment with Specialist-1/3/23  Obtained and reviewed discharge summary and/or continuity of care documents  Education of patient/family/caregiver/guardian to support self-management-  Record daily weights. Notify your doctor if you have a weight gain 2 pounds in a day, or 3-5 pounds in 1 week. Notify your doctor for  increased shortness of breath, or swelling in legs or feet. Follow a low sodium diet. Resume normal activity unless otherwise instructed by your doctor. Assessment and support for treatment adherence and medication management-    Offered patient enrollment in the Remote Patient Monitoring (RPM) program for in-home monitoring: Yes, but did not enroll at this time and Patient declined.     Care Transitions 24 Hour Call    Schedule Follow Up Appointment with PCP: Completed  Do you have a copy of your discharge instructions?: Yes  Do you have all of your prescriptions and are they filled?: Yes  Have you been contacted by a [address]?: No  Have you scheduled your follow up appointment?: Yes  How are you going to get to your appointment?: Car - family or friend to transport  [address]: [address] (Comment: [facility])  Do you have support at home?: Alone  Do you feel like you have everything you need to keep you well at home?: Yes  Are you an active caregiver in your home?: No  Care Transitions Interventions  Disease Association: Completed               Follow Up  Future Appointments   Date Time Provider Chanelle Phillips   1/3/2023  3:45 PM Analilia Serrato DPM [id number]   2/13/2023  1:30 PM Felicia Saunders MD [facility]   2/23/2023  2:30 PM Lonnie Quick, [address]   4/27/2023  2:30 PM Steven Tesfaye. Maikel Guadarrama" [address] Transition Nurse provided contact information. Plan for follow-up call in 3-5 days based on severity of symptoms and risk factors.   Plan for next call:     Oscar Santizo RN

## 2023-01-03 NOTE — TELEPHONE ENCOUNTER
Care Transitions Initial Follow Up Call    Outreach made within 2 business days of discharge: Yes    Patient: Dino Vergara Patient : 1931   MRN: [id number]  Reason for Admission: There are no discharge diagnoses documented for the most recent discharge. Discharge Date: 22       Spoke with: patient    Discharge department/facility: [facility]    TCM Interactive Patient Contact:  Was patient able to fill all prescriptions: Yes  Was patient instructed to bring all medications to the follow-up visit: Yes  Is patient taking all medications as directed in the discharge summary?  Yes  Does patient understand their discharge instructions: Yes  Does patient have questions or concerns that need addressed prior to 7-14 day follow up office visit: yes - 2023    Scheduled appointment with PCP within 7-14 days    Follow Up  Future Appointments   Date Time Provider Chanelle Phillips   1/3/2023  3:45 PM [address]   2023  1:30 PM [facility]   [facility]23  2:30 PM Lonnie Quick DPM [facility]   2023  2:30 PM Lonnie Quick, [address] [place] [address]

## 2023-01-03 NOTE — PROGRESS NOTES
[facility]  Return Patient     Reggie Morfin 80 y.o. male that presents for follow-up of   Chief Complaint   Patient presents with    Foot Pain     Unna boot        right arch and ankle pain. He called today to get in. He was doing a lot of walking this morning. Would like wrapped. This has helped in the past on the right and left. No injury, has been walking a lot. Some swelling, wearing new balance and powersteps. Currently denies F/C/N/V. Allergies   Allergen Reactions    Celebrex [Celecoxib] Nausea Only    Mobic Nausea Only       Past Medical History:   Diagnosis Date    Acute MI ([facility] [address]     Allergic rhinitis 09/02/2011    Anemia     Atrial fibrillation ([facility] [address]     Dr. Taylor Ge, [facility]    Basal cell cancer 03/2013    left side of head, face chin    Basal cell cancer 03/10/2014    left cheek    Cervical radiculopathy     Diverticulitis 02/17/2013    GERD (gastroesophageal reflux disease)     Glaucoma     left eye    Hyperlipidemia     Hypertension     Dr. Phi Weller    Hyponatremia 09/02/2011    IBS (irritable bowel syndrome) 09/02/2011    Insomnia 09/02/2011    Osteoarthritis     Osteoarthritis/neck pain. 09/02/2011    Prostate cancer ([facility] [address] 08/18/2021    active surviellence monitoring PSA    Renal calculi     Shingles     history of shingles    Status post prostatectomy 08/18/2021    Simple prostatectomy for BPH    Urinary frequency     Wears glasses     Wears hearing aid in both ears        Prior to Admission medications    Medication Sig Start Date End Date Taking?  Authorizing Provider   predniSONE (DELTASONE) 20 MG tablet Take 2 tablets by mouth daily for 4 doses 12/31/22 1/4/23 Yes Jasmeet Perez MD   chlorhexidine (PERIDEX) 0.12 % solution Take 15 mLs by mouth 2 times daily For 15 days starting 12/21/22 12/21/22 1/5/23 Yes Historical Provider, MD   warfarin (COUMADIN) 5 MG tablet Take 5 mg by mouth Five times weekly Indications: Sun, Mon, Tues, Thurs, Fri - INR goal 2-2.5 Dosing per [facility] Medication Management Service   Yes Historical Provider, MD   metoprolol tartrate (LOPRESSOR) 25 MG tablet Take 1 tablet by mouth 2 times daily 12/13/22  Yes Valerie Beebe MD   allopurinol (ZYLOPRIM) 100 MG tablet TAKE ONE TABLET BY MOUTH DAILY 10/20/22  Yes Valerie Beebe MD   dilTIAZem (CARDIZEM CD) 180 MG extended release capsule Take 180 mg by mouth daily 7/29/22  Yes Historical Provider, MD   furosemide (LASIX) 20 MG tablet Take 20 mg by mouth daily 7/22/22  Yes Historical Provider, MD   mirtazapine (REMERON) 15 MG tablet Take 1 tablet by mouth nightly 8/19/22  Yes Valerie Beebe MD   busPIRone (BUSPAR) 15 MG tablet TAKE ONE TABLET BY MOUTH THREE TIMES A DAY  Patient taking differently: Take 15 mg by mouth 3 times daily as needed 8/18/22  Yes Valerie Beebe MD   lisinopril (PRINIVIL;ZESTRIL) 10 MG tablet TAKE ONE TABLET BY MOUTH DAILY WITH LUNCH 2/1/22  Yes Valerie Beebe MD   albuterol sulfate HFA (VENTOLIN HFA) 108 (90 Base) MCG/ACT inhaler Inhale 2 puffs into the lungs every 4 hours as needed for Wheezing 1/17/22  Yes Valerie Beebe MD   warfarin (COUMADIN) 5 MG tablet Take 2.5 mg by mouth Twice a Week Indications: Wed/Sat - INR goal 2-2.5 Dosing per [facility] Medication Management Service   Yes Historical Provider, MD   omeprazole (PRILOSEC OTC) 20 MG tablet Take 20 mg by mouth daily   Yes Historical Provider, MD   latanoprost (XALATAN) 0.005 % ophthalmic solution Place 1 drop into both eyes nightly    Yes Historical Provider, MD       Review of Systems   Constitutional:  Negative for chills, diaphoresis, fatigue, fever and unexpected weight change. Cardiovascular:  Positive for leg swelling. Gastrointestinal:  Negative for constipation, diarrhea, nausea and vomiting. Musculoskeletal:  Positive for gait problem. Negative for arthralgias and joint swelling. Skin:  Negative for color change, pallor, rash and wound. Neurological:  Negative for weakness and numbness. Lower Extremity Physical Examination:     Vitals: There were no vitals filed for this visit. General: AAO x 3 in NAD. Integument:   Warm, dry, supple, Bilateral.  Open lesion absent, Bilateral.      Vascular: DP and PT pulses palpable 2/4, Bilateral.  CFT <3 seconds, Bilateral.  Hair growth absent to the level of the digits, Bilateral.  Edema present, Left. Varicosities absent, Bilateral. Erythema absent, Left    Neurological:  Sensation present to light touch to level of digits, Bilateral.    Musculoskeletal: Muscle strength 5/5, Left. Pain present upon palpation of central heel, medial and plantar central arch, pain along the pt tendon, right. normal medial longitudinal arch, Bilateral.  Ankle ROM normal,Left. Asessment: Patient is a 80 y.o. male with:   1. Posterior tibial tendinitis, right    2. Plantar fasciitis, right    3. Acute idiopathic gout of right foot        Plan: Pt was evaluated and examined. Patient was given personalized discharge instructions. Pt will follow up in 2 weeks or sooner if any problems arise. Diagnosis was discussed with the pt and all of their questions were answered in detail. Proper foot hygiene and care was discussed with the pt. Patient to check feet daily and contact the office with any questions/problems/concerns. Other comorbidity noted and will be managed by PCP. Rest, good shoes, powersteps    Foot and ankle strapping/ Low-dye strapping was applied to the right, with 2 inch and 1 inch tape and a scaffoid pad. This is designed to off-load the plantar fascia and encourage healing and pain reduction. Discussed with the patient that the Low dye taping is a short term treatment and its off-loading effects vary from patient to patient. I advised the patient to leave the tape on for 3-5 days, but sometimes you may need to replace it sooner in order to remain effective.       Orders Placed This Encounter   Procedures    [place] STRAPPING ANKLE &/FOOT     No orders of the defined types were placed in this encounter. RTC in 2week(s).     1/3/2023

## 2023-01-03 NOTE — DISCHARGE SUMMARY
[place] [address] Internal Medicine    Discharge Summary     Patient ID: Ella Chaudhary  :  1931   MRN: [id number]     ACCOUNT:  [de-identified]   Patient's PCP: Ed Girard MD  Admit Date: 2022   Discharge Date: 1/3/2023    Length of Stay: 1  Code Status:  Prior  Admitting Physician: Lyly Lopez MD  Discharge Physician: Lyly Lopez MD     Active Discharge Diagnoses:     Primary Problem  Pseudogout      Hospital Problems  Active Hospital Problems    Diagnosis Date Noted    Chronic anticoagulation [Z79.01] 2022     Priority: Medium    Pseudogout [M11.20] 2022     Priority: Medium    Paroxysmal atrial fibrillation ([address] [I48.0] 2015       Admission Condition:  poor     Discharged Condition: fair    Hospital Stay:     Hospital Course:  Ella Chaudhary is a 80 y.o. male who was admitted for the management of Pseudogout , presented to ER with Leg Pain (Bilateral leg pain)  The patient is a 80 y.o.   Non- / non  male who presents with Leg Pain (Bilateral leg pain)   and he is admitted to the hospital for the management of    Patient past medical history multiple medical problems include hypertension, CHF, atrial fibrillation, on anticoagulation, aortic stenosis, history of pseudogout, patient, follows with Dr. Eddie Yung  Patient presented with complaint of pain in both ankles, symptoms are going on for last few days before presentation  He told us that his gout flareup when he ate some seafood, this time he has not eaten anything which can precipitate his pseudogout  Patient was started on steroid  X-ray ankles were done negative for fracture  Feeling much better today, getting discharged home on oral steroids        Significant therapeutic interventions:     Significant Diagnostic Studies:   Labs / Micro:        ,     Radiology:    XR ANKLE LEFT (MIN 3 VIEWS)    Result Date: 2022  EXAMINATION: THREE XRAY VIEWS OF THE RIGHT ANKLE; THREE XRAY VIEWS OF THE LEFT ANKLE 12/29/2022 12:49 pm COMPARISON: 02/17/2021 HISTORY: ORDERING SYSTEM PROVIDED HISTORY: pain and swelling TECHNOLOGIST PROVIDED HISTORY: pain and swelling Reason for Exam: pt states pain and swelling began x3 day, history of gout. FINDINGS: The visualized bones are normal .   There is no evidence of fracture or dislocation. . The  joint spaces appear well maintained. Mild soft tissue swelling on the left. Vascular calcifications are seen compatible with atherosclerotic disease. Plantar calcaneal spur on the left     No acute bony abnormalities are noted of the bilateral ankles     XR ANKLE RIGHT (MIN 3 VIEWS)    Result Date: 12/29/2022  EXAMINATION: THREE XRAY VIEWS OF THE RIGHT ANKLE; THREE XRAY VIEWS OF THE LEFT ANKLE 12/29/2022 12:49 pm COMPARISON: 02/17/2021 HISTORY: ORDERING SYSTEM PROVIDED HISTORY: pain and swelling TECHNOLOGIST PROVIDED HISTORY: pain and swelling Reason for Exam: pt states pain and swelling began x3 day, history of gout. FINDINGS: The visualized bones are normal .   There is no evidence of fracture or dislocation. . The  joint spaces appear well maintained. Mild soft tissue swelling on the left. Vascular calcifications are seen compatible with atherosclerotic disease. Plantar calcaneal spur on the left     No acute bony abnormalities are noted of the bilateral ankles         Consultations:    Consults:     Final Specialist Recommendations/Findings:   IP CONSULT TO INTERNAL MEDICINE  PHARMACY TO DOSE WARFARIN      The patient was seen and examined on day of discharge and this discharge summary is in conjunction with any daily progress note from day of discharge.     Discharge plan:     Disposition: Home    Physician Follow Up:     MD Noemy Walker [address] [facility]  [phone]    Go on 2/13/2023  post hospital follow up       Requiring Further Evaluation/Follow Up POST HOSPITALIZATION/Incidental Findings:    Diet: cardiac diet    Activity: As tolerated    Instructions to Patient:     Discharge Medications:      Medication List        CHANGE how you take these medications      busPIRone 15 MG tablet  Commonly known as: BUSPAR  TAKE ONE TABLET BY MOUTH THREE TIMES A DAY  What changed: See the new instructions. predniSONE 20 MG tablet  Commonly known as: DELTASONE  Take 2 tablets by mouth daily for 4 doses  What changed:   medication strength  how much to take  how to take this  when to take this  additional instructions  Another medication with the same name was removed. Continue taking this medication, and follow the directions you see here. CONTINUE taking these medications      albuterol sulfate  (90 Base) MCG/ACT inhaler  Commonly known as: Ventolin HFA  Inhale 2 puffs into the lungs every 4 hours as needed for Wheezing     allopurinol 100 MG tablet  Commonly known as: ZYLOPRIM  TAKE ONE TABLET BY MOUTH DAILY     chlorhexidine 0.12 % solution  Commonly known as: PERIDEX     dilTIAZem 180 MG extended release capsule  Commonly known as: CARDIZEM CD     furosemide 20 MG tablet  Commonly known as: LASIX     latanoprost 0.005 % ophthalmic solution  Commonly known as: XALATAN     lisinopril 10 MG tablet  Commonly known as: PRINIVIL;ZESTRIL  TAKE ONE TABLET BY MOUTH DAILY WITH LUNCH     metoprolol tartrate 25 MG tablet  Commonly known as: LOPRESSOR  Take 1 tablet by mouth 2 times daily     mirtazapine 15 MG tablet  Commonly known as: REMERON  Take 1 tablet by mouth nightly     omeprazole 20 MG tablet  Commonly known as: PRILOSEC OTC     * warfarin 5 MG tablet  Commonly known as: COUMADIN     * warfarin 5 MG tablet  Commonly known as: COUMADIN           * This list has 2 medication(s) that are the same as other medications prescribed for you. Read the directions carefully, and ask your doctor or other care provider to review them with you.                    Where to Get Your Medications        These medications were sent to [facility] [address]  [address] [address]      Phone: [phone]   predniSONE 20 MG tablet         Time Spent on discharge is  35 mins in patient examination, evaluation, counseling as well as medication reconciliation, prescriptions for required medications, discharge plan and follow up. Electronically signed by   Valeria Rosenberg MD  1/3/2023  5:23 PM      Thank you Dr. Valerie Beebe MD for the opportunity to be involved in this patient's care.

## 2023-01-06 ENCOUNTER — HOSPITAL ENCOUNTER (OUTPATIENT)
Age: 88
Setting detail: SPECIMEN
Discharge: HOME | End: 2023-01-06

## 2023-01-06 DIAGNOSIS — R74.8 ELEVATED LIVER ENZYMES: ICD-10-CM

## 2023-01-06 DIAGNOSIS — I50.22 CHRONIC SYSTOLIC CONGESTIVE HEART FAILURE (HCC): ICD-10-CM

## 2023-01-06 DIAGNOSIS — E53.8 B12 DEFICIENCY: ICD-10-CM

## 2023-01-06 DIAGNOSIS — M10.9 GOUT, UNSPECIFIED CAUSE, UNSPECIFIED CHRONICITY, UNSPECIFIED SITE: ICD-10-CM

## 2023-01-06 DIAGNOSIS — E87.1 HYPONATREMIA: ICD-10-CM

## 2023-01-06 DIAGNOSIS — I10 ESSENTIAL HYPERTENSION: ICD-10-CM

## 2023-01-06 DIAGNOSIS — D50.9 IRON DEFICIENCY ANEMIA, UNSPECIFIED IRON DEFICIENCY ANEMIA TYPE: ICD-10-CM

## 2023-01-06 DIAGNOSIS — E78.00 PURE HYPERCHOLESTEROLEMIA: ICD-10-CM

## 2023-01-06 LAB
ALBUMIN SERPL-MCNC: 3.6 G/DL (ref 3.5–5.2)
ALBUMIN/GLOBULIN RATIO: 1.1 (ref 1–2.5)
ALP BLD-CCNC: 75 U/L (ref 40–129)
ALT SERPL-CCNC: 19 U/L (ref 5–41)
ANION GAP SERPL CALCULATED.3IONS-SCNC: 9 MMOL/L (ref 9–17)
AST SERPL-CCNC: 18 U/L
BILIRUB SERPL-MCNC: 0.9 MG/DL (ref 0.3–1.2)
BUN BLDV-MCNC: 28 MG/DL (ref 8–23)
CALCIUM SERPL-MCNC: 9.4 MG/DL (ref 8.6–10.4)
CHLORIDE BLD-SCNC: 102 MMOL/L (ref 98–107)
CHOLESTEROL/HDL RATIO: 2.7
CHOLESTEROL: 189 MG/DL
CO2: 32 MMOL/L (ref 20–31)
CREAT SERPL-MCNC: 1.17 MG/DL (ref 0.7–1.2)
GFR SERPL CREATININE-BSD FRML MDRD: 59 ML/MIN/1.73M2
GLUCOSE BLD-MCNC: 68 MG/DL (ref 70–99)
HCT VFR BLD CALC: 48.5 % (ref 40.7–50.3)
HDLC SERPL-MCNC: 71 MG/DL
HEMOGLOBIN: 14.2 G/DL (ref 13–17)
LDL CHOLESTEROL: 91 MG/DL (ref 0–130)
MCH RBC QN AUTO: 29.1 PG (ref 25.2–33.5)
MCHC RBC AUTO-ENTMCNC: 29.3 G/DL (ref 28.4–34.8)
MCV RBC AUTO: 99.4 FL (ref 82.6–102.9)
NRBC AUTOMATED: 0 PER 100 WBC
PDW BLD-RTO: 15 % (ref 11.8–14.4)
PLATELET # BLD: 213 K/UL (ref 138–453)
PMV BLD AUTO: 11 FL (ref 8.1–13.5)
POTASSIUM SERPL-SCNC: 3.9 MMOL/L (ref 3.7–5.3)
RBC # BLD: 4.88 M/UL (ref 4.21–5.77)
SODIUM BLD-SCNC: 143 MMOL/L (ref 135–144)
TOTAL PROTEIN: 6.8 G/DL (ref 6.4–8.3)
TRIGL SERPL-MCNC: 136 MG/DL
URIC ACID: 5.3 MG/DL (ref 3.4–7)
VITAMIN B-12: 348 PG/ML (ref 232–1245)
WBC # BLD: 13.8 K/UL (ref 3.5–11.3)

## 2023-01-09 ENCOUNTER — CARE COORDINATION (OUTPATIENT)
Dept: CASE MANAGEMENT | Age: 88
End: 2023-01-09

## 2023-01-09 NOTE — CARE COORDINATION
Care Transitions Outreach Attempt #1      Attempted to reach patient for transitions of care follow up. Unable to reach patient. Left VM to home and mobile numbers requesting call back. Pt has Podiatry tomorrow and PCP on 22. Will follow up after appts. Patient: Michelle Caruso Patient : 1931 MRN: [id number]    Last Discharge [address]       Date Complaint Diagnosis Description Type Department Provider    22 Leg Pain Pseudogout involving multiple joints . .. ED to Hosp-Admission (Discharged) (ADMITTED) Taylor Valera MD; [place]. ..               Noted following upcoming appointments from discharge chart review:   [facility] follow up appointment(s):   Future Appointments   Date Time Provider Chanelle Phillips   1/10/2023  1:15 PM REECE [facility]   2023 10:30 AM MD CURLY Loya  [facility]   2023  1:30 PM MD CURLY Loya  [address] [facility]   2023  2:30 PM Saurav Iniguez DPM [place] [facility]   2023  2:30 PM Saurav Iniguez DPM [address] VERONICA Nguyễn BSN   Care Transitions Nurse  [phone]

## 2023-01-10 ENCOUNTER — OFFICE VISIT (OUTPATIENT)
Dept: PODIATRY | Age: 88
End: 2023-01-10
Payer: MEDICARE

## 2023-01-10 VITALS — BODY MASS INDEX: 27.66 KG/M2 | HEIGHT: 64 IN | WEIGHT: 162 LBS

## 2023-01-10 DIAGNOSIS — M76.821 POSTERIOR TIBIAL TENDINITIS, RIGHT: Primary | ICD-10-CM

## 2023-01-10 DIAGNOSIS — M72.2 PLANTAR FASCIITIS, RIGHT: ICD-10-CM

## 2023-01-10 PROCEDURE — G8484 FLU IMMUNIZE NO ADMIN: HCPCS | Performed by: PODIATRIST

## 2023-01-10 PROCEDURE — G8427 DOCREV CUR MEDS BY ELIG CLIN: HCPCS | Performed by: PODIATRIST

## 2023-01-10 PROCEDURE — G8417 CALC BMI ABV UP PARAM F/U: HCPCS | Performed by: PODIATRIST

## 2023-01-10 PROCEDURE — 29540 STRAPPING ANKLE &/FOOT: CPT | Performed by: PODIATRIST

## 2023-01-10 PROCEDURE — 1123F ACP DISCUSS/DSCN MKR DOCD: CPT | Performed by: PODIATRIST

## 2023-01-10 PROCEDURE — 1111F DSCHRG MED/CURRENT MED MERGE: CPT | Performed by: PODIATRIST

## 2023-01-10 PROCEDURE — 99213 OFFICE O/P EST LOW 20 MIN: CPT | Performed by: PODIATRIST

## 2023-01-10 PROCEDURE — 1036F TOBACCO NON-USER: CPT | Performed by: PODIATRIST

## 2023-01-10 ASSESSMENT — ENCOUNTER SYMPTOMS
NAUSEA: 0
COLOR CHANGE: 0
DIARRHEA: 0
CONSTIPATION: 0
VOMITING: 0

## 2023-01-10 NOTE — PROGRESS NOTES
[facility]  Return Patient     Amelie Morfin 80 y.o. male that presents for follow-up of   Chief Complaint   Patient presents with    Foot Pain     Right foot unna boot          right arch and ankle pain. He felt good until he took the wrap off after about 5 days. He was doing a lot of walking this morning. Would like wrapped. This has helped in the past on the right and left. No injury, has been walking a lot. Some swelling, wearing new balance and powersteps. Currently denies F/C/N/V. Allergies   Allergen Reactions    Celebrex [Celecoxib] Nausea Only    Mobic Nausea Only       Past Medical History:   Diagnosis Date    Acute MI ([facility] [address]     Allergic rhinitis 09/02/2011    Anemia     Atrial fibrillation ([facility] [address]     Dr. Jessica Burnham, [facility]    Basal cell cancer 03/2013    left side of head, face chin    Basal cell cancer 03/10/2014    left cheek    Cervical radiculopathy     Diverticulitis 02/17/2013    GERD (gastroesophageal reflux disease)     Glaucoma     left eye    Hyperlipidemia     Hypertension     Dr. Brennan Liu    Hyponatremia 09/02/2011    IBS (irritable bowel syndrome) 09/02/2011    Insomnia 09/02/2011    Osteoarthritis     Osteoarthritis/neck pain. 09/02/2011    Prostate cancer ([facility] [address] 08/18/2021    active surviellence monitoring PSA    Renal calculi     Shingles     history of shingles    Status post prostatectomy 08/18/2021    Simple prostatectomy for BPH    Urinary frequency     Wears glasses     Wears hearing aid in both ears        Prior to Admission medications    Medication Sig Start Date End Date Taking?  Authorizing Provider   warfarin (COUMADIN) 5 MG tablet Take 5 mg by mouth Five times weekly Indications: Sun, Mon, Tues, Thurs, Fri - INR goal 2-2.5 Dosing per [facility] Medication Management Service   Yes Historical Provider, MD   metoprolol tartrate (LOPRESSOR) 25 MG tablet Take 1 tablet by mouth 2 times daily 12/13/22  Yes Lilian Edouard MD   allopurinol (ZYLOPRIM) 100 MG tablet TAKE ONE TABLET BY MOUTH DAILY 10/20/22  Yes Kellee Morales MD   dilTIAZem (CARDIZEM CD) 180 MG extended release capsule Take 180 mg by mouth daily 7/29/22  Yes Historical Provider, MD   furosemide (LASIX) 20 MG tablet Take 20 mg by mouth daily 7/22/22  Yes Historical Provider, MD   mirtazapine (REMERON) 15 MG tablet Take 1 tablet by mouth nightly 8/19/22  Yes Kellee Morales MD   busPIRone (BUSPAR) 15 MG tablet TAKE ONE TABLET BY MOUTH THREE TIMES A DAY  Patient taking differently: Take 15 mg by mouth 3 times daily as needed 8/18/22  Yes Kellee Morales MD   lisinopril (PRINIVIL;ZESTRIL) 10 MG tablet TAKE ONE TABLET BY MOUTH DAILY WITH LUNCH 2/1/22  Yes Kellee Morales MD   albuterol sulfate HFA (VENTOLIN HFA) 108 (90 Base) MCG/ACT inhaler Inhale 2 puffs into the lungs every 4 hours as needed for Wheezing 1/17/22  Yes Kellee Morales MD   warfarin (COUMADIN) 5 MG tablet Take 2.5 mg by mouth Twice a Week Indications: Wed/Sat - INR goal 2-2.5 Dosing per [facility] Medication Management Service   Yes Historical Provider, MD   omeprazole (PRILOSEC OTC) 20 MG tablet Take 20 mg by mouth daily   Yes Historical Provider, MD   latanoprost (XALATAN) 0.005 % ophthalmic solution Place 1 drop into both eyes nightly    Yes Historical Provider, MD       Review of Systems   Constitutional:  Negative for chills, diaphoresis, fatigue, fever and unexpected weight change. Cardiovascular:  Positive for leg swelling. Gastrointestinal:  Negative for constipation, diarrhea, nausea and vomiting. Musculoskeletal:  Positive for gait problem. Negative for arthralgias and joint swelling. Skin:  Negative for color change, pallor, rash and wound. Neurological:  Negative for weakness and numbness. Lower Extremity Physical Examination:     Vitals: There were no vitals filed for this visit. General: AAO x 3 in NAD.      Integument:   Warm, dry, supple, Bilateral.  Open lesion absent, Bilateral.      Vascular: DP and PT pulses palpable 2/4, Bilateral.  CFT <3 seconds, Bilateral.  Hair growth absent to the level of the digits, Bilateral.  Edema present, Left. Varicosities absent, Bilateral. Erythema absent, Left    Neurological:  Sensation present to light touch to level of digits, Bilateral.    Musculoskeletal: Muscle strength 5/5, Left. Pain present upon palpation of central heel, medial and plantar central arch, pain along the pt tendon, right. normal medial longitudinal arch, Bilateral.  Ankle ROM normal,Left. Asessment: Patient is a 80 y.o. male with:   1. Posterior tibial tendinitis, right    2. Plantar fasciitis, right        Plan: Pt was evaluated and examined. Patient was given personalized discharge instructions. Pt will follow up in 2 weeks or sooner if any problems arise. Diagnosis was discussed with the pt and all of their questions were answered in detail. Proper foot hygiene and care was discussed with the pt. Patient to check feet daily and contact the office with any questions/problems/concerns. Other comorbidity noted and will be managed by PCP. Rest, good shoes, powersteps  Modified powerteps today    Foot and ankle strapping/ Low-dye strapping was applied to the right, with 2 inch and 1 inch tape and a scaffoid pad. This is designed to off-load the plantar fascia and encourage healing and pain reduction. Discussed with the patient that the Low dye taping is a short term treatment and its off-loading effects vary from patient to patient. I advised the patient to leave the tape on for 3-5 days, but sometimes you may need to replace it sooner in order to remain effective. Orders Placed This Encounter   Procedures    [place] STRAPPING ANKLE &/FOOT       No orders of the defined types were placed in this encounter. RTC in 2week(s).     1/10/2023

## 2023-01-11 ENCOUNTER — OFFICE VISIT (OUTPATIENT)
Dept: FAMILY MEDICINE CLINIC | Age: 88
End: 2023-01-11

## 2023-01-11 VITALS
TEMPERATURE: 98.1 F | OXYGEN SATURATION: 95 % | HEART RATE: 70 BPM | DIASTOLIC BLOOD PRESSURE: 72 MMHG | BODY MASS INDEX: 28.49 KG/M2 | SYSTOLIC BLOOD PRESSURE: 118 MMHG | WEIGHT: 166 LBS | RESPIRATION RATE: 16 BRPM

## 2023-01-11 DIAGNOSIS — Z09 HOSPITAL DISCHARGE FOLLOW-UP: ICD-10-CM

## 2023-01-11 DIAGNOSIS — M11.20 PSEUDOGOUT: Primary | ICD-10-CM

## 2023-01-11 ASSESSMENT — PATIENT HEALTH QUESTIONNAIRE - PHQ9
6. FEELING BAD ABOUT YOURSELF - OR THAT YOU ARE A FAILURE OR HAVE LET YOURSELF OR YOUR FAMILY DOWN: 0
SUM OF ALL RESPONSES TO PHQ QUESTIONS 1-9: 6
3. TROUBLE FALLING OR STAYING ASLEEP: 3
SUM OF ALL RESPONSES TO PHQ QUESTIONS 1-9: 6
4. FEELING TIRED OR HAVING LITTLE ENERGY: 1
SUM OF ALL RESPONSES TO PHQ QUESTIONS 1-9: 6
10. IF YOU CHECKED OFF ANY PROBLEMS, HOW DIFFICULT HAVE THESE PROBLEMS MADE IT FOR YOU TO DO YOUR WORK, TAKE CARE OF THINGS AT HOME, OR GET ALONG WITH OTHER PEOPLE: 0
9. THOUGHTS THAT YOU WOULD BE BETTER OFF DEAD, OR OF HURTING YOURSELF: 0
SUM OF ALL RESPONSES TO PHQ9 QUESTIONS 1 & 2: 2
7. TROUBLE CONCENTRATING ON THINGS, SUCH AS READING THE NEWSPAPER OR WATCHING TELEVISION: 0
8. MOVING OR SPEAKING SO SLOWLY THAT OTHER PEOPLE COULD HAVE NOTICED. OR THE OPPOSITE, BEING SO FIGETY OR RESTLESS THAT YOU HAVE BEEN MOVING AROUND A LOT MORE THAN USUAL: 0
1. LITTLE INTEREST OR PLEASURE IN DOING THINGS: 1
2. FEELING DOWN, DEPRESSED OR HOPELESS: 1
5. POOR APPETITE OR OVEREATING: 0
SUM OF ALL RESPONSES TO PHQ QUESTIONS 1-9: 6

## 2023-01-11 NOTE — PROGRESS NOTES
Post-Discharge Transitional Care Follow Up      Shadi Art   YOB: 1931    Date of Office Visit:  1/11/2023  Date of Hospital Admission: 12/29/22  Date of Hospital Discharge: 12/30/22  Readmission Risk Score (high >=14%. Medium >=10%):Readmission Risk Score: 13.5      Care management risk score Rising risk (score 2-5) and Complex Care (Scores >=6): No Risk Score On File     Non face to face  following discharge, date last encounter closed (first attempt may have been earlier): 01/03/2023     Call initiated 2 business days of discharge: Yes     Pseudogout  Comments:  both ankles  - Cont the Prednisone taper          Medical Decision Making: moderate complexity  No follow-ups on file. Subjective:   HPI    Inpatient course: Discharge summary reviewed- see chart. Interval history/Current status: Pt here today for a hospital follow secondary to pseudogout of both ankles. He was started on oral steroids and is doing much better. Pt today denies any HA, chest pain, or SOB. Pt denies any N/V/D/C or abdominal pain. Pt denies any fever or chills.      Patient Active Problem List   Diagnosis    Hyponatremia    Insomnia    Allergic rhinitis    Branch retinal vein occlusion, left eye    Cervical radiculopathy    Essential hypertension    Gastroesophageal reflux disease without esophagitis    Iron deficiency anemia    Arthritis, degenerative    Diverticulosis of large intestine without hemorrhage    B12 deficiency    Diastolic dysfunction    Elevated liver enzymes    Pure hypercholesterolemia    Irritable bowel syndrome without diarrhea    Paroxysmal atrial fibrillation (HCC)    Low testosterone    Bradycardia    Chronic bilateral low back pain without sciatica    Gout    Anxiety    Chronic systolic congestive heart failure (HCC)    DDD (degenerative disc disease), cervical    DDD (degenerative disc disease), lumbar    Spinal stenosis of lumbar region with neurogenic claudication Depression    Status post prostatectomy    History of prostate cancer    Pseudogout    Chronic anticoagulation       Medications listed as ordered at the time of discharge from hospital     Medication List            Accurate as of January 11, 2023 10:50 AM. If you have any questions, ask your nurse or doctor. CHANGE how you take these medications      busPIRone 15 MG tablet  Commonly known as: BUSPAR  TAKE ONE TABLET BY MOUTH THREE TIMES A DAY  What changed: See the new instructions. CONTINUE taking these medications      albuterol sulfate  (90 Base) MCG/ACT inhaler  Commonly known as: Ventolin HFA  Inhale 2 puffs into the lungs every 4 hours as needed for Wheezing     allopurinol 100 MG tablet  Commonly known as: ZYLOPRIM  TAKE ONE TABLET BY MOUTH DAILY     dilTIAZem 180 MG extended release capsule  Commonly known as: CARDIZEM CD     furosemide 20 MG tablet  Commonly known as: LASIX     latanoprost 0.005 % ophthalmic solution  Commonly known as: XALATAN     lisinopril 10 MG tablet  Commonly known as: PRINIVIL;ZESTRIL  TAKE ONE TABLET BY MOUTH DAILY WITH LUNCH     metoprolol tartrate 25 MG tablet  Commonly known as: LOPRESSOR  Take 1 tablet by mouth 2 times daily     mirtazapine 15 MG tablet  Commonly known as: REMERON  Take 1 tablet by mouth nightly     omeprazole 20 MG tablet  Commonly known as: PRILOSEC OTC     * warfarin 5 MG tablet  Commonly known as: COUMADIN     * warfarin 5 MG tablet  Commonly known as: COUMADIN           * This list has 2 medication(s) that are the same as other medications prescribed for you. Read the directions carefully, and ask your doctor or other care provider to review them with you.                    Medications marked \"taking\" at this time  Outpatient Medications Marked as Taking for the 1/11/23 encounter (Office Visit) with Abelino Rose MD   Medication Sig Dispense Refill    metoprolol tartrate (LOPRESSOR) 25 MG tablet Take 1 tablet by mouth 2 times daily 60 tablet 5    allopurinol (ZYLOPRIM) 100 MG tablet TAKE ONE TABLET BY MOUTH DAILY 90 tablet 3    dilTIAZem (CARDIZEM CD) 180 MG extended release capsule Take 180 mg by mouth daily      furosemide (LASIX) 20 MG tablet Take 20 mg by mouth daily      mirtazapine (REMERON) 15 MG tablet Take 1 tablet by mouth nightly 90 tablet 3    busPIRone (BUSPAR) 15 MG tablet TAKE ONE TABLET BY MOUTH THREE TIMES A DAY (Patient taking differently: Take 15 mg by mouth 3 times daily as needed) 90 tablet 1    lisinopril (PRINIVIL;ZESTRIL) 10 MG tablet TAKE ONE TABLET BY MOUTH DAILY WITH LUNCH 90 tablet 3    albuterol sulfate HFA (VENTOLIN HFA) 108 (90 Base) MCG/ACT inhaler Inhale 2 puffs into the lungs every 4 hours as needed for Wheezing 18 g 5    warfarin (COUMADIN) 5 MG tablet Take 2.5 mg by mouth Twice a Week Indications: Wed/Sat - INR goal 2-2.5 Dosing per [facility]      omeprazole (PRILOSEC OTC) 20 MG tablet Take 20 mg by mouth daily      latanoprost (XALATAN) 0.005 % ophthalmic solution Place 1 drop into both eyes nightly           Medications patient taking as of now reconciled against medications ordered at time of hospital discharge: Yes    Review of Systems    Objective: There were no vitals taken for this visit. Physical Exam    An electronic signature was used to authenticate this note.   --Sherif Correia MD

## 2023-01-12 ENCOUNTER — CARE COORDINATION (OUTPATIENT)
Dept: CASE MANAGEMENT | Age: 88
End: 2023-01-12

## 2023-01-12 ENCOUNTER — CARE COORDINATION (OUTPATIENT)
Dept: CARE COORDINATION | Age: 88
End: 2023-01-12

## 2023-01-12 NOTE — CARE COORDINATION
[facility] Care Transitions Follow Up Call    Care Transition Nurse contacted the patient by telephone to follow up after admission on 23. Verified name and  with patient as identifiers. Patient: Verito Walker  Patient : 1931   MRN: [id number]  Reason for Admission:   Discharge Date: 22 RARS: Readmission Risk Score: 13.5      Needs to be reviewed by the provider   Additional needs identified to be addressed with provider: No  none             Method of communication with provider: none. Writer spoke to patient, he is doing well, has had all his f/u appointments, no new needs or concerns at this time, denies any c/o f/c, n/v/d, sob, cp, dizziness or pain,, will forward patient on to [facility], care transition episode resolved//JU    Addressed changes since last contact:  none  Discussed follow-up appointments. If no appointment was previously scheduled, appointment scheduling offered: has appt with pcp 23. Is follow up appointment scheduled within 7 days of discharge? .    Follow Up  Future Appointments   Date Time Provider Chanelle Phillips   2023  1:30 PM Elisha Lyons MD [facility]   2023  2:30 PM Elicia Cueva DPM [facility]   2023  2:30 PM REECE Oliver 40 Transitions Subsequent and Final Call    Schedule Follow Up Appointment with PCP: Completed  Subsequent and Final Calls  Do you have any ongoing symptoms?: No  Have your medications changed?: No  Do you have any questions related to your medications?: No  Do you currently have any active services?: Yes  Are you currently active with any services?: Home Health  Do you have any needs or concerns that I can assist you with?: No  Care Transitions Interventions  Disease Association: Completed      Other Interventions:             Care Transition Nurse provided contact information for future needs.  No further follow-up call indicated based on severity of symptoms and risk factors.   Plan for next call: referral to ambulatory care manager-Verónica Fields RN

## 2023-01-12 NOTE — CARE COORDINATION
Ambulatory Care Coordination Note  1/12/2023    ACC: Naeem Gallegos RN    CTN referral     Called patient to introduce myself and that ACM will now follow. Reviewed care management services. He mentions he's doing well and doesn't need frequent calls. His questions were addressed. Writer will follow up with him in 5-7 days     Future Appointments   Date Time Provider Chanelle Phillips   2/13/2023  1:30 PM Dianne Awad MD [facility]   2/23/2023  2:30 PM David Charles DPM [facility]   4/27/2023  2:30 PM David Charles DPM [address]         Lab Results       None            Care Coordination Interventions    Referral from Primary Care Provider: Yes  Suggested Interventions and Community Resources          Goals Addressed                   This Visit's Progress     COMPLETED: Conditions and Symptoms        I will schedule office visits, as directed by my provider. I will keep my appointment or reschedule if I have to cancel. I will notify my provider of any barriers to my plan of care. I will follow my Zone Management tool to seek urgent or emergent care. I will notify my provider of any symptoms that indicate a worsening of my condition. Barriers: fear of failure and stress  Plan for overcoming my barriers: communication with daughter and providers  Confidence: 8/10  Anticipated Goal Completion Date:  12/221/2201         Conditions and Symptoms        I will schedule office visits, as directed by my provider. I will notify my provider of any barriers to my plan of care. I will follow my Zone Management tool to seek urgent or emergent care. I will notify my provider of any symptoms that indicate a worsening of my condition. Barriers: none  Plan for overcoming my barriers: education, care management   Confidence: 9/10  Anticipated Goal Completion Date: 4/12/2022                  Prior to Admission medications    Medication Sig Start Date End Date Taking?  Authorizing Provider   warfarin (COUMADIN) 5 MG tablet Take 5 mg by mouth Five times weekly Indications: Sun, Mon, Tues, Thurs, Fri - INR goal 2-2.5 Dosing per [facility]    Historical Provider, MD   metoprolol tartrate (LOPRESSOR) 25 MG tablet Take 1 tablet by mouth 2 times daily 12/13/22   Ashley Fernández MD   allopurinol (ZYLOPRIM) 100 MG tablet TAKE ONE TABLET BY MOUTH DAILY 10/20/22   Ashley Fernández MD   dilTIAZem (CARDIZEM CD) 180 MG extended release capsule Take 180 mg by mouth daily 7/29/22   Historical Provider, MD   furosemide (LASIX) 20 MG tablet Take 20 mg by mouth daily 7/22/22   Historical Provider, MD   mirtazapine (REMERON) 15 MG tablet Take 1 tablet by mouth nightly 8/19/22   Ashley Fernández MD   busPIRone (BUSPAR) 15 MG tablet TAKE ONE TABLET BY MOUTH THREE TIMES A DAY  Patient taking differently: Take 15 mg by mouth 3 times daily as needed 8/18/22   Ashley Fernández MD   lisinopril (PRINIVIL;ZESTRIL) 10 MG tablet TAKE ONE TABLET BY MOUTH DAILY WITH LUNCH 2/1/22   Ashley Fernández MD   albuterol sulfate HFA (VENTOLIN HFA) 108 (90 Base) MCG/ACT inhaler Inhale 2 puffs into the lungs every 4 hours as needed for Wheezing 1/17/22   Ashley Fernández MD   warfarin (COUMADIN) 5 MG tablet Take 2.5 mg by mouth Twice a Week Indications:  Wed/Sat - INR goal 2-2.5 Dosing per [facility]    Historical Provider, MD   omeprazole (PRILOSEC OTC) 20 MG tablet Take 20 mg by mouth daily    Historical Provider, MD   latanoprost (XALATAN) 0.005 % ophthalmic solution Place 1 drop into both eyes nightly     Historical Provider, MD       Future Appointments   Date Time Provider Chanelle Phillips   2/13/2023  1:30 PM MD CURLY Perez [facility]   2/23/2023  2:30 PM Adrienne Rodriguez DPM [facility]   4/27/2023  2:30 PM Adrienne Rodriguez DPM [facility]    and   Congestive Heart Failure Assessment    Are you currently restricting fluids?: No Restriction  Do you understand a low sodium diet?: Yes  Do you understand how to read food labels?: Yes  How many restaurant meals do you eat per week?: 1-2  Do you salt your food before tasting it?: Yes         Symptoms:

## 2023-01-20 ENCOUNTER — CARE COORDINATION (OUTPATIENT)
Dept: CARE COORDINATION | Age: 88
End: 2023-01-20

## 2023-01-27 ENCOUNTER — CARE COORDINATION (OUTPATIENT)
Dept: CARE COORDINATION | Age: 88
End: 2023-01-27

## 2023-01-31 ENCOUNTER — CARE COORDINATION (OUTPATIENT)
Dept: CARE COORDINATION | Age: 88
End: 2023-01-31

## 2023-01-31 SDOH — ECONOMIC STABILITY: INCOME INSECURITY: IN THE LAST 12 MONTHS, WAS THERE A TIME WHEN YOU WERE NOT ABLE TO PAY THE MORTGAGE OR RENT ON TIME?: NO

## 2023-01-31 SDOH — ECONOMIC STABILITY: HOUSING INSECURITY: IN THE LAST 12 MONTHS, HOW MANY PLACES HAVE YOU LIVED?: 1

## 2023-01-31 SDOH — ECONOMIC STABILITY: HOUSING INSECURITY
IN THE LAST 12 MONTHS, WAS THERE A TIME WHEN YOU DID NOT HAVE A STEADY PLACE TO SLEEP OR SLEPT IN A SHELTER (INCLUDING NOW)?: NO

## 2023-01-31 SDOH — ECONOMIC STABILITY: TRANSPORTATION INSECURITY
IN THE PAST 12 MONTHS, HAS THE LACK OF TRANSPORTATION KEPT YOU FROM MEDICAL APPOINTMENTS OR FROM GETTING MEDICATIONS?: NO

## 2023-01-31 SDOH — ECONOMIC STABILITY: TRANSPORTATION INSECURITY
IN THE PAST 12 MONTHS, HAS LACK OF TRANSPORTATION KEPT YOU FROM MEETINGS, WORK, OR FROM GETTING THINGS NEEDED FOR DAILY LIVING?: NO

## 2023-01-31 NOTE — CARE COORDINATION
Ambulatory Care Coordination Note  1/31/2023    ACC: Jayna Mederos, RN    Patient reports, \"I'm feeling fine, it's the best I've felt in a long time\"    Denies shortness of breath, chest pain or dizziness. Taking medications as written. No questions or concerns. Encouraged calling providers as needed  CC plan; review s/s dyspnea, edema, assessments, education     Offered patient enrollment in the Remote Patient Monitoring (RPM) program for in-home monitoring: Patient declined. Future Appointments   Date Time Provider Chanelle Phillips   2/13/2023  1:30 PM Louisa Otero MD [facility]   2/23/2023  2:30 PM Riley Salvador DPM [facility]   4/27/2023  2:30 PM Riley Salvador DPM [address]       Lab Results       None            Care Coordination Interventions    Referral from Primary Care Provider: Yes  Suggested Interventions and Community Resources          Goals Addressed    None         Prior to Admission medications    Medication Sig Start Date End Date Taking?  Authorizing Provider   warfarin (COUMADIN) 5 MG tablet Take 5 mg by mouth Five times weekly Indications: Sun, Mon, Tues, Thurs, Fri - INR goal 2-2.5 Dosing per [facility]    Historical Provider, MD   metoprolol tartrate (LOPRESSOR) 25 MG tablet Take 1 tablet by mouth 2 times daily 12/13/22   Louisa Otero MD   allopurinol (ZYLOPRIM) 100 MG tablet TAKE ONE TABLET BY MOUTH DAILY 10/20/22   Louisa Otero MD   dilTIAZem (CARDIZEM CD) 180 MG extended release capsule Take 180 mg by mouth daily 7/29/22   Historical Provider, MD   furosemide (LASIX) 20 MG tablet Take 20 mg by mouth daily 7/22/22   Historical Provider, MD   mirtazapine (REMERON) 15 MG tablet Take 1 tablet by mouth nightly 8/19/22   Louisa Otero MD   busPIRone (BUSPAR) 15 MG tablet TAKE ONE TABLET BY MOUTH THREE TIMES A DAY  Patient taking differently: Take 15 mg by mouth 3 times daily as needed 8/18/22   Terra Arkivum R Deepak Seaman MD   lisinopril (PRINIVIL;ZESTRIL) 10 MG tablet TAKE ONE TABLET BY MOUTH DAILY WITH LUNCH 2/1/22   Janeth Agrawal MD   albuterol sulfate HFA (VENTOLIN HFA) 108 (90 Base) MCG/ACT inhaler Inhale 2 puffs into the lungs every 4 hours as needed for Wheezing 1/17/22   Janeth Agrawal MD   warfarin (COUMADIN) 5 MG tablet Take 2.5 mg by mouth Twice a Week Indications:  Wed/Sat - INR goal 2-2.5 Dosing per [facility]    Historical Provider, MD   omeprazole (PRILOSEC OTC) 20 MG tablet Take 20 mg by mouth daily    Historical Provider, MD   latanoprost (XALATAN) 0.005 % ophthalmic solution Place 1 drop into both eyes nightly     Historical Provider, MD       Future Appointments   Date Time Provider Chanelle Jacqueline   2/13/2023  1:30 PM Janeth Agrawal MD [facility]   2/23/2023  2:30 PM David Palomino DPM [facility]   4/27/2023  2:30 PM David Palomino DPM [facility]   ,   Congestive Heart Failure Assessment    Are you currently restricting fluids?: No Restriction  Do you understand a low sodium diet?: Yes  Do you understand how to read food labels?: Yes  How many restaurant meals do you eat per week?: 1-2  Do you salt your food before tasting it?: Yes         Symptoms:     Symptom course: stable  Weight trend: stable  Salt intake watch compared to last visit: stable     , and   General Assessment    Do you have any symptoms that are causing concern?: No

## 2023-02-14 RX ORDER — LISINOPRIL 10 MG/1
TABLET ORAL
Qty: 90 TABLET | Refills: 3 | Status: SHIPPED | OUTPATIENT
Start: 2023-02-14

## 2023-02-21 ENCOUNTER — TELEPHONE (OUTPATIENT)
Dept: FAMILY MEDICINE CLINIC | Age: 88
End: 2023-02-21

## 2023-02-21 NOTE — TELEPHONE ENCOUNTER
Patient has been up all night D/T bleeding from a MOHS procedure that was done yesterday by Dr. Sánchez Whipple on his scalp. They did not stop his coumadin. His daughter is there now and has put a compression dressing on it and they think the bleeding has slowed or stopped. I did instruct them to call Dr. Sánchez Whipple if it doesn't. I did reschedule him for next week.

## 2023-02-22 ENCOUNTER — CARE COORDINATION (OUTPATIENT)
Dept: CARE COORDINATION | Age: 88
End: 2023-02-22

## 2023-02-23 NOTE — CARE COORDINATION
Ambulatory Care Coordination Note  2023    Patient Current Location:  Home: [place] [address] [place] [id number]     ACM contacted the family by telephone. Verified name and  with family as identifiers. Provided introduction to self, and explanation of the ACM role. Challenges to be reviewed by the provider   Additional needs identified to be addressed with provider: No  none               Method of communication with provider: phone. ACM: Christofer Pemberton RN        Offered patient enrollment in the Remote Patient Monitoring (RPM) program for in-home monitoring: Patient declined. Future Appointments   Date Time Provider Chanelle Phillips   3/1/2023  3:00 PM Almeta Halsted, [address]   3/28/2023  [facility]:30 [facility]   2023  2:30 PM Almeta Halsted, DPM [address]         Lab Results       None            Care Coordination Interventions    Referral from Primary Care Provider: Yes  Suggested Interventions and Community Resources          Goals Addressed                   This Visit's Progress     Conditions and Symptoms   Improving     I will schedule office visits, as directed by my provider. I will notify my provider of any barriers to my plan of care. I will follow my Zone Management tool to seek urgent or emergent care. I will notify my provider of any symptoms that indicate a worsening of my condition.     Barriers: none  Plan for overcoming my barriers: education, care management   Confidence: 9/10  Anticipated Goal Completion Date: 2022                  Future Appointments   Date Time Provider Chanelle Phillips   3/1/2023  3:00 PM Almeta Halsted, DPM [address]   3/28/2023  2:30 PM MD CURLY Moreno  [facility]   2023  2:30 PM Almeta Halsted, DPM [facility]   ,   Congestive Heart Failure Assessment    Are you currently restricting fluids?: No Restriction  Do you understand a low sodium diet?: Yes  Do you understand how to read food labels?: Yes  How many restaurant meals do you eat per week?: 1-2  Do you salt your food before tasting it?: Yes         Symptoms:     Symptom course: stable  Salt intake watch compared to last visit: stable     , and   General Assessment

## 2023-02-24 ENCOUNTER — CARE COORDINATION (OUTPATIENT)
Dept: CARE COORDINATION | Age: 88
End: 2023-02-24

## 2023-03-01 ENCOUNTER — OFFICE VISIT (OUTPATIENT)
Dept: PODIATRY | Age: 88
End: 2023-03-01
Payer: MEDICARE

## 2023-03-01 VITALS — BODY MASS INDEX: 28.34 KG/M2 | WEIGHT: 166 LBS | HEIGHT: 64 IN

## 2023-03-01 DIAGNOSIS — B35.1 ONYCHOMYCOSIS: Primary | ICD-10-CM

## 2023-03-01 DIAGNOSIS — M79.674 PAIN IN TOES OF BOTH FEET: ICD-10-CM

## 2023-03-01 DIAGNOSIS — M79.675 PAIN IN TOES OF BOTH FEET: ICD-10-CM

## 2023-03-01 PROCEDURE — 11721 DEBRIDE NAIL 6 OR MORE: CPT | Performed by: PODIATRIST

## 2023-03-01 PROCEDURE — 99999 PR OFFICE/OUTPT VISIT,PROCEDURE ONLY: CPT | Performed by: PODIATRIST

## 2023-03-01 ASSESSMENT — ENCOUNTER SYMPTOMS
NAUSEA: 0
VOMITING: 0
DIARRHEA: 0
CONSTIPATION: 0
COLOR CHANGE: 0

## 2023-03-01 NOTE — PROGRESS NOTES
[facility]  Return Patient    Chief Complaint   Patient presents with    Nail Problem     B/l nail trim,last seen Becka Griffin MD 1/11/23         Subjective: This Altagracia Foots comes to clinic for foot and nail care. He would like all of his nails trimmed. He cannot take care of them himself. They are painful when long and he tries to wear shoes. Pt's primary care physician is Tessa Tijerina MD.       Past Medical History:   Diagnosis Date    Acute [facility])     Allergic rhinitis 09/02/2011    Anemia     Atrial fibrillation ([facility] [address]     Dr. Patience Montgomery, [facility]    Basal cell cancer 03/2013    left side of head, face chin    Basal cell cancer 03/10/2014    left cheek    Cervical radiculopathy     Diverticulitis 02/17/2013    GERD (gastroesophageal reflux disease)     Glaucoma     left eye    Hyperlipidemia     Hypertension     Dr. Kelly Cooper    Hyponatremia 09/02/2011    IBS (irritable bowel syndrome) 09/02/2011    Insomnia 09/02/2011    Osteoarthritis     Osteoarthritis/neck pain.  09/02/2011    Prostate cancer ([facility] [address] 08/18/2021    active surviellence monitoring PSA    Renal calculi     Shingles     history of shingles    Status post prostatectomy 08/18/2021    Simple prostatectomy for BPH    Urinary frequency     Wears glasses     Wears hearing aid in both ears        Allergies   Allergen Reactions    Celebrex [Celecoxib] Nausea Only    Mobic Nausea Only     Current Outpatient Medications on File Prior to Visit   Medication Sig Dispense Refill    lisinopril (PRINIVIL;ZESTRIL) 10 MG tablet TAKE ONE TABLET BY MOUTH DAILY WITH LUNCH 90 tablet 3    warfarin (COUMADIN) 5 MG tablet Take 5 mg by mouth Five times weekly Indications: Sun, Mon, Tues, Thurs, Fri - INR goal 2-2.5 Dosing per [facility]      metoprolol tartrate (LOPRESSOR) 25 MG tablet Take 1 tablet by mouth 2 times daily 60 tablet 5    allopurinol (ZYLOPRIM) 100 MG tablet TAKE ONE TABLET BY MOUTH DAILY 90 tablet 3    dilTIAZem (CARDIZEM CD) 180 MG extended release capsule Take 180 mg by mouth daily      furosemide (LASIX) 20 MG tablet Take 20 mg by mouth daily      mirtazapine (REMERON) 15 MG tablet Take 1 tablet by mouth nightly 90 tablet 3    busPIRone (BUSPAR) 15 MG tablet TAKE ONE TABLET BY MOUTH THREE TIMES A DAY (Patient taking differently: Take 15 mg by mouth 3 times daily as needed) 90 tablet 1    albuterol sulfate HFA (VENTOLIN HFA) 108 (90 Base) MCG/ACT inhaler Inhale 2 puffs into the lungs every 4 hours as needed for Wheezing 18 g 5    warfarin (COUMADIN) 5 MG tablet Take 2.5 mg by mouth Twice a Week Indications: Wed/Sat - INR goal 2-2.5 Dosing per [facility]      omeprazole (PRILOSEC OTC) 20 MG tablet Take 20 mg by mouth daily      latanoprost (XALATAN) 0.005 % ophthalmic solution Place 1 drop into both eyes nightly        No current facility-administered medications on file prior to visit. Review of Systems   Constitutional:  Negative for chills, diaphoresis, fatigue, fever and unexpected weight change. Cardiovascular:  Negative for leg swelling. Gastrointestinal:  Negative for constipation, diarrhea, nausea and vomiting. Musculoskeletal:  Positive for gait problem. Negative for arthralgias and joint swelling. Skin:  Negative for color change, pallor, rash and wound. Neurological:  Negative for weakness and numbness. Objective:  General: AAO x 3 in NAD. Derm  Left hallux nail incurvated medial border with slight redness and pain to palpation.     Sites of Onychomycosis Involvement (Check affected area)  [x] [x] [x] [x] [x] [x] [x] [x] [x] [x]  5 4 3 2 1 1 2 3 4 5                          Right                                        Left    Thickness  [x] [x] [x] [x] [x] [x] [x] [x] [x] [x]  5 4 3 2 1 1 2 3 4 5                         Right                                        Left    Dystrophic Changes [x] [x] [x] [x] [x] [x] [x] [x] [x] [x]  5 4 3 2 1 1 2 3 4 5                         Right                                        Left    Color                                                                  [x] [x] [x] [x] [x] [x] [x] [x] [x] [x]  5 4 3 2 1 1 2 3 4 5                          Right                                        Left    Incurvation/Ingrowin                                                                   [] [] [] [] [] [] [] [] [] []  5 4 3 2 1 1 2 3 4 5                         Right                                        Left    Inflammation/Pain                                                                   [x] [x] [x] [x] [x] [x] [x] [x] [x] [x]  5 4 3 2 1 1 2 3 4 5                         Right                                        Left    Vascular:  DP/PT pulses palpable 2/4, Bilateral.    CFT <3 seconds to digits 1-5, Bilateral .   Hair growth present to level of digits, Bilateral.    Neurological:  Sensation present to light touch to level of digits, Bilateral.    Assessment:  80 y.o. male with:   1. Onychomycosis    2. Pain in toes of both feet       Orders Placed This Encounter   Procedures    ME DEBRIDEMENT NAIL ANY METHOD 6/>           Plan:   Pt was evaluated and examined. Patient was given personalized discharge instructions. Nails 1-10 were debrided in length and thickness sharply with a nail nipper and  without incident, slant back performed. Pt will follow up in 9-12 weeks or sooner if any problems arise. Diagnosis was discussed with the pt and all of their questions were answered in detail. Proper foot hygiene and care was discussed with the pt. Patient to check feet daily and contact the office with any questions/problems/concerns. Other comorbidity noted and will be managed by PCP. Pain waiver discussed with patient and confirmed.          3/1/2023    Electronically signed by Luz Norwood DPM on 3/1/2023 at 3:32 PM

## 2023-03-08 ENCOUNTER — CARE COORDINATION (OUTPATIENT)
Dept: CARE COORDINATION | Age: 88
End: 2023-03-08

## 2023-03-08 NOTE — CARE COORDINATION
Ambulatory Care Coordination Note  3/8/2023    Patient Current Location:  Home: [place] Brown [address] [place] [id number]     ACM contacted the patient by telephone. Verified name and  with patient as identifiers. Provided introduction to self, and explanation of the ACM role. Challenges to be reviewed by the provider   Additional needs identified to be addressed with provider: No  none               Method of communication with provider: none. ACM: Parvin Daley, RN    He is feeling good. Had bleeding of skin cancer MOHS site under right ear, Dr. Ren Delgadillo put a bandage on and patient will remove tomorrow. He'll notify Dr. Ren Delgadillo if starts bleeding again. CHF- no symptoms, no recent worsening of dyspnea or leg swelling. Reviewed upcoming appts. Feels good. Doing regular ADLs, eating good, no urinary or bowel concerns. Denied any issues or new symptoms. CC Plan:   -Follow up in a few weeks to review symptoms, assess for graduation from care management. Congestive Heart Failure Assessment    Are you currently restricting fluids?: No Restriction  Do you understand a low sodium diet?: Yes  Do you understand how to read food labels?: Yes  How many restaurant meals do you eat per week?: 1-2  Do you salt your food before tasting it?: Yes         Symptoms:  CHF associated dyspnea on exertion: Pos      Symptom course: stable  Salt intake watch compared to last visit: stable      and   General Assessment    Do you have any symptoms that are causing concern?: Yes  Progression since Onset: Gradually Improving  Reported Symptoms: Other (Comment: healing skin CA sites)           Offered patient enrollment in the Remote Patient Monitoring (RPM) program for in-home monitoring: Patient declined.     Lab Results       None            Care Coordination Interventions    Referral from Primary Care Provider: Yes  Suggested Interventions and Community Resources          Goals Addressed                   This Visit's Progress Conditions and Symptoms   On track     I will schedule office visits, as directed by my provider. I will notify my provider of any barriers to my plan of care. I will follow my Zone Management tool to seek urgent or emergent care. I will notify my provider of any symptoms that indicate a worsening of my condition.     Barriers: none  Plan for overcoming my barriers: education, care management   Confidence: 9/10  Anticipated Goal Completion Date: 4/12/2022                  Future Appointments   Date Time Provider Chanelle Phillips   3/28/2023  2:30 PM Terrance Amezquita MD [facility]   5/17/2023  2:45 PM [address]   7/26/2023  2:45 PM [address]ani [address]

## 2023-03-17 ENCOUNTER — TELEPHONE (OUTPATIENT)
Dept: FAMILY MEDICINE CLINIC | Age: 88
End: 2023-03-17

## 2023-03-17 RX ORDER — PREDNISONE 20 MG/1
TABLET ORAL
Qty: 17 TABLET | Refills: 0 | Status: SHIPPED | OUTPATIENT
Start: 2023-03-17 | End: 2023-03-27

## 2023-03-17 NOTE — TELEPHONE ENCOUNTER
Patient states he thinks he is having a gout flare up that started this morning. He is wanting to know if a prescription could be sent over to [facility] in [place].

## 2023-03-27 ENCOUNTER — HOSPITAL ENCOUNTER (OUTPATIENT)
Age: 88
Setting detail: SPECIMEN
Discharge: HOME | End: 2023-03-27

## 2023-03-27 LAB — MAGNESIUM SERPL-MCNC: 2.2 MG/DL (ref 1.6–2.6)

## 2023-03-28 ENCOUNTER — OFFICE VISIT (OUTPATIENT)
Dept: FAMILY MEDICINE CLINIC | Age: 88
End: 2023-03-28
Payer: MEDICARE

## 2023-03-28 VITALS
SYSTOLIC BLOOD PRESSURE: 124 MMHG | RESPIRATION RATE: 16 BRPM | DIASTOLIC BLOOD PRESSURE: 84 MMHG | HEART RATE: 58 BPM | BODY MASS INDEX: 28.67 KG/M2 | TEMPERATURE: 99.1 F | WEIGHT: 167 LBS | OXYGEN SATURATION: 96 %

## 2023-03-28 DIAGNOSIS — M50.30 DDD (DEGENERATIVE DISC DISEASE), CERVICAL: ICD-10-CM

## 2023-03-28 DIAGNOSIS — E87.1 HYPONATREMIA: ICD-10-CM

## 2023-03-28 DIAGNOSIS — I10 ESSENTIAL HYPERTENSION: Primary | ICD-10-CM

## 2023-03-28 DIAGNOSIS — K58.9 IRRITABLE BOWEL SYNDROME WITHOUT DIARRHEA: ICD-10-CM

## 2023-03-28 DIAGNOSIS — E78.00 PURE HYPERCHOLESTEROLEMIA: ICD-10-CM

## 2023-03-28 DIAGNOSIS — I48.0 PAROXYSMAL ATRIAL FIBRILLATION (HCC): ICD-10-CM

## 2023-03-28 DIAGNOSIS — M19.90 OSTEOARTHRITIS, UNSPECIFIED OSTEOARTHRITIS TYPE, UNSPECIFIED SITE: ICD-10-CM

## 2023-03-28 DIAGNOSIS — I50.22 CHRONIC SYSTOLIC CONGESTIVE HEART FAILURE (HCC): ICD-10-CM

## 2023-03-28 DIAGNOSIS — K21.9 GASTROESOPHAGEAL REFLUX DISEASE WITHOUT ESOPHAGITIS: ICD-10-CM

## 2023-03-28 DIAGNOSIS — E53.8 B12 DEFICIENCY: ICD-10-CM

## 2023-03-28 DIAGNOSIS — Z85.46 HISTORY OF PROSTATE CANCER: ICD-10-CM

## 2023-03-28 DIAGNOSIS — M10.9 GOUT, UNSPECIFIED CAUSE, UNSPECIFIED CHRONICITY, UNSPECIFIED SITE: ICD-10-CM

## 2023-03-28 DIAGNOSIS — F41.9 ANXIETY: ICD-10-CM

## 2023-03-28 DIAGNOSIS — G47.00 INSOMNIA, UNSPECIFIED TYPE: ICD-10-CM

## 2023-03-28 DIAGNOSIS — R74.8 ELEVATED LIVER ENZYMES: ICD-10-CM

## 2023-03-28 DIAGNOSIS — D50.9 IRON DEFICIENCY ANEMIA, UNSPECIFIED IRON DEFICIENCY ANEMIA TYPE: ICD-10-CM

## 2023-03-28 DIAGNOSIS — M51.36 DDD (DEGENERATIVE DISC DISEASE), LUMBAR: ICD-10-CM

## 2023-03-28 DIAGNOSIS — F32.A DEPRESSION, UNSPECIFIED DEPRESSION TYPE: ICD-10-CM

## 2023-03-28 PROCEDURE — G8417 CALC BMI ABV UP PARAM F/U: HCPCS | Performed by: FAMILY MEDICINE

## 2023-03-28 PROCEDURE — G8484 FLU IMMUNIZE NO ADMIN: HCPCS | Performed by: FAMILY MEDICINE

## 2023-03-28 PROCEDURE — G8427 DOCREV CUR MEDS BY ELIG CLIN: HCPCS | Performed by: FAMILY MEDICINE

## 2023-03-28 PROCEDURE — 1123F ACP DISCUSS/DSCN MKR DOCD: CPT | Performed by: FAMILY MEDICINE

## 2023-03-28 PROCEDURE — 99214 OFFICE O/P EST MOD 30 MIN: CPT | Performed by: FAMILY MEDICINE

## 2023-03-28 PROCEDURE — 1036F TOBACCO NON-USER: CPT | Performed by: FAMILY MEDICINE

## 2023-03-28 RX ORDER — MIRTAZAPINE 30 MG/1
30 TABLET, FILM COATED ORAL NIGHTLY
Qty: 90 TABLET | Refills: 3 | Status: SHIPPED | OUTPATIENT
Start: 2023-03-28

## 2023-03-28 ASSESSMENT — ENCOUNTER SYMPTOMS
RHINORRHEA: 0
ABDOMINAL DISTENTION: 0
CHEST TIGHTNESS: 0
COUGH: 0
SORE THROAT: 0
ABDOMINAL PAIN: 0
DIARRHEA: 0
SHORTNESS OF BREATH: 0
CONSTIPATION: 0
BACK PAIN: 1
VOMITING: 0
NAUSEA: 0

## 2023-03-28 NOTE — PROGRESS NOTES
needed for Wheezing 18 g 5    warfarin (COUMADIN) 5 MG tablet Take 2.5 mg by mouth Twice a Week Indications: Wed/Sat - INR goal 2-2.5 [facility]      omeprazole (PRILOSEC OTC) 20 MG tablet Take 20 mg by mouth daily      latanoprost (XALATAN) 0.005 % ophthalmic solution Place 1 drop into both eyes nightly       warfarin (COUMADIN) 5 MG tablet Take 5 mg by mouth Five times weekly Indications: Sun, Mon, Tues, Thurs, Fri - INR goal 2-2.5 Dosing per [facility] Medication Management Service       No current facility-administered medications on file prior to visit. Subjective:     Review of Systems   Constitutional:  Negative for appetite change, fatigue and fever. HENT:  Negative for congestion, ear pain, rhinorrhea and sore throat. Respiratory:  Negative for cough, chest tightness and shortness of breath. Cardiovascular:  Negative for chest pain and palpitations. Gastrointestinal:  Negative for abdominal distention, abdominal pain, constipation, diarrhea, nausea and vomiting. Occ heartburn   Genitourinary:  Negative for difficulty urinating and dysuria. Musculoskeletal:  Positive for arthralgias and back pain. Negative for myalgias. Skin:  Negative for rash. Neurological:  Negative for dizziness, weakness, light-headedness and headaches. Hematological:  Negative for adenopathy. Psychiatric/Behavioral:  Positive for dysphoric mood (stable) and sleep disturbance. Negative for behavioral problems. The patient is nervous/anxious (stable). Objective:     Physical Exam  Vitals reviewed. Constitutional:       General: He is not in acute distress. Appearance: Normal appearance. He is well-developed. He is not ill-appearing. HENT:      Head: Normocephalic and atraumatic.       Right Ear: External ear normal.      Left Ear: External ear normal.      Nose: Nose normal.      Mouth/Throat:      Mouth: Mucous membranes are moist.

## 2023-04-04 ENCOUNTER — CARE COORDINATION (OUTPATIENT)
Dept: CARE COORDINATION | Age: 88
End: 2023-04-04

## 2023-04-20 RX ORDER — PREDNISONE 1 MG/1
TABLET ORAL
Qty: 90 TABLET | Refills: 3 | Status: SHIPPED | OUTPATIENT
Start: 2023-04-20

## 2023-04-25 ENCOUNTER — CARE COORDINATION (OUTPATIENT)
Dept: CARE COORDINATION | Age: 88
End: 2023-04-25

## 2023-04-28 ENCOUNTER — CARE COORDINATION (OUTPATIENT)
Dept: CARE COORDINATION | Age: 88
End: 2023-04-28

## 2023-04-28 NOTE — CARE COORDINATION
Ambulatory Care Coordination Note  2023    Patient Current Location:  Home: Hui Brown [address] [place] [id number]     ACM contacted the patient by telephone. Verified name and  with patient as identifiers. Provided introduction to self, and explanation of the ACM role. Challenges to be reviewed by the provider   Additional needs identified to be addressed with provider: No  none               Method of communication with provider: none. ACM: Gerardine Cooks, RN    Feeling fine, Had another skin cancer lesion removed near the right ear and neck, keeping the area clean and covered. Follows with Dr Ariana Gu,  CHF, Afib; breathing is fine, no swelling. No chest pain or discomfort. No issues with dizziness or lightheadedness  PT/INR have been fine, following with [facility] medication management  No new symptoms or concerns. Reports, his son is in town visiting and he's keeping busy  Medications are filled, taking as prescribed. No questions  CM plan; review any s/s, review appointments, assessments     Future Appointments   Date Time Provider Chanelle Phillips   2023  2:45 PM Zack Redd DPM [facility]   2023  2:30 PM Shira Liu MD [facility]   2023  2:45 PM Zack Redd DPM [address] [place] [address]  patient enrollment in the Remote Patient Monitoring (RPM) program for in-home monitoring: Patient is not eligible for RPM program.    Lab Results       None            Care Coordination Interventions    Referral from Primary Care Provider: Yes  Suggested Interventions and Community Resources          Goals Addressed                   This Visit's Progress     COMPLETED: Conditions and Symptoms   On track     I will schedule office visits, as directed by my provider. I will notify my provider of any barriers to my plan of care. I will follow my Zone Management tool to seek urgent or emergent care.   I will notify my provider of any symptoms that indicate a

## 2023-04-29 ENCOUNTER — OFFICE VISIT (OUTPATIENT)
Dept: FAMILY MEDICINE CLINIC | Age: 88
End: 2023-04-29

## 2023-04-29 VITALS
SYSTOLIC BLOOD PRESSURE: 139 MMHG | TEMPERATURE: 97.5 F | DIASTOLIC BLOOD PRESSURE: 77 MMHG | OXYGEN SATURATION: 97 % | HEART RATE: 72 BPM

## 2023-04-29 DIAGNOSIS — M11.20 PSEUDOGOUT: Primary | ICD-10-CM

## 2023-04-29 RX ORDER — PREDNISONE 20 MG/1
40 TABLET ORAL DAILY
Qty: 10 TABLET | Refills: 0 | Status: SHIPPED | OUTPATIENT
Start: 2023-04-29 | End: 2023-05-04

## 2023-04-29 RX ORDER — TIMOLOL MALEATE 5 MG/ML
SOLUTION/ DROPS OPHTHALMIC
COMMUNITY
Start: 2023-04-06

## 2023-04-29 NOTE — PROGRESS NOTES
Marissa Marquez MD  [facility]  [address] [facility] [address] [facility] [address]  Dept: [phone]    Annita Allison is a 80 y.o. male who presents today for hismedical conditions/complaints as noted below.   Annita Allison is here today c/o Foot Pain (Left foot and ankle onset 1 day (history of gout)) and Foot Swelling       HPI:     HPI    Patient presents to the walk-in clinic with his daughter for evaluation of left ankle pain and swelling that began this morning  He has a history of pseudogout with recurrent flares, most recent flareup was at the end of December which required hospitalization, on maintenance dose of allopurinol  He endorses discomfort at the left lateral ankle along with swelling and redness at the distal left foot, hurts to walk, using a walker, not taking anything for his symptoms  He thinks this is pseudogout and is unsure of the trigger, he did have some breaded shrimp a few days ago  Denies any injury to the leg or foot  Currently on prednisone 5 mg daily    Patient Active Problem List   Diagnosis    Hyponatremia    Insomnia    Allergic rhinitis    Branch retinal vein occlusion, left eye    Cervical radiculopathy    Essential hypertension    Gastroesophageal reflux disease without esophagitis    Iron deficiency anemia    Arthritis, degenerative    Diverticulosis of large intestine without hemorrhage    B12 deficiency    Diastolic dysfunction    Elevated liver enzymes    Pure hypercholesterolemia    Irritable bowel syndrome without diarrhea    Paroxysmal atrial fibrillation (HCC)    Low testosterone    Bradycardia    Chronic bilateral low back pain without sciatica    Gout    Anxiety    Chronic systolic congestive heart failure ([address]    DDD (degenerative disc disease), cervical    DDD (degenerative disc disease), lumbar    Spinal stenosis of lumbar region with neurogenic claudication    Depression

## 2023-05-01 ENCOUNTER — TELEPHONE (OUTPATIENT)
Dept: FAMILY MEDICINE CLINIC | Age: 88
End: 2023-05-01

## 2023-05-01 NOTE — TELEPHONE ENCOUNTER
Patient went to urgent care for gout on the left foot. Patient was prescribed a higher dose of prednisone. Patient would like a call back about this. Has questions about the medication, and questioning if PCP would like to see him.

## 2023-05-10 ENCOUNTER — TELEMEDICINE (OUTPATIENT)
Dept: FAMILY MEDICINE CLINIC | Age: 88
End: 2023-05-10
Payer: MEDICARE

## 2023-05-10 ENCOUNTER — TELEPHONE (OUTPATIENT)
Dept: FAMILY MEDICINE CLINIC | Age: 88
End: 2023-05-10

## 2023-05-10 DIAGNOSIS — M25.551 RIGHT HIP PAIN: ICD-10-CM

## 2023-05-10 DIAGNOSIS — Z91.81 STATUS POST FALL: ICD-10-CM

## 2023-05-10 DIAGNOSIS — M54.50 ACUTE BILATERAL LOW BACK PAIN WITHOUT SCIATICA: Primary | ICD-10-CM

## 2023-05-10 PROCEDURE — 99213 OFFICE O/P EST LOW 20 MIN: CPT | Performed by: FAMILY MEDICINE

## 2023-05-10 PROCEDURE — 1123F ACP DISCUSS/DSCN MKR DOCD: CPT | Performed by: FAMILY MEDICINE

## 2023-05-10 PROCEDURE — G8427 DOCREV CUR MEDS BY ELIG CLIN: HCPCS | Performed by: FAMILY MEDICINE

## 2023-05-10 RX ORDER — TIZANIDINE 2 MG/1
2 TABLET ORAL EVERY 8 HOURS PRN
Qty: 40 TABLET | Refills: 0 | Status: SHIPPED | OUTPATIENT
Start: 2023-05-10 | End: 2023-05-20

## 2023-05-10 ASSESSMENT — ENCOUNTER SYMPTOMS
ABDOMINAL PAIN: 0
ABDOMINAL DISTENTION: 0
RHINORRHEA: 0
SHORTNESS OF BREATH: 0
NAUSEA: 0
CHEST TIGHTNESS: 0
BACK PAIN: 1
CONSTIPATION: 0
VOMITING: 0
COUGH: 0
DIARRHEA: 0
SINUS PAIN: 0
SORE THROAT: 0

## 2023-05-10 NOTE — PROGRESS NOTES
108 (90 Base) MCG/ACT inhaler Inhale 2 puffs into the lungs every 4 hours as needed for Wheezing 18 g 5    warfarin (COUMADIN) 5 MG tablet Take 0.5 tablets by mouth Twice a Week Indications: Wed/Sat - INR goal 2-2.5 Dosing per [facility] Medication Management Service      omeprazole (PRILOSEC OTC) 20 MG tablet Take 1 tablet by mouth daily      latanoprost (XALATAN) 0.005 % ophthalmic solution Place 1 drop into both eyes nightly       No current facility-administered medications on file prior to visit. Subjective:     Review of Systems   Constitutional:  Negative for appetite change, chills, fatigue and fever. HENT:  Negative for congestion, ear pain, rhinorrhea, sinus pain and sore throat. Eyes:  Negative for visual disturbance. Respiratory:  Negative for cough, chest tightness and shortness of breath. Cardiovascular:  Negative for chest pain and palpitations. Gastrointestinal:  Negative for abdominal distention, abdominal pain, constipation, diarrhea, nausea and vomiting. Genitourinary:  Negative for difficulty urinating, dysuria, frequency and urgency. Musculoskeletal:  Positive for back pain. Negative for arthralgias, myalgias and neck pain. Skin:  Negative for rash. Neurological:  Negative for dizziness, weakness, light-headedness, numbness and headaches. Hematological:  Negative for adenopathy. Psychiatric/Behavioral:  Negative for behavioral problems, dysphoric mood and sleep disturbance. The patient is not nervous/anxious. Objective:     Physical Exam  Vitals reviewed: Vital signs unavailable, as this is a virtual visit. Constitutional:       General: He is not in acute distress. Appearance: Normal appearance. He is not ill-appearing or toxic-appearing. Pulmonary:      Effort: Pulmonary effort is normal. No tachypnea, accessory muscle usage or respiratory distress.       Comments: Patient able to talk in full sentences without difficulty   Neurological:

## 2023-05-10 NOTE — TELEPHONE ENCOUNTER
Pt fell on 5/8/2023 and had his foot caught in the screen door. He states his rt hip has been hurting and that he is getting spasms in his low back. He is wondering if a muscle relaxer might help him. He has been taking Tylenol but it has not been working very well.

## 2023-05-15 ENCOUNTER — TELEPHONE (OUTPATIENT)
Dept: FAMILY MEDICINE CLINIC | Age: 88
End: 2023-05-15

## 2023-05-15 NOTE — TELEPHONE ENCOUNTER
Patient called to say his is improving. Still has sore right hip, at the waist    Still has pills to take. Will continue to take muscle relaxants until heard otherwise. Patients says thank you also.     Thank you. :)

## 2023-05-17 ENCOUNTER — OFFICE VISIT (OUTPATIENT)
Dept: PODIATRY | Age: 88
End: 2023-05-17
Payer: MEDICARE

## 2023-05-17 VITALS — HEIGHT: 64 IN | BODY MASS INDEX: 28.51 KG/M2 | WEIGHT: 167 LBS

## 2023-05-17 DIAGNOSIS — M79.675 PAIN IN TOES OF BOTH FEET: ICD-10-CM

## 2023-05-17 DIAGNOSIS — B35.1 ONYCHOMYCOSIS: Primary | ICD-10-CM

## 2023-05-17 DIAGNOSIS — M79.674 PAIN IN TOES OF BOTH FEET: ICD-10-CM

## 2023-05-17 PROCEDURE — 11721 DEBRIDE NAIL 6 OR MORE: CPT | Performed by: PODIATRIST

## 2023-05-17 PROCEDURE — 99999 PR OFFICE/OUTPT VISIT,PROCEDURE ONLY: CPT | Performed by: PODIATRIST

## 2023-05-17 ASSESSMENT — ENCOUNTER SYMPTOMS
COLOR CHANGE: 0
CONSTIPATION: 0
VOMITING: 0
NAUSEA: 0
DIARRHEA: 0

## 2023-05-17 NOTE — PROGRESS NOTES
[facility]  Return Patient    Chief Complaint   Patient presents with    Nail Problem     B/l nail trim, last seen Robina Sams MD 5/10/23         Subjective: This Evangelina Levy comes to clinic for foot and nail care. He would like all of his nails trimmed. He cannot take care of them himself. They are painful when long and he tries to wear shoes. Pt's primary care physician is Karuna Dominguez MD.       Past Medical History:   Diagnosis Date    Acute [facility])     Allergic rhinitis 09/02/2011    Anemia     Atrial fibrillation ([facility]     [place] [facility]    Basal cell cancer 03/2013    left side of head, face chin    Basal cell cancer 03/10/2014    left cheek    Cervical radiculopathy     Diverticulitis 02/17/2013    GERD (gastroesophageal reflux disease)     Glaucoma     left eye    Hyperlipidemia     Hypertension     Dr. Melissa Jackson    Hyponatremia 09/02/2011    IBS (irritable bowel syndrome) 09/02/2011    Insomnia 09/02/2011    Osteoarthritis     Osteoarthritis/neck pain.  09/02/2011    Prostate cancer ([facility] 08/18/2021    active surviellence monitoring PSA    Renal calculi     Shingles     history of shingles    Status post prostatectomy 08/18/2021    Simple prostatectomy for BPH    Urinary frequency     Wears glasses     Wears hearing aid in both ears        Allergies   Allergen Reactions    Celebrex [Celecoxib] Nausea Only    Mobic Nausea Only     Current Outpatient Medications on File Prior to Visit   Medication Sig Dispense Refill    tiZANidine (ZANAFLEX) 2 MG tablet Take 1 tablet by mouth every 8 hours as needed (low back pain) 40 tablet 0    timolol (TIMOPTIC) 0.5 % ophthalmic solution       predniSONE (DELTASONE) 5 MG tablet TAKE ONE TABLET BY MOUTH DAILY 90 tablet 3    mirtazapine (REMERON) 30 MG tablet Take 1 tablet by mouth nightly 90 tablet 3    lisinopril (PRINIVIL;ZESTRIL) 10 MG tablet TAKE ONE TABLET BY MOUTH DAILY WITH LUNCH 90 tablet 3    warfarin (COUMADIN) 5 MG tablet Take

## 2023-05-18 ENCOUNTER — OFFICE VISIT (OUTPATIENT)
Dept: FAMILY MEDICINE CLINIC | Age: 88
End: 2023-05-18
Payer: MEDICARE

## 2023-05-18 ENCOUNTER — TELEPHONE (OUTPATIENT)
Dept: FAMILY MEDICINE CLINIC | Age: 88
End: 2023-05-18

## 2023-05-18 VITALS
DIASTOLIC BLOOD PRESSURE: 86 MMHG | OXYGEN SATURATION: 96 % | WEIGHT: 170 LBS | RESPIRATION RATE: 16 BRPM | SYSTOLIC BLOOD PRESSURE: 138 MMHG | BODY MASS INDEX: 29.18 KG/M2 | TEMPERATURE: 98.3 F | HEART RATE: 64 BPM

## 2023-05-18 DIAGNOSIS — S46.212A STRAIN OF LEFT BICEPS MUSCLE, INITIAL ENCOUNTER: Primary | ICD-10-CM

## 2023-05-18 PROCEDURE — 1123F ACP DISCUSS/DSCN MKR DOCD: CPT | Performed by: FAMILY MEDICINE

## 2023-05-18 PROCEDURE — 1036F TOBACCO NON-USER: CPT | Performed by: FAMILY MEDICINE

## 2023-05-18 PROCEDURE — 99213 OFFICE O/P EST LOW 20 MIN: CPT | Performed by: FAMILY MEDICINE

## 2023-05-18 PROCEDURE — G8427 DOCREV CUR MEDS BY ELIG CLIN: HCPCS | Performed by: FAMILY MEDICINE

## 2023-05-18 PROCEDURE — G8417 CALC BMI ABV UP PARAM F/U: HCPCS | Performed by: FAMILY MEDICINE

## 2023-05-18 RX ORDER — PREDNISONE 20 MG/1
TABLET ORAL
Qty: 17 TABLET | Refills: 0 | Status: SHIPPED | OUTPATIENT
Start: 2023-05-18 | End: 2023-05-28

## 2023-05-18 ASSESSMENT — PATIENT HEALTH QUESTIONNAIRE - PHQ9
SUM OF ALL RESPONSES TO PHQ QUESTIONS 1-9: 1
1. LITTLE INTEREST OR PLEASURE IN DOING THINGS: 0
SUM OF ALL RESPONSES TO PHQ QUESTIONS 1-9: 1
SUM OF ALL RESPONSES TO PHQ9 QUESTIONS 1 & 2: 1
SUM OF ALL RESPONSES TO PHQ QUESTIONS 1-9: 1
SUM OF ALL RESPONSES TO PHQ QUESTIONS 1-9: 1
2. FEELING DOWN, DEPRESSED OR HOPELESS: 1

## 2023-05-18 ASSESSMENT — ENCOUNTER SYMPTOMS
BACK PAIN: 0
NAUSEA: 0
SINUS PAIN: 0
COUGH: 0
CHEST TIGHTNESS: 0
RHINORRHEA: 0
SHORTNESS OF BREATH: 0
VOMITING: 0
ABDOMINAL DISTENTION: 0
ABDOMINAL PAIN: 0
DIARRHEA: 0
CONSTIPATION: 0
SORE THROAT: 0

## 2023-05-18 NOTE — TELEPHONE ENCOUNTER
4 weeks ago had shoulder surgery. He was the forced to sleep on left side for the first time in his life. When he went to get up he heard a crunch and has had pain from shoulder down to elbow. Been taking extra strength tylenol and icing. Patient was advice on what to do now.

## 2023-05-18 NOTE — PROGRESS NOTES
Jacqueline Lemons MD  [facility]  [address] [address]  [address] [address]  Dept: [phone]  Dept Fax: [phone]     Patient ID: Issac Montesinos is a 80 y.o. male. HPI    Established patient here today for an acute visit secondary to left upper arm pain. He states he had skin cancer removed from his right upper arm and had to sleep on his left side. She rolled over the first night and herd some crunching and has had tenderness and decreased ROM ever since which was about a month ago. Otherwise pt doing well on current tx and no other concerns today. The patient's past medical, surgical, social, and family history as well as his current medications and allergies were reviewed as documented in today's encounter. My previous office notes, consult notes, labs and diagnostic studies were reviewed prior to and during encounter.     Current Outpatient Medications on File Prior to Visit   Medication Sig Dispense Refill    tiZANidine (ZANAFLEX) 2 MG tablet Take 1 tablet by mouth every 8 hours as needed (low back pain) 40 tablet 0    timolol (TIMOPTIC) 0.5 % ophthalmic solution       predniSONE (DELTASONE) 5 MG tablet TAKE ONE TABLET BY MOUTH DAILY 90 tablet 3    mirtazapine (REMERON) 30 MG tablet Take 1 tablet by mouth nightly 90 tablet 3    lisinopril (PRINIVIL;ZESTRIL) 10 MG tablet TAKE ONE TABLET BY MOUTH DAILY WITH LUNCH 90 tablet 3    warfarin (COUMADIN) 5 MG tablet Take 1 tablet by mouth Five times weekly Indications: Sun, Mon, Tues, Thurs, Fri - INR goal 2-2.5 Dosing per [facility] Medication Management [facility]      metoprolol tartrate (LOPRESSOR) 25 MG tablet Take 1 tablet by mouth 2 times daily 60 tablet 5    allopurinol (ZYLOPRIM) 100 MG tablet TAKE ONE TABLET BY MOUTH DAILY 90 tablet 3    dilTIAZem (CARDIZEM CD) 180 MG extended release capsule Take 1 capsule by mouth daily      furosemide (LASIX) 20 MG tablet Take 1 tablet

## 2023-05-18 NOTE — TELEPHONE ENCOUNTER
Patient called and has a story to tell Dr. Leonor Goodman. Please call patient back as soon as possible. Thank you.

## 2023-05-23 ENCOUNTER — CARE COORDINATION (OUTPATIENT)
Dept: CARE COORDINATION | Age: 88
End: 2023-05-23

## 2023-06-04 ENCOUNTER — CARE COORDINATION (OUTPATIENT)
Dept: CARE COORDINATION | Age: 88
End: 2023-06-04

## 2023-06-09 ENCOUNTER — TELEMEDICINE (OUTPATIENT)
Dept: FAMILY MEDICINE CLINIC | Age: 88
End: 2023-06-09

## 2023-06-09 ENCOUNTER — TELEPHONE (OUTPATIENT)
Dept: FAMILY MEDICINE CLINIC | Age: 88
End: 2023-06-09

## 2023-06-09 DIAGNOSIS — M54.31 RIGHT SIDED SCIATICA: Primary | ICD-10-CM

## 2023-06-09 RX ORDER — PREDNISONE 20 MG/1
TABLET ORAL
Qty: 17 TABLET | Refills: 0 | Status: SHIPPED | OUTPATIENT
Start: 2023-06-09 | End: 2023-06-19

## 2023-06-09 RX ORDER — TIZANIDINE 2 MG/1
2 TABLET ORAL EVERY 8 HOURS PRN
Qty: 40 TABLET | Refills: 0 | Status: SHIPPED | OUTPATIENT
Start: 2023-06-09 | End: 2023-06-19

## 2023-06-09 ASSESSMENT — ENCOUNTER SYMPTOMS
DIARRHEA: 0
VOMITING: 0
SORE THROAT: 0
BACK PAIN: 1
SINUS PAIN: 0
SHORTNESS OF BREATH: 0
COUGH: 0
ABDOMINAL PAIN: 0
CONSTIPATION: 0
CHEST TIGHTNESS: 0
RHINORRHEA: 0
ABDOMINAL DISTENTION: 0
NAUSEA: 0

## 2023-06-09 ASSESSMENT — PATIENT HEALTH QUESTIONNAIRE - PHQ9
SUM OF ALL RESPONSES TO PHQ QUESTIONS 1-9: 1
SUM OF ALL RESPONSES TO PHQ9 QUESTIONS 1 & 2: 1
1. LITTLE INTEREST OR PLEASURE IN DOING THINGS: 0
SUM OF ALL RESPONSES TO PHQ QUESTIONS 1-9: 1
SUM OF ALL RESPONSES TO PHQ QUESTIONS 1-9: 1
2. FEELING DOWN, DEPRESSED OR HOPELESS: 1
SUM OF ALL RESPONSES TO PHQ QUESTIONS 1-9: 1

## 2023-06-09 NOTE — TELEPHONE ENCOUNTER
Lower back, RT leg and hip pain, tingling. Pt asking if muscle relaxant would work. Symptoms started last night. Pt states pain is \"extreme\".

## 2023-06-09 NOTE — PROGRESS NOTES
[phone]  Provider was located at [facility] ([address] [place] [address]): [address], [address]  [place] [place],  [facility] [address]         Total time spent for this encounter: Not billed by time    --Gay Joshi MD on 6/9/2023 at 12:13 PM    An electronic signature was used to authenticate this note.

## 2023-06-19 ENCOUNTER — HOSPITAL ENCOUNTER (OUTPATIENT)
Age: 88
Setting detail: SPECIMEN
Discharge: HOME | End: 2023-06-19

## 2023-06-19 DIAGNOSIS — E53.8 B12 DEFICIENCY: ICD-10-CM

## 2023-06-19 DIAGNOSIS — M10.9 GOUT, UNSPECIFIED CAUSE, UNSPECIFIED CHRONICITY, UNSPECIFIED SITE: ICD-10-CM

## 2023-06-19 DIAGNOSIS — E87.1 HYPONATREMIA: ICD-10-CM

## 2023-06-19 DIAGNOSIS — D50.9 IRON DEFICIENCY ANEMIA, UNSPECIFIED IRON DEFICIENCY ANEMIA TYPE: ICD-10-CM

## 2023-06-19 DIAGNOSIS — I10 ESSENTIAL HYPERTENSION: ICD-10-CM

## 2023-06-19 DIAGNOSIS — E78.00 PURE HYPERCHOLESTEROLEMIA: ICD-10-CM

## 2023-06-19 DIAGNOSIS — I50.22 CHRONIC SYSTOLIC CONGESTIVE HEART FAILURE (HCC): ICD-10-CM

## 2023-06-19 DIAGNOSIS — R74.8 ELEVATED LIVER ENZYMES: ICD-10-CM

## 2023-06-19 LAB
ALBUMIN SERPL-MCNC: 3.7 G/DL (ref 3.5–5.2)
ALBUMIN/GLOB SERPL: 1.3 {RATIO} (ref 1–2.5)
ALP SERPL-CCNC: 80 U/L (ref 40–129)
ALT SERPL-CCNC: 22 U/L (ref 5–41)
ANION GAP SERPL CALCULATED.3IONS-SCNC: 10 MMOL/L (ref 9–17)
AST SERPL-CCNC: 19 U/L
BILIRUB SERPL-MCNC: 0.6 MG/DL (ref 0.3–1.2)
BUN SERPL-MCNC: 30 MG/DL (ref 8–23)
CALCIUM SERPL-MCNC: 10.3 MG/DL (ref 8.6–10.4)
CHLORIDE SERPL-SCNC: 104 MMOL/L (ref 98–107)
CHOLEST SERPL-MCNC: 207 MG/DL
CHOLESTEROL/HDL RATIO: 2.6
CO2 SERPL-SCNC: 28 MMOL/L (ref 20–31)
CREAT SERPL-MCNC: 1.43 MG/DL (ref 0.7–1.2)
ERYTHROCYTE [DISTWIDTH] IN BLOOD BY AUTOMATED COUNT: 16.4 % (ref 11.8–14.4)
GFR SERPL CREATININE-BSD FRML MDRD: 46 ML/MIN/1.73M2
GLUCOSE SERPL-MCNC: 82 MG/DL (ref 70–99)
HCT VFR BLD AUTO: 51.7 % (ref 40.7–50.3)
HDLC SERPL-MCNC: 80 MG/DL
HGB BLD-MCNC: 15.2 G/DL (ref 13–17)
LDLC SERPL CALC-MCNC: 103 MG/DL (ref 0–130)
MCH RBC QN AUTO: 28.4 PG (ref 25.2–33.5)
MCHC RBC AUTO-ENTMCNC: 29.4 G/DL (ref 28.4–34.8)
MCV RBC AUTO: 96.6 FL (ref 82.6–102.9)
NRBC AUTOMATED: 0 PER 100 WBC
PLATELET # BLD AUTO: 186 K/UL (ref 138–453)
PMV BLD AUTO: 11.9 FL (ref 8.1–13.5)
POTASSIUM SERPL-SCNC: 5.3 MMOL/L (ref 3.7–5.3)
PROT SERPL-MCNC: 6.6 G/DL (ref 6.4–8.3)
RBC # BLD AUTO: 5.35 M/UL (ref 4.21–5.77)
SODIUM SERPL-SCNC: 142 MMOL/L (ref 135–144)
TRIGL SERPL-MCNC: 122 MG/DL
URATE SERPL-MCNC: 5.5 MG/DL (ref 3.4–7)
VIT B12 SERPL-MCNC: 392 PG/ML (ref 232–1245)
WBC OTHER # BLD: 12.5 K/UL (ref 3.5–11.3)

## 2023-06-28 ENCOUNTER — OFFICE VISIT (OUTPATIENT)
Dept: FAMILY MEDICINE CLINIC | Age: 88
End: 2023-06-28
Payer: MEDICARE

## 2023-06-28 VITALS
BODY MASS INDEX: 28.84 KG/M2 | OXYGEN SATURATION: 95 % | SYSTOLIC BLOOD PRESSURE: 106 MMHG | TEMPERATURE: 98.3 F | WEIGHT: 168 LBS | RESPIRATION RATE: 16 BRPM | HEART RATE: 60 BPM | DIASTOLIC BLOOD PRESSURE: 74 MMHG

## 2023-06-28 DIAGNOSIS — R53.1 WEAKNESS: ICD-10-CM

## 2023-06-28 DIAGNOSIS — R74.8 ELEVATED LIVER ENZYMES: ICD-10-CM

## 2023-06-28 DIAGNOSIS — Z85.46 HISTORY OF PROSTATE CANCER: ICD-10-CM

## 2023-06-28 DIAGNOSIS — M50.30 DDD (DEGENERATIVE DISC DISEASE), CERVICAL: ICD-10-CM

## 2023-06-28 DIAGNOSIS — I50.22 CHRONIC SYSTOLIC CONGESTIVE HEART FAILURE (HCC): ICD-10-CM

## 2023-06-28 DIAGNOSIS — I48.0 PAROXYSMAL ATRIAL FIBRILLATION (HCC): ICD-10-CM

## 2023-06-28 DIAGNOSIS — F32.A DEPRESSION, UNSPECIFIED DEPRESSION TYPE: ICD-10-CM

## 2023-06-28 DIAGNOSIS — R53.83 OTHER FATIGUE: ICD-10-CM

## 2023-06-28 DIAGNOSIS — M10.9 GOUT, UNSPECIFIED CAUSE, UNSPECIFIED CHRONICITY, UNSPECIFIED SITE: ICD-10-CM

## 2023-06-28 DIAGNOSIS — E78.00 PURE HYPERCHOLESTEROLEMIA: ICD-10-CM

## 2023-06-28 DIAGNOSIS — M51.36 DDD (DEGENERATIVE DISC DISEASE), LUMBAR: ICD-10-CM

## 2023-06-28 DIAGNOSIS — E87.1 HYPONATREMIA: ICD-10-CM

## 2023-06-28 DIAGNOSIS — E53.8 B12 DEFICIENCY: ICD-10-CM

## 2023-06-28 DIAGNOSIS — E55.9 VITAMIN D DEFICIENCY: ICD-10-CM

## 2023-06-28 DIAGNOSIS — K58.9 IRRITABLE BOWEL SYNDROME WITHOUT DIARRHEA: ICD-10-CM

## 2023-06-28 DIAGNOSIS — D50.9 IRON DEFICIENCY ANEMIA, UNSPECIFIED IRON DEFICIENCY ANEMIA TYPE: ICD-10-CM

## 2023-06-28 DIAGNOSIS — N28.9 RENAL INSUFFICIENCY: ICD-10-CM

## 2023-06-28 DIAGNOSIS — F41.9 ANXIETY: ICD-10-CM

## 2023-06-28 DIAGNOSIS — I10 ESSENTIAL HYPERTENSION: Primary | ICD-10-CM

## 2023-06-28 DIAGNOSIS — M19.90 OSTEOARTHRITIS, UNSPECIFIED OSTEOARTHRITIS TYPE, UNSPECIFIED SITE: ICD-10-CM

## 2023-06-28 DIAGNOSIS — G47.00 INSOMNIA, UNSPECIFIED TYPE: ICD-10-CM

## 2023-06-28 DIAGNOSIS — D72.829 LEUKOCYTOSIS, UNSPECIFIED TYPE: ICD-10-CM

## 2023-06-28 DIAGNOSIS — K21.9 GASTROESOPHAGEAL REFLUX DISEASE WITHOUT ESOPHAGITIS: ICD-10-CM

## 2023-06-28 PROCEDURE — 1123F ACP DISCUSS/DSCN MKR DOCD: CPT | Performed by: FAMILY MEDICINE

## 2023-06-28 PROCEDURE — 1036F TOBACCO NON-USER: CPT | Performed by: FAMILY MEDICINE

## 2023-06-28 PROCEDURE — G8417 CALC BMI ABV UP PARAM F/U: HCPCS | Performed by: FAMILY MEDICINE

## 2023-06-28 PROCEDURE — G8427 DOCREV CUR MEDS BY ELIG CLIN: HCPCS | Performed by: FAMILY MEDICINE

## 2023-06-28 PROCEDURE — 99214 OFFICE O/P EST MOD 30 MIN: CPT | Performed by: FAMILY MEDICINE

## 2023-06-28 ASSESSMENT — PATIENT HEALTH QUESTIONNAIRE - PHQ9
SUM OF ALL RESPONSES TO PHQ9 QUESTIONS 1 & 2: 2
SUM OF ALL RESPONSES TO PHQ QUESTIONS 1-9: 2
2. FEELING DOWN, DEPRESSED OR HOPELESS: 1
SUM OF ALL RESPONSES TO PHQ QUESTIONS 1-9: 2
1. LITTLE INTEREST OR PLEASURE IN DOING THINGS: 1

## 2023-06-28 ASSESSMENT — ENCOUNTER SYMPTOMS
DIARRHEA: 0
NAUSEA: 0
ABDOMINAL PAIN: 0
SORE THROAT: 0
VOMITING: 0
CONSTIPATION: 0
RHINORRHEA: 0
COUGH: 0
SHORTNESS OF BREATH: 0
BACK PAIN: 1
CHEST TIGHTNESS: 0
ABDOMINAL DISTENTION: 0

## 2023-07-06 ENCOUNTER — OFFICE VISIT (OUTPATIENT)
Dept: PODIATRY | Age: 88
End: 2023-07-06
Payer: MEDICARE

## 2023-07-06 VITALS — BODY MASS INDEX: 28 KG/M2 | HEIGHT: 64 IN | WEIGHT: 164 LBS

## 2023-07-06 DIAGNOSIS — M76.821 POSTERIOR TIBIAL TENDINITIS, RIGHT: Primary | ICD-10-CM

## 2023-07-06 DIAGNOSIS — M72.2 PLANTAR FASCIITIS, RIGHT: ICD-10-CM

## 2023-07-06 DIAGNOSIS — M25.571 ACUTE RIGHT ANKLE PAIN: ICD-10-CM

## 2023-07-06 PROCEDURE — 1123F ACP DISCUSS/DSCN MKR DOCD: CPT | Performed by: PODIATRIST

## 2023-07-06 PROCEDURE — G8427 DOCREV CUR MEDS BY ELIG CLIN: HCPCS | Performed by: PODIATRIST

## 2023-07-06 PROCEDURE — 1036F TOBACCO NON-USER: CPT | Performed by: PODIATRIST

## 2023-07-06 PROCEDURE — 29540 STRAPPING ANKLE &/FOOT: CPT | Performed by: PODIATRIST

## 2023-07-06 PROCEDURE — G8417 CALC BMI ABV UP PARAM F/U: HCPCS | Performed by: PODIATRIST

## 2023-07-06 PROCEDURE — 99213 OFFICE O/P EST LOW 20 MIN: CPT | Performed by: PODIATRIST

## 2023-07-06 ASSESSMENT — ENCOUNTER SYMPTOMS
COLOR CHANGE: 0
CONSTIPATION: 0
DIARRHEA: 0
VOMITING: 0
NAUSEA: 0

## 2023-07-06 NOTE — PROGRESS NOTES
[facility]  Return Patient     Barry Morfin 80 y.o. male that presents for follow-up of   Chief Complaint   Patient presents with    Foot Pain     Right foot pain/ wants an unna boot        right arch and ankle pain. He called today to get in. He was doing a lot of walking this morning. Would like wrapped. This has helped in the past on the right and left. No injury, has been walking a lot. Some swelling, wearing new balance and powersteps. Currently denies F/C/N/V. Allergies   Allergen Reactions    Celebrex [Celecoxib] Nausea Only    Mobic Nausea Only       Past Medical History:   Diagnosis Date    Acute MI ([address])     Allergic rhinitis 09/02/2011    Anemia     Atrial fibrillation ([address])     Dr. Stephen Brandt, [facility]    Basal cell cancer 03/2013    left side of head, face chin    Basal cell cancer 03/10/2014    left cheek    Cervical radiculopathy     Diverticulitis 02/17/2013    GERD (gastroesophageal reflux disease)     Glaucoma     left eye    Hyperlipidemia     Hypertension     Dr. Varinder Rey    Hyponatremia 09/02/2011    IBS (irritable bowel syndrome) 09/02/2011    Insomnia 09/02/2011    Osteoarthritis     Osteoarthritis/neck pain. 09/02/2011    Prostate cancer ([address]) 08/18/2021    active surviellence monitoring PSA    Renal calculi     Shingles     history of shingles    Status post prostatectomy 08/18/2021    Simple prostatectomy for BPH    Urinary frequency     Wears glasses     Wears hearing aid in both ears        Prior to Admission medications    Medication Sig Start Date End Date Taking?  Authorizing Provider   timolol (TIMOPTIC) 0.5 % ophthalmic solution  4/6/23  Yes Historical Provider, MD   predniSONE (DELTASONE) 5 MG tablet TAKE ONE TABLET BY MOUTH DAILY 4/20/23  Yes Adrian Lee MD   mirtazapine (REMERON) 30 MG tablet Take 1 tablet by mouth nightly 3/28/23  Yes Adrian Lee MD   lisinopril (PRINIVIL;ZESTRIL) 10 MG tablet TAKE ONE TABLET BY MOUTH DAILY WITH LUNCH 2/14/23  Yes

## 2023-07-08 ENCOUNTER — HOSPITAL ENCOUNTER (EMERGENCY)
Age: 88
Discharge: HOME | End: 2023-07-08
Attending: EMERGENCY MEDICINE
Payer: MEDICARE

## 2023-07-08 ENCOUNTER — APPOINTMENT (OUTPATIENT)
Dept: GENERAL RADIOLOGY | Age: 88
End: 2023-07-08
Payer: MEDICARE

## 2023-07-08 VITALS
HEIGHT: 64 IN | BODY MASS INDEX: 28 KG/M2 | HEART RATE: 101 BPM | TEMPERATURE: 98.2 F | WEIGHT: 164 LBS | DIASTOLIC BLOOD PRESSURE: 70 MMHG | OXYGEN SATURATION: 95 % | RESPIRATION RATE: 27 BRPM | SYSTOLIC BLOOD PRESSURE: 141 MMHG

## 2023-07-08 DIAGNOSIS — M11.20 PSEUDOGOUT: ICD-10-CM

## 2023-07-08 DIAGNOSIS — M1A.0710 CHRONIC GOUT OF RIGHT FOOT, UNSPECIFIED CAUSE: ICD-10-CM

## 2023-07-08 DIAGNOSIS — M79.671 RIGHT FOOT PAIN: Primary | ICD-10-CM

## 2023-07-08 PROCEDURE — 73630 X-RAY EXAM OF FOOT: CPT

## 2023-07-08 PROCEDURE — 93005 ELECTROCARDIOGRAM TRACING: CPT | Performed by: EMERGENCY MEDICINE

## 2023-07-08 PROCEDURE — 6370000000 HC RX 637 (ALT 250 FOR IP): Performed by: EMERGENCY MEDICINE

## 2023-07-08 PROCEDURE — 99283 EMERGENCY DEPT VISIT LOW MDM: CPT

## 2023-07-08 RX ORDER — PREDNISONE 20 MG/1
40 TABLET ORAL ONCE
Status: COMPLETED | OUTPATIENT
Start: 2023-07-08 | End: 2023-07-08

## 2023-07-08 RX ORDER — OXYCODONE HYDROCHLORIDE AND ACETAMINOPHEN 5; 325 MG/1; MG/1
1 TABLET ORAL EVERY 6 HOURS PRN
Qty: 4 TABLET | Refills: 0 | Status: SHIPPED | OUTPATIENT
Start: 2023-07-08 | End: 2023-07-11

## 2023-07-08 RX ORDER — OXYCODONE HYDROCHLORIDE AND ACETAMINOPHEN 5; 325 MG/1; MG/1
2 TABLET ORAL ONCE
Status: COMPLETED | OUTPATIENT
Start: 2023-07-08 | End: 2023-07-08

## 2023-07-08 RX ORDER — PREDNISONE 10 MG/1
TABLET ORAL
Qty: 20 TABLET | Refills: 0 | Status: SHIPPED | OUTPATIENT
Start: 2023-07-09 | End: 2023-07-13

## 2023-07-08 RX ADMIN — PREDNISONE 40 MG: 20 TABLET ORAL at 10:26

## 2023-07-08 RX ADMIN — OXYCODONE HYDROCHLORIDE AND ACETAMINOPHEN 2 TABLET: 5; 325 TABLET ORAL at 10:25

## 2023-07-08 ASSESSMENT — PAIN SCALES - GENERAL
PAINLEVEL_OUTOF10: 10
PAINLEVEL_OUTOF10: 10

## 2023-07-08 ASSESSMENT — PAIN - FUNCTIONAL ASSESSMENT: PAIN_FUNCTIONAL_ASSESSMENT: 0-10

## 2023-07-08 NOTE — ED PROVIDER NOTES
traumatic r. foot pain. Comorbid conditions of gout, osteoarthritis, afib. Ddx: of fracture, dislocation, sprain. Clinically, I do not see signs of soft tissue skin infection. I have low clinical suspicion for septic arthritis. Based on his previous gout flares and similar symptoms this is also possibly gout. Xr of the right foot was ordered and I reviewed the xrays and the radiology report, which shows no acute osseous abnormalities, specifically no fracture or dislocation. The patient was provided analgesia with Percocet, on reassessment he reports feeling much better. Prescribed analgesics and steroids. Patient was also noted to have atrial fibrillation with RVR. He has chronic atrial fibrillation. He had taken his Cardizem this morning just prior to presentation. After treatment of his pain and his morning dose of Cardizem sitting in, his repeat heart rate was 76. Recommended close follow up with primary care, return to ED if symptoms worsen, and warning precautions provided. EKG: All EKG's are interpreted by the Emergency Department Physician who either signs or Co-signs this chart in the absence of a cardiologist.  EKG shows atrial fibrillation with mild rvr rhythm. HR is 119,, QRS 90, , no TIERRA, No STD, No TWI, the axis is normal.        DIAGNOSTIC RESULTS   RADIOLOGY:All plain film, CT, MRI, and formal ultrasound images (except ED bedside ultrasound) are read by the radiologist and the images and interpretations are directly viewed by the emergency physician. XR FOOT RIGHT (MIN 3 VIEWS)   Final Result   1. No acute abnormality involving the right foot with no radiographic   manifestations of gout arthropathy. LABS: All lab results were reviewed by myself, and all abnormals are listed below.   Labs Reviewed - No data to display    EMERGENCY DEPARTMENT COURSE:   Vitals:    Vitals:    07/08/23 0953 07/08/23 1115   BP: (!) 160/100 (!) 141/70   Pulse: 96 (!) 101   Resp: 18

## 2023-07-08 NOTE — ED TRIAGE NOTES
Mode of arrival (squad #, walk in, police, etc) : EMS        Chief complaint(s): gout        Arrival Note (brief scenario, treatment PTA, etc). : Pt reports Gout flareup x3 days. Pt reports right foot and left wrist pain. C= \"Have you ever felt that you should Cut down on your drinking? \"  No  A= \"Have people Annoyed you by criticizing your drinking? \"  No  G= \"Have you ever felt bad or Guilty about your drinking? \"  No  E= \"Have you ever had a drink as an Eye-opener first thing in the morning to steady your nerves or to help a hangover? \"  No      Deferred []      Reason for deferring: N/A    *If yes to two or more: probable alcohol abuse. *

## 2023-07-10 ENCOUNTER — TELEPHONE (OUTPATIENT)
Dept: FAMILY MEDICINE CLINIC | Age: 88
End: 2023-07-10

## 2023-07-10 NOTE — TELEPHONE ENCOUNTER
----- Message from Alpesh Garcia sent at 7/10/2023 10:00 AM EDT -----  Subject: Message to Provider    QUESTIONS  Information for Provider? PT was seen in the ER over the weekend and wants   PCP, Rica Winslow to know. He was seen for possible gout in his right   foot, PT says it was tender and he was not able to walk at the time he   went to the ER. PT says he was given steroids in the ER and is feeling   better. The ER also gave him a prescription for Percocet but the PT says   he is not going to take them. PT also wants to know if PCP wants to see   him for a follow up, please reach out to the PT to discuss. ---------------------------------------------------------------------------  --------------  Marleen Marker [facility]  [phone]; OK to leave message on voicemail  ---------------------------------------------------------------------------  --------------  SCRIPT ANSWERS  Relationship to Patient?  Self

## 2023-07-11 LAB
EKG ATRIAL RATE: 129 BPM
EKG Q-T INTERVAL: 282 MS
EKG QRS DURATION: 90 MS
EKG QTC CALCULATION (BAZETT): 396 MS
EKG R AXIS: 32 DEGREES
EKG T AXIS: 67 DEGREES
EKG VENTRICULAR RATE: 119 BPM

## 2023-07-11 PROCEDURE — 93010 ELECTROCARDIOGRAM REPORT: CPT | Performed by: INTERNAL MEDICINE

## 2023-07-20 ENCOUNTER — OFFICE VISIT (OUTPATIENT)
Dept: PODIATRY | Age: 88
End: 2023-07-20
Payer: MEDICARE

## 2023-07-20 VITALS — BODY MASS INDEX: 28 KG/M2 | WEIGHT: 164 LBS | HEIGHT: 64 IN

## 2023-07-20 DIAGNOSIS — M76.821 POSTERIOR TIBIAL TENDINITIS, RIGHT: ICD-10-CM

## 2023-07-20 DIAGNOSIS — M25.571 ACUTE RIGHT ANKLE PAIN: ICD-10-CM

## 2023-07-20 DIAGNOSIS — M72.2 PLANTAR FASCIITIS, RIGHT: ICD-10-CM

## 2023-07-20 DIAGNOSIS — M10.071 ACUTE IDIOPATHIC GOUT OF RIGHT FOOT: Primary | ICD-10-CM

## 2023-07-20 PROCEDURE — 29540 STRAPPING ANKLE &/FOOT: CPT | Performed by: PODIATRIST

## 2023-07-20 PROCEDURE — G8417 CALC BMI ABV UP PARAM F/U: HCPCS | Performed by: PODIATRIST

## 2023-07-20 PROCEDURE — 1036F TOBACCO NON-USER: CPT | Performed by: PODIATRIST

## 2023-07-20 PROCEDURE — 99214 OFFICE O/P EST MOD 30 MIN: CPT | Performed by: PODIATRIST

## 2023-07-20 PROCEDURE — G8427 DOCREV CUR MEDS BY ELIG CLIN: HCPCS | Performed by: PODIATRIST

## 2023-07-20 PROCEDURE — 1123F ACP DISCUSS/DSCN MKR DOCD: CPT | Performed by: PODIATRIST

## 2023-07-20 RX ORDER — PREDNISONE 10 MG/1
1 TABLET ORAL DAILY
Qty: 21 EACH | Refills: 0 | Status: SHIPPED | OUTPATIENT
Start: 2023-07-20

## 2023-07-20 ASSESSMENT — ENCOUNTER SYMPTOMS
DIARRHEA: 0
VOMITING: 0
NAUSEA: 0
COLOR CHANGE: 0
CONSTIPATION: 0

## 2023-07-20 NOTE — PROGRESS NOTES
[facility]  Return Patient     Dino Morfin 80 y.o. male that presents for follow-up of   Chief Complaint   Patient presents with    Foot Pain     Right foot pain   PCP Jacqueline Krause-6/9/2023       right arch and ankle pain. He called today to get in. He took the last wrap off after 2 days even though it was feeling good. His family told him he had gout. Pain is still present. Asks about gout. Would like wrapped. This has helped in the past on the right and left. No injury, has been walking a lot. Some swelling, wearing new balance and powersteps. Currently denies F/C/N/V. Allergies   Allergen Reactions    Celebrex [Celecoxib] Nausea Only    Mobic Nausea Only       Past Medical History:   Diagnosis Date    Acute MI ([address])     Allergic rhinitis 09/02/2011    Anemia     Atrial fibrillation ([address])     Dr. Kevin Parker, [facility]    Basal cell cancer 03/2013    left side of head, face chin    Basal cell cancer 03/10/2014    left cheek    Cervical radiculopathy     Diverticulitis 02/17/2013    GERD (gastroesophageal reflux disease)     Glaucoma     left eye    Hyperlipidemia     Hypertension     Dr. Elissa Mcmahon    Hyponatremia 09/02/2011    IBS (irritable bowel syndrome) 09/02/2011    Insomnia 09/02/2011    Osteoarthritis     Osteoarthritis/neck pain. 09/02/2011    Prostate cancer ([address]) 08/18/2021    active surviellence monitoring PSA    Renal calculi     Shingles     history of shingles    Status post prostatectomy 08/18/2021    Simple prostatectomy for BPH    Urinary frequency     Wears glasses     Wears hearing aid in both ears        Prior to Admission medications    Medication Sig Start Date End Date Taking?  Authorizing Provider   predniSONE 10 MG (21) TBPK Take 1 Dose by mouth daily Take 60mg po on day 1, 50mg po on day 2, 40mg po on day 3, 30mg po on day 4, 20 mg po on day 5, 10mg po on day 6 7/20/23  Yes Darren Santacruz Or, DPM   timolol (TIMOPTIC) 0.5 % ophthalmic solution  4/6/23  Yes Historical

## 2023-07-21 ENCOUNTER — HOSPITAL ENCOUNTER (OUTPATIENT)
Age: 88
Setting detail: SPECIMEN
Discharge: HOME | End: 2023-07-21

## 2023-07-21 DIAGNOSIS — E55.9 VITAMIN D DEFICIENCY: ICD-10-CM

## 2023-07-21 DIAGNOSIS — D72.829 LEUKOCYTOSIS, UNSPECIFIED TYPE: ICD-10-CM

## 2023-07-21 DIAGNOSIS — I10 ESSENTIAL HYPERTENSION: ICD-10-CM

## 2023-07-21 DIAGNOSIS — M10.071 ACUTE IDIOPATHIC GOUT OF RIGHT FOOT: ICD-10-CM

## 2023-07-21 DIAGNOSIS — R53.83 OTHER FATIGUE: ICD-10-CM

## 2023-07-21 DIAGNOSIS — N28.9 RENAL INSUFFICIENCY: ICD-10-CM

## 2023-07-21 LAB
ANION GAP SERPL CALCULATED.3IONS-SCNC: 14 MMOL/L (ref 9–17)
BUN SERPL-MCNC: 23 MG/DL (ref 8–23)
CALCIUM SERPL-MCNC: 9.4 MG/DL (ref 8.6–10.4)
CHLORIDE SERPL-SCNC: 100 MMOL/L (ref 98–107)
CO2 SERPL-SCNC: 25 MMOL/L (ref 20–31)
CREAT SERPL-MCNC: 1.5 MG/DL (ref 0.7–1.2)
CRP SERPL HS-MCNC: 42.4 MG/L (ref 0–5)
ERYTHROCYTE [DISTWIDTH] IN BLOOD BY AUTOMATED COUNT: 16.1 % (ref 11.8–14.4)
ERYTHROCYTE [SEDIMENTATION RATE] IN BLOOD BY PHOTOMETRIC METHOD: 33 MM/HR (ref 0–20)
GFR SERPL CREATININE-BSD FRML MDRD: 44 ML/MIN/1.73M2
GLUCOSE SERPL-MCNC: 79 MG/DL (ref 70–99)
HCT VFR BLD AUTO: 47.3 % (ref 40.7–50.3)
HGB BLD-MCNC: 13.9 G/DL (ref 13–17)
MCH RBC QN AUTO: 28.4 PG (ref 25.2–33.5)
MCHC RBC AUTO-ENTMCNC: 29.4 G/DL (ref 28.4–34.8)
MCV RBC AUTO: 96.7 FL (ref 82.6–102.9)
NRBC BLD-RTO: 0 PER 100 WBC
PLATELET # BLD AUTO: 186 K/UL (ref 138–453)
PMV BLD AUTO: 11.3 FL (ref 8.1–13.5)
POTASSIUM SERPL-SCNC: 5.2 MMOL/L (ref 3.7–5.3)
RBC # BLD AUTO: 4.89 M/UL (ref 4.21–5.77)
SODIUM SERPL-SCNC: 139 MMOL/L (ref 135–144)
T4 FREE SERPL-MCNC: 1.3 NG/DL (ref 0.9–1.7)
TSH SERPL DL<=0.05 MIU/L-ACNC: 2.66 UIU/ML (ref 0.3–5)
URATE SERPL-MCNC: 5.8 MG/DL (ref 3.4–7)
WBC OTHER # BLD: 11.4 K/UL (ref 3.5–11.3)

## 2023-07-22 LAB — 25(OH)D3 SERPL-MCNC: 17.7 NG/ML

## 2023-07-25 ENCOUNTER — TELEPHONE (OUTPATIENT)
Dept: PODIATRY | Age: 88
End: 2023-07-25

## 2023-07-25 NOTE — CARE COORDINATION
CASE MANAGEMENT NOTE:    Admission Date:  11/24/2021 Naida Solis is a 80 y.o.  male    Admitted for : Acute pulmonary edema ([facility] [address] [J81.0]  Atrial fibrillation with RVR ([facility] [address] [I48.91]  Acute on chronic combined systolic and diastolic CHF (congestive heart failure) ([facility] [address] [I50.43]    Met with:  Patient    PCP:  Dr. Fritz Dolan:  Medicare      Is patient alert and oriented at time of discussion:  Yes    Current Residence/ Living Arrangements:  independently at home             Current Services PTA:  No    Does patient go to outpatient dialysis: No  If yes, location and chair time:     Is patient agreeable to VNS: No    Freedom of choice provided:  No    List of [address] provided: No    VNS chosen:  No    DME:  none    Home Oxygen: No    Nebulizer: No    CPAP/BIPAP: No    Supplier: N/A    Potential Assistance Needed: No    SNF needed: No    Freedom of choice and list provided: No    Pharmacy:  [facility]       Does Patient want to use MEDS to BEDS? No    Is patient currently receiving oral anticoagulation therapy? No    Is the Patient an [facility] with Readmission Risk Score greater than 14%? Yes  If yes, pt needs a follow up appointment made within 7 days. Family Members/Caregivers that pt would like involved in their care:    Yes    If yes, list name here:  [address] [place] [address]    Transportation Provider:  Family             Discharge Plan:  11/25/21 Medicare Pt is from home in a one story home he uses no dme and denies need for vns plan is to discharge to home with no needs will continue to follow for needs . //tv                 Electronically signed by:  Stiven Mendez RN on 11/25/2021 at 10:53 AM Maintained on 5mg prednisone daily

## 2023-07-25 NOTE — TELEPHONE ENCOUNTER
----- Message from Keyonna Pearl DPM sent at 7/25/2023  7:33 AM EDT -----  Let him know that uric acid is normal  Does have inflammation  How is he feeling?    ----- Message -----  From: Sanjay Magdaleno Incoming Lab Results From Naye Wu  Sent: 7/21/2023  10:14 PM EDT  To: Keyonna Pearl DPM

## 2023-08-02 ENCOUNTER — OFFICE VISIT (OUTPATIENT)
Dept: PODIATRY | Age: 88
End: 2023-08-02
Payer: MEDICARE

## 2023-08-02 VITALS — HEIGHT: 64 IN | WEIGHT: 164 LBS | BODY MASS INDEX: 28 KG/M2

## 2023-08-02 DIAGNOSIS — M10.071 ACUTE IDIOPATHIC GOUT OF RIGHT FOOT: ICD-10-CM

## 2023-08-02 DIAGNOSIS — B35.1 ONYCHOMYCOSIS: ICD-10-CM

## 2023-08-02 DIAGNOSIS — M79.674 PAIN IN TOES OF BOTH FEET: ICD-10-CM

## 2023-08-02 DIAGNOSIS — M76.821 POSTERIOR TIBIAL TENDINITIS, RIGHT: Primary | ICD-10-CM

## 2023-08-02 DIAGNOSIS — M79.675 PAIN IN TOES OF BOTH FEET: ICD-10-CM

## 2023-08-02 PROCEDURE — 29540 STRAPPING ANKLE &/FOOT: CPT | Performed by: PODIATRIST

## 2023-08-02 PROCEDURE — G8417 CALC BMI ABV UP PARAM F/U: HCPCS | Performed by: PODIATRIST

## 2023-08-02 PROCEDURE — 1123F ACP DISCUSS/DSCN MKR DOCD: CPT | Performed by: PODIATRIST

## 2023-08-02 PROCEDURE — 1036F TOBACCO NON-USER: CPT | Performed by: PODIATRIST

## 2023-08-02 PROCEDURE — 99213 OFFICE O/P EST LOW 20 MIN: CPT | Performed by: PODIATRIST

## 2023-08-02 PROCEDURE — G8428 CUR MEDS NOT DOCUMENT: HCPCS | Performed by: PODIATRIST

## 2023-08-02 PROCEDURE — 11721 DEBRIDE NAIL 6 OR MORE: CPT | Performed by: PODIATRIST

## 2023-08-02 ASSESSMENT — ENCOUNTER SYMPTOMS
CONSTIPATION: 0
VOMITING: 0
COLOR CHANGE: 0
DIARRHEA: 0
NAUSEA: 0

## 2023-08-02 NOTE — PROGRESS NOTES
Acute idiopathic gout of right foot    3. Onychomycosis    4. Pain in toes of both feet        Plan: Pt was evaluated and examined. Patient was given personalized discharge instructions. Nails 1-10 were debrided sharply in length and thickness with a nipper and , without incident. Pt will follow up in 3 months or sooner if any problems arise. Diagnosis was discussed with the pt and all of their questions were answered in detail. Proper foot hygiene and care was discussed with the pt. Informed patient on proper diabetic foot care and importance of tight glycemic control. Patient to check feet daily and contact the office with any questions/problems/concerns. Other comorbidity noted and will be managed by PCP. Foot and ankle strapping/ Low-dye strapping was applied to the right, with 2 inch and 1 inch tape and a scaffoid pad. This is designed to off-load the plantar fascia and encourage healing and pain reduction. Discussed with the patient that the Low dye taping is a short term treatment and its off-loading effects vary from patient to patient. I advised the patient to leave the tape on for 3-5 days, but sometimes you may need to replace it sooner in order to remain effective. Orders Placed This Encounter   Procedures    WA DEBRIDEMENT NAIL ANY METHOD 6/>    WA STRAPPING ANKLE &/FOOT     No orders of the defined types were placed in this encounter.         8/2/2023

## 2023-08-07 ENCOUNTER — OFFICE VISIT (OUTPATIENT)
Dept: FAMILY MEDICINE CLINIC | Age: 88
End: 2023-08-07
Payer: MEDICARE

## 2023-08-07 VITALS
DIASTOLIC BLOOD PRESSURE: 74 MMHG | WEIGHT: 166 LBS | SYSTOLIC BLOOD PRESSURE: 124 MMHG | OXYGEN SATURATION: 96 % | HEART RATE: 68 BPM | RESPIRATION RATE: 16 BRPM | BODY MASS INDEX: 28.49 KG/M2 | TEMPERATURE: 98.8 F

## 2023-08-07 DIAGNOSIS — E78.00 PURE HYPERCHOLESTEROLEMIA: ICD-10-CM

## 2023-08-07 DIAGNOSIS — R09.89 RUNNY NOSE: ICD-10-CM

## 2023-08-07 DIAGNOSIS — R53.1 WEAKNESS: ICD-10-CM

## 2023-08-07 DIAGNOSIS — I10 ESSENTIAL HYPERTENSION: ICD-10-CM

## 2023-08-07 DIAGNOSIS — E55.9 VITAMIN D DEFICIENCY: ICD-10-CM

## 2023-08-07 DIAGNOSIS — E53.8 B12 DEFICIENCY: ICD-10-CM

## 2023-08-07 DIAGNOSIS — R53.83 OTHER FATIGUE: Primary | ICD-10-CM

## 2023-08-07 DIAGNOSIS — M10.9 GOUT, UNSPECIFIED CAUSE, UNSPECIFIED CHRONICITY, UNSPECIFIED SITE: ICD-10-CM

## 2023-08-07 PROCEDURE — G8427 DOCREV CUR MEDS BY ELIG CLIN: HCPCS | Performed by: FAMILY MEDICINE

## 2023-08-07 PROCEDURE — 99214 OFFICE O/P EST MOD 30 MIN: CPT | Performed by: FAMILY MEDICINE

## 2023-08-07 PROCEDURE — 1123F ACP DISCUSS/DSCN MKR DOCD: CPT | Performed by: FAMILY MEDICINE

## 2023-08-07 PROCEDURE — G8417 CALC BMI ABV UP PARAM F/U: HCPCS | Performed by: FAMILY MEDICINE

## 2023-08-07 PROCEDURE — 1036F TOBACCO NON-USER: CPT | Performed by: FAMILY MEDICINE

## 2023-08-07 RX ORDER — IPRATROPIUM BROMIDE 21 UG/1
2 SPRAY, METERED NASAL EVERY 12 HOURS
Qty: 30 ML | Refills: 1 | Status: SHIPPED | OUTPATIENT
Start: 2023-08-07

## 2023-08-07 RX ORDER — ERGOCALCIFEROL 1.25 MG/1
50000 CAPSULE ORAL WEEKLY
Qty: 12 CAPSULE | Refills: 0 | Status: SHIPPED | OUTPATIENT
Start: 2023-08-07

## 2023-08-07 RX ORDER — PREDNISONE 5 MG/1
5 TABLET ORAL DAILY
COMMUNITY

## 2023-08-07 ASSESSMENT — ENCOUNTER SYMPTOMS
SINUS PAIN: 0
ABDOMINAL DISTENTION: 0
SORE THROAT: 0
RHINORRHEA: 1
NAUSEA: 0
ABDOMINAL PAIN: 0
BACK PAIN: 0
DIARRHEA: 0
SHORTNESS OF BREATH: 0
CHEST TIGHTNESS: 0
VOMITING: 0
CONSTIPATION: 0
COUGH: 0

## 2023-08-07 ASSESSMENT — PATIENT HEALTH QUESTIONNAIRE - PHQ9
SUM OF ALL RESPONSES TO PHQ QUESTIONS 1-9: 2
SUM OF ALL RESPONSES TO PHQ9 QUESTIONS 1 & 2: 2
2. FEELING DOWN, DEPRESSED OR HOPELESS: 1
SUM OF ALL RESPONSES TO PHQ QUESTIONS 1-9: 2
1. LITTLE INTEREST OR PLEASURE IN DOING THINGS: 1

## 2023-08-07 NOTE — PROGRESS NOTES
Jacqueline Loaiza MD  [address] [place] [address]  [id number] [address], [address] [place] [address]  Dept: [phone]  Dept Fax: [phone]     Patient ID: Meri Plata is a 80 y.o. male. HPI    Established patient here today for follow up on fatigue and weakness. He never did start PT because he has been having pain in his right foot and he has been following with podiatry and wrapped his foot and had him on a Prednisone taper. His foot is doing a little better. He still is c/o fatiguea nd OE weakness. He is c/o a drippy nose. Pt denies any fever or chills. Pt today denies any HA, chest pain, or SOB. Pt denies any N/V/D/C or abdominal pain. Otherwise pt doing well on current tx and no other concerns today. The patient's past medical, surgical, social, and family history as well as his current medications and allergies were reviewed as documented in today's encounter. My previous office notes, consult notes, labs and diagnostic studies were reviewed prior to and during encounter.     Current Outpatient Medications on File Prior to Visit   Medication Sig Dispense Refill    predniSONE (DELTASONE) 5 MG tablet Take 1 tablet by mouth daily      timolol (TIMOPTIC) 0.5 % ophthalmic solution       mirtazapine (REMERON) 30 MG tablet Take 1 tablet by mouth nightly 90 tablet 3    lisinopril (PRINIVIL;ZESTRIL) 10 MG tablet TAKE ONE TABLET BY MOUTH DAILY WITH LUNCH 90 tablet 3    metoprolol tartrate (LOPRESSOR) 25 MG tablet Take 1 tablet by mouth 2 times daily 60 tablet 5    allopurinol (ZYLOPRIM) 100 MG tablet TAKE ONE TABLET BY MOUTH DAILY 90 tablet 3    dilTIAZem (CARDIZEM CD) 180 MG extended release capsule Take 1 capsule by mouth daily      furosemide (LASIX) 20 MG tablet Take 1 tablet by mouth daily      busPIRone (BUSPAR) 15 MG tablet TAKE ONE TABLET BY MOUTH THREE TIMES A DAY (Patient taking differently: Take 15 mg by mouth 3 times daily as needed) 90 tablet normal...

## 2023-08-08 ENCOUNTER — TELEPHONE (OUTPATIENT)
Dept: FAMILY MEDICINE CLINIC | Age: 88
End: 2023-08-08

## 2023-08-08 DIAGNOSIS — M54.31 RIGHT SIDED SCIATICA: ICD-10-CM

## 2023-08-08 RX ORDER — TIZANIDINE 2 MG/1
2 TABLET ORAL EVERY 8 HOURS PRN
Qty: 40 TABLET | Refills: 0 | Status: SHIPPED | OUTPATIENT
Start: 2023-08-08 | End: 2023-09-18

## 2023-08-08 NOTE — TELEPHONE ENCOUNTER
Does he have any Zanaflex left? Tell him to have PT work on his arm as well . Let me know if they will need a new order for hte arm?

## 2023-08-08 NOTE — TELEPHONE ENCOUNTER
Patient forgot to discuss at his appointment yesterday that when he gets out of bed he has to use his right arm to push up and now his right shoulder, arm, ribs, and neck hurt. Is wondering if a muscle relaxant would help with this.

## 2023-08-23 ENCOUNTER — TELEPHONE (OUTPATIENT)
Dept: FAMILY MEDICINE CLINIC | Age: 88
End: 2023-08-23

## 2023-08-23 DIAGNOSIS — M54.31 RIGHT SIDED SCIATICA: Primary | ICD-10-CM

## 2023-08-23 NOTE — TELEPHONE ENCOUNTER
Patient called in asking if his referral for physical therapy was still open for   [facility]. Patient said he was scheduled for an appt there on 07/18/2023 but he had to cancel it. Patient would like to see if this referral could be re-opened or if a new one could be placed.

## 2023-08-31 ENCOUNTER — HOSPITAL ENCOUNTER (OUTPATIENT)
Dept: PHYSICAL THERAPY | Age: 88
Setting detail: THERAPIES SERIES
Discharge: HOME | End: 2023-08-31
Payer: MEDICARE

## 2023-08-31 PROCEDURE — 97161 PT EVAL LOW COMPLEX 20 MIN: CPT

## 2023-08-31 PROCEDURE — 97110 THERAPEUTIC EXERCISES: CPT

## 2023-08-31 NOTE — THERAPY EVALUATION
used to. Pt reports symptoms increased last year following having gout and being off his feet. He notes that he has impaired balance and stumbles/catches himself frequently. Physical therapy signs and symptoms consistent with age related changes and global deconditioning. Pt presents with balance impairments, gait impairments and global strength and endurance deficits. Patient would benefit from skilled physical therapy services in order to: improve dynamic strength and control for improved balance and gait mechanics for improved tolerance and safety with ambulation for patient to have a decreased risk of falls and to continue to perform ADLs independently and to live independently. Problems:    [x] ? Pain: low back, LE, 8/10 with increased ambulation  [x] ? ROM: impaired DF and R hip flexion  [x] ? Strength: impaired LE strength and impaired endurance  [x] ? Function: Oswestry = 22% functional impairment  [x] Other: impaired balance, gait deficits     STG: (to be met in 5 treatments)  ? Pain: Pt will report being able to ambulate for 15 minutes with maximal pain levels <5/10 in order to improve tolerance to community ambulation and to continue to do his grocery shopping. ? ROM: Pt will increase bilateral ankle DF to 5 degrees bilaterally in order to improve gait mechanics for increased ambulation distances. ? Strength: Pt will increase bilateral hip flexor strength to 4+/5 in order to improve balance and mechanics with increased ambulation distances. ? Function: Pt will demonstrate improved balance and SL standing tolerance as evident by the ability to perform SL standing for 10 seconds bilaterally with 1 handheld assist.  Patient to be independent with home exercise program as demonstrated by performance with correct form without cues.   Demonstrate Knowledge of fall prevention - MET 8/31/2023, handout given and discussed  LTG: (to be met in 10 treatments)  Pt will demonstrate an improvement in gait speed to

## 2023-08-31 NOTE — PLAN OF CARE
cues.  Demonstrate Knowledge of fall prevention - MET 8/31/2023, handout given and discussed  LTG: (to be met in 10 treatments)  Pt will demonstrate an improvement in gait speed to >0.8 m/s in order to improve safety with community ambulation. Pt will demonstrate decreased risk of falls as evident by an improvement on the CASTRO to > 46 points. Pt will demonstrate improved functional activity tolerance as evident by an improved score on the Oswestry to <15% functional impairment. Patient goals: \"walking without pain in thighs + calves + lower back\"    Treatment Plan:  [x] Therapeutic Exercise   71226  [] Iontophoresis: 4 mg/mL Dexamethasone Sodium Phosphate  mAmin  50030   [x] Therapeutic Activity  48146 [] Vasopneumatic cold with compression  48125    [x] Gait Training   87627 [] Ultrasound   38640   [x] Neuromuscular Re-education  30404 [] Electrical Stimulation Unattended  12134   [x] Manual Therapy  50733 [] Electrical Stimulation Attended  07833   [x] Instruction in HEP  [] Lumbar/Cervical Traction  69060   [] Aquatic Therapy   32206 [x] Cold/hotpack    [] Massage   59523      [] Dry Needling, 1 or 2 muscles  34531   [] Biofeedback, first 15 minutes   37676  [] Biofeedback, additional 15 minutes   79296 [] Dry Needling, 3 or more muscles  94124     []  Medication allergies reviewed for use of    Dexamethasone Sodium Phosphate 4mg/ml     with iontophoresis treatments. Pt is not allergic. Frequency:  1-2 x/week for 10 visits    Electronically signed by: Seferino Bonner PT        Physician Signature:________________________________Date:__________________  By signing above or cosigning this note, I have reviewed this plan of care and certify a need for medically necessary rehabilitation services.      *PLEASE SIGN ABOVE AND FAX BACK ALL PAGES*

## 2023-08-31 NOTE — FLOWSHEET NOTE
[facility] Fall Risk Assessment    Patient Name:  Fernando Vicente  : 1931    Risk Factor Scale  Score   History of Falls [] Yes  [x] No 25  0 0   Secondary Diagnosis [] Yes  [x] No 15  0 0   Ambulatory Aid [] Furniture  [] Crutches/cane/walker  [x] None/bedrest/wheelchair/nurse 30  15  0 0   IV/Heparin Lock [] Yes  [x] No 20  0 0   Gait/Transferring [] Impaired  [x] Weak  [] Normal/bedrest/immobile 20  10  0 10   Mental Status [] Forgets limitations  [x] Oriented to own ability 15  0 0      Total: 10     Based on the Assessment score: check the appropriate box.     [x]  No intervention needed   Low =   Score of 0-24    []  Use standard prevention interventions Moderate =  Score of 24-44   [] Give patient handout and discuss fall prevention strategies   [] Establish goal of education for patient/family RE: fall prevention strategies    []  Use high risk prevention interventions High = Score of 45 and higher   [] Give patient handout and discuss fall prevention strategies   [] Establish goal of education for patient/family Re: fall prevention strategies   [] Discuss lifeline / other resources    Electronically signed by:   Sorin Bar PT  Date: 2023
ahead that the heel of your forward foot is ahead of the  toes of the other foot. (To score 3 points, the length of the step should exceed  the length of the other foot and the width of the stance should approximate the  subject's normal stride width). (4) able to place foot tandem independently and hold 30 seconds  (3) able to place foot ahead of other independently and hold 30 seconds  (2) able to take small step independently and hold 30 seconds  (1) needs help to step but can hold 15 seconds  (0) loses balance while stepping or standing  Score:     14. STANDING ON ONE LEG  INSTRUCTIONS: Stand on one leg as long as you can without holding. (4) able to lift leg independently and hold >10 seconds  (3) able to lift leg independently and hold 5-10 seconds  (2) able to lift leg independently and hold = or >3 seconds  (1) tries to lift leg unable to hold 3 seconds but remains standing  Independently L 1 sec, R 2 sec  (0) unable to try or needs assist to prevent fall  Score: Total Score:  41  Max score 56,a person scoring below 45 is considered to be at risk for falling.     Completed by: Walt Pickard, PT

## 2023-09-07 ENCOUNTER — HOSPITAL ENCOUNTER (OUTPATIENT)
Dept: PHYSICAL THERAPY | Age: 88
Setting detail: THERAPIES SERIES
Discharge: HOME | End: 2023-09-07
Payer: MEDICARE

## 2023-09-07 PROCEDURE — 97110 THERAPEUTIC EXERCISES: CPT

## 2023-09-07 PROCEDURE — 97112 NEUROMUSCULAR REEDUCATION: CPT

## 2023-09-07 NOTE — FLOWSHEET NOTE
emphasis on foot clearance and postural awareness. Moderate difficulty maintaining posture with (B) UE support and only able to walk for 3 minutes before needing to stop due to pain. Notes increased pain in lumbar back and LEs with standing more then 2-3 minutes. Educated on standing lumbar extension to decreased pain in lumbar back. Only able to tolerate 30 minutes of exercise today. [] No change. [] Other:    [x] Patient would continue to benefit from skilled physical therapy services in order to: improve dynamic strength and control for improved balance and gait mechanics for improved tolerance and safety with ambulation for patient to have a decreased risk of falls and to continue to perform ADLs independently and to live independently. STG: (to be met in 5 treatments)  ? Pain: Pt will report being able to ambulate for 15 minutes with maximal pain levels <5/10 in order to improve tolerance to community ambulation and to continue to do his grocery shopping. ? ROM: Pt will increase bilateral ankle DF to 5 degrees bilaterally in order to improve gait mechanics for increased ambulation distances. ? Strength: Pt will increase bilateral hip flexor strength to 4+/5 in order to improve balance and mechanics with increased ambulation distances. ? Function: Pt will demonstrate improved balance and SL standing tolerance as evident by the ability to perform SL standing for 10 seconds bilaterally with 1 handheld assist.  Patient to be independent with home exercise program as demonstrated by performance with correct form without cues. Demonstrate Knowledge of fall prevention - MET 8/31/2023, handout given and discussed  LTG: (to be met in 10 treatments)  Pt will demonstrate an improvement in gait speed to >0.8 m/s in order to improve safety with community ambulation. Pt will demonstrate decreased risk of falls as evident by an improvement on the CASTRO to > 46 points.   Pt will demonstrate improved

## 2023-09-12 ENCOUNTER — HOSPITAL ENCOUNTER (OUTPATIENT)
Dept: PHYSICAL THERAPY | Age: 88
Setting detail: THERAPIES SERIES
Discharge: HOME | End: 2023-09-12
Payer: MEDICARE

## 2023-09-12 NOTE — FLOWSHEET NOTE
[x] [facility]) [facility]  [address]  [place]: [phone]   [place]: [phone]     Physical Therapy Cancel/No Show note    Date: 2023  Patient: Robert Christie  : 1931  MRN: [id number]    Visit Count: 2/10  Cancels/No Shows to date:     For today's appointment patient:    [x]  Cancelled    [] Rescheduled appointment    [] No-show     Reason given by patient:    []  Patient ill    [x]  Conflicting appointment    [] No transportation      [] Conflict with work    [] No reason given    [] Weather related    [] [id number]    [] Other:      Comments:        [x] Next appointment was confirmed    Electronically signed by: Felipa Mann PTA

## 2023-09-19 ENCOUNTER — HOSPITAL ENCOUNTER (OUTPATIENT)
Dept: PHYSICAL THERAPY | Age: 88
Setting detail: THERAPIES SERIES
Discharge: HOME | End: 2023-09-19
Payer: MEDICARE

## 2023-09-19 PROCEDURE — 97112 NEUROMUSCULAR REEDUCATION: CPT

## 2023-09-19 PROCEDURE — 97110 THERAPEUTIC EXERCISES: CPT

## 2023-09-19 NOTE — FLOWSHEET NOTE
be met in 10 treatments)  Pt will demonstrate an improvement in gait speed to >0.8 m/s in order to improve safety with community ambulation. Pt will demonstrate decreased risk of falls as evident by an improvement on the CASTRO to > 46 points. Pt will demonstrate improved functional activity tolerance as evident by an improved score on the Oswestry to <15% functional impairment. Patient goals: \"walking without pain in thighs + calves + lower back\"    Pt. Education:  [x] Yes  [] No  [x] Reviewed Prior HEP/Ed  Method of Education: [x] Verbal  [x] Demo  [] Written  Comprehension of Education:  [x] Verbalizes understanding. [x] Demonstrates understanding. [] Needs review. [] Demonstrates/verbalizes HEP/Ed previously given. Plan: [x] Continue per plan of care.    [] Other:      Treatment Charges: Mins Units   []  Modalities     [x]  Ther Exercise 30 2   []  Manual Therapy     []  Ther Activities     []  Aquatics     [x]  Neuromuscular 10 1   [] Vasocompression     [] Gait Training     [] Dry needling        [] 1 or 2 muscles        [] 3 or more muscles     []  Other     Total Treatment time 40 2     Time In:  1:31 pm            Time Out: 2:11    Electronically signed by:  Gabriel Hughes PTA

## 2023-09-22 ENCOUNTER — APPOINTMENT (OUTPATIENT)
Dept: CT IMAGING | Age: 88
DRG: 605 | End: 2023-09-22
Payer: MEDICARE

## 2023-09-22 ENCOUNTER — HOSPITAL ENCOUNTER (INPATIENT)
Age: 88
LOS: 4 days | Discharge: OTHER INSTITUTION | DRG: 605 | End: 2023-09-26
Attending: EMERGENCY MEDICINE | Admitting: SURGERY
Payer: MEDICARE

## 2023-09-22 DIAGNOSIS — T14.8XXA SKIN ABRASION: ICD-10-CM

## 2023-09-22 DIAGNOSIS — S00.83XA FACIAL HEMATOMA, INITIAL ENCOUNTER: ICD-10-CM

## 2023-09-22 DIAGNOSIS — R55 SYNCOPE AND COLLAPSE: Primary | ICD-10-CM

## 2023-09-22 PROBLEM — I35.0 NONRHEUMATIC AORTIC VALVE STENOSIS: Status: ACTIVE | Noted: 2023-09-22

## 2023-09-22 PROBLEM — E53.8 B12 DEFICIENCY: Status: RESOLVED | Noted: 2023-09-22 | Resolved: 2023-09-22

## 2023-09-22 PROBLEM — M1A.0710 CHRONIC GOUT OF RIGHT FOOT: Status: ACTIVE | Noted: 2023-09-22
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
## 2023-09-22 PROBLEM — I48.21 PERMANENT ATRIAL FIBRILLATION (HCC): Status: ACTIVE | Noted: 2023-09-22

## 2023-09-22 PROBLEM — D50.9 IRON DEFICIENCY ANEMIA: Status: ACTIVE | Noted: 2023-09-22

## 2023-09-22 PROBLEM — Z79.01 ANTICOAGULATED ON COUMADIN: Status: ACTIVE | Noted: 2023-09-22

## 2023-09-22 PROBLEM — E53.8 B12 DEFICIENCY: Status: ACTIVE | Noted: 2023-09-22

## 2023-09-22 PROBLEM — I50.32 CHRONIC DIASTOLIC HEART FAILURE (HCC): Status: ACTIVE | Noted: 2023-09-22

## 2023-09-22 LAB
25(OH)D3 SERPL-MCNC: 48.8 NG/ML
ABO + RH BLD: NORMAL
ALBUMIN SERPL-MCNC: 3.5 G/DL (ref 3.5–5.2)
ANION GAP SERPL CALCULATED.3IONS-SCNC: 15 MMOL/L (ref 9–17)
ARM BAND NUMBER: NORMAL
BLOOD BANK SAMPLE EXPIRATION: NORMAL
BLOOD BANK SPECIMEN: ABNORMAL
BLOOD GROUP ANTIBODIES SERPL: NEGATIVE
BODY TEMPERATURE: 37
BUN SERPL-MCNC: 20 MG/DL (ref 8–23)
CHLORIDE SERPL-SCNC: 99 MMOL/L (ref 98–107)
CO2 SERPL-SCNC: 20 MMOL/L (ref 20–31)
COHGB MFR BLD: 2.6 % (ref 0–5)
CREAT SERPL-MCNC: 1.2 MG/DL (ref 0.7–1.2)
ERYTHROCYTE [DISTWIDTH] IN BLOOD BY AUTOMATED COUNT: 15.1 % (ref 11.8–14.4)
EST. AVERAGE GLUCOSE BLD GHB EST-MCNC: 94 MG/DL
ETHANOL PERCENT: <0.01 %
ETHANOLAMINE SERPL-MCNC: <10 MG/DL
FIO2 ON VENT: ABNORMAL %
GFR SERPL CREATININE-BSD FRML MDRD: 57 ML/MIN/1.73M2
GLUCOSE SERPL-MCNC: 102 MG/DL (ref 70–99)
HBA1C MFR BLD: 4.9 % (ref 4–6)
HCG SERPL QL: ABNORMAL
HCO3 VENOUS: 25.8 MMOL/L (ref 24–30)
HCT VFR BLD AUTO: 48 % (ref 40.7–50.3)
HGB BLD-MCNC: 14.5 G/DL (ref 13–17)
INR PPP: 2.1
MCH RBC QN AUTO: 28.5 PG (ref 25.2–33.5)
MCHC RBC AUTO-ENTMCNC: 30.2 G/DL (ref 28.4–34.8)
MCV RBC AUTO: 94.3 FL (ref 82.6–102.9)
NRBC BLD-RTO: 0 PER 100 WBC
O2 SAT, VEN: 75.3 % (ref 60–85)
PARTIAL THROMBOPLASTIN TIME: 31.2 SEC (ref 23–36.5)
PCO2, VEN: 46.1 MM HG (ref 39–55)
PH VENOUS: 7.37 (ref 7.32–7.42)
PLATELET # BLD AUTO: 148 K/UL (ref 138–453)
PMV BLD AUTO: 11.8 FL (ref 8.1–13.5)
PO2, VEN: 44.1 MM HG (ref 30–50)
POSITIVE BASE EXCESS, VEN: 0.5 MMOL/L (ref 0–2)
POTASSIUM SERPL-SCNC: 5.4 MMOL/L (ref 3.7–5.3)
PROTHROMBIN TIME: 23.2 SEC (ref 11.7–14.9)
RBC # BLD AUTO: 5.09 M/UL (ref 4.21–5.77)
SODIUM SERPL-SCNC: 134 MMOL/L (ref 135–144)
T4 FREE SERPL-MCNC: 1.3 NG/DL (ref 0.9–1.7)
TROPONIN I SERPL HS-MCNC: 70 NG/L (ref 0–22)
TSH SERPL DL<=0.05 MIU/L-ACNC: 3.03 UIU/ML (ref 0.3–5)
VIT B12 SERPL-MCNC: 413 PG/ML (ref 232–1245)
WBC OTHER # BLD: 9.4 K/UL (ref 3.5–11.3)

## 2023-09-22 PROCEDURE — 71260 CT THORAX DX C+: CPT

## 2023-09-22 PROCEDURE — 84443 ASSAY THYROID STIM HORMONE: CPT

## 2023-09-22 PROCEDURE — 1200000000 HC SEMI PRIVATE

## 2023-09-22 PROCEDURE — 82040 ASSAY OF SERUM ALBUMIN: CPT

## 2023-09-22 PROCEDURE — 2580000003 HC RX 258: Performed by: STUDENT IN AN ORGANIZED HEALTH CARE EDUCATION/TRAINING PROGRAM

## 2023-09-22 PROCEDURE — 96376 TX/PRO/DX INJ SAME DRUG ADON: CPT

## 2023-09-22 PROCEDURE — 6360000002 HC RX W HCPCS: Performed by: STUDENT IN AN ORGANIZED HEALTH CARE EDUCATION/TRAINING PROGRAM

## 2023-09-22 PROCEDURE — 82565 ASSAY OF CREATININE: CPT

## 2023-09-22 PROCEDURE — 84439 ASSAY OF FREE THYROXINE: CPT

## 2023-09-22 PROCEDURE — 82947 ASSAY GLUCOSE BLOOD QUANT: CPT

## 2023-09-22 PROCEDURE — 70450 CT HEAD/BRAIN W/O DYE: CPT

## 2023-09-22 PROCEDURE — 84520 ASSAY OF UREA NITROGEN: CPT

## 2023-09-22 PROCEDURE — 82607 VITAMIN B-12: CPT

## 2023-09-22 PROCEDURE — 84703 CHORIONIC GONADOTROPIN ASSAY: CPT

## 2023-09-22 PROCEDURE — 70486 CT MAXILLOFACIAL W/O DYE: CPT

## 2023-09-22 PROCEDURE — 93005 ELECTROCARDIOGRAM TRACING: CPT | Performed by: SURGERY

## 2023-09-22 PROCEDURE — 82306 VITAMIN D 25 HYDROXY: CPT

## 2023-09-22 PROCEDURE — 6810039001 HC L1 TRAUMA PRIORITY

## 2023-09-22 PROCEDURE — G0480 DRUG TEST DEF 1-7 CLASSES: HCPCS

## 2023-09-22 PROCEDURE — 83036 HEMOGLOBIN GLYCOSYLATED A1C: CPT

## 2023-09-22 PROCEDURE — 72125 CT NECK SPINE W/O DYE: CPT

## 2023-09-22 PROCEDURE — 86901 BLOOD TYPING SEROLOGIC RH(D): CPT

## 2023-09-22 PROCEDURE — 6360000004 HC RX CONTRAST MEDICATION: Performed by: NURSE PRACTITIONER

## 2023-09-22 PROCEDURE — 80051 ELECTROLYTE PANEL: CPT

## 2023-09-22 PROCEDURE — 99285 EMERGENCY DEPT VISIT HI MDM: CPT

## 2023-09-22 PROCEDURE — 93005 ELECTROCARDIOGRAM TRACING: CPT | Performed by: STUDENT IN AN ORGANIZED HEALTH CARE EDUCATION/TRAINING PROGRAM

## 2023-09-22 PROCEDURE — 85610 PROTHROMBIN TIME: CPT

## 2023-09-22 PROCEDURE — 86850 RBC ANTIBODY SCREEN: CPT

## 2023-09-22 PROCEDURE — 99222 1ST HOSP IP/OBS MODERATE 55: CPT | Performed by: STUDENT IN AN ORGANIZED HEALTH CARE EDUCATION/TRAINING PROGRAM

## 2023-09-22 PROCEDURE — 96375 TX/PRO/DX INJ NEW DRUG ADDON: CPT

## 2023-09-22 PROCEDURE — 96374 THER/PROPH/DIAG INJ IV PUSH: CPT

## 2023-09-22 PROCEDURE — 99222 1ST HOSP IP/OBS MODERATE 55: CPT | Performed by: SURGERY

## 2023-09-22 PROCEDURE — 85730 THROMBOPLASTIN TIME PARTIAL: CPT

## 2023-09-22 PROCEDURE — 6370000000 HC RX 637 (ALT 250 FOR IP): Performed by: STUDENT IN AN ORGANIZED HEALTH CARE EDUCATION/TRAINING PROGRAM

## 2023-09-22 PROCEDURE — 84484 ASSAY OF TROPONIN QUANT: CPT

## 2023-09-22 PROCEDURE — 86900 BLOOD TYPING SEROLOGIC ABO: CPT

## 2023-09-22 PROCEDURE — 85027 COMPLETE CBC AUTOMATED: CPT

## 2023-09-22 PROCEDURE — 82805 BLOOD GASES W/O2 SATURATION: CPT

## 2023-09-22 RX ORDER — DILTIAZEM HYDROCHLORIDE 180 MG/1
180 CAPSULE, EXTENDED RELEASE ORAL DAILY
Status: ON HOLD | COMMUNITY
End: 2023-09-26 | Stop reason: HOSPADM

## 2023-09-22 RX ORDER — SENNA AND DOCUSATE SODIUM 50; 8.6 MG/1; MG/1
1 TABLET, FILM COATED ORAL 2 TIMES DAILY
Status: DISCONTINUED | OUTPATIENT
Start: 2023-09-22 | End: 2023-09-26 | Stop reason: HOSPADM

## 2023-09-22 RX ORDER — SODIUM CHLORIDE 0.9 % (FLUSH) 0.9 %
5-40 SYRINGE (ML) INJECTION PRN
Status: DISCONTINUED | OUTPATIENT
Start: 2023-09-22 | End: 2023-09-26 | Stop reason: HOSPADM

## 2023-09-22 RX ORDER — ACETAMINOPHEN 500 MG
1000 TABLET ORAL EVERY 8 HOURS SCHEDULED
Status: DISCONTINUED | OUTPATIENT
Start: 2023-09-22 | End: 2023-09-26 | Stop reason: HOSPADM

## 2023-09-22 RX ORDER — GABAPENTIN 100 MG/1
100 CAPSULE ORAL NIGHTLY
Status: DISCONTINUED | OUTPATIENT
Start: 2023-09-22 | End: 2023-09-23

## 2023-09-22 RX ORDER — FUROSEMIDE 20 MG/1
20 TABLET ORAL DAILY
COMMUNITY

## 2023-09-22 RX ORDER — PREDNISONE 5 MG/1
5 TABLET ORAL DAILY
COMMUNITY

## 2023-09-22 RX ORDER — LISINOPRIL 10 MG/1
10 TABLET ORAL DAILY
Status: ON HOLD | COMMUNITY
End: 2023-09-26 | Stop reason: HOSPADM

## 2023-09-22 RX ORDER — POLYETHYLENE GLYCOL 3350 17 G/17G
17 POWDER, FOR SOLUTION ORAL DAILY
Status: DISCONTINUED | OUTPATIENT
Start: 2023-09-22 | End: 2023-09-26 | Stop reason: HOSPADM

## 2023-09-22 RX ORDER — SODIUM CHLORIDE 9 MG/ML
INJECTION, SOLUTION INTRAVENOUS CONTINUOUS
Status: DISCONTINUED | OUTPATIENT
Start: 2023-09-22 | End: 2023-09-25

## 2023-09-22 RX ORDER — METOPROLOL TARTRATE 50 MG/1
25 TABLET, FILM COATED ORAL 2 TIMES DAILY
Status: DISCONTINUED | OUTPATIENT
Start: 2023-09-22 | End: 2023-09-22

## 2023-09-22 RX ORDER — OMEPRAZOLE 20 MG/1
20 CAPSULE, DELAYED RELEASE ORAL DAILY
Status: ON HOLD | COMMUNITY
End: 2023-09-26 | Stop reason: HOSPADM

## 2023-09-22 RX ORDER — ONDANSETRON 4 MG/1
4 TABLET, ORALLY DISINTEGRATING ORAL EVERY 8 HOURS PRN
Status: DISCONTINUED | OUTPATIENT
Start: 2023-09-22 | End: 2023-09-26

## 2023-09-22 RX ORDER — MIRTAZAPINE 30 MG/1
30 TABLET, FILM COATED ORAL DAILY
Status: ON HOLD | COMMUNITY
End: 2023-09-26 | Stop reason: HOSPADM

## 2023-09-22 RX ORDER — PANTOPRAZOLE SODIUM 40 MG/1
40 TABLET, DELAYED RELEASE ORAL
Status: DISCONTINUED | OUTPATIENT
Start: 2023-09-23 | End: 2023-09-26 | Stop reason: HOSPADM

## 2023-09-22 RX ORDER — SODIUM CHLORIDE 0.9 % (FLUSH) 0.9 %
5-40 SYRINGE (ML) INJECTION EVERY 12 HOURS SCHEDULED
Status: DISCONTINUED | OUTPATIENT
Start: 2023-09-22 | End: 2023-09-26

## 2023-09-22 RX ORDER — SODIUM CHLORIDE, SODIUM LACTATE, POTASSIUM CHLORIDE, CALCIUM CHLORIDE 600; 310; 30; 20 MG/100ML; MG/100ML; MG/100ML; MG/100ML
INJECTION, SOLUTION INTRAVENOUS CONTINUOUS
Status: DISCONTINUED | OUTPATIENT
Start: 2023-09-22 | End: 2023-09-22

## 2023-09-22 RX ORDER — BUSPIRONE HYDROCHLORIDE 15 MG/1
15 TABLET ORAL 3 TIMES DAILY PRN
COMMUNITY

## 2023-09-22 RX ORDER — LATANOPROST 50 UG/ML
1 SOLUTION/ DROPS OPHTHALMIC NIGHTLY
COMMUNITY

## 2023-09-22 RX ORDER — FENTANYL CITRATE 50 UG/ML
50 INJECTION, SOLUTION INTRAMUSCULAR; INTRAVENOUS ONCE
Status: COMPLETED | OUTPATIENT
Start: 2023-09-22 | End: 2023-09-22

## 2023-09-22 RX ORDER — WARFARIN SODIUM 5 MG/1
5 TABLET ORAL DAILY
COMMUNITY

## 2023-09-22 RX ORDER — OXYCODONE HYDROCHLORIDE 5 MG/1
5 TABLET ORAL ONCE
Status: COMPLETED | OUTPATIENT
Start: 2023-09-22 | End: 2023-09-22

## 2023-09-22 RX ORDER — GINSENG 100 MG
CAPSULE ORAL 3 TIMES DAILY
Status: DISCONTINUED | OUTPATIENT
Start: 2023-09-22 | End: 2023-09-26 | Stop reason: HOSPADM

## 2023-09-22 RX ORDER — MIRTAZAPINE 15 MG/1
15 TABLET, FILM COATED ORAL NIGHTLY
Status: DISCONTINUED | OUTPATIENT
Start: 2023-09-22 | End: 2023-09-26 | Stop reason: HOSPADM

## 2023-09-22 RX ORDER — ALLOPURINOL 100 MG/1
100 TABLET ORAL DAILY
Status: ON HOLD | COMMUNITY
End: 2023-09-26 | Stop reason: HOSPADM

## 2023-09-22 RX ORDER — SODIUM CHLORIDE 9 MG/ML
INJECTION, SOLUTION INTRAVENOUS PRN
Status: DISCONTINUED | OUTPATIENT
Start: 2023-09-22 | End: 2023-09-26

## 2023-09-22 RX ORDER — ONDANSETRON 2 MG/ML
4 INJECTION INTRAMUSCULAR; INTRAVENOUS ONCE
Status: COMPLETED | OUTPATIENT
Start: 2023-09-22 | End: 2023-09-22

## 2023-09-22 RX ORDER — MIRTAZAPINE 30 MG/1
30 TABLET, FILM COATED ORAL NIGHTLY
Status: DISCONTINUED | OUTPATIENT
Start: 2023-09-22 | End: 2023-09-22

## 2023-09-22 RX ORDER — GABAPENTIN 100 MG/1
100 CAPSULE ORAL EVERY 8 HOURS SCHEDULED
Status: DISCONTINUED | OUTPATIENT
Start: 2023-09-22 | End: 2023-09-22

## 2023-09-22 RX ORDER — DILTIAZEM HYDROCHLORIDE 180 MG/1
180 CAPSULE, COATED, EXTENDED RELEASE ORAL DAILY
Status: DISCONTINUED | OUTPATIENT
Start: 2023-09-22 | End: 2023-09-26 | Stop reason: HOSPADM

## 2023-09-22 RX ORDER — ONDANSETRON 2 MG/ML
4 INJECTION INTRAMUSCULAR; INTRAVENOUS EVERY 6 HOURS PRN
Status: DISCONTINUED | OUTPATIENT
Start: 2023-09-22 | End: 2023-09-26

## 2023-09-22 RX ORDER — FENTANYL CITRATE 50 UG/ML
25 INJECTION, SOLUTION INTRAMUSCULAR; INTRAVENOUS ONCE
Status: COMPLETED | OUTPATIENT
Start: 2023-09-22 | End: 2023-09-22

## 2023-09-22 RX ORDER — TIMOLOL MALEATE 5 MG/ML
1 SOLUTION/ DROPS OPHTHALMIC DAILY
COMMUNITY

## 2023-09-22 RX ORDER — OXYCODONE HYDROCHLORIDE 5 MG/1
2.5 TABLET ORAL EVERY 4 HOURS PRN
Status: DISCONTINUED | OUTPATIENT
Start: 2023-09-22 | End: 2023-09-26 | Stop reason: HOSPADM

## 2023-09-22 RX ADMIN — SODIUM CHLORIDE: 9 INJECTION, SOLUTION INTRAVENOUS at 19:01

## 2023-09-22 RX ADMIN — ONDANSETRON 4 MG: 2 INJECTION INTRAMUSCULAR; INTRAVENOUS at 13:52

## 2023-09-22 RX ADMIN — OXYCODONE HYDROCHLORIDE 5 MG: 5 TABLET ORAL at 15:18

## 2023-09-22 RX ADMIN — FENTANYL CITRATE 25 MCG: 50 INJECTION, SOLUTION INTRAMUSCULAR; INTRAVENOUS at 15:18

## 2023-09-22 RX ADMIN — BACITRACIN: 500 OINTMENT TOPICAL at 22:30

## 2023-09-22 RX ADMIN — FENTANYL CITRATE 50 MCG: 50 INJECTION, SOLUTION INTRAMUSCULAR; INTRAVENOUS at 13:52

## 2023-09-22 RX ADMIN — IOPAMIDOL 130 ML: 755 INJECTION, SOLUTION INTRAVENOUS at 13:21

## 2023-09-22 RX ADMIN — ACETAMINOPHEN 1000 MG: 500 TABLET ORAL at 22:14

## 2023-09-22 ASSESSMENT — ENCOUNTER SYMPTOMS
ABDOMINAL DISTENTION: 0
RESPIRATORY NEGATIVE: 1
FACIAL SWELLING: 1
CHEST TIGHTNESS: 0
SORE THROAT: 0
EYE PAIN: 0
ABDOMINAL PAIN: 0
EYE REDNESS: 0
APNEA: 0
GASTROINTESTINAL NEGATIVE: 1
TROUBLE SWALLOWING: 0

## 2023-09-22 ASSESSMENT — PATIENT HEALTH QUESTIONNAIRE - PHQ9
1. LITTLE INTEREST OR PLEASURE IN DOING THINGS: 0
SUM OF ALL RESPONSES TO PHQ QUESTIONS 1-9: 0
SUM OF ALL RESPONSES TO PHQ9 QUESTIONS 1 & 2: 0
2. FEELING DOWN, DEPRESSED OR HOPELESS: 0
SUM OF ALL RESPONSES TO PHQ QUESTIONS 1-9: 0

## 2023-09-22 ASSESSMENT — LIFESTYLE VARIABLES
HOW MANY STANDARD DRINKS CONTAINING ALCOHOL DO YOU HAVE ON A TYPICAL DAY: PATIENT DOES NOT DRINK
HOW OFTEN DO YOU HAVE A DRINK CONTAINING ALCOHOL: NEVER

## 2023-09-22 ASSESSMENT — PAIN SCALES - GENERAL: PAINLEVEL_OUTOF10: 7

## 2023-09-22 NOTE — ED NOTES
Pt resting on stretcher. A&Ox4. RR even and unlabored. No distress noted. Pt denies any needs at this time. Call light within reach.         Leta Mojica RN  09/22/23 5707

## 2023-09-22 NOTE — ED NOTES
Dr. Agustin Jade drained about 40 cc blood from patient's left face hematoma. Patient's daughter holding pressure to the site for ten minutes per Dr. Agustin Jade' request. Patient tolerating well.      Olivier Hughes RN  09/22/23 3332

## 2023-09-22 NOTE — ED NOTES
Dr. uBrgess Lyme at bedside to evaluate pt and sutures facial lacerations.       Cindy Austin RN  09/22/23 9090

## 2023-09-22 NOTE — ED TRIAGE NOTES
Pt presents to Trauma [facility] via [address] [facility] for a fall from one step. EMS states that pt fell straight onto his face from one step in his garage. EMS states that pt does have a Hx of a-fib and gout. EMS states that pt is on coumadin for a-fib as well.

## 2023-09-22 NOTE — ED NOTES
Plastic attending at bedside. Plastics attending states to apply ice to soften the area and he will return in a few hours to check on patients facial hematoma.       Marvel Perez RN  09/22/23 3724

## 2023-09-22 NOTE — ED NOTES
Pt transported to CT via cot with [facility].       Tiesha Riojas RN  09/22/23 [address], RN  09/22/23 4042

## 2023-09-22 NOTE — CODE DOCUMENTATION
Resuscitation/Code Status Note on Denise Crowell (YOB: 1931)    At 5 on September 21, 2023, resuscitation/code status decision was based on a thorough discussion with the patient. The code status was made [id number] No additional code details. Discussion held in presence of the patient's daughter. All questions answered, patient wishes to be intubated if needed with discussion of duration to be held with his family should that  occur.      Electronically signed by Rufina Mackay DO on 9/22/23 at 5:21 PM EDT

## 2023-09-22 NOTE — CONSULTS
Plastics Consult Jeremy Guo      Re: left cheek hematoma. Patient was in garage when he apparently tripped and fell. He does not remember the fall. He was brought to ER. Patient has A Fib and is on Coumadin. He did take his dose this AM.    Reviewing the notes, apparently Trauma did aspirate it once. PAST MEDICAL/SURGICAL HISTORY: []  None   []   Information not available due to exam limitations documented above        Diagnosis Date    Acute MI ([address])      Allergic rhinitis 09/02/2011    Anemia      Atrial fibrillation ([address])       Dr. Dom Uriarte, [address] cell cancer 03/2013     left side of head, face chin    Basal cell cancer 03/10/2014     left cheek    Cervical radiculopathy      Diverticulitis 02/17/2013    GERD (gastroesophageal reflux disease)      Glaucoma       left eye    Hyperlipidemia      Hypertension       Dr. Margaret Joyce    Hyponatremia 09/02/2011    IBS (irritable bowel syndrome) 09/02/2011    Insomnia 09/02/2011    Osteoarthritis      Osteoarthritis/neck pain.  09/02/2011    Prostate cancer ([address]) 08/18/2021     active surviellence monitoring PSA    Renal calculi      Shingles       history of shingles    Status post prostatectomy 08/18/2021     Simple prostatectomy for BPH    Urinary frequency      Wears glasses      Wears hearing aid in both ears              Procedure Laterality Date    CHOLECYSTECTOMY   09/09/2015     laparoscopic with cholangiogram    COLONOSCOPY   2002    COLONOSCOPY   08/2013    HERNIA REPAIR Right       inguinal    LITHOTRIPSY        MOHS SURGERY Left 03/10/2014     cheek    MOHS SURGERY   12/30/2014     post scalp    MOHS SURGERY Left 08/2017     X2 left cheek    MOHS SURGERY Left 10/21/2019     temple    MOHS SURGERY   12/14/2020     top of head    MOHS SURGERY   02/20/2023     top of head    OTHER SURGICAL HISTORY   09/06/2015     attempted ERCP    PROSTATECTOMY   08/18/2021    PROSTATECTOMY N/A 08/18/2021     XI ROBOTIC LAPAROSCOPIC SIMPLE PROSTATECTOMY documented above   Celebrex [celecoxib] and Mobic [meloxicam]    FAMILY HISTORY   []   Information not available due to exam limitations documented above        Family History   Problem Relation Age of Onset    Heart Disease Mother      Lung Cancer Father      Cancer Father           lung    Heart Disease Brother           SOCIAL HISTORY  []   Information not available due to exam limitations documented above        Tobacco Use    Smoking status: Former       Packs/day: 1.00       Years: 10.00       Pack years: 10.00       Types: Cigarettes       Quit date: 1961       Years since quittin.3    Smokeless tobacco: Never    Tobacco comments:       quit    Vaping Use    Vaping Use: Never used   Substance Use Topics    Alcohol use: Not Currently       Alcohol/week: 0.0 standard drinks    Drug use: No     EXAM:  Patient is awake and alert, cooperative. HEENT: NC, most of face ecchymotic. Left eye swollen shut. Swelling left cheek and upper lip d/t hematoma. Skin over is firm but not tense. No blistering at this time. Chest: breathing nonlabored. Abd: benign. CT facial bones shows the swelling/hematoma. No facial fractures seen. IMP:  Large hematoma left cheek. PLAN:  I discussed with patient that if it gets tight enough, it can harm the overlying skin. With the coumadin on board, if we cut the skin to let the blood out, it may be very hard to stop the bleeding. I discussed  with him using ice packs to see if we can get it to soften. I will check back in a couple hours to reassess it. If we don't see improvement, we may need to think about surgery.

## 2023-09-22 NOTE — PROGRESS NOTES
Trauma Tertiary Survey    Admit Date: 9/22/2023  Hospital day 0      Subjective:     Pt presented to ED following a [facility]. Patient states he fell while he was in his garage and that his daughter was present and called the emergency medical services. Patient's is on Coumadin for atrial fibrillation. Patient states at this time he has no pains aside from the left side of his face. Denies any other tenderness or pain. Objective:   PHYSICAL EXAM:   Physical Exam  Constitutional:       General: He is not in acute distress. Appearance: He is not ill-appearing. HENT:      Head:      Comments: Large hematoma to the left side of face with significant superior palpebra hematoma     Right Ear: External ear normal.      Left Ear: External ear normal.      Nose: Nose normal.      Mouth/Throat:      Comments: Hematoma extends to left side of mouth  Eyes:      Extraocular Movements: Extraocular movements intact. Comments: Left eye unable to evaluate   Cardiovascular:      Rate and Rhythm: Normal rate and regular rhythm. Pulses: Normal pulses. Pulmonary:      Effort: Pulmonary effort is normal. No respiratory distress. Chest:      Chest wall: No tenderness. Abdominal:      General: There is no distension. Palpations: Abdomen is soft. Tenderness: There is no abdominal tenderness. There is no guarding. Musculoskeletal:         General: No swelling or tenderness. Cervical back: Normal range of motion. No tenderness. Skin:     General: Skin is warm and dry. Neurological:      General: No focal deficit present. Mental Status: He is alert and oriented to person, place, and time.    Psychiatric:         Mood and Affect: Mood normal.           Spine:     Spine Tenderness ROM   Cervical 0 /10 Normal   Thoracic 0 /10 Normal   Lumbar 0 /10 Normal     Musculoskeletal    Joint Tenderness Swelling ROM   Right shoulder absent absent normal   Left shoulder absent absent normal   Right elbow

## 2023-09-22 NOTE — ED NOTES
Pt resting on stretcher. A&Ox4. RR even and unlabored. No distress noted. Pt denies any needs at this time. Call light within reach.         Janette Ga RN  09/22/23 [id number]

## 2023-09-22 NOTE — PROGRESS NOTES
[facility] [address] [place] [address]  PROGRESS NOTE    Shift date: 09/22/23  Shift day: Friday   Shift # 2    Room # 09/09   Name: Av Nowak                Gnosticism:    Place of Gnosticism:     Referral: Routine Visit    Admit Date & Time: 9/22/2023 12:47 PM    Assessment:  Av Nowak is a 80 y.o. male in the hospital       Intervention:  Writer introduced self and title as  to visitor in ED results waiting room. Visitor identified as patient's daughter and did not appear to mind  presence. Family member appeared anxious and hopeful when discussing the days events which led to patient's hospital visit.  provided a supportive presence through active listening and words of affirmation. Outcome:  Daughter appeared receptive to  visit. Plan:  Chaplains will remain available to offer spiritual and emotional support as needed.       Electronically signed by Mohit Hu on 9/22/2023 at [address] [facility]  [phone]

## 2023-09-22 NOTE — ED NOTES
The following labs were labeled with appropriate pt sticker and tubed to lab:     [] Blue     [] Lavender   [] on ice  [x] Green/yellow  [] Green/black [] on ice  [] Judythe Maxcy  [] on ice  [] Yellow  [] Red  [] Pink  [] Type/ Screen  [] ABG  [] VBG    [] COVID-19 swab    [] Rapid  [] PCR  [] Flu swab  [] Peds Viral Panel     [] Urine Sample  [] Fecal Sample  [] Pelvic Cultures  [] Blood Cultures  [] X 2  [] STREP Cultures     Wicho Land, VERONICA  09/22/23 2617

## 2023-09-22 NOTE — ED NOTES
Dr. Kevin Murillo at bedside to evaluate and speak with the patient regarding Vipin Benitez RN  09/22/23 [id number]

## 2023-09-22 NOTE — ED PROVIDER NOTES
[facility] ED  Emergency Department Encounter  Emergency Medicine Resident     Pt Lela Boyd  MRN: [id number]  [address] 12/2/1931  Date of evaluation: 9/22/23  PCP:  Renaldo Ocampo MD  Note Started: 1:07 PM EDT    CHIEF COMPLAINT       Chief Complaint   Patient presents with    Fall     HISTORY OF PRESENT ILLNESS  (Location/Symptom, Timing/Onset, Context/Setting, Quality, Duration, Modifying Factors, Severity.)      Monika Pandey is a 80 y.o. male who presents as a trauma priority after falling face forward landing on the left side of his face. Did not break his fall. He states he does not remember the fall or the incident. At time of arrival to ED he is in C collar and complaining of face pain and headache. He states his whole body hurts. He is on warfarin for afib. Last dose taken this morning but he thinks it was a half dose because he was seen this morning and they adjusted his medication. Denies allergies. PAST MEDICAL / SURGICAL / SOCIAL / FAMILY HISTORY      has a past medical history of A-fib ([address] [facility]). has no past surgical history on file. Social History     Socioeconomic History    Marital status:       Spouse name: Not on file    Number of children: Not on file    Years of education: Not on file    Highest education level: Not on file   Occupational History    Not on file   Tobacco Use    Smoking status: Not on file    Smokeless tobacco: Not on file   Substance and Sexual Activity    Alcohol use: Not on file    Drug use: Not on file    Sexual activity: Not on file   Other Topics Concern    Not on file   Social History Narrative    Not on file     Social Determinants of Health     Financial Resource Strain: Not on file   Food Insecurity: Not on file   Transportation Needs: Not on file   Physical Activity: Not on file   Stress: Not on file   Social Connections: Not on file   Intimate Partner Violence: Not on file   Housing Stability: Not on file       No

## 2023-09-22 NOTE — ED NOTES
Pt resting on stretcher. A&Ox4. RR even and unlabored. No distress noted. Pt denies any needs at this time. Call light within reach.         Clinton Hester RN  09/22/23 [id number]

## 2023-09-22 NOTE — PROGRESS NOTES
[address]     Emergency/Trauma Note    PATIENT NAME: Ant Barbosa    Shift date: 09/22/2023  Shift day: Friday   Shift # 1    Room # 09/09     Name: Ant Barbosa          Age: 80 y.o. Gender: male          Scientologist: [place] [facility] [employer]:     Trauma/Incident type: Adult Trauma Priority  Admit Date & Time: 9/22/2023 12:47 PM  TRAUMA NAME: Cy Trauma XxUpton     ADVANCE DIRECTIVES IN CHART? No    NAME OF DECISION MAKER: Unknown    PATIENT/EVENT DESCRIPTION:  Dave Trauma Catalina Regalado is a 80 y.o. male who arrived via ground ambulance as adult trauma priority. Per report, patient took a fall. Patient to be admitted to 09/09. SPIRITUAL ASSESSMENT-INTERVENTION-OUTCOME:  No spiritual assessment was carried out because patient was having a rough time. Family was not present.  called patient's daughter, Shaniqua Gibbs, at [phone] and she said she was on her way.  offered emotional support to Shaniqua Gibbs.  provided ministry of presence and offered support to staff. PATIENT BELONGINGS:  This  did not handle patient's belongings. ANY BELONGINGS OF SIGNIFICANT VALUE NOTED:  Unknown    REGISTRATION STAFF NOTIFIED? Yes    WHAT IS YOUR SPIRITUAL CARE PLAN FOR THIS PATIENT?:  Follow up visits recommended for ongoing assessment of patient's and for spiritual and emotional support. Electronically signed by Fr. Ulysses Gamez on 9/22/2023 at [address] [place] [address]  [phone]

## 2023-09-22 NOTE — PROGRESS NOTES
15 Variable Trauma-Specific Frailty Index   Comorbidities   Cancer History Yes (1) No (0)   Coronary Heart Disease MI (1) CABG (0.75) Mild (0.25)  No (0)   Dementia Severe (1) Moderate (0.5) Mild (0.25)  No (0)   Daily Activities   Help With Grooming Yes (1) No (0)   Help with Managing Money Yes (1) No (0)   Help doing Housework Yes (1) No (0)   Help with Toileting Yes (1) No (0)   Help Walking Wheelchair (1) Walker (0.75) Cane (0.5) No (0)   Health Attitude   Feel Less Useful Most Time (1) Sometimes (0.5) Never (0)   Feel Sad Most Time (1) Sometimes (0.5) Never (0)   Feel Effort to Do Everything Most Time (1) Sometimes (0.5) Never (0)   Feels Lonely Most Time (1) Sometimes (0.5) Never (0)   Falls Most Time (1) Sometimes (0.5) Never (0)   Function   Sexually Active Yes (0)  No(1)   Nutrition   Albumin < 3g/dL (1)  > 3g/dL   Scoring   Score  3.75 FI (Score/15)  0.25 > 0.25 = Frail   *Based on 2-weeks prior to hospital admission   Trauma Specific Fraility Index > or = to 4 (4/15 = 0.26)  Trauma Specific Veldon Loach Index Score:    0.25 not frail    Tahira Sam DO  General Surgery Resident PGY-1  09/22/23 9:20PM

## 2023-09-22 NOTE — H&P
TRAUMA H&P/CONSULT    PATIENT NAME: Dave Trauma Cortez Green (Devante Dumas)  YOB: 1880 (12/2/1931)  MEDICAL RECORD NO. [id number]   DATE: 9/22/2023  PRIMARY CARE PHYSICIAN: No primary care provider on file. PATIENT EVALUATED AT THE REQUEST OF : Sherwin    ACTIVATION   []Trauma Alert     [x] Trauma Priority     []Trauma Consult. There is no problem list on file for this patient. IMPRESSION AND PLAN:       Fall  Multiple skin tears  Facial soft tissue hematoma   No other traumatic injuries noted   Monitor hematoma to cheek-ice    If intracranial hemorrhage is present, is it a:  [] BIG 1  [] BIG 2  [] BIG 3  If chest wall injury: Rib score___    CONSULT SERVICES    [] Neurosurgery     [] Orthopedic Surgery    [] Cardiothoracic     [] Facial Trauma    [] Plastic Surgery (Burn)    [] Pediatric Surgery     [] Internal Medicine    [] Pulmonary Medicine    [] Geriatrics    [] Other:       HISTORY:     Chief Complaint:  \"My cheek hurts\"    GENERAL DATA  Patient information was obtained from patient and EMS personnel. History/Exam limitations: none. Injury Date: 9/22/2023   Approximate Injury Time: 1200        Transport mode:   [x]Ambulance      [] Helicopter     []Car       [] Other  Referring Hospital:     [facility]   Location (e.g., home, farm, industry, street): home  Specific Details of Location (e.g., bedroom, kitchen, garage, highway): [facility]    [] Motor Vehicle Collision   Specific vehicle type involved (e.g., sedan, minivan, SUV, pickup truck):      Type of collision  [] Single Vehicle Collision  []Multiple Vehicle Collision  [] unknown collision type  Collision with (e.g., type of vehicle, building, barn, ditch, tree):     Mechanism considerations  [] Fatality in Same Vehicle      []Ejected       []Rollover          []Extricated    Internal Compartment   []                      []Passenger:      []Front Seat        []Rear Seat     Personal Restraints  [] Unrestrained   []Lap Belt Only Restrained   [] Shoulder Belt Only Restrained  [] 3 Point Restrained  [] unknown     Air Bags  [] Front Air Bag  []Side Air Bag  []Curtain Airbag []Air [address] [facility] [address] Not Deployed    []No Air Bag equipped in vehicle    Pediatric Consideration:      [] Booster Seat  []Infant Car Seat  [] Child Car Seat      [] Motorcycle     []Electric Bike/Moped   [] ATV   [] Bicycle/Scooter (manual)   Wearing Helmet     []Yes     []No    []Unknown         [x] Fall    [x]From Standing     []From Height __ Ft     []Down ___steps  []Other___    [] Assault with ____    [] Gunshot  Specify caliber / type of gun: ____________________________    [] Stabbing  Specify weapon type, size: _____________________________    [] Burn  []Flame   []Scald   []Electrical   []Chemical  []Inhalation   []House fire    [] Other ______________________________________________________    [] Eye protection  []Boots   []Flotation device   []Leather outerwear  []Sports gear []Other:___       HISTORY:     Cy Talha Baca is a male that presented to the Emergency Department following a fall from standing. Patient states he remembers being in his garage but does not remember what happened after that, states he believes he tripped and was experiencing vertigo. Patient states he takes \"coumadin to thin my blood\". Per EMS patient has history of AFib. Patient states he takes his coumadin in the morning and did take it this morning but is unsure of the dose. States he thinks he took a \"half dose\" today.     Traumatic loss of Consciousness []No   []Yes Duration(min)       [x] Unknown     Total Fluids Given Prior To Arrival  unknown mL    MEDICATIONS:   []  None     []  Information not available due to exam limitations documented above  Medication Sig Dispense Refill    predniSONE (DELTASONE) 5 MG tablet Take 1 tablet by mouth daily        timolol (TIMOPTIC) 0.5 % ophthalmic solution          mirtazapine (REMERON) 30 MG tablet

## 2023-09-22 NOTE — ED PROVIDER NOTES
[address]  Emergency Department  Emergency Medicine Resident Sign-out     Care of David Stanton was assumed from Dr. Millicent Saez and is being seen for Fall  . The patient's initial evaluation and plan have been discussed with the prior provider who initially evaluated the patient. EMERGENCY DEPARTMENT COURSE / MEDICAL DECISION MAKING:       MEDICATIONS GIVEN:  Orders Placed This Encounter   Medications    iopamidol (ISOVUE-370) 76 % injection 130 mL    ondansetron (ZOFRAN) injection 4 mg    fentaNYL (SUBLIMAZE) injection 50 mcg    fentaNYL (SUBLIMAZE) injection 25 mcg    oxyCODONE (ROXICODONE) immediate release tablet 5 mg       LABS / RADIOLOGY:     Results for orders placed or performed during the hospital encounter of 09/22/23   Albumin   Result Value Ref Range    Albumin 3.5 3.5 - 5.2 g/dL   Vitamin B12   Result Value Ref Range    Vitamin B-12 413 232 - 1245 pg/mL   Trauma Panel   Result Value Ref Range    Ethanol <10 <10 mg/dL    Ethanol percent <0.010 <0.010 %    Blood Bank Specimen BILL FOR SERVICES PERFORMED     BUN 20 8 - 23 mg/dL    WBC 9.4 3.5 - 11.3 k/uL    RBC 5.09 4. 21 - 5.77 m/uL    Hemoglobin 14.5 13.0 - 17.0 g/dL    Hematocrit 48.0 40.7 - 50.3 %    MCV 94.3 82.6 - 102.9 fL    MCH 28.5 25.2 - 33.5 pg    MCHC 30.2 28.4 - 34.8 g/dL    RDW 15.1 (H) 11.8 - 14.4 %    Platelets 355 045 - 460 k/uL    MPV 11.8 8.1 - 13.5 fL    NRBC Automated 0.0 0.0 per 100 WBC    Creatinine 1.2 0.7 - 1.2 mg/dL    Est, Glom Filt Rate 57 (L) >60 mL/min/1.73m2    Glucose 102 (H) 70 - 99 mg/dL    hCG Qual CANCEL PER SHEET     Sodium 134 (L) 135 - 144 mmol/L    Potassium 5.4 (H) 3.7 - 5.3 mmol/L    Chloride 99 98 - 107 mmol/L    CO2 20 20 - 31 mmol/L    Anion Gap 15 9 - 17 mmol/L    Protime 23.2 (H) 11.7 - 14.9 sec    INR 2.1     PTT 31.2 23.0 - 36.5 sec    pH, Adán 7.367 7.320 - 7.420    pCO2, Adán 46.1 39 - 55 mm Hg    pO2, Adán 44.1 30 - 50 mm Hg    HCO3, Venous 25.8 24 - 30 mmol/L    Positive without the administration of intravenous contrast. Automated exposure control, iterative reconstruction, and/or weight based adjustment of the mA/kV was utilized to reduce the radiation dose to as low as reasonably achievable.; CT of the face was performed without the administration of intravenous contrast. Multiplanar reformatted images are provided for review. Automated exposure control, iterative reconstruction, and/or weight based adjustment of the mA/kV was utilized to reduce the radiation dose to as low as reasonably achievable.; CT of the cervical spine was performed without the administration of intravenous contrast. Multiplanar reformatted images are provided for review. Automated exposure control, iterative reconstruction, and/or weight based adjustment of the mA/kV was utilized to reduce the radiation dose to as low as reasonably achievable. COMPARISON: None. HISTORY: ORDERING SYSTEM PROVIDED HISTORY: fall TECHNOLOGIST PROVIDED HISTORY: fall Decision Support Exception - unselect if not a suspected or confirmed emergency medical condition->Emergency Medical Condition (MA) Reason for Exam: fall; ORDERING SYSTEM PROVIDED HISTORY: fall TECHNOLOGIST PROVIDED HISTORY: fall Decision Support Exception - unselect if not a suspected or confirmed emergency medical condition->Emergency Medical Condition (MA) Reason for Exam: fall, left cheek bruising swelling; ORDERING SYSTEM PROVIDED HISTORY: fall TECHNOLOGIST PROVIDED HISTORY: fall Decision Support Exception - unselect if not a suspected or confirmed emergency medical condition->Emergency Medical Condition (MA) Reason for Exam: fall FINDINGS: CT HEAD: BRAIN/VENTRICLES: There is prominence of the ventricles and cortical sulci consistent with cortical volume loss. No midline shift. Basal cisterns are normally outlined.  There is periventricular and subcortical white matter discrete and confluent low attenuation, nonspecific, likely representing age-related chronic

## 2023-09-23 PROBLEM — Z71.89 GOALS OF CARE, COUNSELING/DISCUSSION: Status: ACTIVE | Noted: 2023-09-23

## 2023-09-23 PROBLEM — Z51.5 ENCOUNTER FOR PALLIATIVE CARE: Status: ACTIVE | Noted: 2023-09-23

## 2023-09-23 LAB
ANION GAP SERPL CALCULATED.3IONS-SCNC: 11 MMOL/L (ref 9–17)
ANION GAP SERPL CALCULATED.3IONS-SCNC: 11 MMOL/L (ref 9–17)
BASOPHILS # BLD: 0 K/UL (ref 0–0.2)
BASOPHILS NFR BLD: 0 % (ref 0–2)
BUN SERPL-MCNC: 17 MG/DL (ref 8–23)
CALCIUM SERPL-MCNC: 8.6 MG/DL (ref 8.6–10.4)
CHLORIDE SERPL-SCNC: 104 MMOL/L (ref 98–107)
CHLORIDE SERPL-SCNC: 104 MMOL/L (ref 98–107)
CO2 SERPL-SCNC: 22 MMOL/L (ref 20–31)
CO2 SERPL-SCNC: 22 MMOL/L (ref 20–31)
CREAT SERPL-MCNC: 0.9 MG/DL (ref 0.7–1.2)
EKG ATRIAL RATE: 110 BPM
EKG ATRIAL RATE: 127 BPM
EKG Q-T INTERVAL: 324 MS
EKG Q-T INTERVAL: 344 MS
EKG QRS DURATION: 84 MS
EKG QRS DURATION: 88 MS
EKG QTC CALCULATION (BAZETT): 409 MS
EKG QTC CALCULATION (BAZETT): 471 MS
EKG R AXIS: 31 DEGREES
EKG R AXIS: 7 DEGREES
EKG T AXIS: 53 DEGREES
EKG T AXIS: 58 DEGREES
EKG VENTRICULAR RATE: 113 BPM
EKG VENTRICULAR RATE: 96 BPM
EOSINOPHIL # BLD: 0 K/UL (ref 0–0.4)
EOSINOPHILS RELATIVE PERCENT: 0 % (ref 1–4)
ERYTHROCYTE [DISTWIDTH] IN BLOOD BY AUTOMATED COUNT: 15.2 % (ref 11.8–14.4)
GFR SERPL CREATININE-BSD FRML MDRD: >60 ML/MIN/1.73M2
GLUCOSE SERPL-MCNC: 69 MG/DL (ref 70–99)
HCT VFR BLD AUTO: 44.2 % (ref 40.7–50.3)
HGB BLD-MCNC: 13.2 G/DL (ref 13–17)
IMM GRANULOCYTES # BLD AUTO: 0 K/UL (ref 0–0.3)
IMM GRANULOCYTES NFR BLD: 0 %
INR PPP: 2.1
LYMPHOCYTES NFR BLD: 0.89 K/UL (ref 1–4.8)
LYMPHOCYTES RELATIVE PERCENT: 8 % (ref 24–44)
MAGNESIUM SERPL-MCNC: 2.4 MG/DL (ref 1.6–2.6)
MCH RBC QN AUTO: 28.9 PG (ref 25.2–33.5)
MCHC RBC AUTO-ENTMCNC: 29.9 G/DL (ref 28.4–34.8)
MCV RBC AUTO: 96.7 FL (ref 82.6–102.9)
MONOCYTES NFR BLD: 2.66 K/UL (ref 0.1–0.8)
MONOCYTES NFR BLD: 24 % (ref 1–7)
MORPHOLOGY: ABNORMAL
NEUTROPHILS NFR BLD: 68 % (ref 36–66)
NEUTS SEG NFR BLD: 7.55 K/UL (ref 1.8–7.7)
NRBC BLD-RTO: 0 PER 100 WBC
PHOSPHATE SERPL-MCNC: 3.1 MG/DL (ref 2.5–4.5)
PLATELET # BLD AUTO: ABNORMAL K/UL (ref 138–453)
PLATELET, FLUORESCENCE: 109 K/UL (ref 138–453)
PLATELETS.RETICULATED NFR BLD AUTO: 9.1 % (ref 1.1–10.3)
POTASSIUM SERPL-SCNC: 4.9 MMOL/L (ref 3.7–5.3)
POTASSIUM SERPL-SCNC: 4.9 MMOL/L (ref 3.7–5.3)
PROTHROMBIN TIME: 23 SEC (ref 11.7–14.9)
RBC # BLD AUTO: 4.57 M/UL (ref 4.21–5.77)
SODIUM SERPL-SCNC: 137 MMOL/L (ref 135–144)
SODIUM SERPL-SCNC: 137 MMOL/L (ref 135–144)
TROPONIN I SERPL HS-MCNC: 49 NG/L (ref 0–22)
TROPONIN I SERPL HS-MCNC: 62 NG/L (ref 0–22)
TROPONIN I SERPL HS-MCNC: 66 NG/L (ref 0–22)
WBC OTHER # BLD: 11.1 K/UL (ref 3.5–11.3)

## 2023-09-23 PROCEDURE — 85025 COMPLETE CBC W/AUTO DIFF WBC: CPT

## 2023-09-23 PROCEDURE — 1200000000 HC SEMI PRIVATE

## 2023-09-23 PROCEDURE — 99232 SBSQ HOSP IP/OBS MODERATE 35: CPT | Performed by: STUDENT IN AN ORGANIZED HEALTH CARE EDUCATION/TRAINING PROGRAM

## 2023-09-23 PROCEDURE — 6370000000 HC RX 637 (ALT 250 FOR IP): Performed by: STUDENT IN AN ORGANIZED HEALTH CARE EDUCATION/TRAINING PROGRAM

## 2023-09-23 PROCEDURE — 94761 N-INVAS EAR/PLS OXIMETRY MLT: CPT

## 2023-09-23 PROCEDURE — 80048 BASIC METABOLIC PNL TOTAL CA: CPT

## 2023-09-23 PROCEDURE — 84484 ASSAY OF TROPONIN QUANT: CPT

## 2023-09-23 PROCEDURE — 97166 OT EVAL MOD COMPLEX 45 MIN: CPT

## 2023-09-23 PROCEDURE — 99232 SBSQ HOSP IP/OBS MODERATE 35: CPT | Performed by: SURGERY

## 2023-09-23 PROCEDURE — 83735 ASSAY OF MAGNESIUM: CPT

## 2023-09-23 PROCEDURE — 85055 RETICULATED PLATELET ASSAY: CPT

## 2023-09-23 PROCEDURE — 93005 ELECTROCARDIOGRAM TRACING: CPT | Performed by: SURGERY

## 2023-09-23 PROCEDURE — 36415 COLL VENOUS BLD VENIPUNCTURE: CPT

## 2023-09-23 PROCEDURE — 85610 PROTHROMBIN TIME: CPT

## 2023-09-23 PROCEDURE — 97162 PT EVAL MOD COMPLEX 30 MIN: CPT

## 2023-09-23 PROCEDURE — 93010 ELECTROCARDIOGRAM REPORT: CPT | Performed by: INTERNAL MEDICINE

## 2023-09-23 PROCEDURE — 97535 SELF CARE MNGMENT TRAINING: CPT

## 2023-09-23 PROCEDURE — 97530 THERAPEUTIC ACTIVITIES: CPT

## 2023-09-23 PROCEDURE — 84100 ASSAY OF PHOSPHORUS: CPT

## 2023-09-23 PROCEDURE — 99223 1ST HOSP IP/OBS HIGH 75: CPT

## 2023-09-23 PROCEDURE — 80051 ELECTROLYTE PANEL: CPT

## 2023-09-23 PROCEDURE — 2580000003 HC RX 258: Performed by: STUDENT IN AN ORGANIZED HEALTH CARE EDUCATION/TRAINING PROGRAM

## 2023-09-23 RX ORDER — PREDNISONE 5 MG/1
5 TABLET ORAL DAILY
Status: DISCONTINUED | OUTPATIENT
Start: 2023-09-23 | End: 2023-09-26 | Stop reason: HOSPADM

## 2023-09-23 RX ORDER — ATROPINE SULFATE 0.1 MG/ML
1 INJECTION INTRAVENOUS
Status: ACTIVE | OUTPATIENT
Start: 2023-09-23 | End: 2023-09-24

## 2023-09-23 RX ORDER — FUROSEMIDE 20 MG/1
20 TABLET ORAL DAILY
Status: DISCONTINUED | OUTPATIENT
Start: 2023-09-23 | End: 2023-09-26 | Stop reason: HOSPADM

## 2023-09-23 RX ORDER — BUSPIRONE HYDROCHLORIDE 15 MG/1
15 TABLET ORAL 3 TIMES DAILY PRN
Status: DISCONTINUED | OUTPATIENT
Start: 2023-09-23 | End: 2023-09-26 | Stop reason: HOSPADM

## 2023-09-23 RX ORDER — POLYVINYL ALCOHOL 14 MG/ML
1 SOLUTION/ DROPS OPHTHALMIC PRN
Status: DISCONTINUED | OUTPATIENT
Start: 2023-09-23 | End: 2023-09-26 | Stop reason: HOSPADM

## 2023-09-23 RX ADMIN — MIRTAZAPINE 15 MG: 15 TABLET, FILM COATED ORAL at 20:58

## 2023-09-23 RX ADMIN — BACITRACIN: 500 OINTMENT TOPICAL at 15:52

## 2023-09-23 RX ADMIN — DOCUSATE SODIUM 50 MG AND SENNOSIDES 8.6 MG 1 TABLET: 8.6; 5 TABLET, FILM COATED ORAL at 02:28

## 2023-09-23 RX ADMIN — BACITRACIN: 500 OINTMENT TOPICAL at 20:57

## 2023-09-23 RX ADMIN — PANTOPRAZOLE SODIUM 40 MG: 40 TABLET, DELAYED RELEASE ORAL at 07:30

## 2023-09-23 RX ADMIN — ACETAMINOPHEN 1000 MG: 500 TABLET ORAL at 22:30

## 2023-09-23 RX ADMIN — BACITRACIN: 500 OINTMENT TOPICAL at 08:30

## 2023-09-23 RX ADMIN — SODIUM CHLORIDE: 9 INJECTION, SOLUTION INTRAVENOUS at 02:26

## 2023-09-23 RX ADMIN — MIRTAZAPINE 15 MG: 15 TABLET, FILM COATED ORAL at 00:30

## 2023-09-23 RX ADMIN — PREDNISONE 5 MG: 5 TABLET ORAL at 10:05

## 2023-09-23 RX ADMIN — FUROSEMIDE 20 MG: 20 TABLET ORAL at 10:05

## 2023-09-23 RX ADMIN — GABAPENTIN 100 MG: 100 CAPSULE ORAL at 02:28

## 2023-09-23 RX ADMIN — ACETAMINOPHEN 1000 MG: 500 TABLET ORAL at 06:03

## 2023-09-23 RX ADMIN — DOCUSATE SODIUM 50 MG AND SENNOSIDES 8.6 MG 1 TABLET: 8.6; 5 TABLET, FILM COATED ORAL at 20:58

## 2023-09-23 RX ADMIN — DILTIAZEM HYDROCHLORIDE 180 MG: 180 CAPSULE, COATED, EXTENDED RELEASE ORAL at 08:28

## 2023-09-23 RX ADMIN — METOPROLOL TARTRATE 25 MG: 25 TABLET ORAL at 10:05

## 2023-09-23 ASSESSMENT — ENCOUNTER SYMPTOMS
ABDOMINAL DISTENTION: 0
ABDOMINAL PAIN: 0
TROUBLE SWALLOWING: 0
APNEA: 0
FACIAL SWELLING: 1
SORE THROAT: 0
CHEST TIGHTNESS: 0
EYE REDNESS: 0
EYE PAIN: 0

## 2023-09-23 ASSESSMENT — PAIN SCALES - GENERAL
PAINLEVEL_OUTOF10: 3
PAINLEVEL_OUTOF10: 3

## 2023-09-23 NOTE — PROGRESS NOTES
I spoke with patient's daughter, Osorio Bazan at [phone], who states she is not prepared to accompany him home today but would prepare for tomorrow if he is doing well. She would like to have [address] up before discharge home. We will continue to complete any therapy evaluations prior to discharge. [place] Call of plan to arrange Home Care.

## 2023-09-23 NOTE — CARE COORDINATION
Case Management Assessment  Initial Evaluation    Date/Time of Evaluation: 9/23/2023 3:18 PM  Assessment Completed by: Claudell Gone, RN    If patient is discharged prior to next notation, then this note serves as note for discharge by case management. Patient Name: Mita Nicole                   YOB: 1931  Diagnosis: Syncope and collapse [R55]  Skin abrasion [T14. 8XXA]  Facial hematoma, initial encounter [I66.57MK]  Fall from standing, initial encounter [W19. XXXA]                   Date / Time: 9/22/2023 12:47 PM    Patient Admission Status: Inpatient   Readmission Risk (Low < 19, Mod (19-27), High > 27): Readmission Risk Score: 13.3    Current PCP: Gretta Barron MD  PCP verified by CM? Yes Reginald Shah MD)    Chart Reviewed: Yes      History Provided by: Patient  Patient Orientation: Alert and Oriented    Patient Cognition: Alert    Hospitalization in the last 30 days (Readmission):  No    If yes, Readmission Assessment in  Navigator will be completed. Advance Directives:      Code Status: DNR-CCA   Patient's Primary Decision Maker is: Legal Next of Kin      Discharge Planning:    Patient lives with: Alone Type of Home: House  Primary Care Giver: Self  Patient Support Systems include: Family Members (sister mahi)   Current Financial resources: Medicare  Current community resources: Other (Comment) ([facility], op PT [facility])  Current services prior to admission: Other (Comment) (op PT, [facility])            Current DME:              Type of Home Care services:  None    ADLS  Prior functional level: Assistance with the following: (sister assists as needed patient mostly independent)  Current functional level: Assistance with the following: (daughter to assist as needed)    PT AM-PAC: 21 /24  OT AM-PAC: 12 /24    Family can provide assistance at DC:  Yes  Would you like Case Management to discuss the discharge plan with any other family members/significant others, and if so, who? Yes (daughter Edson Arvizu)  Plans to Return to Present Housing: Yes  Other Identified Issues/Barriers to RETURNING to current housing: weakness  Potential Assistance needed at discharge: [address] [place]            Patient expects to discharge to: [address] [place] [address] for transportation at discharge: Family    Financial    Payor: Jose Obrieny / Plan: MEDICARE PART A AND B / Product Type: *No Product type* /     Does insurance require precert for SNF: No    Potential assistance Purchasing Medications: No  Meds-to-Beds request:        Sarah [id number] Rajesh Medrano, [address]  [address] [facility] [address]  Phone: [phone] Fax: [phone]      Notes:    Factors facilitating achievement of predicted outcomes: Family support    Barriers to discharge: clinical status    Additional Case Management Notes: return home with daughter, provided home care list                   Post Acute Facility/Agency List     Provided patient with the following list, the list includes the overall star ratings obtained from CMS per the Medicare Web site (www.Medicare.gov):     [] [address]  [] Acute Inpatient Rehabilitation Facilities  [] Skilled Nursing Facilities  [x] Home Care          The Plan for Transition of Care is related to the following treatment goals of Syncope and collapse [R55]  Skin abrasion [T14. 8XXA]  Facial hematoma, initial encounter [N65.81ZL]  Fall from standing, initial encounter [W19. XXXA]    IF APPLICABLE: The Patient and/or patient representative Kang Simeon and his family were provided with a choice of provider and agrees with the discharge plan.  Freedom of choice list with basic dialogue that supports the patient's individualized plan of care/goals and shares the quality data associated with the providers was provided to:     Patient      The Patient and/or Patient Representative Agree with the Discharge Plan?  yes    Berta Yoon RN  Case Management Department

## 2023-09-23 NOTE — PROGRESS NOTES
Occupational Therapy  Facility/Department: [address] ORTHO/MED SURG  Occupational Therapy Initial Assessment    Name: Medina Small  : 1931  MRN: [id number]  Date of Service: 2023    Discharge Recommendations:   Further therapy recommended at discharge. Patient Diagnosis(es): The primary encounter diagnosis was Syncope and collapse. Diagnoses of Facial hematoma, initial encounter and Skin abrasion were also pertinent to this visit. Past Medical History:  has a past medical history of A-fib ([address]). Past Surgical History:  has no past surgical history on file. Assessment   Performance deficits / Impairments: Decreased functional mobility ; Decreased ADL status; Decreased endurance;Decreased high-level IADLs;Decreased balance;Decreased strength  Assessment: pt demonstrated above deficits impacting occupational performance. pt is unsafe to return to prior living arrangement without  assistance/supervision d/t decreased balance and strength. pt would also benefit from continued OT at discharge in order to increase safety and independence with ADls and functional transfers/functional mobility. Prognosis: Good  Decision Making: Medium Complexity  REQUIRES OT FOLLOW-UP: Yes  Activity Tolerance  Activity Tolerance: Patient Tolerated treatment well        Plan   Occupational Therapy Plan  Times Per Week: 3-4x/wk     Restrictions  Restrictions/Precautions  Restrictions/Precautions: Fall Risk  Required Braces or Orthoses?: No  Position Activity Restriction  Other position/activity restrictions: up with assist    Subjective   General  Patient assessed for rehabilitation services?: Yes  Family / Caregiver Present: No  General Comment  Comments: RN ok'd for therapythis morning. pt agreeable to participate in session and cooperative/pleasant throughout. pt reported 5/10 face pain, pt provided with ice at end of session.      Social/Functional History  Social/Functional History  Lives With: Alone  Type of assistance (with RW)       AROM: Generally decreased, functional (B shoulders 0-90)  Strength: Generally decreased, functional (B  4/5)  Coordination: Within functional limits  Tone: Normal  Sensation: Intact    ADL  Feeding: Modified independent   Grooming: Stand by assistance  UE Bathing: Stand by assistance  LE Bathing: Moderate assistance  UE Dressing: Stand by assistance  LE Dressing: Moderate assistance  Toileting: Moderate assistance  Additional Comments: OT facilitated pt in using restroom during session. pt required mod A for brief management and min A for toilet transfer d/t decreased balance and strength. All ADLs completed during session discussed above, otherwise clinical reasoning/judgement utilized for all other assist levels. Vision  Vision: Impaired  Vision Exceptions: Wears glasses at all times  Hearing  Hearing: Exceptions to [facility]  Hearing Exceptions: Bilateral hearing aid    Cognition  Overall Cognitive Status: WFL  Orientation  Overall Orientation Status: Within Functional Limits                    Education Given To: Patient  Education Provided: Role of Therapy;Plan of Care;Transfer Training; Fall Prevention Strategies  Education Method: Verbal  Barriers to Learning: None  Education Outcome: Verbalized understanding             Hand Dominance  Hand Dominance: Right                                                        -PAC Score        -[facility] Inpatient Daily Activity Raw Score: 16 (09/23/23 1114)  AM-PAC Inpatient ADL T-Scale Score : 35.96 (09/23/23 1114)  ADL Inpatient CMS 0-100% Score: 53.32 (09/23/23 1114)  ADL Inpatient CMS G-Code Modifier : CK (09/23/23 1114)           Goals  Short Term Goals  Time Frame for Short Term Goals: pt will, by discharge  Short Term Goal 1: complete LB ADLs and toileting tasks with min A, set up and Ae, as needed  Short Term Goal 2: complete UB ADLs and grooming tasks with supervision  Short Term Goal 3: dem SBA during functional transfers/funcitonal mobility with LRd, as needed  Short Term Goal 4: dem ~6 minutes dynamic standing tolerance with SBA in order to complete functional tasks  Short Term Goal 5: increase activity tolerance to 20+ minutes in order to participate in daily tasks       Therapy Time   Individual Concurrent Group Co-treatment   Time In 0805         Time Out 0827         Minutes 22      Co-eval with PT    Timed Code Treatment Minutes: [address], OTR/L
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
## 2023-09-23 NOTE — PROGRESS NOTES
Plastics - [facility]      Patient states face feels better this AM.  Swelling down a bit. Hematoma a little softer. Skin overlying hematoma more pink than purple this AM.    No surgical intervention planned. Continue ice packs. Discussed with patient that ice can help for at least the first 4 days after injury. OK to eat. Following.

## 2023-09-23 NOTE — ACP (ADVANCE CARE PLANNING)
Advance Care Planning     Advance Care Planning (ACP) Physician/NP/PA Conversation    Date of Conversation: 9/22/2023  Conducted with: Patient with Decision Making Capacity    Healthcare Decision Maker:  No healthcare decision makers have been documented. Click here to complete [address] including selection of the Healthcare Decision Maker Relationship (ie \"Primary\")         Care Preferences:    Hospitalization: \"If your health worsens and it becomes clear that your chance of recovery is unlikely, what would be your preference regarding hospitalization? \"  The patient would prefer hospitalization. Ventilation: \"If you were unable to breath on your own and your chance of recovery was unlikely, what would be your preference about the use of a ventilator (breathing machine) if it was available to you? \"  The patient would desire the use of a ventilator. Resuscitation: \"In the event your heart stopped as a result of an underlying serious health condition, would you want attempts made to restart your heart, or would you prefer a natural death? \"  Yes, attempt to resuscitate.     treatment goals    Conversation Outcomes / Follow-Up Plan:  ACP complete - no further action today  Reviewed DNR/DNI and patient elects Full Code (Attempt Resuscitation)    Length of Voluntary ACP Conversation in minutes:  <16 minutes (Non-Billable)    NASRA Davis - CNP

## 2023-09-23 NOTE — PROGRESS NOTES
Physical Therapy  Facility/Department: [address] ORTHO/MED SURG  Physical Therapy Initial Assessment    Name: David Satnton  : 1931  MRN: [id number]  Date of Service: 2023    Discharge Recommendations:  Patient would benefit from continued therapy after discharge          Patient Diagnosis(es): The primary encounter diagnosis was Syncope and collapse. Diagnoses of Facial hematoma, initial encounter and Skin abrasion were also pertinent to this visit. Past Medical History:  has a past medical history of A-fib ([address]). Past Surgical History:  has no past surgical history on file. Assessment   Body Structures, Functions, Activity Limitations Requiring Skilled Therapeutic Intervention: Decreased functional mobility ; Decreased strength;Decreased endurance  Assessment: The pt ambulated 25 ft with a RW x CGA. He moved slowly and was mildly unsteady. He also reported mild dizziness.  He could benefit from a continuation of PT for gait and strengthening following his DC  Therapy Prognosis: Good  Decision Making: Medium Complexity  Requires PT Follow-Up: Yes  Activity Tolerance  Activity Tolerance: Patient limited by fatigue;Patient limited by endurance     Plan   Physcial Therapy Plan  General Plan:  (5-6x wk)  Current Treatment Recommendations: Strengthening, Functional mobility training, Endurance training, Stair training, Gait training, Safety education & training, Therapeutic activities  Safety Devices  Type of Devices: Call light within reach, Bed alarm in place, Left in bed, Patient at risk for falls, Gait belt, Nurse notified  Restraints  Restraints Initially in Place: No     Restrictions  Restrictions/Precautions  Restrictions/Precautions: Fall Risk  Required Braces or Orthoses?: No  Position Activity Restriction  Other position/activity restrictions: up with assist     Subjective   General  Patient assessed for rehabilitation services?: Yes  Response To Previous Treatment: Not applicable  Family / Caregiver Present: No  Follows Commands: Within Functional Limits  Subjective  Subjective:  The pt reported a 5/10 pain in his face L side         Social/Functional History  Social/Functional History  Lives With: Alone  Type of Home: House  Home Layout: One level  Home Access: Stairs to enter with rails  Entrance Stairs - Number of Steps: 3  Entrance Stairs - Rails: Both  Bathroom Shower/Tub: Tub/Shower unit, Doors  Bathroom Toilet: Standard  Bathroom Equipment: Grab bars in shower, Shower chair  Home Equipment: Miguel Flor, rolling, Cane (pt reported occasionally using RW for long distances)  ADL Assistance: [address] Independent  Homemaking Responsibilities: Yes  Meal Prep Responsibility: Primary  Laundry Responsibility: Primary  Cleaning Responsibility: Primary  Ambulation Assistance: Independent  Transfer Assistance: Independent  Active : Yes (or  daughter drives)  Mode of Transportation: SUV  Occupation: Retired  Type of Occupation: sales  Leisure & Hobbies: computer, chavez, watching sports  Additional Comments: pt reported daughter lives close and is able to provide 24/7 if needed  Vision/Hearing  Vision  Vision: Impaired  Vision Exceptions: Wears glasses at all times  Hearing  Hearing: Exceptions to [facility]  Hearing Exceptions: Bilateral hearing aid    Cognition   Orientation  Overall Orientation Status: Within Functional Limits  Orientation Level: Oriented X4  Cognition  Overall Cognitive Status: WFL     Objective   Pulse: 90  Heart Rate Source: Monitor  BP: (!) 149/92  BP Location: Left upper arm  BP Method: Automatic  Patient Position: Semi fowlers  MAP (Calculated): 111  SpO2: 99 %  O2 Device: None (Room air)            AROM RLE (degrees)  RLE AROM: WFL  AROM LLE (degrees)  LLE AROM : WFL  AROM RUE (degrees)  RUE AROM : WFL  AROM LUE (degrees)  LUE AROM : WFL  Strength RLE  Strength RLE: WFL  Strength LLE  Strength LLE: WFL  Strength RUE  Strength RUE: WFL  Strength LUE  Strength

## 2023-09-23 NOTE — PROGRESS NOTES
[facility]  Office: [phone]  Anjel Lemus, DO, Rodney Butterfield MD, Mary Zamarripa MD, Jessi Ansari MD, Chase Cadena MD,  Bao Anthony MD, Hamilton Castaneda MD, Bhavana Ahmadi DO, Darrell Cabello MD,  Jolene Torrez MD, Sanju Reed MD, Bennie Rodriguez DO, Storm Granados MD,  Marion Hsieh DO, Ulysses Sprinkle, MD, Grecia Martins MD, Ashley Thakkar MD, Di Nettles MD,  Fede Noonan MD, Mono Sandoval MD, Chelle Blackmon MD, Frederic Fulton MD, Zuri Hickman DO, Geri Nieto MD,  Мария Aguirre MD, Lee Beach MD, Sharri Muñoz, CNP,  Gus Morris, CNP,, Bertha Santiago, CNP,  Gilma Rivers, [facility], Terrance Shoulder, CNP, Dian Munoz, CNP, Samantha Allegra, CNP, Rachael Ruiz, CNP, Scott Hansford, CNP, Sarai Keto, CNP, Yulissa Gamma, CNS, Talha Plate, CNP, Edmundo Joyce, [address]    Progress Note    9/23/2023    9:20 AM    Name:   Akhil Hamilton  MRN:     [id number]     Acct:      [de-identified]   Room:   [address] [place] [address] Day:  1  Admit Date:  9/22/2023 12:47 PM    PCP:   Steve Reed MD  Code Status:  Full Code    Subjective:     C/C:   Chief Complaint   Patient presents with    Fall     Interval History Status: improved. Patient skip nd examined. Ice packs have been helping with the swelling. Overall dowing well. No other issues overnight. Still in afib. Heart rate and blood pressure are stable. Walked to the bathroom and urinated. Otherwise feeling ok. Brief History:     80-year-old male past medical history of aortic stenosis, chronic diastolic heart failure, permanent A-fib, anticoagulation with Coumadin,  iron deficiency anemia, history of gout right foot presents status post fall.   Patient was trying to go from his kitchen to the garage down 3 steps and had a mechanical fall unfortunately causing his right arm to be pinned down. Patient presented to the ER as a trauma priority. Found to have hematoma of the face as well as multiple bruising on his arms both right and left. Patient follows up with cardiology as outpatient last seen in April of this year. On Cardizem for rate control, Coumadin for anticoagulation and follows up with anticoagulation clinic at [facility]. Patient also follows up with PCP last seen August 7 of this year. Next follow-up appointment was scheduled for November. Patient has known history of aortic stenosis currently being managed with serial echoes. Review of Systems:     Review of Systems   Constitutional:  Negative for activity change and fatigue. HENT:  Positive for facial swelling. Negative for congestion, sore throat, tinnitus and trouble swallowing. Eyes:  Negative for pain and redness. Respiratory:  Negative for apnea and chest tightness. Cardiovascular:  Negative for chest pain, palpitations and leg swelling. Gastrointestinal:  Negative for abdominal distention and abdominal pain. Musculoskeletal:  Positive for arthralgias and myalgias. Skin:  Positive for wound. Neurological:  Positive for weakness. Negative for light-headedness. Psychiatric/Behavioral:  Negative for agitation, confusion and decreased concentration. Medications: Allergies:     Allergies   Allergen Reactions    Celebrex [Celecoxib]     Mobic [Meloxicam]        Current Meds:   Scheduled Meds:    furosemide  20 mg Oral Daily    metoprolol tartrate  25 mg Oral BID    predniSONE  5 mg Oral Daily    sodium chloride flush  5-40 mL IntraVENous 2 times per day    polyethylene glycol  17 g Oral Daily    sennosides-docusate sodium  1 tablet Oral BID    bacitracin   Topical TID    acetaminophen  1,000 mg Oral 3 times per day    mirtazapine  15 mg Oral Nightly    dilTIAZem  180 mg Oral Daily    gabapentin  100 mg Oral Nightly    pantoprazole  40 mg Oral QAM AC     Continuous Infusions:

## 2023-09-23 NOTE — CARE COORDINATION
Met with patient to complete SBIRT, daughter at bedside. Patient denies alcohol or drug use. Denies depression symptoms or history of mental health. Family plans to support patient at discharge as well as pursue HC. Screening negative. Alcohol Screening and Brief Intervention        Recent Labs     09/22/23  1302   ALC <10       Alcohol Pre-screening  (MEN ONLY) How many times in the past year have you had 5 or more drinks in a day?: None          Drug Pre-Screening   How many times in the past year have you used a recreational drug or used a prescription medication for nonmedical reasons?: None    Drug Screening DAST       Mood Pre-Screening (PHQ-2)  Score: 0          Mood Pre-Screening (PHQ-9)         I have interviewed Donato Kingston, [phone] regarding  His alcohol consumption/drug use and risk for excessive use. Screenings were negative. Patient  N/A intervention at this time.      Deferred []    Completed on: 9/23/2023   ODETTE Jacob

## 2023-09-23 NOTE — PROGRESS NOTES
Changed dressing to 3 right forearm and between thumb and 1st digit  and left elbow skin tears. Skin tears open with red drainage. Cleansed with soap and water  and then [employer]  drsg then kerlex roll .  Applies tape

## 2023-09-23 NOTE — PROGRESS NOTES
Plastics - [facility]      Patient re-evaluated. He states it feels a bit better. He looks less swollen, cheek, eyelid and upper lip. Firmness of hematoma same or maybe slight less. Overlying skin looks OK. Will continue ice packs.   Re-evaluate in AM.      Will allow PO intake until MN, then NPO in case needs to be drained in AM.

## 2023-09-23 NOTE — CONSULTS
Palliative Care Inpatient Consult    NAME:  Anurag Mccall  MEDICAL RECORD NUMBER:  [id number]  AGE: 80 y.o. GENDER: male  : 1931  TODAY'S DATE:  2023    Reasons for Consultation:    Symptom and/or pain management  Provision of information regarding PC and/or hospice philosophies  Complex, time-intensive communication and interdisciplinary psychosocial support  Clarification of goals of care and/or assistance with difficult decision-making  Guidance in regards to resources and transition(s)    Members of PC team contributing to this consultation are : Ree Scanlon, Palliative Care APRN-CNP    Plan      Palliative Interaction: Patient seen and examined at bedside. Patient resting comfortably in bed upon my arrival. Introduced myself and my palliative role in his care. Patient currently lives alone where he is completely independent. He states he has only fallen one time in the past and that was several years ago. He shares his daughter lives close and is very helpful and he feels very supported by her. Informed by Trauma resident that patient was under the impression that a DNR-CCA related to surgery only and patient requesting to be a FULL CODE at this time. I discuss this with patient and explain resuscitative measures at length. Patient expressing he wishes to receive CPR and mechanical ventilation should it be deemed necessary. Patient states that he has had many discussions with his children Jackie Vargas and Guilherme Katz, who are patients HCPOA, regarding his wishes beyond that point, and his children are understanding of this. Patient to be made FULL code at this time. Palliative care will continue to follow.          Education/support to family  Education/support to patient  Discharge planning/helping to coordinate care  Communications with primary service  Continue with current plan of care  Code status clarified: Full Code  Principle Problem/Diagnosis:  Fall from standing, initial

## 2023-09-24 LAB
ANION GAP SERPL CALCULATED.3IONS-SCNC: 11 MMOL/L (ref 9–17)
BASOPHILS # BLD: 0 K/UL (ref 0–0.2)
BASOPHILS NFR BLD: 0 % (ref 0–2)
BUN SERPL-MCNC: 18 MG/DL (ref 8–23)
CALCIUM SERPL-MCNC: 8.4 MG/DL (ref 8.6–10.4)
CHLORIDE SERPL-SCNC: 108 MMOL/L (ref 98–107)
CO2 SERPL-SCNC: 21 MMOL/L (ref 20–31)
CREAT SERPL-MCNC: 1.1 MG/DL (ref 0.7–1.2)
EOSINOPHIL # BLD: 0 K/UL (ref 0–0.44)
EOSINOPHILS RELATIVE PERCENT: 0 % (ref 1–4)
ERYTHROCYTE [DISTWIDTH] IN BLOOD BY AUTOMATED COUNT: 15 % (ref 11.8–14.4)
GFR SERPL CREATININE-BSD FRML MDRD: >60 ML/MIN/1.73M2
GLUCOSE SERPL-MCNC: 78 MG/DL (ref 70–99)
HCT VFR BLD AUTO: 47.1 % (ref 40.7–50.3)
HGB BLD-MCNC: 13.6 G/DL (ref 13–17)
IMM GRANULOCYTES # BLD AUTO: 0.2 K/UL (ref 0–0.3)
IMM GRANULOCYTES NFR BLD: 2 %
INR PPP: 2
LYMPHOCYTES NFR BLD: 0.92 K/UL (ref 1.1–3.7)
LYMPHOCYTES RELATIVE PERCENT: 9 % (ref 24–43)
MAGNESIUM SERPL-MCNC: 2.4 MG/DL (ref 1.6–2.6)
MCH RBC QN AUTO: 28.2 PG (ref 25.2–33.5)
MCHC RBC AUTO-ENTMCNC: 28.9 G/DL (ref 28.4–34.8)
MCV RBC AUTO: 97.7 FL (ref 82.6–102.9)
MONOCYTES NFR BLD: 3.06 K/UL (ref 0.1–1.2)
MONOCYTES NFR BLD: 30 % (ref 3–12)
MORPHOLOGY: ABNORMAL
NEUTROPHILS NFR BLD: 59 % (ref 36–65)
NEUTS SEG NFR BLD: 6.02 K/UL (ref 1.5–8.1)
NRBC BLD-RTO: 0 PER 100 WBC
PHOSPHATE SERPL-MCNC: 2.1 MG/DL (ref 2.5–4.5)
PLATELET # BLD AUTO: ABNORMAL K/UL (ref 138–453)
PLATELET, FLUORESCENCE: 120 K/UL (ref 138–453)
PLATELETS.RETICULATED NFR BLD AUTO: 7.7 % (ref 1.1–10.3)
POTASSIUM SERPL-SCNC: 4.3 MMOL/L (ref 3.7–5.3)
PROTHROMBIN TIME: 22.6 SEC (ref 11.7–14.9)
RBC # BLD AUTO: 4.82 M/UL (ref 4.21–5.77)
SODIUM SERPL-SCNC: 140 MMOL/L (ref 135–144)
WBC OTHER # BLD: 10.2 K/UL (ref 3.5–11.3)

## 2023-09-24 PROCEDURE — 85055 RETICULATED PLATELET ASSAY: CPT

## 2023-09-24 PROCEDURE — 2500000003 HC RX 250 WO HCPCS: Performed by: STUDENT IN AN ORGANIZED HEALTH CARE EDUCATION/TRAINING PROGRAM

## 2023-09-24 PROCEDURE — 85610 PROTHROMBIN TIME: CPT

## 2023-09-24 PROCEDURE — 85025 COMPLETE CBC W/AUTO DIFF WBC: CPT

## 2023-09-24 PROCEDURE — 99232 SBSQ HOSP IP/OBS MODERATE 35: CPT | Performed by: SURGERY

## 2023-09-24 PROCEDURE — 36415 COLL VENOUS BLD VENIPUNCTURE: CPT

## 2023-09-24 PROCEDURE — 83735 ASSAY OF MAGNESIUM: CPT

## 2023-09-24 PROCEDURE — 2580000003 HC RX 258: Performed by: STUDENT IN AN ORGANIZED HEALTH CARE EDUCATION/TRAINING PROGRAM

## 2023-09-24 PROCEDURE — 80048 BASIC METABOLIC PNL TOTAL CA: CPT

## 2023-09-24 PROCEDURE — 6370000000 HC RX 637 (ALT 250 FOR IP): Performed by: STUDENT IN AN ORGANIZED HEALTH CARE EDUCATION/TRAINING PROGRAM

## 2023-09-24 PROCEDURE — 1200000000 HC SEMI PRIVATE

## 2023-09-24 PROCEDURE — 84100 ASSAY OF PHOSPHORUS: CPT

## 2023-09-24 PROCEDURE — 99223 1ST HOSP IP/OBS HIGH 75: CPT | Performed by: INTERNAL MEDICINE

## 2023-09-24 RX ORDER — METOPROLOL TARTRATE 5 MG/5ML
2.5 INJECTION INTRAVENOUS ONCE
Status: COMPLETED | OUTPATIENT
Start: 2023-09-24 | End: 2023-09-24

## 2023-09-24 RX ADMIN — MIRTAZAPINE 15 MG: 15 TABLET, FILM COATED ORAL at 21:52

## 2023-09-24 RX ADMIN — PANTOPRAZOLE SODIUM 40 MG: 40 TABLET, DELAYED RELEASE ORAL at 07:51

## 2023-09-24 RX ADMIN — DOCUSATE SODIUM 50 MG AND SENNOSIDES 8.6 MG 1 TABLET: 8.6; 5 TABLET, FILM COATED ORAL at 08:20

## 2023-09-24 RX ADMIN — BACITRACIN: 500 OINTMENT TOPICAL at 14:35

## 2023-09-24 RX ADMIN — METOPROLOL TARTRATE 25 MG: 25 TABLET ORAL at 08:20

## 2023-09-24 RX ADMIN — POLYETHYLENE GLYCOL 3350 17 G: 17 POWDER, FOR SOLUTION ORAL at 08:20

## 2023-09-24 RX ADMIN — PREDNISONE 5 MG: 5 TABLET ORAL at 08:20

## 2023-09-24 RX ADMIN — METOPROLOL TARTRATE 2.5 MG: 1 INJECTION, SOLUTION INTRAVENOUS at 07:45

## 2023-09-24 RX ADMIN — DOCUSATE SODIUM 50 MG AND SENNOSIDES 8.6 MG 1 TABLET: 8.6; 5 TABLET, FILM COATED ORAL at 21:52

## 2023-09-24 RX ADMIN — METOPROLOL TARTRATE 25 MG: 25 TABLET ORAL at 21:52

## 2023-09-24 RX ADMIN — FUROSEMIDE 20 MG: 20 TABLET ORAL at 08:20

## 2023-09-24 RX ADMIN — ACETAMINOPHEN 1000 MG: 500 TABLET ORAL at 06:49

## 2023-09-24 RX ADMIN — BACITRACIN: 500 OINTMENT TOPICAL at 21:50

## 2023-09-24 RX ADMIN — SODIUM PHOSPHATE, MONOBASIC, MONOHYDRATE AND SODIUM PHOSPHATE, DIBASIC, ANHYDROUS 15 MMOL: 276; 142 INJECTION, SOLUTION INTRAVENOUS at 12:24

## 2023-09-24 RX ADMIN — BACITRACIN: 500 OINTMENT TOPICAL at 08:21

## 2023-09-24 ASSESSMENT — PAIN SCALES - GENERAL
PAINLEVEL_OUTOF10: 3
PAINLEVEL_OUTOF10: 0
PAINLEVEL_OUTOF10: 0

## 2023-09-24 NOTE — PROGRESS NOTES
PROGRESS NOTE          PATIENT NAME: Ignacio Reddy  MEDICAL RECORD NO. [id number]  DATE: 9/24/2023    HD: # 2      Patient Active Problem List   Diagnosis    Fall from standing, initial encounter    Chronic diastolic heart failure (HCC)    Permanent atrial fibrillation ([address])    Anticoagulated on Coumadin    Nonrheumatic aortic valve stenosis    Chronic gout of right foot    Syncope and collapse    Encounter for palliative care    Goals of care, counseling/discussion       DIAGNOSIS AND PLAN    Diagnosis: left facial hematoma    Plan:    Plastics following   - [facility] for discharge if medically stable   - Warfarin can be re-started today (9/24)   - [facility] for dvt ppx   - F/u outpatient with Dr. Ariadne Worthy in 5 days. 2. DVT Prophylaxis- lovenox  Code status discussed: DNR-CCA at this time    Asymptomatic variable bradycardia, intermittent a-fib w RVR   - Cardiology recs: resume metoprolol and Cardizem    DISPO: Planning for D/C home with daughter & home health care    Chief Complaint: \"Nothing\"    SUBJECTIVE    Patient seen at bedside this morning and has no complaints. Patient denies chest pain, shortness of breath, nausea, vomiting, abdominal pain, or palpitations. Patient states face feels better today. Patient tachycardic on exam, A-fib with RVR up to 130s on monitor. Cardizem and Lopressor were held overnight. OBJECTIVE  VITALS:   Vitals:    09/24/23 1059   BP: (!) 140/101   Pulse: 80   Resp:    Temp:    SpO2:      Physical Exam  Constitutional:       Appearance: He is normal weight. He is not toxic-appearing. HENT:      Head: Normocephalic. Comments:  Left sided facial swelling/abrasions/bruising, the is left periorbital and extends down to the left side of mouth. Right Ear: External ear normal.      Left Ear: External ear normal.      Nose: Nose normal.      Mouth/Throat:      Mouth: Mucous membranes are moist.      Pharynx: Oropharynx is clear.    Eyes:      Conjunctiva/sclera: Conjunctivae normal.   Cardiovascular:      Rate and Rhythm: Tachycardia present. Rhythm irregular. Pulses: Normal pulses. Pulmonary:      Effort: Pulmonary effort is normal. No respiratory distress. Breath sounds: No wheezing. Abdominal:      General: Abdomen is flat. There is no distension. Palpations: Abdomen is soft. Tenderness: There is no abdominal tenderness. There is no guarding. Musculoskeletal:         General: Signs of injury present. No swelling, tenderness or deformity. Cervical back: Normal range of motion. Comments: Multiple skin tears on arms bilaterally, bandaged without strike through. Skin:     General: Skin is warm and dry. Coloration: Skin is not jaundiced. Neurological:      Mental Status: He is alert and oriented to person, place, and time. Mental status is at baseline. LAB:  CBC:   Recent Labs     09/22/23  1302 09/23/23  0546 09/24/23  0450   WBC 9.4 11.1 10.2   HGB 14.5 13.2 13.6   HCT 48.0 44.2 47.1   MCV 94.3 96.7 97.7    See Reflexed IPF Result See Reflexed IPF Result       BMP:   Recent Labs     09/22/23  1302 09/23/23  0546 09/24/23  0450   * 137  137 140   K 5.4* 4.9  4.9 4.3   CL 99 104  104 108*   CO2 20 22  22 21   BUN 20 17 18   CREATININE 1.2 0.9 1.1   GLUCOSE 102* 69* 78         RADIOLOGY:  CXR:     CT CHEST ABDOMEN PELVIS W CONTRAST Additional Contrast? None    Result Date: 9/22/2023  Chest abdomen pelvis: 1. No evidence for acute visceral injury. 2. No acute osseous abnormality. 3. A mild subpleural fibrosis in the lungs. No honeycombing. 4. Cardiomegaly. Thoracic and lumbar spine: 1. No acute fracture. 2. Osteopenia and superimposed diffuse degenerative changes. 3. Grade 1 L2-L3 retrolisthesis and a grade 1-2 L4 L5 anterolisthesis. CT THORACIC SPINE BONY RECONSTRUCTION    Result Date: 9/22/2023  Chest abdomen pelvis: 1. No evidence for acute visceral injury. 2. No acute osseous abnormality.  3. A mild

## 2023-09-24 NOTE — PROGRESS NOTES
Attempted to get pt up to chair Pt sat at bedside became dizzy then dizziness lightned up pt  felt like he could stand . pt stood felt really dizzy and felt like he was going to pass out , pt returned back to bed .  Dizziness gone shortly after returned to bed   bp 140/101 82

## 2023-09-24 NOTE — PLAN OF CARE
Problem: Discharge Planning  Goal: Discharge to home or other facility with appropriate resources  9/24/2023 1540 by Radha Lopez RN  Outcome: Progressing  9/24/2023 0556 by Nissa Rivera RN  Outcome: Progressing     Problem: Skin/Tissue Integrity  Goal: Absence of new skin breakdown  Description: 1. Monitor for areas of redness and/or skin breakdown  2. Assess vascular access sites hourly  3. Every 4-6 hours minimum:  Change oxygen saturation probe site  4. Every 4-6 hours:  If on nasal continuous positive airway pressure, respiratory therapy assess nares and determine need for appliance change or resting period.   9/24/2023 1540 by Radha Lopez RN  Outcome: Progressing  9/24/2023 0556 by Nissa Rivera RN  Outcome: Progressing     Problem: Safety - Adult  Goal: Free from fall injury  9/24/2023 0556 by Nissa Rivera RN  Outcome: Progressing

## 2023-09-24 NOTE — CARE COORDINATION
Transitional planning. SNF referrals faxed  to [facility] (1st choice) and [facility] (2nd choice) PT/OT ordered.

## 2023-09-24 NOTE — PROGRESS NOTES
[address]  Speech Language Pathology    Date: 9/24/2023  Patient Name: Viet Muñiz  YOB: 1931   AGE: 80 y.o. MRN: [id number]        Patient Not Available for Speech Therapy     Due to:  [] Testing  [] Hemodialysis  [] Cancelled by RN  [] Surgery   [] Intubation/Sedation/Pain Medication  [] Medical instability  [x] Other: Pt. With nursing care    Next scheduled treatment: 9/25  Completed by: Carroll Jefferson, SLP, M.A.  MELIZA-SLP

## 2023-09-24 NOTE — PROGRESS NOTES
Heart monitor Alarming Pt heart rate in atrial fib rate 120 to 140s . Trauma and cardiology resident here. Trauma resident resumed lopressor 25mg po and lopressor one time dose 2.5mg IV  cardiology stated ok to give lopressor po and IV .  Lopressor 2.5 IV given at 745 am . Lopressor 25 mg not here called pharmacy Lopressor po given at 820am given

## 2023-09-25 ENCOUNTER — APPOINTMENT (OUTPATIENT)
Dept: GENERAL RADIOLOGY | Age: 88
DRG: 605 | End: 2023-09-25
Payer: MEDICARE

## 2023-09-25 LAB
ANION GAP SERPL CALCULATED.3IONS-SCNC: 12 MMOL/L (ref 9–16)
BASOPHILS # BLD: 0 K/UL (ref 0–0.2)
BASOPHILS NFR BLD: 0 % (ref 0–2)
BUN SERPL-MCNC: 12 MG/DL (ref 8–23)
CALCIUM SERPL-MCNC: 8 MG/DL (ref 8.6–10.4)
CHLORIDE SERPL-SCNC: 108 MMOL/L (ref 98–107)
CO2 SERPL-SCNC: 21 MMOL/L (ref 20–31)
CREAT SERPL-MCNC: 0.9 MG/DL (ref 0.7–1.2)
EKG ATRIAL RATE: 33 BPM
EKG Q-T INTERVAL: 436 MS
EKG QRS DURATION: 88 MS
EKG QTC CALCULATION (BAZETT): 346 MS
EKG R AXIS: 11 DEGREES
EKG T AXIS: 178 DEGREES
EKG VENTRICULAR RATE: 38 BPM
EOSINOPHIL # BLD: 0 K/UL (ref 0–0.44)
EOSINOPHILS RELATIVE PERCENT: 0 % (ref 1–4)
ERYTHROCYTE [DISTWIDTH] IN BLOOD BY AUTOMATED COUNT: 15 % (ref 11.8–14.4)
GFR SERPL CREATININE-BSD FRML MDRD: >60 ML/MIN/1.73M2
GLUCOSE SERPL-MCNC: 73 MG/DL (ref 74–99)
HCT VFR BLD AUTO: 44.3 % (ref 40.7–50.3)
HGB BLD-MCNC: 13.2 G/DL (ref 13–17)
IMM GRANULOCYTES # BLD AUTO: 0.21 K/UL (ref 0–0.3)
IMM GRANULOCYTES NFR BLD: 2 %
INR PPP: 1.5
LYMPHOCYTES NFR BLD: 0.82 K/UL (ref 1.1–3.7)
LYMPHOCYTES RELATIVE PERCENT: 8 % (ref 24–43)
MAGNESIUM SERPL-MCNC: 2.2 MG/DL (ref 1.7–2.3)
MCH RBC QN AUTO: 28.6 PG (ref 25.2–33.5)
MCHC RBC AUTO-ENTMCNC: 29.8 G/DL (ref 28.4–34.8)
MCV RBC AUTO: 95.9 FL (ref 82.6–102.9)
MONOCYTES NFR BLD: 2.88 K/UL (ref 0.1–1.2)
MONOCYTES NFR BLD: 28 % (ref 3–12)
MORPHOLOGY: ABNORMAL
NEUTROPHILS NFR BLD: 62 % (ref 36–65)
NEUTS SEG NFR BLD: 6.39 K/UL (ref 1.5–8.1)
NRBC BLD-RTO: 0 PER 100 WBC
PHOSPHATE SERPL-MCNC: 1.8 MG/DL (ref 2.5–4.5)
PLATELET # BLD AUTO: 169 K/UL (ref 138–453)
PMV BLD AUTO: 11.9 FL (ref 8.1–13.5)
POTASSIUM SERPL-SCNC: 3.5 MMOL/L (ref 3.7–5.3)
PROTHROMBIN TIME: 17.7 SEC (ref 11.7–14.9)
RBC # BLD AUTO: 4.62 M/UL (ref 4.21–5.77)
SODIUM SERPL-SCNC: 141 MMOL/L (ref 136–145)
WBC OTHER # BLD: 10.3 K/UL (ref 3.5–11.3)

## 2023-09-25 PROCEDURE — 36415 COLL VENOUS BLD VENIPUNCTURE: CPT

## 2023-09-25 PROCEDURE — 85025 COMPLETE CBC W/AUTO DIFF WBC: CPT

## 2023-09-25 PROCEDURE — 93010 ELECTROCARDIOGRAM REPORT: CPT | Performed by: INTERNAL MEDICINE

## 2023-09-25 PROCEDURE — 2500000003 HC RX 250 WO HCPCS

## 2023-09-25 PROCEDURE — 2500000003 HC RX 250 WO HCPCS: Performed by: STUDENT IN AN ORGANIZED HEALTH CARE EDUCATION/TRAINING PROGRAM

## 2023-09-25 PROCEDURE — 1200000000 HC SEMI PRIVATE

## 2023-09-25 PROCEDURE — 99232 SBSQ HOSP IP/OBS MODERATE 35: CPT | Performed by: SURGERY

## 2023-09-25 PROCEDURE — 6370000000 HC RX 637 (ALT 250 FOR IP): Performed by: STUDENT IN AN ORGANIZED HEALTH CARE EDUCATION/TRAINING PROGRAM

## 2023-09-25 PROCEDURE — 80048 BASIC METABOLIC PNL TOTAL CA: CPT

## 2023-09-25 PROCEDURE — 85610 PROTHROMBIN TIME: CPT

## 2023-09-25 PROCEDURE — 2580000003 HC RX 258: Performed by: STUDENT IN AN ORGANIZED HEALTH CARE EDUCATION/TRAINING PROGRAM

## 2023-09-25 PROCEDURE — 84100 ASSAY OF PHOSPHORUS: CPT

## 2023-09-25 PROCEDURE — 92523 SPEECH SOUND LANG COMPREHEN: CPT

## 2023-09-25 PROCEDURE — 6370000000 HC RX 637 (ALT 250 FOR IP)

## 2023-09-25 PROCEDURE — 73120 X-RAY EXAM OF HAND: CPT

## 2023-09-25 PROCEDURE — 96127 BRIEF EMOTIONAL/BEHAV ASSMT: CPT | Performed by: SOCIAL WORKER

## 2023-09-25 PROCEDURE — 83735 ASSAY OF MAGNESIUM: CPT

## 2023-09-25 PROCEDURE — 94761 N-INVAS EAR/PLS OXIMETRY MLT: CPT

## 2023-09-25 RX ORDER — WARFARIN SODIUM 5 MG/1
5 TABLET ORAL
Status: COMPLETED | OUTPATIENT
Start: 2023-09-25 | End: 2023-09-25

## 2023-09-25 RX ORDER — METOPROLOL TARTRATE 5 MG/5ML
5 INJECTION INTRAVENOUS ONCE
Status: COMPLETED | OUTPATIENT
Start: 2023-09-25 | End: 2023-09-25

## 2023-09-25 RX ADMIN — BACITRACIN: 500 OINTMENT TOPICAL at 10:06

## 2023-09-25 RX ADMIN — DOCUSATE SODIUM 50 MG AND SENNOSIDES 8.6 MG 1 TABLET: 8.6; 5 TABLET, FILM COATED ORAL at 10:05

## 2023-09-25 RX ADMIN — ACETAMINOPHEN 1000 MG: 500 TABLET ORAL at 14:21

## 2023-09-25 RX ADMIN — FUROSEMIDE 20 MG: 20 TABLET ORAL at 10:05

## 2023-09-25 RX ADMIN — DILTIAZEM HYDROCHLORIDE 180 MG: 180 CAPSULE, COATED, EXTENDED RELEASE ORAL at 10:06

## 2023-09-25 RX ADMIN — PREDNISONE 5 MG: 5 TABLET ORAL at 10:05

## 2023-09-25 RX ADMIN — METOPROLOL TARTRATE 25 MG: 25 TABLET ORAL at 23:43

## 2023-09-25 RX ADMIN — BACITRACIN: 500 OINTMENT TOPICAL at 14:21

## 2023-09-25 RX ADMIN — BACITRACIN: 500 OINTMENT TOPICAL at 23:42

## 2023-09-25 RX ADMIN — METOPROLOL TARTRATE 25 MG: 25 TABLET ORAL at 10:05

## 2023-09-25 RX ADMIN — DOCUSATE SODIUM 50 MG AND SENNOSIDES 8.6 MG 1 TABLET: 8.6; 5 TABLET, FILM COATED ORAL at 23:43

## 2023-09-25 RX ADMIN — WARFARIN SODIUM 5 MG: 5 TABLET ORAL at 18:44

## 2023-09-25 RX ADMIN — POTASSIUM PHOSPHATE, MONOBASIC POTASSIUM PHOSPHATE, DIBASIC 30 MMOL: 224; 236 INJECTION, SOLUTION, CONCENTRATE INTRAVENOUS at 14:24

## 2023-09-25 RX ADMIN — MIRTAZAPINE 15 MG: 15 TABLET, FILM COATED ORAL at 23:43

## 2023-09-25 RX ADMIN — PANTOPRAZOLE SODIUM 40 MG: 40 TABLET, DELAYED RELEASE ORAL at 06:30

## 2023-09-25 RX ADMIN — METOPROLOL TARTRATE 5 MG: 1 INJECTION, SOLUTION INTRAVENOUS at 08:10

## 2023-09-25 RX ADMIN — BUSPIRONE HYDROCHLORIDE 15 MG: 15 TABLET ORAL at 23:43

## 2023-09-25 ASSESSMENT — PAIN SCALES - GENERAL: PAINLEVEL_OUTOF10: 5

## 2023-09-25 NOTE — PROGRESS NOTES
PROGRESS NOTE          PATIENT NAME: Janelle Priest  MEDICAL RECORD NO. [id number]  DATE: 9/25/2023    HD: # 3      Patient Active Problem List   Diagnosis    Fall from standing, initial encounter    Chronic diastolic heart failure (HCC)    Permanent atrial fibrillation ([address])    Anticoagulated on Coumadin    Nonrheumatic aortic valve stenosis    Chronic gout of right foot    Syncope and collapse    Encounter for palliative care    Goals of care, counseling/discussion       DIAGNOSIS AND PLAN    Diagnosis: left facial hematoma    Plan:    Plastics following   - [facility] if [facility] stable   - Warfarin can be re-started today (9/24)   - [facility] for d[facility] ppx   - F/u outpatient with Dr. Louann Turner around 9/28    2. DVT Prophylaxis- lovenox  Code status: DNR-CCA    Asymptomatic variable sandra to Tachy, intermittent a-fib w RVR   - Cardiology recs: resume metoprolol and Cardizem   - Lopressor 5 mg this am    Resumed Home meds  Pharm consulted for warfarin dosing    DISPO: Planning for D/C home with daughter & home health care    Chief Complaint: \"Nothing\"    SUBJECTIVE    Patient seen at bedside this morning and has no complaints. Patient denies chest pain, shortness of breath, nausea, vomiting, abdominal pain, or palpitations. States face feels the same. Patient states face feels better today. Patient tachycardic on exam, A-fib with RVR up to 140 on monitor. Restarting cardizem and lopressor      OBJECTIVE  VITALS:   Vitals:    09/24/23 2152   BP: (!) 160/89   Pulse:    Resp:    Temp:    SpO2:      Physical Exam  Constitutional:       Appearance: He is normal weight. He is not toxic-appearing. HENT:      Head: Normocephalic. Comments:  Left sided facial swelling/abrasions/bruising, the is left periorbital and extends down to the left side of mouth.       Right Ear: External ear normal.      Left Ear: External ear normal.      Nose: Nose normal.      Mouth/Throat:      Mouth: Mucous membranes are moist.

## 2023-09-25 NOTE — PROGRESS NOTES
Pharmacy Note  Warfarin Consult    Andrzej Sanchez is a 80 y.o. male for whom pharmacy has been consulted to manage warfarin therapy. Consulting Physician: Niki Gallardo  Reason for Admission: fall    Warfarin dose prior to admission: unknown  Warfarin indication: afib  Target INR range: 2-3     Past Medical History:   Diagnosis Date    A-fib [facility])                 Recent Labs     09/25/23  0755   INR 1.5     Recent Labs     09/23/23  0546 09/24/23  0450 09/25/23  0755   HGB 13.2 13.6 13.2   HCT 44.2 47.1 44.3   PLT See Reflexed IPF Result See Reflexed IPF Result 169       Current warfarin drug-drug interactions: n/a      Will give 5mg today. If daughter brings in home regimen will adjust based on that thereafter. Daily PT/INR while inpatient. PT/INR ordered to start 9/26. Thank you for the consult. Will continue to follow.     Shadi Quintana, PharmD  9/25/2023  1:06 PM

## 2023-09-25 NOTE — CARE COORDINATION
Transition planning    Call from Genaro Amezquita at [facility] [phone] stating she needs patient's SS #. CM speaks with patient and pt's daughter Meeta Beaulieu at bedside, pt provides SS #. Patient also clarifies that his first choice for SNF is [facility] and second choice is [facility]. Called and updated Nori at [facility] with pt's SS # and that they are pt's second choice, will keep her updated. Called and spoke with Briana Polo at [facility], updated that patient wants referral to [facility] location instead. [facility] Received call from Briana Polo at [facility], she states they are able to accept patient.

## 2023-09-25 NOTE — CONSULTS
[address] [place] [address] Inpatient Brief Diagnostic Assessment Note  ODETTE Abdalla-S   9/25/2023    Devante Dumas  12/2/1931  [id number]      Time spent with Patient: 10 minutes     Pt was provided informed consent for the [address] [place] [address]. Discussed with patient model of service to include the limits of confidentiality (i.e. abuse reporting, suicide intervention, etc.) and short-term intervention focused approach. Pt indicated understanding. This was not a virtual session      Presenting Patient Report:  Patient was screened at the request of the Trauma Team.   Patient presents as a 80 y.o. male subsequent to hospital admission following Fall resulting in injury. Patient was able to complete the following screens: ITSS    Patient denies symptoms at this time and declines mental health interventions. MSE:     Appearance:   Hospital Gown, Good Hygiene, Visible Injuries: Bruises, and Good Eye Contact  Appetite normal  Sleep disturbance No  Fatigue No  Loss of pleasure No  Impulsive behavior No  Speech    spontaneous, normal rate, normal volume, and well articulated  Mood    Euthymic, Hopefull / Future Oriented, and Positive  Affect    Appropriate to Context  Thought Content    intact  Thought Process    linear, goal directed, and coherent  Associations    logical connections  Insight    Fair  Judgment    Intact  Orientation    oriented to person, place, time, and general circumstances  Memory    recent and remote memory intact  Attention/Concentration    intact  Morbid ideation No  Suicide Assessment    no suicidal ideation      Assessment:  Patient denies any previous for depression or anxiety with symptoms being controlled medically  Patient scored low on Injured Trauma Survivor Screen (ITSS). Patient does not meet criteria for depression or anxiety diagnosis at this time. Screening Scale Results (If Any):    ITSS:  Date Screen Completed: 09/25/2023  Patient's score= 0 for PTSD risk. ( ? 2 is positive for PTSD risk. )  Patient's score= 1 for depression risk. ( ? 2 is positive for Depression risk )            Diagnoses: The following Diagnoses are based on currently available information and may change as additional information becomes available. Observation for suspected mental condition, No diagnosis (B28.24) [id number]         Patient Interventions:  Brief Diagnostic Assessment       Recommendations:   Follow up with current mental health provider    Plan:  No plan for bedside follow-up during hospital admission

## 2023-09-25 NOTE — PROGRESS NOTES
SLP ALL NOTES  Facility/Department: [address] ORTHO/MED SURG  Initial Speech/Language/Cognitive Assessment    NAME: Anahi Olivas  : 1931   MRN: [id number]  ADMISSION DATE: 2023  ADMITTING DIAGNOSIS: has Fall from standing, initial encounter; Chronic diastolic heart failure ([address]); Permanent atrial fibrillation ([address]); Anticoagulated on Coumadin; Nonrheumatic aortic valve stenosis; Chronic gout of right foot; Syncope and collapse; Encounter for palliative care; and Goals of care, counseling/discussion on their problem list.    Date of Eval: 2023   Evaluating Therapist: Latrell Blackwood    RECENT RESULTS  CT OF HEAD/MRI: IMPRESSION:  No acute intracranial abnormality. Cerebral atrophy. No acute traumatic injury of the facial bones. Large left infraorbital facial contusion manifested by swelling, a localized  3.6 cm hematoma and extensive subcutaneous fat infiltration. No acute fracture or subluxation of cervical spine. Moderate degenerative  changes. Primary Complaint: Anahi Olivas is a male that presented to the Emergency Department following a fall from standing. Patient states he remembers being in his garage but does not remember what happened after that, states he believes he tripped and was experiencing vertigo. Patient states he takes \"coumadin to thin my blood\". Per EMS patient has history of AFib. Patient states he takes his coumadin in the morning and did take it this morning but is unsure of the dose. States he thinks he took a \"half dose\" today. Pain:  Pain Assessment  Pain Assessment: None - Denies Pain  Pain Level: 0  Patient's Stated Pain Goal: 0 - No pain    Vision/ Hearing  Vision  Vision: Within Functional Limits  Hearing  Hearing: Exceptions to [facility]  Hearing Exceptions: Hard of hearing/hearing concerns    Assessment:    Pt presents with mild cognitive deficits characterized by difficulties with immediate and short-term memory.    Pt. Presents with no

## 2023-09-26 ENCOUNTER — CARE COORDINATION (OUTPATIENT)
Dept: CARE COORDINATION | Age: 88
End: 2023-09-26

## 2023-09-26 VITALS
RESPIRATION RATE: 17 BRPM | SYSTOLIC BLOOD PRESSURE: 146 MMHG | OXYGEN SATURATION: 98 % | HEART RATE: 92 BPM | HEIGHT: 61 IN | BODY MASS INDEX: 30.59 KG/M2 | DIASTOLIC BLOOD PRESSURE: 89 MMHG | WEIGHT: 162.04 LBS | TEMPERATURE: 99 F

## 2023-09-26 LAB
ANION GAP SERPL CALCULATED.3IONS-SCNC: 12 MMOL/L (ref 9–17)
BASOPHILS # BLD: 0 K/UL (ref 0–0.2)
BASOPHILS NFR BLD: 0 % (ref 0–2)
BUN SERPL-MCNC: 13 MG/DL (ref 8–23)
CALCIUM SERPL-MCNC: 7.8 MG/DL (ref 8.6–10.4)
CHLORIDE SERPL-SCNC: 105 MMOL/L (ref 98–107)
CO2 SERPL-SCNC: 21 MMOL/L (ref 20–31)
CREAT SERPL-MCNC: 0.9 MG/DL (ref 0.7–1.2)
EOSINOPHIL # BLD: 0 K/UL (ref 0–0.44)
EOSINOPHILS RELATIVE PERCENT: 0 % (ref 1–4)
ERYTHROCYTE [DISTWIDTH] IN BLOOD BY AUTOMATED COUNT: 15.1 % (ref 11.8–14.4)
GFR SERPL CREATININE-BSD FRML MDRD: >60 ML/MIN/1.73M2
GLUCOSE SERPL-MCNC: 84 MG/DL (ref 70–99)
HCT VFR BLD AUTO: 42.9 % (ref 40.7–50.3)
HGB BLD-MCNC: 12.8 G/DL (ref 13–17)
IMM GRANULOCYTES # BLD AUTO: 0.23 K/UL (ref 0–0.3)
IMM GRANULOCYTES NFR BLD: 2 %
INR PPP: 1.4
LYMPHOCYTES NFR BLD: 0.69 K/UL (ref 1.1–3.7)
LYMPHOCYTES RELATIVE PERCENT: 6 % (ref 24–43)
MCH RBC QN AUTO: 29.4 PG (ref 25.2–33.5)
MCHC RBC AUTO-ENTMCNC: 29.8 G/DL (ref 28.4–34.8)
MCV RBC AUTO: 98.4 FL (ref 82.6–102.9)
MONOCYTES NFR BLD: 3.8 K/UL (ref 0.1–1.2)
MONOCYTES NFR BLD: 33 % (ref 3–12)
MORPHOLOGY: ABNORMAL
NEUTROPHILS NFR BLD: 59 % (ref 36–65)
NEUTS SEG NFR BLD: 6.78 K/UL (ref 1.5–8.1)
NRBC BLD-RTO: 0 PER 100 WBC
PLATELET # BLD AUTO: 168 K/UL (ref 138–453)
PMV BLD AUTO: 11.5 FL (ref 8.1–13.5)
POTASSIUM SERPL-SCNC: 3.8 MMOL/L (ref 3.7–5.3)
PROTHROMBIN TIME: 16.6 SEC (ref 11.7–14.9)
RBC # BLD AUTO: 4.36 M/UL (ref 4.21–5.77)
SODIUM SERPL-SCNC: 138 MMOL/L (ref 135–144)
WBC OTHER # BLD: 11.5 K/UL (ref 3.5–11.3)

## 2023-09-26 PROCEDURE — 97116 GAIT TRAINING THERAPY: CPT

## 2023-09-26 PROCEDURE — 6370000000 HC RX 637 (ALT 250 FOR IP): Performed by: STUDENT IN AN ORGANIZED HEALTH CARE EDUCATION/TRAINING PROGRAM

## 2023-09-26 PROCEDURE — 99239 HOSP IP/OBS DSCHRG MGMT >30: CPT | Performed by: NURSE PRACTITIONER

## 2023-09-26 PROCEDURE — 85610 PROTHROMBIN TIME: CPT

## 2023-09-26 PROCEDURE — 85025 COMPLETE CBC W/AUTO DIFF WBC: CPT

## 2023-09-26 PROCEDURE — 36415 COLL VENOUS BLD VENIPUNCTURE: CPT

## 2023-09-26 PROCEDURE — 97110 THERAPEUTIC EXERCISES: CPT

## 2023-09-26 PROCEDURE — 97530 THERAPEUTIC ACTIVITIES: CPT

## 2023-09-26 PROCEDURE — 80048 BASIC METABOLIC PNL TOTAL CA: CPT

## 2023-09-26 PROCEDURE — 99232 SBSQ HOSP IP/OBS MODERATE 35: CPT | Performed by: SURGERY

## 2023-09-26 RX ORDER — WARFARIN SODIUM 7.5 MG/1
7.5 TABLET ORAL
Status: DISCONTINUED | OUTPATIENT
Start: 2023-09-26 | End: 2023-09-26 | Stop reason: HOSPADM

## 2023-09-26 RX ORDER — GINSENG 100 MG
CAPSULE ORAL
Qty: 1 G | Refills: 0 | DISCHARGE
Start: 2023-09-26 | End: 2023-10-06

## 2023-09-26 RX ADMIN — OXYCODONE HYDROCHLORIDE 2.5 MG: 5 TABLET ORAL at 11:32

## 2023-09-26 RX ADMIN — PREDNISONE 5 MG: 5 TABLET ORAL at 11:33

## 2023-09-26 RX ADMIN — ACETAMINOPHEN 1000 MG: 500 TABLET ORAL at 14:13

## 2023-09-26 RX ADMIN — BACITRACIN: 500 OINTMENT TOPICAL at 11:34

## 2023-09-26 RX ADMIN — METOPROLOL TARTRATE 25 MG: 25 TABLET ORAL at 11:32

## 2023-09-26 RX ADMIN — FUROSEMIDE 20 MG: 20 TABLET ORAL at 11:33

## 2023-09-26 RX ADMIN — DILTIAZEM HYDROCHLORIDE 180 MG: 180 CAPSULE, COATED, EXTENDED RELEASE ORAL at 11:32

## 2023-09-26 RX ADMIN — PANTOPRAZOLE SODIUM 40 MG: 40 TABLET, DELAYED RELEASE ORAL at 06:52

## 2023-09-26 ASSESSMENT — PAIN SCALES - GENERAL
PAINLEVEL_OUTOF10: 7
PAINLEVEL_OUTOF10: 6

## 2023-09-26 NOTE — CODE DOCUMENTATION
Resuscitation/Code Status Note on Devante Dumas (YOB: 1931)    At 11:00 pm on September 23, 2023, resuscitation/code status decision was based on a thorough discussion with the patient. The code status was made DNR-CCA and a form was filled, signed, and placed in chart.      Electronically signed by Elbert Ricks DO on 9/26/23 at 1:02 PM EDT

## 2023-09-26 NOTE — PROGRESS NOTES
Pharmacy Note  Warfarin Consult follow-up      Recent Labs     09/26/23  0626   INR 1.4     Recent Labs     09/24/23  0450 09/25/23  0755 09/26/23  0626   HGB 13.6 13.2 12.8*   HCT 47.1 44.3 42.9   PLT See Reflexed IPF Result 169 [id number]       Significant Drug-Drug Interactions:  New warfarin drug-drug interactions:   Discontinued drug-drug interactions:       Notes: INR = 1.4 today. 5 mg of Coumadin given on 9/25. Will give 7.5 mg today. Daily PT/INR while inpatient.  Will continue to follow    [facility] BOOTH.  9/26/2023  12:12 PM

## 2023-09-26 NOTE — CARE COORDINATION
Transition planning  Discharge order noted, called and spoke with Susan Reeder at [facility], she states they are able to accept patient today. Transportation requested per [facility] network. [address]  and spoke with Cher at [facility] network,she states transportation is set for 2:00 pm today. Updated Susan Reeder at [facility], patient and daughter at bedside and pt's RN. Report # [phone], ask for 3rd floor  Fax # [phone]    Faxed H&P and AVS to [facility] at [phone].

## 2023-09-26 NOTE — PLAN OF CARE
Problem: Discharge Planning  Goal: Discharge to home or other facility with appropriate resources  9/26/2023 1443 by Ingris Buchanan RN  Outcome: Completed  9/26/2023 0615 by Nissa Rivera RN  Outcome: Progressing     Problem: Skin/Tissue Integrity  Goal: Absence of new skin breakdown  Description: 1. Monitor for areas of redness and/or skin breakdown  2. Assess vascular access sites hourly  3. Every 4-6 hours minimum:  Change oxygen saturation probe site  4. Every 4-6 hours:  If on nasal continuous positive airway pressure, respiratory therapy assess nares and determine need for appliance change or resting period.   9/26/2023 1443 by Ingris Buchanan RN  Outcome: Completed  9/26/2023 0615 by Nissa Rivera RN  Outcome: Progressing     Problem: Safety - Adult  Goal: Free from fall injury  9/26/2023 1443 by Ingris Buchanan RN  Outcome: Completed  9/26/2023 0615 by Nissa Rivera RN  Outcome: Progressing Self

## 2023-09-26 NOTE — PROGRESS NOTES
Physical Therapy  Facility/Department: [address] ORTHO/MED SURG  Physical Therapy daily treatment note    Name: Kari Grubbs  : 1931  MRN: [id number]  Date of Service: 2023    Discharge Recommendations:  Patient would benefit from continued therapy after discharge   PT Equipment Recommendations  Equipment Needed: No      Patient Diagnosis(es): The primary encounter diagnosis was Syncope and collapse. Diagnoses of Facial hematoma, initial encounter and Skin abrasion were also pertinent to this visit. Past Medical History:  has a past medical history of A-fib ([address]). Past Surgical History:  has no past surgical history on file. Assessment   Body Structures, Functions, Activity Limitations Requiring Skilled Therapeutic Intervention: Decreased functional mobility ; Decreased strength;Decreased endurance  Assessment: Pt amb 40 ft wtih RW and CGA. Limited by increased pain with mobility, decreased endurance and balance. Pt high fall risk and unsafe to return to prior living arrangments. Recommend continued PT after d/c to address deficits  Therapy Prognosis: Good  Activity Tolerance  Activity Tolerance: Patient limited by endurance; Patient limited by pain     Plan   Physcial Therapy Plan  General Plan:  (5-6x)  Current Treatment Recommendations: Strengthening, Functional mobility training, Endurance training, Stair training, Gait training, Safety education & training, Therapeutic activities  Safety Devices  Type of Devices: Call light within reach, Patient at risk for falls, Gait belt, Nurse notified, Chair alarm in place, Left in chair  Restraints  Restraints Initially in Place: No     Restrictions  Restrictions/Precautions  Restrictions/Precautions: Fall Risk  Required Braces or Orthoses?: No  Position Activity Restriction  Other position/activity restrictions: up with assist     Subjective   General  Patient assessed for rehabilitation services?: Yes  Response To Previous Treatment: Patient with no The right DP pulse was 1+.   [facility]

## 2023-09-27 ENCOUNTER — APPOINTMENT (OUTPATIENT)
Dept: PHYSICAL THERAPY | Age: 88
End: 2023-09-27
Payer: MEDICARE

## 2023-09-27 NOTE — PROGRESS NOTES
[place] [facility]) Inpatient Discharge Summary  AVIVA Tyler  9/27/2023        Monika ENG [facility]  12/2/1931  [id number]    Date & Time of Discharge: 9/26/2023  2:49 PM       Services provided:  Screenings: ITSS    Screen Results (If any):      ITSS:  Date Screen Completed: 09/25/2023  Patient's score= 0 for PTSD risk. ( ? 2 is positive for PTSD risk. )  Patient's score= 1 for depression risk. ( ? 2 is positive for Depression risk )      Discharge Recommendations: Follow up with current mental health provider      The therapist may be reached through the [address] [place] [address] offices at [phone].

## 2023-09-28 ENCOUNTER — APPOINTMENT (OUTPATIENT)
Dept: PHYSICAL THERAPY | Age: 88
End: 2023-09-28
Payer: MEDICARE

## 2023-10-04 ENCOUNTER — TELEPHONE (OUTPATIENT)
Dept: FAMILY MEDICINE CLINIC | Age: 88
End: 2023-10-04

## 2023-10-04 ENCOUNTER — CARE COORDINATION (OUTPATIENT)
Dept: CARE COORDINATION | Age: 88
End: 2023-10-04

## 2023-10-04 DIAGNOSIS — E55.9 VITAMIN D DEFICIENCY: Primary | ICD-10-CM

## 2023-10-04 NOTE — TELEPHONE ENCOUNTER
Daughter called requesting to speak with Debra Cousins. Pt currently in [place]. Daughter is stating that pt's attitude has changed and she is worried. Daughter is worried because the hospital has stopped many medications. She is asking if pt should have stopped Diltiazem 180mg, allopurinol 100mg, Lisinopril 10mg, mirtazapine 30mg as according to the hospital. Advised daughter I will ask PCP and we will call her back sometime tomorrow. She states understanding.  She asked that PCP look at pictures taken in the hospital.

## 2023-10-05 NOTE — CARE COORDINATION
improved functional mobiltiy and stabiltiy cuing for increased ROM with fair carryover; pt defers out of bed exercises and standing due to gout and pain in feet, alerted nurse.  PT making minimal progress       SW/Discharge Planning   Anticipated date for discharge: 10/20/23

## 2023-10-05 NOTE — TELEPHONE ENCOUNTER
I am not sure what his BP's have been running, so that may be why they did not resume the Lisinopril and Cardizem? ?  I would start him back on the Allopurinol 100mg daily and the Mirtazapine 30mg daily, but I cannot give the order to resume. She can discuss this with the house doctor.

## 2023-10-06 NOTE — TELEPHONE ENCOUNTER
Attempted again to reach the patients daughter, no answer and mailbox is full so unable to leave a VM.

## 2023-10-09 ENCOUNTER — CARE COORDINATION (OUTPATIENT)
Dept: CARE COORDINATION | Age: 88
End: 2023-10-09

## 2023-10-10 ENCOUNTER — TELEPHONE (OUTPATIENT)
Dept: FAMILY MEDICINE CLINIC | Age: 88
End: 2023-10-10

## 2023-10-10 DIAGNOSIS — E55.9 VITAMIN D DEFICIENCY: Primary | ICD-10-CM

## 2023-10-10 NOTE — TELEPHONE ENCOUNTER
Patients daughter Walt Black had called last week to discuss his medications after the patient was DC'd [facility] and admitted to [facility] CARE AT [facility]. I attempted to call her back X's 4 but she didn't answer and her VM is broken. She said she didn't recognize the phone number so she didn't answer. She said there is a CNP there and she is trying to order medications and I told her that is fine, she is allowed to do so. She said they are having their first care conference today at 3pm and she will know better what they are planning to do. She is asking about getting care set up for the patient once he is DC'd from there and I told her I will reach out to [address] who is the patients CC that she has talked to previously and have her give the daughter a call. I sent an E-mail to [employer].

## 2023-10-10 NOTE — CARE COORDINATION
Care Transitions Post-Acute Facility Update Call    10/5/2023    Patient: Meri Plata Patient : 1931   MRN: [id number]  Reason for Admission:  fall risk, afib/recent hx of dizziness/low BP's, [place]. large hematoma-face. R hand skin tear- thenar space  Discharge Date: 23 RARS: Readmission Risk Score: 14    Email sent to snf regarding notes / Nick Ruffin. Appears patient was to restart despite d/c summary states stopping medication. Care Transitions Post Acute Facility Update    Care Transitions Interventions                    Disease Association: Completed            Post Acute Facility Update   Post Acute Facility: [facility]    ELOS: 21 days      Nursing    Pt is on Cardizem 180mg daily       Prescribed diet: SHERLYN (No Added Salt) diet, Regular texture, Thin consistency    Wounds: Pos   Wound Location: Left elbow   Wound Care Therapies: cleanse with dermal wound cleanser, pat dry, apply adaptic, wrap with kerlix and secure w/ tape. Change Q 3 dasy & PRN loose/soiled   Skilled Medication therapies: PT /OT   Reported Nursing Updates:  / 77 T 96.9 P 100 SP02 98% .6 LBS RESP 18     Follow-Up Appointment: PLASTIC SURGERY/ WOUND RE CHECK Provider Name/Specialty: DR. George Meek Date: 10/05 Time: 10:00AM Transportation Method: TLC P/U @ 9:00am CONF #: [id number] Office Location: [address] [place] [address] Phone Number: [phone]       Rehab/Functional   Cognitive function assessment: A&O X 3   ADLs: Minimal Assistance   Bed Mobility: Minimal Assistance   Transfer Assistance: Contact Guard Assist - Hands on patient for balance   Ambulation Assistance: Contact Guard Assist - Hands on patient for balance   How far (in feet) is the patient ambulating?: 190   Does patient use an assistive device?:  (Comment: FWW)   Rehab Notes: 10/10    PT- Resident is working on ambulation, transfers, and bed mobility. Resident is walking 110 feet with 190 feet with FWW at [facility].  Resident needed cues on

## 2023-10-17 ENCOUNTER — CARE COORDINATION (OUTPATIENT)
Dept: CARE COORDINATION | Age: 88
End: 2023-10-17

## 2023-10-18 NOTE — CARE COORDINATION
Care Transitions Post-Acute Facility Update Call    10/5/2023    Patient: Maritza Orellana Patient : 1931   MRN: [id number]  Reason for Admission:  fall risk, afib/recent hx of dizziness/low BP's, [place]. large hematoma-face. R hand skin tear- thenar space  Discharge Date: 23 RARS: Readmission Risk Score: 14    Care Transitions Post Acute Facility Update    Care Transitions Interventions                    Disease Association: Completed            Post Acute Facility Update   Post Acute Facility: [facility]    ELOS: 21 days      Nursing  Pt is on Cardizem 180mg daily     Prescribed diet: SHERLYN (No Added Salt) diet, Regular texture, Thin consistency    Wounds: Pos   Wound Location: Left elbow   Wound Care Therapies: cleanse with dermal wound cleanser, pat dry, apply adaptic, wrap with kerlix and secure w/ tape. Change Q 3 dasy & PRN loose/soiled   Skilled Medication therapies: PT /OT   Reported Nursing Updates:  /79 T 96.9 P 68 SP02 98% .2 LBS RESP 18     Follow-Up Appointment: PLASTIC SURGERY/ WOUND RE CHECK Provider Name/Specialty: DR. Dorina Cano Date: 10/05 Time: 10:00AM Transportation Method: TLC P/U @ 9:00am CONF #: [id number] Office Location: [address] [place] [address] Phone Number: [phone]       Rehab/Functional   Cognitive function assessment: A&O X 3   ADLs: Minimal Assistance   Bed Mobility: Minimal Assistance   Transfer Assistance: Contact Guard Assist - Hands on patient for balance   Ambulation Assistance: Contact Guard Assist - Hands on patient for balance   How far (in feet) is the patient ambulating?: 110   Does patient use an assistive device?:  (Comment: FWW)   Rehab Notes:         update:  Went over discharge planning and explained insurance reviews. Resident lives in a one story home with 2 TIERRA. Medicare vs Medicare Advantage plans were discussed and patient and family were educated on the differences.     Home safety valeria DOWNS   SW/Discharge Planning:

## 2023-10-24 ENCOUNTER — CARE COORDINATION (OUTPATIENT)
Dept: CARE COORDINATION | Age: 88
End: 2023-10-24

## 2023-10-25 ENCOUNTER — TELEPHONE (OUTPATIENT)
Dept: FAMILY MEDICINE CLINIC | Age: 88
End: 2023-10-25

## 2023-10-25 DIAGNOSIS — M10.9 GOUT, UNSPECIFIED CAUSE, UNSPECIFIED CHRONICITY, UNSPECIFIED SITE: Primary | ICD-10-CM

## 2023-10-25 DIAGNOSIS — M54.31 RIGHT SIDED SCIATICA: ICD-10-CM

## 2023-10-25 RX ORDER — PREDNISONE 20 MG/1
TABLET ORAL
Qty: 17 TABLET | Refills: 0 | Status: SHIPPED | OUTPATIENT
Start: 2023-10-25 | End: 2023-11-04

## 2023-10-25 NOTE — TELEPHONE ENCOUNTER
Sent over prednisone taper dose, have him stop the 5 mg while on taper dose and then start it back up when done

## 2023-10-25 NOTE — CARE COORDINATION
Care Transitions Post-Acute Facility Update Call    10/5/2023    Patient: Cecilia Coyne Patient : 1931   MRN: [id number]  Reason for Admission:  fall risk, afib/recent hx of dizziness/low BP's, [place]. large hematoma-face. R hand skin tear- thenar space  Discharge Date: 23 RARS: Readmission Risk Score: 14    Pt is discharging home today 10/25    Home with [address] [facility] PT / OT / Home health ordered     Care Transitions Post Acute Facility Update    Care Transitions Interventions                    Disease Association: Completed            Post Acute Facility Update   Post Acute Facility: [facility]    ELOS: 21 days      Nursing  Pt is on Cardizem 180mg daily     Prescribed diet: SHERLYN (No Added Salt) diet, Regular texture, Thin consistency    Wounds: Pos   Wound Location: Left elbow   Wound Care Therapies: cleanse with dermal wound cleanser, pat dry, apply adaptic, wrap with kerlix and secure w/ tape.  Change Q 3 dasy & PRN loose/soiled   Skilled Medication therapies: PT /OT   Reported Nursing Updates:  /85 T 96.9 P 55 SP02 98% .2 LBS RESP 18          Rehab/Functional   Cognitive function assessment: A&O X 3   ADLs: set up    Bed Mobility: Minimal Assistance   Transfer Assistance: Contact Guard Assist - Hands on patient for balance   Ambulation Assistance: Contact Guard Assist - Hands on patient for balance   How far (in feet) is the patient ambulating?: 220   Does patient use an assistive device?:  (Comment: FWW)   Rehab Notes:         update:   Home safety valeria TBD   SW/Discharge Planning: Home with home health services   Anticipated date for discharge: 10/25/23     Ann Gary  [facility] Acute Care Manager [phone]

## 2023-10-25 NOTE — TELEPHONE ENCOUNTER
Care Transitions Initial Follow Up Call    Outreach made within 2 business days of discharge: Yes    Patient: Marimar Nash Patient : 1931   MRN: [id number]  Reason for Admission: There are no discharge diagnoses documented for the most recent discharge. Discharge Date: 23       Spoke with: Daughter     Discharge department/facility: [facility] Patient Contact:  Was patient able to fill all prescriptions: Yes  Was patient instructed to bring all medications to the follow-up visit: Yes  Is patient taking all medications as directed in the discharge summary? Yes  Does patient understand their discharge instructions: Yes  Does patient have questions or concerns that need addressed prior to 7-14 day follow up office visit: yes -     Daughter is concerned about pt having bilateral foot swelling. She states that he has had this for a few weeks now and this has happened before and PCP usually gives him prednisone for a gout flare up. Advised daughter that Uric acid from 10/21/23 is normal but will inform covering providers and see what they say. She states understanding.     Scheduled appointment with PCP within 7-14 days    Follow Up  Future Appointments   Date Time Provider [address] [place] [address]   2023  2:30 PM Adriana Vickers MD [facility]   2023  2:30 PM Stephane Mckenzie  [address] Katha [place], [place]

## 2023-10-25 NOTE — TELEPHONE ENCOUNTER
Can we verify with dtr about the swelling, looks like he was seen by cardiology today and their note says no swelling. Is the swelling to legs, feet, toes?  I can see this week if needed

## 2023-10-25 NOTE — TELEPHONE ENCOUNTER
Spelling in top of foot into legs. Painful to touch, pain in RT leg to walk. Toes are normal. Painful with taking socks off/ touching skin. Daughter states that previously his uric acid was not elevated when he had a gout attack, he was seen at the ER and they had to remove fluid for them to be able to see the crystals and informed them not to wait so long next time. Every time this has happened by improves with prednisone. Daughter explained that pt states that Cardiologist did look at his left leg and did not make any remarks about swelling.

## 2023-10-26 ENCOUNTER — CARE COORDINATION (OUTPATIENT)
Dept: CARE COORDINATION | Age: 88
End: 2023-10-26

## 2023-10-26 NOTE — CARE COORDINATION
Planning:   Does patient have an Advance Directive: reviewed and current. Medication reconciliation was performed with family, who verbalizes understanding of administration of home medications. Medications reviewed, 1111F entered: no    Was patient discharged with a pulse oximeter? no    Non-face-to-face services provided:  Education of patient/family/caregiver/guardian to support self-management-completed    Offered patient enrollment in the Remote Patient Monitoring (RPM) program for in-home monitoring: NA.    Care Transitions 24 Hour Call    Do you have any ongoing symptoms?: No  Do you have all of your prescriptions and are they filled?: Yes  Were you discharged with any Home Care or Post Acute Services: Yes  Post Acute Services: Home Health (Comment: [facility] [address])  Do you have support at home?: Alone  Are you an active caregiver in your home?: No  Care Transitions Interventions                    Disease Association: Completed               Discussed follow-up appointments. If no appointment was previously scheduled, appointment scheduling offered: Yes. Is follow up appointment scheduled within 7 days of discharge? No.    Follow Up  Future Appointments   Date Time Provider [address] [place] [address] Ct   11/7/2023  2:30 PM Nanda Coreas MD [facility]   12/13/2023  2:30 PM Ileana Patricia  [facility] [address] Acute Care Manager provided contact information. Plan for follow-up call in 5-7 days based on severity of symptoms and risk factors.   Plan for next call: follow-up appointment-PCP appt 11/7 aware of d/c  community resources-Home health with PT /OT / HHA  referral to ambulatory care manager-today    Ryan Chaves RN

## 2023-10-31 ENCOUNTER — HOSPITAL ENCOUNTER (OUTPATIENT)
Age: 88
Setting detail: SPECIMEN
Discharge: HOME | End: 2023-10-31

## 2023-10-31 DIAGNOSIS — E53.8 B12 DEFICIENCY: ICD-10-CM

## 2023-10-31 DIAGNOSIS — E55.9 VITAMIN D DEFICIENCY: ICD-10-CM

## 2023-10-31 DIAGNOSIS — E78.00 PURE HYPERCHOLESTEROLEMIA: ICD-10-CM

## 2023-10-31 DIAGNOSIS — I10 ESSENTIAL HYPERTENSION: ICD-10-CM

## 2023-10-31 DIAGNOSIS — M10.9 GOUT, UNSPECIFIED CAUSE, UNSPECIFIED CHRONICITY, UNSPECIFIED SITE: ICD-10-CM

## 2023-10-31 LAB
ALBUMIN SERPL-MCNC: 3.3 G/DL (ref 3.5–5.2)
ALBUMIN/GLOB SERPL: 1.2 {RATIO} (ref 1–2.5)
ALP SERPL-CCNC: 87 U/L (ref 40–129)
ALT SERPL-CCNC: 27 U/L (ref 5–41)
ANION GAP SERPL CALCULATED.3IONS-SCNC: 10 MMOL/L (ref 9–17)
AST SERPL-CCNC: 23 U/L
BILIRUB SERPL-MCNC: 0.9 MG/DL (ref 0.3–1.2)
BUN SERPL-MCNC: 20 MG/DL (ref 8–23)
CALCIUM SERPL-MCNC: 9.2 MG/DL (ref 8.6–10.4)
CHLORIDE SERPL-SCNC: 96 MMOL/L (ref 98–107)
CHOLEST SERPL-MCNC: 164 MG/DL
CHOLESTEROL/HDL RATIO: 3.2
CO2 SERPL-SCNC: 29 MMOL/L (ref 20–31)
CREAT SERPL-MCNC: 1.1 MG/DL (ref 0.7–1.2)
ERYTHROCYTE [DISTWIDTH] IN BLOOD BY AUTOMATED COUNT: 15.7 % (ref 11.8–14.4)
GFR SERPL CREATININE-BSD FRML MDRD: >60 ML/MIN/1.73M2
GLUCOSE SERPL-MCNC: 69 MG/DL (ref 70–99)
HCT VFR BLD AUTO: 46.4 % (ref 40.7–50.3)
HDLC SERPL-MCNC: 52 MG/DL
HGB BLD-MCNC: 13.8 G/DL (ref 13–17)
LDLC SERPL CALC-MCNC: 89 MG/DL (ref 0–130)
MCH RBC QN AUTO: 27.7 PG (ref 25.2–33.5)
MCHC RBC AUTO-ENTMCNC: 29.7 G/DL (ref 28.4–34.8)
MCV RBC AUTO: 93.2 FL (ref 82.6–102.9)
NRBC BLD-RTO: 0 PER 100 WBC
PLATELET # BLD AUTO: 181 K/UL (ref 138–453)
PMV BLD AUTO: 11.6 FL (ref 8.1–13.5)
POTASSIUM SERPL-SCNC: 4.3 MMOL/L (ref 3.7–5.3)
PROT SERPL-MCNC: 6 G/DL (ref 6.4–8.3)
RBC # BLD AUTO: 4.98 M/UL (ref 4.21–5.77)
SODIUM SERPL-SCNC: 135 MMOL/L (ref 135–144)
TRIGL SERPL-MCNC: 115 MG/DL
URATE SERPL-MCNC: 4.7 MG/DL (ref 3.4–7)
WBC OTHER # BLD: 12.5 K/UL (ref 3.5–11.3)

## 2023-11-01 LAB
25(OH)D3 SERPL-MCNC: 65.3 NG/ML
VIT B12 SERPL-MCNC: 374 PG/ML (ref 232–1245)

## 2023-11-07 ENCOUNTER — OFFICE VISIT (OUTPATIENT)
Dept: FAMILY MEDICINE CLINIC | Age: 88
End: 2023-11-07

## 2023-11-07 VITALS
BODY MASS INDEX: 29.64 KG/M2 | RESPIRATION RATE: 16 BRPM | WEIGHT: 157 LBS | OXYGEN SATURATION: 93 % | HEART RATE: 62 BPM | TEMPERATURE: 98.6 F | SYSTOLIC BLOOD PRESSURE: 106 MMHG | HEIGHT: 61 IN | DIASTOLIC BLOOD PRESSURE: 66 MMHG

## 2023-11-07 DIAGNOSIS — Z91.81 STATUS POST FALL: Primary | ICD-10-CM

## 2023-11-07 DIAGNOSIS — D50.9 IRON DEFICIENCY ANEMIA, UNSPECIFIED IRON DEFICIENCY ANEMIA TYPE: ICD-10-CM

## 2023-11-07 DIAGNOSIS — E78.00 PURE HYPERCHOLESTEROLEMIA: ICD-10-CM

## 2023-11-07 DIAGNOSIS — I48.21 PERMANENT ATRIAL FIBRILLATION (HCC): ICD-10-CM

## 2023-11-07 DIAGNOSIS — E87.1 HYPONATREMIA: ICD-10-CM

## 2023-11-07 DIAGNOSIS — Z85.46 HISTORY OF PROSTATE CANCER: ICD-10-CM

## 2023-11-07 DIAGNOSIS — S00.83XA FACIAL HEMATOMA, INITIAL ENCOUNTER: ICD-10-CM

## 2023-11-07 DIAGNOSIS — I50.22 CHRONIC SYSTOLIC CONGESTIVE HEART FAILURE (HCC): ICD-10-CM

## 2023-11-07 DIAGNOSIS — M51.36 DDD (DEGENERATIVE DISC DISEASE), LUMBAR: ICD-10-CM

## 2023-11-07 DIAGNOSIS — E53.8 B12 DEFICIENCY: ICD-10-CM

## 2023-11-07 DIAGNOSIS — Z23 NEED FOR VACCINATION: ICD-10-CM

## 2023-11-07 DIAGNOSIS — S00.83XA CONTUSION OF FACE, INITIAL ENCOUNTER: ICD-10-CM

## 2023-11-07 DIAGNOSIS — I10 ESSENTIAL HYPERTENSION: ICD-10-CM

## 2023-11-07 DIAGNOSIS — F41.9 ANXIETY: ICD-10-CM

## 2023-11-07 DIAGNOSIS — M50.30 DDD (DEGENERATIVE DISC DISEASE), CERVICAL: ICD-10-CM

## 2023-11-07 DIAGNOSIS — K21.9 GASTROESOPHAGEAL REFLUX DISEASE WITHOUT ESOPHAGITIS: ICD-10-CM

## 2023-11-07 DIAGNOSIS — M10.9 GOUT, UNSPECIFIED CAUSE, UNSPECIFIED CHRONICITY, UNSPECIFIED SITE: ICD-10-CM

## 2023-11-07 DIAGNOSIS — M19.90 OSTEOARTHRITIS, UNSPECIFIED OSTEOARTHRITIS TYPE, UNSPECIFIED SITE: ICD-10-CM

## 2023-11-07 DIAGNOSIS — R74.8 ELEVATED LIVER ENZYMES: ICD-10-CM

## 2023-11-07 DIAGNOSIS — F32.A DEPRESSION, UNSPECIFIED DEPRESSION TYPE: ICD-10-CM

## 2023-11-07 RX ORDER — ALLOPURINOL 100 MG/1
100 TABLET ORAL DAILY
Qty: 90 TABLET | Refills: 3 | Status: SHIPPED | OUTPATIENT
Start: 2023-11-07

## 2023-11-07 RX ORDER — POTASSIUM CHLORIDE 1.5 G/1.58G
20 POWDER, FOR SOLUTION ORAL DAILY
COMMUNITY
End: 2023-11-07

## 2023-11-07 ASSESSMENT — ENCOUNTER SYMPTOMS
ABDOMINAL DISTENTION: 0
NAUSEA: 0
CHEST TIGHTNESS: 0
COUGH: 0
CONSTIPATION: 0
BACK PAIN: 1
RHINORRHEA: 0
VOMITING: 0
ABDOMINAL PAIN: 0
DIARRHEA: 0
SHORTNESS OF BREATH: 0
SORE THROAT: 0

## 2023-11-07 ASSESSMENT — PATIENT HEALTH QUESTIONNAIRE - PHQ9
SUM OF ALL RESPONSES TO PHQ QUESTIONS 1-9: 2
SUM OF ALL RESPONSES TO PHQ9 QUESTIONS 1 & 2: 2
SUM OF ALL RESPONSES TO PHQ QUESTIONS 1-9: 2
1. LITTLE INTEREST OR PLEASURE IN DOING THINGS: 1
2. FEELING DOWN, DEPRESSED OR HOPELESS: 1

## 2023-11-07 NOTE — PROGRESS NOTES
Vaccine Information Sheet, \"Influenza - Inactivated\"  given to Constance Hutchinson, or parent/legal guardian of  Constance Hutchisnon and verbalized understanding. Patient responses:    Have you ever had a reaction to a flu vaccine? No  Are you able to eat eggs without adverse effects? Yes  Do you have any current illness? No  Have you ever had Guillian [place] Syndrome? No    Flu vaccine given per order. Please see immunization tab.
distension. Palpations: Abdomen is soft. There is no mass. Tenderness: There is no abdominal tenderness. Musculoskeletal:         General: No swelling or tenderness. Normal range of motion. Cervical back: Normal range of motion. Lymphadenopathy:      Cervical: No cervical adenopathy. Skin:     General: Skin is warm. Findings: No rash. Neurological:      Mental Status: He is alert and oriented to person, place, and time. Deep Tendon Reflexes: Reflexes are normal and symmetric. Psychiatric:         Mood and Affect: Mood normal.         Behavior: Behavior normal.         Assessment:      Diagnosis Orders   1. Status post fall        2. Need for vaccination  Influenza, FLUAD, (age 72 y+), IM, Preservative Free, 0.5 mL      3. Facial hematoma, initial encounter        4. Contusion of face, initial encounter        5. Essential hypertension  Basic Metabolic Panel      6. Pure hypercholesterolemia        7. Permanent atrial fibrillation ([address])        8. Chronic systolic congestive heart failure ([address])        9. Iron deficiency anemia, unspecified iron deficiency anemia type        10. B12 deficiency        11. Elevated liver enzymes      stable and improved      12. Gout, unspecified cause, unspecified chronicity, unspecified site  allopurinol (ZYLOPRIM) 100 MG tablet      13. Hyponatremia      improved      14. Osteoarthritis, unspecified osteoarthritis type, unspecified site        15. DDD (degenerative disc disease), cervical        16. DDD (degenerative disc disease), lumbar        17. Depression, unspecified depression type        18. Anxiety        19. History of prostate cancer        20.  Gastroesophageal reflux disease without esophagitis              Plan:     Orders Placed This Encounter   Procedures    Influenza, FLUAD, (age 72 y+), IM, Preservative Free, 0.5 mL    Basic Metabolic Panel     Standing Status:   Future     Standing Expiration Date:   11/7/2024      Orders Placed

## 2023-11-08 DIAGNOSIS — E55.9 VITAMIN D DEFICIENCY: ICD-10-CM

## 2023-11-09 RX ORDER — ERGOCALCIFEROL 1.25 MG/1
CAPSULE ORAL
Qty: 12 CAPSULE | Refills: 0 | OUTPATIENT
Start: 2023-11-09

## 2023-11-10 ENCOUNTER — CARE COORDINATION (OUTPATIENT)
Dept: CARE COORDINATION | Age: 88
End: 2023-11-10

## 2023-11-10 NOTE — CARE COORDINATION
Ambulatory Care Coordination Note  11/10/2023    Patient Current Location:  Home: [address]  [place] [address]     ACM contacted the patient by telephone. Verified name and  with patient as identifiers. Provided introduction to self, and explanation of the ACM role. Challenges to be reviewed by the provider   Additional needs identified to be addressed with provider: No  none               Method of communication with provider: none. ACM: Elvis Pantoja RN    Talked to patient, reviewed ACM role and care management. Reports, he feels a lot stronger and cancelled the rest of his home care visits. He's exercising at home, and goes to [employer] every day to walk around. No recent falls. Reports, he's doing well and does not have any questions or needs. Declines further calls    Offered patient enrollment in the Remote Patient Monitoring (RPM) program for in-home monitoring: Patient declined.       Future Appointments   Date Time Provider [address]   2023  2:00 PM Precious Marquez MD [facility]   2023  2:30 PM Renata Victor, [address] [place] [address]

## 2023-11-11 DIAGNOSIS — E55.9 VITAMIN D DEFICIENCY: ICD-10-CM

## 2023-11-13 RX ORDER — ERGOCALCIFEROL 1.25 MG/1
CAPSULE ORAL
Qty: 12 CAPSULE | Refills: 0 | OUTPATIENT
Start: 2023-11-13

## 2023-11-16 ENCOUNTER — TELEPHONE (OUTPATIENT)
Dept: FAMILY MEDICINE CLINIC | Age: 88
End: 2023-11-16

## 2023-11-16 DIAGNOSIS — S00.83XA FACIAL HEMATOMA, INITIAL ENCOUNTER: Primary | ICD-10-CM

## 2023-11-16 RX ORDER — TRAMADOL HYDROCHLORIDE 50 MG/1
50 TABLET ORAL EVERY 8 HOURS PRN
Qty: 28 TABLET | Refills: 0 | Status: SHIPPED | OUTPATIENT
Start: 2023-11-16 | End: 2023-11-21 | Stop reason: SDUPTHER

## 2023-11-16 NOTE — TELEPHONE ENCOUNTER
Patient was prescribe prednisone for pain from a fall he had a month ago. He finished the prescription but is now having a lot of pain and would like to know if there is a substitute he can take for pain.

## 2023-11-17 ENCOUNTER — APPOINTMENT (OUTPATIENT)
Dept: GENERAL RADIOLOGY | Age: 88
DRG: 552 | End: 2023-11-17
Payer: MEDICARE

## 2023-11-17 ENCOUNTER — HOSPITAL ENCOUNTER (EMERGENCY)
Age: 88
Discharge: ANOTHER HOSPITAL | DRG: 552 | End: 2023-11-18
Attending: EMERGENCY MEDICINE
Payer: MEDICARE

## 2023-11-17 ENCOUNTER — TELEPHONE (OUTPATIENT)
Dept: FAMILY MEDICINE CLINIC | Age: 88
End: 2023-11-17

## 2023-11-17 ENCOUNTER — APPOINTMENT (OUTPATIENT)
Dept: CT IMAGING | Age: 88
DRG: 552 | End: 2023-11-17
Payer: MEDICARE

## 2023-11-17 DIAGNOSIS — R29.898 LEFT LEG WEAKNESS: Primary | ICD-10-CM

## 2023-11-17 DIAGNOSIS — M54.2 NECK PAIN ON RIGHT SIDE: ICD-10-CM

## 2023-11-17 LAB
ANION GAP SERPL CALCULATED.3IONS-SCNC: 8 MMOL/L (ref 9–17)
BACTERIA URNS QL MICRO: ABNORMAL
BASOPHILS # BLD: 0 K/UL (ref 0–0.2)
BASOPHILS NFR BLD: 0 % (ref 0–2)
BILIRUB UR QL STRIP: NEGATIVE
BNP SERPL-MCNC: 7448 PG/ML
BUN SERPL-MCNC: 25 MG/DL (ref 8–23)
CALCIUM SERPL-MCNC: 8.8 MG/DL (ref 8.6–10.4)
CASTS #/AREA URNS LPF: ABNORMAL /LPF
CHLORIDE SERPL-SCNC: 100 MMOL/L (ref 98–107)
CLARITY UR: CLEAR
CO2 SERPL-SCNC: 28 MMOL/L (ref 20–31)
COLOR UR: YELLOW
CREAT SERPL-MCNC: 1.2 MG/DL (ref 0.7–1.2)
EKG ATRIAL RATE: 0 BPM
EKG ATRIAL RATE: 115 BPM
EKG Q-T INTERVAL: 0 MS
EKG Q-T INTERVAL: 336 MS
EKG QRS DURATION: 0 MS
EKG QRS DURATION: 84 MS
EKG QTC CALCULATION (BAZETT): 0 MS
EKG QTC CALCULATION (BAZETT): 437 MS
EKG R AXIS: 0 DEGREES
EKG R AXIS: 42 DEGREES
EKG T AXIS: 0 DEGREES
EKG T AXIS: 171 DEGREES
EKG VENTRICULAR RATE: 0 BPM
EKG VENTRICULAR RATE: 102 BPM
EOSINOPHIL # BLD: 0 K/UL (ref 0–0.4)
EOSINOPHILS RELATIVE PERCENT: 0 % (ref 0–4)
EPI CELLS #/AREA URNS HPF: ABNORMAL /HPF
ERYTHROCYTE [DISTWIDTH] IN BLOOD BY AUTOMATED COUNT: 17.2 % (ref 11.5–14.9)
FLUAV RNA RESP QL NAA+PROBE: NOT DETECTED
FLUBV RNA RESP QL NAA+PROBE: NOT DETECTED
GFR SERPL CREATININE-BSD FRML MDRD: 57 ML/MIN/1.73M2
GLUCOSE SERPL-MCNC: 102 MG/DL (ref 70–99)
GLUCOSE UR STRIP-MCNC: NEGATIVE MG/DL
HCT VFR BLD AUTO: 38.2 % (ref 41–53)
HGB BLD-MCNC: 12.2 G/DL (ref 13.5–17.5)
HGB UR QL STRIP.AUTO: ABNORMAL
KETONES UR STRIP-MCNC: ABNORMAL MG/DL
LEUKOCYTE ESTERASE UR QL STRIP: NEGATIVE
LYMPHOCYTES NFR BLD: 0.2 K/UL (ref 1–4.8)
LYMPHOCYTES RELATIVE PERCENT: 2 % (ref 24–44)
MCH RBC QN AUTO: 28.1 PG (ref 26–34)
MCHC RBC AUTO-ENTMCNC: 31.9 G/DL (ref 31–37)
MCV RBC AUTO: 88 FL (ref 80–100)
MONOCYTES NFR BLD: 2.32 K/UL (ref 0.1–1.3)
MONOCYTES NFR BLD: 23 % (ref 1–7)
MORPHOLOGY: ABNORMAL
NEUTROPHILS NFR BLD: 75 % (ref 36–66)
NEUTS SEG NFR BLD: 7.58 K/UL (ref 1.3–9.1)
NITRITE UR QL STRIP: NEGATIVE
PH UR STRIP: 6.5 [PH] (ref 5–8)
PLATELET # BLD AUTO: 105 K/UL (ref 150–450)
PMV BLD AUTO: 8.4 FL (ref 6–12)
POTASSIUM SERPL-SCNC: 4.6 MMOL/L (ref 3.7–5.3)
PROT UR STRIP-MCNC: ABNORMAL MG/DL
RBC # BLD AUTO: 4.34 M/UL (ref 4.5–5.9)
RBC #/AREA URNS HPF: ABNORMAL /HPF
RETICS # AUTO: 0.05 M/UL (ref 0.02–0.1)
RETICS/RBC NFR AUTO: 1.3 % (ref 0.5–2)
SARS-COV-2 RNA RESP QL NAA+PROBE: NOT DETECTED
SODIUM SERPL-SCNC: 136 MMOL/L (ref 135–144)
SOURCE: NORMAL
SP GR UR STRIP: 1.03 (ref 1–1.03)
SPECIMEN DESCRIPTION: NORMAL
TROPONIN I SERPL HS-MCNC: 76 NG/L (ref 0–22)
TROPONIN I SERPL HS-MCNC: 88 NG/L (ref 0–22)
UROBILINOGEN UR STRIP-ACNC: ABNORMAL EU/DL (ref 0–1)
WBC #/AREA URNS HPF: ABNORMAL /HPF
WBC OTHER # BLD: 10.1 K/UL (ref 3.5–11)

## 2023-11-17 PROCEDURE — 85025 COMPLETE CBC W/AUTO DIFF WBC: CPT

## 2023-11-17 PROCEDURE — 36415 COLL VENOUS BLD VENIPUNCTURE: CPT

## 2023-11-17 PROCEDURE — 71046 X-RAY EXAM CHEST 2 VIEWS: CPT

## 2023-11-17 PROCEDURE — 84484 ASSAY OF TROPONIN QUANT: CPT

## 2023-11-17 PROCEDURE — 99285 EMERGENCY DEPT VISIT HI MDM: CPT

## 2023-11-17 PROCEDURE — 6360000004 HC RX CONTRAST MEDICATION

## 2023-11-17 PROCEDURE — 83880 ASSAY OF NATRIURETIC PEPTIDE: CPT

## 2023-11-17 PROCEDURE — 2580000003 HC RX 258

## 2023-11-17 PROCEDURE — 71250 CT THORAX DX C-: CPT

## 2023-11-17 PROCEDURE — 80048 BASIC METABOLIC PNL TOTAL CA: CPT

## 2023-11-17 PROCEDURE — 70498 CT ANGIOGRAPHY NECK: CPT

## 2023-11-17 PROCEDURE — 93005 ELECTROCARDIOGRAM TRACING: CPT

## 2023-11-17 PROCEDURE — 87636 SARSCOV2 & INF A&B AMP PRB: CPT

## 2023-11-17 PROCEDURE — 73502 X-RAY EXAM HIP UNI 2-3 VIEWS: CPT

## 2023-11-17 PROCEDURE — 81001 URINALYSIS AUTO W/SCOPE: CPT

## 2023-11-17 PROCEDURE — 85045 AUTOMATED RETICULOCYTE COUNT: CPT

## 2023-11-17 PROCEDURE — 73030 X-RAY EXAM OF SHOULDER: CPT

## 2023-11-17 RX ORDER — SODIUM CHLORIDE 0.9 % (FLUSH) 0.9 %
10 SYRINGE (ML) INJECTION PRN
Status: DISCONTINUED | OUTPATIENT
Start: 2023-11-17 | End: 2023-11-18 | Stop reason: HOSPADM

## 2023-11-17 RX ORDER — 0.9 % SODIUM CHLORIDE 0.9 %
100 INTRAVENOUS SOLUTION INTRAVENOUS ONCE
Status: COMPLETED | OUTPATIENT
Start: 2023-11-17 | End: 2023-11-17

## 2023-11-17 RX ADMIN — SODIUM CHLORIDE 100 ML: 9 INJECTION, SOLUTION INTRAVENOUS at 20:25

## 2023-11-17 RX ADMIN — SODIUM CHLORIDE, PRESERVATIVE FREE 10 ML: 5 INJECTION INTRAVENOUS at 20:25

## 2023-11-17 RX ADMIN — IOPAMIDOL 75 ML: 755 INJECTION, SOLUTION INTRAVENOUS at 20:17

## 2023-11-17 ASSESSMENT — PAIN - FUNCTIONAL ASSESSMENT: PAIN_FUNCTIONAL_ASSESSMENT: 0-10

## 2023-11-17 ASSESSMENT — PAIN DESCRIPTION - LOCATION: LOCATION: NECK

## 2023-11-17 ASSESSMENT — PAIN SCALES - GENERAL: PAINLEVEL_OUTOF10: 7

## 2023-11-17 NOTE — TELEPHONE ENCOUNTER
He can go back on the Prednisone 5mg daily, but let [facility] he is going back on the Prednisone.

## 2023-11-17 NOTE — TELEPHONE ENCOUNTER
I spoke with the patients daughter Beba Fatima, explained he should take 2 20mg furosemide daily for three days and increase the potassium in his diet while he does this, low sodium diet, keep his feet up while sitting/lying down. I explained that it is ok for him to take a tylenol along with the tramadol because she said the tramadol is not helping with the pain in his feet from the edema. I encouraged her to call next week if there is no improvement in his symptoms.

## 2023-11-17 NOTE — TELEPHONE ENCOUNTER
Patient had his INR checked and he spiked to a 7.8. When patient was here about 2 weeks ago he was in the normal range at a 2.8. Patients feet (both) are very swollen to the ankles. When patient took predniSONE it helped with the swelling. Is patient allowed to start this medication up again? Patient is having full body aches. He is taking tramadol and he is still having horrible aches. Feels as if he can not move his body due to aches. Patient does not want to go to ER. But would like PCP to call his daughter Reegopal Burr). Please advise.

## 2023-11-17 NOTE — TELEPHONE ENCOUNTER
Instructions noted that I instructed him to do to increase the Lasix x 3 days and increase potassium in his diet and call next week with an update

## 2023-11-17 NOTE — ED PROVIDER NOTES
[address] [facility] ED  eMERGENCY dEPARTMENT eNCOUnter   Attending Attestation     Pt Name: Martinez Bryant  MRN: [id number]  [address] [facility] 12/2/1931  Date of evaluation: 11/17/23    History, EXAM, MDM:    Martinez Bryant is a 80 y.o. male who presents with Extremity Weakness and Neck Pain    About a month ago the patient on phone, fell on the left side of his body. The patient is having progressively worsening weakness on the left side of his body in his left shoulder his left hip left arm and leg. He is reporting pain in his neck. Worse Weaning course. He notes he is getting weak particularly on that side. On exam, the patient has no drift in his extremities, he has slight slightly less hand  strength on the left. Appropriate capillary refill bilaterally and appropriate pulses bilaterally. Differential diagnosis stroke, cervical myelopathy arthritic pain from his fall deconditioning. In regards to his general weakness and fatigue, patient also has a borderline fever we will also work him up for COVID and UTI. Chest x-ray shows cardiomegaly, no acute findings in his hip or shoulder he has arthropathy, CTA of the head and neck shows no dissection or acute occlusion. UA is negative for infection. COVID and influenza are negative. Patient has a chronic troponin elevation chronic BNP elevation, troponin slightly above his baseline. EKG: All EKG's are interpreted by the Emergency Department Physician who either signs or Co-signs this chart in the absence of a cardiologist.  EKG shows atrial fibrillation heart rate of 102, QRS of 84, QTc of 437 no ST segment elevations, T wave flattening and inversions, the axis is normal.    We will obtain an MRI of neck and head. If subacute stroke we will admit him here. If signs of cervical myelopathy, will consult with spine surgeon Dr. Mikala Cruz.   If the test showed no acute stroke or myelopathy, the patient will need to be admitted for further workup of his

## 2023-11-17 NOTE — ED PROVIDER NOTES
[address] [facility] ED  Emergency Department Encounter  Emergency Medicine Resident     Pt Name:Issac Clemons  MRN: [id number]  [id number] 12/2/1931  Date of evaluation: 11/17/23  PCP:  Hilario Colón MD  Note Started: 6:27 PM EST      CHIEF COMPLAINT       Chief Complaint   Patient presents with    Extremity Weakness    Neck Pain       HISTORY OF PRESENT ILLNESS  (Location/Symptom, Timing/Onset, Context/Setting, Quality, Duration, Modifying Factors, Severity.)      Sonya Murrell is a 80 y.o. male who presents with extremity weakness and back pain. Left upper and lower extremity weakness for the past few weeks which got better, then got worse, resulting in patient collapsing to his knees for short period of time and requiring his daughter to help him get back to his feet. Patient said is primarily his left leg but says his right leg feels weaker as well. Patient also reports  Left shoulder and bicep pain, as well as left hip pain. Denies any injuries to these areas but says have been hurting more over the last couple of weeks. Patient is not having any difficulty ambulating other than episodes of weakness. Additionally, patient complaining of Right-sided neck pain that he says is in his \"right cord\", referring to the right side of his neck behind his     PAST MEDICAL / SURGICAL / SOCIAL / FAMILY HISTORY      has a past medical history of A-fib ([address]), Acute MI ([address]), Allergic rhinitis, Anemia, Atrial fibrillation ([address]), Basal cell cancer, Basal cell cancer, Cervical radiculopathy, Diverticulitis, GERD (gastroesophageal reflux disease), Glaucoma, Hyperlipidemia, Hypertension, Hyponatremia, IBS (irritable bowel syndrome), Insomnia, Osteoarthritis, Osteoarthritis/neck pain. , Prostate cancer [facility]), Renal calculi, Shingles, Status post prostatectomy, Urinary frequency, Wears glasses, and Wears hearing aid in both ears. has a past surgical history that includes Spine surgery (04/2003);  Lithotripsy;

## 2023-11-17 NOTE — ED NOTES
Mode of arrival (squad #, walk in, police, etc) : Walk in         Chief complaint(s): Extremity weakness, Neck Pain         Arrival Note (brief scenario, treatment PTA, etc). : Patient complains of weakness in the lower extremities, primarily the left leg and neck pain. He states these are ongoing issues leg weakness x3 weeks and neck pain for around a week. He states that he fell around a month ago and had numerous injuries to the face and his legs. C= \"Have you ever felt that you should Cut down on your drinking? \"  No  A= \"Have people Annoyed you by criticizing your drinking? \"  No  G= \"Have you ever felt bad or Guilty about your drinking? \"  No  E= \"Have you ever had a drink as an Eye-opener first thing in the morning to steady your nerves or to help a hangover? \"  No      Deferred []      Reason for deferring: N/A    *If yes to two or more: probable alcohol abuse. Tad Allen RN  11/17/23 [id number]

## 2023-11-18 ENCOUNTER — APPOINTMENT (OUTPATIENT)
Dept: MRI IMAGING | Age: 88
DRG: 552 | End: 2023-11-18
Payer: MEDICARE

## 2023-11-18 ENCOUNTER — HOSPITAL ENCOUNTER (INPATIENT)
Age: 88
LOS: 3 days | Discharge: OTHER INSTITUTION | DRG: 552 | End: 2023-11-21
Attending: EMERGENCY MEDICINE | Admitting: PSYCHIATRY & NEUROLOGY
Payer: MEDICARE

## 2023-11-18 VITALS
DIASTOLIC BLOOD PRESSURE: 83 MMHG | HEART RATE: 100 BPM | RESPIRATION RATE: 16 BRPM | TEMPERATURE: 99 F | BODY MASS INDEX: 27.31 KG/M2 | OXYGEN SATURATION: 95 % | WEIGHT: 160 LBS | HEIGHT: 64 IN | SYSTOLIC BLOOD PRESSURE: 124 MMHG

## 2023-11-18 DIAGNOSIS — S00.83XA FACIAL HEMATOMA, INITIAL ENCOUNTER: ICD-10-CM

## 2023-11-18 DIAGNOSIS — R53.1 LEFT-SIDED WEAKNESS: Primary | ICD-10-CM

## 2023-11-18 PROBLEM — R29.90 STROKE-LIKE SYMPTOMS: Status: ACTIVE | Noted: 2023-11-18

## 2023-11-18 LAB
INR PPP: 2.1
PROTHROMBIN TIME: 23 SEC (ref 11.7–14.9)

## 2023-11-18 PROCEDURE — 2060000000 HC ICU INTERMEDIATE R&B

## 2023-11-18 PROCEDURE — 85610 PROTHROMBIN TIME: CPT

## 2023-11-18 PROCEDURE — 70551 MRI BRAIN STEM W/O DYE: CPT

## 2023-11-18 PROCEDURE — 99222 1ST HOSP IP/OBS MODERATE 55: CPT | Performed by: PSYCHIATRY & NEUROLOGY

## 2023-11-18 PROCEDURE — 92523 SPEECH SOUND LANG COMPREHEN: CPT

## 2023-11-18 PROCEDURE — 93005 ELECTROCARDIOGRAM TRACING: CPT | Performed by: STUDENT IN AN ORGANIZED HEALTH CARE EDUCATION/TRAINING PROGRAM

## 2023-11-18 PROCEDURE — 2580000003 HC RX 258

## 2023-11-18 PROCEDURE — 6370000000 HC RX 637 (ALT 250 FOR IP)

## 2023-11-18 PROCEDURE — 72148 MRI LUMBAR SPINE W/O DYE: CPT

## 2023-11-18 PROCEDURE — 72141 MRI NECK SPINE W/O DYE: CPT

## 2023-11-18 PROCEDURE — 99285 EMERGENCY DEPT VISIT HI MDM: CPT

## 2023-11-18 RX ORDER — FUROSEMIDE 20 MG/1
20 TABLET ORAL DAILY
Status: DISCONTINUED | OUTPATIENT
Start: 2023-11-18 | End: 2023-11-21 | Stop reason: HOSPADM

## 2023-11-18 RX ORDER — SODIUM CHLORIDE 0.9 % (FLUSH) 0.9 %
5-40 SYRINGE (ML) INJECTION PRN
Status: DISCONTINUED | OUTPATIENT
Start: 2023-11-18 | End: 2023-11-21 | Stop reason: HOSPADM

## 2023-11-18 RX ORDER — SODIUM CHLORIDE 0.9 % (FLUSH) 0.9 %
5-40 SYRINGE (ML) INJECTION EVERY 12 HOURS SCHEDULED
Status: DISCONTINUED | OUTPATIENT
Start: 2023-11-18 | End: 2023-11-21 | Stop reason: HOSPADM

## 2023-11-18 RX ORDER — SODIUM CHLORIDE 9 MG/ML
INJECTION, SOLUTION INTRAVENOUS PRN
Status: DISCONTINUED | OUTPATIENT
Start: 2023-11-18 | End: 2023-11-21 | Stop reason: HOSPADM

## 2023-11-18 RX ORDER — TIMOLOL MALEATE 5 MG/ML
1 SOLUTION/ DROPS OPHTHALMIC DAILY
Status: DISCONTINUED | OUTPATIENT
Start: 2023-11-18 | End: 2023-11-21 | Stop reason: HOSPADM

## 2023-11-18 RX ORDER — LATANOPROST 50 UG/ML
1 SOLUTION/ DROPS OPHTHALMIC NIGHTLY
Status: DISCONTINUED | OUTPATIENT
Start: 2023-11-18 | End: 2023-11-21 | Stop reason: HOSPADM

## 2023-11-18 RX ORDER — ONDANSETRON 2 MG/ML
4 INJECTION INTRAMUSCULAR; INTRAVENOUS EVERY 6 HOURS PRN
Status: DISCONTINUED | OUTPATIENT
Start: 2023-11-18 | End: 2023-11-21 | Stop reason: HOSPADM

## 2023-11-18 RX ORDER — ALLOPURINOL 100 MG/1
100 TABLET ORAL DAILY
Status: DISCONTINUED | OUTPATIENT
Start: 2023-11-18 | End: 2023-11-21 | Stop reason: HOSPADM

## 2023-11-18 RX ORDER — ASPIRIN 300 MG/1
300 SUPPOSITORY RECTAL DAILY
Status: DISCONTINUED | OUTPATIENT
Start: 2023-11-18 | End: 2023-11-21 | Stop reason: HOSPADM

## 2023-11-18 RX ORDER — WARFARIN SODIUM 5 MG/1
5 TABLET ORAL
Status: DISCONTINUED | OUTPATIENT
Start: 2023-11-18 | End: 2023-11-18

## 2023-11-18 RX ORDER — PANTOPRAZOLE SODIUM 40 MG/1
40 TABLET, DELAYED RELEASE ORAL
Status: DISCONTINUED | OUTPATIENT
Start: 2023-11-18 | End: 2023-11-21 | Stop reason: HOSPADM

## 2023-11-18 RX ORDER — ACETAMINOPHEN 325 MG/1
650 TABLET ORAL EVERY 6 HOURS PRN
Status: DISCONTINUED | OUTPATIENT
Start: 2023-11-18 | End: 2023-11-21 | Stop reason: HOSPADM

## 2023-11-18 RX ORDER — ONDANSETRON 4 MG/1
4 TABLET, ORALLY DISINTEGRATING ORAL EVERY 8 HOURS PRN
Status: DISCONTINUED | OUTPATIENT
Start: 2023-11-18 | End: 2023-11-21 | Stop reason: HOSPADM

## 2023-11-18 RX ORDER — ASPIRIN 81 MG/1
81 TABLET, CHEWABLE ORAL DAILY
Status: DISCONTINUED | OUTPATIENT
Start: 2023-11-18 | End: 2023-11-21 | Stop reason: HOSPADM

## 2023-11-18 RX ORDER — BUSPIRONE HYDROCHLORIDE 5 MG/1
7.5 TABLET ORAL 3 TIMES DAILY PRN
Status: DISCONTINUED | OUTPATIENT
Start: 2023-11-18 | End: 2023-11-21 | Stop reason: HOSPADM

## 2023-11-18 RX ORDER — IPRATROPIUM BROMIDE 21 UG/1
2 SPRAY, METERED NASAL EVERY 12 HOURS
Status: DISCONTINUED | OUTPATIENT
Start: 2023-11-18 | End: 2023-11-21 | Stop reason: HOSPADM

## 2023-11-18 RX ORDER — DILTIAZEM HYDROCHLORIDE 180 MG/1
180 CAPSULE, COATED, EXTENDED RELEASE ORAL DAILY
Status: DISCONTINUED | OUTPATIENT
Start: 2023-11-18 | End: 2023-11-21 | Stop reason: HOSPADM

## 2023-11-18 RX ORDER — SODIUM CHLORIDE 9 MG/ML
INJECTION, SOLUTION INTRAVENOUS CONTINUOUS
Status: ACTIVE | OUTPATIENT
Start: 2023-11-18 | End: 2023-11-18

## 2023-11-18 RX ORDER — PREDNISONE 5 MG/1
5 TABLET ORAL DAILY
Status: DISCONTINUED | OUTPATIENT
Start: 2023-11-18 | End: 2023-11-21 | Stop reason: HOSPADM

## 2023-11-18 RX ORDER — WARFARIN SODIUM 2.5 MG/1
2.5 TABLET ORAL
Status: COMPLETED | OUTPATIENT
Start: 2023-11-18 | End: 2023-11-18

## 2023-11-18 RX ORDER — POLYETHYLENE GLYCOL 3350 17 G/17G
17 POWDER, FOR SOLUTION ORAL DAILY PRN
Status: DISCONTINUED | OUTPATIENT
Start: 2023-11-18 | End: 2023-11-21 | Stop reason: HOSPADM

## 2023-11-18 RX ORDER — ALBUTEROL SULFATE 90 UG/1
2 AEROSOL, METERED RESPIRATORY (INHALATION) EVERY 4 HOURS PRN
Status: DISCONTINUED | OUTPATIENT
Start: 2023-11-18 | End: 2023-11-21 | Stop reason: HOSPADM

## 2023-11-18 RX ORDER — WARFARIN SODIUM 2.5 MG/1
2.5 TABLET ORAL
Status: DISCONTINUED | OUTPATIENT
Start: 2023-11-19 | End: 2023-11-18

## 2023-11-18 RX ADMIN — WARFARIN SODIUM 2.5 MG: 5 TABLET ORAL at 17:41

## 2023-11-18 RX ADMIN — PREDNISONE 5 MG: 5 TABLET ORAL at 08:55

## 2023-11-18 RX ADMIN — DILTIAZEM HYDROCHLORIDE 180 MG: 180 CAPSULE, COATED, EXTENDED RELEASE ORAL at 08:54

## 2023-11-18 RX ADMIN — METOPROLOL TARTRATE 25 MG: 25 TABLET ORAL at 08:54

## 2023-11-18 RX ADMIN — ALLOPURINOL 100 MG: 100 TABLET ORAL at 08:54

## 2023-11-18 RX ADMIN — ACETAMINOPHEN 650 MG: 325 TABLET ORAL at 10:48

## 2023-11-18 RX ADMIN — PANTOPRAZOLE SODIUM 40 MG: 40 TABLET, DELAYED RELEASE ORAL at 08:55

## 2023-11-18 RX ADMIN — SODIUM CHLORIDE, PRESERVATIVE FREE 10 ML: 5 INJECTION INTRAVENOUS at 20:05

## 2023-11-18 RX ADMIN — LATANOPROST 1 DROP: 50 SOLUTION OPHTHALMIC at 20:04

## 2023-11-18 RX ADMIN — METOPROLOL TARTRATE 25 MG: 25 TABLET ORAL at 20:05

## 2023-11-18 RX ADMIN — FUROSEMIDE 20 MG: 20 TABLET ORAL at 08:54

## 2023-11-18 RX ADMIN — ACETAMINOPHEN 650 MG: 325 TABLET ORAL at 04:29

## 2023-11-18 RX ADMIN — SODIUM CHLORIDE: 9 INJECTION, SOLUTION INTRAVENOUS at 12:09

## 2023-11-18 RX ADMIN — ACETAMINOPHEN 650 MG: 325 TABLET ORAL at 22:41

## 2023-11-18 RX ADMIN — SODIUM CHLORIDE, PRESERVATIVE FREE 10 ML: 5 INJECTION INTRAVENOUS at 08:55

## 2023-11-18 ASSESSMENT — PAIN SCALES - GENERAL
PAINLEVEL_OUTOF10: 7
PAINLEVEL_OUTOF10: 10
PAINLEVEL_OUTOF10: 3
PAINLEVEL_OUTOF10: 3
PAINLEVEL_OUTOF10: 8
PAINLEVEL_OUTOF10: 8

## 2023-11-18 ASSESSMENT — PAIN SCALES - WONG BAKER: WONGBAKER_NUMERICALRESPONSE: 0

## 2023-11-18 ASSESSMENT — ENCOUNTER SYMPTOMS
NAUSEA: 0
BACK PAIN: 0
SHORTNESS OF BREATH: 0
ABDOMINAL PAIN: 0
VOMITING: 0
DIARRHEA: 0
CONSTIPATION: 0

## 2023-11-18 ASSESSMENT — PAIN DESCRIPTION - LOCATION: LOCATION: NECK

## 2023-11-18 ASSESSMENT — PAIN DESCRIPTION - DESCRIPTORS: DESCRIPTORS: ACHING

## 2023-11-18 NOTE — ED NOTES
89M with L side weakness x2mos  MRI needed but no capability there over weekend  Neuro aware     Shaw Forman RN  11/18/23 0001

## 2023-11-18 NOTE — ED TRIAGE NOTES
Pt presents to ED via EMS from [facility] tx d/t left lower extremity weakness since Thursday. Pt denies any injury. Pt also c/o right lower extremity weakness as well. Pt denies any chest pain or shortness of breath. Pt reports he stopped taking warfarin as directed by PCP d/t increased INR. Pt reports he believes he last took warfarin Thursday. Pt reports using walker at home for ambulation. Pt denies any pain at this time. Per EMS, run sheet zero meds given at [facility]. Pt Aox4. Hx of Afib, HTN  Pt placed on full cardiac monitor, IV access established.   Dr. Ronni Mina at bedside

## 2023-11-18 NOTE — ED NOTES
RN writing this note placed pt on 1L NC d/t SPO2 of 93%. Pt SPO2 now 96%.      Xiomara Mcdermott RN  11/17/23 0180

## 2023-11-18 NOTE — H&P
11/8/2023    Left Ventricle: There is moderate concentric increased wall thickness/hypertrophy. Systolic function is mildly decreased with an ejection fraction of 45-50%. The quantitative EF by 2D Carrington biplane is 46%. Unable to assess diastolic function due to atrial fibrillation/flutter. Right Ventricle: Right ventricular size appears normal. Systolic function is mildly reduced. Abnormal tricuspid annular plane systolic excursion. Aortic Valve: The aortic valve is trileaflet. The leaflets are severely calcified. There is mild to moderate regurgitation. There is moderate stenosis. The calculated aortic valve area is 0.95 cm2. The calculated aortic valve peak gradient is 39.00 mmHg. The calculated aortic valve mean gradient is 22.00 mmHg. Mitral Valve: There is mild to moderate regurgitation. There is no evidence of mitral valve stenosis. Tricuspid Valve: There is mild to moderate regurgitation. There is no evidence of tricuspid valve stenosis. The right ventricular systolic pressure is mildly elevated. RVSP calculated at 41 mmHg. There is mild pulmonary hypertension. Left Ventricle Left ventricle appears normal in size. There is moderate concentric increased wall thickness/hypertrophy. Systolic function is mildly decreased with an ejection fraction of 45-50%. The quantitative EF by 2D Carrington biplane is 46%. See wall score diagram for wall motion abnormalities. Unable to assess diastolic function due to atrial fibrillation/flutter. Right Ventricle Right ventricular size appears normal. The right ventricular basal diameter is 34.0 mm. Systolic function is mildly reduced. Abnormal tricuspid annular plane systolic excursion. Abnormal systolic excursion velocity by TDI (<9.5cm/s). Left Atrium Left atrium is normal in size. The left atrial volume index is 34.3 mL/m2. Right Atrium Right atrium is mildly dilated. The right atrial area is 22.8 cm2.  IVC/SVC IVC appears normal. Mitral Valve The leaflets are

## 2023-11-18 NOTE — CARE COORDINATION
Case Management Assessment  Initial Evaluation    Date/Time of Evaluation: 11/18/2023 9:31 AM  Assessment Completed by: Jeremy Casillas RN    If patient is discharged prior to next notation, then this note serves as note for discharge by case management. Patient Name: Jesus Alberto Lino                   YOB: 1931  Diagnosis: Left-sided weakness [R53.1]  Stroke-like symptoms [R29.90]                   Date / Time: 11/18/2023  1:39 AM    Patient Admission Status: Inpatient   Readmission Risk (Low < 19, Mod (19-27), High > 27): Readmission Risk Score: 18    Current PCP: Precious Marquez MD  PCP verified by CM? Yes    Chart Reviewed: Yes      History Provided by: Patient  Patient Orientation: Alert and Oriented    Patient Cognition: Alert    Hospitalization in the last 30 days (Readmission):  No    If yes, Readmission Assessment in CM Navigator will be completed. Advance Directives:      Code Status: Full Code   Patient's Primary Decision Maker is: Legal Next of Kin    Primary Decision Maker: Pamela Morfin  Child - [phone]    Secondary Decision Maker: Inderjit Morfin  Child - [phone]    Secondary Decision Maker: Dari Mims Child - [phone]    Discharge Planning:    Patient lives with: Alone Type of Home: House  Primary Care Giver: Self  Patient Support Systems include: Children   Current Financial resources: Medicare  Current community resources:    Current services prior to admission: None            Current DME:              Type of Home Care services:  None    ADLS  Prior functional level: Independent in ADLs/IADLs  Current functional level: Independent in ADLs/IADLs    PT AM-PAC:   /24  OT AM-PAC:   /24    Family can provide assistance at DC: Yes  Would you like Case Management to discuss the discharge plan with any other family members/significant others, and if so, who?     Plans to Return to Present Housing: Yes  Other Identified Issues/Barriers to RETURNING to

## 2023-11-18 NOTE — ED PROVIDER NOTES
[address] [place] [address] ED  Emergency Department Encounter  Emergency Medicine Resident     Pt Name:Issac Hatch  MRN: [id number]  [facility] 12/2/1931  Date of evaluation: 11/18/23  PCP:  Glendy Joy MD  Note Started: 1:47 AM EST      CHIEF COMPLAINT       Chief Complaint   Patient presents with    Extremity Weakness     left       HISTORY OF PRESENT ILLNESS  (Location/Symptom, Timing/Onset, Context/Setting, Quality, Duration, Modifying Factors, Severity.)      Kristie Rockford is a 80 y.o. male who presents as a transfer from outside facility due to left lower extremity weakness. Patient presented to outside facility earlier today due to left lower extremity weakness since Thursday. Denies any trauma. Never experienced this before. No history of strokes. Denies any other symptoms. Outside facility discussed case with stroke team at this facility. Requested transfer to this facility for MRI. On arrival patient resting comfortably, no acute distress. No complaints at this time. Is still endorsing left lower extremity weakness    PAST MEDICAL / SURGICAL / SOCIAL / FAMILY HISTORY      has a past medical history of A-fib ([address]), Acute MI ([address]), Allergic rhinitis, Anemia, Atrial fibrillation ([address]), Basal cell cancer, Basal cell cancer, Cervical radiculopathy, Diverticulitis, GERD (gastroesophageal reflux disease), Glaucoma, Hyperlipidemia, Hypertension, Hyponatremia, IBS (irritable bowel syndrome), Insomnia, Osteoarthritis, Osteoarthritis/neck pain. , Prostate cancer [facility]), Renal calculi, Shingles, Status post prostatectomy, Urinary frequency, Wears glasses, and Wears hearing aid in both ears. has a past surgical history that includes Spine surgery (04/2003); Lithotripsy; Skin cancer excision; skin biopsy (03/11/2013); Skin cancer excision (Left, 03/2013); Colonoscopy (2002); Colonoscopy (08/2013); skin biopsy (Left, 02/11/2014); Mohs surgery (Left, 03/10/2014);  Mohs surgery (12/30/2014);

## 2023-11-18 NOTE — ED NOTES
89M with L side weakness x2mos  MRI needed but no capability there over weekend  Neuro aware     Luzma Nj, RN  11/18/23 5801

## 2023-11-18 NOTE — ED NOTES
Bedside shift change report given to Danny (oncoming nurse) by Franki Gama (offgoing nurse). Report included the following information Nurse Handoff Report, ED Encounter Summary, ED SBAR, MAR, Recent Results, and Event Log.        Margo Perez RN  11/17/23 8558

## 2023-11-19 LAB
ALBUMIN SERPL-MCNC: 2.7 G/DL (ref 3.5–5.2)
ALBUMIN/GLOB SERPL: 0.8 {RATIO} (ref 1–2.5)
ALP SERPL-CCNC: 99 U/L (ref 40–129)
ALT SERPL-CCNC: 24 U/L (ref 5–41)
ANION GAP SERPL CALCULATED.3IONS-SCNC: 9 MMOL/L (ref 9–17)
AST SERPL-CCNC: 25 U/L
BILIRUB SERPL-MCNC: 1.6 MG/DL (ref 0.3–1.2)
BUN SERPL-MCNC: 16 MG/DL (ref 8–23)
CALCIUM SERPL-MCNC: 8.4 MG/DL (ref 8.6–10.4)
CHLORIDE SERPL-SCNC: 102 MMOL/L (ref 98–107)
CHOLEST SERPL-MCNC: 147 MG/DL
CHOLESTEROL/HDL RATIO: 2.5
CO2 SERPL-SCNC: 26 MMOL/L (ref 20–31)
CREAT SERPL-MCNC: 1 MG/DL (ref 0.7–1.2)
ERYTHROCYTE [DISTWIDTH] IN BLOOD BY AUTOMATED COUNT: 16 % (ref 11.8–14.4)
EST. AVERAGE GLUCOSE BLD GHB EST-MCNC: 111 MG/DL
GFR SERPL CREATININE-BSD FRML MDRD: >60 ML/MIN/1.73M2
GLUCOSE SERPL-MCNC: 121 MG/DL (ref 70–99)
HBA1C MFR BLD: 5.5 % (ref 4–6)
HCT VFR BLD AUTO: 42 % (ref 40.7–50.3)
HDLC SERPL-MCNC: 58 MG/DL
HGB BLD-MCNC: 12.2 G/DL (ref 13–17)
INR PPP: 1.7
LDLC SERPL CALC-MCNC: 72 MG/DL (ref 0–130)
MCH RBC QN AUTO: 27.5 PG (ref 25.2–33.5)
MCHC RBC AUTO-ENTMCNC: 29 G/DL (ref 28.4–34.8)
MCV RBC AUTO: 94.8 FL (ref 82.6–102.9)
NRBC BLD-RTO: 0 PER 100 WBC
PLATELET # BLD AUTO: ABNORMAL K/UL (ref 138–453)
PLATELET, FLUORESCENCE: 118 K/UL (ref 138–453)
PLATELETS.RETICULATED NFR BLD AUTO: 5.4 % (ref 1.1–10.3)
POTASSIUM SERPL-SCNC: 3.7 MMOL/L (ref 3.7–5.3)
PROT SERPL-MCNC: 6 G/DL (ref 6.4–8.3)
PROTHROMBIN TIME: 20 SEC (ref 11.7–14.9)
RBC # BLD AUTO: 4.43 M/UL (ref 4.21–5.77)
SODIUM SERPL-SCNC: 137 MMOL/L (ref 135–144)
TRIGL SERPL-MCNC: 83 MG/DL
WBC OTHER # BLD: 10.8 K/UL (ref 3.5–11.3)

## 2023-11-19 PROCEDURE — 97166 OT EVAL MOD COMPLEX 45 MIN: CPT

## 2023-11-19 PROCEDURE — 80061 LIPID PANEL: CPT

## 2023-11-19 PROCEDURE — 6370000000 HC RX 637 (ALT 250 FOR IP)

## 2023-11-19 PROCEDURE — 99232 SBSQ HOSP IP/OBS MODERATE 35: CPT | Performed by: PSYCHIATRY & NEUROLOGY

## 2023-11-19 PROCEDURE — 97535 SELF CARE MNGMENT TRAINING: CPT

## 2023-11-19 PROCEDURE — 6370000000 HC RX 637 (ALT 250 FOR IP): Performed by: STUDENT IN AN ORGANIZED HEALTH CARE EDUCATION/TRAINING PROGRAM

## 2023-11-19 PROCEDURE — 97162 PT EVAL MOD COMPLEX 30 MIN: CPT

## 2023-11-19 PROCEDURE — 97530 THERAPEUTIC ACTIVITIES: CPT

## 2023-11-19 PROCEDURE — 36415 COLL VENOUS BLD VENIPUNCTURE: CPT

## 2023-11-19 PROCEDURE — 1200000000 HC SEMI PRIVATE

## 2023-11-19 PROCEDURE — 85027 COMPLETE CBC AUTOMATED: CPT

## 2023-11-19 PROCEDURE — 2580000003 HC RX 258

## 2023-11-19 PROCEDURE — 97116 GAIT TRAINING THERAPY: CPT

## 2023-11-19 PROCEDURE — 80053 COMPREHEN METABOLIC PANEL: CPT

## 2023-11-19 PROCEDURE — 85610 PROTHROMBIN TIME: CPT

## 2023-11-19 PROCEDURE — 83036 HEMOGLOBIN GLYCOSYLATED A1C: CPT

## 2023-11-19 PROCEDURE — 94760 N-INVAS EAR/PLS OXIMETRY 1: CPT

## 2023-11-19 PROCEDURE — 85055 RETICULATED PLATELET ASSAY: CPT

## 2023-11-19 RX ORDER — METHOCARBAMOL 500 MG/1
500 TABLET, FILM COATED ORAL ONCE
Status: COMPLETED | OUTPATIENT
Start: 2023-11-19 | End: 2023-11-19

## 2023-11-19 RX ORDER — WARFARIN SODIUM 5 MG/1
5 TABLET ORAL
Status: DISCONTINUED | OUTPATIENT
Start: 2023-11-19 | End: 2023-11-19

## 2023-11-19 RX ORDER — WARFARIN SODIUM 5 MG/1
5 TABLET ORAL
Status: COMPLETED | OUTPATIENT
Start: 2023-11-19 | End: 2023-11-19

## 2023-11-19 RX ADMIN — FUROSEMIDE 20 MG: 20 TABLET ORAL at 09:01

## 2023-11-19 RX ADMIN — METHOCARBAMOL TABLETS 500 MG: 500 TABLET, COATED ORAL at 12:01

## 2023-11-19 RX ADMIN — METOPROLOL TARTRATE 25 MG: 25 TABLET ORAL at 09:01

## 2023-11-19 RX ADMIN — ALLOPURINOL 100 MG: 100 TABLET ORAL at 09:01

## 2023-11-19 RX ADMIN — SODIUM CHLORIDE, PRESERVATIVE FREE 10 ML: 5 INJECTION INTRAVENOUS at 20:35

## 2023-11-19 RX ADMIN — LATANOPROST 1 DROP: 50 SOLUTION OPHTHALMIC at 20:35

## 2023-11-19 RX ADMIN — PANTOPRAZOLE SODIUM 40 MG: 40 TABLET, DELAYED RELEASE ORAL at 09:01

## 2023-11-19 RX ADMIN — METOPROLOL TARTRATE 25 MG: 25 TABLET ORAL at 20:35

## 2023-11-19 RX ADMIN — SODIUM CHLORIDE, PRESERVATIVE FREE 10 ML: 5 INJECTION INTRAVENOUS at 09:01

## 2023-11-19 RX ADMIN — PREDNISONE 5 MG: 5 TABLET ORAL at 09:01

## 2023-11-19 RX ADMIN — ACETAMINOPHEN 650 MG: 325 TABLET ORAL at 04:40

## 2023-11-19 RX ADMIN — METHOCARBAMOL TABLETS 500 MG: 500 TABLET, COATED ORAL at 20:35

## 2023-11-19 RX ADMIN — DILTIAZEM HYDROCHLORIDE 180 MG: 180 CAPSULE, COATED, EXTENDED RELEASE ORAL at 09:01

## 2023-11-19 RX ADMIN — WARFARIN SODIUM 5 MG: 5 TABLET ORAL at 17:39

## 2023-11-19 RX ADMIN — ACETAMINOPHEN 650 MG: 325 TABLET ORAL at 19:44

## 2023-11-19 ASSESSMENT — PAIN SCALES - GENERAL
PAINLEVEL_OUTOF10: 10
PAINLEVEL_OUTOF10: 7
PAINLEVEL_OUTOF10: 3
PAINLEVEL_OUTOF10: 3
PAINLEVEL_OUTOF10: 7
PAINLEVEL_OUTOF10: 8

## 2023-11-19 ASSESSMENT — ENCOUNTER SYMPTOMS
APNEA: 0
ABDOMINAL DISTENTION: 0
SHORTNESS OF BREATH: 0
ABDOMINAL PAIN: 0
DIARRHEA: 0
COUGH: 0
WHEEZING: 0
VOMITING: 0
NAUSEA: 0
CONSTIPATION: 0
CHEST TIGHTNESS: 0

## 2023-11-19 ASSESSMENT — PAIN DESCRIPTION - LOCATION
LOCATION: BACK;NECK
LOCATION: HEAD;NECK
LOCATION: HEAD
LOCATION: HEAD;NECK

## 2023-11-19 NOTE — DISCHARGE INSTR - COC
Diet:  General    Routes of Feeding: Oral  Liquids: Thin Liquids  Daily Fluid Restriction: no  Last Modified Barium Swallow with Video (Video Swallowing Test): not done    Treatments at the Time of Hospital Discharge:   Respiratory Treatments: n/a  Oxygen Therapy:  is not on home oxygen therapy. Ventilator:    - No ventilator support    Rehab Therapies: Physical Therapy and Occupational Therapy  Weight Bearing Status/Restrictions: No weight bearing restrictions  Other Medical Equipment (for information only, NOT a DME order):  walker  Other Treatments: n/a    Patient's personal belongings (please select all that are sent with patient):  Glasses, clothes, slippers    RN SIGNATURE:  Electronically signed by Carley Cameron RN on 11/21/23 at 12:00 PM EST    CASE MANAGEMENT/SOCIAL WORK SECTION    Inpatient Status Date: ***    Readmission Risk Assessment Score:  Readmission Risk              Risk of Unplanned Readmission:  24           Discharging to Facility/ Agency   Name:  [facility]  Address:  Phone:  Fax:    Dialysis Facility (if applicable)   Name:  Address:  Dialysis Schedule:  Phone:  Fax:    / signature: Electronically signed by Nivia Jacinto RN on 11/21/23 at 11:56 AM EST    PHYSICIAN SECTION    Prognosis: {Prognosis:7641024430}    Condition at Discharge: [address] Patient Condition:503096079}    Rehab Potential (if transferring to Rehab): {Prognosis:7882865993}    Recommended Labs or Other Treatments After Discharge: ***    Physician Certification: I certify the above information and transfer of Fernando Vicente  is necessary for the continuing treatment of the diagnosis listed and that he requires {Admit to Appropriate Level of Care:96196} for {GREATER/LESS:172660418} 30 days.      Update Admission H&P: {CHP DME Changes in OTJW:041598306}    PHYSICIAN SIGNATURE:  Electronically signed by Farida Ash DO on 11/19/23 at 1:30 PM EST

## 2023-11-20 PROBLEM — M54.2 CERVICAL MUSCLE PAIN: Status: ACTIVE | Noted: 2023-11-20

## 2023-11-20 PROBLEM — M62.81 MUSCLE WEAKNESS OF LEFT UPPER EXTREMITY: Status: ACTIVE | Noted: 2023-11-20

## 2023-11-20 LAB
ANION GAP SERPL CALCULATED.3IONS-SCNC: 11 MMOL/L (ref 9–17)
BASOPHILS # BLD: 0 K/UL (ref 0–0.2)
BASOPHILS NFR BLD: 0 % (ref 0–2)
BUN SERPL-MCNC: 17 MG/DL (ref 8–23)
CALCIUM SERPL-MCNC: 8.5 MG/DL (ref 8.6–10.4)
CHLORIDE SERPL-SCNC: 104 MMOL/L (ref 98–107)
CO2 SERPL-SCNC: 23 MMOL/L (ref 20–31)
CREAT SERPL-MCNC: 0.9 MG/DL (ref 0.7–1.2)
EKG ATRIAL RATE: 115 BPM
EKG ATRIAL RATE: 117 BPM
EKG ATRIAL RATE: 340 BPM
EKG Q-T INTERVAL: 336 MS
EKG Q-T INTERVAL: 348 MS
EKG Q-T INTERVAL: 348 MS
EKG QRS DURATION: 84 MS
EKG QRS DURATION: 84 MS
EKG QRS DURATION: 88 MS
EKG QTC CALCULATION (BAZETT): 437 MS
EKG QTC CALCULATION (BAZETT): 441 MS
EKG QTC CALCULATION (BAZETT): 468 MS
EKG R AXIS: 14 DEGREES
EKG R AXIS: 42 DEGREES
EKG R AXIS: 49 DEGREES
EKG T AXIS: -123 DEGREES
EKG T AXIS: -140 DEGREES
EKG T AXIS: 171 DEGREES
EKG VENTRICULAR RATE: 102 BPM
EKG VENTRICULAR RATE: 109 BPM
EKG VENTRICULAR RATE: 97 BPM
EOSINOPHIL # BLD: 0 K/UL (ref 0–0.4)
EOSINOPHILS RELATIVE PERCENT: 0 % (ref 1–4)
ERYTHROCYTE [DISTWIDTH] IN BLOOD BY AUTOMATED COUNT: 16.1 % (ref 11.8–14.4)
GFR SERPL CREATININE-BSD FRML MDRD: >60 ML/MIN/1.73M2
GLUCOSE SERPL-MCNC: 90 MG/DL (ref 70–99)
HCT VFR BLD AUTO: 42.9 % (ref 40.7–50.3)
HGB BLD-MCNC: 12.7 G/DL (ref 13–17)
IMM GRANULOCYTES # BLD AUTO: 0 K/UL (ref 0–0.3)
IMM GRANULOCYTES NFR BLD: 0 %
INR PPP: 1.7
LYMPHOCYTES NFR BLD: 0.53 K/UL (ref 1–4.8)
LYMPHOCYTES RELATIVE PERCENT: 6 % (ref 24–44)
MCH RBC QN AUTO: 27.5 PG (ref 25.2–33.5)
MCHC RBC AUTO-ENTMCNC: 29.6 G/DL (ref 28.4–34.8)
MCV RBC AUTO: 92.9 FL (ref 82.6–102.9)
MONOCYTES NFR BLD: 1.32 K/UL (ref 0.1–0.8)
MONOCYTES NFR BLD: 15 % (ref 1–7)
MORPHOLOGY: ABNORMAL
NEUTROPHILS NFR BLD: 79 % (ref 36–66)
NEUTS SEG NFR BLD: 6.95 K/UL (ref 1.8–7.7)
NRBC BLD-RTO: 0 PER 100 WBC
PLATELET # BLD AUTO: ABNORMAL K/UL (ref 138–453)
PLATELET, FLUORESCENCE: 115 K/UL (ref 138–453)
PLATELETS.RETICULATED NFR BLD AUTO: 5.6 % (ref 1.1–10.3)
POTASSIUM SERPL-SCNC: 3.8 MMOL/L (ref 3.7–5.3)
PROTHROMBIN TIME: 19.5 SEC (ref 11.7–14.9)
RBC # BLD AUTO: 4.62 M/UL (ref 4.21–5.77)
SODIUM SERPL-SCNC: 138 MMOL/L (ref 135–144)
SURGICAL PATHOLOGY REPORT: NORMAL
WBC OTHER # BLD: 8.8 K/UL (ref 3.5–11.3)

## 2023-11-20 PROCEDURE — 85610 PROTHROMBIN TIME: CPT

## 2023-11-20 PROCEDURE — 80048 BASIC METABOLIC PNL TOTAL CA: CPT

## 2023-11-20 PROCEDURE — 6370000000 HC RX 637 (ALT 250 FOR IP)

## 2023-11-20 PROCEDURE — 1200000000 HC SEMI PRIVATE

## 2023-11-20 PROCEDURE — 99232 SBSQ HOSP IP/OBS MODERATE 35: CPT | Performed by: STUDENT IN AN ORGANIZED HEALTH CARE EDUCATION/TRAINING PROGRAM

## 2023-11-20 PROCEDURE — 6370000000 HC RX 637 (ALT 250 FOR IP): Performed by: PSYCHIATRY & NEUROLOGY

## 2023-11-20 PROCEDURE — 36415 COLL VENOUS BLD VENIPUNCTURE: CPT

## 2023-11-20 PROCEDURE — 2580000003 HC RX 258

## 2023-11-20 PROCEDURE — 85025 COMPLETE CBC W/AUTO DIFF WBC: CPT

## 2023-11-20 PROCEDURE — 85055 RETICULATED PLATELET ASSAY: CPT

## 2023-11-20 RX ORDER — TRAMADOL HYDROCHLORIDE 50 MG/1
50 TABLET ORAL 3 TIMES DAILY PRN
Status: DISCONTINUED | OUTPATIENT
Start: 2023-11-20 | End: 2023-11-21 | Stop reason: HOSPADM

## 2023-11-20 RX ORDER — CYCLOBENZAPRINE HCL 10 MG
10 TABLET ORAL ONCE
Status: COMPLETED | OUTPATIENT
Start: 2023-11-20 | End: 2023-11-20

## 2023-11-20 RX ORDER — TRAMADOL HYDROCHLORIDE 50 MG/1
50 TABLET ORAL ONCE
Status: COMPLETED | OUTPATIENT
Start: 2023-11-20 | End: 2023-11-20

## 2023-11-20 RX ORDER — WARFARIN SODIUM 5 MG/1
5 TABLET ORAL
Status: COMPLETED | OUTPATIENT
Start: 2023-11-20 | End: 2023-11-20

## 2023-11-20 RX ADMIN — METOPROLOL TARTRATE 25 MG: 25 TABLET ORAL at 08:12

## 2023-11-20 RX ADMIN — PREDNISONE 5 MG: 5 TABLET ORAL at 08:12

## 2023-11-20 RX ADMIN — PANTOPRAZOLE SODIUM 40 MG: 40 TABLET, DELAYED RELEASE ORAL at 06:34

## 2023-11-20 RX ADMIN — ACETAMINOPHEN 650 MG: 325 TABLET ORAL at 15:30

## 2023-11-20 RX ADMIN — CYCLOBENZAPRINE 10 MG: 10 TABLET, FILM COATED ORAL at 09:57

## 2023-11-20 RX ADMIN — LATANOPROST 1 DROP: 50 SOLUTION OPHTHALMIC at 21:25

## 2023-11-20 RX ADMIN — SODIUM CHLORIDE, PRESERVATIVE FREE 10 ML: 5 INJECTION INTRAVENOUS at 21:35

## 2023-11-20 RX ADMIN — TRAMADOL HYDROCHLORIDE 50 MG: 50 TABLET, COATED ORAL at 12:46

## 2023-11-20 RX ADMIN — SODIUM CHLORIDE, PRESERVATIVE FREE 10 ML: 5 INJECTION INTRAVENOUS at 08:15

## 2023-11-20 RX ADMIN — ALLOPURINOL 100 MG: 100 TABLET ORAL at 08:12

## 2023-11-20 RX ADMIN — TIMOLOL MALEATE 1 DROP: 5 SOLUTION OPHTHALMIC at 08:15

## 2023-11-20 RX ADMIN — METOPROLOL TARTRATE 25 MG: 25 TABLET ORAL at 21:25

## 2023-11-20 RX ADMIN — FUROSEMIDE 20 MG: 20 TABLET ORAL at 08:11

## 2023-11-20 RX ADMIN — ACETAMINOPHEN 650 MG: 325 TABLET ORAL at 08:12

## 2023-11-20 RX ADMIN — WARFARIN SODIUM 5 MG: 5 TABLET ORAL at 17:48

## 2023-11-20 RX ADMIN — DILTIAZEM HYDROCHLORIDE 180 MG: 180 CAPSULE, COATED, EXTENDED RELEASE ORAL at 08:11

## 2023-11-20 RX ADMIN — IPRATROPIUM BROMIDE 2 SPRAY: 21 SPRAY NASAL at 11:53

## 2023-11-20 ASSESSMENT — ENCOUNTER SYMPTOMS
NAUSEA: 0
ABDOMINAL DISTENTION: 0
STRIDOR: 0
WHEEZING: 0
COUGH: 0
VOMITING: 0
SHORTNESS OF BREATH: 0
ABDOMINAL PAIN: 0
DIARRHEA: 0
CONSTIPATION: 0

## 2023-11-20 ASSESSMENT — PAIN DESCRIPTION - DESCRIPTORS
DESCRIPTORS: ACHING
DESCRIPTORS: ACHING
DESCRIPTORS: ACHING;DISCOMFORT;SHARP

## 2023-11-20 ASSESSMENT — PAIN DESCRIPTION - LOCATION
LOCATION: NECK;SHOULDER
LOCATION: NECK;SHOULDER
LOCATION: NECK
LOCATION: NECK;SHOULDER

## 2023-11-20 ASSESSMENT — PAIN SCALES - GENERAL
PAINLEVEL_OUTOF10: 6
PAINLEVEL_OUTOF10: 7
PAINLEVEL_OUTOF10: 8
PAINLEVEL_OUTOF10: 6

## 2023-11-20 ASSESSMENT — PAIN DESCRIPTION - ORIENTATION
ORIENTATION: RIGHT;LEFT;MID
ORIENTATION: RIGHT;LEFT;MID
ORIENTATION: LEFT;RIGHT
ORIENTATION: MID

## 2023-11-20 ASSESSMENT — PAIN - FUNCTIONAL ASSESSMENT: PAIN_FUNCTIONAL_ASSESSMENT: PREVENTS OR INTERFERES WITH ALL ACTIVE AND SOME PASSIVE ACTIVITIES

## 2023-11-20 NOTE — PLAN OF CARE
Problem: Discharge Planning  Goal: Discharge to home or other facility with appropriate resources  11/20/2023 0404 by Yao Shannon RN  Outcome: Progressing  11/19/2023 1601 by Delmer Licona RN  Outcome: Progressing     Problem: Safety - Adult  Goal: Free from fall injury  11/20/2023 0404 by Yao Shannon RN  Outcome: Progressing  11/19/2023 1601 by Delmer Licona RN  Outcome: Progressing     Problem: Skin/Tissue Integrity  Goal: Absence of new skin breakdown  Description: 1. Monitor for areas of redness and/or skin breakdown  2. Assess vascular access sites hourly  3. Every 4-6 hours minimum:  Change oxygen saturation probe site  4. Every 4-6 hours:  If on nasal continuous positive airway pressure, respiratory therapy assess nares and determine need for appliance change or resting period.   11/20/2023 0404 by Yao Shannon RN  Outcome: Progressing  11/19/2023 1601 by Delmer Licona RN  Outcome: Progressing     Problem: ABCDS Injury Assessment  Goal: Absence of physical injury  11/20/2023 0404 by Yao Shannon RN  Outcome: Progressing  11/19/2023 1601 by Delmer Licona RN  Outcome: Progressing     Problem: Pain  Goal: Verbalizes/displays adequate comfort level or baseline comfort level  11/20/2023 0404 by Yao Shannon RN  Outcome: Progressing  11/19/2023 1601 by Delmer Licona RN  Outcome: Progressing     Problem: Nutrition Deficit:  Goal: Optimize nutritional status  11/20/2023 0404 by Yao Shannon RN  Outcome: Progressing  11/19/2023 1601 by Delmer Licona RN  Outcome: Progressing     Problem: Chronic Conditions and Co-morbidities  Goal: Patient's chronic conditions and co-morbidity symptoms are monitored and maintained or improved  11/20/2023 0404 by Yao Shannon RN  Outcome: Progressing  11/19/2023 1601 by Delmer Licona RN  Outcome: Progressing

## 2023-11-20 NOTE — CARE COORDINATION
Met with pt to discuss SNF choices for rehab, he would like me to call his daughter Yaw Trujillo to discuss and possible choices, he feels he can do better with pt, explained if he is able to go home we can cancel any plans for SNF that have been made.  Call to Yaw Trujillo but unable to leave message, will attempt later

## 2023-11-20 NOTE — CARE COORDINATION
Spoke with pts daughter Eldred Shone to discuss choices, choices obtained, she was also able to speak with Dr Blair Rust as he was on floor. Referrals sent    Call to Bina Randle at [facility] to notify him of referral, he will call back when he reviews as this is first choice, referral also sent to [facility] Return call to Bina Randle from [address] [facility] relates they can accept pt but it will be a  room until they get a private room, pts daughter notified \"Alexandra\" she is going to call [facility] to discuss a private room and she will call back.      [id number] Call from [facility], they will not have a bed until later this week but they can accept

## 2023-11-21 VITALS
DIASTOLIC BLOOD PRESSURE: 77 MMHG | RESPIRATION RATE: 17 BRPM | WEIGHT: 157.19 LBS | TEMPERATURE: 97 F | BODY MASS INDEX: 26.84 KG/M2 | HEIGHT: 64 IN | SYSTOLIC BLOOD PRESSURE: 123 MMHG | OXYGEN SATURATION: 97 % | HEART RATE: 66 BPM

## 2023-11-21 DIAGNOSIS — S00.83XA FACIAL HEMATOMA, INITIAL ENCOUNTER: ICD-10-CM

## 2023-11-21 LAB
ANION GAP SERPL CALCULATED.3IONS-SCNC: 8 MMOL/L (ref 9–17)
BASOPHILS # BLD: 0 K/UL (ref 0–0.2)
BASOPHILS NFR BLD: 0 % (ref 0–2)
BUN SERPL-MCNC: 21 MG/DL (ref 8–23)
CALCIUM SERPL-MCNC: 8.5 MG/DL (ref 8.6–10.4)
CHLORIDE SERPL-SCNC: 104 MMOL/L (ref 98–107)
CO2 SERPL-SCNC: 24 MMOL/L (ref 20–31)
CREAT SERPL-MCNC: 1 MG/DL (ref 0.7–1.2)
EOSINOPHIL # BLD: 0 K/UL (ref 0–0.4)
EOSINOPHILS RELATIVE PERCENT: 0 % (ref 1–4)
ERYTHROCYTE [DISTWIDTH] IN BLOOD BY AUTOMATED COUNT: 16 % (ref 11.8–14.4)
GFR SERPL CREATININE-BSD FRML MDRD: >60 ML/MIN/1.73M2
GLUCOSE SERPL-MCNC: 83 MG/DL (ref 70–99)
HCT VFR BLD AUTO: 41.1 % (ref 40.7–50.3)
HGB BLD-MCNC: 12.1 G/DL (ref 13–17)
IMM GRANULOCYTES # BLD AUTO: 0 K/UL (ref 0–0.3)
IMM GRANULOCYTES NFR BLD: 0 %
INR PPP: 2.1
LYMPHOCYTES NFR BLD: 1.17 K/UL (ref 1–4.8)
LYMPHOCYTES RELATIVE PERCENT: 15 % (ref 24–44)
MAGNESIUM SERPL-MCNC: 2.4 MG/DL (ref 1.6–2.6)
MCH RBC QN AUTO: 27.3 PG (ref 25.2–33.5)
MCHC RBC AUTO-ENTMCNC: 29.4 G/DL (ref 28.4–34.8)
MCV RBC AUTO: 92.8 FL (ref 82.6–102.9)
MONOCYTES NFR BLD: 1.09 K/UL (ref 0.1–0.8)
MONOCYTES NFR BLD: 14 % (ref 1–7)
MORPHOLOGY: ABNORMAL
NEUTROPHILS NFR BLD: 71 % (ref 36–66)
NEUTS SEG NFR BLD: 5.54 K/UL (ref 1.8–7.7)
NRBC BLD-RTO: 0 PER 100 WBC
PATH REV BLD -IMP: NORMAL
PLATELET # BLD AUTO: ABNORMAL K/UL (ref 138–453)
PLATELET, FLUORESCENCE: 143 K/UL (ref 138–453)
PLATELETS.RETICULATED NFR BLD AUTO: 5.7 % (ref 1.1–10.3)
POTASSIUM SERPL-SCNC: 3.4 MMOL/L (ref 3.7–5.3)
PROTHROMBIN TIME: 23.5 SEC (ref 11.7–14.9)
RBC # BLD AUTO: 4.43 M/UL (ref 4.21–5.77)
SODIUM SERPL-SCNC: 136 MMOL/L (ref 135–144)
WBC OTHER # BLD: 7.8 K/UL (ref 3.5–11.3)

## 2023-11-21 PROCEDURE — 85610 PROTHROMBIN TIME: CPT

## 2023-11-21 PROCEDURE — 6370000000 HC RX 637 (ALT 250 FOR IP): Performed by: STUDENT IN AN ORGANIZED HEALTH CARE EDUCATION/TRAINING PROGRAM

## 2023-11-21 PROCEDURE — 85025 COMPLETE CBC W/AUTO DIFF WBC: CPT

## 2023-11-21 PROCEDURE — 80048 BASIC METABOLIC PNL TOTAL CA: CPT

## 2023-11-21 PROCEDURE — 85055 RETICULATED PLATELET ASSAY: CPT

## 2023-11-21 PROCEDURE — 2580000003 HC RX 258

## 2023-11-21 PROCEDURE — 6370000000 HC RX 637 (ALT 250 FOR IP)

## 2023-11-21 PROCEDURE — 36415 COLL VENOUS BLD VENIPUNCTURE: CPT

## 2023-11-21 PROCEDURE — 99239 HOSP IP/OBS DSCHRG MGMT >30: CPT | Performed by: STUDENT IN AN ORGANIZED HEALTH CARE EDUCATION/TRAINING PROGRAM

## 2023-11-21 PROCEDURE — 83735 ASSAY OF MAGNESIUM: CPT

## 2023-11-21 PROCEDURE — 97116 GAIT TRAINING THERAPY: CPT

## 2023-11-21 PROCEDURE — 97110 THERAPEUTIC EXERCISES: CPT

## 2023-11-21 RX ORDER — CYCLOBENZAPRINE HCL 10 MG
10 TABLET ORAL 3 TIMES DAILY PRN
Status: DISCONTINUED | OUTPATIENT
Start: 2023-11-21 | End: 2023-11-21 | Stop reason: HOSPADM

## 2023-11-21 RX ORDER — CYCLOBENZAPRINE HCL 10 MG
10 TABLET ORAL 3 TIMES DAILY PRN
Qty: 30 TABLET | Refills: 0 | Status: SHIPPED | OUTPATIENT
Start: 2023-11-21 | End: 2023-12-21

## 2023-11-21 RX ORDER — WARFARIN SODIUM 2.5 MG/1
2.5 TABLET ORAL
Status: DISCONTINUED | OUTPATIENT
Start: 2023-11-21 | End: 2023-11-21 | Stop reason: HOSPADM

## 2023-11-21 RX ORDER — TRAMADOL HYDROCHLORIDE 50 MG/1
50 TABLET ORAL EVERY 8 HOURS PRN
Qty: 30 TABLET | Refills: 0 | Status: SHIPPED | OUTPATIENT
Start: 2023-11-21 | End: 2023-12-01

## 2023-11-21 RX ORDER — CYCLOBENZAPRINE HCL 10 MG
10 TABLET ORAL 3 TIMES DAILY PRN
Qty: 30 TABLET | Refills: 0 | Status: SHIPPED | OUTPATIENT
Start: 2023-11-21 | End: 2023-11-21 | Stop reason: SDUPTHER

## 2023-11-21 RX ADMIN — ACETAMINOPHEN 650 MG: 325 TABLET ORAL at 01:37

## 2023-11-21 RX ADMIN — DILTIAZEM HYDROCHLORIDE 180 MG: 180 CAPSULE, COATED, EXTENDED RELEASE ORAL at 08:45

## 2023-11-21 RX ADMIN — PANTOPRAZOLE SODIUM 40 MG: 40 TABLET, DELAYED RELEASE ORAL at 06:34

## 2023-11-21 RX ADMIN — FUROSEMIDE 20 MG: 20 TABLET ORAL at 08:44

## 2023-11-21 RX ADMIN — TIMOLOL MALEATE 1 DROP: 5 SOLUTION OPHTHALMIC at 08:45

## 2023-11-21 RX ADMIN — SODIUM CHLORIDE, PRESERVATIVE FREE 10 ML: 5 INJECTION INTRAVENOUS at 08:48

## 2023-11-21 RX ADMIN — POTASSIUM BICARBONATE 40 MEQ: 782 TABLET, EFFERVESCENT ORAL at 08:44

## 2023-11-21 RX ADMIN — ALLOPURINOL 100 MG: 100 TABLET ORAL at 08:44

## 2023-11-21 RX ADMIN — METOPROLOL TARTRATE 25 MG: 25 TABLET ORAL at 08:44

## 2023-11-21 RX ADMIN — CYCLOBENZAPRINE 10 MG: 10 TABLET, FILM COATED ORAL at 08:45

## 2023-11-21 RX ADMIN — PREDNISONE 5 MG: 5 TABLET ORAL at 08:44

## 2023-11-21 RX ADMIN — TRAMADOL HYDROCHLORIDE 50 MG: 50 TABLET, COATED ORAL at 00:27

## 2023-11-21 ASSESSMENT — PAIN DESCRIPTION - LOCATION
LOCATION: NECK

## 2023-11-21 ASSESSMENT — PAIN - FUNCTIONAL ASSESSMENT
PAIN_FUNCTIONAL_ASSESSMENT: ACTIVITIES ARE NOT PREVENTED

## 2023-11-21 ASSESSMENT — PAIN SCALES - GENERAL
PAINLEVEL_OUTOF10: 7
PAINLEVEL_OUTOF10: 8
PAINLEVEL_OUTOF10: 8
PAINLEVEL_OUTOF10: 9

## 2023-11-21 ASSESSMENT — ENCOUNTER SYMPTOMS
NAUSEA: 0
SHORTNESS OF BREATH: 0
DIARRHEA: 0
ABDOMINAL PAIN: 0
ABDOMINAL DISTENTION: 0
WHEEZING: 0
VOMITING: 0
STRIDOR: 0
CONSTIPATION: 0
COUGH: 0

## 2023-11-21 ASSESSMENT — PAIN DESCRIPTION - ORIENTATION
ORIENTATION: POSTERIOR;MID
ORIENTATION: POSTERIOR;MID

## 2023-11-21 ASSESSMENT — PAIN DESCRIPTION - DESCRIPTORS
DESCRIPTORS: ACHING;DISCOMFORT

## 2023-11-21 NOTE — PLAN OF CARE
Problem: Discharge Planning  Goal: Discharge to home or other facility with appropriate resources  11/21/2023 1221 by Asmita Salmeron RN  Outcome: Completed  11/21/2023 0418 by Stephen Holstein  Outcome: Progressing     Problem: Safety - Adult  Goal: Free from fall injury  11/21/2023 1221 by Asmita Salmeron RN  Outcome: Completed  11/21/2023 0418 by Stephen Holstein  Outcome: Progressing     Problem: Skin/Tissue Integrity  Goal: Absence of new skin breakdown  Description: 1. Monitor for areas of redness and/or skin breakdown  2. Assess vascular access sites hourly  3. Every 4-6 hours minimum:  Change oxygen saturation probe site  4. Every 4-6 hours:  If on nasal continuous positive airway pressure, respiratory therapy assess nares and determine need for appliance change or resting period.   11/21/2023 1221 by Asmita Salmeron RN  Outcome: Completed  11/21/2023 0418 by Stephen Holstein  Outcome: Progressing     Problem: ABCDS Injury Assessment  Goal: Absence of physical injury  11/21/2023 1221 by Asmita Salmeron RN  Outcome: Completed  11/21/2023 0418 by Stephen Holstein  Outcome: Progressing     Problem: Pain  Goal: Verbalizes/displays adequate comfort level or baseline comfort level  11/21/2023 1221 by Asmita Salmeron RN  Outcome: Completed  11/21/2023 0418 by Stephen Holstein  Outcome: Progressing     Problem: Nutrition Deficit:  Goal: Optimize nutritional status  11/21/2023 1221 by Asmita Salmeron RN  Outcome: Completed  11/21/2023 0418 by Stephen Holstein  Outcome: Progressing     Problem: Chronic Conditions and Co-morbidities  Goal: Patient's chronic conditions and co-morbidity symptoms are monitored and maintained or improved  11/21/2023 1221 by Asmita Salmeron RN  Outcome: Completed  11/21/2023 0418 by Stephen Holstein  Outcome: Progressing

## 2023-11-21 NOTE — CARE COORDINATION
Transitional Planning:  Call to [facility] [phone]. Spoke with Madisyn Novoa who has spoken to daughter Emilee Mckeon. They can receive patient today. Report  [phone] ask for [address] nurse. Fax:  [phone]    Paperwork for transport faxed to [address] [place] [address] attending to reconcile med list..    12:20  Transport time 12:45-13:00. Nurse notified. She will notify patient and family  Madisyn Novoa at [facility] notified. 13:10  MIRLANDE faxed to SNF.

## 2023-11-21 NOTE — PROGRESS NOTES
[address] [place] [address] Neurology   [address] [facility]    Progress Note             Date:   11/21/2023  Patient name:  Martinez Bryant  Date of admission:  11/18/2023  1:39 AM  MRN:   [id number]  Account:  [de-identified]  YOB: 1931  PCP:    Linda Calvo MD  Room:   2594/7888-09  Code Status:    Prior    Chief Complaint:     Chief Complaint   Patient presents with    Extremity Weakness     left       Interval hx:     Patient was seen and examined at the bedside. No acute overnight events. VSS. Alert and oriented x4. The patient reports improvement in left-sided neck pain, left upper extremity soreness. Given 50 mg p.o. Ultram tablet this morning. He was also given 10 mg Flexeril tablet. Discharge to SNF today. Brief History of Present Illness: The patient is a 80 y.o. Non- / non  male with PMH of paroxysmal A-fib (on Coumadin), CHF, HTN, gout who presents as a transfer from [facility] ED due to strokelike symptoms for further diagnostical work-up. He presented today complaining of left-sided weakness that started about 2 days ago, he also endorses having neck pain and pain in his left shoulder. Patient is independent in terms of activities of daily living at his baseline and lives by himself. He usually ambulates with walker. CTA was obtained and sent Shirley Humphreys to rule out dissection -came back unremarkable. Patient was transferred to [facility] to get MRI brain cervical spine done to rule out stroke and cervical myelopathy. Upon evaluation in ER patient does not appear to be in acute distress, he is tachycardic with irregular HR. on his neurological exam there is left nasolabial fold flattening, no drift was noted, however, hand  strength is weaker on the left side, dorsiflexion and plantar extension of the left foot is 4-, no pathological reflexes. Patient denies having numbness or tingling, double vision or headache.     11/20:
[address]  Speech Language Pathology    Date: 11/18/2023  Patient Name: Julia Cunha  YOB: 1931   AGE: 80 y.o. MRN: [id number]        Patient Not Available for Speech Therapy     Due to:  [x] Testing  [] Hemodialysis  [] Cancelled by RN  [] Surgery   [] Intubation/Sedation/Pain Medication  [] Medical instability  [] Other:    Next scheduled treatment: will try to get back today or 11/19/2023    Completed by: VIRGILIO Hoff, M. Ed.  MELIZA-SLP
Comprehensive Nutrition Assessment    Type and Reason for Visit:  Positive Nutrition Screen (wt loss, poor po)    Nutrition Recommendations/Plan:   Continue current diet, Regular with 1.8 L FR  Monitor/encourage PO intake     Malnutrition Assessment:  Malnutrition Status:  No malnutrition (11/18/23 1012)    Context:  Acute Illness     Findings of the 6 clinical characteristics of malnutrition:  Energy Intake:  Mild decrease in energy intake (Comment)  Weight Loss:  No significant weight loss     Body Fat Loss:  No significant body fat loss     Muscle Mass Loss:  No significant muscle mass loss    Fluid Accumulation:  Mild     Strength:  Not Performed    Nutrition Assessment:    79 yo M adm stroke-like symptoms from [facility]. PMH significant for GERD, diverticulitis, HLD, HTN, IBS. Per pt, UBW =164 lbs, endorses wt loss, unsure how much. Per chat, pt with 6% wt loss x 6 mos, not significant. Pt/wife note he has not eaten since Wednesday evening x 2 days and pt is very hungry. RN advanced diet to Regular w/ FR during discussion. Per pt NKFA. No BM noted. +2 pitting BLE edema noted. Nutrition Related Findings:    labs/meds reviewed Wound Type: None       Current Nutrition Intake & Therapies:    Average Meal Intake: NPO  Average Supplements Intake: None Ordered  ADULT DIET; Regular; 1800 ml    Anthropometric Measures:  Height: 162.6 cm (5' 4.02\")  Ideal Body Weight (IBW): 130 lbs (59 kg)    Admission Body Weight: 71.3 kg (157 lb 3 oz) (11/18/23)  Current Body Weight: 71.3 kg (157 lb 3 oz), 120.9 % IBW. Current BMI (kg/m2): 27  Usual Body Weight: 75.8 kg (167 lb 1.7 oz) (5/17/23)  % Weight Change (Calculated): -5.9                    BMI Categories: Overweight (BMI 25.0-29. 9)    Estimated Daily Nutrient Needs:  Energy Requirements Based On: Kcal/kg  Weight Used for Energy Requirements: Current  Energy (kcal/day): 1500 to 1600 kcal/day  Weight Used for Protein Requirements: Current  Protein (g/day): 80 to 90
Occupational Therapy  Facility/Department: [facility]/MED SURG  Occupational Therapy Initial Assessment    Name: Sofiya Coreas  : 1931  MRN: [id number]  Date of Service: 2023    Discharge Recommendations:   Further therapy recommended at discharge. Patient Diagnosis(es): The encounter diagnosis was Left-sided weakness. Past Medical History:  has a past medical history of A-fib ([address]), Acute MI ([address]), Allergic rhinitis, Anemia, Atrial fibrillation ([address]), Basal cell cancer, Basal cell cancer, Cervical radiculopathy, Diverticulitis, GERD (gastroesophageal reflux disease), Glaucoma, Hyperlipidemia, Hypertension, Hyponatremia, IBS (irritable bowel syndrome), Insomnia, Osteoarthritis, Osteoarthritis/neck pain. , Prostate cancer [facility]), Renal calculi, Shingles, Status post prostatectomy, Urinary frequency, Wears glasses, and Wears hearing aid in both ears. Past Surgical History:  has a past surgical history that includes Spine surgery (2003); Lithotripsy; Skin cancer excision; skin biopsy (2013); Skin cancer excision (Left, 2013); Colonoscopy (); Colonoscopy (2013); skin biopsy (Left, 2014); Mohs surgery (Left, 03/10/2014); Mohs surgery (2014); other surgical history (2015); Cholecystectomy (2015); Skin cancer excision (Right, 2016); Mohs surgery (Left, 2017); Mohs surgery (Left, 10/21/2019); Mohs surgery (2020); hernia repair (Right); Prostatectomy (2021); Prostatectomy (N/A, 2021); Mohs surgery (2023); and Skin cancer excision (2023). Assessment   Performance deficits / Impairments: Decreased functional mobility ; Decreased ADL status; Decreased endurance;Decreased high-level IADLs;Decreased balance;Decreased strength;Decreased ROM  Assessment: pt demonstrated above deficits impacting occupational performance.  pt would benefit from continued acute OT, as well as at discharge in order to increase safety and
Patient HR sustaining over 120.  Messaged attending, no new orders
Patient discharged from unit with all belongings via transport. Patient verbalized understanding of discharge instructions and denied further questions or concerns. Peripheral IV removed prior to discharge. Writer gave report to receiving facility RN named Lubna. All questions answered. Writer requested resident to send Flexeril and Ultram to [facility]. Lubna reports that is the pharmacy that the SNF uses. [phone] to have resident change pharmacies. Writer contacted meds to beds to transfer Flexeril script to [facility].
Pharmacy Note  Warfarin Consult    Francois Mckeon is a 80 y.o. male for whom pharmacy has been consulted to manage warfarin therapy. Consulting Physician: Haily Radford  Reason for Admission: Stroke-like symptoms    Warfarin dose prior to admission: 5mg on Tuesday, Thursday, Saturday and 2.5mg all other days  Warfarin indication: Afib  Target INR range: 2-3     Past Medical History:   Diagnosis Date    A-fib ([address])     Acute MI ([address])     Allergic rhinitis 09/02/2011    Anemia     Atrial fibrillation (HCC)     Dr. Anali Jaramillo, [address] cell cancer 03/2013    left side of head, face chin    Basal cell cancer 03/10/2014    left cheek    Cervical radiculopathy     Diverticulitis 02/17/2013    GERD (gastroesophageal reflux disease)     Glaucoma     left eye    Hyperlipidemia     Hypertension     Dr. Racheal Salamanca    Hyponatremia 09/02/2011    IBS (irritable bowel syndrome) 09/02/2011    Insomnia 09/02/2011    Osteoarthritis     Osteoarthritis/neck pain. 09/02/2011    Prostate cancer ([address]) 08/18/2021    active surviellence monitoring PSA    Renal calculi     Shingles     history of shingles    Status post prostatectomy 08/18/2021    Simple prostatectomy for BPH    Urinary frequency     Wears glasses     Wears hearing aid in both ears                 Recent Labs     11/18/23  0245   INR 2.1     Recent Labs     11/17/23  1850   HGB 12.2*   HCT 38.2*   *       Current warfarin drug-drug interactions: Allopurinol, aspirin, acetaminophen      Date             INR        Dose   11/18/2023            2.1       5mg    Daily PT/INR while inpatient. PT/INR ordered to start 11/19 with morning labs. Thank you for the consult. Will continue to follow.
Pharmacy Note  Warfarin Consult follow-up      Recent Labs     11/20/23  0643   INR 1.7     Recent Labs     11/17/23  1850 11/19/23  0316 11/20/23  0643   HGB 12.2* 12.2* 12.7*   HCT 38.2* 42.0 42.9   * See Reflexed IPF Result See Reflexed IPF Result       Significant Drug-Drug Interactions:  New warfarin drug-drug interactions: none  Discontinued drug-drug interactions: none      Notes: Will order 5 mg today. Daily PT/INR while inpatient.      Thank you  Eliane Babb, PharmD, [facility]  Inpatient Clinical Pharmacist  [phone]
Pharmacy Note  Warfarin Consult follow-up      Recent Labs     11/21/23  0706   INR 2.1     Recent Labs     11/19/23  0316 11/20/23  0643 11/21/23  0706   HGB 12.2* 12.7* 12.1*   HCT 42.0 42.9 41.1   PLT See Reflexed IPF Result See Reflexed IPF Result See Reflexed IPF Result       Significant Drug-Drug Interactions:  New warfarin drug-drug interactions:   Discontinued drug-drug interactions:       Notes: Will order 2.5 mg today. Then consider starting home dose tomorrow or at discharge, 5 mg tues, thur, sat and 2.5 mg all other days    Daily PT/INR while inpatient.      Thank you  Stefani Barreto, PharmD, [facility]  Inpatient Clinical Pharmacist  [phone]
Pharmacy Note  Warfarin Consult follow-up    Warfarin dose prior to admission: 5 mg Tu,Th, Sat and 2.5 mg all other days (per Jobst, last visit 11/15)  Warfarin indication: afib  Target INR range: ordered as 2-3 (per outpatient notes 2-2.5)     Recent Labs     11/18/23  0245   INR 2.1     Recent Labs     11/17/23  1850   HGB 12.2*   HCT 38.2*   *       Current warfarin drug-drug interactions: prednisone (chronic med, per notes patient was recently receiving a short duration of higher daily dosing followed by a taper), asa (held), allopurinol     Date INR Dose   11/18 2.1            Notes:  - Patient had a supratherapeutic INR of 7.8 on 11/15 where he was instructed to hold warfarin through 11/18 (took last dose of 5 mg on 11/15); this was suspected to be the result of steroid use   - INR therapeutic today, will order Warfarin 2.5 mg x1 today                    Daily PT/INR while inpatient.      Osiel Arias, PharmD, 11/18/2023 2:46 PM
Pharmacy Note  Warfarin Consult follow-up    Warfarin dose prior to admission: 5 mg Tu,Th, Sat and 2.5 mg all other days (per Jobst, last visit 11/15)  Warfarin indication: afib  Target INR range: ordered as 2-3 (per outpatient notes 2-2.5)     Recent Labs     11/19/23  0316   INR 1.7     Recent Labs     11/17/23  1850 11/19/23  0316   HGB 12.2* 12.2*   HCT 38.2* 42.0   * See Reflexed IPF Result   Plt on 11/19: 118    Current warfarin drug-drug interactions: prednisone (chronic med, per notes patient was recently receiving a short duration of higher daily dosing followed by a taper), asa (held), allopurinol     Date INR Dose   11/18 2.1 2.5 mg   11/19 1.7       Notes:  - Patient had a supratherapeutic INR of 7.8 on 11/15 where he was instructed to hold warfarin through 11/18 (took last dose of 5 mg on 11/15); this was suspected to be the result of steroid use   - INR subtherapeutic, will order Warfarin 5 mg x1 today                    Daily PT/INR while inpatient.      Elizabeth Rodriguez, PharmD, 11/19/2023 8:52 AM
Physical Therapy  Facility/Department: [facility] ONC/MED SURG  Physical Therapy Initial Assessment    Name: Antonio Porras  : 1931  MRN: [id number]  Date of Service: 2023  Chief Complaint   Patient presents with    Extremity Weakness     left        Discharge Recommendations:  Patient would benefit from continued therapy after discharge   PT Equipment Recommendations  Equipment Needed: No      Patient Diagnosis(es): The encounter diagnosis was Left-sided weakness. Past Medical History:  has a past medical history of A-fib ([address]), Acute MI ([address]), Allergic rhinitis, Anemia, Atrial fibrillation ([address]), Basal cell cancer, Basal cell cancer, Cervical radiculopathy, Diverticulitis, GERD (gastroesophageal reflux disease), Glaucoma, Hyperlipidemia, Hypertension, Hyponatremia, IBS (irritable bowel syndrome), Insomnia, Osteoarthritis, Osteoarthritis/neck pain. , Prostate cancer [facility]), Renal calculi, Shingles, Status post prostatectomy, Urinary frequency, Wears glasses, and Wears hearing aid in both ears. Past Surgical History:  has a past surgical history that includes Spine surgery (2003); Lithotripsy; Skin cancer excision; skin biopsy (2013); Skin cancer excision (Left, 2013); Colonoscopy (); Colonoscopy (2013); skin biopsy (Left, 2014); Mohs surgery (Left, 03/10/2014); Mohs surgery (2014); other surgical history (2015); Cholecystectomy (2015); Skin cancer excision (Right, 2016); Mohs surgery (Left, 2017); Mohs surgery (Left, 10/21/2019); Mohs surgery (2020); hernia repair (Right); Prostatectomy (2021); Prostatectomy (N/A, 2021); Mohs surgery (2023); and Skin cancer excision (2023). Assessment   Body Structures, Functions, Activity Limitations Requiring Skilled Therapeutic Intervention: Decreased functional mobility ; Decreased body mechanics; Decreased strength;Decreased endurance;Decreased posture  Assessment: Pt demonstrate
Physician Progress Note      Oneida Savage  CSN #:                  [id number]  :                       1931  ADMIT DATE:       2023 1:39 AM  DISCH DATE:  RESPONDING  PROVIDER #:        Niki Finley MD          QUERY TEXT:    Patient admitted with left sided weakness, noted to have paroxysmal atrial   fibrillation and is maintained on coumadin. If possible, please document in   progress notes and discharge summary if you are evaluating and/or treating any   of the following: The medical record reflects the following:  Risk Factors: age, male, HTN CHF,  Clinical Indicators: PT- 21, 21, 19.5. INR - 2.1, 1.7, 1.7. HR - ,   irregular  Treatment: PO- Coumadin, Cardizem, Lopressor, Bus[par, Lasix. Thank you, please contact me for any questions. Glynn Charles RN, [employer]  cell- [phone]  office hours - [id number]  Options provided:  -- Secondary hypercoagulable state in a patient with atrial fibrillation  -- Other - I will add my own diagnosis  -- Disagree - Not applicable / Not valid  -- Disagree - Clinically unable to determine / Unknown  -- Refer to Clinical Documentation Reviewer    PROVIDER RESPONSE TEXT:    Provider disagreed with this query. patient has A-fib well maintained on Coumadin. no new CVA and symptoms   secondary to acute intractable cervical pain likely related to strained/   pulled cervical muscle    Query created by: Glynn Charles on 2023 2:12 PM      QUERY TEXT:    Patient admitted with left sided weakness, noted to have upper extremity   weakness only and then lower extremity weakness as well with left shoulder   pain. If possible, please document in progress notes and discharge summary if   you are evaluating and/or treating any of the following: The medical record reflects the following:  Risk Factors: age, cervical left neck/shoulder pain, cervical radiculopathy,   osteoarthritis, gout, HTN.   Clinical Indicators: c/o left sided
Physician Progress Note      Oneida Savage  CSN #:                  [id number]  :                       1931  ADMIT DATE:       2023 1:39 AM  DISCH DATE:  RESPONDING  PROVIDER #:        Amanda ALEXANDER MD          QUERY TEXT:    Pt admitted with left sided weakness and has CHF documented. If possible,   please clarify the type and acuity of CHF:    The medical record reflects the following:  Risk Factors: HTN, Paroxysmal atrial fibrillation,  systolic CHF  Clinical Indicators: ECHO- 2021- EF 25% with a reduced systolic   function. Hr noted as atrial fibrillation with rate from . Edema -   bilateral upper extremity - 1+ and bilateral lower extremity - 2+. Lungs-   clear and diminished. pro - BNP-= 7448  Treatment: PO- Lasix, Buspar, Lopressor, Coumadin, Cardizem. monitor. Thank you, please contact me for any questions. Glynn Charles RN, CDS  cell- [phone]  office hours - 630A-300K  Options provided:  -- Chronic Systolic CHF/HFrEF  -- Chronic Diastolic CHF/HFpEF  -- Chronic Systolic and Diastolic CHF  -- Other - I will add my own diagnosis  -- Disagree - Not applicable / Not valid  -- Disagree - Clinically unable to determine / Unknown  -- Refer to Clinical Documentation Reviewer    PROVIDER RESPONSE TEXT:    This patient has chronic systolic CHF/HFrEF.     Query created by: Glynn Charles on 2023 2:09 PM      Electronically signed by:  Agustin Valerio MD 2023 6:11 PM
SLP ALL NOTES  Facility/Department: [address] STE[address]  Initial Speech/Language/Cognitive Assessment    NAME: Antonio Porras  : 1931   MRN: [id number]  ADMISSION DATE: 2023    ADMITTING DIAGNOSIS: has Hyponatremia; Insomnia; Allergic rhinitis; Branch retinal vein occlusion, left eye; Cervical radiculopathy; Essential hypertension; Gastroesophageal reflux disease without esophagitis; Iron deficiency anemia; Arthritis, degenerative; Diverticulosis of large intestine without hemorrhage; B12 deficiency; Diastolic dysfunction; Elevated liver enzymes; Pure hypercholesterolemia; Irritable bowel syndrome without diarrhea; Paroxysmal atrial fibrillation ([address]); Low testosterone; Bradycardia; Chronic bilateral low back pain without sciatica; Gout; Anxiety; Chronic systolic congestive heart failure ([address]); DDD (degenerative disc disease), cervical; DDD (degenerative disc disease), lumbar; Spinal stenosis of lumbar region with neurogenic claudication; Depression; Status post prostatectomy; History of prostate cancer; Pseudogout; Chronic anticoagulation; Vitamin D deficiency; Fall from standing, initial encounter; Chronic diastolic heart failure ([address]); Permanent atrial fibrillation ([address]); Anticoagulated on Coumadin; Nonrheumatic aortic valve stenosis; Chronic gout of right foot; Syncope and collapse; Encounter for palliative care; Goals of care, counseling/discussion; and Stroke-like symptoms on their problem list.    DATE ONSET: 23      Date of Eval: 2023   Evaluating Therapist: VIRGILIO Valiente      RECENT RESULTS  CT OF HEAD/MRI: 2023  No acute ischemia or acute intracranial abnormality     Mild to moderate chronic microvascular disease within the periventricular  white matter     Moderate cerebral atrophy          Primary Complaint:   Jessika Frederick is an 79 yo man who was admitted to [facility] on 23 with left sided weakness (UE & LE) and neck pain that got worse over 2 days.  CTA r/o
[facility] Neurology   [address] [facility]    Progress Note             Date:   11/19/2023  Patient name:  Martinez Bryant  Date of admission:  11/18/2023  1:39 AM  MRN:   [id number]  Account:  [de-identified]  YOB: 1931  PCP:    Linda Calvo MD  Room:   [facility]8583-83  Code Status:    Full Code    Chief Complaint:     Chief Complaint   Patient presents with    Extremity Weakness     left       Interval hx: The patient was seen and examined at bedside. Is vitally stable, alert and oriented x 3. No acute events overnight. The patient stated that he feels his weakness is significantly improved since presentation. He does endorse bilateral neck pain since presentation and states its much worse with movement either way. He has been experiencing this pain since presentation and stated that the neck pain improves or worsens on its own. He has been using Tylenol with no relief. Muscle relaxant ordered. Brief History of Present Illness: The patient is a 80 y.o. Non- / non  male with PMH of paroxysmal A-fib (on Coumadin), CHF, HTN, gout who presents as a transfer from [facility] ED due to strokelike symptoms for further diagnostical work-up. He presented today complaining of left-sided weakness that started about 2 days ago, he also endorses having neck pain and pain in his left shoulder. Patient is independent in terms of activities of daily living at his baseline and lives by himself. He usually ambulates with walker. CTA was obtained and sent Shirley Humphreys to rule out dissection -came back unremarkable. Patient was transferred to [facility] to get MRI brain cervical spine done to rule out stroke and cervical myelopathy. Upon evaluation in ER patient does not appear to be in acute distress, he is tachycardic with irregular HR.  on his neurological exam there is left nasolabial fold flattening, no drift was noted, however, hand  strength
[facility] Neurology   [address] [facility]    Progress Note             Date:   11/20/2023  Patient name:  Yaneth Schmidt  Date of admission:  11/18/2023  1:39 AM  MRN:   [id number]  Account:  [de-identified]  YOB: 1931  PCP:    Renaldo Ocampo MD  Room:   [address] [place] [address]  Code Status:    Full Code    Chief Complaint:     Chief Complaint   Patient presents with    Extremity Weakness     left       Interval hx: The patient was seen and examined at bedside. Is vitally stable, alert and oriented x 4. No acute events overnight. Patient endorses continued left neck pain and stiffness, left upper extremity soreness and stiffness. He denies headaches. Discussion with patient about SNF versus 24/7 care at home. Dr. Fernando Flores spoke with patient's daughter, who elected for SNF, options provided to patient and daughter by . Brief History of Present Illness: The patient is a 80 y.o. Non- / non  male with PMH of paroxysmal A-fib (on Coumadin), CHF, HTN, gout who presents as a transfer from [facility] ED due to strokelike symptoms for further diagnostical work-up. He presented today complaining of left-sided weakness that started about 2 days ago, he also endorses having neck pain and pain in his left shoulder. Patient is independent in terms of activities of daily living at his baseline and lives by himself. He usually ambulates with walker. CTA was obtained and sent Ariel Grace to rule out dissection -came back unremarkable. Patient was transferred to [facility] to get MRI brain cervical spine done to rule out stroke and cervical myelopathy. Upon evaluation in ER patient does not appear to be in acute distress, he is tachycardic with irregular HR.  on his neurological exam there is left nasolabial fold flattening, no drift was noted, however, hand  strength is weaker on the left side, dorsiflexion and plantar extension of the left
Back  Barriers to Learning: None  Education Outcome: Verbalized understanding      Therapy Time   Individual Concurrent Group Co-treatment   Time In 0919         Time Out 0945         Minutes 26         Timed Code Treatment Minutes: 232 [address], [facility]

## 2023-11-21 NOTE — PLAN OF CARE
Problem: Discharge Planning  Goal: Discharge to home or other facility with appropriate resources  11/21/2023 0418 by Ravindra Anthony  Outcome: Progressing  11/20/2023 1849 by Bib Gold RN  Outcome: Progressing     Problem: Safety - Adult  Goal: Free from fall injury  11/21/2023 0418 by Ravindra Anthony  Outcome: Progressing  11/20/2023 1849 by Bib Gold RN  Outcome: Progressing     Problem: Skin/Tissue Integrity  Goal: Absence of new skin breakdown  Description: 1. Monitor for areas of redness and/or skin breakdown  2. Assess vascular access sites hourly  3. Every 4-6 hours minimum:  Change oxygen saturation probe site  4. Every 4-6 hours:  If on nasal continuous positive airway pressure, respiratory therapy assess nares and determine need for appliance change or resting period.   11/21/2023 0418 by Ravindra Anthony  Outcome: Progressing  11/20/2023 1849 by Bib Gold RN  Outcome: Progressing     Problem: ABCDS Injury Assessment  Goal: Absence of physical injury  11/21/2023 0418 by Ravindra Anthony  Outcome: Progressing  11/20/2023 1849 by Bib Gold RN  Outcome: Progressing     Problem: Pain  Goal: Verbalizes/displays adequate comfort level or baseline comfort level  11/21/2023 0418 by Ravindra Anthony  Outcome: Progressing  11/20/2023 1849 by Bib Gold RN  Outcome: Progressing     Problem: Nutrition Deficit:  Goal: Optimize nutritional status  11/21/2023 0418 by Ravindra Anthony  Outcome: Progressing  11/20/2023 1849 by Bib Gold RN  Outcome: Progressing     Problem: Chronic Conditions and Co-morbidities  Goal: Patient's chronic conditions and co-morbidity symptoms are monitored and maintained or improved  11/21/2023 0418 by Ravindra Anthony  Outcome: Progressing  11/20/2023 1849 by Bib Gold RN  Outcome: Progressing

## 2023-11-21 NOTE — DISCHARGE SUMMARY
[address]     Department of Neurology    INPATIENT DISCHARGE SUMMARY        Patient Identification:  Carlie Najjar is a 80 y.o. male. :  1931  MRN: [id number]     Acct: [de-identified]   Admit Date:  2023  Discharge date and time: 23 afternoon   Attending Provider: Ramiro Arvizu MD                                     Admission Diagnoses:   Left-sided weakness [R53.1]  Stroke-like symptoms [R29.90]    Discharge Diagnoses:   Principal Problem:    Stroke-like symptoms  Active Problems:    Left-sided weakness    Cervical muscle pain    Muscle weakness of left upper extremity  Resolved Problems:    * No resolved hospital problems. *       Consults:   none    Brief Inpatient course: The patient is a 80 y.o. Non- / non  male with PMH of paroxysmal A-fib (on Coumadin), CHF, HTN, gout who presents as a transfer from [facility] ED due to strokelike symptoms for further diagnostical work-up. He presented complaining of left-sided weakness that started about 2 days prior, he also endorses having neck pain and pain in his left shoulder. Patient is independent in terms of activities of daily living at his baseline and lives by himself. He usually ambulates with walker. CTA was obtained and sent Remi Davis to rule out dissection -came back unremarkable. Patient was transferred to [facility] to get MRI brain cervical spine done to rule out stroke and cervical myelopathy. Upon evaluation in ER patient does not appear to be in acute distress, he is tachycardic with irregular HR. on his neurological exam there is left nasolabial fold flattening, no drift was noted, however, hand  strength is weaker on the left side, dorsiflexion and plantar extension of the left foot is 4-, no pathological reflexes. Patient denies having numbness or tingling, double vision or headache.     On the next day,  he endorsed  continued left neck pain and stiffness, left upper

## 2023-11-24 ENCOUNTER — OUTSIDE SERVICES (OUTPATIENT)
Dept: PRIMARY CARE CLINIC | Age: 88
End: 2023-11-24

## 2023-11-24 DIAGNOSIS — M62.81 MUSCLE WEAKNESS: ICD-10-CM

## 2023-11-24 DIAGNOSIS — I50.32 CHRONIC DIASTOLIC HEART FAILURE (HCC): Primary | ICD-10-CM

## 2023-11-24 DIAGNOSIS — I48.21 PERMANENT ATRIAL FIBRILLATION (HCC): ICD-10-CM

## 2023-11-24 ASSESSMENT — ENCOUNTER SYMPTOMS
DIARRHEA: 0
BACK PAIN: 1
SHORTNESS OF BREATH: 0
CONSTIPATION: 0
COUGH: 0
NAUSEA: 0

## 2023-11-25 NOTE — PROGRESS NOTES
Jesus Alberto Lino is a 80 y.o. male being seen for his weekly follow up. Location of visit: [facility]    Visit Date:  11/24/2023     Reason for Visit:    Chief Complaint   Patient presents with    Extremity Weakness      Extremity Weakness  Associated symptoms include arthralgias, fatigue, myalgias, neck pain and weakness. Pertinent negatives include no chest pain, chills, congestion, coughing, fever, headaches, nausea or rash. He was hospitalized with left sided weakness and stroke like weakness. Stroke workup was negative. He does have neck pain and left shoulder pain. He has muscle fatigue easily. History of CHF and Atrial fibrillation and back pain and HTN. He just completed physical therapy and is worn out. He is having some neck and shoulder pain and would like to rest.    Review of Systems   Constitutional:  Positive for activity change and fatigue. Negative for chills and fever. HENT:  Negative for congestion and nosebleeds. Respiratory:  Negative for cough and shortness of breath. Cardiovascular:  Negative for chest pain, palpitations and leg swelling. Gastrointestinal:  Negative for constipation, diarrhea and nausea. Musculoskeletal:  Positive for arthralgias, back pain, gait problem, myalgias, neck pain and neck stiffness. Skin:  Negative for rash and wound. Neurological:  Positive for weakness. Negative for light-headedness and headaches. Psychiatric/Behavioral:  Negative for dysphoric mood and sleep disturbance. The patient is not nervous/anxious. Physical Exam  Vitals and nursing note reviewed. Constitutional:       General: He is not in acute distress. Appearance: He is well-developed. He is not ill-appearing. HENT:      Head: Normocephalic and atraumatic. Right Ear: External ear normal.      Left Ear: External ear normal.   Eyes:      Conjunctiva/sclera: Conjunctivae normal.   Neck:      Thyroid: No thyromegaly.       Trachea: No tracheal

## 2023-11-28 ENCOUNTER — OUTSIDE SERVICES (OUTPATIENT)
Dept: PRIMARY CARE CLINIC | Age: 88
End: 2023-11-28

## 2023-11-28 DIAGNOSIS — M62.81 MUSCLE WEAKNESS OF LEFT UPPER EXTREMITY: ICD-10-CM

## 2023-11-28 DIAGNOSIS — M25.50 ARTHRALGIA, UNSPECIFIED JOINT: ICD-10-CM

## 2023-11-28 ASSESSMENT — ENCOUNTER SYMPTOMS
SHORTNESS OF BREATH: 0
VOMITING: 0
NAUSEA: 0
COUGH: 0
DIARRHEA: 0

## 2023-11-28 NOTE — ASSESSMENT & PLAN NOTE
states he always has arthritis pain but this is in all his joints and muscles and feels worse - is on prednisone and is supposed to get appt with neuro

## 2023-11-28 NOTE — PROGRESS NOTES
Virgen Green is a 80 y.o. male being seen for his weekly follow up. Location of visit: [facility]    HPI:  New today:Pt c/o pain in his arms and legs. Has prednisone, and flexeril prn        Review of Systems   Constitutional:  Negative for activity change, appetite change, chills, diaphoresis and fever. HENT:  Negative for congestion. Respiratory:  Negative for cough and shortness of breath. Cardiovascular:  Negative for chest pain and palpitations. Gastrointestinal:  Negative for diarrhea, nausea and vomiting. Genitourinary:  Negative for hematuria. Musculoskeletal:  Positive for arthralgias and myalgias. Skin:  Negative for rash. Neurological:  Negative for weakness and headaches. Psychiatric/Behavioral:  Negative for agitation, dysphoric mood and sleep disturbance. The patient is not nervous/anxious. Physical Exam  Vitals reviewed. Constitutional:       General: He is not in acute distress. HENT:      Head: Normocephalic and atraumatic. Nose: No congestion or rhinorrhea. Eyes:      General:         Right eye: No discharge. Left eye: No discharge. Cardiovascular:      Rate and Rhythm: Normal rate and regular rhythm. Pulmonary:      Effort: Pulmonary effort is normal. No respiratory distress. Breath sounds: Normal breath sounds. Abdominal:      Palpations: Abdomen is soft. Tenderness: There is no abdominal tenderness. Musculoskeletal:      Right lower leg: No edema. Left lower leg: No edema. Skin:     General: Skin is warm and dry. Neurological:      Mental Status: He is alert. Mental status is at baseline. ASSESSMENT:   Diagnosis Orders   1. Muscle weakness of left upper extremity        2. Arthralgia, unspecified joint            PLAN:  1. Muscle weakness of left upper extremity  Assessment & Plan:   stroke work up was negative in hospital.  Continue therapy.     2. Arthralgia, unspecified joint  Assessment & Plan:

## 2023-12-05 ENCOUNTER — OUTSIDE SERVICES (OUTPATIENT)
Dept: PRIMARY CARE CLINIC | Age: 88
End: 2023-12-05

## 2023-12-05 DIAGNOSIS — R55 SYNCOPE AND COLLAPSE: ICD-10-CM

## 2023-12-05 DIAGNOSIS — I48.21 PERMANENT ATRIAL FIBRILLATION (HCC): ICD-10-CM

## 2023-12-05 DIAGNOSIS — M54.6 ACUTE LEFT-SIDED THORACIC BACK PAIN: ICD-10-CM

## 2023-12-05 DIAGNOSIS — M25.50 ARTHRALGIA, UNSPECIFIED JOINT: Primary | ICD-10-CM

## 2023-12-05 ASSESSMENT — ENCOUNTER SYMPTOMS
COUGH: 0
VOMITING: 0
NAUSEA: 0
DIARRHEA: 0
SHORTNESS OF BREATH: 0

## 2023-12-05 NOTE — ASSESSMENT & PLAN NOTE
Multiple joints, recently left rib pain- slightly tender to touch. Not as bad today, but son states that he was really having a lot of pain yesterday. Lower to mid back and around into ribs. Has severe lumbar foraminal narrowing at multiple areas on MRI lumbar spine 2 weeks ago. Will get MRI thoracic spine as well to r/o compression fracture since pt is having such severe pain.

## 2023-12-05 NOTE — PROGRESS NOTES
Connye Burkitt is a 80 y.o. male being seen for his weekly follow up. Location of visit: [facility]    HPI:  New today:Pt still c/o all over pain. Mostly in his neck and in his left thoracic spine ribs. Tender to touch. Review of Systems   Constitutional:  Negative for activity change, appetite change, chills, diaphoresis and fever. HENT:  Negative for congestion. Respiratory:  Negative for cough and shortness of breath. Cardiovascular:  Negative for chest pain and palpitations. Gastrointestinal:  Negative for diarrhea, nausea and vomiting. Genitourinary:  Negative for hematuria. Musculoskeletal:  Negative for arthralgias and myalgias. Skin:  Negative for rash. Neurological:  Negative for weakness and headaches. Psychiatric/Behavioral:  Negative for agitation, dysphoric mood and sleep disturbance. The patient is not nervous/anxious. Physical Exam  Vitals reviewed. Constitutional:       General: He is not in acute distress. HENT:      Head: Normocephalic and atraumatic. Nose: No congestion or rhinorrhea. Eyes:      General:         Right eye: No discharge. Left eye: No discharge. Cardiovascular:      Rate and Rhythm: Normal rate and regular rhythm. Pulmonary:      Effort: Pulmonary effort is normal. No respiratory distress. Breath sounds: Normal breath sounds. Abdominal:      Palpations: Abdomen is soft. Tenderness: There is no abdominal tenderness. Musculoskeletal:         General: Tenderness (left ribs and thoracic spine area) present. Right lower leg: No edema. Left lower leg: No edema. Skin:     General: Skin is warm and dry. Neurological:      Mental Status: He is alert. Mental status is at baseline. ASSESSMENT:   Diagnosis Orders   1. Arthralgia, unspecified joint        2. Acute left-sided thoracic back pain  MRI THORACIC SPINE WO CONTRAST      3. Syncope and collapse        4.  Permanent atrial

## 2023-12-08 ENCOUNTER — TELEPHONE (OUTPATIENT)
Dept: FAMILY MEDICINE CLINIC | Age: 88
End: 2023-12-08

## 2023-12-08 NOTE — TELEPHONE ENCOUNTER
Patient's daughter is letting PCP know that he is currently in [facility] after being in the hospital for 4 days. Patient was in the hospital due to \"his muscles giving out on him\". Patient is just very weak and is using a wheel chair most days. If any questions or concerns patients daughter would like a call back from [facility].

## 2023-12-12 ENCOUNTER — OUTSIDE SERVICES (OUTPATIENT)
Dept: PRIMARY CARE CLINIC | Age: 88
End: 2023-12-12

## 2023-12-12 DIAGNOSIS — M54.2 CERVICAL MUSCLE PAIN: ICD-10-CM

## 2023-12-12 DIAGNOSIS — H61.22 IMPACTED CERUMEN OF LEFT EAR: Primary | ICD-10-CM

## 2023-12-12 DIAGNOSIS — Z79.01 CHRONIC ANTICOAGULATION: ICD-10-CM

## 2023-12-12 DIAGNOSIS — M25.50 ARTHRALGIA, UNSPECIFIED JOINT: ICD-10-CM

## 2023-12-12 ASSESSMENT — ENCOUNTER SYMPTOMS
DIARRHEA: 0
VOMITING: 0
COUGH: 0
SHORTNESS OF BREATH: 0
NAUSEA: 0

## 2023-12-12 NOTE — PROGRESS NOTES
Lilibeth Graff is a 80 y.o. male being seen for his weekly follow up. Location of visit: [facility]    HPI:  New today:Pt states pain is a little better. Getting muscle relaxer and pain meds regularly and that seems to help. Continues with therapy. Family states increase trouble hearing recently. Review of Systems   Constitutional:  Negative for activity change, appetite change, chills, diaphoresis and fever. HENT:  Negative for congestion. Respiratory:  Negative for cough and shortness of breath. Cardiovascular:  Negative for chest pain and palpitations. Gastrointestinal:  Negative for diarrhea, nausea and vomiting. Genitourinary:  Negative for hematuria. Musculoskeletal:  Negative for arthralgias and myalgias. Skin:  Negative for rash. Neurological:  Negative for weakness and headaches. Psychiatric/Behavioral:  Negative for agitation, dysphoric mood and sleep disturbance. The patient is not nervous/anxious. Physical Exam  Vitals reviewed. Constitutional:       General: He is not in acute distress. HENT:      Head: Normocephalic and atraumatic. Left Ear: There is impacted cerumen. Nose: No congestion or rhinorrhea. Eyes:      General:         Right eye: No discharge. Left eye: No discharge. Cardiovascular:      Rate and Rhythm: Normal rate and regular rhythm. Pulmonary:      Effort: Pulmonary effort is normal. No respiratory distress. Breath sounds: Normal breath sounds. Abdominal:      Palpations: Abdomen is soft. Tenderness: There is no abdominal tenderness. Musculoskeletal:      Right lower leg: No edema. Left lower leg: No edema. Skin:     General: Skin is warm and dry. Neurological:      Mental Status: He is alert. Mental status is at baseline. ASSESSMENT:   Diagnosis Orders   1. Impacted cerumen of left ear        2. Cervical muscle pain        3. Arthralgia, unspecified joint        4.  Chronic

## 2023-12-15 ENCOUNTER — OUTSIDE SERVICES (OUTPATIENT)
Dept: PRIMARY CARE CLINIC | Age: 88
End: 2023-12-15

## 2023-12-15 DIAGNOSIS — R29.6 FREQUENT FALLS: Primary | ICD-10-CM

## 2023-12-15 DIAGNOSIS — M71.10 INFECTION OF BURSA: ICD-10-CM

## 2023-12-16 NOTE — PROGRESS NOTES
Kody Chavez is a 80 y.o. male being seen for his  requested  follow up. Location of visit: [facility]    Visit Date:  12/15/2023     Reason for Visit:    Chief Complaint   Patient presents with    Fall        Fall  Associated symptoms include headaches. Pertinent negatives include no fever. Asked to see resident after a fall due to swollen and red elbow. He thought he had to go to work this morning and attempted to get up and fell. Right elbow pain    Review of Systems   Constitutional:  Positive for fatigue. Negative for chills and fever. HENT: Negative. Negative for ear pain. Respiratory:  Negative for cough and shortness of breath. Cardiovascular:  Negative for palpitations and leg swelling. Gastrointestinal:  Negative for constipation. Musculoskeletal:  Positive for neck pain and neck stiffness. Neurological:  Positive for weakness and headaches. Negative for light-headedness. Psychiatric/Behavioral:  Negative for sleep disturbance. The patient is not nervous/anxious. Physical Exam  Vitals and nursing note reviewed. Constitutional:       Appearance: Normal appearance. HENT:      Head: Normocephalic and atraumatic. Right Ear: External ear normal.      Left Ear: External ear normal.   Cardiovascular:      Rate and Rhythm: Normal rate and regular rhythm. Heart sounds: Normal heart sounds. Pulmonary:      Effort: Pulmonary effort is normal.   Abdominal:      General: Bowel sounds are normal.      Palpations: Abdomen is soft. Musculoskeletal:      Right elbow: Effusion present. Tenderness present. Comments: Right elbow effusion with redness and warmth and tender   Neurological:      Mental Status: He is alert. Motor: Weakness present.    Psychiatric:         Mood and Affect: Mood normal.         Behavior: Behavior normal.          Allergies   Allergen Reactions    Celebrex [Celecoxib] Nausea Only    Mobic Nausea Only        Past Medical

## 2023-12-19 PROBLEM — R41.0 CONFUSION AND DISORIENTATION: Status: ACTIVE | Noted: 2023-12-19

## 2023-12-21 ENCOUNTER — TELEPHONE (OUTPATIENT)
Dept: PAIN MANAGEMENT | Age: 88
End: 2023-12-21

## 2023-12-21 NOTE — TELEPHONE ENCOUNTER
Ayla from [facility] is asking for a return call for new patient scheduling for Issac. Referral on file.    Please return her call to [phone].  If she is not available, please ask for [address] nurse to assist.    Thank you.

## 2023-12-26 ENCOUNTER — OUTSIDE SERVICES (OUTPATIENT)
Dept: PRIMARY CARE CLINIC | Age: 88
End: 2023-12-26

## 2023-12-26 DIAGNOSIS — M25.50 ARTHRALGIA, UNSPECIFIED JOINT: ICD-10-CM

## 2023-12-26 DIAGNOSIS — R41.0 CONFUSION AND DISORIENTATION: ICD-10-CM

## 2023-12-26 DIAGNOSIS — M51.36 DDD (DEGENERATIVE DISC DISEASE), LUMBAR: ICD-10-CM

## 2023-12-26 DIAGNOSIS — I48.21 PERMANENT ATRIAL FIBRILLATION (HCC): ICD-10-CM

## 2023-12-26 DIAGNOSIS — M54.12 CERVICAL RADICULOPATHY: ICD-10-CM

## 2023-12-26 NOTE — ASSESSMENT & PLAN NOTE
was having a lot of pain in his thoracic spine and ribs, but that is gone per pt. Will d/c order for MRI thoracic spine and just have him f/u with PM for possible injections in his neck.

## 2023-12-26 NOTE — ASSESSMENT & PLAN NOTE
seems to be better. Continue with tramadol prn and will try decreasing the flexeril to 5 mg tid and 5 mg prn.

## 2023-12-26 NOTE — PROGRESS NOTES
Virgen Green is a 80 y.o. male being seen for his weekly follow up. Location of visit: [facility]    HPI:  New today:Pt doing okay today. Rib pain is better and does not think he needs the MRI. Also neck pain is about the same- maybe a little better. Review of Systems   Constitutional:  Negative for activity change, appetite change, chills, diaphoresis and fever. HENT:  Negative for congestion. Respiratory:  Negative for cough and shortness of breath. Cardiovascular:  Negative for chest pain and palpitations. Gastrointestinal:  Negative for diarrhea, nausea and vomiting. Genitourinary:  Negative for hematuria. Musculoskeletal:  Negative for arthralgias and myalgias. Skin:  Negative for rash. Neurological:  Negative for weakness and headaches. Psychiatric/Behavioral:  Negative for agitation, dysphoric mood and sleep disturbance. The patient is not nervous/anxious. Physical Exam  Vitals reviewed. Constitutional:       General: He is not in acute distress. HENT:      Head: Normocephalic and atraumatic. Nose: No congestion or rhinorrhea. Eyes:      General:         Right eye: No discharge. Left eye: No discharge. Cardiovascular:      Rate and Rhythm: Normal rate and regular rhythm. Pulmonary:      Effort: Pulmonary effort is normal. No respiratory distress. Breath sounds: Normal breath sounds. Abdominal:      Palpations: Abdomen is soft. Tenderness: There is no abdominal tenderness. Musculoskeletal:      Right lower leg: No edema. Left lower leg: No edema. Skin:     General: Skin is warm and dry. Neurological:      Mental Status: He is alert. Mental status is at baseline. ASSESSMENT:   Diagnosis Orders   1. Confusion and disorientation        2. DDD (degenerative disc disease), lumbar        3. Cervical radiculopathy        4. Arthralgia, unspecified joint        5.  Permanent atrial fibrillation ([address])

## 2023-12-26 NOTE — ASSESSMENT & PLAN NOTE
Asymptomatic, continue current medications- INR monitored. Will continue meds. HR irreg, but not tachy.

## 2023-12-26 NOTE — ASSESSMENT & PLAN NOTE
pt states that is getting better - just has pain on the \"cords\" in his neck- med adjustments as above. continue therapy. Has appt with PM coming up.

## 2024-01-02 ENCOUNTER — OUTSIDE SERVICES (OUTPATIENT)
Dept: PRIMARY CARE CLINIC | Age: 89
End: 2024-01-02

## 2024-01-02 DIAGNOSIS — M54.12 CERVICAL RADICULOPATHY: ICD-10-CM

## 2024-01-02 DIAGNOSIS — I48.21 PERMANENT ATRIAL FIBRILLATION (HCC): ICD-10-CM

## 2024-01-02 DIAGNOSIS — R05.1 ACUTE COUGH: ICD-10-CM

## 2024-01-02 ASSESSMENT — ENCOUNTER SYMPTOMS
DIARRHEA: 0
SHORTNESS OF BREATH: 0
VOMITING: 0
COUGH: 0
NAUSEA: 0

## 2024-01-02 NOTE — ASSESSMENT & PLAN NOTE
continue same meds and continue therapy, as pt is improved.  Still would recommend appt with PM so that if pain gets worse again, he can be seen and have injections if needed.

## 2024-01-02 NOTE — PROGRESS NOTES
Issac Morfin is a 89 y.o. male being seen for his weekly follow up.  Location of visit: [facility]    HPI:  New today:Pt denies any new complaints.  Pain is doing much better.  Still has some neck pain but not like it was and no pain in his ribs like he had when he was admitted.  Denies any CP or SOB.  He does have a cough and it is mostly loose.          Review of Systems   Constitutional:  Negative for activity change, appetite change, chills, diaphoresis and fever.   HENT:  Negative for congestion.    Respiratory:  Negative for cough and shortness of breath.    Cardiovascular:  Negative for chest pain and palpitations.   Gastrointestinal:  Negative for diarrhea, nausea and vomiting.   Genitourinary:  Negative for hematuria.   Musculoskeletal:  Negative for arthralgias and myalgias.   Skin:  Negative for rash.   Neurological:  Negative for weakness and headaches.   Psychiatric/Behavioral:  Negative for agitation, dysphoric mood and sleep disturbance. The patient is not nervous/anxious.           Physical Exam  Vitals reviewed.   Constitutional:       General: He is not in acute distress.  HENT:      Head: Normocephalic and atraumatic.      Nose: No congestion or rhinorrhea.   Eyes:      General:         Right eye: No discharge.         Left eye: No discharge.   Cardiovascular:      Rate and Rhythm: Normal rate. Rhythm irregular.   Pulmonary:      Effort: Pulmonary effort is normal. No respiratory distress.      Breath sounds: Normal breath sounds.   Abdominal:      Palpations: Abdomen is soft.      Tenderness: There is no abdominal tenderness.   Musculoskeletal:      Right lower leg: No edema.      Left lower leg: No edema.   Skin:     General: Skin is warm and dry.   Neurological:      Mental Status: He is alert. Mental status is at baseline.          ASSESSMENT:   Diagnosis Orders   1. Permanent atrial fibrillation (HCC)        2. Cervical radiculopathy        3. Acute cough            PLAN:    1.

## 2024-01-02 NOTE — ASSESSMENT & PLAN NOTE
rate controlled.  No symptoms.  Continue same meds. INR elevated while here.  Hold again today and tomorrow and recheck on Thursday

## 2024-01-02 NOTE — ASSESSMENT & PLAN NOTE
occas wheeze noted on exam- will give aerosols x2 and then tessalon prn.  If cough continues will need CXR and possible abx.

## 2024-01-12 ENCOUNTER — APPOINTMENT (OUTPATIENT)
Dept: CT IMAGING | Age: 89
End: 2024-01-12
Payer: MEDICARE

## 2024-01-12 ENCOUNTER — HOSPITAL ENCOUNTER (EMERGENCY)
Age: 89
Discharge: HOME | End: 2024-01-12
Attending: EMERGENCY MEDICINE
Payer: MEDICARE

## 2024-01-12 ENCOUNTER — OUTSIDE SERVICES (OUTPATIENT)
Dept: PRIMARY CARE CLINIC | Age: 89
End: 2024-01-12

## 2024-01-12 VITALS
BODY MASS INDEX: 27.31 KG/M2 | RESPIRATION RATE: 19 BRPM | TEMPERATURE: 98.3 F | HEART RATE: 114 BPM | OXYGEN SATURATION: 95 % | SYSTOLIC BLOOD PRESSURE: 140 MMHG | DIASTOLIC BLOOD PRESSURE: 81 MMHG | HEIGHT: 64 IN | WEIGHT: 160 LBS

## 2024-01-12 DIAGNOSIS — S01.01XA LACERATION OF SCALP, INITIAL ENCOUNTER: ICD-10-CM

## 2024-01-12 DIAGNOSIS — R29.6 FREQUENT FALLS: Primary | ICD-10-CM

## 2024-01-12 DIAGNOSIS — S59.902A ELBOW INJURY, LEFT, INITIAL ENCOUNTER: ICD-10-CM

## 2024-01-12 DIAGNOSIS — M54.12 CERVICAL RADICULOPATHY: ICD-10-CM

## 2024-01-12 DIAGNOSIS — S51.012A LACERATION OF LEFT ELBOW, INITIAL ENCOUNTER: ICD-10-CM
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
## 2024-01-12 LAB
INR PPP: 1.7
PROTHROMBIN TIME: 20.1 SEC (ref 11.8–14.6)

## 2024-01-12 PROCEDURE — 70450 CT HEAD/BRAIN W/O DYE: CPT

## 2024-01-12 PROCEDURE — 2500000003 HC RX 250 WO HCPCS: Performed by: EMERGENCY MEDICINE

## 2024-01-12 PROCEDURE — 12004 RPR S/N/AX/GEN/TRK7.6-12.5CM: CPT

## 2024-01-12 PROCEDURE — 72131 CT LUMBAR SPINE W/O DYE: CPT

## 2024-01-12 PROCEDURE — 72125 CT NECK SPINE W/O DYE: CPT

## 2024-01-12 PROCEDURE — 6370000000 HC RX 637 (ALT 250 FOR IP): Performed by: EMERGENCY MEDICINE

## 2024-01-12 PROCEDURE — 72128 CT CHEST SPINE W/O DYE: CPT

## 2024-01-12 PROCEDURE — 99284 EMERGENCY DEPT VISIT MOD MDM: CPT

## 2024-01-12 PROCEDURE — 36415 COLL VENOUS BLD VENIPUNCTURE: CPT

## 2024-01-12 PROCEDURE — 85610 PROTHROMBIN TIME: CPT

## 2024-01-12 RX ORDER — LIDOCAINE HYDROCHLORIDE 10 MG/ML
5 INJECTION, SOLUTION INFILTRATION; PERINEURAL ONCE
Status: COMPLETED | OUTPATIENT
Start: 2024-01-12 | End: 2024-01-12

## 2024-01-12 RX ORDER — ACETAMINOPHEN 325 MG/1
650 TABLET ORAL ONCE
Status: COMPLETED | OUTPATIENT
Start: 2024-01-12 | End: 2024-01-12

## 2024-01-12 RX ADMIN — LIDOCAINE HYDROCHLORIDE 5 ML: 10 INJECTION, SOLUTION INFILTRATION; PERINEURAL at 14:20

## 2024-01-12 RX ADMIN — ACETAMINOPHEN 650 MG: 325 TABLET ORAL at 13:12

## 2024-01-12 ASSESSMENT — ENCOUNTER SYMPTOMS
DIARRHEA: 0
NAUSEA: 0
SHORTNESS OF BREATH: 0
CONSTIPATION: 0
BACK PAIN: 1
EYE PAIN: 0
SHORTNESS OF BREATH: 0
ABDOMINAL PAIN: 0
NAUSEA: 0
CHEST TIGHTNESS: 0
DIARRHEA: 0
COUGH: 0
CONSTIPATION: 0
COUGH: 0
VOMITING: 0
BACK PAIN: 0
SORE THROAT: 0

## 2024-01-12 ASSESSMENT — PAIN SCALES - GENERAL
PAINLEVEL_OUTOF10: 4
PAINLEVEL_OUTOF10: 3

## 2024-01-12 ASSESSMENT — PAIN DESCRIPTION - LOCATION
LOCATION: HEAD
LOCATION: HEAD

## 2024-01-12 ASSESSMENT — LIFESTYLE VARIABLES
HOW OFTEN DO YOU HAVE A DRINK CONTAINING ALCOHOL: NEVER
HOW MANY STANDARD DRINKS CONTAINING ALCOHOL DO YOU HAVE ON A TYPICAL DAY: PATIENT DOES NOT DRINK

## 2024-01-12 ASSESSMENT — PAIN DESCRIPTION - DESCRIPTORS
DESCRIPTORS: ACHING
DESCRIPTORS: ACHING

## 2024-01-12 NOTE — CARE COORDINATION
Ambulatory Care Coordination Note  2/24/2023    ACM: Fidel Pozo RN    Patient recently had a skin cancer taken off the top of his head. Had bleeding from the area, started an antibiotic.    Takes coumadin, follows with [address] Medication Therapy Management      Future Appointments   Date Time Provider Chanelle Phillips   3/1/2023  3:00 PM Cher Chavira DPM [address]   3/28/2023  2:30 PM Michela Dickinson MD [facility]   4/27/2023  2:30 PM Cher Chavira DPM [address]           Lab Results       None            Care Coordination Interventions    Referral from Primary Care Provider: Yes  Suggested Interventions and Community Resources          Goals Addressed    None carlos manuel

## 2024-01-12 NOTE — ED PROVIDER NOTES
Scar revision: no    Skin repair:     Repair method:  Sutures    Suture size:  3-0    Suture material:  Nylon    Suture technique:  Simple interrupted    Number of sutures:  3  Approximation:     Approximation:  Loose  Post-procedure details:     Dressing:  Non-adherent dressing    Procedure completion:  Tolerated      DIAGNOSTIC RESULTS   EKG: All EKG's are interpreted by the Emergency Department Physician who either signs or Co-signs this chart inthe absence of a cardiologist.      RADIOLOGY:All plain film, CT, MRI, and formal ultrasound images (except ED bedside ultrasound) are read by the radiologist, see reports below, unless otherwise noted in MDM or here.  CT LUMBAR SPINE WO CONTRAST   Final Result   1. No acute intracranial abnormality.   2. No acute abnormality of the cervical, thoracic or lumbar spine.   3. Multilevel degenerative disc disease of the cervical, thoracic and lumbar   spine. If there are neurologic symptoms, MRI could be performed for further   evaluation.   4. Grade 1 spondylolisthesis of L4 on L5 which appears to be secondary to   degenerative disease.         CT THORACIC SPINE WO CONTRAST   Final Result   1. No acute intracranial abnormality.   2. No acute abnormality of the cervical, thoracic or lumbar spine.   3. Multilevel degenerative disc disease of the cervical, thoracic and lumbar   spine. If there are neurologic symptoms, MRI could be performed for further   evaluation.   4. Grade 1 spondylolisthesis of L4 on L5 which appears to be secondary to   degenerative disease.         CT CERVICAL SPINE WO CONTRAST   Final Result   1. No acute intracranial abnormality.   2. No acute abnormality of the cervical, thoracic or lumbar spine.   3. Multilevel degenerative disc disease of the cervical, thoracic and lumbar   spine. If there are neurologic symptoms, MRI could be performed for further   evaluation.   4. Grade 1 spondylolisthesis of L4 on L5 which appears to be secondary to

## 2024-01-12 NOTE — DISCHARGE INSTRUCTIONS
There are 3 stiches in the elbow which should be removed in one week. The skin is very damaged on the elbow and might take a long time to heal, may need wound care.   There are 3 staples in the scalp which need to be removed in one week.   Keep the bandages we have put on for the first 24 hours, keep the wounds dry for the first 24 hours. After 24 hours you can shower normally and replace the bandages when they get wet.

## 2024-01-12 NOTE — ED TRIAGE NOTES
Mode of arrival (squad #, walk in, police, etc) : [facility]        Chief complaint(s): Fall/ head and arm laceration        Arrival Note (brief scenario, treatment PTA, etc).: Pt in today after he leaned over in the chair and fell. Pt has laceration to back of head and scrape on left arm. Pt reports no loss of consciousness.         C= \"Have you ever felt that you should Cut down on your drinking?\"  No  A= \"Have people Annoyed you by criticizing your drinking?\"  No  G= \"Have you ever felt bad or Guilty about your drinking?\"  No  E= \"Have you ever had a drink as an Eye-opener first thing in the morning to steady your nerves or to help a hangover?\"  No      Deferred []      Reason for deferring: N/A    *If yes to two or more: probable alcohol abuse.*

## 2024-01-13 NOTE — PROGRESS NOTES
Issac Morfin is a 89 y.o. male being seen for his weekly follow up.  Location of visit: [facility]    Visit Date:  1/12/2024     Reason for Visit:    Chief Complaint   Patient presents with    Fall        Fall  Pertinent negatives include no fever or nausea.      Asked to see resident who was on floor due to recent fall.  Resident fell backwards hitting his head apparently against the edge of the couch.  He evidently also hit his left elbow on something.  He is alert and oriented.    Review of Systems   Constitutional:  Negative for activity change, chills, fatigue and fever.   HENT:  Negative for congestion.    Respiratory:  Negative for cough and shortness of breath.    Cardiovascular:  Negative for chest pain and palpitations.   Gastrointestinal:  Negative for constipation, diarrhea and nausea.   Genitourinary:  Negative for difficulty urinating and dysuria.   Musculoskeletal:  Positive for arthralgias, back pain, gait problem and neck pain.   Skin:  Positive for wound.   Neurological:  Positive for weakness. Negative for dizziness and light-headedness.   Psychiatric/Behavioral:  Negative for dysphoric mood and sleep disturbance. The patient is not nervous/anxious.         Physical Exam  Vitals and nursing note reviewed.   Constitutional:       Appearance: Normal appearance.   HENT:      Head: Normocephalic and atraumatic.        Comments: 3 cm scalp laceration      Right Ear: External ear normal.      Left Ear: External ear normal.   Eyes:      Extraocular Movements: Extraocular movements intact.      Conjunctiva/sclera: Conjunctivae normal.      Pupils: Pupils are equal, round, and reactive to light.   Cardiovascular:      Rate and Rhythm: Normal rate and regular rhythm.      Heart sounds: Normal heart sounds.   Pulmonary:      Effort: Pulmonary effort is normal.      Breath sounds: Normal breath sounds.   Abdominal:      General: Bowel sounds are normal.      Palpations: Abdomen is soft.

## 2024-01-16 ENCOUNTER — OUTSIDE SERVICES (OUTPATIENT)
Dept: PRIMARY CARE CLINIC | Age: 89
End: 2024-01-16

## 2024-01-16 DIAGNOSIS — R55 SYNCOPE AND COLLAPSE: ICD-10-CM

## 2024-01-16 DIAGNOSIS — R29.6 FREQUENT FALLS: Primary | ICD-10-CM

## 2024-01-16 DIAGNOSIS — M54.12 CERVICAL RADICULOPATHY: ICD-10-CM

## 2024-01-16 DIAGNOSIS — Z79.01 CHRONIC ANTICOAGULATION: ICD-10-CM

## 2024-01-16 DIAGNOSIS — I50.32 CHRONIC DIASTOLIC HEART FAILURE (HCC): ICD-10-CM

## 2024-01-16 ASSESSMENT — ENCOUNTER SYMPTOMS
SHORTNESS OF BREATH: 0
NAUSEA: 0
VOMITING: 0
DIARRHEA: 0
COUGH: 0

## 2024-01-16 NOTE — ASSESSMENT & PLAN NOTE
Continue therapy. Fall the other day resulted in staples and stitches in head- removal in about a week.  Denies any pain.  Keep open to air as posterior one is slightly macerated.

## 2024-01-16 NOTE — PROGRESS NOTES
Issac Morfin is a 89 y.o. male being seen for his weekly follow up.  Location of visit: [facility]    HPI:  New today:pt had a fall the other day- ended up in the ER with staples and stitches in his head.  Pain is doing much better.         Review of Systems   Constitutional:  Negative for activity change, appetite change, chills, diaphoresis and fever.   HENT:  Negative for congestion.    Respiratory:  Negative for cough and shortness of breath.    Cardiovascular:  Negative for chest pain and palpitations.   Gastrointestinal:  Negative for diarrhea, nausea and vomiting.   Genitourinary:  Negative for hematuria.   Musculoskeletal:  Negative for arthralgias and myalgias.   Skin:  Negative for rash.   Neurological:  Negative for weakness and headaches.   Psychiatric/Behavioral:  Negative for agitation, dysphoric mood and sleep disturbance. The patient is not nervous/anxious.           Physical Exam  Vitals reviewed.   Constitutional:       General: He is not in acute distress.  HENT:      Head: Normocephalic and atraumatic.      Nose: No congestion or rhinorrhea.   Eyes:      General:         Right eye: No discharge.         Left eye: No discharge.   Cardiovascular:      Rate and Rhythm: Normal rate and regular rhythm.   Pulmonary:      Effort: Pulmonary effort is normal. No respiratory distress.      Breath sounds: Normal breath sounds.   Abdominal:      Palpations: Abdomen is soft.      Tenderness: There is no abdominal tenderness.   Musculoskeletal:      Right lower leg: No edema.      Left lower leg: No edema.   Skin:     General: Skin is warm and dry.   Neurological:      Mental Status: He is alert. Mental status is at baseline.          ASSESSMENT:   Diagnosis Orders   1. Frequent falls        2. Chronic anticoagulation        3. Chronic diastolic heart failure (HCC)        4. Syncope and collapse        5. Cervical radiculopathy            PLAN:  1. Frequent falls  Assessment & Plan:    Continue

## 2024-01-16 NOTE — ASSESSMENT & PLAN NOTE
Denies any CP or SOB and no edema.  Continue same meds.  No changes.    Subjective   Patient ID: Nury is a 59 year old female.    This visit is being performed virtually via Non-integrated Video Visit. Consent to treat includes permission to submit charges to the patient's insurance. It was shared that without being seen and evaluated in person, there is a risk that the information and/or assessment may be incomplete or inaccurate. This video visit may be discontinued by patient or clinician, if it is felt that the videoconferencing connections are not adequate for her situation.   Clinical Location: [facility]  Nury's location [facility] and is physically present in   the [facility] at the time of this visit.       Chief Complaint   Patient presents with   • Office Visit     At home covid test negative.    • Video Visit   • Congestion     Post nasal drip X 2 weeks      Congestion  This is a new problem. Episode onset: 2 weeks ago. The problem occurs constantly. The problem has been gradually worsening. Associated symptoms include chills and congestion. Pertinent negatives include no coughing, fever or sore throat. Associated symptoms comments: Maxillary and frontal sinus pain. The symptoms are aggravated by bending. Treatments tried: mucinex, hot liquids, nasal sprays, cold and flu meds, dayquil.         Past Medical History:   Diagnosis Date   • Anxiety    • Concussion    • Lymphedema    • Malignant neoplasm (CMD)    • RAD (reactive airway disease)    • Rheumatoid arthritis (CMD)        MEDICATIONS:  Current Outpatient Medications   Medication Sig   • indomethacin (INDOCIN) 25 MG capsule Take 1 capsule by mouth daily as needed for Headaches.   • amoxicillin-clavulanate (AUGMENTIN) 875-125 MG per tablet Take 1 tablet by mouth in the morning and 1 tablet in the evening. Do all this for 5 days.   • losartan (COZAAR) 50 MG tablet Take 1 tablet by mouth daily.   • sertraline (ZOLOFT) 100 MG tablet Take 100 mg by mouth every morning.   • erythromycin (ILOTYCIN)  ophthalmic ointment Apply 1 cm strip in affected eye every and directly to stye every 4-6 hours for 5 days.   • butalbital-APAP-caffeine (FIORICET) -40 MG per tablet 1 tablet as needed   • oxygen (O2) gas Inhale into the lungs continuous. At night   • budesonide-formoterol (SYMBICORT) 160-4.5 MCG/ACT inhaler Inhale 2 puffs into the lungs 2 times daily.   • cetirizine (ZYRTEC) 10 MG tablet Take 10 mg by mouth daily.   • Ascorbic Acid (VITAMIN C) 1000 MG tablet Take 1,000 mg by mouth daily.   • cholecalciferol (VITAMIN D) 25 mcg (1,000 units) tablet Take by mouth daily.   • triamterene-hydroCHLOROthiazide (MAXZIDE-25) 37.5-25 MG per tablet Take 1 tablet by mouth daily.   • ALPRAZolam (XANAX) 0.5 MG tablet Take 0.5 mg by mouth nightly as needed for Sleep.    • montelukast (SINGULAIR) 10 MG tablet Take 10 mg by mouth every evening.    • hydroxychloroquine (PLAQUENIL) 200 MG tablet Take 200 mg by mouth daily. Indications: Rheumatoid Arthritis For RA     No current facility-administered medications for this visit.       ALLERGIES:  ALLERGIES:   Allergen Reactions   • Cortisone Other (See Comments)     Becomes disoriented  Memory loss  Disoriented     • Methylprednisolone Other (See Comments)     Disoriented. Has taken prednisone, but cannot make sound decisions while on medication       PAST SURGICAL HISTORY:  Past Surgical History:   Procedure Laterality Date   • Breast surgery     • Gallbladder surgery     • Hysterectomy         FAMILY HISTORY:  Family History   Problem Relation Age of Onset   • Heart Father         mitral valve replacement       SOCIAL HISTORY:  Social History     Tobacco Use   • Smoking status: Never   • Smokeless tobacco: Never   Vaping Use   • Vaping Use: never used   Substance Use Topics   • Alcohol use: Yes     Alcohol/week: 2.0 standard drinks of alcohol     Types: 2 Glasses of wine per week   • Drug use: Never         Nury has a past medical history of Anxiety, Concussion, Lymphedema,  Malignant neoplasm (CMD), RAD (reactive airway disease), and Rheumatoid arthritis (CMD).  Nury has Acute respiratory disease due to COVID-19 virus on their problem list.  Nury has a past surgical history that includes Breast surgery; Hysterectomy; and Gallbladder surgery.  Her family history includes Heart in her father.  Nury reports that she has never smoked. She has never used smokeless tobacco. She reports current alcohol use of about 2.0 standard drinks of alcohol per week. She reports that she does not use drugs.  Nury has a current medication list which includes the following prescription(s): indomethacin, losartan, sertraline, oxygen, cetirizine, vitamin c, cholecalciferol, triamterene-hydrochlorothiazide, alprazolam, montelukast, hydroxychloroquine, amoxicillin-clavulanate, erythromycin, butalbital-apap-caffeine, and budesonide-formoterol.  Nury   Current Outpatient Medications   Medication Sig Dispense Refill   • indomethacin (INDOCIN) 25 MG capsule Take 1 capsule by mouth daily as needed for Headaches.     • amoxicillin-clavulanate (AUGMENTIN) 875-125 MG per tablet Take 1 tablet by mouth in the morning and 1 tablet in the evening. Do all this for 5 days. 10 tablet 0   • losartan (COZAAR) 50 MG tablet Take 1 tablet by mouth daily. 90 tablet 3   • sertraline (ZOLOFT) 100 MG tablet Take 100 mg by mouth every morning.     • erythromycin (ILOTYCIN) ophthalmic ointment Apply 1 cm strip in affected eye every and directly to stye every 4-6 hours for 5 days. 3.5 g 1   • butalbital-APAP-caffeine (FIORICET) -40 MG per tablet 1 tablet as needed     • oxygen (O2) gas Inhale into the lungs continuous. At night     • budesonide-formoterol (SYMBICORT) 160-4.5 MCG/ACT inhaler Inhale 2 puffs into the lungs 2 times daily.     • cetirizine (ZYRTEC) 10 MG tablet Take 10 mg by mouth daily.     • Ascorbic Acid (VITAMIN C) 1000 MG tablet Take 1,000 mg by mouth daily.     • cholecalciferol (VITAMIN D) 25 mcg (1,000  units) tablet Take by mouth daily.     • triamterene-hydroCHLOROthiazide (MAXZIDE-25) 37.5-25 MG per tablet Take 1 tablet by mouth daily.     • ALPRAZolam (XANAX) 0.5 MG tablet Take 0.5 mg by mouth nightly as needed for Sleep.      • montelukast (SINGULAIR) 10 MG tablet Take 10 mg by mouth every evening.      • hydroxychloroquine (PLAQUENIL) 200 MG tablet Take 200 mg by mouth daily. Indications: Rheumatoid Arthritis For RA       No current facility-administered medications for this visit.     Nury is allergic to cortisone and methylprednisolone.  Patient's medications, allergies, past medical, surgical, and social history  were reviewed and updated as appropriate.    Review of Systems   Constitutional: Positive for chills. Negative for fever.   HENT: Positive for congestion, postnasal drip and sinus pain. Negative for ear pain, sore throat and trouble swallowing.    Respiratory: Negative for cough, chest tightness, shortness of breath and wheezing.    Cardiovascular: Negative.        Objective   Physical Exam  Vitals reviewed.   Constitutional:       General: She is not in acute distress.     Appearance: She is not ill-appearing.   HENT:      Head: Normocephalic.      Right Ear: Hearing, tympanic membrane and ear canal normal.      Left Ear: Hearing, tympanic membrane and ear canal normal.      Nose: No rhinorrhea.      Right Turbinates: Not swollen.      Left Turbinates: Not swollen.      Right Sinus: Maxillary sinus tenderness and frontal sinus tenderness present.      Left Sinus: Maxillary sinus tenderness and frontal sinus tenderness present.      Mouth/Throat:      Mouth: Mucous membranes are moist.      Pharynx: Uvula midline. No posterior oropharyngeal erythema.      Comments: Posterior pharynx cobblestoning     Neck: Neck supple.   Cardiovascular:      Rate and Rhythm: Normal rate and regular rhythm.      Heart sounds: Normal heart sounds. No murmur heard.  Pulmonary:      Effort: Pulmonary effort is  normal. No accessory muscle usage or retractions.      Breath sounds: Normal breath sounds.   Neurological:      Mental Status: She is alert.       Visit Vitals  /78 (BP Location: RUE - Right upper extremity, Patient Position: Sitting, Cuff Size: Regular)   Pulse 65   Temp 97.9 °F (36.6 °C) (Temporal)   Resp 18   Ht 5' 3\" (1.6 m)   Wt 88.9 kg (196 lb)   SpO2 99%   BMI 34.72 kg/m²       Assessment   Problem List Items Addressed This Visit    None  Visit Diagnoses     Acute non-recurrent maxillary sinusitis    -  Primary    Relevant Medications    amoxicillin-clavulanate (AUGMENTIN) 875-125 MG per tablet          This is a 59 year old year-old female who presents with sinus infection.    Instructions provided as documented in the AVS.    Thank you for visiting [facility].  Please follow up with your PCP as needed if symptoms worsen or persist.

## 2024-01-25 ENCOUNTER — OUTSIDE SERVICES (OUTPATIENT)
Dept: PRIMARY CARE CLINIC | Age: 89
End: 2024-01-25

## 2024-01-25 DIAGNOSIS — R29.6 FREQUENT FALLS: Primary | ICD-10-CM

## 2024-01-25 DIAGNOSIS — M54.12 CERVICAL RADICULOPATHY: ICD-10-CM

## 2024-01-25 ASSESSMENT — ENCOUNTER SYMPTOMS
DIARRHEA: 0
COUGH: 0
CONSTIPATION: 0
SHORTNESS OF BREATH: 0

## 2024-01-25 NOTE — PROGRESS NOTES
Past Medical History:   Diagnosis Date    A-fib (HCC)     Acute MI (HCC)     Allergic rhinitis 09/02/2011    Anemia     Atrial fibrillation (HCC)     Dr. Zamora Watova    Basal cell cancer 03/2013    left side of head, face chin    Basal cell cancer 03/10/2014    left cheek    Cervical radiculopathy     Diverticulitis 02/17/2013    GERD (gastroesophageal reflux disease)     Glaucoma     left eye    Hyperlipidemia     Hypertension     Dr. MORENA Krause    Hyponatremia 09/02/2011    IBS (irritable bowel syndrome) 09/02/2011    Insomnia 09/02/2011    Osteoarthritis     Osteoarthritis/neck pain. 09/02/2011    Prostate cancer (HCC) 08/18/2021    active surviellence monitoring PSA    Renal calculi     Shingles     history of shingles    Status post prostatectomy 08/18/2021    Simple prostatectomy for BPH    Urinary frequency     Wears glasses     Wears hearing aid in both ears       ASSESSMENT:     Diagnosis Orders   1. Frequent falls        2. Cervical radiculopathy             Plan:  Continues with physical therapy part B for strengthening, endurance and balance.

## 2024-02-06 ENCOUNTER — OUTSIDE SERVICES (OUTPATIENT)
Dept: PRIMARY CARE CLINIC | Age: 89
End: 2024-02-06

## 2024-02-06 DIAGNOSIS — I10 ESSENTIAL HYPERTENSION: ICD-10-CM

## 2024-02-06 DIAGNOSIS — I48.21 PERMANENT ATRIAL FIBRILLATION (HCC): Primary | ICD-10-CM

## 2024-02-06 DIAGNOSIS — Z79.01 ANTICOAGULATED ON COUMADIN: ICD-10-CM

## 2024-02-06 DIAGNOSIS — I69.954 HEMIPLEGIA OF LEFT NONDOMINANT SIDE AS LATE EFFECT OF CEREBROVASCULAR DISEASE, UNSPECIFIED CEREBROVASCULAR DISEASE TYPE, UNSPECIFIED HEMIPLEGIA TYPE (HCC): ICD-10-CM

## 2024-02-06 DIAGNOSIS — I50.32 CHRONIC DIASTOLIC HEART FAILURE (HCC): ICD-10-CM

## 2024-02-06 ASSESSMENT — ENCOUNTER SYMPTOMS
COUGH: 0
DIARRHEA: 0
SHORTNESS OF BREATH: 0
NAUSEA: 0
VOMITING: 0

## 2024-02-06 NOTE — PROGRESS NOTES
Issac Morfin is a 89 y.o. male being seen for his monthly follow up.  Location of visit: [facility]    HPI:  New today:Pt denies any new complaints or problems. No pain or SOB.          Review of Systems   Constitutional:  Negative for activity change, appetite change, chills, diaphoresis and fever.   HENT:  Negative for congestion.    Respiratory:  Negative for cough and shortness of breath.    Cardiovascular:  Negative for chest pain and palpitations.   Gastrointestinal:  Negative for diarrhea, nausea and vomiting.   Genitourinary:  Negative for hematuria.   Musculoskeletal:  Negative for arthralgias and myalgias.   Skin:  Negative for rash.   Neurological:  Negative for weakness and headaches.   Psychiatric/Behavioral:  Negative for agitation, dysphoric mood and sleep disturbance. The patient is not nervous/anxious.           Physical Exam  Vitals reviewed.   Constitutional:       General: He is not in acute distress.  HENT:      Head: Normocephalic and atraumatic.      Nose: No congestion or rhinorrhea.   Eyes:      General:         Right eye: No discharge.         Left eye: No discharge.   Cardiovascular:      Rate and Rhythm: Normal rate. Rhythm irregular.   Pulmonary:      Effort: Pulmonary effort is normal. No respiratory distress.      Breath sounds: Normal breath sounds.   Abdominal:      Palpations: Abdomen is soft.      Tenderness: There is no abdominal tenderness.   Musculoskeletal:      Right lower leg: No edema.      Left lower leg: No edema.   Skin:     General: Skin is warm and dry.   Neurological:      Mental Status: He is alert. Mental status is at baseline.          ASSESSMENT:   Diagnosis Orders   1. Permanent atrial fibrillation (HCC)        2. 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 Essential hypertension        4. Chronic diastolic heart failure (HCC)

## 2024-02-16 ENCOUNTER — OUTSIDE SERVICES (OUTPATIENT)
Dept: PRIMARY CARE CLINIC | Age: 89
End: 2024-02-16

## 2024-02-16 DIAGNOSIS — M25.50 ARTHRALGIA, UNSPECIFIED JOINT: Primary | ICD-10-CM

## 2024-02-16 DIAGNOSIS — M10.9 GOUT, UNSPECIFIED CAUSE, UNSPECIFIED CHRONICITY, UNSPECIFIED SITE: ICD-10-CM

## 2024-03-05 ENCOUNTER — OUTSIDE SERVICES (OUTPATIENT)
Dept: PRIMARY CARE CLINIC | Age: 89
End: 2024-03-05

## 2024-03-05 DIAGNOSIS — I50.32 CHRONIC DIASTOLIC HEART FAILURE (HCC): ICD-10-CM

## 2024-03-05 DIAGNOSIS — M51.36 DDD (DEGENERATIVE DISC DISEASE), LUMBAR: ICD-10-CM

## 2024-03-05 DIAGNOSIS — G89.29 CHRONIC BILATERAL LOW BACK PAIN WITHOUT SCIATICA: ICD-10-CM

## 2024-03-05 DIAGNOSIS — M54.50 CHRONIC BILATERAL LOW BACK PAIN WITHOUT SCIATICA: ICD-10-CM

## 2024-03-05 DIAGNOSIS — I10 ESSENTIAL HYPERTENSION: Primary | ICD-10-CM

## 2024-03-05 ASSESSMENT — ENCOUNTER SYMPTOMS
DIARRHEA: 0
BACK PAIN: 1
SHORTNESS OF BREATH: 0
COUGH: 0
NAUSEA: 0
VOMITING: 0

## 2024-03-05 NOTE — PROGRESS NOTES
disease), lumbar            PLAN:  1. Essential hypertension  Assessment & Plan:   Asymptomatic, continue current medications  2. Chronic diastolic heart failure (HCC)  Assessment & Plan:   Well-controlled, continue current medications  3. Chronic bilateral low back pain without sciatica  Assessment & Plan:    Pain radiating around abd and into groin- chronic pain- will add lidocaine patch to see if that helps at all.    4. DDD (degenerative disc disease), lumbar  Assessment & Plan:    As above- continues with therapy

## 2024-03-05 NOTE — ASSESSMENT & PLAN NOTE
Pain radiating around abd and into groin- chronic pain- will add lidocaine patch to see if that helps at all.

## 2024-03-09 NOTE — TELEPHONE ENCOUNTER
Discussed instructions with patient. He is not sure if he wants to start the medication but he is going to talk with his son and will let us know. 4 = No assist / stand by assistance

## 2024-03-10 ENCOUNTER — APPOINTMENT (OUTPATIENT)
Dept: CT IMAGING | Age: 89
DRG: 308 | End: 2024-03-10
Payer: MEDICARE

## 2024-03-10 ENCOUNTER — APPOINTMENT (OUTPATIENT)
Dept: GENERAL RADIOLOGY | Age: 89
DRG: 308 | End: 2024-03-10
Payer: MEDICARE

## 2024-03-10 ENCOUNTER — HOSPITAL ENCOUNTER (INPATIENT)
Age: 89
LOS: 2 days | Discharge: NURSING, REHAB OR LONG-TERM CARE | DRG: 308 | End: 2024-03-12
Attending: STUDENT IN AN ORGANIZED HEALTH CARE EDUCATION/TRAINING PROGRAM | Admitting: INTERNAL MEDICINE
Payer: MEDICARE

## 2024-03-10 DIAGNOSIS — I48.91 ATRIAL FIBRILLATION WITH RVR (HCC): Primary | ICD-10-CM

## 2024-03-10 DIAGNOSIS — N50.89 TESTICULAR SWELLING: ICD-10-CM

## 2024-03-10 DIAGNOSIS — R79.1 SUBTHERAPEUTIC INTERNATIONAL NORMALIZED RATIO (INR): ICD-10-CM

## 2024-03-10 DIAGNOSIS — R10.31 RIGHT LOWER QUADRANT ABDOMINAL PAIN: ICD-10-CM

## 2024-03-10 PROBLEM — R10.9 ABDOMINAL PAIN: Status: ACTIVE | Noted: 2024-03-10

## 2024-03-10 LAB
ALBUMIN SERPL-MCNC: 2.6 G/DL (ref 3.5–5.2)
ALP SERPL-CCNC: 94 U/L (ref 40–129)
ALT SERPL-CCNC: 9 U/L (ref 5–41)
ANION GAP SERPL CALCULATED.3IONS-SCNC: 10 MMOL/L (ref 9–17)
AST SERPL-CCNC: 11 U/L
BACTERIA URNS QL MICRO: ABNORMAL
BASOPHILS # BLD: 0 K/UL (ref 0–0.2)
BASOPHILS NFR BLD: 0 % (ref 0–2)
BILIRUB SERPL-MCNC: 0.5 MG/DL (ref 0.3–1.2)
BILIRUB UR QL STRIP: NEGATIVE
BUN SERPL-MCNC: 15 MG/DL (ref 8–23)
CALCIUM SERPL-MCNC: 8.7 MG/DL (ref 8.6–10.4)
CASTS #/AREA URNS LPF: ABNORMAL /LPF
CHLORIDE SERPL-SCNC: 98 MMOL/L (ref 98–107)
CLARITY UR: CLEAR
CO2 SERPL-SCNC: 27 MMOL/L (ref 20–31)
COLOR UR: YELLOW
CREAT SERPL-MCNC: 0.9 MG/DL (ref 0.7–1.2)
DATE, STOOL #1: ABNORMAL
EOSINOPHIL # BLD: 0 K/UL (ref 0–0.4)
EOSINOPHILS RELATIVE PERCENT: 0 % (ref 0–4)
EPI CELLS #/AREA URNS HPF: ABNORMAL /HPF
ERYTHROCYTE [DISTWIDTH] IN BLOOD BY AUTOMATED COUNT: 17.6 % (ref 11.5–14.9)
GFR SERPL CREATININE-BSD FRML MDRD: >60 ML/MIN/1.73M2
GLUCOSE SERPL-MCNC: 92 MG/DL (ref 70–99)
GLUCOSE UR STRIP-MCNC: NEGATIVE MG/DL
HCT VFR BLD AUTO: 44 % (ref 41–53)
HEMOCCULT SP1 STL QL: POSITIVE
HGB BLD-MCNC: 13.6 G/DL (ref 13.5–17.5)
HGB UR QL STRIP.AUTO: NEGATIVE
INR PPP: 1.8
KETONES UR STRIP-MCNC: NEGATIVE MG/DL
LACTATE BLDV-SCNC: 1.4 MMOL/L (ref 0.5–2.2)
LEUKOCYTE ESTERASE UR QL STRIP: NEGATIVE
LIPASE SERPL-CCNC: 12 U/L (ref 13–60)
LYMPHOCYTES NFR BLD: 1.03 K/UL (ref 1–4.8)
LYMPHOCYTES RELATIVE PERCENT: 8 % (ref 24–44)
MAGNESIUM SERPL-MCNC: 2.1 MG/DL (ref 1.6–2.6)
MCH RBC QN AUTO: 25.5 PG (ref 26–34)
MCHC RBC AUTO-ENTMCNC: 30.9 G/DL (ref 31–37)
MCV RBC AUTO: 82.3 FL (ref 80–100)
MONOCYTES NFR BLD: 25 % (ref 1–7)
MONOCYTES NFR BLD: 3.23 K/UL (ref 0.1–1.3)
MORPHOLOGY: ABNORMAL
MORPHOLOGY: ABNORMAL
NEUTROPHILS NFR BLD: 67 % (ref 36–66)
NEUTS SEG NFR BLD: 8.64 K/UL (ref 1.3–9.1)
NITRITE UR QL STRIP: NEGATIVE
PARTIAL THROMBOPLASTIN TIME: 36 SEC (ref 24–36)
PH UR STRIP: 7 [PH] (ref 5–8)
PLATELET # BLD AUTO: 221 K/UL (ref 150–450)
PMV BLD AUTO: 8.2 FL (ref 6–12)
POTASSIUM SERPL-SCNC: 3.4 MMOL/L (ref 3.7–5.3)
PROT SERPL-MCNC: 5.9 G/DL (ref 6.4–8.3)
PROT UR STRIP-MCNC: ABNORMAL MG/DL
PROTHROMBIN TIME: 21.1 SEC (ref 11.8–14.6)
RBC # BLD AUTO: 5.34 M/UL (ref 4.5–5.9)
RBC #/AREA URNS HPF: ABNORMAL /HPF
SODIUM SERPL-SCNC: 135 MMOL/L (ref 135–144)
SP GR UR STRIP: 1.01 (ref 1–1.03)
TIME, STOOL #1: ABNORMAL
TROPONIN I SERPL HS-MCNC: 63 NG/L (ref 0–22)
UROBILINOGEN UR STRIP-ACNC: ABNORMAL EU/DL (ref 0–1)
WBC #/AREA URNS HPF: ABNORMAL /HPF
WBC OTHER # BLD: 12.9 K/UL (ref 3.5–11)

## 2024-03-10 PROCEDURE — 2580000003 HC RX 258

## 2024-03-10 PROCEDURE — 96374 THER/PROPH/DIAG INJ IV PUSH: CPT

## 2024-03-10 PROCEDURE — 36415 COLL VENOUS BLD VENIPUNCTURE: CPT

## 2024-03-10 PROCEDURE — 85730 THROMBOPLASTIN TIME PARTIAL: CPT

## 2024-03-10 PROCEDURE — 83690 ASSAY OF LIPASE: CPT

## 2024-03-10 PROCEDURE — 84484 ASSAY OF TROPONIN QUANT: CPT

## 2024-03-10 PROCEDURE — 93005 ELECTROCARDIOGRAM TRACING: CPT

## 2024-03-10 PROCEDURE — 2580000003 HC RX 258: Performed by: STUDENT IN AN ORGANIZED HEALTH CARE EDUCATION/TRAINING PROGRAM

## 2024-03-10 PROCEDURE — 99285 EMERGENCY DEPT VISIT HI MDM: CPT

## 2024-03-10 PROCEDURE — 85610 PROTHROMBIN TIME: CPT

## 2024-03-10 PROCEDURE — 6360000002 HC RX W HCPCS: Performed by: INTERNAL MEDICINE

## 2024-03-10 PROCEDURE — 2500000003 HC RX 250 WO HCPCS: Performed by: STUDENT IN AN ORGANIZED HEALTH CARE EDUCATION/TRAINING PROGRAM

## 2024-03-10 PROCEDURE — 6360000002 HC RX W HCPCS: Performed by: STUDENT IN AN ORGANIZED HEALTH CARE EDUCATION/TRAINING PROGRAM

## 2024-03-10 PROCEDURE — 85025 COMPLETE CBC W/AUTO DIFF WBC: CPT

## 2024-03-10 PROCEDURE — 82270 OCCULT BLOOD FECES: CPT

## 2024-03-10 PROCEDURE — 6360000004 HC RX CONTRAST MEDICATION: Performed by: STUDENT IN AN ORGANIZED HEALTH CARE EDUCATION/TRAINING PROGRAM

## 2024-03-10 PROCEDURE — 6370000000 HC RX 637 (ALT 250 FOR IP): Performed by: INTERNAL MEDICINE

## 2024-03-10 PROCEDURE — 83735 ASSAY OF MAGNESIUM: CPT

## 2024-03-10 PROCEDURE — 2060000000 HC ICU INTERMEDIATE R&B

## 2024-03-10 PROCEDURE — 6360000002 HC RX W HCPCS

## 2024-03-10 PROCEDURE — 99232 SBSQ HOSP IP/OBS MODERATE 35: CPT | Performed by: INTERNAL MEDICINE

## 2024-03-10 PROCEDURE — 6370000000 HC RX 637 (ALT 250 FOR IP)

## 2024-03-10 PROCEDURE — C9113 INJ PANTOPRAZOLE SODIUM, VIA: HCPCS

## 2024-03-10 PROCEDURE — 80053 COMPREHEN METABOLIC PANEL: CPT

## 2024-03-10 PROCEDURE — 81001 URINALYSIS AUTO W/SCOPE: CPT

## 2024-03-10 PROCEDURE — 83605 ASSAY OF LACTIC ACID: CPT

## 2024-03-10 PROCEDURE — 74174 CTA ABD&PLVS W/CONTRAST: CPT

## 2024-03-10 PROCEDURE — 73502 X-RAY EXAM HIP UNI 2-3 VIEWS: CPT

## 2024-03-10 PROCEDURE — 6370000000 HC RX 637 (ALT 250 FOR IP): Performed by: STUDENT IN AN ORGANIZED HEALTH CARE EDUCATION/TRAINING PROGRAM

## 2024-03-10 RX ORDER — ALLOPURINOL 100 MG/1
100 TABLET ORAL DAILY
Status: DISCONTINUED | OUTPATIENT
Start: 2024-03-10 | End: 2024-03-12 | Stop reason: HOSPADM

## 2024-03-10 RX ORDER — 0.9 % SODIUM CHLORIDE 0.9 %
1000 INTRAVENOUS SOLUTION INTRAVENOUS ONCE
Status: COMPLETED | OUTPATIENT
Start: 2024-03-10 | End: 2024-03-10

## 2024-03-10 RX ORDER — FENTANYL CITRATE 0.05 MG/ML
50 INJECTION, SOLUTION INTRAMUSCULAR; INTRAVENOUS ONCE
Status: DISCONTINUED | OUTPATIENT
Start: 2024-03-10 | End: 2024-03-12 | Stop reason: HOSPADM

## 2024-03-10 RX ORDER — SODIUM CHLORIDE 0.9 % (FLUSH) 0.9 %
5-40 SYRINGE (ML) INJECTION EVERY 12 HOURS SCHEDULED
Status: DISCONTINUED | OUTPATIENT
Start: 2024-03-10 | End: 2024-03-12 | Stop reason: HOSPADM

## 2024-03-10 RX ORDER — MAGNESIUM SULFATE HEPTAHYDRATE 40 MG/ML
2000 INJECTION, SOLUTION INTRAVENOUS PRN
Status: DISCONTINUED | OUTPATIENT
Start: 2024-03-10 | End: 2024-03-12 | Stop reason: HOSPADM

## 2024-03-10 RX ORDER — DIGOXIN 0.25 MG/ML
250 INJECTION INTRAMUSCULAR; INTRAVENOUS ONCE
Status: COMPLETED | OUTPATIENT
Start: 2024-03-10 | End: 2024-03-10

## 2024-03-10 RX ORDER — FUROSEMIDE 20 MG/1
20 TABLET ORAL DAILY
Status: DISCONTINUED | OUTPATIENT
Start: 2024-03-10 | End: 2024-03-12 | Stop reason: HOSPADM

## 2024-03-10 RX ORDER — ONDANSETRON 2 MG/ML
4 INJECTION INTRAMUSCULAR; INTRAVENOUS ONCE
Status: DISCONTINUED | OUTPATIENT
Start: 2024-03-10 | End: 2024-03-12 | Stop reason: HOSPADM

## 2024-03-10 RX ORDER — ONDANSETRON 2 MG/ML
4 INJECTION INTRAMUSCULAR; INTRAVENOUS EVERY 6 HOURS PRN
Status: DISCONTINUED | OUTPATIENT
Start: 2024-03-10 | End: 2024-03-12 | Stop reason: HOSPADM

## 2024-03-10 RX ORDER — TRAMADOL HYDROCHLORIDE 50 MG/1
50 TABLET ORAL EVERY 8 HOURS PRN
COMMUNITY
End: 2024-03-13 | Stop reason: SDUPTHER

## 2024-03-10 RX ORDER — SODIUM CHLORIDE 0.9 % (FLUSH) 0.9 %
5-40 SYRINGE (ML) INJECTION PRN
Status: DISCONTINUED | OUTPATIENT
Start: 2024-03-10 | End: 2024-03-12 | Stop reason: HOSPADM

## 2024-03-10 RX ORDER — ENOXAPARIN SODIUM 100 MG/ML
1 INJECTION SUBCUTANEOUS 2 TIMES DAILY
Status: DISCONTINUED | OUTPATIENT
Start: 2024-03-10 | End: 2024-03-12 | Stop reason: HOSPADM

## 2024-03-10 RX ORDER — SODIUM CHLORIDE 0.9 % (FLUSH) 0.9 %
10 SYRINGE (ML) INJECTION PRN
Status: DISCONTINUED | OUTPATIENT
Start: 2024-03-10 | End: 2024-03-12 | Stop reason: HOSPADM

## 2024-03-10 RX ORDER — DILTIAZEM HYDROCHLORIDE 5 MG/ML
10 INJECTION INTRAVENOUS ONCE
Status: COMPLETED | OUTPATIENT
Start: 2024-03-10 | End: 2024-03-10

## 2024-03-10 RX ORDER — ACETAMINOPHEN 500 MG
1000 TABLET ORAL EVERY 6 HOURS PRN
COMMUNITY

## 2024-03-10 RX ORDER — ACETAMINOPHEN 650 MG/1
650 SUPPOSITORY RECTAL EVERY 6 HOURS PRN
Status: DISCONTINUED | OUTPATIENT
Start: 2024-03-10 | End: 2024-03-12 | Stop reason: HOSPADM

## 2024-03-10 RX ORDER — POTASSIUM CHLORIDE 20 MEQ/1
40 TABLET, EXTENDED RELEASE ORAL PRN
Status: DISCONTINUED | OUTPATIENT
Start: 2024-03-10 | End: 2024-03-12 | Stop reason: HOSPADM

## 2024-03-10 RX ORDER — POLYETHYLENE GLYCOL 3350 17 G/17G
17 POWDER, FOR SOLUTION ORAL DAILY PRN
Status: DISCONTINUED | OUTPATIENT
Start: 2024-03-10 | End: 2024-03-12 | Stop reason: HOSPADM

## 2024-03-10 RX ORDER — ALBUTEROL SULFATE 2.5 MG/3ML
2.5 SOLUTION RESPIRATORY (INHALATION) EVERY 4 HOURS PRN
COMMUNITY

## 2024-03-10 RX ORDER — BENZONATATE 100 MG/1
100 CAPSULE ORAL 3 TIMES DAILY PRN
COMMUNITY
End: 2024-03-10

## 2024-03-10 RX ORDER — WARFARIN SODIUM 2 MG/1
4 TABLET ORAL
Status: COMPLETED | OUTPATIENT
Start: 2024-03-10 | End: 2024-03-10

## 2024-03-10 RX ORDER — CYCLOBENZAPRINE HCL 5 MG
5 TABLET ORAL 3 TIMES DAILY PRN
COMMUNITY
Start: 2024-01-22

## 2024-03-10 RX ORDER — SODIUM CHLORIDE 9 MG/ML
INJECTION, SOLUTION INTRAVENOUS PRN
Status: DISCONTINUED | OUTPATIENT
Start: 2024-03-10 | End: 2024-03-12 | Stop reason: HOSPADM

## 2024-03-10 RX ORDER — WARFARIN SODIUM 5 MG/1
5 TABLET ORAL
Status: DISCONTINUED | OUTPATIENT
Start: 2024-03-10 | End: 2024-03-10

## 2024-03-10 RX ORDER — ACETAMINOPHEN 500 MG
1000 TABLET ORAL ONCE
Status: COMPLETED | OUTPATIENT
Start: 2024-03-10 | End: 2024-03-10

## 2024-03-10 RX ORDER — ALBUTEROL SULFATE 2.5 MG/3ML
2.5 SOLUTION RESPIRATORY (INHALATION) EVERY 4 HOURS PRN
Status: DISCONTINUED | OUTPATIENT
Start: 2024-03-10 | End: 2024-03-12 | Stop reason: HOSPADM

## 2024-03-10 RX ORDER — 0.9 % SODIUM CHLORIDE 0.9 %
100 INTRAVENOUS SOLUTION INTRAVENOUS ONCE
Status: COMPLETED | OUTPATIENT
Start: 2024-03-10 | End: 2024-03-10

## 2024-03-10 RX ORDER — LIDOCAINE 40 MG/G
CREAM TOPICAL 2 TIMES DAILY PRN
COMMUNITY

## 2024-03-10 RX ORDER — ONDANSETRON 4 MG/1
4 TABLET, ORALLY DISINTEGRATING ORAL EVERY 8 HOURS PRN
Status: DISCONTINUED | OUTPATIENT
Start: 2024-03-10 | End: 2024-03-12 | Stop reason: HOSPADM

## 2024-03-10 RX ORDER — POTASSIUM CHLORIDE 7.45 MG/ML
10 INJECTION INTRAVENOUS PRN
Status: DISCONTINUED | OUTPATIENT
Start: 2024-03-10 | End: 2024-03-12 | Stop reason: HOSPADM

## 2024-03-10 RX ORDER — 0.9 % SODIUM CHLORIDE 0.9 %
1000 INTRAVENOUS SOLUTION INTRAVENOUS ONCE
Status: DISCONTINUED | OUTPATIENT
Start: 2024-03-10 | End: 2024-03-10

## 2024-03-10 RX ORDER — DILTIAZEM HYDROCHLORIDE 180 MG/1
180 CAPSULE, COATED, EXTENDED RELEASE ORAL DAILY
Status: DISCONTINUED | OUTPATIENT
Start: 2024-03-10 | End: 2024-03-12 | Stop reason: HOSPADM

## 2024-03-10 RX ORDER — LIDOCAINE 4 G/G
1 PATCH TOPICAL DAILY PRN
COMMUNITY

## 2024-03-10 RX ORDER — TRAMADOL HYDROCHLORIDE 50 MG/1
50 TABLET ORAL EVERY 8 HOURS PRN
Status: DISCONTINUED | OUTPATIENT
Start: 2024-03-10 | End: 2024-03-12 | Stop reason: HOSPADM

## 2024-03-10 RX ORDER — ACETAMINOPHEN 325 MG/1
650 TABLET ORAL EVERY 6 HOURS PRN
Status: DISCONTINUED | OUTPATIENT
Start: 2024-03-10 | End: 2024-03-12 | Stop reason: HOSPADM

## 2024-03-10 RX ADMIN — TRAMADOL HYDROCHLORIDE 50 MG: 50 TABLET, COATED ORAL at 20:00

## 2024-03-10 RX ADMIN — SODIUM CHLORIDE, PRESERVATIVE FREE 40 MG: 5 INJECTION INTRAVENOUS at 22:27

## 2024-03-10 RX ADMIN — SODIUM CHLORIDE, PRESERVATIVE FREE 10 ML: 5 INJECTION INTRAVENOUS at 20:01

## 2024-03-10 RX ADMIN — ACETAMINOPHEN 1000 MG: 500 TABLET ORAL at 09:16

## 2024-03-10 RX ADMIN — SODIUM CHLORIDE 1000 ML: 9 INJECTION, SOLUTION INTRAVENOUS at 08:18

## 2024-03-10 RX ADMIN — SODIUM CHLORIDE, PRESERVATIVE FREE 10 ML: 5 INJECTION INTRAVENOUS at 22:28

## 2024-03-10 RX ADMIN — METOPROLOL TARTRATE 25 MG: 25 TABLET, FILM COATED ORAL at 20:01

## 2024-03-10 RX ADMIN — DILTIAZEM HYDROCHLORIDE 10 MG: 5 INJECTION, SOLUTION INTRAVENOUS at 12:12

## 2024-03-10 RX ADMIN — IOPAMIDOL 100 ML: 755 INJECTION, SOLUTION INTRAVENOUS at 10:04

## 2024-03-10 RX ADMIN — SODIUM CHLORIDE 100 ML: 9 INJECTION, SOLUTION INTRAVENOUS at 10:04

## 2024-03-10 RX ADMIN — SODIUM CHLORIDE, PRESERVATIVE FREE 10 ML: 5 INJECTION INTRAVENOUS at 10:04

## 2024-03-10 RX ADMIN — DIGOXIN 250 MCG: 0.25 INJECTION INTRAMUSCULAR; INTRAVENOUS at 22:27

## 2024-03-10 RX ADMIN — METOPROLOL TARTRATE 25 MG: 25 TABLET, FILM COATED ORAL at 12:12

## 2024-03-10 RX ADMIN — WARFARIN SODIUM 4 MG: 2 TABLET ORAL at 18:41

## 2024-03-10 ASSESSMENT — PAIN SCALES - GENERAL
PAINLEVEL_OUTOF10: 0
PAINLEVEL_OUTOF10: 10
PAINLEVEL_OUTOF10: 0
PAINLEVEL_OUTOF10: 8
PAINLEVEL_OUTOF10: 8
PAINLEVEL_OUTOF10: 7
PAINLEVEL_OUTOF10: 8

## 2024-03-10 ASSESSMENT — PAIN DESCRIPTION - LOCATION
LOCATION: BACK
LOCATION: LEG
LOCATION: GROIN
LOCATION: LEG
LOCATION: GROIN

## 2024-03-10 ASSESSMENT — PAIN DESCRIPTION - ORIENTATION
ORIENTATION: LOWER
ORIENTATION: RIGHT

## 2024-03-10 ASSESSMENT — PAIN - FUNCTIONAL ASSESSMENT: PAIN_FUNCTIONAL_ASSESSMENT: 0-10

## 2024-03-10 NOTE — PROGRESS NOTES
Patient was transferred to the commode with 2 assist. Patient had large BM that looked like blood in it. Writer notified medicine residents to get occult stool order.

## 2024-03-10 NOTE — ED PROVIDER NOTES
[facility]  Emergency Department Encounter  Emergency Medicine Physician     Pt Name: Issac Morfin  MRN: [id number]  Birthdate 12/2/1931  Date of evaluation: 3/10/24  PCP:  Carmen Wright MD    CHIEF COMPLAINT       Chief Complaint   Patient presents with    Groin Pain     Right groin pain radiating around to back        HISTORY OF PRESENT ILLNESS  (Location/Symptom, Timing/Onset, Context/Setting, Quality, Duration, Modifying Factors, Severity.)    Issac Morfin is a 89 y.o. male who presents with right lower quadrant abdominal pain ongoing for the past 2 days.  Patient states the pain radiates into his right flank and down into the right testicle.  He states that occasionally pain changes whenever he moves or sits or coughs.  He denies any known history of a hernia.  States he does have a history of congestive heart failure and atrial fibrillation for which he takes warfarin.  States the pain radiates into the back.  States that he is not having any left-sided abdominal pain.  States that occasionally the pain radiates into the right hip.        PAST MEDICAL / SURGICAL / SOCIAL / FAMILY HISTORY    has a past medical history of A-fib (HCC), Acute MI (HCC), Allergic rhinitis, Anemia, Atrial fibrillation (HCC), Basal cell cancer, Basal cell cancer, Cervical radiculopathy, Diverticulitis, GERD (gastroesophageal reflux disease), Glaucoma, Hyperlipidemia, Hypertension, Hyponatremia, IBS (irritable bowel syndrome), Insomnia, Osteoarthritis, Osteoarthritis/neck pain., Prostate cancer (HCC), Renal calculi, Shingles, Status post prostatectomy, Urinary frequency, Wears glasses, and Wears hearing aid in both ears.     has a past surgical history that includes Spine surgery (04/2003); Lithotripsy; Skin cancer excision; skin biopsy (03/11/2013); Skin cancer excision (Left, 03/2013); Colonoscopy (2002); Colonoscopy (08/2013); skin biopsy (Left, 02/11/2014); Mohs surgery (Left, 03/10/2014); Mohs     Intake and output    Turn or assist with turn approximately every 2 hours if patient is unable to turn self. Remind patient to turn if necessary    Maintain HOB at the lowest elevation consistent with medical plan of care    Assess skin per unit guidelines    Maintain heels off of bed at all times    Pad/offload medical devices    Use lift equipment for lifting patient    DNR comfort care    Inpatient consult to Internal Medicine    Pharmacy to Dose: Warfarin    Consult to Cardiology    Inpatient consult to Social Work    ADULT ORAL NUTRITION SUPPLEMENT; Breakfast, Lunch, Dinner; Standard High Calorie/High Protein Oral Supplement    OT eval and treat    PT evaluation and treat    Initiate Oxygen Therapy Protocol    EKG 12 Lead    ADMIT TO INPATIENT       MEDICATIONS ORDERED:  Orders Placed This Encounter   Medications    DISCONTD: sodium chloride 0.9 % bolus 1,000 mL    ondansetron (ZOFRAN) injection 4 mg    fentaNYL (SUBLIMAZE) injection 50 mcg    sodium chloride 0.9 % bolus 1,000 mL    acetaminophen (TYLENOL) tablet 1,000 mg    sodium chloride flush 0.9 % injection 10 mL    iopamidol (ISOVUE-370) 76 % injection 100 mL    sodium chloride 0.9 % bolus 100 mL    metoprolol tartrate (LOPRESSOR) tablet 25 mg    dilTIAZem injection 10 mg    DISCONTD: warfarin (COUMADIN) tablet 5 mg     Order Specific Question:   Indication of Use     Answer:   A Fib/A Flutter     Order Specific Question:   What is the patient's goal INR?     Answer:   2.0 - 3.0    warfarin placeholder: dosing by pharmacy     Order Specific Question:   What is the patient's goal INR?     Answer:   2.0 - 3.0    warfarin (COUMADIN) tablet 4 mg     Order Specific Question:   Indication of Use     Answer:   A Fib/A Flutter     Order Specific Question:   What is the patient's goal INR?     Answer:   2.0 - 3.0    albuterol (PROVENTIL) (2.5 MG/3ML) 0.083% nebulizer solution 2.5 mg     Order Specific Question:   Initiate RT Bronchodilator Protocol     Answer:

## 2024-03-10 NOTE — H&P
[facility]  IN-PATIENT SERVICE  [facility]  IN-PATIENT SERVICE   [facility]     HISTORY AND PHYSICAL EXAMINATION            Date:   3/10/2024  Patient name:  Issac Morfin  Date of admission:  3/10/2024  7:05 AM  MRN:   [id number]  Account:  [id number]  YOB: 1931  PCP:    Carmen Wright MD  Room:   02/02  Code Status:    Prior    Chief Complaint:     Chief Complaint   Patient presents with    Groin Pain     Right groin pain radiating around to back        History Obtained From:     patient    History of Present Illness:       Patient is a 89 year old male with past medical history of afib on Coumadin, CHF reduced ejection fraction, hypertension, iron deficiency anemia, B12 deficiency presenting with right-sided flank/abdominal pain.  Patient states pain began 5 days ago.  Sharp in nature, worse with movement, radiates up and down into the left side of the groin.  Patient does note that he had a bowel movement yesterday with relief of some of the pain.  Bowel movement was normal in consistency and color, denies any blood in the stool.  No history of hernias.  No urinary symptoms.  Patient does have a history of cholecystectomy.  Patient on Coumadin for atrial fibrillation, that he does take the medication every day.  Patient denies any fever or chills.  Denies any chest pain, shortness of breath.  Patient currently living at [facility].  Chart review notes that patient complains of generalized pain during each encounter with the physician on staff.  Patient did have a fall 2 months ago, at that time had extensive workup including CT head, CT C-spine, CT thoracic and lumbar spine all unremarkable for any acute fracture.    In the ED, patient was found to be tachycardic in A-fib, occasional runs of rapid ventricular response into the 150s heart rate.  Received one-time dose of Cardizem  Lipase 12 (L) 13 - 60 U/L   Magnesium    Collection Time: 03/10/24  9:15 AM   Result Value Ref Range    Magnesium 2.1 1.6 - 2.6 mg/dL   Troponin    Collection Time: 03/10/24  9:15 AM   Result Value Ref Range    Troponin, High Sensitivity 63 (HH) 0 - 22 ng/L   Urinalysis with Reflex to Culture    Collection Time: 03/10/24  9:37 AM    Specimen: Urine   Result Value Ref Range    Color, UA Yellow Yellow    Turbidity UA Clear Clear    Glucose, Ur NEGATIVE NEGATIVE mg/dL    Bilirubin Urine NEGATIVE NEGATIVE    Ketones, Urine NEGATIVE NEGATIVE mg/dL    Specific Gravity, UA 1.009 1.000 - 1.030    Urine Hgb NEGATIVE NEGATIVE    pH, UA 7.0 5.0 - 8.0    Protein, UA TRACE (A) NEGATIVE mg/dL    Urobilinogen, Urine ELEVATED 0.0 - 1.0 EU/dL    Nitrite, Urine NEGATIVE NEGATIVE    Leukocyte Esterase, Urine NEGATIVE NEGATIVE   Microscopic Urinalysis    Collection Time: 03/10/24  9:37 AM   Result Value Ref Range    WBC, UA 0 TO 2 (A) 0 TO 5 /HPF    RBC, UA 0 TO 2 0 TO 2 /HPF    Casts UA 0 TO 2 (A) None /LPF    Epithelial Cells UA 0 TO 2 /HPF    Bacteria, UA None None       Imaging/Diagnostics:  CTA ABDOMEN PELVIS W CONTRAST    Result Date: 3/10/2024  EXAMINATION: CTA OF THE ABDOMEN AND PELVIS WITH CONTRAST 3/10/2024 10:00 am: TECHNIQUE: CTA of the abdomen and pelvis was performed with the administration of intravenous contrast. Multiplanar reformatted images are provided for review. MIP images are provided for review. Automated exposure control, iterative reconstruction, and/or weight based adjustment of the mA/kV was utilized to reduce the radiation dose to as low as reasonably achievable. COMPARISON: None. HISTORY: ORDERING SYSTEM PROVIDED HISTORY: RLQ pain that extends into testicles and right flank, a fib, concern for appy, hernia, mesenteric ischemia, lower concern for deep space infection TECHNOLOGIST PROVIDED HISTORY: RLQ pain that extends into testicles and right flank, a fib, concern for appy, hernia, mesenteric ischemia,  see him  - Cardizem 180 daily  - Lopressor 25 twice daily  - Pharmacy to dose Coumadin    Subtherapeutic INR on Coumadin  - INR 1.8  - Concern of whether the patient has been getting his Coumadin daily at extended-care facility  - Full dose Lovenox  - Pharmacy to dose Coumadin    Right-sided abdominal/groin pain  -CT done in the ED negative for any acute pathology  -Given history of pain changing with bowel movement, possibly spontaneously reduced hernia  -Will manage patient symptomatically at this time given DNRCC status      DVT Prophylaxis: Lovenox, pharmacy to dose Coumadin  GI Prophylaxis: N/A  Diet: Adult regular  Dispo: TBD  PT/OT/SW: Consulted    CODE STATUS: DNRCC      Consultations:   IP CONSULT TO INTERNAL MEDICINE  PHARMACY TO DOSE WARFARIN  IP CONSULT TO CARDIOLOGY  IP CONSULT TO SOCIAL WORK    Patient is admitted as inpatient status because of co-morbiditieslisted above, severity of signs and symptoms as outlined, requirement for current medical therapies and most importantly because of direct risk to patient if care not provided in a hospital setting.    Elian Callahan MD  3/10/2024  12:53 PM    Copy sent to Dr. Wright, Carmen Villa MD  Attending Physician Statement  I have discussed the care of Issac Morfin and I have examined the patient myselft and taken ros and hpi , including pertinent history and exam findings,  with the resident. I have reviewed the key elements of all parts of the encounter with the resident.  I agree with the assessment, plan and orders as documented by the resident.  89-year-old gentleman with underlying history of chronic atrial fibrillation admitted with A-fib RVR, cardiology on board, medication adjusted, subtherapeutic INR on Coumadin, back to baseline as per cardiology patient is okay to be discharged, CODE STATUS DNR resuscitate comfort care only      Electronically signed by Meenakshi Karimi MD

## 2024-03-10 NOTE — PROGRESS NOTES
Pt and family are known to writer; pt gave writer update and asked to be placed on prayer list at [employer]. Pt and daughter appreciated visit and prayer. Writer will follow up tomorrow.    03/10/24 1347   Encounter Summary   Encounter Overview/Reason  Spiritual/Emotional Needs   Service Provided For: Patient and family together   Referral/Consult From: [employer] [facility]   Last Encounter  03/10/24   Complexity of Encounter Moderate   Spiritual/Emotional needs   Type Spiritual Support   Assessment/Intervention/Outcome   Assessment Calm;Impaired resilience   Intervention Active listening;Discussed illness injury and it’s impact;Explored/Affirmed feelings, thoughts, concerns;Prayer (assurance of)/[place];Sustaining Presence/Ministry of presence   Outcome Comfort;Engaged in conversation;Expressed feelings, needs, and concerns;Expressed Gratitude;Receptive   Plan and Referrals   Plan/Referrals Contacted support as requested per patient/family request Plan  [facility]

## 2024-03-10 NOTE — ED NOTES
Mode of arrival (squad #, walk in, police, etc) : [facility] from [facility]        Arrival Note (brief scenario, treatment PTA, etc).: patient presents with complaints of right groin pain radiating to right flank area. He reports he has had this pain for the past 4 days, denies any recent fall/injury. Pain has gotten progressively worse and worsens with certain movements and coughing. He arrives to ED alert and oriented x4, respirations even non-labored.

## 2024-03-11 ENCOUNTER — APPOINTMENT (OUTPATIENT)
Dept: ULTRASOUND IMAGING | Age: 89
DRG: 308 | End: 2024-03-11
Payer: MEDICARE

## 2024-03-11 PROBLEM — I34.0 MITRAL VALVE INSUFFICIENCY: Status: ACTIVE | Noted: 2024-03-11

## 2024-03-11 PROBLEM — K92.1 HEMATOCHEZIA: Status: ACTIVE | Noted: 2024-03-11

## 2024-03-11 PROBLEM — I42.9 CARDIOMYOPATHY (HCC): Status: ACTIVE | Noted: 2024-03-11

## 2024-03-11 PROBLEM — E43 SEVERE MALNUTRITION (HCC): Status: ACTIVE | Noted: 2024-03-11

## 2024-03-11 LAB
ANION GAP SERPL CALCULATED.3IONS-SCNC: 10 MMOL/L (ref 9–17)
BASOPHILS # BLD: 0 K/UL (ref 0–0.2)
BASOPHILS NFR BLD: 0 % (ref 0–2)
BUN SERPL-MCNC: 13 MG/DL (ref 8–23)
CALCIUM SERPL-MCNC: 8.8 MG/DL (ref 8.6–10.4)
CHLORIDE SERPL-SCNC: 100 MMOL/L (ref 98–107)
CO2 SERPL-SCNC: 28 MMOL/L (ref 20–31)
CREAT SERPL-MCNC: 0.8 MG/DL (ref 0.7–1.2)
EOSINOPHIL # BLD: 0 K/UL (ref 0–0.4)
EOSINOPHILS RELATIVE PERCENT: 0 % (ref 0–4)
ERYTHROCYTE [DISTWIDTH] IN BLOOD BY AUTOMATED COUNT: 18 % (ref 11.5–14.9)
GFR SERPL CREATININE-BSD FRML MDRD: >60 ML/MIN/1.73M2
GLUCOSE SERPL-MCNC: 86 MG/DL (ref 70–99)
HCT VFR BLD AUTO: 36.7 % (ref 41–53)
HGB BLD-MCNC: 11.7 G/DL (ref 13.5–17.5)
INR PPP: 2.2
LYMPHOCYTES NFR BLD: 0.77 K/UL (ref 1–4.8)
LYMPHOCYTES RELATIVE PERCENT: 7 % (ref 24–44)
MCH RBC QN AUTO: 26.2 PG (ref 26–34)
MCHC RBC AUTO-ENTMCNC: 32 G/DL (ref 31–37)
MCV RBC AUTO: 81.9 FL (ref 80–100)
MONOCYTES NFR BLD: 38 % (ref 1–7)
MONOCYTES NFR BLD: 4.18 K/UL (ref 0.1–1.3)
MORPHOLOGY: ABNORMAL
NEUTROPHILS NFR BLD: 55 % (ref 36–66)
NEUTS SEG NFR BLD: 6.05 K/UL (ref 1.3–9.1)
PLATELET # BLD AUTO: 189 K/UL (ref 150–450)
PMV BLD AUTO: 8.3 FL (ref 6–12)
POTASSIUM SERPL-SCNC: 3.6 MMOL/L (ref 3.7–5.3)
PROTHROMBIN TIME: 24.2 SEC (ref 11.8–14.6)
RBC # BLD AUTO: 4.48 M/UL (ref 4.5–5.9)
SODIUM SERPL-SCNC: 138 MMOL/L (ref 135–144)
WBC OTHER # BLD: 11 K/UL (ref 3.5–11)

## 2024-03-11 PROCEDURE — 2580000003 HC RX 258

## 2024-03-11 PROCEDURE — 97166 OT EVAL MOD COMPLEX 45 MIN: CPT

## 2024-03-11 PROCEDURE — 2060000000 HC ICU INTERMEDIATE R&B

## 2024-03-11 PROCEDURE — 97530 THERAPEUTIC ACTIVITIES: CPT

## 2024-03-11 PROCEDURE — 99232 SBSQ HOSP IP/OBS MODERATE 35: CPT | Performed by: INTERNAL MEDICINE

## 2024-03-11 PROCEDURE — 99223 1ST HOSP IP/OBS HIGH 75: CPT | Performed by: INTERNAL MEDICINE

## 2024-03-11 PROCEDURE — 80048 BASIC METABOLIC PNL TOTAL CA: CPT

## 2024-03-11 PROCEDURE — 85025 COMPLETE CBC W/AUTO DIFF WBC: CPT

## 2024-03-11 PROCEDURE — 76870 US EXAM SCROTUM: CPT

## 2024-03-11 PROCEDURE — 85610 PROTHROMBIN TIME: CPT

## 2024-03-11 PROCEDURE — 6360000002 HC RX W HCPCS

## 2024-03-11 PROCEDURE — A4216 STERILE WATER/SALINE, 10 ML: HCPCS

## 2024-03-11 PROCEDURE — 97162 PT EVAL MOD COMPLEX 30 MIN: CPT

## 2024-03-11 PROCEDURE — 99222 1ST HOSP IP/OBS MODERATE 55: CPT | Performed by: INTERNAL MEDICINE

## 2024-03-11 PROCEDURE — C9113 INJ PANTOPRAZOLE SODIUM, VIA: HCPCS

## 2024-03-11 PROCEDURE — 36415 COLL VENOUS BLD VENIPUNCTURE: CPT

## 2024-03-11 PROCEDURE — 6370000000 HC RX 637 (ALT 250 FOR IP)

## 2024-03-11 PROCEDURE — APPNB30 APP NON BILLABLE TIME 0-30 MINS: Performed by: NURSE PRACTITIONER

## 2024-03-11 RX ADMIN — ALLOPURINOL 100 MG: 100 TABLET ORAL at 09:36

## 2024-03-11 RX ADMIN — TRAMADOL HYDROCHLORIDE 50 MG: 50 TABLET, COATED ORAL at 12:58

## 2024-03-11 RX ADMIN — DILTIAZEM HYDROCHLORIDE 180 MG: 180 CAPSULE, COATED, EXTENDED RELEASE ORAL at 09:36

## 2024-03-11 RX ADMIN — SODIUM CHLORIDE, PRESERVATIVE FREE 10 ML: 5 INJECTION INTRAVENOUS at 21:32

## 2024-03-11 RX ADMIN — TRAMADOL HYDROCHLORIDE 50 MG: 50 TABLET, COATED ORAL at 21:32

## 2024-03-11 RX ADMIN — SODIUM CHLORIDE, PRESERVATIVE FREE 40 MG: 5 INJECTION INTRAVENOUS at 09:37

## 2024-03-11 RX ADMIN — METOPROLOL TARTRATE 25 MG: 25 TABLET, FILM COATED ORAL at 21:32

## 2024-03-11 RX ADMIN — SODIUM CHLORIDE, PRESERVATIVE FREE 10 ML: 5 INJECTION INTRAVENOUS at 09:35

## 2024-03-11 RX ADMIN — METOPROLOL TARTRATE 25 MG: 25 TABLET, FILM COATED ORAL at 09:35

## 2024-03-11 ASSESSMENT — ENCOUNTER SYMPTOMS
DIARRHEA: 0
BLOOD IN STOOL: 0
CHEST TIGHTNESS: 0
COUGH: 0
CONSTIPATION: 0
NAUSEA: 0
ABDOMINAL PAIN: 0
VOMITING: 0
SHORTNESS OF BREATH: 0

## 2024-03-11 ASSESSMENT — PAIN SCALES - GENERAL
PAINLEVEL_OUTOF10: 7
PAINLEVEL_OUTOF10: 7

## 2024-03-11 ASSESSMENT — PAIN DESCRIPTION - LOCATION: LOCATION: ABDOMEN;BACK

## 2024-03-11 NOTE — PROGRESS NOTES
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 (6/20/16 - 6/24/16) after a prolonged hospitalization (2/5/16 - 3/21/16) for induction with 7&3 and reinduction with MEC to remission and IDAC (4/4/16 - 4/9/16)  -- Chimerics from marrow done 6/21 show CD3 of 90% donor and 10% recipient, but CD34 of 5% donor and 95% recipient   -- Chimers from 7/10 shows 70% recipient and 30% donor - NOT SORTED  -- Relpased 6/2017 and reinduced with FLAG BETZY  -- DLI 8/14/17        [facility]   Occupational Therapy Evaluation  Date: 3/11/24  Patient Name: Issac Morfin       Room: 2106/2106-01  MRN: [id number]  Account: [id number]   : 1931  (89 y.o.) Gender: male     Discharge Recommendations:  Further Occupational Therapy is recommended upon facility discharge.    OT Equipment Recommendations  Other: TBD    Referring Practitioner: Silviano Walker MD  Diagnosis: Abdominal pain   Additional Pertinent Hx: Per H&P Note: 89 year old male with past medical history of afib on Coumadin, CHF reduced ejection fraction, hypertension, iron deficiency anemia, B12 deficiency presenting with right-sided flank/abdominal pain.  Patient states pain began 5 days ago.  Sharp in nature, worse with movement, radiates up and down into the left side of the groin.  Patient does note that he had a bowel movement yesterday with relief of some of the pain.  Bowel movement was normal in consistency and color, denies any blood in the stool.  No history of hernias.  No urinary symptoms.  Patient does have a history of cholecystectomy.  Patient on Coumadin for atrial fibrillation, that he does take the medication every day.  Patient denies any fever or chills.  Denies any chest pain, shortness of breath.  Patient currently living at [facility].  Chart review notes that patient complains of generalized pain during each encounter with the physician on staff.  Patient did have a fall 2 months ago, at that time had extensive workup including CT head, CT C-spine, CT thoracic and lumbar spine all unremarkable for any acute fracture.         Past Medical History:  has a past medical history of A-fib (HCC), Acute MI (HCC), Allergic rhinitis, Anemia, Atrial fibrillation (HCC), Basal cell cancer, Basal cell cancer, Cervical radiculopathy, Diverticulitis, GERD (gastroesophageal reflux disease), Glaucoma, Hyperlipidemia, Hypertension, Hyponatremia, IBS (irritable bowel syndrome), Insomnia,

## 2024-03-11 NOTE — PLAN OF CARE
Problem: Pain  Goal: Verbalizes/displays adequate comfort level or baseline comfort level  3/11/2024 0303 by Bertha Burris, RN  Outcome: Progressing  Note: Patient states that he is very achy.  Patient did take a dose of PRN tramadol and said that it was effective for pain.       Problem: Skin/Tissue Integrity  Goal: Absence of new skin breakdown  Description: 1.  Monitor for areas of redness and/or skin breakdown  2.  Assess vascular access sites hourly  3.  Every 4-6 hours minimum:  Change oxygen saturation probe site  4.  Every 4-6 hours:  If on nasal continuous positive airway pressure, respiratory therapy assess nares and determine need for appliance change or resting period.  3/11/2024 0303 by Bertha Burris, RN  Outcome: Progressing  Note: Patient did c/o discomfort in heels.  Foam dressings applied to heels and heels elevated off the bed.  Patient is able to reposition self in bed, yet slowly.  No new skin breakdown noted at this time.       Problem: Safety - Adult  Goal: Free from fall injury  3/11/2024 0303 by Bertha Burris, RN  Outcome: Progressing  Note: Bed in lowest and locked position.  2 bedrails used for pt safety.  Bed alarm in use in pt's room.  Patient is unable to stand straight even with 2 staff assisting.  Patient able to use neeru steady much better.  Hourly rounds done by staff and RN.  Phone and call light within pt's reach.         Problem: Cardiovascular - Adult  Goal: Maintains optimal cardiac output and hemodynamic stability  Outcome: Progressing     Problem: Cardiovascular - Adult  Goal: Absence of cardiac dysrhythmias or at baseline  Outcome: Progressing  Note: Telemetry in use throughout the shift.  Cardiac strip posted and placed in chart.  Patient is in afib and blood thinners on hold per cardiology.  Patient did receive a dose of IV digoxin.  Patient denied chest pain during assessments.

## 2024-03-11 NOTE — PROGRESS NOTES
Physical Therapy  Facility/Department: [facility] PROGRESSIVE CARE  Physical Therapy Initial Assessment    Name: Issca Morfin  : 1931  MRN: [id number]  Date of Service: 3/11/2024    Discharge Recommendations:  Patient would benefit from continued therapy after discharge, Therapy recommended at discharge          Patient Diagnosis(es): The primary encounter diagnosis was Atrial fibrillation with RVR (HCC). Diagnoses of Right lower quadrant abdominal pain and Subtherapeutic international normalized ratio (INR) were also pertinent to this visit.  Past Medical History:  has a past medical history of A-fib (HCC), Acute MI (HCC), Allergic rhinitis, Anemia, Atrial fibrillation (HCC), Basal cell cancer, Basal cell cancer, Cervical radiculopathy, Diverticulitis, GERD (gastroesophageal reflux disease), Glaucoma, Hyperlipidemia, Hypertension, Hyponatremia, IBS (irritable bowel syndrome), Insomnia, Osteoarthritis, Osteoarthritis/neck pain., Prostate cancer (HCC), Renal calculi, Shingles, Status post prostatectomy, Urinary frequency, Wears glasses, and Wears hearing aid in both ears.  Past Surgical History:  has a past surgical history that includes Spine surgery (2003); Lithotripsy; Skin cancer excision; skin biopsy (2013); Skin cancer excision (Left, 2013); Colonoscopy (); Colonoscopy (2013); skin biopsy (Left, 2014); Mohs surgery (Left, 03/10/2014); Mohs surgery (2014); other surgical history (2015); Cholecystectomy (2015); Skin cancer excision (Right, 2016); Mohs surgery (Left, 2017); Mohs surgery (Left, 10/21/2019); Mohs surgery (2020); hernia repair (Right); Prostatectomy (2021); Prostatectomy (N/A, 2021); Mohs surgery (2023); and Skin cancer excision (2023).    Assessment   Assessment: Pt admitted form SNF, has been receiving therapies since 2023. Pt having severe back spasms limiting mobility. Pt requires 2 person assist for bed  Education  Education Given To: Patient;Family  Education Provided: Role of Therapy;Plan of Care;Precautions;Transfer Training;Fall Prevention Strategies  Education Method: Verbal;Demonstration  Barriers to Learning: Hearing  Education Outcome: Continued education needed;Verbalized understanding      Therapy Time   Individual Concurrent Group Co-treatment   Time In 1400         Time Out 1428         Minutes 28         Timed Code Treatment Minutes: 10 Minutes       Gage Helton, PT

## 2024-03-11 NOTE — PROGRESS NOTES
Pharmacy Note  Warfarin Consult follow-up      Recent Labs     03/10/24  0810 03/11/24  0543   INR 1.8 2.2     Recent Labs     03/10/24  0810 03/11/24  0543   HGB 13.6 11.7*   HCT 44.0 36.7*    189       Significant Drug-Drug Interactions:  New warfarin drug-drug interactions: none  Discontinued drug-drug interactions: Lovenox (on hold)  Current warfarin drug-drug interactions: Tramadol,       Date             INR                           Dose given previous day Dose scheduled for today  3/11/2024            2.2  (Goal 2-3)          4 mg        Hold        Notes:                   INR 2.2, therapeutic. Coumadin is being held due to possible GI bleed.   Daily PT/INR while inpatient.     Elizabet HdezD, [facility] 3/11/2024 11:22 AM

## 2024-03-11 NOTE — CARE COORDINATION
Case Management Assessment  Initial Evaluation    Date/Time of Evaluation: 3/11/2024 11:06 AM  Assessment Completed by: Isabel Stewart RN    If patient is discharged prior to next notation, then this note serves as note for discharge by case management.    Patient Name: Issac Morfin                   YOB: 1931  Diagnosis: Abdominal pain [R10.9]  Atrial fibrillation with RVR (HCC) [I48.91]  Subtherapeutic international normalized ratio (INR) [R79.1]  Right lower quadrant abdominal pain [R10.31]                   Date / Time: 3/10/2024  7:05 AM    Patient Admission Status: Inpatient   Readmission Risk (Low < 19, Mod (19-27), High > 27): Readmission Risk Score: 18.5    Current PCP: Carmen Wright MD  PCP verified by ? Yes    Chart Reviewed: Yes      History Provided by: Patient  Patient Orientation: Alert and Oriented ([place])    Patient Cognition: Alert    Hospitalization in the last 30 days (Readmission):  No    If yes, Readmission Assessment in  Navigator will be completed.    Advance Directives:      Code Status: DNR-CC   Patient's Primary Decision Maker is:      Primary Decision Maker: Pamela Morfin - Child - [phone]    Secondary Decision Maker: Inderjit Morfin  Child - [phone]    Secondary Decision Maker: Dari Mims Child - [phone]    Discharge Planning:    Patient lives with: Alone Type of Home: Skilled Nursing Facility (Private Pay Bedhold.)  Primary Care Giver: Other (Comment) (Pt is coming from [facility])  Patient Support Systems include: Children   Current Financial resources: Medicaid, Medicare  Current community resources: ECF/Home Care (Has had Home Care in Past. Unsure from where.)  Current services prior to admission: Durable Medical Equipment, Skilled Nursing Facility            Current DME: Cane, Walker, Shower Chair (GB, Tub Bench)            Type of Home Care services:  None    ADLS  Prior functional level: Independent in

## 2024-03-11 NOTE — FLOWSHEET NOTE
03/10/24 2043   Treatment Team Notification   Reason for Communication Evaluate;Medication concern   Name of Team Member Notified Dr Aquino   Treatment Team Role Consulting Provider   Method of Communication Secure Message   Response See orders   Notification Time 2043     RN sent a perfect serve to MD.  MD called RN back.  MD notified that patient does have history of afib, on coumadin which was sub therapeutic in ER.  Patient did receive a dose of coumadin, yet did have bloody stool when admitted to PCU.  RN asked MD if he still wanted patient to receive lovenox injection.  MD gave telephone order to hold lovenox and not continue coumadin.  MD states that he thinks a consult to GI would be beneficial and to ask primary.  RN also notified MD that patient did receive a one time dose of IV cardizem in ER and HR will increase to the 130-150s at times.  MD given most recent set of vitals and patient did receive oral lopressor.  MD gave telephone order for a one time dose of IV digoxin at 250mcg.  MD states that if HR remains elevated for a sustained period of time to call back.     SEE SCANNED SESSION REPORT. RACHEL RN

## 2024-03-11 NOTE — ACP (ADVANCE CARE PLANNING)
Advance Care Planning     Advance Care Planning Activator (Inpatient)  Conversation Note      Date of ACP Conversation: 3/11/2024     Conversation Conducted with: Patient with Decision Making Capacity    ACP Activator: Isabel Stewart RN        Health Care Decision Maker:     Current Designated Health Care Decision Maker:     Primary Decision Maker: Pamela Morfin - Child - [phone]    Secondary Decision Maker: Inderjit Morfin - Child - [phone]    Secondary Decision Maker: PrasannaDari Child - [phone]        Care Preferences    Ventilation:  \"If you were in your present state of health and suddenly became very ill and were unable to breathe on your own, what would your preference be about the use of a ventilator (breathing machine) if it were available to you?\"      Would the patient desire the use of ventilator (breathing machine)?: no    \"If your health worsens and it becomes clear that your chance of recovery is unlikely, what would your preference be about the use of a ventilator (breathing machine) if it were available to you?\"     Would the patient desire the use of ventilator (breathing machine)?: No      Resuscitation  \"CPR works best to restart the heart when there is a sudden event, like a heart attack, in someone who is otherwise healthy. Unfortunately, CPR does not typically restart the heart for people who have serious health conditions or who are very sick.\"    \"In the event your heart stopped as a result of an underlying serious health condition, would you want attempts to be made to restart your heart (answer \"yes\" for attempt to resuscitate) or would you prefer a natural death (answer \"no\" for do not attempt to resuscitate)?\" no       [] Yes   [] No   Educated Patient / Decision Maker regarding differences between Advance Directives and portable DNR orders.    Length of ACP Conversation in minutes:      Conversation Outcomes:  ACP discussion completed    Follow-up plan:    []  Schedule follow-up conversation to continue planning  [] Referred individual to Provider for additional questions/concerns   [] Advised patient/agent/surrogate to review completed ACP document and update if needed with changes in condition, patient preferences or care setting    [] This note routed to one or more involved healthcare providers    Pt States \"He is a DNRCC\".

## 2024-03-11 NOTE — PROGRESS NOTES
Comprehensive Nutrition Assessment    Type and Reason for Visit:  Positive Nutrition Screen (wt loss, poor appetite)    Nutrition Recommendations/Plan:   Please advance pt to No Added Salt diet  Will change supplements to Magic Cup twice daily     Malnutrition Assessment:  Malnutrition Status:  Severe malnutrition (03/11/24 1622)    Context:  Chronic Illness     Findings of the 6 clinical characteristics of malnutrition:  Energy Intake:  75% or less estimated energy requirements for 1 month or longer  Weight Loss:   (15% wt loss over 6-7 months)     Body Fat Loss:  Severe body fat loss Orbital, Triceps   Muscle Mass Loss:  Severe muscle mass loss Hand (interosseous), Calf (gastrocnemius)  Fluid Accumulation:   (moderate) Extremities   Strength:  Not Performed    Nutrition Assessment:    Pt admitted from [facility] due to Abd Pain. He is now on Full Liquid diet and supplements have been added to all trays. Review of wt history shows pt is 26# below his usual wt of 164# (stated). He states he is not hungry because he does not like liquid diet. Pt has moderate to severe fat wasting evident in the orbital and tricep regions and severe muscle wasting in calf and interooseus regions.    Nutrition Related Findings:    moderate BLE edema, Labs/Meds: Reviewed, PMH: CHF, IBS Wound Type: None       Current Nutrition Intake & Therapies:    Average Meal Intake: 1-25%     ADULT ORAL NUTRITION SUPPLEMENT; Breakfast, Lunch, Dinner; Standard High Calorie/High Protein Oral Supplement  ADULT DIET; Full Liquid; No red dye    Anthropometric Measures:  Height: 162.6 cm (5' 4\")  Ideal Body Weight (IBW): 130 lbs (59 kg)    Admission Body Weight: 66.7 kg (147 lb)  Current Body Weight: 63 kg (138 lb 14.2 oz) (obtained by RD), 106.8 % IBW. Weight Source: Bed Scale  Current BMI (kg/m2): 23.8  Usual Body Weight: 74.4 kg (164 lb) (8/23)  % Weight Change (Calculated): -15.3                    BMI Categories: Normal Weight (BMI 22.0 to 24.9) age over  65    Estimated Daily Nutrient Needs:  Energy Requirements Based On: Formula  Weight Used for Energy Requirements: Current  Energy (kcal/day): Davis x 1.3= 1600 kcal (1800 kcal for wt gain)  Weight Used for Protein Requirements: Current  Protein (g/day): 1.5g/kg=95 g     Fluid (ml/day): per MD    Nutrition Diagnosis:   Severe malnutrition related to inadequate protein-energy intake as evidenced by Criteria as identified in malnutrition assessment    Nutrition Interventions:   Food and/or Nutrient Delivery: Modify Oral Nutrition Supplement (advance to solid food)  Nutrition Education/Counseling: Education completed (discussed supplements)  Coordination of Nutrition Care: Continue to monitor while inpatient       Goals:     Goals: PO intake 50% or greater       Nutrition Monitoring and Evaluation:      Food/Nutrient Intake Outcomes: Food and Nutrient Intake, Supplement Intake  Physical Signs/Symptoms Outcomes: Biochemical Data, GI Status, Fluid Status or Edema, Skin, Weight    Discharge Planning:    Too soon to determine     Naye Frankel RD, LD  Contact: [phone]

## 2024-03-11 NOTE — PROGRESS NOTES
Pt with increased pain to scrotal area. Resident informed. Ultrasound ordered. Pt informed. Pt normally sees Dr. Cabral outpt.   Also noted U.O. approx 350mL. Bladder scan resulting 140 mL.    Resident stated to \"hold discharge tonight.\"

## 2024-03-11 NOTE — CONSULTS
[facility]    GASTROENTEROLOGY CONSULT    Patient:   Issac Morfin   :    1931   Facility:   [facility]  Date:    3/11/2024  Admission Dx:  Abdominal pain [R10.9]  Atrial fibrillation with RVR (HCC) [I48.91]  Subtherapeutic international normalized ratio (INR) [R79.1]  Right lower quadrant abdominal pain [R10.31]  Requesting physician: Meenakshi Karimi MD  Reason for consult:  Hematochezia.      SUBJECTIVE:  History of Present Illness:  This is a 89 y.o.   male who was admitted 3/10/2024 with Abdominal pain [R10.9]  Atrial fibrillation with RVR (HCC) [I48.91]  Subtherapeutic international normalized ratio (INR) [R79.1]  Right lower quadrant abdominal pain [R10.31]. We have been asked to see the patient in consultation by Meenakshi Karimi MD for  hematochezia. This is a 89 year old male with pmh of afib on coumadin MI CHF htn iron deficiency and B12 anemia gerd diverticulitis hld prostate cancer s/p surgery who presented to the ED for right sided flank and abdominal pain that began 5 days ago. He is being treated for a fib with rvr. Pt was noted to have one episode of hematochezia last night. He denies prior gi bleeding. He has intermittent random abdominal cramping that has been occurring for the last several days. No fevers chills or emesis. Hgb 13.6>11.7 CTA showed diverticulosis. Inr 2.2 wbc 12.9>11 plt normal lft and lipase are normal . He has not had dysphagia weight loss hematemesis melena change in the bowel habits rash.     ENDOSCOPY HX  states last colonoscopy was normal back in , no egd    FAMILY HX  no hx of liver pancreatic stomach or colon cancer no uc/crohns    OBJECTIVE:    PAST MEDICAL/SURGICAL HISTORY  Past Medical History:   Diagnosis Date    A-fib (HCC)     Acute MI (HCC)     Allergic rhinitis 2011    Anemia     Atrial fibrillation (HCC)     Dr. Zamora, [place]    Basal cell cancer 2013    left side of head, face chin    Basal

## 2024-03-11 NOTE — DISCHARGE INSTR - COC
Continuity of Care Form    Patient Name: Issac Morfin   :  1931  MRN:  [id number]    Admit date:  3/10/2024  Discharge date:  3/12/2024    Code Status Order: DNR-CC   Advance Directives:     Admitting Physician:  Meenakshi Karimi MD  PCP: Carmen Wright MD    Discharging Nurse: Edith GARRIDO  Discharging Hospital Unit/Room#: 2106/2106-01  Discharging Unit Phone Number: [phone]    Emergency Contact:   Extended Emergency Contact Information  Primary Emergency Contact: Pamela Morfin  Home Phone: [phone]  Relation: Child  Secondary Emergency Contact: Inderjit Morfin   [facility]  Home Phone: [phone]  Relation: Child    Past Surgical History:  Past Surgical History:   Procedure Laterality Date    CHOLECYSTECTOMY  2015    laparoscopic with cholangiogram    COLONOSCOPY      COLONOSCOPY  2013    HERNIA REPAIR Right     inguinal    LITHOTRIPSY      MOHS SURGERY Left 03/10/2014    cheek    MOHS SURGERY  2014    post scalp    MOHS SURGERY Left 08/2017    X2 left cheek    MOHS SURGERY Left 10/21/2019    temple    MOHS SURGERY  2020    top of head    MOHS SURGERY  2023    top of head    OTHER SURGICAL HISTORY  2015    attempted ERCP    PROSTATECTOMY  2021    PROSTATECTOMY N/A 2021    XI ROBOTIC LAPAROSCOPIC SIMPLE PROSTATECTOMY performed by Ministerio Cabral MD at [facility] OR    SKIN BIOPSY  2013    3 areas    SKIN BIOPSY Left 2014    cheek /basal cell carinoma    SKIN CANCER EXCISION      left face and top of head    SKIN CANCER EXCISION Left 2013    forehead, cheek, chin, basal cell.    SKIN CANCER EXCISION Right 2016    with skin graft    SKIN CANCER EXCISION  2023    posterior to right ear    SPINE SURGERY  2003       Immunization History:   Immunization History   Administered Date(s) Administered    COVID-19, MODERNA BLUE border, Primary or Immunocompromised, (age 12y+), IM, 100 mcg/0.5mL 2021, 2021,  EKG

## 2024-03-11 NOTE — PROGRESS NOTES
[facility]  IN-PATIENT SERVICE  [facility]    PROGRESS NOTE             3/11/2024    7:41 AM    Name:   Issac Morfin  MRN:     [id number]     Acct:      [id number]   Room:   2106/2106-01   Day:  1  Admit Date:  3/10/2024  7:05 AM    PCP:  Carmen Wright MD  Code Status:  DNR-CC    Subjective:     C/C:   Chief Complaint   Patient presents with    Groin Pain     Right groin pain radiating around to back      Interval History Status: not changed.    Patient seen and examined at bedside.  States that he is feeling tired and does not have an appetite.  States that he might try eating in a few hours.  Does not report any pain, states that he did not have any other bloody bowel movements.      Brief History:     Patient is a 89 year old male with past medical history of afib on Coumadin, CHF reduced ejection fraction, hypertension, iron deficiency anemia, B12 deficiency presenting with right-sided flank/abdominal pain.  Patient states pain began 5 days ago.  Sharp in nature, worse with movement, radiates up and down into the left side of the groin.  Patient does note that he had a bowel movement yesterday with relief of some of the pain.  Bowel movement was normal in consistency and color, denies any blood in the stool.  No history of hernias.  No urinary symptoms.  Patient does have a history of cholecystectomy.  Patient on Coumadin for atrial fibrillation, that he does take the medication every day.  Patient denies any fever or chills.  Denies any chest pain, shortness of breath.  Patient currently living at [facility].  Chart review notes that patient complains of generalized pain during each encounter with the physician on staff.  Patient did have a fall 2 months ago, at that time had extensive workup including CT head, CT C-spine, CT thoracic and lumbar spine all unremarkable for any acute fracture.     In the ED, patient was found to be  will admit the patient and have cardiology see him  - Cardizem 180 daily  - Lopressor 25 twice daily  - Pharmacy to dose Coumadin     Subtherapeutic INR on Coumadin  - INR 1.8  - Concern of whether the patient has been getting his Coumadin daily at extended-care facility  - Full dose Lovenox  - Pharmacy to dose Coumadin  - Anticoagulation on hold due to possibility of GI bleed       DVT Prophylaxis: On hold due to possible GI bleed  GI Prophylaxis: N/A  Diet: Full liquid  Dispo: TBD  PT/OT/SW: Consulted     CODE STATUS: DNRCC      Plan will be discussed with the attending, Dr Trev Mcpherson MD  PGY I Family Medicine Resident  3/11/2024 7:41 AM        Attending Physician Statement  I have discussed the care of Issac LUO [place] and I have examined the patient myselft and taken ros and hpi , including pertinent history and exam findings,  with the resident. I have reviewed the key elements of all parts of the encounter with the resident.  I agree with the assessment, plan and orders as documented by the resident.  89-year-old gentleman with underlying history of chronic atrial fibrillation admitted with A-fib RVR, cardiology on board, medication adjusted, subtherapeutic INR on Coumadin, back to baseline as per cardiology patient is okay to be discharged, CODE STATUS DNR resuscitate comfort care only     Electronically signed by Meenakshi Karimi MD

## 2024-03-11 NOTE — CONSULTS
[facility]  In Patient Cardiology Consult             Date:   3/11/2024  Patient name: Issac Morfin  Date of admission:  3/10/2024  7:05 AM  MRN:   [id number]  YOB: 1931    Reason for Admission:  atrial fibrillation, right-sided flank/abdominal pain     CHIEF COMPLAINT:   Afib with RVR    History Obtained From:  Patient, chart review    HISTORY OF PRESENT ILLNESS:    Patient is a 89 year old male with past medical history of afib on Coumadin, CHF reduced ejection fraction, hypertension, iron deficiency anemia, B12 deficiency presenting with right-sided flank/abdominal pain.  Patient states pain began 5 days ago.  Sharp in nature, worse with movement, radiates up and down into the left side of the groin.  Patient does note that he had a bowel movement yesterday with relief of some of the pain.  Bowel movement was normal in consistency and color, denies any blood in the stool.  No history of hernias.  No urinary symptoms.  Patient does have a history of cholecystectomy.  Patient on Coumadin for atrial fibrillation, that he does take the medication every day.  Patient denies any fever or chills.  Denies any chest pain, shortness of breath.  Patient currently living at [facility].  Chart review notes that patient complains of generalized pain during each encounter with the physician on staff.  Patient did have a fall 2 months ago, at that time had extensive workup including CT head, CT C-spine, CT thoracic and lumbar spine all unremarkable for any acute fracture.     In the ED, patient was found to be tachycardic in A-fib, occasional runs of rapid ventricular response into the 150s heart rate.  Received one-time dose of Cardizem and metoprolol.  Patient's INR is 1.8, which is subtherapeutic, ED physician concern for possible mesenteric ischemia as cause of pain, CTA of the abdomen was completed which did not show any acute ischemia or other remarkable findings.  Urinalysis was  11/19/2023 03:16 AM     LIVER PROFILE:  Recent Labs     03/10/24  0915   AST 11   ALT 9   LABALBU 2.6*         IMPRESSION:    Patient Active Problem List   Diagnosis    Hyponatremia    Insomnia    Allergic rhinitis    Cervical radiculopathy    Essential hypertension    Gastroesophageal reflux disease without esophagitis    Iron deficiency anemia    Arthritis, degenerative    Diverticulosis of large intestine without hemorrhage    B12 deficiency    Diastolic dysfunction    Elevated liver enzymes    Pure hypercholesterolemia    Irritable bowel syndrome without diarrhea    Paroxysmal atrial fibrillation (HCC)    Low testosterone    Bradycardia    Chronic bilateral low back pain without sciatica    Gout    Anxiety    Chronic systolic congestive heart failure (HCC)    DDD (degenerative disc disease), cervical    DDD (degenerative disc disease), lumbar    Spinal stenosis of lumbar region with neurogenic claudication    Depression    Status post prostatectomy    History of prostate cancer    Pseudogout    Chronic anticoagulation    Vitamin D deficiency    Fall from standing, initial encounter    Chronic diastolic heart failure (HCC)    Permanent atrial fibrillation (HCC)    Anticoagulated on Coumadin    Nonrheumatic aortic valve stenosis    Chronic gout of right foot    Syncope and collapse    Encounter for palliative care    Goals of care, counseling/discussion    Stroke-like symptoms    Left-sided weakness    Cervical muscle pain    Muscle weakness of left upper extremity    Arthralgia    Impacted cerumen of left ear    Confusion and disorientation    Acute cough    Frequent falls    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    Thank you for allowing me to  participate in the care of this patient, please do not hesitate to call if you have any questions.  Attending Physician Statement  I have discussed the care of the patient, including pertinent history and exam findings, with the resident. I have seen and examined the patient and the key elements of all parts of the encounter have been performed by me. I agree with the assessment, plan and orders as documented by the resident.      Chronic persistent A-fib on rate control and anticoagulation  Patient is a regular patient of Dr. Zamora and [facility] cardiology  On admission the heart rate was high that was given digoxin in the now the rate is controlled  INR on admission was 1.8 now is 2.2 therapeutic patient need to watch for any cut bruises  Given his age aortic stenosis, moderate mitral regurgitation and tricuspid regurgitation. patient is not a candidate for any invasive testing and then procedures  Patient is DNR CC  Patient has cardiomyopathy last echocardiogram couple of months ago EF 40 to 45%  No significant CHF  From cardiology point patient can be discharged home and follow-up with his cardiologist    MD Charles Wilde MD [facility]  [facility]  [phone]

## 2024-03-12 VITALS
HEART RATE: 69 BPM | SYSTOLIC BLOOD PRESSURE: 110 MMHG | WEIGHT: 147 LBS | RESPIRATION RATE: 16 BRPM | DIASTOLIC BLOOD PRESSURE: 52 MMHG | OXYGEN SATURATION: 94 % | TEMPERATURE: 98.2 F | BODY MASS INDEX: 25.1 KG/M2 | HEIGHT: 64 IN

## 2024-03-12 LAB
ANION GAP SERPL CALCULATED.3IONS-SCNC: 12 MMOL/L (ref 9–17)
BASOPHILS # BLD: 0 K/UL (ref 0–0.2)
BASOPHILS NFR BLD: 0 % (ref 0–2)
BUN SERPL-MCNC: 13 MG/DL (ref 8–23)
CALCIUM SERPL-MCNC: 8.5 MG/DL (ref 8.6–10.4)
CHLORIDE SERPL-SCNC: 96 MMOL/L (ref 98–107)
CO2 SERPL-SCNC: 25 MMOL/L (ref 20–31)
CREAT SERPL-MCNC: 0.7 MG/DL (ref 0.7–1.2)
EOSINOPHIL # BLD: 0 K/UL (ref 0–0.4)
EOSINOPHILS RELATIVE PERCENT: 0 % (ref 0–4)
ERYTHROCYTE [DISTWIDTH] IN BLOOD BY AUTOMATED COUNT: 18.7 % (ref 11.5–14.9)
GFR SERPL CREATININE-BSD FRML MDRD: >60 ML/MIN/1.73M2
GLUCOSE SERPL-MCNC: 74 MG/DL (ref 70–99)
HCT VFR BLD AUTO: 39.4 % (ref 41–53)
HGB BLD-MCNC: 12.3 G/DL (ref 13.5–17.5)
INR PPP: 2.2
LYMPHOCYTES NFR BLD: 0.35 K/UL (ref 1–4.8)
LYMPHOCYTES RELATIVE PERCENT: 3 % (ref 24–44)
MCH RBC QN AUTO: 25.7 PG (ref 26–34)
MCHC RBC AUTO-ENTMCNC: 31.2 G/DL (ref 31–37)
MCV RBC AUTO: 82.4 FL (ref 80–100)
MONOCYTES NFR BLD: 3.54 K/UL (ref 0.1–1.3)
MONOCYTES NFR BLD: 30 % (ref 1–7)
MORPHOLOGY: ABNORMAL
MORPHOLOGY: ABNORMAL
NEUTROPHILS NFR BLD: 67 % (ref 36–66)
NEUTS SEG NFR BLD: 7.91 K/UL (ref 1.3–9.1)
PLATELET # BLD AUTO: 183 K/UL (ref 150–450)
PMV BLD AUTO: 8.7 FL (ref 6–12)
POTASSIUM SERPL-SCNC: 3.6 MMOL/L (ref 3.7–5.3)
PROTHROMBIN TIME: 25 SEC (ref 11.8–14.6)
RBC # BLD AUTO: 4.78 M/UL (ref 4.5–5.9)
SODIUM SERPL-SCNC: 133 MMOL/L (ref 135–144)
WBC OTHER # BLD: 11.8 K/UL (ref 3.5–11)

## 2024-03-12 PROCEDURE — A4216 STERILE WATER/SALINE, 10 ML: HCPCS

## 2024-03-12 PROCEDURE — C9113 INJ PANTOPRAZOLE SODIUM, VIA: HCPCS

## 2024-03-12 PROCEDURE — 6370000000 HC RX 637 (ALT 250 FOR IP)

## 2024-03-12 PROCEDURE — 2580000003 HC RX 258

## 2024-03-12 PROCEDURE — 85610 PROTHROMBIN TIME: CPT

## 2024-03-12 PROCEDURE — 97110 THERAPEUTIC EXERCISES: CPT

## 2024-03-12 PROCEDURE — 6360000002 HC RX W HCPCS

## 2024-03-12 PROCEDURE — 85025 COMPLETE CBC W/AUTO DIFF WBC: CPT

## 2024-03-12 PROCEDURE — 80048 BASIC METABOLIC PNL TOTAL CA: CPT

## 2024-03-12 PROCEDURE — 97116 GAIT TRAINING THERAPY: CPT

## 2024-03-12 PROCEDURE — 36415 COLL VENOUS BLD VENIPUNCTURE: CPT

## 2024-03-12 PROCEDURE — 99231 SBSQ HOSP IP/OBS SF/LOW 25: CPT | Performed by: INTERNAL MEDICINE

## 2024-03-12 PROCEDURE — 97535 SELF CARE MNGMENT TRAINING: CPT

## 2024-03-12 RX ADMIN — TRAMADOL HYDROCHLORIDE 50 MG: 50 TABLET, COATED ORAL at 06:05

## 2024-03-12 RX ADMIN — SODIUM CHLORIDE, PRESERVATIVE FREE 40 MG: 5 INJECTION INTRAVENOUS at 08:53

## 2024-03-12 RX ADMIN — ALLOPURINOL 100 MG: 100 TABLET ORAL at 08:53

## 2024-03-12 RX ADMIN — SODIUM CHLORIDE, PRESERVATIVE FREE 10 ML: 5 INJECTION INTRAVENOUS at 08:57

## 2024-03-12 RX ADMIN — METOPROLOL TARTRATE 25 MG: 25 TABLET, FILM COATED ORAL at 09:03

## 2024-03-12 RX ADMIN — DILTIAZEM HYDROCHLORIDE 180 MG: 180 CAPSULE, COATED, EXTENDED RELEASE ORAL at 08:53

## 2024-03-12 ASSESSMENT — ENCOUNTER SYMPTOMS
CHEST TIGHTNESS: 0
ABDOMINAL PAIN: 0
NAUSEA: 0
CONSTIPATION: 0
VOMITING: 0
SHORTNESS OF BREATH: 0
DIARRHEA: 0
COUGH: 0
BLOOD IN STOOL: 0

## 2024-03-12 NOTE — PROGRESS NOTES
[facility]  IN-PATIENT SERVICE  [facility]    PROGRESS NOTE             3/12/2024    9:16 AM    Name:   Issac Morfin  MRN:     [id number]     Acct:      [id number]   Room:   2106/2106-01   Day:  2  Admit Date:  3/10/2024  7:05 AM    PCP:  Carmen Wright MD  Code Status:  DNR-CC    Subjective:     C/C:   Chief Complaint   Patient presents with    Groin Pain     Right groin pain radiating around to back      Interval History Status: not changed.    Patient seen and examined this morning at bedside.  States he has continued right groin pain.  Pain is worse with all movement.  Denies any further bowel movements.    Scrotal ultrasound done yesterday showing possible orchitis versus malignancy.  Patient not sexually active.  Will consult urology.    Brief History:     Patient is a 89 year old male with past medical history of afib on Coumadin, CHF reduced ejection fraction, hypertension, iron deficiency anemia, B12 deficiency presenting with right-sided flank/abdominal pain.  Patient states pain began 5 days ago.  Sharp in nature, worse with movement, radiates up and down into the left side of the groin.  Patient does note that he had a bowel movement yesterday with relief of some of the pain.  Bowel movement was normal in consistency and color, denies any blood in the stool.  No history of hernias.  No urinary symptoms.  Patient does have a history of cholecystectomy.  Patient on Coumadin for atrial fibrillation, that he does take the medication every day.  Patient denies any fever or chills.  Denies any chest pain, shortness of breath.  Patient currently living at [facility].  Chart review notes that patient complains of generalized pain during each encounter with the physician on staff.  Patient did have a fall 2 months ago, at that time had extensive workup including CT head, CT C-spine, CT thoracic and lumbar spine all unremarkable for any acute  swelling  -CT done in the ED negative for any acute pathology  -Given history of pain changing with bowel movement, possibly spontaneously reduced hernia  -Scrotal ultrasound done shows right testes lower pole subscapular finding infiltrative secondary to malignant process versus orchitis  -Patient follows with urology outpatient  -Urology consulted    Atrial fibrillation with rapid ventricular response  - Given patient's age and comorbidities, will admit the patient and have cardiology see him  - Cardizem 180 daily  - Lopressor 25 twice daily  - Pharmacy to dose Coumadin     Subtherapeutic INR on Coumadin - resolved  - INR 2.2  - Concern of whether the patient has been getting his Coumadin daily at [facility]  - Pharmacy to dose Coumadin  - Anticoagulation on hold due to possibility of GI bleed    Gastrointestinal bleed  -Stool occult blood positive  -GI consulted  -Trend H&H, hemoglobin stable this morning  -Likely colonoscopy outpatient       DVT Prophylaxis: On hold due to possible GI bleed  GI Prophylaxis: N/A  Diet: Full liquid  Dispo: TBD  PT/OT/SW: Consulted     CODE STATUS: DNRCC      Plan will be discussed with the attending, Dr Trev Callahan MD  PGY I Family Medicine Resident  3/12/2024 9:16 AM

## 2024-03-12 NOTE — CARE COORDINATION
AFTER VISIT SUMMARY  Issac Morfin MRN: [id number]     Abdominal pain   3/10/2024 - 3/12/2024   [facility]   [phone]   Your Next Steps   Do      Schedule an appointment with Dr. Tank Williamson MD as soon as possible for a visit in 6 week(s)  [address]  [facility] [address]  [phone]  MyChart  View your After Visit Summary and more online at [email or web address]  Instructions    Your medications have changed   STOP taking:  predniSONE 5 MG tablet (DELTASONE)   Review your updated medication list below.    Procedures and Other Instructions  US Scrotum with Limited Duplex  Complete by: Apr 12, 2024     Reason for exam:  f/u u/s in 6 weeks- as per urology        Your Visit     Here you will find information about your visit, including the reason for your visit.  Please take this sheet with you when you visit your doctor or other health care provider in the future.  It will help determine the best possible medical care for you at that time.  If you have any questions once you leave the hospital, please call the department phone number listed below. In the event of an emergency, call 911 or go to the nearest Emergency Department.  Medical and Psychiatric Advance Directives  Flowsheet Row Most Recent Value   Healthcare Advance Directive Yes, on file     Preventive Care   Date Due   Respiratory Syncytial Virus (RSV) Pregnant or age 60 yrs+ (1 - 1-dose 60+ series) Never done   Prostate Specific Antigen (PSA) Screening or Monitoring 08/16/2023   Annual Wellness Visit (Medicare) 11/27/2023   COVID-19 Vaccine (6 - 2023-24 season) 12/07/2024 (Originally 9/1/2023)   Depression monitoring 11/07/2024   Tetanus Combination Vaccine (3 - Td or Tdap) 10/01/2031            Providers Seen During Your Hospitalization  Treatment Team   Provider Specialty Contact Number Contact Number    Meenakshi Karimi MD Internal Medicine  [phone] --     Follow Up Information and  OPIOIDS FOR PAIN:  Never take opioids in greater amounts or more often than prescribed.  Remember the goal is not to be pain-free but to manage your pain at a tolerable level.  Follow up with your primary care provider to:  Work together to create a plan on how to manage your pain.  Talk about ways to help manage your pain that don't involve prescription opioids.  Talk about any and all concerns and side effects.          Help prevent misuse and abuse.          Never sell or share prescription opioids  Help prevent misuse and abuse.  Store prescription opioids in a secure place and out of reach of others (this may include visitors, children, friends, and family).  Safely dispose of unused/unwanted prescription opioids: Find your community drug take-back program or your pharmacy mail-back program or following guidance from the Food and Drug Administration (www.fda.gov/Drugs/ResourcesForYou).  Visit www.cdc.gov/drugoverdose to learn about the risks of opioid abuse and overdose.  If you believe you may be struggling with addiction, tell your health care provider and ask for guidance or call [facility]'s National Helpline at 8-601-086-TZGH.                                                               Medication List - All medications must be taken as prescribed. Contact your provider before stopping medications.    Continue Taking  Continue Taking    Next Dose Due Morning Afternoon Evening Bedtime As Needed    acetaminophen 500 MG tablet  Your Last Dose Was: 1,000 mg on March 10, 2024  9:16 AM  Commonly known as: TYLENOL  Take 2 tablets by mouth every 6 hours as needed for Pain          albuterol (2.5 MG/3ML) 0.083% nebulizer solution  Commonly known as: PROVENTIL  Take 3 mLs by nebulization every 4 hours as needed          allopurinol 100 MG tablet  Your Last Dose Was: 100 mg on March 12, 2024  8:53 AM  Signed by: Dr. Jacqueline Krause MD  Commonly known as: ZYLOPRIM  Take 1 tablet by mouth daily          cyclobenzaprine 5

## 2024-03-12 NOTE — CARE COORDINATION
Patient will discharge to [facility]  Phone: [phone]  Fax: [phone]   at [phone] via [employer].   MIRLANDE faxed to facility.  Patient/Family informed of discharge and is agreeable.  Bedside RN notified, Call report to [phone].        Patient and family at the bedside notified and are agreeable .//tv

## 2024-03-12 NOTE — PLAN OF CARE
Problem: Discharge Planning  Goal: Discharge to home or other facility with appropriate resources  3/11/2024 2221 by Tahir Gaines RN  Outcome: Progressing  3/11/2024 2221 by Tahir Gaines RN  Outcome: Progressing     Problem: Pain  Goal: Verbalizes/displays adequate comfort level or baseline comfort level  3/11/2024 2221 by Tahir Gaines RN  Outcome: Progressing  3/11/2024 2221 by Tahir Gaines RN  Outcome: Progressing     Problem: Skin/Tissue Integrity  Goal: Absence of new skin breakdown  Description: 1.  Monitor for areas of redness and/or skin breakdown  2.  Assess vascular access sites hourly  3.  Every 4-6 hours minimum:  Change oxygen saturation probe site  4.  Every 4-6 hours:  If on nasal continuous positive airway pressure, respiratory therapy assess nares and determine need for appliance change or resting period.  3/11/2024 2221 by Tahir Gaines RN  Outcome: Progressing  3/11/2024 2221 by Tahir Gaines RN  Outcome: Progressing     Problem: Safety - Adult  Goal: Free from fall injury  3/11/2024 2221 by Tahir Gaines RN  Outcome: Progressing  3/11/2024 2221 by Tahir Gaines RN  Outcome: Progressing     Problem: ABCDS Injury Assessment  Goal: Absence of physical injury  3/11/2024 2221 by Tahir Gaines RN  Outcome: Progressing  3/11/2024 2221 by Tahir Gaines RN  Outcome: Progressing     Problem: Cardiovascular - Adult  Goal: Maintains optimal cardiac output and hemodynamic stability  3/11/2024 2221 by Tahir Gaines RN  Outcome: Progressing  3/11/2024 2221 by Tahir Gaines RN  Outcome: Progressing  Goal: Absence of cardiac dysrhythmias or at baseline  3/11/2024 2221 by Tahir Gaines RN  Outcome: Progressing  3/11/2024 2221 by Tahir Gaines RN  Outcome: Progressing     Problem: Chronic Conditions and Co-morbidities  Goal: Patient's chronic conditions and co-morbidity symptoms are monitored and maintained or improved  3/11/2024 2221 by Tahir Gaines RN  Outcome:

## 2024-03-12 NOTE — DISCHARGE INSTRUCTIONS
Follow up with your PCP in 1 to 2 weeks.    Please get scrotal ultrasound in 6 weeks.    Follow-up with urology after completed scrotal ultrasound.    Please return to the ED for any worsening fever, chills, pain, swelling, erythema of the right scrotum.

## 2024-03-12 NOTE — PLAN OF CARE
Problem: Discharge Planning  Goal: Discharge to home or other facility with appropriate resources  Outcome: Completed     Problem: Pain  Goal: Verbalizes/displays adequate comfort level or baseline comfort level  Outcome: Completed     Problem: Skin/Tissue Integrity  Goal: Absence of new skin breakdown  Description: 1.  Monitor for areas of redness and/or skin breakdown  2.  Assess vascular access sites hourly  3.  Every 4-6 hours minimum:  Change oxygen saturation probe site  4.  Every 4-6 hours:  If on nasal continuous positive airway pressure, respiratory therapy assess nares and determine need for appliance change or resting period.  Outcome: Completed     Problem: Safety - Adult  Goal: Free from fall injury  Outcome: Completed     Problem: ABCDS Injury Assessment  Goal: Absence of physical injury  Outcome: Completed     Problem: Cardiovascular - Adult  Goal: Maintains optimal cardiac output and hemodynamic stability  Outcome: Completed  Goal: Absence of cardiac dysrhythmias or at baseline  Outcome: Completed     Problem: Chronic Conditions and Co-morbidities  Goal: Patient's chronic conditions and co-morbidity symptoms are monitored and maintained or improved  Outcome: Completed     Problem: Nutrition Deficit:  Goal: Optimize nutritional status  Outcome: Completed     Problem: Gastrointestinal - Adult  Goal: Minimal or absence of nausea and vomiting  Outcome: Completed  Goal: Maintains or returns to baseline bowel function  Outcome: Completed  Goal: Maintains adequate nutritional intake  Outcome: Completed  Goal: Establish and maintain optimal ostomy function  Outcome: Completed

## 2024-03-12 NOTE — PROGRESS NOTES
Physician Progress Note      PATIENT:               JOSE FIGUEROA  [facility] #:                  [id number]  :                       1931  ADMIT DATE:       3/10/2024 7:05 AM  DISCH DATE:  RESPONDING  PROVIDER #:        DEVANTE COLE          QUERY TEXT:    Patient admitted with Atrial fibrillation with rapid ventricular response.   Noted to have Severe malnutrition in PN notes 3/11 by Dietitian. If possible,   please document in progress notes and discharge summary if you are evaluating   and /or treating any of the following:    The medical record reflects the following:  Risk Factors: HTN, CHF, HLD.    Clinical Indicators: PN notes 3/11- by Energy Intake:  75% or less estimated   energy requirements for 1 month or longer, Weight Loss:   (15% wt loss over   6-7 months), Body Fat Loss:  Severe body fat loss Orbital, Triceps Muscle Mass   Loss:  Severe muscle mass loss Hand (interosseous), Calf (gastrocnemius),   Fluid Accumulation:   (moderate), Extremities. Current BMI (kg/m2): 23.8.    Treatment: consult Nutrition, Nutrition Assessment.    ASPEN Criteria:    https://aspenjournals.onlinelibrary.lay.com/doi/full/10.1177/305332938824197  5.  .  Thank you,  CHERYL Kaur, RN, CRCR, CDI Specialist  [email or web address]  Can also be reached on [employer].  .  Options provided:  -- Protein calorie malnutrition severe  -- Other - I will add my own diagnosis  -- Disagree - Not applicable / Not valid  -- Disagree - Clinically unable to determine / Unknown  -- Refer to Clinical Documentation Reviewer    PROVIDER RESPONSE TEXT:    This patient has severe protein calorie malnutrition.    Query created by: Linda Perla on 3/12/2024 1:06 PM      Electronically signed by:  DEVANTE COLE 3/12/2024 1:12 PM

## 2024-03-12 NOTE — CONSULTS
Urology Consultation    Patient:  Issac Morfin  MRN: [id number]  YOB: 1931    CHIEF COMPLAINT: Testicular pain    HISTORY OF PRESENT ILLNESS:   The patient is a 89 y.o. male who presents with A-fib with RVR.  We are asked to evaluate for some groin pain.  He had a scrotal ultrasound done which shows an abnormality in the right testicle.  He has a history of BPH and had a suprapubic prostatectomy previously.  He denies any urinary complaints at this time.    Patient's old records, notes and chart reviewed and summarized above.    Past Medical History:    Past Medical History:   Diagnosis Date    A-fib (HCC)     Acute MI (HCC)     Allergic rhinitis 09/02/2011    Anemia     Atrial fibrillation (HCC)     Dr. Zamora, [place]    Basal cell cancer 03/2013    left side of head, face chin    Basal cell cancer 03/10/2014    left cheek    Cervical radiculopathy     Diverticulitis 02/17/2013    GERD (gastroesophageal reflux disease)     Glaucoma     left eye    Hyperlipidemia     Hypertension     Dr. MORENA Krause    Hyponatremia 09/02/2011    IBS (irritable bowel syndrome) 09/02/2011    Insomnia 09/02/2011    Osteoarthritis     Osteoarthritis/neck pain. 09/02/2011    Prostate cancer (HCC) 08/18/2021    active surviellence monitoring PSA    Renal calculi     Shingles     history of shingles    Status post prostatectomy 08/18/2021    Simple prostatectomy for BPH    Urinary frequency     Wears glasses     Wears hearing aid in both ears        Past Surgical History:    Past Surgical History:   Procedure Laterality Date    CHOLECYSTECTOMY  09/09/2015    laparoscopic with cholangiogram    COLONOSCOPY  2002    COLONOSCOPY  08/2013    HERNIA REPAIR Right     inguinal    LITHOTRIPSY      MOHS SURGERY Left 03/10/2014    cheek    MOHS SURGERY  12/30/2014    post scalp    MOHS SURGERY Left 08/2017    X2 left cheek    MOHS SURGERY Left 10/21/2019    temple    MOHS SURGERY  12/14/2020    top of head    MOHS SURGERY   tends to grow quite slowly.  I would simply recommend a repeat ultrasound in 6 to 8 weeks to monitor the lesion for any potential growth.    Tank Williamson MD  12:13 PM 3/12/2024

## 2024-03-12 NOTE — PROGRESS NOTES
Physical Therapy  Facility/Department: [facility] PROGRESSIVE CARE  Daily Treatment Note  NAME: Issac Morfin  : 1931  MRN: [id number]    Date of Service: 3/12/2024    Discharge Recommendations:  Patient would benefit from continued therapy after discharge, Therapy recommended at discharge        Patient Diagnosis(es): The primary encounter diagnosis was Atrial fibrillation with RVR (HCC). Diagnoses of Right lower quadrant abdominal pain and Subtherapeutic international normalized ratio (INR) were also pertinent to this visit.      Activity Tolerance: Treatment limited secondary to decreased cognition;Patient limited by fatigue;Patient limited by endurance;Patient limited by pain     Plan    Physical Therapy Plan  General Plan: 5-7 times per week  Current Treatment Recommendations: Strengthening;Balance training;Functional mobility training;Transfer training;Endurance training;Gait training;Safety education & training;Patient/Caregiver education & training;Positioning;Therapeutic activities     Restrictions  Restrictions/Precautions  Restrictions/Precautions: Fall Risk, General Precautions  Required Braces or Orthoses?: No  Implants present? :  (Pt denied)     Subjective    Pt resting in bed upon.  Pain: pt reports pain in abdomen and L leg with movement. Pt does not rate    Cognition  Overall Cognitive Status: Exceptions     Objective     Bed Mobility Training  Supine to Sit: Maximum assistance;Assist X1;Additional time (trunk progression, assist for LE's)  Sit to Supine: Maximum assistance;Assist X1 (assist for LE's, education for technique, pt becomes anxious, requests therapist \"move\" faster.)  Scooting: Moderate assistance;Assist X1    Balance  Sitting: Impaired  Sitting - Static: Fair (occasional);Prop sitting (Pt does not tolerate sitting @ EOB for long periods of time has abdominal spasms, and he will prop sit @ times)    Transfer Training  Sit to Stand: Minimum assistance  Stand to Sit: Minimum

## 2024-03-12 NOTE — PROGRESS NOTES
[facility]   INPATIENT OCCUPATIONAL THERAPY  PROGRESS NOTE  Date: 3/12/2024  Patient Name: Issac Mofrin       Room:   MRN: [id number]    : 1931  (89 y.o.)  Gender: male   Referring Practitioner: Silviano Walker MD  Diagnosis: Abdominal pain      Discharge Recommendations:  Further Occupational Therapy is recommended upon facility discharge.    OT Equipment Recommendations  Other: TBD    Restrictions/Precautions  Restrictions/Precautions  Restrictions/Precautions: Fall Risk;General Precautions  Required Braces or Orthoses?: No  Implants present? :  (Pt denied)    O2 Device: None (Room air)    Subjective  Subjective  Subjective: \"What do I do now?\" Pt was agreeable to OT session  Subjective  Pain: pt reports pain in abdomen, back, and L leg with movement. Pt does not rate  Comments: Ok per RN for OT session    Objective  Cognition  Overall Cognitive Status: Exceptions  Arousal/Alertness: Delayed responses to stimuli  Following Commands: Follows one step commands with increased time;Follows one step commands with repetition  Attention Span: Attends with cues to redirect  Memory: Decreased short term memory  Safety Judgement: Decreased awareness of need for assistance;Decreased awareness of need for safety  Problem Solving: Assistance required to implement solutions;Assistance required to generate solutions;Assistance required to identify errors made;Assistance required to correct errors made  Insights: Decreased awareness of deficits  Initiation: Requires cues for some  Sequencing: Requires cues for some    Activities of Daily Living  ADL  Feeding: Setup  Grooming: Stand by assistance  UE Bathing: Moderate assistance  LE Bathing: Dependent/Total  UE Dressing: Moderate assistance  LE Dressing: Dependent/Total  LE Dressing Skilled Clinical Factors: TA with threading BLE into pull up brief with 2 person A while standing to pull up over bottom  Toileting:  . Tolerance: Patient limited by pain, Patient limited by fatigue, Treatment limited secondary to decreased cognition  Assessment  Performance deficits / Impairments: Decreased ADL status, Decreased functional mobility , Decreased strength, Decreased safe awareness, Decreased endurance, Decreased balance, Decreased high-level IADLs  Treatment Diagnosis: Impaired self care status  Prognosis: Good  Decision Making: Medium Complexity  Discharge Recommendations: Patient would benefit from continued therapy after discharge  OT Equipment Recommendations  Other: TBD  Safety Devices  Type of Devices: Gait belt, Call light within reach, Bed alarm in place, Left in bed, Patient at risk for falls, All fall risk precautions in place    AM-[facility] Daily Activities Inpatient  AM-[facility] Daily Activity - Inpatient   How much help is needed for putting on and taking off regular lower body clothing?: Total  How much help is needed for bathing (which includes washing, rinsing, drying)?: Total  How much help is needed for toileting (which includes using toilet, bedpan, or urinal)?: Total  How much help is needed for putting on and taking off regular upper body clothing?: A Lot  How much help is needed for taking care of personal grooming?: A Little  How much help for eating meals?: A Little  AM-[facility] Inpatient Daily Activity Raw Score: 11  AM-[facility] Inpatient ADL T-Scale Score : 29.04  ADL Inpatient CMS 0-100% Score: 70.42  ADL Inpatient CMS G-Code Modifier : CL    OT Minutes  OT Individual Minutes  Time In: 1121  Time Out: 1150  Minutes: 29  Time Code Minutes   Timed Code Treatment Minutes: 29 Minutes      Electronically signed by BETTINA Casas on 3/12/24 at 2:21 PM EDT

## 2024-03-13 ENCOUNTER — CARE COORDINATION (OUTPATIENT)
Dept: CARE COORDINATION | Age: 89
End: 2024-03-13

## 2024-03-13 DIAGNOSIS — M54.12 CERVICAL RADICULOPATHY: Primary | ICD-10-CM

## 2024-03-13 RX ORDER — TRAMADOL HYDROCHLORIDE 50 MG/1
50 TABLET ORAL EVERY 8 HOURS PRN
Qty: 90 TABLET | Refills: 0 | Status: SHIPPED | OUTPATIENT
Start: 2024-03-13 | End: 2024-04-12

## 2024-03-13 NOTE — DISCHARGE SUMMARY
[facility]   IN-PATIENT SERVICE   [facility]    Discharge Summary     Patient ID: Issac Morfin  :  1931   MRN: [id number]     ACCOUNT:  [id number]   Patient's PCP: Carmen Wright MD  Admit Date: 3/10/2024   Discharge Date: 3/13/2024     Length of Stay: 2  Code Status:  Prior  Admitting Physician: Meenakshi Karimi MD  Discharge Physician: Elian Callahan MD     Active Discharge Diagnoses:       Primary Problem  Abdominal pain      Hospital Problems  Active Hospital Problems    Diagnosis Date Noted    Cardiomyopathy (HCC) [I42.9] 2024     Priority: High    Mitral valve insufficiency [I34.0] 2024     Priority: High    Hematochezia [K92.1] 2024    Severe malnutrition (HCC) [E43] 2024    Abdominal pain [R10.9] 03/10/2024    Aortic valve stenosis [I35.0] 2023    Atrial fibrillation with RVR (HCC) [I48.91] 2013       Admission Condition:  fair     Discharged Condition: good    Hospital Stay:       Hospital Course:      Issac Morfin is a 89-year-old male admitted for the management of abdominal pain and A-fib with RVR.  Patient presented to the ER with chief complaint of right-sided groin pain radiating around to the back.  No history of hernias, no urinary symptoms.  Initially patient stated he was having right-sided abdominal pain.  Is on Coumadin therapy, INR was subtherapeutic initially 1.9.  Patient was found to be in A-fib with RVR, given clinical presentation slight concern for mesenteric ischemia.  CTA completed in the ED showed some diverticulosis no evidence of diverticulitis or acute ischemia.  Patient receive digoxin with appropriate rate control.  Pharmacy dose Coumadin, INR on discharge 2.2 therapeutic.  Upon further inspection, abdominal pain likely groin pain related to scrotal swelling in the right scrotum.  Right scrotal ultrasound completed showing right testes lower pole subcapsular finding measuring 5 mm

## 2024-03-13 NOTE — PROGRESS NOTES
[facility]    GASTROENTEROLOGY CONSULT    Patient:   Issac Morfin   :    1931   Facility:   [facility]  Date:    3/12/2024  Admission Dx:  Abdominal pain [R10.9]  Atrial fibrillation with RVR (HCC) [I48.91]  Subtherapeutic international normalized ratio (INR) [R79.1]  Right lower quadrant abdominal pain [R10.31]  Requesting physician: No att. providers found  Reason for consult:  Hematochezia.      SUBJECTIVE:   no sign of GI bleeding  H&H are stable  Tolerating diet well          OBJECTIVE:    PAST MEDICAL/SURGICAL HISTORY  Past Medical History:   Diagnosis Date    A-fib (HCC)     Acute MI (HCC)     Allergic rhinitis 2011    Anemia     Atrial fibrillation (HCC)     Dr. Zamora, [place]    Basal cell cancer 2013    left side of head, face chin    Basal cell cancer 03/10/2014    left cheek    Cervical radiculopathy     Diverticulitis 2013    GERD (gastroesophageal reflux disease)     Glaucoma     left eye    Hyperlipidemia     Hypertension     Dr. MORENA Krause    Hyponatremia 2011    IBS (irritable bowel syndrome) 2011    Insomnia 2011    Osteoarthritis     Osteoarthritis/neck pain. 2011    Prostate cancer (HCC) 2021    active surviellence monitoring PSA    Renal calculi     Shingles     history of shingles    Status post prostatectomy 2021    Simple prostatectomy for BPH    Urinary frequency     Wears glasses     Wears hearing aid in both ears      Past Surgical History:   Procedure Laterality Date    CHOLECYSTECTOMY  2015    laparoscopic with cholangiogram    COLONOSCOPY      COLONOSCOPY  2013    HERNIA REPAIR Right     inguinal    LITHOTRIPSY      MOHS SURGERY Left 03/10/2014    cheek    MOHS SURGERY  2014    post scalp    MOHS SURGERY Left 08/2017    X2 left cheek    MOHS SURGERY Left 10/21/2019    temple    MOHS SURGERY  2020    top of head    MOHS SURGERY  2023    top of head

## 2024-03-14 ENCOUNTER — CARE COORDINATION (OUTPATIENT)
Dept: CARE COORDINATION | Age: 89
End: 2024-03-14

## 2024-03-14 LAB
EKG ATRIAL RATE: 127 BPM
EKG Q-T INTERVAL: 334 MS
EKG QRS DURATION: 92 MS
EKG QTC CALCULATION (BAZETT): 474 MS
EKG R AXIS: 16 DEGREES
EKG T AXIS: 132 DEGREES
EKG VENTRICULAR RATE: 121 BPM

## 2024-03-19 ENCOUNTER — OUTSIDE SERVICES (OUTPATIENT)
Dept: PRIMARY CARE CLINIC | Age: 89
End: 2024-03-19

## 2024-03-19 DIAGNOSIS — R10.9 ABDOMINAL PAIN, UNSPECIFIED ABDOMINAL LOCATION: Primary | ICD-10-CM

## 2024-03-19 ASSESSMENT — ENCOUNTER SYMPTOMS
NAUSEA: 0
DIARRHEA: 0
VOMITING: 0
SHORTNESS OF BREATH: 0
COUGH: 0

## 2024-03-19 NOTE — ASSESSMENT & PLAN NOTE
This is a recurrent issue.  CTA and scrotal u/s done in hospital last week was negative.  Pain is mostly RLQ.  Will recheck xray today and will give tramadol. Seems to hurt him when he moves and wraps around to his back. May be related to back, more than an abd issue.  May need repeat CT or consult.

## 2024-03-19 NOTE — PROGRESS NOTES
Issac Morfin is a 89 y.o. male being seen for his  nursing and pt requested  follow up.  Location of visit: [facility]    HPI:  New today:pt still having the abd pain- has had before multiple times.  Unable to identify etiology.         Review of Systems   Constitutional:  Negative for activity change, appetite change, chills, diaphoresis and fever.   HENT:  Negative for congestion.    Respiratory:  Negative for cough and shortness of breath.    Cardiovascular:  Negative for chest pain and palpitations.   Gastrointestinal:  Negative for diarrhea, nausea and vomiting.   Genitourinary:  Negative for hematuria.   Musculoskeletal:  Negative for arthralgias and myalgias.   Skin:  Negative for rash.   Neurological:  Negative for weakness and headaches.   Psychiatric/Behavioral:  Negative for agitation, dysphoric mood and sleep disturbance. The patient is not nervous/anxious.           Physical Exam  Vitals reviewed.   Constitutional:       General: He is not in acute distress.  HENT:      Head: Normocephalic and atraumatic.      Nose: No congestion or rhinorrhea.   Eyes:      General:         Right eye: No discharge.         Left eye: No discharge.   Cardiovascular:      Rate and Rhythm: Normal rate and regular rhythm.   Pulmonary:      Effort: Pulmonary effort is normal. No respiratory distress.      Breath sounds: Normal breath sounds.   Abdominal:      Palpations: Abdomen is soft.      Tenderness: There is no abdominal tenderness.   Musculoskeletal:      Right lower leg: No edema.      Left lower leg: No edema.   Skin:     General: Skin is warm and dry.   Neurological:      Mental Status: He is alert. Mental status is at baseline.          ASSESSMENT:   Diagnosis Orders   1. Abdominal pain, unspecified abdominal location            PLAN:  1. Abdominal pain, unspecified abdominal location  Assessment & Plan:    This is a recurrent issue.  CTA and scrotal u/s done in hospital last week was negative.  Pain is

## 2024-03-20 DIAGNOSIS — R10.9 ABDOMINAL PAIN, UNSPECIFIED ABDOMINAL LOCATION: Primary | ICD-10-CM

## 2024-03-20 RX ORDER — HYDROCODONE BITARTRATE AND ACETAMINOPHEN 5; 325 MG/1; MG/1
1 TABLET ORAL EVERY 4 HOURS PRN
Qty: 60 TABLET | Refills: 0 | Status: SHIPPED | OUTPATIENT
Start: 2024-03-20 | End: 2024-04-03

## 2024-03-21 ENCOUNTER — CARE COORDINATION (OUTPATIENT)
Dept: CARE COORDINATION | Age: 89
End: 2024-03-21

## 2024-03-21 ENCOUNTER — OUTSIDE SERVICES (OUTPATIENT)
Dept: PRIMARY CARE CLINIC | Age: 89
End: 2024-03-21

## 2024-03-21 DIAGNOSIS — R10.9 ABDOMINAL PAIN, UNSPECIFIED ABDOMINAL LOCATION: Primary | ICD-10-CM

## 2024-03-21 DIAGNOSIS — R41.0 CONFUSION AND DISORIENTATION: ICD-10-CM

## 2024-03-21 ASSESSMENT — ENCOUNTER SYMPTOMS
DIARRHEA: 0
ABDOMINAL PAIN: 1
NAUSEA: 0
SHORTNESS OF BREATH: 0
VOMITING: 0
ABDOMINAL DISTENTION: 0
COUGH: 0

## 2024-03-21 NOTE — CARE COORDINATION
Care Transitions Post-Acute Facility Update Call    3/21/2024    Patient: Issac Morfin Patient : 1931   MRN: [id number]  Reason for Admission:     Discharge Date: 3/12/24 RARS: Readmission Risk Score: 19.2     PT is under private pay     Status: In House   (Readmission Risk)  Primary Physician: Dr. Carmen Wright MD  Primary Payer: Private [facility]    Care Transitions Post Acute Facility Update    Care Transitions Interventions  Disease Association: Completed            Post Acute Facility Update   Post Acute Facility: [facility]          Nursing       Rehab/Functional       SW/Discharge Planning        Daniela Kapoor RN Post Acute Care Manager [phone]

## 2024-03-21 NOTE — PROGRESS NOTES
Issac Morfin is a 89 y.o. male being seen for his  extra nursing requested  follow up.  Location of visit: [facility]    HPI:  New today:Pt confused today.  Reaching and grabbing in the air, denies pain currently.  No SOB.         Review of Systems   Constitutional:  Positive for appetite change. Negative for chills and fever.   HENT:  Negative for congestion.    Respiratory:  Negative for cough and shortness of breath.    Gastrointestinal:  Positive for abdominal pain. Negative for abdominal distention, diarrhea, nausea and vomiting.   Genitourinary:  Negative for dysuria.   Psychiatric/Behavioral:  Positive for confusion and hallucinations.           Physical Exam  Vitals reviewed.   Constitutional:       General: He is not in acute distress.  HENT:      Head: Normocephalic and atraumatic.      Nose: No congestion or rhinorrhea.   Eyes:      General:         Right eye: No discharge.         Left eye: No discharge.   Cardiovascular:      Rate and Rhythm: Normal rate and regular rhythm.   Pulmonary:      Effort: Pulmonary effort is normal. No respiratory distress.      Breath sounds: Normal breath sounds.   Abdominal:      Palpations: Abdomen is soft.      Tenderness: There is no abdominal tenderness.   Musculoskeletal:      Right lower leg: No edema.      Left lower leg: No edema.   Skin:     General: Skin is warm and dry.   Neurological:      Mental Status: He is alert. Mental status is at baseline.          ASSESSMENT:   Diagnosis Orders   1. Abdominal pain, unspecified abdominal location        2. Confusion and disorientation            PLAN:  1. Abdominal pain, unspecified abdominal location  Assessment & Plan:    Pain is better today per pt, only getting it when he turns from side to side.  No n/v, but has not been eating.  No cause for pain found.  May be related to blood flow as pt seems to be having some other EOL symptoms.   2. Confusion and disorientation  Assessment & Plan:    Is more

## 2024-03-21 NOTE — ASSESSMENT & PLAN NOTE
Is more confused again.  Reaching for things in the air- but then knows who people are when they come in the room.  Family agreeable to hospice consult.  Does not seem related to pain meds, as he has had only 1 norco and that was yesterday (and the increase in confusion started before that).

## 2024-03-21 NOTE — ASSESSMENT & PLAN NOTE
Pain is better today per pt, only getting it when he turns from side to side.  No n/v, but has not been eating.  No cause for pain found.  May be related to blood flow as pt seems to be having some other EOL symptoms.

## 2024-03-26 ENCOUNTER — OUTSIDE SERVICES (OUTPATIENT)
Dept: PRIMARY CARE CLINIC | Age: 89
End: 2024-03-26

## 2024-03-26 DIAGNOSIS — R10.9 ABDOMINAL PAIN, UNSPECIFIED ABDOMINAL LOCATION: Primary | ICD-10-CM

## 2024-03-26 DIAGNOSIS — I42.9 CARDIOMYOPATHY, UNSPECIFIED TYPE (HCC): ICD-10-CM

## 2024-03-26 DIAGNOSIS — T14.8XXA MULTIPLE SKIN TEARS: ICD-10-CM

## 2024-03-26 DIAGNOSIS — R29.6 FREQUENT FALLS: ICD-10-CM

## 2024-03-26 ASSESSMENT — ENCOUNTER SYMPTOMS
NAUSEA: 0
DIARRHEA: 0
COUGH: 0
VOMITING: 0
ABDOMINAL PAIN: 1
SHORTNESS OF BREATH: 0

## 2024-03-26 NOTE — ASSESSMENT & PLAN NOTE
Family states pain comes and goes, almost like cramping or spasm.  Is on muscle relaxer, but could try giving it tid atc and see if that helps. Watch for worsening mentation.

## 2024-03-26 NOTE — ASSESSMENT & PLAN NOTE
Does not appear SOB today but is confused and reaching for things. Recommend Hospice to help with symptom control, etc.

## 2024-03-26 NOTE — PROGRESS NOTES
Issac Morfin is a 89 y.o. male being seen for his weekly follow up.  Location of visit: [facility]    HPI:  New today: pt still confused and reaching out for things- picking at the air.  No acute cause found on recent workups.  Still flinching with abd pain at times.         Review of Systems   Constitutional:  Positive for activity change and appetite change. Negative for chills, diaphoresis and fever.   HENT:  Negative for congestion.    Respiratory:  Negative for cough and shortness of breath.    Cardiovascular:  Negative for palpitations.   Gastrointestinal:  Positive for abdominal pain. Negative for diarrhea, nausea and vomiting.   Genitourinary:  Negative for hematuria.   Musculoskeletal:  Positive for myalgias.   Skin:  Negative for rash.   Neurological:  Positive for weakness.   Psychiatric/Behavioral:  Positive for confusion, decreased concentration and hallucinations. Negative for agitation, dysphoric mood and sleep disturbance. The patient is not nervous/anxious.           Physical Exam  Vitals reviewed.   Constitutional:       General: He is not in acute distress.  HENT:      Head: Normocephalic and atraumatic.      Nose: No congestion or rhinorrhea.   Eyes:      General:         Right eye: No discharge.         Left eye: No discharge.   Cardiovascular:      Rate and Rhythm: Normal rate and regular rhythm.   Pulmonary:      Effort: Pulmonary effort is normal. No respiratory distress.      Breath sounds: Normal breath sounds.   Abdominal:      Palpations: Abdomen is soft.      Tenderness: There is no abdominal tenderness.   Musculoskeletal:      Right lower leg: No edema.      Left lower leg: No edema.   Skin:     General: Skin is warm and dry.   Neurological:      Mental Status: He is alert. Mental status is at baseline.          ASSESSMENT:   Diagnosis Orders   1. Abdominal pain, unspecified abdominal location        2. Frequent falls        3. Multiple skin tears        4. Cardiomyopathy,

## 2024-03-29 ENCOUNTER — OUTSIDE SERVICES (OUTPATIENT)
Dept: PRIMARY CARE CLINIC | Age: 89
End: 2024-03-29

## 2024-03-29 DIAGNOSIS — F41.9 ANXIETY: ICD-10-CM

## 2024-03-29 DIAGNOSIS — R68.89 EXCESSIVE ORAL SECRETIONS: ICD-10-CM

## 2024-03-29 DIAGNOSIS — K11.20 PAROTIDITIS: Primary | ICD-10-CM

## 2024-03-30 ASSESSMENT — ENCOUNTER SYMPTOMS
RHINORRHEA: 0
DIARRHEA: 0
SORE THROAT: 0
SHORTNESS OF BREATH: 0
CONSTIPATION: 0
COUGH: 0
NAUSEA: 0
FACIAL SWELLING: 1

## 2024-03-30 NOTE — PROGRESS NOTES
Issac Morfin is a 89 y.o. male being seen for his  requested  follow up.  Location of visit: [facility]    Visit Date:  March 29, 2024    Reason for Visit:    Chief Complaint   Patient presents with    Facial Swelling        HPI   Patient has new left jaw selling  and tenderness. Significant swelling and redness.   Denies sore throat  Appetite poor  He is confused frequently  Increased muscle weakness    Review of Systems   Constitutional:  Negative for chills and fever.   HENT:  Positive for facial swelling. Negative for congestion, ear discharge, rhinorrhea and sore throat.    Respiratory:  Negative for cough and shortness of breath.    Cardiovascular:  Negative for chest pain, palpitations and leg swelling.   Gastrointestinal:  Negative for constipation, diarrhea and nausea.   Genitourinary:  Negative for difficulty urinating and dysuria.   Musculoskeletal:  Positive for gait problem.   Skin:  Negative for rash and wound.   Neurological:  Positive for weakness. Negative for dizziness and headaches.   Psychiatric/Behavioral:  Positive for confusion.         Physical Exam  Vitals and nursing note reviewed.   Constitutional:       Appearance: He is ill-appearing.   HENT:      Head: Normocephalic and atraumatic.      Jaw: Tenderness and swelling present.        Comments: Left jaw swelling and erythema and tenderness     Right Ear: External ear normal.      Left Ear: External ear normal.   Cardiovascular:      Rate and Rhythm: Rhythm irregular.      Heart sounds: Normal heart sounds.      Comments: Bilateral foot modeling, feet are cool to touch  Pulmonary:      Breath sounds: Normal breath sounds.   Abdominal:      General: Bowel sounds are normal.      Palpations: Abdomen is soft.   Musculoskeletal:      Right lower leg: No edema.      Left lower leg: No edema.   Skin:     General: Skin is warm.   Neurological:      Mental Status: He is alert.   Psychiatric:         Mood and Affect: Mood is anxious.

## 2024-04-03 ENCOUNTER — TELEPHONE (OUTPATIENT)
Dept: PRIMARY CARE CLINIC | Age: 89
End: 2024-04-03

## 2025-07-01 NOTE — TELEPHONE ENCOUNTER
Podiatry referral resent per parent request.      Jennifer Brunson D.O.     Will refill the Zanaflex

## (undated) DEVICE — CYSTO/BLADDER IRRIGATION SET, REGULATING CLAMP

## (undated) DEVICE — CATHETER URETH 26FR BLLN 3CC 3 W F SPEC INF CTRL BARDX

## (undated) DEVICE — TROCAR: Brand: KII FIOS FIRST ENTRY

## (undated) DEVICE — ELECTRO LUBE IS A SINGLE PATIENT USE DEVICE THAT IS INTENDED TO BE USED ON ELECTROSURGICAL ELECTRODES TO REDUCE STICKING.: Brand: KEY SURGICAL ELECTRO LUBE

## (undated) DEVICE — SUTURE VCRL SZ 1 L18IN ABSRB VLT CT-1 L36MM 1/2 CIR J741D

## (undated) DEVICE — Device

## (undated) DEVICE — SOLUTION ANTIFOG VIS SYS CLEARIFY LAPSCP

## (undated) DEVICE — ARM DRAPE

## (undated) DEVICE — AIRSEAL 12 MM ACCESS PORT AND PALM GRIP OBTURATOR WITH BLADELESS OPTICAL TIP, 120 MM LENGTH: Brand: AIRSEAL

## (undated) DEVICE — BLADELESS OBTURATOR: Brand: WECK VISTA

## (undated) DEVICE — CONTAINER,SPECIMEN,4OZ,OR STRL: Brand: MEDLINE

## (undated) DEVICE — APPLICATOR MEDICATED 26 CC SOLUTION HI LT ORNG CHLORAPREP

## (undated) DEVICE — Z INACTIVE USE 2735373 APPLICATOR FBR LAIN COT WOOD TIP ECONOMICAL

## (undated) DEVICE — TOWEL,OR,DSP,ST,NATURAL,DLX,4/PK,20PK/CS: Brand: MEDLINE

## (undated) DEVICE — DRAIN,WOUND,15FR,3/16,FULL-FLUTED: Brand: MEDLINE

## (undated) DEVICE — BLADE CLIPPER GEN PURP NS

## (undated) DEVICE — DRAPE,REIN 53X77,STERILE: Brand: MEDLINE

## (undated) DEVICE — 40580 - THE PINK PAD - ADVANCED TRENDELENBURG POSITIONING KIT: Brand: 40580 - THE PINK PAD - ADVANCED TRENDELENBURG POSITIONING KIT

## (undated) DEVICE — SUTURE MCRYL SZ 4-0 L18IN ABSRB UD L19MM PS-2 3/8 CIR PRIM Y496G

## (undated) DEVICE — PROTECTOR ULN NRV PUR FOAM HK LOOP STRP ANATOMICALLY

## (undated) DEVICE — CATHETER URETH 18FR BLLN 30CC 2 W F INF CTRL BARDX

## (undated) DEVICE — Z DISCONTINUED USE 2272124 DRAPE SURG XL N INVASIVE 2 LAYR DISP

## (undated) DEVICE — MITT SURG PREP L ADH DISPOSABLE

## (undated) DEVICE — RESERVOIR,SUCTION,100CC,SILICONE: Brand: MEDLINE

## (undated) DEVICE — SCISSOR SURG METZ CRV TIP

## (undated) DEVICE — SUTURE V-LOC 180 SZ 3-0 L6IN ABSRB GRN V-20 L26MM 1/2 CIR VLOCL0604

## (undated) DEVICE — SUTURE NONABSORBABLE MONOFILAMENT 3-0 PS-1 18 IN BLK ETHILON 1663H

## (undated) DEVICE — GARMENT COMPR STD FOR 17IN CALF UNIF THER FLOTRN

## (undated) DEVICE — GAUZE,SPONGE,FLUFF,6"X6.75",STRL,5/TRAY: Brand: MEDLINE

## (undated) DEVICE — METER,URINE,400ML,DRAIN BAG,L/F,LL: Brand: MEDLINE

## (undated) DEVICE — CANNULA SEAL

## (undated) DEVICE — GOWN,SIRUS,NONRNF,SETINSLV,XL,20/CS: Brand: MEDLINE

## (undated) DEVICE — INSUFFLATION NEEDLE TO ESTABLISH PNEUMOPERITONEUM.: Brand: INSUFFLATION NEEDLE

## (undated) DEVICE — GLOVE SURG SZ 65 THK91MIL LTX FREE SYN POLYISOPRENE

## (undated) DEVICE — 3M™ IOBAN™ 2 ANTIMICROBIAL INCISE DRAPE 6650EZ: Brand: IOBAN™ 2

## (undated) DEVICE — ADHESIVE SKIN CLOSURE TOP 36 CC HI VISC DERMBND MINI

## (undated) DEVICE — TRI-LUMEN FILTERED TUBE SET WITH ACTIVATED CHARCOAL FILTER: Brand: AIRSEAL

## (undated) DEVICE — POSITIONER,HEAD,MULTIRING,36CS: Brand: MEDLINE

## (undated) DEVICE — SUTURE V-LOC 180 SZ 3-0 L6IN ABSRB GRN L17MM CV-23 1/2 CIR VLOCL0804

## (undated) DEVICE — TIP COVER ACCESSORY

## (undated) DEVICE — CATHETER URETH 24FR BLLN 30CC SIL ALLY W/ SIL HYDRGEL 3 W F

## (undated) DEVICE — STRAP,CATHETER,ELASTIC,HOOK&LOOP: Brand: MEDLINE

## (undated) DEVICE — GARMENT,MEDLINE,DVT,INT,CALF,MED, GEN2: Brand: MEDLINE

## (undated) DEVICE — GLOVE ORANGE PI 7   MSG9070

## (undated) DEVICE — DRESSING TRNSPAR W5XL4.5IN FLM SHT SEMIPERMEABLE WIND

## (undated) DEVICE — GLOVE ORTHO 7 1/2   MSG9475

## (undated) DEVICE — DRAINBAG,ANTI-REFLUX TOWER,L/F,2000ML,LL: Brand: MEDLINE

## (undated) DEVICE — PLUMEPORT SEO LAPAROSCOPIC SMOKE FILTRATION DEVICE: Brand: PLUMEPORT

## (undated) DEVICE — SUTURE PDS II SZ 0 L27IN ABSRB VLT L36MM CT-1 1/2 CIR Z340H